# Patient Record
Sex: FEMALE | Race: WHITE | Employment: OTHER
[De-identification: names, ages, dates, MRNs, and addresses within clinical notes are randomized per-mention and may not be internally consistent; named-entity substitution may affect disease eponyms.]

---

## 2017-09-12 ENCOUNTER — HOSPITAL ENCOUNTER (OUTPATIENT)
Dept: ULTRASOUND IMAGING | Facility: CLINIC | Age: 57
Discharge: HOME | End: 2017-09-12
Payer: COMMERCIAL

## 2017-09-12 DIAGNOSIS — M25.512 LEFT SHOULDER PAIN, UNSPECIFIED CHRONICITY: ICD-10-CM

## 2017-09-12 PROCEDURE — 76881 US COMPL JOINT R-T W/IMG: CPT

## 2018-06-08 ENCOUNTER — HOSPITAL ENCOUNTER (EMERGENCY)
Age: 58
Discharge: HOME | End: 2018-06-08
Attending: EMERGENCY MEDICINE
Payer: COMMERCIAL

## 2018-06-08 ENCOUNTER — APPOINTMENT (OUTPATIENT)
Dept: CT IMAGING | Age: 58
End: 2018-06-08
Payer: COMMERCIAL

## 2018-06-08 ENCOUNTER — APPOINTMENT (OUTPATIENT)
Dept: GENERAL RADIOLOGY | Age: 58
End: 2018-06-08
Payer: COMMERCIAL

## 2018-06-08 VITALS
RESPIRATION RATE: 18 BRPM | TEMPERATURE: 98.4 F | SYSTOLIC BLOOD PRESSURE: 118 MMHG | OXYGEN SATURATION: 100 % | HEART RATE: 82 BPM | DIASTOLIC BLOOD PRESSURE: 102 MMHG

## 2018-06-08 DIAGNOSIS — G44.209 ACUTE NON INTRACTABLE TENSION-TYPE HEADACHE: ICD-10-CM

## 2018-06-08 DIAGNOSIS — R06.02 SHORTNESS OF BREATH: Primary | ICD-10-CM

## 2018-06-08 DIAGNOSIS — R07.9 CHEST PAIN, UNSPECIFIED TYPE: ICD-10-CM

## 2018-06-08 LAB
ANION GAP SERPL CALCULATED.3IONS-SCNC: 10 MMOL/L (ref 9–17)
BUN BLDV-MCNC: 16 MG/DL (ref 6–20)
BUN/CREAT BLD: ABNORMAL (ref 9–20)
CALCIUM SERPL-MCNC: 9 MG/DL (ref 8.6–10.4)
CHLORIDE BLD-SCNC: 99 MMOL/L (ref 98–107)
CO2: 26 MMOL/L (ref 20–31)
CREAT SERPL-MCNC: 0.64 MG/DL (ref 0.5–0.9)
D-DIMER QUANTITATIVE: 1.92 MG/L FEU
EKG ATRIAL RATE: 86 BPM
EKG P AXIS: 103 DEGREES
EKG P-R INTERVAL: 160 MS
EKG Q-T INTERVAL: 364 MS
EKG QRS DURATION: 82 MS
EKG QTC CALCULATION (BAZETT): 435 MS
EKG R AXIS: -14 DEGREES
EKG T AXIS: 15 DEGREES
EKG VENTRICULAR RATE: 86 BPM
GFR AFRICAN AMERICAN: >60 ML/MIN
GFR NON-AFRICAN AMERICAN: >60 ML/MIN
GFR SERPL CREATININE-BSD FRML MDRD: ABNORMAL ML/MIN/{1.73_M2}
GFR SERPL CREATININE-BSD FRML MDRD: ABNORMAL ML/MIN/{1.73_M2}
GLUCOSE BLD-MCNC: 124 MG/DL (ref 70–99)
HCT VFR BLD CALC: 38.2 % (ref 36.3–47.1)
HEMOGLOBIN: 12.3 G/DL (ref 11.9–15.1)
MCH RBC QN AUTO: 28.3 PG (ref 25.2–33.5)
MCHC RBC AUTO-ENTMCNC: 32.2 G/DL (ref 28.4–34.8)
MCV RBC AUTO: 88 FL (ref 82.6–102.9)
NRBC AUTOMATED: 0 PER 100 WBC
PDW BLD-RTO: 13.7 % (ref 11.8–14.4)
PLATELET # BLD: 236 K/UL (ref 138–453)
PMV BLD AUTO: 10.5 FL (ref 8.1–13.5)
POC TROPONIN I: 0 NG/ML (ref 0–0.1)
POC TROPONIN I: 0 NG/ML (ref 0–0.1)
POC TROPONIN INTERP: NORMAL
POC TROPONIN INTERP: NORMAL
POTASSIUM SERPL-SCNC: 4.4 MMOL/L (ref 3.7–5.3)
RBC # BLD: 4.34 M/UL (ref 3.95–5.11)
SODIUM BLD-SCNC: 135 MMOL/L (ref 135–144)
WBC # BLD: 6.7 K/UL (ref 3.5–11.3)

## 2018-06-08 PROCEDURE — 6360000004 HC RX CONTRAST MEDICATION: Performed by: STUDENT IN AN ORGANIZED HEALTH CARE EDUCATION/TRAINING PROGRAM

## 2018-06-08 PROCEDURE — 85379 FIBRIN DEGRADATION QUANT: CPT

## 2018-06-08 PROCEDURE — 71046 X-RAY EXAM CHEST 2 VIEWS: CPT

## 2018-06-08 PROCEDURE — 84484 ASSAY OF TROPONIN QUANT: CPT

## 2018-06-08 PROCEDURE — 85027 COMPLETE CBC AUTOMATED: CPT

## 2018-06-08 PROCEDURE — 80048 BASIC METABOLIC PNL TOTAL CA: CPT

## 2018-06-08 PROCEDURE — 93971 EXTREMITY STUDY: CPT

## 2018-06-08 PROCEDURE — 71260 CT THORAX DX C+: CPT

## 2018-06-08 PROCEDURE — G0384 LEV 5 HOSP TYPE B ED VISIT: HCPCS

## 2018-06-08 PROCEDURE — 93005 ELECTROCARDIOGRAM TRACING: CPT

## 2018-06-08 PROCEDURE — 6370000000 HC RX 637 (ALT 250 FOR IP): Performed by: STUDENT IN AN ORGANIZED HEALTH CARE EDUCATION/TRAINING PROGRAM

## 2018-06-08 RX ORDER — ACETAMINOPHEN 325 MG/1
650 TABLET ORAL ONCE
Status: COMPLETED | OUTPATIENT
Start: 2018-06-08 | End: 2018-06-08

## 2018-06-08 RX ADMIN — IOPAMIDOL 75 ML: 755 INJECTION, SOLUTION INTRAVENOUS at 16:24

## 2018-06-08 RX ADMIN — ACETAMINOPHEN 650 MG: 325 TABLET ORAL at 16:05

## 2018-06-08 ASSESSMENT — ENCOUNTER SYMPTOMS
CONSTIPATION: 0
NAUSEA: 1
COUGH: 1
ABDOMINAL PAIN: 0
SORE THROAT: 0
VOMITING: 0
WHEEZING: 0
DIARRHEA: 0

## 2018-06-08 ASSESSMENT — PAIN SCALES - GENERAL
PAINLEVEL_OUTOF10: 6
PAINLEVEL_OUTOF10: 6

## 2018-06-08 ASSESSMENT — HEART SCORE
ECG: 0
ECG: 1
ECG: 0

## 2018-06-08 ASSESSMENT — PAIN DESCRIPTION - LOCATION: LOCATION: CHEST;ABDOMEN

## 2018-06-08 ASSESSMENT — PAIN DESCRIPTION - PAIN TYPE: TYPE: ACUTE PAIN

## 2018-06-08 ASSESSMENT — PAIN DESCRIPTION - DESCRIPTORS: DESCRIPTORS: ACHING

## 2020-05-31 ENCOUNTER — HOSPITAL ENCOUNTER (INPATIENT)
Age: 60
LOS: 5 days | Discharge: HOME | DRG: 435 | End: 2020-06-06
Attending: EMERGENCY MEDICINE | Admitting: INTERNAL MEDICINE
Payer: COMMERCIAL

## 2020-05-31 ENCOUNTER — APPOINTMENT (OUTPATIENT)
Dept: GENERAL RADIOLOGY | Age: 60
DRG: 435 | End: 2020-05-31
Payer: COMMERCIAL

## 2020-05-31 LAB
ABSOLUTE EOS #: 0 K/UL (ref 0–0.4)
ABSOLUTE IMMATURE GRANULOCYTE: 0 K/UL (ref 0–0.3)
ABSOLUTE LYMPH #: 0.98 K/UL (ref 1–4.8)
ABSOLUTE MONO #: 1.18 K/UL (ref 0.1–0.8)
ALBUMIN SERPL-MCNC: 3.1 G/DL (ref 3.5–5.2)
ALBUMIN/GLOBULIN RATIO: 1 (ref 1–2.5)
ALLEN TEST: ABNORMAL
ALLEN TEST: ABNORMAL
ALP BLD-CCNC: 550 U/L (ref 35–104)
ALT SERPL-CCNC: 655 U/L (ref 5–33)
ANION GAP SERPL CALCULATED.3IONS-SCNC: ABNORMAL MMOL/L (ref 9–17)
ANION GAP: 17 MMOL/L (ref 7–16)
AST SERPL-CCNC: 169 U/L
BASOPHILS # BLD: 0 % (ref 0–2)
BASOPHILS ABSOLUTE: 0 K/UL (ref 0–0.2)
BETA-HYDROXYBUTYRATE: 11.05 MMOL/L (ref 0.02–0.27)
BILIRUB SERPL-MCNC: 0.54 MG/DL (ref 0.3–1.2)
BUN BLDV-MCNC: 41 MG/DL (ref 6–20)
BUN/CREAT BLD: ABNORMAL (ref 9–20)
CALCIUM SERPL-MCNC: 9.5 MG/DL (ref 8.6–10.4)
CARBOXYHEMOGLOBIN: 1.6 % (ref 0–5)
CHLORIDE BLD-SCNC: 83 MMOL/L (ref 98–107)
CHP ED QC CHECK: YES
CO2: <6 MMOL/L (ref 20–31)
CREAT SERPL-MCNC: 1.76 MG/DL (ref 0.5–0.9)
DIFFERENTIAL TYPE: ABNORMAL
EOSINOPHILS RELATIVE PERCENT: 0 % (ref 1–4)
FIO2: ABNORMAL
FIO2: ABNORMAL
GFR AFRICAN AMERICAN: 36 ML/MIN
GFR NON-AFRICAN AMERICAN: 29 ML/MIN
GFR NON-AFRICAN AMERICAN: 30 ML/MIN
GFR SERPL CREATININE-BSD FRML MDRD: 36 ML/MIN
GFR SERPL CREATININE-BSD FRML MDRD: ABNORMAL ML/MIN/{1.73_M2}
GLUCOSE BLD-MCNC: 1078 MG/DL (ref 70–99)
GLUCOSE BLD-MCNC: 700 MG/DL
GLUCOSE BLD-MCNC: >700 MG/DL (ref 74–100)
HCO3 VENOUS: 6.6 MMOL/L (ref 24–30)
HCO3 VENOUS: 7.8 MMOL/L (ref 22–29)
HCT VFR BLD CALC: 40.8 % (ref 36.3–47.1)
HEMOGLOBIN: 11.5 G/DL (ref 11.9–15.1)
IMMATURE GRANULOCYTES: 0 %
INR BLD: 1.1
LACTIC ACID, SEPSIS WHOLE BLOOD: 3.8 MMOL/L (ref 0.5–1.9)
LACTIC ACID, SEPSIS WHOLE BLOOD: 3.8 MMOL/L (ref 0.5–1.9)
LACTIC ACID, SEPSIS: ABNORMAL MMOL/L (ref 0.5–1.9)
LACTIC ACID, SEPSIS: ABNORMAL MMOL/L (ref 0.5–1.9)
LIPASE: 12 U/L (ref 13–60)
LYMPHOCYTES # BLD: 5 % (ref 24–44)
MCH RBC QN AUTO: 28.6 PG (ref 25.2–33.5)
MCHC RBC AUTO-ENTMCNC: 28.2 G/DL (ref 28.4–34.8)
MCV RBC AUTO: 101.5 FL (ref 82.6–102.9)
METHEMOGLOBIN: ABNORMAL % (ref 0–1.5)
MODE: ABNORMAL
MODE: ABNORMAL
MONOCYTES # BLD: 6 % (ref 1–7)
MORPHOLOGY: ABNORMAL
NEGATIVE BASE EXCESS, VEN: 22 (ref 0–2)
NEGATIVE BASE EXCESS, VEN: 23.6 MMOL/L (ref 0–2)
NOTIFICATION TIME: ABNORMAL
NOTIFICATION: ABNORMAL
NRBC AUTOMATED: 0 PER 100 WBC
O2 DEVICE/FLOW/%: ABNORMAL
O2 DEVICE/FLOW/%: ABNORMAL
O2 SAT, VEN: 65 % (ref 60–85)
O2 SAT, VEN: 96.3 % (ref 60–85)
OXYHEMOGLOBIN: ABNORMAL % (ref 95–98)
PARTIAL THROMBOPLASTIN TIME: 26.5 SEC (ref 20.5–30.5)
PATIENT TEMP: 37
PATIENT TEMP: ABNORMAL
PCO2, VEN, TEMP ADJ: ABNORMAL MMHG (ref 39–55)
PCO2, VEN: 28.1 (ref 39–55)
PCO2, VEN: 31 MM HG (ref 41–51)
PDW BLD-RTO: 14.6 % (ref 11.8–14.4)
PEEP/CPAP: ABNORMAL
PH VENOUS: 7 (ref 7.32–7.42)
PH VENOUS: 7.01 (ref 7.32–7.43)
PH, VEN, TEMP ADJ: ABNORMAL (ref 7.32–7.42)
PLATELET # BLD: 473 K/UL (ref 138–453)
PLATELET ESTIMATE: ABNORMAL
PMV BLD AUTO: 11.2 FL (ref 8.1–13.5)
PO2, VEN, TEMP ADJ: ABNORMAL MMHG (ref 30–50)
PO2, VEN: 109 (ref 30–50)
PO2, VEN: 49.2 MM HG (ref 30–50)
POC CHLORIDE: 98 MMOL/L (ref 98–107)
POC CREATININE: 1.78 MG/DL (ref 0.51–1.19)
POC HEMATOCRIT: 39 % (ref 36–46)
POC HEMOGLOBIN: 13.1 G/DL (ref 12–16)
POC IONIZED CALCIUM: 1.23 MMOL/L (ref 1.15–1.33)
POC LACTIC ACID: 3.61 MMOL/L (ref 0.56–1.39)
POC PCO2 TEMP: ABNORMAL MM HG
POC PH TEMP: ABNORMAL
POC PO2 TEMP: ABNORMAL MM HG
POC POTASSIUM: 7 MMOL/L (ref 3.5–4.5)
POC SODIUM: 123 MMOL/L (ref 138–146)
POSITIVE BASE EXCESS, VEN: ABNORMAL (ref 0–3)
POSITIVE BASE EXCESS, VEN: ABNORMAL MMOL/L (ref 0–2)
POTASSIUM SERPL-SCNC: 6.9 MMOL/L (ref 3.7–5.3)
PROTHROMBIN TIME: 11.8 SEC (ref 9–12)
PSV: ABNORMAL
PT. POSITION: ABNORMAL
RBC # BLD: 4.02 M/UL (ref 3.95–5.11)
RBC # BLD: ABNORMAL 10*6/UL
RESPIRATORY RATE: ABNORMAL
SAMPLE SITE: ABNORMAL
SAMPLE SITE: ABNORMAL
SEG NEUTROPHILS: 89 % (ref 36–66)
SEGMENTED NEUTROPHILS ABSOLUTE COUNT: 17.44 K/UL (ref 1.8–7.7)
SET RATE: ABNORMAL
SODIUM BLD-SCNC: 124 MMOL/L (ref 135–144)
TEXT FOR RESPIRATORY: ABNORMAL
TOTAL CO2, VENOUS: 9 MMOL/L (ref 23–30)
TOTAL HB: ABNORMAL G/DL (ref 12–16)
TOTAL PROTEIN: 6.2 G/DL (ref 6.4–8.3)
TOTAL RATE: ABNORMAL
TROPONIN INTERP: ABNORMAL
TROPONIN T: ABNORMAL NG/ML
TROPONIN, HIGH SENSITIVITY: 53 NG/L (ref 0–14)
VT: ABNORMAL
WBC # BLD: 19.6 K/UL (ref 3.5–11.3)
WBC # BLD: ABNORMAL 10*3/UL

## 2020-05-31 PROCEDURE — 82947 ASSAY GLUCOSE BLOOD QUANT: CPT

## 2020-05-31 PROCEDURE — 82565 ASSAY OF CREATININE: CPT

## 2020-05-31 PROCEDURE — 87086 URINE CULTURE/COLONY COUNT: CPT

## 2020-05-31 PROCEDURE — 80053 COMPREHEN METABOLIC PANEL: CPT

## 2020-05-31 PROCEDURE — 84484 ASSAY OF TROPONIN QUANT: CPT

## 2020-05-31 PROCEDURE — 93005 ELECTROCARDIOGRAM TRACING: CPT | Performed by: EMERGENCY MEDICINE

## 2020-05-31 PROCEDURE — 85610 PROTHROMBIN TIME: CPT

## 2020-05-31 PROCEDURE — 82805 BLOOD GASES W/O2 SATURATION: CPT

## 2020-05-31 PROCEDURE — 87040 BLOOD CULTURE FOR BACTERIA: CPT

## 2020-05-31 PROCEDURE — 82010 KETONE BODYS QUAN: CPT

## 2020-05-31 PROCEDURE — 82435 ASSAY OF BLOOD CHLORIDE: CPT

## 2020-05-31 PROCEDURE — 6370000000 HC RX 637 (ALT 250 FOR IP): Performed by: EMERGENCY MEDICINE

## 2020-05-31 PROCEDURE — 84132 ASSAY OF SERUM POTASSIUM: CPT

## 2020-05-31 PROCEDURE — 96365 THER/PROPH/DIAG IV INF INIT: CPT

## 2020-05-31 PROCEDURE — 2580000003 HC RX 258: Performed by: EMERGENCY MEDICINE

## 2020-05-31 PROCEDURE — 85025 COMPLETE CBC W/AUTO DIFF WBC: CPT

## 2020-05-31 PROCEDURE — 83690 ASSAY OF LIPASE: CPT

## 2020-05-31 PROCEDURE — 84295 ASSAY OF SERUM SODIUM: CPT

## 2020-05-31 PROCEDURE — 36415 COLL VENOUS BLD VENIPUNCTURE: CPT

## 2020-05-31 PROCEDURE — 82803 BLOOD GASES ANY COMBINATION: CPT

## 2020-05-31 PROCEDURE — 96366 THER/PROPH/DIAG IV INF ADDON: CPT

## 2020-05-31 PROCEDURE — 71045 X-RAY EXAM CHEST 1 VIEW: CPT

## 2020-05-31 PROCEDURE — 83605 ASSAY OF LACTIC ACID: CPT

## 2020-05-31 PROCEDURE — 99285 EMERGENCY DEPT VISIT HI MDM: CPT

## 2020-05-31 PROCEDURE — 85730 THROMBOPLASTIN TIME PARTIAL: CPT

## 2020-05-31 PROCEDURE — 82330 ASSAY OF CALCIUM: CPT

## 2020-05-31 PROCEDURE — 85014 HEMATOCRIT: CPT

## 2020-05-31 RX ORDER — CALCIUM GLUCONATE 20 MG/ML
2 INJECTION, SOLUTION INTRAVENOUS ONCE
Status: COMPLETED | OUTPATIENT
Start: 2020-06-01 | End: 2020-06-01

## 2020-05-31 RX ORDER — 0.9 % SODIUM CHLORIDE 0.9 %
1000 INTRAVENOUS SOLUTION INTRAVENOUS ONCE
Status: COMPLETED | OUTPATIENT
Start: 2020-05-31 | End: 2020-06-01

## 2020-05-31 RX ORDER — 0.9 % SODIUM CHLORIDE 0.9 %
15 INTRAVENOUS SOLUTION INTRAVENOUS ONCE
Status: COMPLETED | OUTPATIENT
Start: 2020-05-31 | End: 2020-06-01

## 2020-05-31 RX ORDER — POTASSIUM CHLORIDE 7.45 MG/ML
10 INJECTION INTRAVENOUS PRN
Status: DISCONTINUED | OUTPATIENT
Start: 2020-05-31 | End: 2020-06-06 | Stop reason: HOSPADM

## 2020-05-31 RX ORDER — SODIUM CHLORIDE 9 MG/ML
INJECTION, SOLUTION INTRAVENOUS CONTINUOUS
Status: DISCONTINUED | OUTPATIENT
Start: 2020-06-01 | End: 2020-06-01

## 2020-05-31 RX ORDER — DEXTROSE AND SODIUM CHLORIDE 5; .45 G/100ML; G/100ML
INJECTION, SOLUTION INTRAVENOUS CONTINUOUS PRN
Status: DISCONTINUED | OUTPATIENT
Start: 2020-05-31 | End: 2020-06-06 | Stop reason: HOSPADM

## 2020-05-31 RX ORDER — SODIUM CHLORIDE, SODIUM LACTATE, POTASSIUM CHLORIDE, CALCIUM CHLORIDE 600; 310; 30; 20 MG/100ML; MG/100ML; MG/100ML; MG/100ML
1000 INJECTION, SOLUTION INTRAVENOUS ONCE
Status: COMPLETED | OUTPATIENT
Start: 2020-05-31 | End: 2020-06-01

## 2020-05-31 RX ADMIN — SODIUM CHLORIDE 0.1 UNITS/KG/HR: 9 INJECTION, SOLUTION INTRAVENOUS at 23:28

## 2020-05-31 RX ADMIN — SODIUM CHLORIDE, POTASSIUM CHLORIDE, SODIUM LACTATE AND CALCIUM CHLORIDE 1000 ML: 600; 310; 30; 20 INJECTION, SOLUTION INTRAVENOUS at 22:53

## 2020-05-31 RX ADMIN — SODIUM CHLORIDE 1000 ML: 9 INJECTION, SOLUTION INTRAVENOUS at 23:28

## 2020-05-31 ASSESSMENT — ENCOUNTER SYMPTOMS
CONSTIPATION: 1
BACK PAIN: 1
SORE THROAT: 0
SHORTNESS OF BREATH: 1
ABDOMINAL PAIN: 1
DIARRHEA: 0
VOMITING: 1
COUGH: 0
NAUSEA: 1

## 2020-05-31 ASSESSMENT — PAIN DESCRIPTION - ORIENTATION: ORIENTATION: RIGHT;LEFT;UPPER

## 2020-05-31 ASSESSMENT — PAIN DESCRIPTION - FREQUENCY: FREQUENCY: INTERMITTENT

## 2020-05-31 ASSESSMENT — PAIN DESCRIPTION - PROGRESSION: CLINICAL_PROGRESSION: NOT CHANGED

## 2020-05-31 ASSESSMENT — PAIN DESCRIPTION - DESCRIPTORS: DESCRIPTORS: ACHING

## 2020-05-31 ASSESSMENT — PAIN DESCRIPTION - ONSET: ONSET: ON-GOING

## 2020-05-31 ASSESSMENT — PAIN DESCRIPTION - PAIN TYPE: TYPE: ACUTE PAIN

## 2020-05-31 ASSESSMENT — PAIN DESCRIPTION - LOCATION: LOCATION: ABDOMEN

## 2020-06-01 PROBLEM — E11.10 DKA, TYPE 2, NOT AT GOAL (HCC): Status: ACTIVE | Noted: 2020-06-01

## 2020-06-01 LAB
-: ABNORMAL
ALBUMIN SERPL-MCNC: 3 G/DL (ref 3.5–5.2)
ALBUMIN/GLOBULIN RATIO: 1 (ref 1–2.5)
ALLEN TEST: ABNORMAL
ALLEN TEST: ABNORMAL
ALP BLD-CCNC: 499 U/L (ref 35–104)
ALT SERPL-CCNC: 613 U/L (ref 5–33)
AMORPHOUS: ABNORMAL
ANION GAP SERPL CALCULATED.3IONS-SCNC: 14 MMOL/L (ref 9–17)
ANION GAP SERPL CALCULATED.3IONS-SCNC: 14 MMOL/L (ref 9–17)
ANION GAP SERPL CALCULATED.3IONS-SCNC: 19 MMOL/L (ref 9–17)
ANION GAP SERPL CALCULATED.3IONS-SCNC: 29 MMOL/L (ref 9–17)
ANION GAP SERPL CALCULATED.3IONS-SCNC: 30 MMOL/L (ref 9–17)
AST SERPL-CCNC: 133 U/L
BACTERIA: ABNORMAL
BETA-HYDROXYBUTYRATE: 8.76 MMOL/L (ref 0.02–0.27)
BILIRUB SERPL-MCNC: 0.43 MG/DL (ref 0.3–1.2)
BILIRUBIN URINE: NEGATIVE
BUN BLDV-MCNC: 36 MG/DL (ref 6–20)
BUN BLDV-MCNC: 38 MG/DL (ref 6–20)
BUN BLDV-MCNC: 39 MG/DL (ref 6–20)
BUN BLDV-MCNC: 41 MG/DL (ref 6–20)
BUN BLDV-MCNC: 43 MG/DL (ref 6–20)
BUN/CREAT BLD: ABNORMAL (ref 9–20)
CALCIUM SERPL-MCNC: 8.6 MG/DL (ref 8.6–10.4)
CALCIUM SERPL-MCNC: 8.7 MG/DL (ref 8.6–10.4)
CALCIUM SERPL-MCNC: 8.9 MG/DL (ref 8.6–10.4)
CALCIUM SERPL-MCNC: 9.2 MG/DL (ref 8.6–10.4)
CALCIUM SERPL-MCNC: 9.2 MG/DL (ref 8.6–10.4)
CARBOXYHEMOGLOBIN: 0.8 % (ref 0–5)
CARBOXYHEMOGLOBIN: 3.9 % (ref 0–5)
CASTS UA: ABNORMAL /LPF (ref 0–2)
CASTS UA: ABNORMAL /LPF (ref 0–2)
CHLORIDE BLD-SCNC: 102 MMOL/L (ref 98–107)
CHLORIDE BLD-SCNC: 103 MMOL/L (ref 98–107)
CHLORIDE BLD-SCNC: 104 MMOL/L (ref 98–107)
CHLORIDE BLD-SCNC: 92 MMOL/L (ref 98–107)
CHLORIDE BLD-SCNC: 98 MMOL/L (ref 98–107)
CO2: 16 MMOL/L (ref 20–31)
CO2: 17 MMOL/L (ref 20–31)
CO2: 19 MMOL/L (ref 20–31)
CO2: 7 MMOL/L (ref 20–31)
CO2: 9 MMOL/L (ref 20–31)
COLOR: YELLOW
COMMENT UA: ABNORMAL
CREAT SERPL-MCNC: 1.23 MG/DL (ref 0.5–0.9)
CREAT SERPL-MCNC: 1.26 MG/DL (ref 0.5–0.9)
CREAT SERPL-MCNC: 1.33 MG/DL (ref 0.5–0.9)
CREAT SERPL-MCNC: 1.7 MG/DL (ref 0.5–0.9)
CREAT SERPL-MCNC: 1.78 MG/DL (ref 0.5–0.9)
CRYSTALS, UA: ABNORMAL /HPF
DIRECT EXAM: ABNORMAL
EKG ATRIAL RATE: 103 BPM
EKG ATRIAL RATE: 99 BPM
EKG P AXIS: 25 DEGREES
EKG P AXIS: 48 DEGREES
EKG P-R INTERVAL: 146 MS
EKG P-R INTERVAL: 152 MS
EKG Q-T INTERVAL: 340 MS
EKG Q-T INTERVAL: 378 MS
EKG QRS DURATION: 100 MS
EKG QRS DURATION: 86 MS
EKG QTC CALCULATION (BAZETT): 436 MS
EKG QTC CALCULATION (BAZETT): 495 MS
EKG R AXIS: -27 DEGREES
EKG R AXIS: -9 DEGREES
EKG T AXIS: 28 DEGREES
EKG T AXIS: 59 DEGREES
EKG VENTRICULAR RATE: 103 BPM
EKG VENTRICULAR RATE: 99 BPM
EPITHELIAL CELLS UA: ABNORMAL /HPF (ref 0–5)
ESTIMATED AVERAGE GLUCOSE: 260 MG/DL
FIO2: ABNORMAL
FIO2: ABNORMAL
GFR AFRICAN AMERICAN: 36 ML/MIN
GFR AFRICAN AMERICAN: 38 ML/MIN
GFR AFRICAN AMERICAN: 50 ML/MIN
GFR AFRICAN AMERICAN: 53 ML/MIN
GFR AFRICAN AMERICAN: 55 ML/MIN
GFR NON-AFRICAN AMERICAN: 29 ML/MIN
GFR NON-AFRICAN AMERICAN: 31 ML/MIN
GFR NON-AFRICAN AMERICAN: 41 ML/MIN
GFR NON-AFRICAN AMERICAN: 44 ML/MIN
GFR NON-AFRICAN AMERICAN: 45 ML/MIN
GFR SERPL CREATININE-BSD FRML MDRD: ABNORMAL ML/MIN/{1.73_M2}
GLUCOSE BLD-MCNC: 162 MG/DL (ref 65–105)
GLUCOSE BLD-MCNC: 174 MG/DL (ref 65–105)
GLUCOSE BLD-MCNC: 175 MG/DL (ref 65–105)
GLUCOSE BLD-MCNC: 176 MG/DL (ref 65–105)
GLUCOSE BLD-MCNC: 177 MG/DL (ref 65–105)
GLUCOSE BLD-MCNC: 178 MG/DL (ref 65–105)
GLUCOSE BLD-MCNC: 187 MG/DL (ref 65–105)
GLUCOSE BLD-MCNC: 192 MG/DL (ref 70–99)
GLUCOSE BLD-MCNC: 195 MG/DL (ref 70–99)
GLUCOSE BLD-MCNC: 213 MG/DL (ref 65–105)
GLUCOSE BLD-MCNC: 230 MG/DL (ref 65–105)
GLUCOSE BLD-MCNC: 318 MG/DL (ref 65–105)
GLUCOSE BLD-MCNC: 351 MG/DL (ref 65–105)
GLUCOSE BLD-MCNC: 355 MG/DL (ref 65–105)
GLUCOSE BLD-MCNC: 393 MG/DL (ref 70–99)
GLUCOSE BLD-MCNC: 411 MG/DL (ref 65–105)
GLUCOSE BLD-MCNC: 450 MG/DL (ref 65–105)
GLUCOSE BLD-MCNC: 471 MG/DL (ref 65–105)
GLUCOSE BLD-MCNC: 525 MG/DL (ref 65–105)
GLUCOSE BLD-MCNC: 555 MG/DL (ref 65–105)
GLUCOSE BLD-MCNC: 641 MG/DL (ref 70–99)
GLUCOSE BLD-MCNC: 817 MG/DL (ref 70–99)
GLUCOSE BLD-MCNC: >600 MG/DL (ref 65–105)
GLUCOSE BLD-MCNC: >600 MG/DL (ref 65–105)
GLUCOSE URINE: ABNORMAL
HBA1C MFR BLD: 10.7 % (ref 4–6)
HCO3 ARTERIAL: 20.5 MMOL/L (ref 22–27)
HCO3 VENOUS: 18.2 MMOL/L (ref 24–30)
KETONES, URINE: ABNORMAL
LACTIC ACID, SEPSIS WHOLE BLOOD: 3.6 MMOL/L (ref 0.5–1.9)
LACTIC ACID, SEPSIS: ABNORMAL MMOL/L (ref 0.5–1.9)
LACTIC ACID, WHOLE BLOOD: 1.5 MMOL/L (ref 0.7–2.1)
LACTIC ACID: NORMAL MMOL/L
LEUKOCYTE ESTERASE, URINE: NEGATIVE
LIPASE: 14 U/L (ref 13–60)
Lab: ABNORMAL
MAGNESIUM: 1.3 MG/DL (ref 1.6–2.6)
MAGNESIUM: 1.4 MG/DL (ref 1.6–2.6)
MAGNESIUM: 1.8 MG/DL (ref 1.6–2.6)
MAGNESIUM: 1.9 MG/DL (ref 1.6–2.6)
METHEMOGLOBIN: ABNORMAL % (ref 0–1.5)
METHEMOGLOBIN: ABNORMAL % (ref 0–1.5)
MODE: ABNORMAL
MODE: ABNORMAL
MUCUS: ABNORMAL
MYOGLOBIN: 316 NG/ML (ref 25–58)
NEGATIVE BASE EXCESS, ART: 3.8 MMOL/L (ref 0–2)
NEGATIVE BASE EXCESS, VEN: 4.8 MMOL/L (ref 0–2)
NITRITE, URINE: NEGATIVE
NOTIFICATION TIME: ABNORMAL
NOTIFICATION TIME: ABNORMAL
NOTIFICATION: ABNORMAL
NOTIFICATION: ABNORMAL
O2 DEVICE/FLOW/%: ABNORMAL
O2 DEVICE/FLOW/%: ABNORMAL
O2 SAT, ARTERIAL: 97.2 % (ref 94–100)
O2 SAT, VEN: 99.2 % (ref 60–85)
OTHER OBSERVATIONS UA: ABNORMAL
OXYHEMOGLOBIN: ABNORMAL % (ref 95–98)
OXYHEMOGLOBIN: ABNORMAL % (ref 95–98)
PARTIAL THROMBOPLASTIN TIME: 23.9 SEC (ref 20.5–30.5)
PATIENT TEMP: 37
PATIENT TEMP: 37
PCO2 ARTERIAL: 36.9 MMHG (ref 32–45)
PCO2, ART, TEMP ADJ: ABNORMAL (ref 32–45)
PCO2, VEN, TEMP ADJ: ABNORMAL MMHG (ref 39–55)
PCO2, VEN: 28.2 (ref 39–55)
PEEP/CPAP: ABNORMAL
PEEP/CPAP: ABNORMAL
PH ARTERIAL: 7.37 (ref 7.35–7.45)
PH UA: 5 (ref 5–8)
PH VENOUS: 7.42 (ref 7.32–7.42)
PH, ART, TEMP ADJ: ABNORMAL (ref 7.35–7.45)
PH, VEN, TEMP ADJ: ABNORMAL (ref 7.32–7.42)
PHOSPHORUS: 2.1 MG/DL (ref 2.6–4.5)
PHOSPHORUS: 2.2 MG/DL (ref 2.6–4.5)
PHOSPHORUS: 2.7 MG/DL (ref 2.6–4.5)
PHOSPHORUS: 3.7 MG/DL (ref 2.6–4.5)
PO2 ARTERIAL: 90.6 MMHG (ref 75–95)
PO2, ART, TEMP ADJ: ABNORMAL MMHG (ref 75–95)
PO2, VEN, TEMP ADJ: ABNORMAL MMHG (ref 30–50)
PO2, VEN: 166 (ref 30–50)
POSITIVE BASE EXCESS, ART: ABNORMAL MMOL/L (ref 0–2)
POSITIVE BASE EXCESS, VEN: ABNORMAL MMOL/L (ref 0–2)
POTASSIUM SERPL-SCNC: 3.8 MMOL/L (ref 3.7–5.3)
POTASSIUM SERPL-SCNC: 3.9 MMOL/L (ref 3.7–5.3)
POTASSIUM SERPL-SCNC: 4.2 MMOL/L (ref 3.7–5.3)
POTASSIUM SERPL-SCNC: 4.9 MMOL/L (ref 3.7–5.3)
POTASSIUM SERPL-SCNC: 6.3 MMOL/L (ref 3.7–5.3)
PROTEIN UA: ABNORMAL
PSV: ABNORMAL
PSV: ABNORMAL
PT. POSITION: ABNORMAL
PT. POSITION: ABNORMAL
RBC UA: ABNORMAL /HPF (ref 0–2)
RENAL EPITHELIAL, UA: ABNORMAL /HPF
RESPIRATORY RATE: ABNORMAL
RESPIRATORY RATE: ABNORMAL
SAMPLE SITE: ABNORMAL
SAMPLE SITE: ABNORMAL
SET RATE: ABNORMAL
SET RATE: ABNORMAL
SODIUM BLD-SCNC: 129 MMOL/L (ref 135–144)
SODIUM BLD-SCNC: 133 MMOL/L (ref 135–144)
SODIUM BLD-SCNC: 136 MMOL/L (ref 135–144)
SODIUM BLD-SCNC: 137 MMOL/L (ref 135–144)
SODIUM BLD-SCNC: 138 MMOL/L (ref 135–144)
SPECIFIC GRAVITY UA: 1.02 (ref 1–1.03)
SPECIMEN DESCRIPTION: ABNORMAL
TEXT FOR RESPIRATORY: ABNORMAL
TEXT FOR RESPIRATORY: ABNORMAL
TOTAL CK: 106 U/L (ref 26–192)
TOTAL HB: ABNORMAL G/DL (ref 12–16)
TOTAL HB: ABNORMAL G/DL (ref 12–16)
TOTAL PROTEIN: 6 G/DL (ref 6.4–8.3)
TOTAL RATE: ABNORMAL
TOTAL RATE: ABNORMAL
TRICHOMONAS: ABNORMAL
TROPONIN INTERP: ABNORMAL
TROPONIN T: ABNORMAL NG/ML
TROPONIN, HIGH SENSITIVITY: 121 NG/L (ref 0–14)
TROPONIN, HIGH SENSITIVITY: 236 NG/L (ref 0–14)
TROPONIN, HIGH SENSITIVITY: 480 NG/L (ref 0–14)
TROPONIN, HIGH SENSITIVITY: 62 NG/L (ref 0–14)
TROPONIN, HIGH SENSITIVITY: 62 NG/L (ref 0–14)
TROPONIN, HIGH SENSITIVITY: 87 NG/L (ref 0–14)
TURBIDITY: ABNORMAL
URINE HGB: NEGATIVE
UROBILINOGEN, URINE: NORMAL
VT: ABNORMAL
VT: ABNORMAL
WBC UA: ABNORMAL /HPF (ref 0–5)
YEAST: ABNORMAL

## 2020-06-01 PROCEDURE — 6360000002 HC RX W HCPCS: Performed by: STUDENT IN AN ORGANIZED HEALTH CARE EDUCATION/TRAINING PROGRAM

## 2020-06-01 PROCEDURE — 76937 US GUIDE VASCULAR ACCESS: CPT

## 2020-06-01 PROCEDURE — 93010 ELECTROCARDIOGRAM REPORT: CPT | Performed by: INTERNAL MEDICINE

## 2020-06-01 PROCEDURE — 82805 BLOOD GASES W/O2 SATURATION: CPT

## 2020-06-01 PROCEDURE — 6370000000 HC RX 637 (ALT 250 FOR IP): Performed by: STUDENT IN AN ORGANIZED HEALTH CARE EDUCATION/TRAINING PROGRAM

## 2020-06-01 PROCEDURE — 83605 ASSAY OF LACTIC ACID: CPT

## 2020-06-01 PROCEDURE — 36620 INSERTION CATHETER ARTERY: CPT

## 2020-06-01 PROCEDURE — 83036 HEMOGLOBIN GLYCOSYLATED A1C: CPT

## 2020-06-01 PROCEDURE — 84484 ASSAY OF TROPONIN QUANT: CPT

## 2020-06-01 PROCEDURE — 85730 THROMBOPLASTIN TIME PARTIAL: CPT

## 2020-06-01 PROCEDURE — 83690 ASSAY OF LIPASE: CPT

## 2020-06-01 PROCEDURE — 36415 COLL VENOUS BLD VENIPUNCTURE: CPT

## 2020-06-01 PROCEDURE — 80053 COMPREHEN METABOLIC PANEL: CPT

## 2020-06-01 PROCEDURE — 37799 UNLISTED PX VASCULAR SURGERY: CPT

## 2020-06-01 PROCEDURE — 2580000003 HC RX 258: Performed by: STUDENT IN AN ORGANIZED HEALTH CARE EDUCATION/TRAINING PROGRAM

## 2020-06-01 PROCEDURE — 2580000003 HC RX 258: Performed by: EMERGENCY MEDICINE

## 2020-06-01 PROCEDURE — 2000000000 HC ICU R&B

## 2020-06-01 PROCEDURE — 83735 ASSAY OF MAGNESIUM: CPT

## 2020-06-01 PROCEDURE — 82947 ASSAY GLUCOSE BLOOD QUANT: CPT

## 2020-06-01 PROCEDURE — 82550 ASSAY OF CK (CPK): CPT

## 2020-06-01 PROCEDURE — 82010 KETONE BODYS QUAN: CPT

## 2020-06-01 PROCEDURE — 03HY32Z INSERTION OF MONITORING DEVICE INTO UPPER ARTERY, PERCUTANEOUS APPROACH: ICD-10-PCS | Performed by: INTERNAL MEDICINE

## 2020-06-01 PROCEDURE — 84100 ASSAY OF PHOSPHORUS: CPT

## 2020-06-01 PROCEDURE — 99291 CRITICAL CARE FIRST HOUR: CPT | Performed by: INTERNAL MEDICINE

## 2020-06-01 PROCEDURE — 80048 BASIC METABOLIC PNL TOTAL CA: CPT

## 2020-06-01 PROCEDURE — 83874 ASSAY OF MYOGLOBIN: CPT

## 2020-06-01 PROCEDURE — 6360000002 HC RX W HCPCS: Performed by: EMERGENCY MEDICINE

## 2020-06-01 PROCEDURE — 2500000003 HC RX 250 WO HCPCS: Performed by: STUDENT IN AN ORGANIZED HEALTH CARE EDUCATION/TRAINING PROGRAM

## 2020-06-01 PROCEDURE — 93005 ELECTROCARDIOGRAM TRACING: CPT | Performed by: STUDENT IN AN ORGANIZED HEALTH CARE EDUCATION/TRAINING PROGRAM

## 2020-06-01 PROCEDURE — 87641 MR-STAPH DNA AMP PROBE: CPT

## 2020-06-01 PROCEDURE — 81001 URINALYSIS AUTO W/SCOPE: CPT

## 2020-06-01 RX ORDER — MAGNESIUM SULFATE 1 G/100ML
1 INJECTION INTRAVENOUS PRN
Status: DISCONTINUED | OUTPATIENT
Start: 2020-06-01 | End: 2020-06-06 | Stop reason: HOSPADM

## 2020-06-01 RX ORDER — HEPARIN SODIUM 10000 [USP'U]/100ML
8.51 INJECTION, SOLUTION INTRAVENOUS CONTINUOUS
Status: DISCONTINUED | OUTPATIENT
Start: 2020-06-01 | End: 2020-06-01

## 2020-06-01 RX ORDER — HEPARIN SODIUM 10000 [USP'U]/100ML
1000 INJECTION, SOLUTION INTRAVENOUS CONTINUOUS
Status: DISCONTINUED | OUTPATIENT
Start: 2020-06-01 | End: 2020-06-03

## 2020-06-01 RX ORDER — SODIUM CHLORIDE 450 MG/100ML
INJECTION, SOLUTION INTRAVENOUS CONTINUOUS
Status: DISCONTINUED | OUTPATIENT
Start: 2020-06-01 | End: 2020-06-01

## 2020-06-01 RX ORDER — INSULIN ASPART 100 [IU]/ML
INJECTION, SOLUTION INTRAVENOUS; SUBCUTANEOUS
COMMUNITY
End: 2020-12-23 | Stop reason: HOSPADM

## 2020-06-01 RX ORDER — HEPARIN SODIUM 1000 [USP'U]/ML
2000 INJECTION, SOLUTION INTRAVENOUS; SUBCUTANEOUS PRN
Status: DISCONTINUED | OUTPATIENT
Start: 2020-06-01 | End: 2020-06-01

## 2020-06-01 RX ORDER — HEPARIN SODIUM 1000 [USP'U]/ML
2000 INJECTION, SOLUTION INTRAVENOUS; SUBCUTANEOUS PRN
Status: DISCONTINUED | OUTPATIENT
Start: 2020-06-01 | End: 2020-06-03

## 2020-06-01 RX ORDER — SERTRALINE HYDROCHLORIDE 100 MG/1
150 TABLET, FILM COATED ORAL DAILY
COMMUNITY

## 2020-06-01 RX ORDER — ONDANSETRON 2 MG/ML
4 INJECTION INTRAMUSCULAR; INTRAVENOUS EVERY 6 HOURS PRN
Status: DISCONTINUED | OUTPATIENT
Start: 2020-06-01 | End: 2020-06-06 | Stop reason: HOSPADM

## 2020-06-01 RX ORDER — ASPIRIN 81 MG/1
81 TABLET, CHEWABLE ORAL NIGHTLY
COMMUNITY

## 2020-06-01 RX ORDER — DEXTROSE MONOHYDRATE 25 G/50ML
12.5 INJECTION, SOLUTION INTRAVENOUS PRN
Status: DISCONTINUED | OUTPATIENT
Start: 2020-06-01 | End: 2020-06-06 | Stop reason: HOSPADM

## 2020-06-01 RX ORDER — HEPARIN SODIUM 1000 [USP'U]/ML
4000 INJECTION, SOLUTION INTRAVENOUS; SUBCUTANEOUS ONCE
Status: COMPLETED | OUTPATIENT
Start: 2020-06-01 | End: 2020-06-01

## 2020-06-01 RX ORDER — HEPARIN SODIUM 1000 [USP'U]/ML
4000 INJECTION, SOLUTION INTRAVENOUS; SUBCUTANEOUS ONCE
Status: DISCONTINUED | OUTPATIENT
Start: 2020-06-01 | End: 2020-06-01

## 2020-06-01 RX ORDER — ASPIRIN 81 MG/1
81 TABLET ORAL DAILY
Status: DISCONTINUED | OUTPATIENT
Start: 2020-06-01 | End: 2020-06-06 | Stop reason: HOSPADM

## 2020-06-01 RX ORDER — HEPARIN SODIUM 1000 [USP'U]/ML
4000 INJECTION, SOLUTION INTRAVENOUS; SUBCUTANEOUS PRN
Status: DISCONTINUED | OUTPATIENT
Start: 2020-06-01 | End: 2020-06-03

## 2020-06-01 RX ORDER — HEPARIN SODIUM 1000 [USP'U]/ML
4000 INJECTION, SOLUTION INTRAVENOUS; SUBCUTANEOUS PRN
Status: DISCONTINUED | OUTPATIENT
Start: 2020-06-01 | End: 2020-06-01

## 2020-06-01 RX ORDER — 0.9 % SODIUM CHLORIDE 0.9 %
15 INTRAVENOUS SOLUTION INTRAVENOUS ONCE
Status: COMPLETED | OUTPATIENT
Start: 2020-06-01 | End: 2020-06-01

## 2020-06-01 RX ORDER — HEPARIN SODIUM 5000 [USP'U]/ML
5000 INJECTION, SOLUTION INTRAVENOUS; SUBCUTANEOUS EVERY 8 HOURS SCHEDULED
Status: DISCONTINUED | OUTPATIENT
Start: 2020-06-01 | End: 2020-06-01

## 2020-06-01 RX ORDER — SODIUM CHLORIDE 0.9 % (FLUSH) 0.9 %
10 SYRINGE (ML) INJECTION PRN
Status: CANCELLED | OUTPATIENT
Start: 2020-06-01

## 2020-06-01 RX ORDER — SODIUM CHLORIDE 0.9 % (FLUSH) 0.9 %
10 SYRINGE (ML) INJECTION EVERY 12 HOURS SCHEDULED
Status: CANCELLED | OUTPATIENT
Start: 2020-06-01

## 2020-06-01 RX ORDER — DEXTROSE AND SODIUM CHLORIDE 5; .45 G/100ML; G/100ML
INJECTION, SOLUTION INTRAVENOUS CONTINUOUS PRN
Status: DISCONTINUED | OUTPATIENT
Start: 2020-06-01 | End: 2020-06-06 | Stop reason: HOSPADM

## 2020-06-01 RX ORDER — AMLODIPINE BESYLATE 5 MG/1
5 TABLET ORAL NIGHTLY
Status: ON HOLD | COMMUNITY
End: 2020-06-06 | Stop reason: HOSPADM

## 2020-06-01 RX ADMIN — SODIUM CHLORIDE: 9 INJECTION, SOLUTION INTRAVENOUS at 02:23

## 2020-06-01 RX ADMIN — MAGNESIUM SULFATE 1 G: 1 INJECTION INTRAVENOUS at 12:10

## 2020-06-01 RX ADMIN — SODIUM CHLORIDE: 9 INJECTION, SOLUTION INTRAVENOUS at 05:51

## 2020-06-01 RX ADMIN — CALCIUM GLUCONATE 2 G: 20 INJECTION, SOLUTION INTRAVENOUS at 00:35

## 2020-06-01 RX ADMIN — SODIUM CHLORIDE 14.4 UNITS/HR: 9 INJECTION, SOLUTION INTRAVENOUS at 09:12

## 2020-06-01 RX ADMIN — SODIUM CHLORIDE 1000 ML: 9 INJECTION, SOLUTION INTRAVENOUS at 01:22

## 2020-06-01 RX ADMIN — SODIUM PHOSPHATE, MONOBASIC, MONOHYDRATE 15 MMOL: 276; 142 INJECTION, SOLUTION INTRAVENOUS at 12:24

## 2020-06-01 RX ADMIN — MAGNESIUM SULFATE 1 G: 1 INJECTION INTRAVENOUS at 11:01

## 2020-06-01 RX ADMIN — METOPROLOL TARTRATE 12.5 MG: 25 TABLET ORAL at 21:00

## 2020-06-01 RX ADMIN — HEPARIN SODIUM AND DEXTROSE 8.5 UNITS/KG/HR: 10000; 5 INJECTION INTRAVENOUS at 18:23

## 2020-06-01 RX ADMIN — HEPARIN SODIUM 5000 UNITS: 5000 INJECTION INTRAVENOUS; SUBCUTANEOUS at 05:51

## 2020-06-01 RX ADMIN — DEXTROSE AND SODIUM CHLORIDE: 5; 450 INJECTION, SOLUTION INTRAVENOUS at 13:04

## 2020-06-01 RX ADMIN — SODIUM CHLORIDE 1905 ML: 9 INJECTION, SOLUTION INTRAVENOUS at 00:41

## 2020-06-01 RX ADMIN — METOPROLOL TARTRATE 12.5 MG: 25 TABLET ORAL at 12:07

## 2020-06-01 RX ADMIN — ONDANSETRON 4 MG: 2 INJECTION INTRAMUSCULAR; INTRAVENOUS at 15:01

## 2020-06-01 RX ADMIN — MAGNESIUM SULFATE 1 G: 1 INJECTION INTRAVENOUS at 14:03

## 2020-06-01 RX ADMIN — HEPARIN SODIUM 4000 UNITS: 1000 INJECTION INTRAVENOUS; SUBCUTANEOUS at 18:23

## 2020-06-01 RX ADMIN — SODIUM CHLORIDE 5.08 UNITS/HR: 9 INJECTION, SOLUTION INTRAVENOUS at 17:06

## 2020-06-01 RX ADMIN — Medication 81 MG: at 09:33

## 2020-06-01 ASSESSMENT — PAIN SCALES - GENERAL
PAINLEVEL_OUTOF10: 0
PAINLEVEL_OUTOF10: 4
PAINLEVEL_OUTOF10: 5

## 2020-06-01 ASSESSMENT — PAIN DESCRIPTION - FREQUENCY: FREQUENCY: INTERMITTENT

## 2020-06-01 ASSESSMENT — PAIN DESCRIPTION - LOCATION
LOCATION: HEAD
LOCATION: ABDOMEN

## 2020-06-01 ASSESSMENT — PAIN DESCRIPTION - PAIN TYPE
TYPE: ACUTE PAIN
TYPE: ACUTE PAIN

## 2020-06-01 ASSESSMENT — PAIN DESCRIPTION - ORIENTATION: ORIENTATION: RIGHT;LEFT;UPPER

## 2020-06-01 ASSESSMENT — PAIN DESCRIPTION - DESCRIPTORS
DESCRIPTORS: ACHING
DESCRIPTORS: ACHING

## 2020-06-01 ASSESSMENT — PAIN DESCRIPTION - PROGRESSION: CLINICAL_PROGRESSION: NOT CHANGED

## 2020-06-01 ASSESSMENT — PAIN DESCRIPTION - ONSET: ONSET: ON-GOING

## 2020-06-01 NOTE — H&P
Hyperlipidemia     Hypertension        Past Surgical History:        Procedure Laterality Date     SECTION      x2    GALLBLADDER SURGERY         Allergies: Allergies   Allergen Reactions    Lisinopril     Pcn [Penicillins] Rash         Home Meds:   Prior to Admission medications    Medication Sig Start Date End Date Taking? Authorizing Provider   atorvastatin (LIPITOR) 40 MG tablet  10/12/13  Yes Historical Provider, MD   LORazepam (ATIVAN) 0.5 MG tablet  10/12/13  Yes Historical Provider, MD   atenolol (TENORMIN) 50 MG tablet Take 50 mg by mouth daily. Yes Historical Provider, MD   Insulin Glargine (LANTUS SC) Inject  into the skin. Yes Historical Provider, MD   Insulin Zinc Human (NOVOLIN L SC) Inject  into the skin. Yes Historical Provider, MD   megestrol (MEGACE) 40 MG tablet Take 2 tablets by mouth BID for two days,  then 1 tablet by mouth BID for 2 days, then 1 tablet BID til gone 13   EMERSON Navas CNP   erythromycin base (E-MYCIN) 500 MG tablet  13   Historical Provider, MD   citalopram (CELEXA) 20 MG tablet Take 20 mg by mouth daily. Historical Provider, MD   nystatin (MYCOSTATIN) powder Apply 3 times daily. 10/16/13   EMERSON Navas CNP   fluconazole (DIFLUCAN) 150 MG tablet 1 tablet today the repeat 1 tablet in 3 days 10/16/13   EMERSON Navas CNP       Social History:   TOBACCO:   reports that she has quit smoking. She has never used smokeless tobacco.  ETOH:   reports current alcohol use. DRUGS:  reports no history of drug use.     Family History:       Problem Relation Age of Onset   Proctor Cancer Mother     Hypertension Mother     Heart Failure Father     Hypertension Father     Cancer Maternal Grandmother         colon     Cancer Maternal Grandfather         lung        REVIEW OF SYSTEMS (ROS):  Review of Systems   General ROS: negative for fevers or chills  Ophthalmic ROS: negative  ENT ROS: positive for dry mouth  Endocrine ROS: positive for CREATININE 1.78*  --  1.76*   GLUCOSE  --    < > 1,078*    < > = values in this interval not displayed. S. Calcium:  Recent Labs     05/31/20 2222   CALCIUM 9.5     S. Ionized Calcium:No results for input(s): IONCA in the last 72 hours. S. Magnesium:No results for input(s): MG in the last 72 hours. S. Phosphorus:No results for input(s): PHOS in the last 72 hours. S. Glucose:  Recent Labs     05/31/20 2218   POCGLU >700*     Glycosylated hemoglobin A1C: No results for input(s): LABA1C in the last 72 hours. INR:   Recent Labs     05/31/20 2222   INR 1.1     Hepatic functions:   Recent Labs     05/31/20 2222   ALKPHOS 550*   *   *   PROT 6.2*   BILITOT 0.54   LABALBU 3.1*     Pancreatic functions:No results for input(s): LACTA, AMYLASE in the last 72 hours. S. Lactic Acid: No results for input(s): LACTA in the last 72 hours. Cardiac enzymes:No results for input(s): CKTOTAL, CKMB, CKMBINDEX, TROPONINI in the last 72 hours. BNP:No results for input(s): BNP in the last 72 hours. Lipid profile: No results for input(s): CHOL, TRIG, HDL, LDLCALC in the last 72 hours.     Invalid input(s): LDL  Blood Gases: No results found for: PH, PCO2, PO2, HCO3, O2SAT  Thyroid functions:   Lab Results   Component Value Date    TSH 1.82 12/27/2013        Urinalysis: see chart    Microbiology: Cultures during this admission:     Blood cultures:                 [] None drawn      [x] Negative             []  Positive (Details:  )  Urine Culture:                   [x] None drawn      [x] Negative             []  Positive (Details:  )  Sputum Culture:               [x] None drawn       [] Negative             []  Positive (Details:  )   Endotracheal aspirate:     [x] None drawn       [] Negative             []  Positive (Details:  )     -----------------------------------------------------------------  Radiological reports:   XR CHEST PORTABLE   Final Result   No acute cardiopulmonary process            (See pain on exam  Admitted with DKA  Impression  Diabetic ketoacidosis due to uncontrolled blood glucose  Diabetes mellitus type 2  Hypertension essential  Acute kidney injury  Non-ST elevated MI  Nausea and vomiting resolved  Hyperkalemia resolved  Hypomagnesemia  Abnormal liver function test with history of prior cholecystectomy  Amylase lipase is normal  Morbid obesity  Plan  Continue DKA protocol  Continue IV fluid hydration  Cardiology evaluation in progress  Will need to monitor serial LFT  If alkaline phosphatase stays elevated will need ultrasound liver and common bile duct  Hold statin until LFTs better    Total critical care time caring for this patient with life threatening, unstable organ failure, including direct patient contact, management of life support systems, review of data including imaging and labs, discussions with other team members and physicians at least 28   Min so far today, excluding procedures. Treatment plan Discussed with nursing staff in detail , all questions answered . Electronically signed by Ken Bowen MD on   6/1/20 at 9:43 AM EDT    Please note that this chart was generated using voice recognition Dragon dictation software. Although every effort was made to ensure the accuracy of this automated transcription, some errors in transcription may have occurred.

## 2020-06-01 NOTE — ED NOTES
0.9% NaCl bolus initiated by Karrie ESPITIA per verbal order by physician. Pt hypotensive and blood glucose >700. Pt symptomatic with dizziness. Pt remains on full cardiac monitoring. Side rails up X2, bed locked in lowest position, call light in hand.        Sumaya Parson RN  05/31/20 4112

## 2020-06-01 NOTE — PLAN OF CARE
Problem: Falls - Risk of:  Goal: Will remain free from falls  Description: Will remain free from falls  6/1/2020 1242 by Annabella Arboleda RN  Outcome: Ongoing  6/1/2020 0512 by Sandra Garcia RN  Outcome: Ongoing  Goal: Absence of physical injury  Description: Absence of physical injury  6/1/2020 0512 by Sandra Garcia RN  Outcome: Ongoing     Problem: Anxiety/Stress:  Goal: Level of anxiety will decrease  Description: Level of anxiety will decrease  6/1/2020 1242 by Annabella Arboleda RN  Outcome: Ongoing  6/1/2020 0512 by Sandra Garcia RN  Outcome: Ongoing     Problem: Nutrition Deficit:  Goal: Ability to achieve adequate nutritional intake will improve  Description: Ability to achieve adequate nutritional intake will improve  6/1/2020 1242 by Annabella Arboleda RN  Outcome: Ongoing  6/1/2020 0512 by Sandra Garcia RN  Outcome: Ongoing     Problem: Serum Glucose Level - Abnormal:  Goal: Ability to maintain appropriate glucose levels will improve to within specified parameters  Description: Ability to maintain appropriate glucose levels will improve to within specified parameters  6/1/2020 1242 by Annabella Arboleda RN  Outcome: Ongoing  6/1/2020 0512 by Sandra Garcia RN  Outcome: Ongoing     Problem: Skin Integrity - Impaired:  Goal: Will show no infection signs and symptoms  Description: Will show no infection signs and symptoms  6/1/2020 1242 by Annabella Arboleda RN  Outcome: Ongoing  6/1/2020 0512 by Sandra Garcia RN  Outcome: Ongoing

## 2020-06-01 NOTE — ED NOTES
Per Dr. Downing [facility] care resident, leave insulin infusion at 12.7 ml/hr     Shirley Ireland, RN  06/01/20 0165 [facility]  06/01/20 7619

## 2020-06-01 NOTE — ED NOTES
Pt presented to ED with complaints of abd pain, AMS, and shortness of breath. Pt states shortness of breath x months. Pt family called EMS for pt \"not acting right. \". pt states her BS runs around 500 daily. Pt denies chest pain. Pt follows commands.       Kathy De La Cruz RN  06/01/20 0021

## 2020-06-01 NOTE — ED PROVIDER NOTES
insulin calcium and fluid boluses. Patient unable to make urine at this time with the severe dehydration hopefully fluids will improve this but with the patient burning discomfort with urination this could be the source of possible infection patient will be started on antibiotics for possible sepsis initiating patient's DKA. Pt Stewart body wt 57kg fluids for Sepsis based on this    Based on evaluations of pt hydration status pt required additional fluids beyond 30cc/kg ideal body wt. EMERGENCY DEPARTMENT COURSE:     -------------------------  BP: (!) 122/39, Temp: 97.5 °F (36.4 °C), Pulse: 103, Resp: 18  Physical Exam  Constitutional:       Appearance: She is well-developed. She is not diaphoretic. HENT:      Head: Normocephalic and atraumatic. Right Ear: External ear normal.      Left Ear: External ear normal.      Mouth/Throat:      Mouth: Mucous membranes are dry. Eyes:      General: No scleral icterus. Right eye: No discharge. Left eye: No discharge. Neck:      Musculoskeletal: Normal range of motion. Trachea: No tracheal deviation. Cardiovascular:      Rate and Rhythm: Tachycardia present. Pulmonary:      Effort: Pulmonary effort is normal. No respiratory distress. Breath sounds: No stridor. Musculoskeletal: Normal range of motion. Skin:     General: Skin is warm and dry. Neurological:      Mental Status: She is alert and oriented to person, place, and time. Coordination: Coordination normal.   Psychiatric:         Behavior: Behavior normal.           Comments  Patient has signs of DKA with a pH of 7.01 and low bicarb. Sugars over 700 at this time. With the chest pain EKG will be needed to ensure no signs of cardiac MI. Also will need evaluation for possible infection patient meeting 1 sirs criteria at this time may need full septic work-up depending on labs and if there is an identifiable infection.       Patient in severe DKA with severe

## 2020-06-01 NOTE — ED PROVIDER NOTES
BID til gone 12/2/13   EMERSON Hassan CNP   erythromycin base (E-MYCIN) 500 MG tablet  9/19/13   Historical Provider, MD   citalopram (CELEXA) 20 MG tablet Take 20 mg by mouth daily. Historical Provider, MD   nystatin (MYCOSTATIN) powder Apply 3 times daily. 10/16/13   EMERSON Hassan CNP   fluconazole (DIFLUCAN) 150 MG tablet 1 tablet today the repeat 1 tablet in 3 days 10/16/13   EMERSON Hassan CNP       REVIEW OF SYSTEMS    (2-9 systems for level 4, 10 or more for level 5)      Review of Systems   Constitutional: Positive for chills. Negative for fever. HENT: Positive for ear pain (R>L). Negative for hearing loss and sore throat. Eyes: Positive for visual disturbance (Bilateral blurry vision). Respiratory: Positive for shortness of breath. Negative for cough. Cardiovascular: Positive for chest pain. Gastrointestinal: Positive for abdominal pain, constipation, nausea and vomiting. Negative for diarrhea. Genitourinary: Positive for difficulty urinating. Negative for dysuria. Musculoskeletal: Positive for back pain (Lower). Negative for arthralgias and myalgias. Skin: Positive for pallor. Neurological: Positive for headaches. Negative for numbness. PHYSICAL EXAM   (up to 7 for level 4, 8 or more for level 5)      INITIAL VITALS:   BP (!) 134/59   Pulse 113   Temp 97.5 °F (36.4 °C) (Oral)   Resp 25   Ht 5' 5\" (1.651 m)   Wt 280 lb (127 kg)   SpO2 100%   BMI 46.59 kg/m²     Physical Exam  Vitals signs and nursing note reviewed. Constitutional:       General: She is not in acute distress. Appearance: Normal appearance. She is well-developed. She is obese. She is not ill-appearing or diaphoretic. HENT:      Head: Normocephalic and atraumatic.       Right Ear: Tympanic membrane, ear canal and external ear normal.      Left Ear: Tympanic membrane, ear canal and external ear normal.      Nose: Nose normal.      Mouth/Throat:      Mouth: Mucous membranes are Sepsis    Lactate, Sepsis    Protime-INR    APTT    Troponin    BLOOD GAS, VENOUS    COMPREHENSIVE METABOLIC PANEL    Diet NPO Effective Now    Vital signs per unit routine    Notify physician if blood glucose (BG) less than 80 mg/dL   [facility] physician if blood glucose (BG) less than 200 mg/dL AND two of the following three criteria are met on two consecutive BMPs    Notify physician if multiplier less than 0.01 results in insulin rate of 0 units/hr    Telemetry monitoring - 24 hour duration    Inpatient consult to Critical Care    POCT glucose    Venous Blood Gas, POC    Creatinine W/GFR Point of Care    Lactic Acid, POC    POCT Glucose    Anion Gap (Calc) POC    EKG 12 Lead    Insert peripheral IV    PATIENT STATUS (FROM ED OR OR/PROCEDURAL) Inpatient       MEDICATIONS ORDERED:  Orders Placed This Encounter   Medications    lactated ringers infusion 1,000 mL    0.9 % sodium chloride bolus    insulin regular (HUMULIN R;NOVOLIN R) 100 Units in sodium chloride 0.9 % 100 mL infusion    dextrose 5 % and 0.45 % sodium chloride infusion    FOLLOWED BY Linked Order Group     0.9 % sodium chloride bolus     0.9 % sodium chloride infusion    potassium chloride 10 mEq/100 mL IVPB (Peripheral Line)    calcium gluconate 2 g in sodium chloride 100 mL       DDX: Elis Meneses is a 64 y.o. female who presents to the emergency department with fatigue, abdominal pain, multiple episodes of vomiting, high blood sugar.  Differential diagnosis includes DKA, HHS, sepsis, ischemia, injury    DIAGNOSTIC RESULTS / EMERGENCY DEPARTMENT COURSE / MDM   LAB RESULTS:  Results for orders placed or performed during the hospital encounter of 05/31/20   CBC Auto Differential   Result Value Ref Range    WBC 19.6 (H) 3.5 - 11.3 k/uL    RBC 4.02 3.95 - 5.11 m/uL    Hemoglobin 11.5 (L) 11.9 - 15.1 g/dL    Hematocrit 40.8 36.3 - 47.1 %    .5 82.6 - 102.9 fL    MCH 28.6 25.2 - 33.5 pg    MCHC 28.2 (L) 28.4 - 34.8 Value Ref Range    pH, Chuck 7.003 (LL) 7.320 - 7.420    pCO2, Chuck 28.1 (L) 39 - 55    pO2, Chuck 109.0 (H) 30 - 50    HCO3, Venous 6.6 (L) 24 - 30 mmol/L    Positive Base Excess, Chuck NOT REPORTED 0.0 - 2.0 mmol/L    Negative Base Excess, Chuck 23.6 (H) 0.0 - 2.0 mmol/L    O2 Sat, Chuck 96.3 (H) 60.0 - 85.0 %    Total Hb NOT REPORTED 12.0 - 16.0 g/dl    Oxyhemoglobin NOT REPORTED 95.0 - 98.0 %    Carboxyhemoglobin 1.6 0 - 5 %    Methemoglobin NOT REPORTED 0.0 - 1.5 %    Pt Temp 37.0     pH, Chuck, Temp Adj NOT REPORTED 7.320 - 7.420    pCO2, Chuck, Temp Adj NOT REPORTED 39 - 55 mmHg    pO2, Chuck, Temp Adj NOT REPORTED 30 - 50 mmHg    O2 Device/Flow/% NOT REPORTED     Respiratory Rate NOT REPORTED     Howie Test NOT REPORTED     Sample Site NOT REPORTED     Pt.  Position NOT REPORTED     Mode NOT REPORTED     Set Rate NOT REPORTED     Total Rate NOT REPORTED     VT NOT REPORTED     FIO2 INFORMATION NOT PROVIDED     Peep/Cpap NOT REPORTED     PSV NOT REPORTED     Text for Respiratory NOT REPORTED     NOTIFICATION NOT REPORTED     NOTIFICATION TIME NOT REPORTED    Hemoglobin and hematocrit, blood   Result Value Ref Range    POC Hemoglobin 13.1 12.0 - 16.0 g/dL    POC Hematocrit 39 36 - 46 %   SODIUM (POC)   Result Value Ref Range    POC Sodium 123 (L) 138 - 146 mmol/L   POTASSIUM (POC)   Result Value Ref Range    POC Potassium 7.0 (HH) 3.5 - 4.5 mmol/L   CHLORIDE (POC)   Result Value Ref Range    POC Chloride 98 98 - 107 mmol/L   CALCIUM, IONIC (POC)   Result Value Ref Range    POC Ionized Calcium 1.23 1.15 - 1.33 mmol/L   Lactate, Sepsis   Result Value Ref Range    Lactic Acid, Sepsis NOT REPORTED 0.5 - 1.9 mmol/L    Lactic Acid, Sepsis, Whole Blood 3.8 (H) 0.5 - 1.9 mmol/L   Lactate, Sepsis   Result Value Ref Range    Lactic Acid, Sepsis NOT REPORTED 0.5 - 1.9 mmol/L    Lactic Acid, Sepsis, Whole Blood 3.6 (H) 0.5 - 1.9 mmol/L   Lactate, Sepsis   Result Value Ref Range    Lactic Acid, Sepsis NOT REPORTED 0.5 - 1.9 mmol/L    Lactic Acid, Sepsis, Whole Blood 3.8 (H) 0.5 - 1.9 mmol/L   Protime-INR   Result Value Ref Range    Protime 11.8 9.0 - 12.0 sec    INR 1.1    APTT   Result Value Ref Range    PTT 26.5 20.5 - 30.5 sec   Troponin   Result Value Ref Range    Troponin, High Sensitivity 62 (HH) 0 - 14 ng/L    Troponin T NOT REPORTED <0.03 ng/mL    Troponin Interp NOT REPORTED    POCT glucose   Result Value Ref Range    Glucose 700 mg/dL    QC OK? yes    Venous Blood Gas, POC   Result Value Ref Range    pH, Chuck 7.010 (LL) 7.320 - 7.430    pCO2, Chuck 31.0 (L) 41.0 - 51.0 mm Hg    pO2, Chuck 49.2 30.0 - 50.0 mm Hg    HCO3, Venous 7.8 (L) 22.0 - 29.0 mmol/L    Total CO2, Venous 9 (L) 23.0 - 30.0 mmol/L    Negative Base Excess, Chuck 22 (H) 0.0 - 2.0    Positive Base Excess, Chuck NOT REPORTED 0.0 - 3.0    O2 Sat, Chuck 65 60.0 - 85.0 %    O2 Device/Flow/% NOT REPORTED     Howie Test NOT REPORTED     Sample Site NOT REPORTED     Mode NOT REPORTED     FIO2 NOT REPORTED     Pt Temp NOT REPORTED     POC pH Temp NOT REPORTED     POC pCO2 Temp NOT REPORTED mm Hg    POC pO2 Temp NOT REPORTED mm Hg   Creatinine W/GFR Point of Care   Result Value Ref Range    POC Creatinine 1.78 (H) 0.51 - 1.19 mg/dL    GFR Comment 36 (L) >60 mL/min    GFR Non- 29 (L) >60 mL/min    GFR Comment         Lactic Acid, POC   Result Value Ref Range    POC Lactic Acid 3.61 (H) 0.56 - 1.39 mmol/L   POCT Glucose   Result Value Ref Range    POC Glucose >700 (HH) 74 - 100 mg/dL   Anion Gap (Calc) POC   Result Value Ref Range    Anion Gap 17 (H) 7 - 16 mmol/L       IMPRESSION: Wes Valenzuela is a 64 y.o. female who presents to the emergency department with multiple episodes of vomiting, fatigue, abdominal pain, high blood sugar. On examination she is tachycardic and her blood pressure is 104/50. Her examination is consistent with the story of decreased p.o. tolerance as well as high blood sugars. High suspicion for DKA at this time.   POC labs demonstrate potassium of 7.0,

## 2020-06-01 NOTE — CONSULTS
% 250 mL IVPB, 15 mmol, Intravenous, PRN **OR** sodium phosphate 20 mmol in dextrose 5 % 500 mL IVPB, 20 mmol, Intravenous, PRN  [COMPLETED] 0.9 % sodium chloride bolus, 15 mL/kg, Intravenous, Once **FOLLOWED BY** 0.45 % sodium chloride infusion, , Intravenous, Continuous  dextrose 5 % and 0.45 % sodium chloride infusion, , Intravenous, Continuous PRN  insulin regular (HUMULIN R;NOVOLIN R) 100 Units in sodium chloride 0.9 % 100 mL infusion, 0.1 Units/kg/hr, Intravenous, Continuous  dextrose 5 % and 0.45 % sodium chloride infusion, , Intravenous, Continuous PRN  [COMPLETED] 0.9 % sodium chloride bolus, 15 mL/kg, Intravenous, Once **FOLLOWED BY** 0.9 % sodium chloride infusion, , Intravenous, Continuous  potassium chloride 10 mEq/100 mL IVPB (Peripheral Line), 10 mEq, Intravenous, PRN      Allergies:  Lisinopril and Pcn [penicillins]      Social History:   reports that she has quit smoking. She has never used smokeless tobacco. She reports current alcohol use. She reports that she does not use drugs. Family History: family history includes Cancer in her maternal grandfather, maternal grandmother, and mother; Heart Failure in her father; Hypertension in her father and mother. No h/o sudden cardiac death. No for premature CAD      REVIEW OF SYSTEMS:    · Constitutional: there has been no unanticipated weight loss. · Eyes: No visual changes or diplopia. · ENT: No Headaches  · Cardiovascular: see above  · Respiratory: No previous pulmonary problems, No cough  · Gastrointestinal: No abdominal pain. No change in bowel or bladder habits. · Genitourinary: No dysuria, trouble voiding, or hematuria. · Musculoskeletal:  No gait disturbance, No weakness or joint complaints. · Integumentary: No rash or pruritis.   · Neurological: No headache, diplopia      PHYSICAL EXAM:      BP (!) 123/53   Pulse 109   Temp 97.7 °F (36.5 °C) (Oral)   Resp 23   Ht 5' 5\" (1.651 m)   Wt 259 lb 0.7 oz (117.5 kg)   SpO2 100%

## 2020-06-02 LAB
ALBUMIN SERPL-MCNC: 2.9 G/DL (ref 3.5–5.2)
ALBUMIN/GLOBULIN RATIO: 1 (ref 1–2.5)
ALLEN TEST: ABNORMAL
ALP BLD-CCNC: 335 U/L (ref 35–104)
ALT SERPL-CCNC: 313 U/L (ref 5–33)
ANION GAP SERPL CALCULATED.3IONS-SCNC: 11 MMOL/L (ref 9–17)
ANION GAP SERPL CALCULATED.3IONS-SCNC: 13 MMOL/L (ref 9–17)
ANION GAP SERPL CALCULATED.3IONS-SCNC: 13 MMOL/L (ref 9–17)
ANION GAP SERPL CALCULATED.3IONS-SCNC: 14 MMOL/L (ref 9–17)
ANION GAP SERPL CALCULATED.3IONS-SCNC: 15 MMOL/L (ref 9–17)
ANION GAP SERPL CALCULATED.3IONS-SCNC: 8 MMOL/L (ref 9–17)
AST SERPL-CCNC: 37 U/L
BILIRUB SERPL-MCNC: 0.37 MG/DL (ref 0.3–1.2)
BILIRUBIN DIRECT: 0.15 MG/DL
BILIRUBIN, INDIRECT: 0.22 MG/DL (ref 0–1)
BUN BLDV-MCNC: 19 MG/DL (ref 6–20)
BUN BLDV-MCNC: 23 MG/DL (ref 6–20)
BUN BLDV-MCNC: 30 MG/DL (ref 6–20)
BUN BLDV-MCNC: 32 MG/DL (ref 6–20)
BUN BLDV-MCNC: 32 MG/DL (ref 6–20)
BUN BLDV-MCNC: 37 MG/DL (ref 6–20)
BUN/CREAT BLD: ABNORMAL (ref 9–20)
CALCIUM SERPL-MCNC: 8.6 MG/DL (ref 8.6–10.4)
CALCIUM SERPL-MCNC: 8.6 MG/DL (ref 8.6–10.4)
CALCIUM SERPL-MCNC: 8.7 MG/DL (ref 8.6–10.4)
CALCIUM SERPL-MCNC: 8.8 MG/DL (ref 8.6–10.4)
CALCIUM SERPL-MCNC: 8.9 MG/DL (ref 8.6–10.4)
CALCIUM SERPL-MCNC: 8.9 MG/DL (ref 8.6–10.4)
CARBOXYHEMOGLOBIN: 0.5 % (ref 0–5)
CARBOXYHEMOGLOBIN: 0.7 % (ref 0–5)
CARBOXYHEMOGLOBIN: 0.8 % (ref 0–5)
CHLORIDE BLD-SCNC: 103 MMOL/L (ref 98–107)
CHLORIDE BLD-SCNC: 103 MMOL/L (ref 98–107)
CHLORIDE BLD-SCNC: 104 MMOL/L (ref 98–107)
CHLORIDE BLD-SCNC: 105 MMOL/L (ref 98–107)
CHLORIDE BLD-SCNC: 106 MMOL/L (ref 98–107)
CHLORIDE BLD-SCNC: 107 MMOL/L (ref 98–107)
CO2: 17 MMOL/L (ref 20–31)
CO2: 19 MMOL/L (ref 20–31)
CO2: 19 MMOL/L (ref 20–31)
CO2: 23 MMOL/L (ref 20–31)
CREAT SERPL-MCNC: 0.59 MG/DL (ref 0.5–0.9)
CREAT SERPL-MCNC: 0.71 MG/DL (ref 0.5–0.9)
CREAT SERPL-MCNC: 0.92 MG/DL (ref 0.5–0.9)
CREAT SERPL-MCNC: 1.03 MG/DL (ref 0.5–0.9)
CREAT SERPL-MCNC: 1.03 MG/DL (ref 0.5–0.9)
CREAT SERPL-MCNC: 1.35 MG/DL (ref 0.5–0.9)
CULTURE: NORMAL
FIO2: 21
FIO2: ABNORMAL
GFR AFRICAN AMERICAN: 49 ML/MIN
GFR AFRICAN AMERICAN: >60 ML/MIN
GFR NON-AFRICAN AMERICAN: 40 ML/MIN
GFR NON-AFRICAN AMERICAN: 55 ML/MIN
GFR NON-AFRICAN AMERICAN: 55 ML/MIN
GFR NON-AFRICAN AMERICAN: >60 ML/MIN
GFR SERPL CREATININE-BSD FRML MDRD: ABNORMAL ML/MIN/{1.73_M2}
GLOBULIN: ABNORMAL G/DL (ref 1.5–3.8)
GLUCOSE BLD-MCNC: 116 MG/DL (ref 65–105)
GLUCOSE BLD-MCNC: 118 MG/DL (ref 65–105)
GLUCOSE BLD-MCNC: 138 MG/DL (ref 65–105)
GLUCOSE BLD-MCNC: 146 MG/DL (ref 65–105)
GLUCOSE BLD-MCNC: 146 MG/DL (ref 65–105)
GLUCOSE BLD-MCNC: 151 MG/DL (ref 65–105)
GLUCOSE BLD-MCNC: 159 MG/DL (ref 65–105)
GLUCOSE BLD-MCNC: 161 MG/DL (ref 65–105)
GLUCOSE BLD-MCNC: 174 MG/DL (ref 65–105)
GLUCOSE BLD-MCNC: 175 MG/DL (ref 70–99)
GLUCOSE BLD-MCNC: 182 MG/DL (ref 65–105)
GLUCOSE BLD-MCNC: 182 MG/DL (ref 70–99)
GLUCOSE BLD-MCNC: 183 MG/DL (ref 65–105)
GLUCOSE BLD-MCNC: 197 MG/DL (ref 65–105)
GLUCOSE BLD-MCNC: 206 MG/DL (ref 65–105)
GLUCOSE BLD-MCNC: 207 MG/DL (ref 65–105)
GLUCOSE BLD-MCNC: 211 MG/DL (ref 70–99)
GLUCOSE BLD-MCNC: 226 MG/DL (ref 65–105)
GLUCOSE BLD-MCNC: 227 MG/DL (ref 70–99)
GLUCOSE BLD-MCNC: 231 MG/DL (ref 65–105)
GLUCOSE BLD-MCNC: 235 MG/DL (ref 70–99)
GLUCOSE BLD-MCNC: 246 MG/DL (ref 70–99)
HCO3 ARTERIAL: 18.9 MMOL/L (ref 22–27)
HCO3 ARTERIAL: 20.4 MMOL/L (ref 22–27)
HCO3 ARTERIAL: 21 MMOL/L (ref 22–27)
Lab: NORMAL
MAGNESIUM: 1.6 MG/DL (ref 1.6–2.6)
MAGNESIUM: 1.7 MG/DL (ref 1.6–2.6)
MAGNESIUM: 1.8 MG/DL (ref 1.6–2.6)
MAGNESIUM: 1.8 MG/DL (ref 1.6–2.6)
METHEMOGLOBIN: ABNORMAL % (ref 0–1.5)
MODE: ABNORMAL
MYOGLOBIN: 107 NG/ML (ref 25–58)
MYOGLOBIN: 223 NG/ML (ref 25–58)
MYOGLOBIN: 88 NG/ML (ref 25–58)
NEGATIVE BASE EXCESS, ART: 2.4 MMOL/L (ref 0–2)
NEGATIVE BASE EXCESS, ART: 3 (ref 0–2)
NEGATIVE BASE EXCESS, ART: 4.6 MMOL/L (ref 0–2)
NEGATIVE BASE EXCESS, ART: 5 (ref 0–2)
NEGATIVE BASE EXCESS, ART: 6 MMOL/L (ref 0–2)
NOTIFICATION TIME: ABNORMAL
NOTIFICATION: ABNORMAL
O2 DEVICE/FLOW/%: ABNORMAL
O2 SAT, ARTERIAL: 91.5 % (ref 94–100)
O2 SAT, ARTERIAL: 95.7 % (ref 94–100)
O2 SAT, ARTERIAL: 98.4 % (ref 94–100)
OXYHEMOGLOBIN: ABNORMAL % (ref 95–98)
PARTIAL THROMBOPLASTIN TIME: 38.9 SEC (ref 20.5–30.5)
PARTIAL THROMBOPLASTIN TIME: 39.2 SEC (ref 20.5–30.5)
PARTIAL THROMBOPLASTIN TIME: 49.1 SEC (ref 20.5–30.5)
PARTIAL THROMBOPLASTIN TIME: 67.7 SEC (ref 20.5–30.5)
PATIENT TEMP: 37
PATIENT TEMP: ABNORMAL
PATIENT TEMP: ABNORMAL
PCO2 ARTERIAL: 32.6 MMHG (ref 32–45)
PCO2 ARTERIAL: 37 MMHG (ref 32–45)
PCO2 ARTERIAL: 39.4 MMHG (ref 32–45)
PCO2, ART, TEMP ADJ: ABNORMAL (ref 32–45)
PEEP/CPAP: ABNORMAL
PH ARTERIAL: 7.33 (ref 7.35–7.45)
PH ARTERIAL: 7.33 (ref 7.35–7.45)
PH ARTERIAL: 7.42 (ref 7.35–7.45)
PH, ART, TEMP ADJ: ABNORMAL (ref 7.35–7.45)
PHOSPHORUS: 2 MG/DL (ref 2.6–4.5)
PHOSPHORUS: 2 MG/DL (ref 2.6–4.5)
PHOSPHORUS: 2.1 MG/DL (ref 2.6–4.5)
PHOSPHORUS: 2.1 MG/DL (ref 2.6–4.5)
PHOSPHORUS: 2.3 MG/DL (ref 2.6–4.5)
PHOSPHORUS: 2.5 MG/DL (ref 2.6–4.5)
PO2 ARTERIAL: 114 MMHG (ref 75–95)
PO2 ARTERIAL: 64.8 MMHG (ref 75–95)
PO2 ARTERIAL: 83.6 MMHG (ref 75–95)
PO2, ART, TEMP ADJ: ABNORMAL MMHG (ref 75–95)
POC HCO3: 21.2 MMOL/L (ref 21–28)
POC HCO3: 21.9 MMOL/L (ref 21–28)
POC O2 SATURATION: 91 % (ref 94–98)
POC O2 SATURATION: 96 % (ref 94–98)
POC PCO2 TEMP: ABNORMAL MM HG
POC PCO2 TEMP: ABNORMAL MM HG
POC PCO2: 37 MM HG (ref 35–48)
POC PCO2: 41.4 MM HG (ref 35–48)
POC PH TEMP: ABNORMAL
POC PH TEMP: ABNORMAL
POC PH: 7.32 (ref 7.35–7.45)
POC PH: 7.38 (ref 7.35–7.45)
POC PO2 TEMP: ABNORMAL MM HG
POC PO2 TEMP: ABNORMAL MM HG
POC PO2: 65.6 MM HG (ref 83–108)
POC PO2: 81 MM HG (ref 83–108)
POSITIVE BASE EXCESS, ART: ABNORMAL (ref 0–3)
POSITIVE BASE EXCESS, ART: ABNORMAL (ref 0–3)
POSITIVE BASE EXCESS, ART: ABNORMAL MMOL/L (ref 0–2)
POTASSIUM SERPL-SCNC: 3.6 MMOL/L (ref 3.7–5.3)
POTASSIUM SERPL-SCNC: 3.7 MMOL/L (ref 3.7–5.3)
POTASSIUM SERPL-SCNC: 3.7 MMOL/L (ref 3.7–5.3)
POTASSIUM SERPL-SCNC: 3.9 MMOL/L (ref 3.7–5.3)
POTASSIUM SERPL-SCNC: 4 MMOL/L (ref 3.7–5.3)
POTASSIUM SERPL-SCNC: 4.2 MMOL/L (ref 3.7–5.3)
PSV: ABNORMAL
PT. POSITION: ABNORMAL
RESPIRATORY RATE: ABNORMAL
SAMPLE SITE: ABNORMAL
SET RATE: ABNORMAL
SODIUM BLD-SCNC: 133 MMOL/L (ref 135–144)
SODIUM BLD-SCNC: 135 MMOL/L (ref 135–144)
SODIUM BLD-SCNC: 135 MMOL/L (ref 135–144)
SODIUM BLD-SCNC: 136 MMOL/L (ref 135–144)
SODIUM BLD-SCNC: 137 MMOL/L (ref 135–144)
SODIUM BLD-SCNC: 138 MMOL/L (ref 135–144)
SPECIMEN DESCRIPTION: NORMAL
TCO2 (CALC), ART: 23 MMOL/L (ref 22–29)
TCO2 (CALC), ART: 23 MMOL/L (ref 22–29)
TEXT FOR RESPIRATORY: ABNORMAL
TOTAL HB: ABNORMAL G/DL (ref 12–16)
TOTAL PROTEIN: 5.7 G/DL (ref 6.4–8.3)
TOTAL RATE: ABNORMAL
TROPONIN INTERP: ABNORMAL
TROPONIN T: ABNORMAL NG/ML
TROPONIN, HIGH SENSITIVITY: 211 NG/L (ref 0–14)
TROPONIN, HIGH SENSITIVITY: 237 NG/L (ref 0–14)
TROPONIN, HIGH SENSITIVITY: 406 NG/L (ref 0–14)
VT: ABNORMAL

## 2020-06-02 PROCEDURE — 6370000000 HC RX 637 (ALT 250 FOR IP): Performed by: STUDENT IN AN ORGANIZED HEALTH CARE EDUCATION/TRAINING PROGRAM

## 2020-06-02 PROCEDURE — 82805 BLOOD GASES W/O2 SATURATION: CPT

## 2020-06-02 PROCEDURE — 84100 ASSAY OF PHOSPHORUS: CPT

## 2020-06-02 PROCEDURE — 99291 CRITICAL CARE FIRST HOUR: CPT | Performed by: INTERNAL MEDICINE

## 2020-06-02 PROCEDURE — 94761 N-INVAS EAR/PLS OXIMETRY MLT: CPT

## 2020-06-02 PROCEDURE — 80076 HEPATIC FUNCTION PANEL: CPT

## 2020-06-02 PROCEDURE — 82803 BLOOD GASES ANY COMBINATION: CPT

## 2020-06-02 PROCEDURE — 2000000000 HC ICU R&B

## 2020-06-02 PROCEDURE — 6360000002 HC RX W HCPCS: Performed by: STUDENT IN AN ORGANIZED HEALTH CARE EDUCATION/TRAINING PROGRAM

## 2020-06-02 PROCEDURE — 2500000003 HC RX 250 WO HCPCS: Performed by: STUDENT IN AN ORGANIZED HEALTH CARE EDUCATION/TRAINING PROGRAM

## 2020-06-02 PROCEDURE — 85730 THROMBOPLASTIN TIME PARTIAL: CPT

## 2020-06-02 PROCEDURE — 83874 ASSAY OF MYOGLOBIN: CPT

## 2020-06-02 PROCEDURE — 2580000003 HC RX 258: Performed by: STUDENT IN AN ORGANIZED HEALTH CARE EDUCATION/TRAINING PROGRAM

## 2020-06-02 PROCEDURE — 84484 ASSAY OF TROPONIN QUANT: CPT

## 2020-06-02 PROCEDURE — 82947 ASSAY GLUCOSE BLOOD QUANT: CPT

## 2020-06-02 RX ORDER — AMLODIPINE BESYLATE 5 MG/1
5 TABLET ORAL DAILY
Status: DISCONTINUED | OUTPATIENT
Start: 2020-06-02 | End: 2020-06-03

## 2020-06-02 RX ADMIN — SODIUM PHOSPHATE, MONOBASIC, MONOHYDRATE 15 MMOL: 276; 142 INJECTION, SOLUTION INTRAVENOUS at 12:02

## 2020-06-02 RX ADMIN — DEXTROSE AND SODIUM CHLORIDE: 5; 450 INJECTION, SOLUTION INTRAVENOUS at 17:36

## 2020-06-02 RX ADMIN — HEPARIN SODIUM 2000 UNITS: 1000 INJECTION INTRAVENOUS; SUBCUTANEOUS at 09:03

## 2020-06-02 RX ADMIN — METOPROLOL TARTRATE 12.5 MG: 25 TABLET ORAL at 08:32

## 2020-06-02 RX ADMIN — HEPARIN SODIUM AND DEXTROSE 12.5 UNITS/KG/HR: 10000; 5 INJECTION INTRAVENOUS at 09:03

## 2020-06-02 RX ADMIN — POTASSIUM CHLORIDE 10 MEQ: 10 INJECTION, SOLUTION INTRAVENOUS at 04:00

## 2020-06-02 RX ADMIN — HEPARIN SODIUM 2000 UNITS: 1000 INJECTION INTRAVENOUS; SUBCUTANEOUS at 16:33

## 2020-06-02 RX ADMIN — METOPROLOL TARTRATE 12.5 MG: 25 TABLET ORAL at 20:08

## 2020-06-02 RX ADMIN — POTASSIUM CHLORIDE 10 MEQ: 10 INJECTION, SOLUTION INTRAVENOUS at 02:05

## 2020-06-02 RX ADMIN — HEPARIN SODIUM 2000 UNITS: 1000 INJECTION INTRAVENOUS; SUBCUTANEOUS at 01:52

## 2020-06-02 RX ADMIN — SODIUM PHOSPHATE, MONOBASIC, MONOHYDRATE 10 MMOL: 276; 142 INJECTION, SOLUTION INTRAVENOUS at 03:00

## 2020-06-02 RX ADMIN — POTASSIUM CHLORIDE 10 MEQ: 10 INJECTION, SOLUTION INTRAVENOUS at 03:00

## 2020-06-02 RX ADMIN — HEPARIN SODIUM AND DEXTROSE 14.2 UNITS/KG/HR: 10000; 5 INJECTION INTRAVENOUS at 16:32

## 2020-06-02 RX ADMIN — AMLODIPINE BESYLATE 5 MG: 5 TABLET ORAL at 08:33

## 2020-06-02 RX ADMIN — DEXTROSE AND SODIUM CHLORIDE: 5; 450 INJECTION, SOLUTION INTRAVENOUS at 09:10

## 2020-06-02 RX ADMIN — Medication 81 MG: at 08:33

## 2020-06-02 ASSESSMENT — PAIN SCALES - GENERAL
PAINLEVEL_OUTOF10: 0
PAINLEVEL_OUTOF10: 0

## 2020-06-02 NOTE — CARE COORDINATION
Patient on insulin gtt, plan is formal ECG and Cardiac Cath when off insulin gtt, plan is home with

## 2020-06-02 NOTE — PROGRESS NOTES
Critical Care Team - Daily Progress Note      Date and time: 6/2/2020 8:02 AM  Patient's name:  [address] Record Number: [id number]  Patient's account/billing number: [de-identified]  Patient's YOB: 1960  Age: 64 y.o. Date of Admission: 5/31/2020 10:14 PM  Length of stay during current admission: 1      Primary Care Physician: No primary care provider on file.   ICU Attending Physician: Dr. Tiffany Law    Code Status: Full Code    Reason for ICU admission: DKA, NSTEMI      SUBJECTIVE:     OVERNIGHT EVENTS:       No acute events overnight, patient is alert and awake without any complaints at this time    AWAKE & FOLLOWING COMMANDS:  [] No   [x] Yes    CURRENT VENTILATION STATUS:     [] Ventilator  [] BIPAP  [] Nasal Cannula [x] Room Air      IF INTUBATED, ET TUBE MARKING AT LOWER LIP:       cms    SECRETIONS Amount:  [] Small [] Moderate  [] Large  [x] None  Color:     [] White [] Colored  [] Bloody    SEDATION:  RAAS Score:  [] Propofol gtt  [] Versed gtt  [] Ativan gtt   [x] No Sedation    PARALYZED:  [x] No    [] Yes    DIARRHEA:                [x] No                [] Yes  (C. Difficile status: [] positive                                                                                                                       [] negative                                                                                                                     [] pending)    VASOPRESSORS:  [x] No    [] Yes    If yes -   [] Levophed       [] Dopamine     [] Vasopressin       [] Dobutamine  [] Phenylephrine         [] Epinephrine    CENTRAL LINES:     [x] No   [] Yes   (Date of Insertion:   )           If yes -     [] Right IJ     [] Left IJ [] Right Femoral [] Left Femoral                   [] Right Subclavian [] Left Subclavian       FU'S CATHETER:   [x] No   [] Yes  (Date of Insertion:   )     URINE OUTPUT:            [x] Good   [] Low              [] Anuric      OBJECTIVE:     VITAL SIGNS:  BP (!) 151/61   Pulse 72   Temp 98.3 °F (36.8 °C) (Oral)   Resp 21   Ht 5' 5\" (1.651 m)   Wt 259 lb 0.7 oz (117.5 kg)   SpO2 95%   BMI 43.11 kg/m²     Tmax over 24 hours:  Temp (24hrs), Av.7 °F (37.1 °C), Min:97.6 °F (36.4 °C), Max:100 °F (37.8 °C)      Patient Vitals for the past 6 hrs:   Temp Temp src Pulse Resp SpO2   20 0700 -- -- 72 21 95 %   20 0600 -- -- 73 -- 97 %   20 0500 -- -- 70 16 97 %   20 0400 98.3 °F (36.8 °C) Oral 73 18 90 %   20 0300 -- -- 69 -- 94 %         Intake/Output Summary (Last 24 hours) at 2020 0802  Last data filed at 2020 0700  Gross per 24 hour   Intake 6413 ml   Output 700 ml   Net 5713 ml     Wt Readings from Last 2 Encounters:   20 259 lb 0.7 oz (117.5 kg)   13 280 lb (127 kg)     Body mass index is 43.11 kg/m².         PHYSICAL EXAMINATION:  General appearance - alert, well appearing, and in no distress  Mental status - alert, oriented to person, place, and time  Eyes - pupils equal and reactive, extraocular eye movements intact  Chest - clear to auscultation, no wheezes, rales or rhonchi, symmetric air entry  Heart - normal rate, regular rhythm, normal S1, S2, no murmurs, rubs, clicks or gallops  Abdomen - soft, nontender, nondistended, no masses or organomegaly  Neurological - alert, oriented, normal speech, no focal findings or movement disorder noted  Extremities - peripheral pulses normal, no pedal edema, no clubbing or cyanosis  Skin - normal coloration and turgor, no rashes, no suspicious skin lesions noted       MEDICATIONS:    Scheduled Meds:   amLODIPine  5 mg Oral Daily    aspirin  81 mg Oral Daily    metoprolol tartrate  12.5 mg Oral BID     Continuous Infusions:   dextrose 5 % and 0.45 % NaCl 150 mL/hr at 20 1304    heparin (porcine) 10.5 Units/kg/hr (20 0151)    insulin 5.8 Units/hr (20 0500)    dextrose 5 % and 0.45 % NaCl       PRN Meds:   benzocaine-menthol, 1 lozenge, Q2H PRN  dextrose,

## 2020-06-02 NOTE — PROGRESS NOTES
[facility]   Progress Note                 Date:   6/2/2020  Patient name:  Eladia Cee  Date of admission:  5/31/2020 10:14 PM  MRN:   [id number]  YOB: 1960  PCP:    No primary care provider on file. Reason for Admission: DKA, type 2, not at goal [facility]) [E11.10]      Subjective:       Clinical Changes / Abnormalities:     Patient seen and examined. No acute events overnight. Remained hemodynamically stable and afebrile. No CP, SOB. I/O last 3 completed shifts: In: 7818 [P.O.:720; I.V.:7098]  Out: 200 [Urine:200]  I/O this shift:  In: 250 [P.O.:250]  Out: 700 [Urine:700]        Medications:   Scheduled Meds:    aspirin  81 mg Oral Daily    metoprolol tartrate  12.5 mg Oral BID     Continuous Infusions:    dextrose 5 % and 0.45 % NaCl 150 mL/hr at 06/01/20 1304    heparin (porcine) 10.5 Units/kg/hr (06/02/20 0151)    insulin 5.8 Units/hr (06/02/20 0500)    dextrose 5 % and 0.45 % NaCl       CBC:   Recent Labs     05/31/20 2222   WBC 19.6*   HGB 11.5*   *     BMP:    Recent Labs     06/01/20  1422 06/01/20  1828 06/02/20  0109    133* 135   K 3.8 3.9 3.6*    102 105   CO2 19* 17* 17*   BUN 36* 38* 37*   CREATININE 1.23* 1.26* 1.35*   GLUCOSE 195* 192* 175*     Hepatic:   Recent Labs     05/31/20 2222 06/01/20  0247   * 133*   * 613*   BILITOT 0.54 0.43   ALKPHOS 550* 499*     Troponin:   Recent Labs     06/01/20  1007 06/01/20 2028 06/02/20  0109   TROPHS 236* 480* 406*     BNP: No results for input(s): BNP in the last 72 hours. Lipids: No results for input(s): CHOL, HDL in the last 72 hours. Invalid input(s): LDLCALCU  INR:   Recent Labs     05/31/20  2222   INR 1.1       Objective:   Vitals: BP (!) 151/61   Pulse 70   Temp 98.3 °F (36.8 °C) (Oral)   Resp 16   Ht 5' 5\" (1.651 m)   Wt 259 lb 0.7 oz (117.5 kg)   SpO2 97%   BMI 43.11 kg/m²   General appearance: Alert. No acute distress.   HEENT: Head: Normal,

## 2020-06-03 ENCOUNTER — APPOINTMENT (OUTPATIENT)
Dept: ULTRASOUND IMAGING | Age: 60
DRG: 435 | End: 2020-06-03
Payer: COMMERCIAL

## 2020-06-03 PROBLEM — Z79.4 TYPE 2 DIABETES MELLITUS WITH KETOACIDOSIS WITHOUT COMA, WITH LONG-TERM CURRENT USE OF INSULIN (HCC): Status: ACTIVE | Noted: 2020-06-01

## 2020-06-03 LAB
AFP: 1.4 UG/L
ALLEN TEST: ABNORMAL
ALLEN TEST: ABNORMAL
ALPHA-1 ANTITRYPSIN: 170 MG/DL (ref 90–200)
ANION GAP SERPL CALCULATED.3IONS-SCNC: 10 MMOL/L (ref 9–17)
ANION GAP SERPL CALCULATED.3IONS-SCNC: 11 MMOL/L (ref 9–17)
ANION GAP SERPL CALCULATED.3IONS-SCNC: 8 MMOL/L (ref 9–17)
BUN BLDV-MCNC: 11 MG/DL (ref 6–20)
BUN BLDV-MCNC: 13 MG/DL (ref 6–20)
BUN BLDV-MCNC: 16 MG/DL (ref 6–20)
BUN/CREAT BLD: ABNORMAL (ref 9–20)
CA 19-9: 13 U/ML (ref 0–35)
CALCIUM SERPL-MCNC: 8.8 MG/DL (ref 8.6–10.4)
CALCIUM SERPL-MCNC: 8.9 MG/DL (ref 8.6–10.4)
CALCIUM SERPL-MCNC: 9 MG/DL (ref 8.6–10.4)
CARBOXYHEMOGLOBIN: 0.6 % (ref 0–5)
CARBOXYHEMOGLOBIN: 0.7 % (ref 0–5)
CARCINOEMBRYONIC ANTIGEN: 2.6 NG/ML
CERULOPLASMIN: 32 MG/DL (ref 16–45)
CHLORIDE BLD-SCNC: 107 MMOL/L (ref 98–107)
CHLORIDE BLD-SCNC: 108 MMOL/L (ref 98–107)
CHLORIDE BLD-SCNC: 110 MMOL/L (ref 98–107)
CHOLESTEROL/HDL RATIO: 2.8
CHOLESTEROL: 122 MG/DL
CO2: 21 MMOL/L (ref 20–31)
CO2: 21 MMOL/L (ref 20–31)
CO2: 23 MMOL/L (ref 20–31)
CREAT SERPL-MCNC: 0.51 MG/DL (ref 0.5–0.9)
CREAT SERPL-MCNC: 0.56 MG/DL (ref 0.5–0.9)
CREAT SERPL-MCNC: 0.6 MG/DL (ref 0.5–0.9)
FERRITIN: 148 UG/L (ref 13–150)
FIO2: ABNORMAL
FIO2: ABNORMAL
GFR AFRICAN AMERICAN: >60 ML/MIN
GFR NON-AFRICAN AMERICAN: >60 ML/MIN
GFR SERPL CREATININE-BSD FRML MDRD: ABNORMAL ML/MIN/{1.73_M2}
GGT: 524 U/L (ref 5–36)
GLUCOSE BLD-MCNC: 132 MG/DL (ref 65–105)
GLUCOSE BLD-MCNC: 152 MG/DL (ref 65–105)
GLUCOSE BLD-MCNC: 153 MG/DL (ref 65–105)
GLUCOSE BLD-MCNC: 160 MG/DL (ref 65–105)
GLUCOSE BLD-MCNC: 163 MG/DL (ref 65–105)
GLUCOSE BLD-MCNC: 175 MG/DL (ref 65–105)
GLUCOSE BLD-MCNC: 176 MG/DL (ref 70–99)
GLUCOSE BLD-MCNC: 184 MG/DL (ref 65–105)
GLUCOSE BLD-MCNC: 190 MG/DL (ref 65–105)
GLUCOSE BLD-MCNC: 196 MG/DL (ref 65–105)
GLUCOSE BLD-MCNC: 198 MG/DL (ref 65–105)
GLUCOSE BLD-MCNC: 210 MG/DL (ref 70–99)
GLUCOSE BLD-MCNC: 215 MG/DL (ref 65–105)
GLUCOSE BLD-MCNC: 220 MG/DL (ref 70–99)
GLUCOSE BLD-MCNC: 238 MG/DL (ref 65–105)
GLUCOSE BLD-MCNC: 276 MG/DL (ref 65–105)
HCO3 ARTERIAL: 22 MMOL/L (ref 22–27)
HCO3 ARTERIAL: 23.6 MMOL/L (ref 22–27)
HDLC SERPL-MCNC: 43 MG/DL
INR BLD: 1.1
IRON SATURATION: 22 % (ref 20–55)
IRON: 46 UG/DL (ref 37–145)
LDL CHOLESTEROL: 50 MG/DL (ref 0–130)
LV EF: 65 %
LVEF MODALITY: NORMAL
MAGNESIUM: 1.7 MG/DL (ref 1.6–2.6)
MAGNESIUM: 1.8 MG/DL (ref 1.6–2.6)
METHEMOGLOBIN: ABNORMAL % (ref 0–1.5)
METHEMOGLOBIN: ABNORMAL % (ref 0–1.5)
MODE: ABNORMAL
MODE: ABNORMAL
NEGATIVE BASE EXCESS, ART: 1.4 MMOL/L (ref 0–2)
NEGATIVE BASE EXCESS, ART: 2.1 MMOL/L (ref 0–2)
NOTIFICATION TIME: ABNORMAL
NOTIFICATION TIME: ABNORMAL
NOTIFICATION: ABNORMAL
NOTIFICATION: ABNORMAL
O2 DEVICE/FLOW/%: ABNORMAL
O2 DEVICE/FLOW/%: ABNORMAL
O2 SAT, ARTERIAL: 97.1 % (ref 94–100)
O2 SAT, ARTERIAL: 98.4 % (ref 94–100)
OXYHEMOGLOBIN: ABNORMAL % (ref 95–98)
OXYHEMOGLOBIN: ABNORMAL % (ref 95–98)
PARTIAL THROMBOPLASTIN TIME: 64 SEC (ref 20.5–30.5)
PATIENT TEMP: 37
PATIENT TEMP: 37
PCO2 ARTERIAL: 37.4 MMHG (ref 32–45)
PCO2 ARTERIAL: 43.5 MMHG (ref 32–45)
PCO2, ART, TEMP ADJ: ABNORMAL (ref 32–45)
PCO2, ART, TEMP ADJ: ABNORMAL (ref 32–45)
PEEP/CPAP: ABNORMAL
PEEP/CPAP: ABNORMAL
PH ARTERIAL: 7.35 (ref 7.35–7.45)
PH ARTERIAL: 7.39 (ref 7.35–7.45)
PH, ART, TEMP ADJ: ABNORMAL (ref 7.35–7.45)
PH, ART, TEMP ADJ: ABNORMAL (ref 7.35–7.45)
PHOSPHORUS: 1.9 MG/DL (ref 2.6–4.5)
PHOSPHORUS: 2 MG/DL (ref 2.6–4.5)
PHOSPHORUS: 2.6 MG/DL (ref 2.6–4.5)
PHOSPHORUS: 2.8 MG/DL (ref 2.6–4.5)
PO2 ARTERIAL: 112 MMHG (ref 75–95)
PO2 ARTERIAL: 96.6 MMHG (ref 75–95)
PO2, ART, TEMP ADJ: ABNORMAL MMHG (ref 75–95)
PO2, ART, TEMP ADJ: ABNORMAL MMHG (ref 75–95)
POSITIVE BASE EXCESS, ART: ABNORMAL MMOL/L (ref 0–2)
POSITIVE BASE EXCESS, ART: ABNORMAL MMOL/L (ref 0–2)
POTASSIUM SERPL-SCNC: 3.7 MMOL/L (ref 3.7–5.3)
POTASSIUM SERPL-SCNC: 3.9 MMOL/L (ref 3.7–5.3)
POTASSIUM SERPL-SCNC: 3.9 MMOL/L (ref 3.7–5.3)
PROTHROMBIN TIME: 11.2 SEC (ref 9–12)
PSV: ABNORMAL
PSV: ABNORMAL
PT. POSITION: ABNORMAL
PT. POSITION: ABNORMAL
RESPIRATORY RATE: ABNORMAL
RESPIRATORY RATE: ABNORMAL
SAMPLE SITE: ABNORMAL
SAMPLE SITE: ABNORMAL
SARS-COV-2, PCR: NORMAL
SARS-COV-2, RAPID: NOT DETECTED
SARS-COV-2: NORMAL
SET RATE: ABNORMAL
SET RATE: ABNORMAL
SODIUM BLD-SCNC: 139 MMOL/L (ref 135–144)
SODIUM BLD-SCNC: 139 MMOL/L (ref 135–144)
SODIUM BLD-SCNC: 141 MMOL/L (ref 135–144)
SOURCE: NORMAL
TEXT FOR RESPIRATORY: ABNORMAL
TEXT FOR RESPIRATORY: ABNORMAL
TOTAL HB: ABNORMAL G/DL (ref 12–16)
TOTAL HB: ABNORMAL G/DL (ref 12–16)
TOTAL IRON BINDING CAPACITY: 213 UG/DL (ref 250–450)
TOTAL RATE: ABNORMAL
TOTAL RATE: ABNORMAL
TRIGL SERPL-MCNC: 145 MG/DL
UNSATURATED IRON BINDING CAPACITY: 167 UG/DL (ref 112–347)
VLDLC SERPL CALC-MCNC: NORMAL MG/DL (ref 1–30)
VT: ABNORMAL
VT: ABNORMAL

## 2020-06-03 PROCEDURE — 82805 BLOOD GASES W/O2 SATURATION: CPT

## 2020-06-03 PROCEDURE — 83540 ASSAY OF IRON: CPT

## 2020-06-03 PROCEDURE — 86301 IMMUNOASSAY TUMOR CA 19-9: CPT

## 2020-06-03 PROCEDURE — 80061 LIPID PANEL: CPT

## 2020-06-03 PROCEDURE — 2709999900 HC NON-CHARGEABLE SUPPLY

## 2020-06-03 PROCEDURE — 82787 IGG 1 2 3 OR 4 EACH: CPT

## 2020-06-03 PROCEDURE — B2111ZZ FLUOROSCOPY OF MULTIPLE CORONARY ARTERIES USING LOW OSMOLAR CONTRAST: ICD-10-PCS | Performed by: INTERNAL MEDICINE

## 2020-06-03 PROCEDURE — 84075 ASSAY ALKALINE PHOSPHATASE: CPT

## 2020-06-03 PROCEDURE — 85730 THROMBOPLASTIN TIME PARTIAL: CPT

## 2020-06-03 PROCEDURE — 82390 ASSAY OF CERULOPLASMIN: CPT

## 2020-06-03 PROCEDURE — 84080 ASSAY ALKALINE PHOSPHATASES: CPT

## 2020-06-03 PROCEDURE — 82378 CARCINOEMBRYONIC ANTIGEN: CPT

## 2020-06-03 PROCEDURE — 6370000000 HC RX 637 (ALT 250 FOR IP): Performed by: STUDENT IN AN ORGANIZED HEALTH CARE EDUCATION/TRAINING PROGRAM

## 2020-06-03 PROCEDURE — 83516 IMMUNOASSAY NONANTIBODY: CPT

## 2020-06-03 PROCEDURE — 83735 ASSAY OF MAGNESIUM: CPT

## 2020-06-03 PROCEDURE — 2580000003 HC RX 258: Performed by: STUDENT IN AN ORGANIZED HEALTH CARE EDUCATION/TRAINING PROGRAM

## 2020-06-03 PROCEDURE — 84100 ASSAY OF PHOSPHORUS: CPT

## 2020-06-03 PROCEDURE — 86038 ANTINUCLEAR ANTIBODIES: CPT

## 2020-06-03 PROCEDURE — 86376 MICROSOMAL ANTIBODY EACH: CPT

## 2020-06-03 PROCEDURE — 2060000000 HC ICU INTERMEDIATE R&B

## 2020-06-03 PROCEDURE — 93454 CORONARY ARTERY ANGIO S&I: CPT | Performed by: INTERNAL MEDICINE

## 2020-06-03 PROCEDURE — 2500000003 HC RX 250 WO HCPCS: Performed by: STUDENT IN AN ORGANIZED HEALTH CARE EDUCATION/TRAINING PROGRAM

## 2020-06-03 PROCEDURE — 6360000002 HC RX W HCPCS: Performed by: STUDENT IN AN ORGANIZED HEALTH CARE EDUCATION/TRAINING PROGRAM

## 2020-06-03 PROCEDURE — 6360000004 HC RX CONTRAST MEDICATION

## 2020-06-03 PROCEDURE — 86255 FLUORESCENT ANTIBODY SCREEN: CPT

## 2020-06-03 PROCEDURE — 2500000003 HC RX 250 WO HCPCS

## 2020-06-03 PROCEDURE — 86256 FLUORESCENT ANTIBODY TITER: CPT

## 2020-06-03 PROCEDURE — 82105 ALPHA-FETOPROTEIN SERUM: CPT

## 2020-06-03 PROCEDURE — 99291 CRITICAL CARE FIRST HOUR: CPT | Performed by: INTERNAL MEDICINE

## 2020-06-03 PROCEDURE — 36415 COLL VENOUS BLD VENIPUNCTURE: CPT

## 2020-06-03 PROCEDURE — 76705 ECHO EXAM OF ABDOMEN: CPT

## 2020-06-03 PROCEDURE — 6360000002 HC RX W HCPCS

## 2020-06-03 PROCEDURE — 83550 IRON BINDING TEST: CPT

## 2020-06-03 PROCEDURE — 85610 PROTHROMBIN TIME: CPT

## 2020-06-03 PROCEDURE — 99253 IP/OBS CNSLTJ NEW/EST LOW 45: CPT | Performed by: NURSE PRACTITIONER

## 2020-06-03 PROCEDURE — U0002 COVID-19 LAB TEST NON-CDC: HCPCS

## 2020-06-03 PROCEDURE — C1769 GUIDE WIRE: HCPCS

## 2020-06-03 PROCEDURE — C1894 INTRO/SHEATH, NON-LASER: HCPCS

## 2020-06-03 PROCEDURE — 93306 TTE W/DOPPLER COMPLETE: CPT

## 2020-06-03 PROCEDURE — 80048 BASIC METABOLIC PNL TOTAL CA: CPT

## 2020-06-03 PROCEDURE — 82103 ALPHA-1-ANTITRYPSIN TOTAL: CPT

## 2020-06-03 PROCEDURE — 82977 ASSAY OF GGT: CPT

## 2020-06-03 PROCEDURE — 82728 ASSAY OF FERRITIN: CPT

## 2020-06-03 RX ORDER — INSULIN GLARGINE 100 [IU]/ML
30 INJECTION, SOLUTION SUBCUTANEOUS ONCE
Status: COMPLETED | OUTPATIENT
Start: 2020-06-03 | End: 2020-06-03

## 2020-06-03 RX ORDER — AMLODIPINE BESYLATE 5 MG/1
5 TABLET ORAL ONCE
Status: COMPLETED | OUTPATIENT
Start: 2020-06-03 | End: 2020-06-03

## 2020-06-03 RX ORDER — DEXTROSE MONOHYDRATE 25 G/50ML
12.5 INJECTION, SOLUTION INTRAVENOUS PRN
Status: DISCONTINUED | OUTPATIENT
Start: 2020-06-03 | End: 2020-06-06 | Stop reason: HOSPADM

## 2020-06-03 RX ORDER — AMLODIPINE BESYLATE 10 MG/1
10 TABLET ORAL DAILY
Status: DISCONTINUED | OUTPATIENT
Start: 2020-06-03 | End: 2020-06-03

## 2020-06-03 RX ORDER — AMLODIPINE BESYLATE 10 MG/1
10 TABLET ORAL DAILY
Status: DISCONTINUED | OUTPATIENT
Start: 2020-06-04 | End: 2020-06-06 | Stop reason: HOSPADM

## 2020-06-03 RX ORDER — NICOTINE POLACRILEX 4 MG
15 LOZENGE BUCCAL PRN
Status: DISCONTINUED | OUTPATIENT
Start: 2020-06-03 | End: 2020-06-06 | Stop reason: HOSPADM

## 2020-06-03 RX ORDER — CARVEDILOL 6.25 MG/1
6.25 TABLET ORAL ONCE
Status: COMPLETED | OUTPATIENT
Start: 2020-06-03 | End: 2020-06-03

## 2020-06-03 RX ORDER — SODIUM CHLORIDE 450 MG/100ML
INJECTION, SOLUTION INTRAVENOUS CONTINUOUS
Status: DISCONTINUED | OUTPATIENT
Start: 2020-06-03 | End: 2020-06-06 | Stop reason: HOSPADM

## 2020-06-03 RX ORDER — INSULIN GLARGINE 100 [IU]/ML
30 INJECTION, SOLUTION SUBCUTANEOUS NIGHTLY
Status: DISCONTINUED | OUTPATIENT
Start: 2020-06-04 | End: 2020-06-05

## 2020-06-03 RX ORDER — DEXTROSE MONOHYDRATE 50 MG/ML
100 INJECTION, SOLUTION INTRAVENOUS PRN
Status: DISCONTINUED | OUTPATIENT
Start: 2020-06-03 | End: 2020-06-06 | Stop reason: HOSPADM

## 2020-06-03 RX ORDER — AMLODIPINE BESYLATE 5 MG/1
5 TABLET ORAL DAILY
Status: DISCONTINUED | OUTPATIENT
Start: 2020-06-03 | End: 2020-06-03

## 2020-06-03 RX ORDER — CARVEDILOL 12.5 MG/1
12.5 TABLET ORAL 2 TIMES DAILY WITH MEALS
Status: DISCONTINUED | OUTPATIENT
Start: 2020-06-03 | End: 2020-06-06 | Stop reason: HOSPADM

## 2020-06-03 RX ORDER — CARVEDILOL 6.25 MG/1
6.25 TABLET ORAL 2 TIMES DAILY WITH MEALS
Status: DISCONTINUED | OUTPATIENT
Start: 2020-06-03 | End: 2020-06-03

## 2020-06-03 RX ADMIN — AMLODIPINE BESYLATE 5 MG: 5 TABLET ORAL at 11:59

## 2020-06-03 RX ADMIN — DIBASIC SODIUM PHOSPHATE, MONOBASIC POTASSIUM PHOSPHATE AND MONOBASIC SODIUM PHOSPHATE 2 TABLET: 852; 155; 130 TABLET ORAL at 12:02

## 2020-06-03 RX ADMIN — CARVEDILOL 12.5 MG: 12.5 TABLET, FILM COATED ORAL at 18:35

## 2020-06-03 RX ADMIN — POTASSIUM CHLORIDE 10 MEQ: 10 INJECTION, SOLUTION INTRAVENOUS at 03:30

## 2020-06-03 RX ADMIN — INSULIN LISPRO 5 UNITS: 100 INJECTION, SOLUTION INTRAVENOUS; SUBCUTANEOUS at 20:06

## 2020-06-03 RX ADMIN — SODIUM PHOSPHATE, MONOBASIC, MONOHYDRATE 15 MMOL: 276; 142 INJECTION, SOLUTION INTRAVENOUS at 00:00

## 2020-06-03 RX ADMIN — INSULIN GLARGINE 30 UNITS: 100 INJECTION, SOLUTION SUBCUTANEOUS at 09:49

## 2020-06-03 RX ADMIN — AMLODIPINE BESYLATE 5 MG: 5 TABLET ORAL at 09:24

## 2020-06-03 RX ADMIN — INSULIN LISPRO 6 UNITS: 100 INJECTION, SOLUTION INTRAVENOUS; SUBCUTANEOUS at 16:55

## 2020-06-03 RX ADMIN — CARVEDILOL 6.25 MG: 6.25 TABLET, FILM COATED ORAL at 09:24

## 2020-06-03 RX ADMIN — POTASSIUM CHLORIDE 10 MEQ: 10 INJECTION, SOLUTION INTRAVENOUS at 04:30

## 2020-06-03 RX ADMIN — Medication 81 MG: at 09:24

## 2020-06-03 RX ADMIN — POTASSIUM CHLORIDE 10 MEQ: 10 INJECTION, SOLUTION INTRAVENOUS at 02:30

## 2020-06-03 RX ADMIN — INSULIN LISPRO 3 UNITS: 100 INJECTION, SOLUTION INTRAVENOUS; SUBCUTANEOUS at 12:10

## 2020-06-03 RX ADMIN — CARVEDILOL 6.25 MG: 6.25 TABLET, FILM COATED ORAL at 11:59

## 2020-06-03 RX ADMIN — SODIUM CHLORIDE 100 ML/HR: 4.5 INJECTION, SOLUTION INTRAVENOUS at 16:48

## 2020-06-03 ASSESSMENT — PAIN SCALES - GENERAL
PAINLEVEL_OUTOF10: 0

## 2020-06-03 NOTE — PLAN OF CARE
Problem: Falls - Risk of:  Goal: Will remain free from falls  Description: Will remain free from falls  Outcome: Ongoing  Goal: Absence of physical injury  Description: Absence of physical injury  Outcome: Ongoing     Problem: Anxiety/Stress:  Goal: Level of anxiety will decrease  Description: Level of anxiety will decrease  Outcome: Ongoing     Problem: Nutrition Deficit:  Goal: Ability to achieve adequate nutritional intake will improve  Description: Ability to achieve adequate nutritional intake will improve  Outcome: Ongoing     Problem: Serum Glucose Level - Abnormal:  Goal: Ability to maintain appropriate glucose levels will improve to within specified parameters  Description: Ability to maintain appropriate glucose levels will improve to within specified parameters  Outcome: Ongoing     Problem: Skin Integrity - Impaired:  Goal: Will show no infection signs and symptoms  Description: Will show no infection signs and symptoms  Outcome: Ongoing

## 2020-06-03 NOTE — PROGRESS NOTES
hydration. No previous history of cardiac disease. Initially had altered mentation, slurred speech however resolved now. Cardiology consulted for unstable angina, troponinemia. Echo unremarkable. Had cardiac cath today 6/3, Unremarkable. Amlodipine changed to 10 mg, Coreg changed to 12.5 mg twice daily. Start home medication atenolol. Held statins due to elevated LFTs. Elevated LFTs, trending down  However still on high side. Liver ultrasound shown dilated pancreatic duct, intrahepatic duct, CBD. No stones visualized through liver ultrasound. Previously had work-up included to clinic. GI consulted for further work-up. Arterial line removed. Carb controlled diet started. Procedures during patient's ICU stay:     Cardiac Cath  Arterial line    Current Vitals:     BP (!) 145/60   Pulse 84   Temp 98.1 °F (36.7 °C) (Oral)   Resp 16   Ht 5' 5\" (1.651 m)   Wt 256 lb 13.4 oz (116.5 kg)   SpO2 91%   BMI 42.74 kg/m²       Cultures:     Blood cultures:                 [] None drawn      [] Negative             []  Positive (Details:  )  Urine Culture:                   [] None drawn      [] Negative             []  Positive (Details:  )  Sputum Culture:               [] None drawn       [] Negative             []  Positive (Details:  )   Endotracheal aspirate:     [] None drawn       [] Negative             []  Positive (Details:  )       Consults:     1. Cardiology    Assessment:     Patient Active Problem List    Diagnosis Date Noted    Type 2 diabetes mellitus with ketoacidosis without coma, with long-term current use of insulin ([facility] [address] 06/01/2020    Uncontrolled diabetes mellitus ([facility] [address] 12/27/2013    Hypertension 12/27/2013    Postmenopausal bleeding 12/27/2013    Thickened endometrium 12/27/2013         Recommended Follow-up:     1. DKA: Resolved  2. Type 2 diabetes mellitus-insulin-dependent: Started on Lantus 30units, high-dose sliding scale. A1c 10.7. Adjust the dose accordingly.   Follow

## 2020-06-03 NOTE — PROGRESS NOTES
[facility]   Progress Note                 Date:   6/3/2020  Patient name:  Eladia Cee  Date of admission:  5/31/2020 10:14 PM  MRN:   [id number]  YOB: 1960  PCP:    No primary care provider on file. Reason for Admission: DKA, type 2, not at goal [facility]) [E11.10]      Subjective:       Clinical Changes / Abnormalities:     Patient seen and examined. No acute events overnight. Remained hemodynamically stable and afebrile. No CP, SOB. I/O last 3 completed shifts: In: 4907.9 [P.O.:250; I.V.:3867.9; IV Piggyback:790]  Out: 3150 [Urine:3150]  I/O this shift: In: 750 [P.O.:300; IV Piggyback:450]  Out: 800 [Urine:800]        Medications:   Scheduled Meds:    amLODIPine  5 mg Oral Daily    aspirin  81 mg Oral Daily    metoprolol tartrate  12.5 mg Oral BID     Continuous Infusions:    dextrose 5 % and 0.45 % NaCl 75 mL/hr at 06/02/20 1736    heparin (porcine) 14.2 Units/kg/hr (06/02/20 1632)    insulin 5.1 Units/kg/hr (06/03/20 0600)    dextrose 5 % and 0.45 % NaCl       CBC:   Recent Labs     05/31/20 2222   WBC 19.6*   HGB 11.5*   *     BMP:    Recent Labs     06/02/20  1822 06/02/20  2226 06/03/20  0200    137 139   K 3.7 4.0 3.7    106 108*   CO2 23 17* 21   BUN 23* 19 16   CREATININE 0.71 0.59 0.60   GLUCOSE 211* 182* 176*     Hepatic:   Recent Labs     05/31/20 2222 06/01/20  0247 06/02/20  1822   * 133* 37*   * 613* 313*   BILITOT 0.54 0.43 0.37   ALKPHOS 550* 499* 335*     Troponin:   Recent Labs     06/02/20  0109 06/02/20  1017 06/02/20  1531   TROPHS 406* 237* 211*     BNP: INR:   Recent Labs     05/31/20  2222   INR 1.1       Objective:   Vitals: BP (!) 145/60   Pulse 88   Temp 98.7 °F (37.1 °C) (Oral)   Resp 17   Ht 5' 5\" (1.651 m)   Wt 256 lb 13.4 oz (116.5 kg)   SpO2 91%   BMI 42.74 kg/m²   General appearance: Alert. No acute distress. HEENT: Head: Normal, normocephalic, atraumatic.   Neck: no JVD, trachea [phone]

## 2020-06-03 NOTE — OP NOTE
[facility]        Date:   6/3/2020  Patient name:  Prashant Lopez  Date of admission:  5/31/2020 10:14 PM  MRN:   [id number]  YOB: 1960    CARDIAC CATHETERIZATION    Operators:  Primary:Reji Reeder MD    CV Fellow:  Norma Bush M.D. Procedure performed:       [] Left Heart Catheterization. [] Graft Angiography.  [] Left Ventriculography. [] Right Heart Catheterization. [x] Coronary Angiography. [] Aortic Valve Studies. [] PCI:      [x] Other:         Pre Procedure Conscious Sedation Data:    ASA Class:    [] I [x] II [] III [] IV    Mallampati Class:  [] I [x] II [] III [] IV      Indication:  [] STEMI      [] + Stress test  [x] ACS      [] + EKG Changes  [] Non Q MI       [x] Significant Risk Factors  [] Recurrent Angina             [x] Diabetes Mellitus    [] New LBBB      [] Uncontrolled HTN. [] CHF / Low EF changes     [] Abnormal CTA / Ca Score  [] Other:     Procedure:  Access:  [x] Femoral  [] Radial  artery       [x] Right  [] Left    Procedure: After informed consent was obtained with explanation of the risks and benefits, patient was brought to the cath lab. The right and left groin were prepped and draped in sterile fashion. 1% lidocaine was used for local block. The  right femoral artery was cannulated with 6  Fr sheath with brisk arterial blood return. The side port was frequently flushed and aspirated with normal saline. Findings:  LMCA: Mild irregularities 10-20%.     LAD: 40% proximal stenosis     LCx: Mild irregularities 10-20%.     RCA: Mild irregularities 10-20%.     Coronary Tree      Dominance: Right      Estimated blood loss: 10 ml    Conclusions:  1. Non obstructive CAD  2. Recommendations:  1. Medical Therapy. 2. Risk Factors Modification.   3. Post Cath Protocol    History and Risk Factors    [x] Hypertension     [] Family history of CAD  [x] Hyperlipidemia     [] Cerebrovascular Disease   [] Prior MI       [] Peripheral Vascular disease   [] Prior PCI              [x] Diabetes Mellitus    [] Left Main PCI. [] Currently on Dialysis. [] Prior CABG. [] Currently smoker. [] Cardiac Arrest outside of healthcare facility. [] Yes    [x] No        Witnessed     [] Yes   [] No     Arrest after arrival of EMS  [] Yes   [] No     [] Cardiac Arrest at other Facility. [] Yes   [x] No    Pre-Procedure Information. Heart Failure       [] Yes    [x] No        Class  [] I      [] II  [] III    [] IV. New Diagnosis    [] Yes  [] No    HF Type      [] Systolic   [] Diastolic          [] Unknown. Diagnostic Test:   EKG       [x] Normal   [] Abnormal    New antiarrhythmia medications:    [] Yes   [] No   New onset atrial fibrillation / Flutter     [] Yes   [] No   ECG Abnormalities:      [] V. Fib   [] Brenda V. Tach           [] NS V. T   [] New LBBB           [] T. Inv  []  ST dev > 0.5 mm         [] PVC's freq  [] PVC's infrequent    Stress Test Performed:      [] Yes    [x] No     Type:     [] Stress Echo   [] Exercise Stress Test (no imaging)      [] Stress Nuclear  [] Stress Imaging     Results   [] Negative   [] Positive        [] Indeterminate  [] Unavailable     If Positive/ Risk / Extent of Ischemia:       [] Low  [] Intermediate         [] High  [] Unavailable      Cardiac CTA Performed:     [] Yes    [x] No      Results   [] CAD   [] Non obstructive CAD      [] No CAD   [] Uncertain      [] Unknown   [] Structural Disease. Pre Procedure Medications:   [x] Yes    [] No         [x] ASA   [x] Beta Blockers      [] Nitrate   [] Ca Channel Blockers      [] Ranolazine   [] Statin       [] Plavix/Others antiplatelets      Electronically signed on 6/3/2020 at 2:44 PM by: Marry Trujillo MD  Fellow, [address] [place] [address] 289 M.  Dylon Tsai MD. Attending Physician  [facility]

## 2020-06-04 ENCOUNTER — APPOINTMENT (OUTPATIENT)
Dept: GENERAL RADIOLOGY | Age: 60
DRG: 435 | End: 2020-06-04
Payer: COMMERCIAL

## 2020-06-04 ENCOUNTER — ANESTHESIA EVENT (OUTPATIENT)
Dept: ENDOSCOPY | Age: 60
DRG: 435 | End: 2020-06-04
Payer: COMMERCIAL

## 2020-06-04 ENCOUNTER — APPOINTMENT (OUTPATIENT)
Dept: MRI IMAGING | Age: 60
DRG: 435 | End: 2020-06-04
Payer: COMMERCIAL

## 2020-06-04 ENCOUNTER — ANESTHESIA (OUTPATIENT)
Dept: ENDOSCOPY | Age: 60
DRG: 435 | End: 2020-06-04
Payer: COMMERCIAL

## 2020-06-04 ENCOUNTER — APPOINTMENT (OUTPATIENT)
Dept: ULTRASOUND IMAGING | Age: 60
DRG: 435 | End: 2020-06-04
Payer: COMMERCIAL

## 2020-06-04 VITALS — OXYGEN SATURATION: 99 % | DIASTOLIC BLOOD PRESSURE: 58 MMHG | TEMPERATURE: 98.1 F | SYSTOLIC BLOOD PRESSURE: 98 MMHG

## 2020-06-04 PROBLEM — E83.42 HYPOMAGNESEMIA: Status: ACTIVE | Noted: 2020-06-04

## 2020-06-04 PROBLEM — I25.10 CAD (CORONARY ARTERY DISEASE), NATIVE CORONARY ARTERY: Status: ACTIVE | Noted: 2020-06-04

## 2020-06-04 PROBLEM — E11.65 HYPERGLYCEMIA DUE TO TYPE 2 DIABETES MELLITUS (HCC): Status: ACTIVE | Noted: 2020-06-04

## 2020-06-04 PROBLEM — K86.89 PANCREATIC MASS: Status: ACTIVE | Noted: 2020-06-04

## 2020-06-04 PROBLEM — E83.39 HYPOPHOSPHATEMIA: Status: ACTIVE | Noted: 2020-06-04

## 2020-06-04 LAB
ABSOLUTE EOS #: 0.03 K/UL (ref 0–0.44)
ABSOLUTE IMMATURE GRANULOCYTE: 0.04 K/UL (ref 0–0.3)
ABSOLUTE LYMPH #: 0.71 K/UL (ref 1.1–3.7)
ABSOLUTE MONO #: 0.42 K/UL (ref 0.1–1.2)
AFP: 1.2 UG/L
ALBUMIN SERPL-MCNC: 2.6 G/DL (ref 3.5–5.2)
ALBUMIN SERPL-MCNC: 2.7 G/DL (ref 3.5–5.2)
ALBUMIN/GLOBULIN RATIO: 0.8 (ref 1–2.5)
ALBUMIN/GLOBULIN RATIO: 1 (ref 1–2.5)
ALP BLD-CCNC: 278 U/L (ref 35–104)
ALP BLD-CCNC: 308 U/L (ref 35–104)
ALT SERPL-CCNC: 162 U/L (ref 5–33)
ALT SERPL-CCNC: 184 U/L (ref 5–33)
ANION GAP SERPL CALCULATED.3IONS-SCNC: 16 MMOL/L (ref 9–17)
ANTI-NUCLEAR ANTIBODY (ANA): NEGATIVE
AST SERPL-CCNC: 16 U/L
AST SERPL-CCNC: 19 U/L
BASOPHILS # BLD: 0 % (ref 0–2)
BASOPHILS ABSOLUTE: <0.03 K/UL (ref 0–0.2)
BILIRUB SERPL-MCNC: 0.56 MG/DL (ref 0.3–1.2)
BILIRUB SERPL-MCNC: 0.59 MG/DL (ref 0.3–1.2)
BILIRUBIN DIRECT: 0.21 MG/DL
BILIRUBIN, INDIRECT: 0.35 MG/DL (ref 0–1)
BUN BLDV-MCNC: 12 MG/DL (ref 6–20)
BUN/CREAT BLD: ABNORMAL (ref 9–20)
CA 19-9: 12 U/ML (ref 0–35)
CALCIUM SERPL-MCNC: 9 MG/DL (ref 8.6–10.4)
CARCINOEMBRYONIC ANTIGEN: 2.7 NG/ML
CHLORIDE BLD-SCNC: 102 MMOL/L (ref 98–107)
CO2: 21 MMOL/L (ref 20–31)
CREAT SERPL-MCNC: 0.63 MG/DL (ref 0.5–0.9)
DIFFERENTIAL TYPE: ABNORMAL
EOSINOPHILS RELATIVE PERCENT: 1 % (ref 1–4)
GFR AFRICAN AMERICAN: >60 ML/MIN
GFR NON-AFRICAN AMERICAN: >60 ML/MIN
GFR SERPL CREATININE-BSD FRML MDRD: ABNORMAL ML/MIN/{1.73_M2}
GFR SERPL CREATININE-BSD FRML MDRD: ABNORMAL ML/MIN/{1.73_M2}
GLOBULIN: ABNORMAL G/DL (ref 1.5–3.8)
GLUCOSE BLD-MCNC: 235 MG/DL (ref 65–105)
GLUCOSE BLD-MCNC: 236 MG/DL (ref 65–105)
GLUCOSE BLD-MCNC: 266 MG/DL (ref 65–105)
GLUCOSE BLD-MCNC: 348 MG/DL (ref 65–105)
GLUCOSE BLD-MCNC: 364 MG/DL (ref 65–105)
GLUCOSE BLD-MCNC: 392 MG/DL (ref 65–105)
GLUCOSE BLD-MCNC: 409 MG/DL (ref 70–99)
HCT VFR BLD CALC: 32 % (ref 36.3–47.1)
HEMOGLOBIN: 10 G/DL (ref 11.9–15.1)
IMMATURE GRANULOCYTES: 1 %
LYMPHOCYTES # BLD: 12 % (ref 24–43)
MAGNESIUM: 1.4 MG/DL (ref 1.6–2.6)
MCH RBC QN AUTO: 28.7 PG (ref 25.2–33.5)
MCHC RBC AUTO-ENTMCNC: 31.3 G/DL (ref 28.4–34.8)
MCV RBC AUTO: 92 FL (ref 82.6–102.9)
MONOCYTES # BLD: 7 % (ref 3–12)
NRBC AUTOMATED: 0 PER 100 WBC
PDW BLD-RTO: 15.1 % (ref 11.8–14.4)
PHOSPHORUS: 2.2 MG/DL (ref 2.6–4.5)
PHOSPHORUS: 2.3 MG/DL (ref 2.6–4.5)
PLATELET # BLD: 201 K/UL (ref 138–453)
PLATELET ESTIMATE: ABNORMAL
PMV BLD AUTO: 10.3 FL (ref 8.1–13.5)
POTASSIUM SERPL-SCNC: 4.6 MMOL/L (ref 3.7–5.3)
RBC # BLD: 3.48 M/UL (ref 3.95–5.11)
RBC # BLD: ABNORMAL 10*6/UL
SEG NEUTROPHILS: 80 % (ref 36–65)
SEGMENTED NEUTROPHILS ABSOLUTE COUNT: 4.71 K/UL (ref 1.5–8.1)
SODIUM BLD-SCNC: 139 MMOL/L (ref 135–144)
TOTAL PROTEIN: 5.4 G/DL (ref 6.4–8.3)
TOTAL PROTEIN: 5.8 G/DL (ref 6.4–8.3)
WBC # BLD: 5.9 K/UL (ref 3.5–11.3)
WBC # BLD: ABNORMAL 10*3/UL

## 2020-06-04 PROCEDURE — 3609020800 HC EGD W/EUS FNA: Performed by: INTERNAL MEDICINE

## 2020-06-04 PROCEDURE — 6360000004 HC RX CONTRAST MEDICATION: Performed by: INTERNAL MEDICINE

## 2020-06-04 PROCEDURE — 6370000000 HC RX 637 (ALT 250 FOR IP): Performed by: INTERNAL MEDICINE

## 2020-06-04 PROCEDURE — 74328 X-RAY BILE DUCT ENDOSCOPY: CPT

## 2020-06-04 PROCEDURE — 0FBG8ZX EXCISION OF PANCREAS, VIA NATURAL OR ARTIFICIAL OPENING ENDOSCOPIC, DIAGNOSTIC: ICD-10-PCS | Performed by: INTERNAL MEDICINE

## 2020-06-04 PROCEDURE — 7100000001 HC PACU RECOVERY - ADDTL 15 MIN: Performed by: INTERNAL MEDICINE

## 2020-06-04 PROCEDURE — 3609018800 HC ERCP DX COLLECTION SPECIMEN BRUSHING/WASHING: Performed by: INTERNAL MEDICINE

## 2020-06-04 PROCEDURE — 83735 ASSAY OF MAGNESIUM: CPT

## 2020-06-04 PROCEDURE — 3700000001 HC ADD 15 MINUTES (ANESTHESIA): Performed by: INTERNAL MEDICINE

## 2020-06-04 PROCEDURE — 2709999900 HC NON-CHARGEABLE SUPPLY: Performed by: INTERNAL MEDICINE

## 2020-06-04 PROCEDURE — 3609014900 HC ERCP W/SPHINCTEROTOMY &/OR PAPILLOTOMY: Performed by: INTERNAL MEDICINE

## 2020-06-04 PROCEDURE — A9576 INJ PROHANCE MULTIPACK: HCPCS | Performed by: NURSE PRACTITIONER

## 2020-06-04 PROCEDURE — 2500000003 HC RX 250 WO HCPCS: Performed by: INTERNAL MEDICINE

## 2020-06-04 PROCEDURE — 2720000010 HC SURG SUPPLY STERILE: Performed by: INTERNAL MEDICINE

## 2020-06-04 PROCEDURE — 2580000003 HC RX 258: Performed by: NURSE PRACTITIONER

## 2020-06-04 PROCEDURE — 3700000000 HC ANESTHESIA ATTENDED CARE: Performed by: INTERNAL MEDICINE

## 2020-06-04 PROCEDURE — 88173 CYTOPATH EVAL FNA REPORT: CPT

## 2020-06-04 PROCEDURE — 82378 CARCINOEMBRYONIC ANTIGEN: CPT

## 2020-06-04 PROCEDURE — 1200000000 HC SEMI PRIVATE

## 2020-06-04 PROCEDURE — 6360000002 HC RX W HCPCS: Performed by: NURSE PRACTITIONER

## 2020-06-04 PROCEDURE — 6370000000 HC RX 637 (ALT 250 FOR IP): Performed by: STUDENT IN AN ORGANIZED HEALTH CARE EDUCATION/TRAINING PROGRAM

## 2020-06-04 PROCEDURE — 7100000000 HC PACU RECOVERY - FIRST 15 MIN: Performed by: INTERNAL MEDICINE

## 2020-06-04 PROCEDURE — 43274 ERCP DUCT STENT PLACEMENT: CPT | Performed by: INTERNAL MEDICINE

## 2020-06-04 PROCEDURE — 43242 EGD US FINE NEEDLE BX/ASPIR: CPT | Performed by: INTERNAL MEDICINE

## 2020-06-04 PROCEDURE — 6360000002 HC RX W HCPCS: Performed by: INTERNAL MEDICINE

## 2020-06-04 PROCEDURE — 2580000003 HC RX 258: Performed by: INTERNAL MEDICINE

## 2020-06-04 PROCEDURE — 2580000003 HC RX 258: Performed by: NURSE ANESTHETIST, CERTIFIED REGISTERED

## 2020-06-04 PROCEDURE — C2625 STENT, NON-COR, TEM W/DEL SY: HCPCS | Performed by: INTERNAL MEDICINE

## 2020-06-04 PROCEDURE — 3609015100 HC ERCP STENT PLACEMENT BILIARY/PANCREATIC DUCT: Performed by: INTERNAL MEDICINE

## 2020-06-04 PROCEDURE — 99233 SBSQ HOSP IP/OBS HIGH 50: CPT | Performed by: INTERNAL MEDICINE

## 2020-06-04 PROCEDURE — C1769 GUIDE WIRE: HCPCS | Performed by: INTERNAL MEDICINE

## 2020-06-04 PROCEDURE — 84100 ASSAY OF PHOSPHORUS: CPT

## 2020-06-04 PROCEDURE — 88172 CYTP DX EVAL FNA 1ST EA SITE: CPT

## 2020-06-04 PROCEDURE — 2500000003 HC RX 250 WO HCPCS: Performed by: NURSE ANESTHETIST, CERTIFIED REGISTERED

## 2020-06-04 PROCEDURE — 85025 COMPLETE CBC W/AUTO DIFF WBC: CPT

## 2020-06-04 PROCEDURE — 6360000002 HC RX W HCPCS: Performed by: NURSE ANESTHETIST, CERTIFIED REGISTERED

## 2020-06-04 PROCEDURE — 82105 ALPHA-FETOPROTEIN SERUM: CPT

## 2020-06-04 PROCEDURE — 86301 IMMUNOASSAY TUMOR CA 19-9: CPT

## 2020-06-04 PROCEDURE — 80053 COMPREHEN METABOLIC PANEL: CPT

## 2020-06-04 PROCEDURE — 36415 COLL VENOUS BLD VENIPUNCTURE: CPT

## 2020-06-04 PROCEDURE — 0F798DZ DILATION OF COMMON BILE DUCT WITH INTRALUMINAL DEVICE, VIA NATURAL OR ARTIFICIAL OPENING ENDOSCOPIC: ICD-10-PCS | Performed by: INTERNAL MEDICINE

## 2020-06-04 PROCEDURE — 74183 MRI ABD W/O CNTR FLWD CNTR: CPT

## 2020-06-04 PROCEDURE — 88305 TISSUE EXAM BY PATHOLOGIST: CPT

## 2020-06-04 PROCEDURE — 82947 ASSAY GLUCOSE BLOOD QUANT: CPT

## 2020-06-04 PROCEDURE — 2580000003 HC RX 258: Performed by: STUDENT IN AN ORGANIZED HEALTH CARE EDUCATION/TRAINING PROGRAM

## 2020-06-04 PROCEDURE — 80076 HEPATIC FUNCTION PANEL: CPT

## 2020-06-04 PROCEDURE — 6360000004 HC RX CONTRAST MEDICATION: Performed by: NURSE PRACTITIONER

## 2020-06-04 PROCEDURE — 76975 GI ENDOSCOPIC ULTRASOUND: CPT

## 2020-06-04 DEVICE — BILIARY STENT WITH NAVIFLEXTM RX DELIVERY SYSTEM
Type: IMPLANTABLE DEVICE | Status: FUNCTIONAL
Brand: ADVANIX™ BILIARY

## 2020-06-04 RX ORDER — NITROGLYCERIN 0.4 MG/1
0.4 TABLET SUBLINGUAL EVERY 5 MIN PRN
Status: DISCONTINUED | OUTPATIENT
Start: 2020-06-04 | End: 2020-06-06 | Stop reason: HOSPADM

## 2020-06-04 RX ORDER — SIMETHICONE 80 MG
80 TABLET,CHEWABLE ORAL EVERY 6 HOURS PRN
Status: DISCONTINUED | OUTPATIENT
Start: 2020-06-04 | End: 2020-06-06 | Stop reason: HOSPADM

## 2020-06-04 RX ORDER — FENTANYL CITRATE 50 UG/ML
25 INJECTION, SOLUTION INTRAMUSCULAR; INTRAVENOUS EVERY 5 MIN PRN
Status: DISCONTINUED | OUTPATIENT
Start: 2020-06-04 | End: 2020-06-04

## 2020-06-04 RX ORDER — SODIUM CHLORIDE 0.9 % (FLUSH) 0.9 %
10 SYRINGE (ML) INJECTION EVERY 12 HOURS SCHEDULED
Status: DISCONTINUED | OUTPATIENT
Start: 2020-06-04 | End: 2020-06-06 | Stop reason: HOSPADM

## 2020-06-04 RX ORDER — MORPHINE SULFATE 2 MG/ML
2 INJECTION, SOLUTION INTRAMUSCULAR; INTRAVENOUS ONCE
Status: COMPLETED | OUTPATIENT
Start: 2020-06-04 | End: 2020-06-04

## 2020-06-04 RX ORDER — ROCURONIUM BROMIDE 10 MG/ML
INJECTION, SOLUTION INTRAVENOUS PRN
Status: DISCONTINUED | OUTPATIENT
Start: 2020-06-04 | End: 2020-06-04 | Stop reason: SDUPTHER

## 2020-06-04 RX ORDER — ATORVASTATIN CALCIUM 40 MG/1
40 TABLET, FILM COATED ORAL NIGHTLY
Status: DISCONTINUED | OUTPATIENT
Start: 2020-06-04 | End: 2020-06-06 | Stop reason: HOSPADM

## 2020-06-04 RX ORDER — DIPHENHYDRAMINE HYDROCHLORIDE 50 MG/ML
INJECTION INTRAMUSCULAR; INTRAVENOUS PRN
Status: DISCONTINUED | OUTPATIENT
Start: 2020-06-04 | End: 2020-06-04 | Stop reason: SDUPTHER

## 2020-06-04 RX ORDER — SODIUM CHLORIDE, SODIUM LACTATE, POTASSIUM CHLORIDE, CALCIUM CHLORIDE 600; 310; 30; 20 MG/100ML; MG/100ML; MG/100ML; MG/100ML
INJECTION, SOLUTION INTRAVENOUS CONTINUOUS
Status: DISCONTINUED | OUTPATIENT
Start: 2020-06-04 | End: 2020-06-04

## 2020-06-04 RX ORDER — SODIUM CHLORIDE 0.9 % (FLUSH) 0.9 %
10 SYRINGE (ML) INJECTION PRN
Status: DISCONTINUED | OUTPATIENT
Start: 2020-06-04 | End: 2020-06-06 | Stop reason: HOSPADM

## 2020-06-04 RX ORDER — FENTANYL CITRATE 50 UG/ML
INJECTION, SOLUTION INTRAMUSCULAR; INTRAVENOUS PRN
Status: DISCONTINUED | OUTPATIENT
Start: 2020-06-04 | End: 2020-06-04 | Stop reason: SDUPTHER

## 2020-06-04 RX ORDER — SODIUM CHLORIDE 9 MG/ML
INJECTION INTRAVENOUS PRN
Status: DISCONTINUED | OUTPATIENT
Start: 2020-06-04 | End: 2020-06-04 | Stop reason: SDUPTHER

## 2020-06-04 RX ORDER — OXYCODONE HYDROCHLORIDE AND ACETAMINOPHEN 5; 325 MG/1; MG/1
1 TABLET ORAL PRN
Status: DISCONTINUED | OUTPATIENT
Start: 2020-06-04 | End: 2020-06-04

## 2020-06-04 RX ORDER — ACETAMINOPHEN 325 MG/1
650 TABLET ORAL EVERY 4 HOURS PRN
Status: DISCONTINUED | OUTPATIENT
Start: 2020-06-04 | End: 2020-06-06 | Stop reason: HOSPADM

## 2020-06-04 RX ORDER — PROPOFOL 10 MG/ML
INJECTION, EMULSION INTRAVENOUS PRN
Status: DISCONTINUED | OUTPATIENT
Start: 2020-06-04 | End: 2020-06-04 | Stop reason: SDUPTHER

## 2020-06-04 RX ORDER — PHENYLEPHRINE HYDROCHLORIDE 10 MG/ML
INJECTION INTRAVENOUS PRN
Status: DISCONTINUED | OUTPATIENT
Start: 2020-06-04 | End: 2020-06-04 | Stop reason: SDUPTHER

## 2020-06-04 RX ORDER — GLYCOPYRROLATE 1 MG/5 ML
SYRINGE (ML) INTRAVENOUS PRN
Status: DISCONTINUED | OUTPATIENT
Start: 2020-06-04 | End: 2020-06-04 | Stop reason: SDUPTHER

## 2020-06-04 RX ORDER — 0.9 % SODIUM CHLORIDE 0.9 %
30 INTRAVENOUS SOLUTION INTRAVENOUS ONCE
Status: COMPLETED | OUTPATIENT
Start: 2020-06-04 | End: 2020-06-04

## 2020-06-04 RX ORDER — OXYCODONE HYDROCHLORIDE AND ACETAMINOPHEN 5; 325 MG/1; MG/1
2 TABLET ORAL PRN
Status: DISCONTINUED | OUTPATIENT
Start: 2020-06-04 | End: 2020-06-04

## 2020-06-04 RX ORDER — DIPHENHYDRAMINE HYDROCHLORIDE 50 MG/ML
12.5 INJECTION INTRAMUSCULAR; INTRAVENOUS
Status: DISCONTINUED | OUTPATIENT
Start: 2020-06-04 | End: 2020-06-04

## 2020-06-04 RX ORDER — LABETALOL 20 MG/4 ML (5 MG/ML) INTRAVENOUS SYRINGE
5 EVERY 10 MIN PRN
Status: DISCONTINUED | OUTPATIENT
Start: 2020-06-04 | End: 2020-06-04

## 2020-06-04 RX ORDER — EPHEDRINE SULFATE/0.9% NACL/PF 50 MG/5 ML
SYRINGE (ML) INTRAVENOUS PRN
Status: DISCONTINUED | OUTPATIENT
Start: 2020-06-04 | End: 2020-06-04 | Stop reason: SDUPTHER

## 2020-06-04 RX ORDER — MORPHINE SULFATE 2 MG/ML
2 INJECTION, SOLUTION INTRAMUSCULAR; INTRAVENOUS EVERY 5 MIN PRN
Status: DISCONTINUED | OUTPATIENT
Start: 2020-06-04 | End: 2020-06-04

## 2020-06-04 RX ORDER — ONDANSETRON 2 MG/ML
4 INJECTION INTRAMUSCULAR; INTRAVENOUS
Status: DISCONTINUED | OUTPATIENT
Start: 2020-06-04 | End: 2020-06-04

## 2020-06-04 RX ORDER — MIDAZOLAM HYDROCHLORIDE 1 MG/ML
INJECTION INTRAMUSCULAR; INTRAVENOUS PRN
Status: DISCONTINUED | OUTPATIENT
Start: 2020-06-04 | End: 2020-06-04 | Stop reason: SDUPTHER

## 2020-06-04 RX ORDER — INSULIN GLARGINE 100 [IU]/ML
20 INJECTION, SOLUTION SUBCUTANEOUS DAILY
Status: DISCONTINUED | OUTPATIENT
Start: 2020-06-04 | End: 2020-06-05

## 2020-06-04 RX ORDER — LIDOCAINE HYDROCHLORIDE 10 MG/ML
INJECTION, SOLUTION EPIDURAL; INFILTRATION; INTRACAUDAL; PERINEURAL PRN
Status: DISCONTINUED | OUTPATIENT
Start: 2020-06-04 | End: 2020-06-04 | Stop reason: SDUPTHER

## 2020-06-04 RX ORDER — HYDRALAZINE HYDROCHLORIDE 25 MG/1
25 TABLET, FILM COATED ORAL EVERY 8 HOURS SCHEDULED
Status: DISCONTINUED | OUTPATIENT
Start: 2020-06-04 | End: 2020-06-06 | Stop reason: HOSPADM

## 2020-06-04 RX ADMIN — LIDOCAINE HYDROCHLORIDE 50 MG: 10 INJECTION, SOLUTION EPIDURAL; INFILTRATION; INTRACAUDAL; PERINEURAL at 14:18

## 2020-06-04 RX ADMIN — SODIUM CHLORIDE: 4.5 INJECTION, SOLUTION INTRAVENOUS at 04:55

## 2020-06-04 RX ADMIN — INSULIN LISPRO 6 UNITS: 100 INJECTION, SOLUTION INTRAVENOUS; SUBCUTANEOUS at 17:25

## 2020-06-04 RX ADMIN — PHENYLEPHRINE HYDROCHLORIDE 200 MCG: 10 INJECTION INTRAVENOUS at 14:58

## 2020-06-04 RX ADMIN — MORPHINE SULFATE 2 MG: 2 INJECTION, SOLUTION INTRAMUSCULAR; INTRAVENOUS at 19:31

## 2020-06-04 RX ADMIN — ROCURONIUM BROMIDE 30 MG: 10 INJECTION INTRAVENOUS at 14:18

## 2020-06-04 RX ADMIN — PHENYLEPHRINE HYDROCHLORIDE 100 MCG: 10 INJECTION INTRAVENOUS at 14:52

## 2020-06-04 RX ADMIN — Medication 10 MG: at 15:14

## 2020-06-04 RX ADMIN — CARVEDILOL 12.5 MG: 12.5 TABLET, FILM COATED ORAL at 07:27

## 2020-06-04 RX ADMIN — INSULIN LISPRO 15 UNITS: 100 INJECTION, SOLUTION INTRAVENOUS; SUBCUTANEOUS at 11:58

## 2020-06-04 RX ADMIN — ROCURONIUM BROMIDE 20 MG: 10 INJECTION INTRAVENOUS at 15:10

## 2020-06-04 RX ADMIN — SODIUM CHLORIDE, POTASSIUM CHLORIDE, SODIUM LACTATE AND CALCIUM CHLORIDE: 600; 310; 30; 20 INJECTION, SOLUTION INTRAVENOUS at 13:47

## 2020-06-04 RX ADMIN — PHENYLEPHRINE HYDROCHLORIDE 300 MCG: 10 INJECTION INTRAVENOUS at 15:14

## 2020-06-04 RX ADMIN — PHENYLEPHRINE HYDROCHLORIDE 100 MCG: 10 INJECTION INTRAVENOUS at 14:42

## 2020-06-04 RX ADMIN — DESMOPRESSIN ACETATE 40 MG: 0.2 TABLET ORAL at 21:02

## 2020-06-04 RX ADMIN — HYDRALAZINE HYDROCHLORIDE 25 MG: 25 TABLET ORAL at 22:15

## 2020-06-04 RX ADMIN — FENTANYL CITRATE 100 MCG: 50 INJECTION INTRAMUSCULAR; INTRAVENOUS at 14:24

## 2020-06-04 RX ADMIN — GADOTERIDOL 20 ML: 279.3 INJECTION, SOLUTION INTRAVENOUS at 08:38

## 2020-06-04 RX ADMIN — POTASSIUM PHOSPHATE, MONOBASIC AND POTASSIUM PHOSPHATE, DIBASIC 10 MMOL: 224; 236 INJECTION, SOLUTION, CONCENTRATE INTRAVENOUS at 11:56

## 2020-06-04 RX ADMIN — Medication 12.5 MG: at 14:40

## 2020-06-04 RX ADMIN — Medication 81 MG: at 07:28

## 2020-06-04 RX ADMIN — CARVEDILOL 12.5 MG: 12.5 TABLET, FILM COATED ORAL at 17:25

## 2020-06-04 RX ADMIN — INDOMETHACIN 100 MG: 50 SUPPOSITORY RECTAL at 16:26

## 2020-06-04 RX ADMIN — MAGNESIUM SULFATE 1 G: 1 INJECTION INTRAVENOUS at 18:35

## 2020-06-04 RX ADMIN — MAGNESIUM SULFATE 1 G: 1 INJECTION INTRAVENOUS at 17:26

## 2020-06-04 RX ADMIN — MIDAZOLAM HYDROCHLORIDE 2 MG: 1 INJECTION, SOLUTION INTRAMUSCULAR; INTRAVENOUS at 14:16

## 2020-06-04 RX ADMIN — Medication 0.2 MG: at 14:58

## 2020-06-04 RX ADMIN — AMLODIPINE BESYLATE 10 MG: 10 TABLET ORAL at 07:27

## 2020-06-04 RX ADMIN — INSULIN LISPRO 5 UNITS: 100 INJECTION, SOLUTION INTRAVENOUS; SUBCUTANEOUS at 21:03

## 2020-06-04 RX ADMIN — PROPOFOL 200 MG: 10 INJECTION, EMULSION INTRAVENOUS at 14:18

## 2020-06-04 RX ADMIN — INSULIN GLARGINE 20 UNITS: 100 INJECTION, SOLUTION SUBCUTANEOUS at 11:58

## 2020-06-04 RX ADMIN — SODIUM CHLORIDE 30 ML: 0.9 INJECTION, SOLUTION INTRAVENOUS at 09:00

## 2020-06-04 RX ADMIN — SODIUM CHLORIDE 9 ML: 9 INJECTION INTRAMUSCULAR; INTRAVENOUS; SUBCUTANEOUS at 14:42

## 2020-06-04 RX ADMIN — INSULIN GLARGINE 30 UNITS: 100 INJECTION, SOLUTION SUBCUTANEOUS at 21:02

## 2020-06-04 RX ADMIN — INSULIN LISPRO 15 UNITS: 100 INJECTION, SOLUTION INTRAVENOUS; SUBCUTANEOUS at 07:28

## 2020-06-04 ASSESSMENT — PULMONARY FUNCTION TESTS
PIF_VALUE: 26
PIF_VALUE: 14
PIF_VALUE: 13
PIF_VALUE: 16
PIF_VALUE: 22
PIF_VALUE: 22
PIF_VALUE: 2
PIF_VALUE: 22
PIF_VALUE: 1
PIF_VALUE: 22
PIF_VALUE: 0
PIF_VALUE: 24
PIF_VALUE: 28
PIF_VALUE: 28
PIF_VALUE: 21
PIF_VALUE: 15
PIF_VALUE: 26
PIF_VALUE: 29
PIF_VALUE: 23
PIF_VALUE: 22
PIF_VALUE: 0
PIF_VALUE: 28
PIF_VALUE: 22
PIF_VALUE: 26
PIF_VALUE: 17
PIF_VALUE: 19
PIF_VALUE: 26
PIF_VALUE: 25
PIF_VALUE: 28
PIF_VALUE: 22
PIF_VALUE: 22
PIF_VALUE: 28
PIF_VALUE: 24
PIF_VALUE: 24
PIF_VALUE: 22
PIF_VALUE: 22
PIF_VALUE: 23
PIF_VALUE: 0
PIF_VALUE: 21
PIF_VALUE: 22
PIF_VALUE: 7
PIF_VALUE: 25
PIF_VALUE: 1
PIF_VALUE: 22
PIF_VALUE: 24
PIF_VALUE: 32
PIF_VALUE: 22
PIF_VALUE: 22
PIF_VALUE: 30
PIF_VALUE: 22
PIF_VALUE: 1
PIF_VALUE: 25
PIF_VALUE: 2
PIF_VALUE: 1
PIF_VALUE: 25
PIF_VALUE: 28
PIF_VALUE: 21
PIF_VALUE: 15
PIF_VALUE: 23
PIF_VALUE: 22
PIF_VALUE: 35
PIF_VALUE: 22
PIF_VALUE: 28
PIF_VALUE: 35
PIF_VALUE: 22
PIF_VALUE: 24
PIF_VALUE: 24
PIF_VALUE: 21
PIF_VALUE: 28
PIF_VALUE: 22
PIF_VALUE: 22
PIF_VALUE: 19
PIF_VALUE: 22
PIF_VALUE: 23
PIF_VALUE: 26
PIF_VALUE: 25
PIF_VALUE: 22
PIF_VALUE: 25
PIF_VALUE: 22
PIF_VALUE: 28
PIF_VALUE: 22
PIF_VALUE: 22
PIF_VALUE: 35
PIF_VALUE: 0
PIF_VALUE: 22
PIF_VALUE: 22
PIF_VALUE: 28
PIF_VALUE: 35
PIF_VALUE: 27
PIF_VALUE: 22

## 2020-06-04 ASSESSMENT — PAIN SCALES - GENERAL
PAINLEVEL_OUTOF10: 5
PAINLEVEL_OUTOF10: 0

## 2020-06-04 ASSESSMENT — LIFESTYLE VARIABLES: SMOKING_STATUS: 0

## 2020-06-04 ASSESSMENT — PAIN - FUNCTIONAL ASSESSMENT: PAIN_FUNCTIONAL_ASSESSMENT: 0-10

## 2020-06-04 NOTE — OP NOTE
Access was gained into the common bile duct using a [employer] sphincterotome preloaded with a 0.035\" guidewire. A cholangiogram showed high-grade distal CBD stricture. Biliary sphincterotomy was performed. 10 F x 7 cm plastic biliary stent was placed in transpapillary position traversing the stricture. Adequate drainage was noted. Pancreatic duct was not cannulated or contrast injected during this procedure. Final fluoroscopic views of the hemidiaphragm revealed no air. The patient was extubated and returned to the recovery area in good condition having tolerated the procedure well. RECOMMENDATIONS:   1. Transfer back to the floor for further management as per primary care team.   2. NPO x 4 hours then clear liquid diet today. 3.  Stent exchange will be decided based on pathology report and possible plan for surgery.         Poppy Corona

## 2020-06-04 NOTE — ANESTHESIA PRE PROCEDURE
Intramuscular PRN Raquel Haas MD        dextrose 5 % solution  100 mL/hr Intravenous PRN Raquel Haas MD        amLODIPine (NORVASC) tablet 10 mg  10 mg Oral Daily Raquel Haas MD   10 mg at 06/04/20 0727    carvedilol (COREG) tablet 12.5 mg  12.5 mg Oral BID WC Raquel Haas MD   12.5 mg at 06/04/20 0727    0.45 % sodium chloride infusion   Intravenous Continuous Urmila Schultz  mL/hr at 06/04/20 0455      benzocaine-menthol (CEPACOL SORE THROAT) lozenge 1 lozenge  1 lozenge Oral Q2H PRN Ulice Poisson, DO        dextrose 50 % IV solution  12.5 g Intravenous PRN Darus Diss, DO        magnesium sulfate 1 g in dextrose 5% 100 mL IVPB  1 g Intravenous PRN Darus Diss, DO   Stopped at 06/01/20 1503    sodium phosphate 10 mmol in dextrose 5 % 250 mL IVPB  10 mmol Intravenous PRN Darus Diss, DO   Stopped at 06/02/20 0700    Or    sodium phosphate 15 mmol in dextrose 5 % 250 mL IVPB  15 mmol Intravenous PRN Darus Diss, DO   Stopped at 06/03/20 0426    Or    sodium phosphate 20 mmol in dextrose 5 % 500 mL IVPB  20 mmol Intravenous PRN Darus Diss, DO        dextrose 5 % and 0.45 % sodium chloride infusion   Intravenous Continuous PRN Darus Diss, DO 75 mL/hr at 06/02/20 1736      aspirin EC tablet 81 mg  81 mg Oral Daily Quang Do MD   81 mg at 06/04/20 0728    ondansetron (ZOFRAN) injection 4 mg  4 mg Intravenous Q6H PRN Mark Ty, DO   4 mg at 06/01/20 1501    dextrose 5 % and 0.45 % sodium chloride infusion   Intravenous Continuous PRN Darus Diss, DO        potassium chloride 10 mEq/100 mL IVPB (Peripheral Line)  10 mEq Intravenous PRN Darus Diss,  mL/hr at 06/03/20 0430 10 mEq at 06/03/20 0430       Allergies   Allergen Reactions    Lisinopril     Pcn [Penicillins] Rash     Patient Active Problem List   Diagnosis    Uncontrolled diabetes mellitus ([facility] [address]    Hypertension    Postmenopausal bleeding    Thickened endometrium    Type 2 diabetes mellitus with ketoacidosis without coma, with long-term current use of insulin (HCC)     Past Medical History:   Diagnosis Date    Anxiety     Diabetes mellitus ([address]     Hyperlipidemia     Hypertension      Past Surgical History:   Procedure Laterality Date     SECTION      x2    GALLBLADDER SURGERY       Social History     Tobacco Use    Smoking status: Former Smoker    Smokeless tobacco: Never Used   Substance Use Topics    Alcohol use: Yes    Drug use: No         Vital Signs (Current)   Vitals:    20 1305   BP: (!) 166/65   Pulse: 88   Resp: 16   Temp: 97.9 °F (36.6 °C)   SpO2: 100%     Vital Signs Statistics (for past 48 hrs)     Temp  Av.9 °F (36.6 °C)  Min: 96.8 °F (36 °C)   Min taken time: 20  Max: 98.7 °F (37.1 °C)   Max taken time: 20 0400  Pulse  Av.4  Min: 76   Min taken time: 20 2200  Max: 93   Max taken time: 20 0723  Resp  Av  Min: 13   Min taken time: 20 1510  Max: 20   Max taken time: 20 0800  BP  Min: 123/66   Min taken time: 20 2000  Max: 187/79   Max taken time: 20 0723  ABP (Arterial line BP)  Min: 142/62   Min taken time: 20 0200  Max: 192/77   Max taken time: 20 1100  MAP (mmHg)  Av.3  Min: [de-identified]   Min taken time: 20 1107  Max: 109   Max taken time: 20 1505  SpO2  Av.6 %  Min: 91 %   Min taken time: 20 0500  Max: 100 %   Max taken time: 20 1305  BP Readings from Last 3 Encounters:   20 (!) 166/65   18 (!) 118/102   13 144/90       BMI  Body mass index is 42.43 kg/m².     CBC   Lab Results   Component Value Date    WBC 5.9 2020    RBC 3.48 2020    RBC 4.33 2012    HGB 10.0 2020    HCT 32.0 2020    MCV 92.0 2020    RDW 15.1 2020     2020     2012       CMP    Lab Results   Component Value Date     2020    K 4.6 2020 Hypertension I10    Postmenopausal bleeding N95.0    Thickened endometrium R93.89    Type 2 diabetes mellitus with ketoacidosis without coma, with long-term current use of insulin (HCC) E11.10, Z79.4       Past Medical History:        Diagnosis Date    Anxiety     Diabetes mellitus ([address]     Hyperlipidemia     Hypertension        Past Surgical History:        Procedure Laterality Date     SECTION      x2    GALLBLADDER SURGERY         Social History:    Social History     Tobacco Use    Smoking status: Former Smoker    Smokeless tobacco: Never Used   Substance Use Topics    Alcohol use: Yes                                Counseling given: Not Answered      Vital Signs (Current):   Vitals:    20 0723 20 1107 20 1254 20 1305   BP: (!) 187/79 139/60  (!) 166/65   Pulse: 93 83  88   Resp: 18 18  16   Temp: 97.7 °F (36.5 °C) 97.8 °F (36.6 °C)  97.9 °F (36.6 °C)   TempSrc: Oral Oral  Temporal   SpO2: 98% 98%  100%   Weight:   255 lb (115.7 kg)    Height:   5' 5\" (1.651 m)                                               BP Readings from Last 3 Encounters:   20 (!) 166/65   18 (!) 118/102   13 144/90       NPO Status: Time of last liquid consumption: 930                        Time of last solid consumption: 930                        Date of last liquid consumption: 20                        Date of last solid food consumption: 20    BMI:   Wt Readings from Last 3 Encounters:   20 255 lb (115.7 kg)   13 280 lb (127 kg)   10/16/13 281 lb (127.5 kg)     Body mass index is 42.43 kg/m².     CBC:   Lab Results   Component Value Date    WBC 5.9 2020    RBC 3.48 2020    RBC 4.33 2012    HGB 10.0 2020    HCT 32.0 2020    MCV 92.0 2020    RDW 15.1 2020     2020     2012       CMP:   Lab Results   Component Value Date     2020    K 4.6 2020     2020

## 2020-06-04 NOTE — PLAN OF CARE
Problem: Serum Glucose Level - Abnormal:  Goal: Ability to maintain appropriate glucose levels will improve to within specified parameters  Description: Ability to maintain appropriate glucose levels will improve to within specified parameters  Outcome: Ongoing  Note: Pt's blood sugar still elevated. Pt on carb control diet. Sugar free snacks offered. Will continue to monitor.

## 2020-06-04 NOTE — PROGRESS NOTES
[facility]  Progress Note                   Date:   6/4/2020  Patient name: Prashant Lopez  Date of admission:  5/31/2020 10:14 PM  MRN:   [id number]  YOB: 1960  PCP: No primary care provider on file. Reason for Admission: DKA, type 2, not at goal [facility]) [E11.10]    Subjective:       Clinical Changes /Abnormalities:  Patient seen and examined in bed in room after discussion with RN. Patient returning from MRI. SR  On tele. Discussed in detail with patient post cath POC including but not limited to medications, diet, exercise, right femoral artery site care, and follow-up. Questions and concerns addressed. F/U in office in 1-3 weeks as scheduled. Review of Systems    Medications:   Scheduled Meds:   sodium chloride  30 mL Intravenous Once    insulin glargine  30 Units Subcutaneous Nightly    insulin lispro  0-18 Units Subcutaneous TID WC    insulin lispro  0-9 Units Subcutaneous Nightly    amLODIPine  10 mg Oral Daily    carvedilol  12.5 mg Oral BID WC    aspirin  81 mg Oral Daily     Continuous Infusions:   dextrose      sodium chloride 100 mL/hr at 06/04/20 0455    dextrose 5 % and 0.45 % NaCl 75 mL/hr at 06/02/20 1736    dextrose 5 % and 0.45 % NaCl       CBC: No results for input(s): WBC, HGB, PLT in the last 72 hours. BMP:    Recent Labs     06/03/20  0200 06/03/20  0600 06/03/20  0919    141 139   K 3.7 3.9 3.9   * 110* 107   CO2 21 23 21   BUN 16 13 11   CREATININE 0.60 0.56 0.51   GLUCOSE 176* 210* 220*     Hepatic:  Recent Labs     06/02/20  1822   AST 37*   *   BILITOT 0.37   ALKPHOS 335*     Troponin:   Recent Labs     06/02/20  0109 06/02/20  1017 06/02/20  1531   TROPHS 406* 237* 211*     BNP: No results for input(s): BNP in the last 72 hours.   Lipids:   Recent Labs     06/03/20 2017   CHOL 122   HDL 43     INR:   Recent Labs     06/03/20  2017   INR 1.1     DIAGNOSTIC DATA  CATH 6/3/2020\  Findings:  LMCA: Mild irregularities 10-20%.     LAD: 40% proximal stenosis     LCx: Mild irregularities 10-20%.     RCA: Mild irregularities 10-20%.     Coronary Tree      Dominance: Right        Estimated blood loss: 10 ml     Conclusions:  1. Non obstructive CAD  2.    Recommendations:  1. Medical Therapy. 2. Risk Factors Modification. 3. Post Cath Protocol    ECHO 6/3/2020  Summary  Left ventricle is normal in size. Global left ventricular systolic function  is hyperdynamic. Estimated ejection fraction is > 65 % . Aortic valve is mildly sclerotic but opens well. Aortic velocities slightly increased probably due to patient being  hyperdynamic. Trivial mitral regurgitation. No obvious valvular abnormality. Trivial tricuspid regurgitation. Estimated right ventricular systolic pressure is 08MSMD. Objective:   Vitals: BP (!) 187/79   Pulse 93   Temp 97.7 °F (36.5 °C) (Oral)   Resp 18   Ht 5' 5\" (1.651 m)   Wt 255 lb 11.7 oz (116 kg)   SpO2 98%   BMI 42.56 kg/m²   General appearance: alert and cooperative with exam  HEENT: Head: Normocephalic, no lesions, without obvious abnormality. Neck:no JVD, trachea midline, no adenopathy  Lungs: Clear to auscultation  Heart: Regular rate and rhythm, s1/s2 auscultated, no murmurs  Abdomen: soft, non-tender, bowel sounds active  Extremities: no edema  Neurologic: not done  Right Femoral Artery site: CDI without ecchymosis or hematoma. Soft +Pulse    Coronary Discharge Core Measure: Please indicate the medication given by X, and if not the reasons not given:    Not Given Reason  Given      Beta Blockers X      ACE-I ALLERGY   Elevated LFTs undergoing work up   Statins         ASA X     Non obstructive CAD no PCI OAP (Plavix/Effient/Brilinta)     SL Nitro X           Assessment / Acute Cardiac Problems:   1. NSTEMI - likely type I vs II, Troponin elevation 62>87>121, in setting of DKA/TORREY as well  2.  Chest pain - symptoms consistent with unstable angina (2-3 months hx)  3. DKA  4. DM  5. HTN  6. HLD  7. Hypomagnesemia  8. Hyperkalemia - resolving  9. Elevated liver enzymes   10. TORREY  11. Lactic acidosis  12. Hx of microscopic hematuria       Patient Active Problem List:     Uncontrolled diabetes mellitus ([facility] [address]     Hypertension     Postmenopausal bleeding     Thickened endometrium     Type 2 diabetes mellitus with ketoacidosis without coma, with long-term current use of insulin ([facility] [address]      Plan of Treatment:   1   NSTEMI type II. S/p cardiac cath d/t chest pain and elevated troponins. Findings non obstructive CAD. Continue ASA, BB, CCB. Elevated LFTs start statin when improved. Allergy to ACE  2   HTN elevated today with repeat BP improved some after medications given. Continue BB and CCB. Will add hydralazine. 3.  HLD. Statin held d/t elevated LFTs. 3   Elevated liver enzymes. Followed by GI. Undergoing work up. 4   Keep K.4.0 and Magnesium >2.0  5.  Cardiology will sign off and see patient in out patient f/u   Please call with questions.       Electronically signed by EMERSON Avila NP on 6/4/2020 at 9:06 AM  [id number]  [phone]

## 2020-06-04 NOTE — H&P
Glucose Fingerstick    Collection Time: 06/04/20 11:07 AM   Result Value Ref Range    POC Glucose 364 (H) 65 - 105 mg/dL   Comprehensive Metabolic Panel    Collection Time: 06/04/20 12:06 PM   Result Value Ref Range    Glucose 409 (HH) 70 - 99 mg/dL    BUN 12 6 - 20 mg/dL    CREATININE 0.63 0.50 - 0.90 mg/dL    Bun/Cre Ratio NOT REPORTED 9 - 20    Calcium 9.0 8.6 - 10.4 mg/dL    Sodium 139 135 - 144 mmol/L    Potassium 4.6 3.7 - 5.3 mmol/L    Chloride 102 98 - 107 mmol/L    CO2 21 20 - 31 mmol/L    Anion Gap 16 9 - 17 mmol/L    Alkaline Phosphatase 278 (H) 35 - 104 U/L     (H) 5 - 33 U/L    AST 16 <32 U/L    Total Bilirubin 0.59 0.3 - 1.2 mg/dL    Total Protein 5.4 (L) 6.4 - 8.3 g/dL    Alb 2.7 (L) 3.5 - 5.2 g/dL    Albumin/Globulin Ratio 1.0 1.0 - 2.5    GFR Non-African American >60 >60 mL/min    GFR African American >60 >60 mL/min    GFR Comment          GFR Staging NOT REPORTED    CBC Auto Differential    Collection Time: 06/04/20 12:06 PM   Result Value Ref Range    WBC 5.9 3.5 - 11.3 k/uL    RBC 3.48 (L) 3.95 - 5.11 m/uL    Hemoglobin 10.0 (L) 11.9 - 15.1 g/dL    Hematocrit 32.0 (L) 36.3 - 47.1 %    MCV 92.0 82.6 - 102.9 fL    MCH 28.7 25.2 - 33.5 pg    MCHC 31.3 28.4 - 34.8 g/dL    RDW 15.1 (H) 11.8 - 14.4 %    Platelets 095 483 - 557 k/uL    MPV 10.3 8.1 - 13.5 fL    NRBC Automated 0.0 0.0 per 100 WBC    Differential Type NOT REPORTED     WBC Morphology NOT REPORTED     RBC Morphology ANISOCYTOSIS PRESENT     Platelet Estimate NOT REPORTED     Seg Neutrophils 80 (H) 36 - 65 %    Lymphocytes 12 (L) 24 - 43 %    Monocytes 7 3 - 12 %    Eosinophils % 1 1 - 4 %    Basophils 0 0 - 2 %    Immature Granulocytes 1 (H) 0 %    Segs Absolute 4.71 1.50 - 8.10 k/uL    Absolute Lymph # 0.71 (L) 1.10 - 3.70 k/uL    Absolute Mono # 0.42 0.10 - 1.20 k/uL    Absolute Eos # 0.03 0.00 - 0.44 k/uL    Basophils Absolute <0.03 0.00 - 0.20 k/uL    Absolute Immature Granulocyte 0.04 0.00 - 0.30 k/uL   CEA    Collection Time: 06/04/20 12:06 PM   Result Value Ref Range    CEA 2.7 <3.9 ng/mL   CANCER ANTIGEN 19-9    Collection Time: 06/04/20 12:06 PM   Result Value Ref Range    CA 19-9 12 0 - 35 U/mL   AFP TUMOR MARKER    Collection Time: 06/04/20 12:06 PM   Result Value Ref Range    AFP (Alpha Fetoprotein) 1.2 <8.4 ug/L   Magnesium    Collection Time: 06/04/20 12:06 PM   Result Value Ref Range    Magnesium 1.4 (L) 1.6 - 2.6 mg/dL   Phosphorus    Collection Time: 06/04/20 12:06 PM   Result Value Ref Range    Phosphorus 2.3 (L) 2.6 - 4.5 mg/dL   POC Glucose Fingerstick    Collection Time: 06/04/20  1:14 PM   Result Value Ref Range    POC Glucose 348 (H) 65 - 105 mg/dL       Imaging/Diagnostics:  Us Liver    Result Date: 6/3/2020  Dilated pancreatic duct. Mild intrahepatic ductal dilatation. Common bile duct appears slightly more dilated than expected for cholecystectomy. Consider follow-up with MRCP with contrast.     Xr Chest Portable    Result Date: 5/31/2020  No acute cardiopulmonary process     Mri Abdomen W Wo Contrast Mrcp    Result Date: 6/4/2020  1.  3.2 cm soft tissue mass in the pancreatic head resulting in pancreatic duct and biliary dilatation, compatible with primary neoplasm. There is abutment of the portal vein and soft tissue extension/lymph nodes in the liver hilum noted. Given the limitations of motion artifact on this exam, a pancreas CT should be considered for detailed vascular evaluation. 2.  No liver metastatic disease. Assessment :      Hospital Problems           Last Modified POA    * (Principal) Pancreatic Head Mass 6/4/2020 Yes    Type 2 diabetes mellitus with ketoacidosis without coma, with long-term current use of insulin ([address] 6/3/2020 Yes    Hypophosphatemia 6/4/2020 Yes    Hypomagnesemia 6/4/2020 Yes    Hyperglycemia due to type 2 diabetes mellitus ([address] 6/4/2020 Yes    Non-Obstructive CAD 6/4/2020 Yes          Plan:     Patient status inpatient in the Progressive Unit/Step down    1.  Patient had

## 2020-06-05 PROBLEM — C25.0: Status: ACTIVE | Noted: 2020-06-05

## 2020-06-05 LAB
ALBUMIN SERPL-MCNC: 2.6 G/DL (ref 3.5–5.2)
ALBUMIN SERPL-MCNC: 2.6 G/DL (ref 3.5–5.2)
ALBUMIN/GLOBULIN RATIO: 1 (ref 1–2.5)
ALP BLD-CCNC: 242 U/L (ref 35–104)
ALT SERPL-CCNC: 118 U/L (ref 5–33)
ANION GAP SERPL CALCULATED.3IONS-SCNC: 11 MMOL/L (ref 9–17)
AST SERPL-CCNC: 12 U/L
BILIRUB SERPL-MCNC: 0.4 MG/DL (ref 0.3–1.2)
BILIRUBIN DIRECT: 0.16 MG/DL
BILIRUBIN, INDIRECT: 0.24 MG/DL (ref 0–1)
BUN BLDV-MCNC: 11 MG/DL (ref 6–20)
BUN/CREAT BLD: ABNORMAL (ref 9–20)
CALCIUM SERPL-MCNC: 9 MG/DL (ref 8.6–10.4)
CASE NUMBER:: NORMAL
CHLORIDE BLD-SCNC: 102 MMOL/L (ref 98–107)
CO2: 25 MMOL/L (ref 20–31)
CREAT SERPL-MCNC: 0.75 MG/DL (ref 0.5–0.9)
GFR AFRICAN AMERICAN: >60 ML/MIN
GFR NON-AFRICAN AMERICAN: >60 ML/MIN
GFR SERPL CREATININE-BSD FRML MDRD: ABNORMAL ML/MIN/{1.73_M2}
GFR SERPL CREATININE-BSD FRML MDRD: ABNORMAL ML/MIN/{1.73_M2}
GLOBULIN: ABNORMAL G/DL (ref 1.5–3.8)
GLUCOSE BLD-MCNC: 226 MG/DL (ref 65–105)
GLUCOSE BLD-MCNC: 238 MG/DL (ref 65–105)
GLUCOSE BLD-MCNC: 263 MG/DL (ref 65–105)
GLUCOSE BLD-MCNC: 310 MG/DL (ref 65–105)
GLUCOSE BLD-MCNC: 318 MG/DL (ref 70–99)
HCT VFR BLD CALC: 30.5 % (ref 36.3–47.1)
HEMOGLOBIN: 9.5 G/DL (ref 11.9–15.1)
MAGNESIUM: 1.7 MG/DL (ref 1.6–2.6)
MCH RBC QN AUTO: 28.5 PG (ref 25.2–33.5)
MCHC RBC AUTO-ENTMCNC: 31.1 G/DL (ref 28.4–34.8)
MCV RBC AUTO: 91.6 FL (ref 82.6–102.9)
NRBC AUTOMATED: 0 PER 100 WBC
PDW BLD-RTO: 15 % (ref 11.8–14.4)
PHOSPHORUS: 3.2 MG/DL (ref 2.6–4.5)
PLATELET # BLD: 190 K/UL (ref 138–453)
PMV BLD AUTO: 10.4 FL (ref 8.1–13.5)
POTASSIUM SERPL-SCNC: 4.3 MMOL/L (ref 3.7–5.3)
RBC # BLD: 3.33 M/UL (ref 3.95–5.11)
SODIUM BLD-SCNC: 138 MMOL/L (ref 135–144)
SPECIMEN DESCRIPTION: NORMAL
SURGICAL PATHOLOGY REPORT: NORMAL
TOTAL PROTEIN: 5.1 G/DL (ref 6.4–8.3)
WBC # BLD: 6 K/UL (ref 3.5–11.3)

## 2020-06-05 PROCEDURE — 99232 SBSQ HOSP IP/OBS MODERATE 35: CPT | Performed by: INTERNAL MEDICINE

## 2020-06-05 PROCEDURE — 36415 COLL VENOUS BLD VENIPUNCTURE: CPT

## 2020-06-05 PROCEDURE — 6370000000 HC RX 637 (ALT 250 FOR IP): Performed by: INTERNAL MEDICINE

## 2020-06-05 PROCEDURE — 82947 ASSAY GLUCOSE BLOOD QUANT: CPT

## 2020-06-05 PROCEDURE — 80069 RENAL FUNCTION PANEL: CPT

## 2020-06-05 PROCEDURE — 85027 COMPLETE CBC AUTOMATED: CPT

## 2020-06-05 PROCEDURE — 2580000003 HC RX 258: Performed by: INTERNAL MEDICINE

## 2020-06-05 PROCEDURE — 83735 ASSAY OF MAGNESIUM: CPT

## 2020-06-05 PROCEDURE — 84100 ASSAY OF PHOSPHORUS: CPT

## 2020-06-05 PROCEDURE — 80076 HEPATIC FUNCTION PANEL: CPT

## 2020-06-05 PROCEDURE — 1200000000 HC SEMI PRIVATE

## 2020-06-05 PROCEDURE — 82248 BILIRUBIN DIRECT: CPT

## 2020-06-05 PROCEDURE — 6370000000 HC RX 637 (ALT 250 FOR IP): Performed by: NURSE PRACTITIONER

## 2020-06-05 PROCEDURE — 80053 COMPREHEN METABOLIC PANEL: CPT

## 2020-06-05 RX ORDER — INSULIN GLARGINE 100 [IU]/ML
50 INJECTION, SOLUTION SUBCUTANEOUS NIGHTLY
Status: DISCONTINUED | OUTPATIENT
Start: 2020-06-05 | End: 2020-06-06 | Stop reason: HOSPADM

## 2020-06-05 RX ORDER — INSULIN GLARGINE 100 [IU]/ML
10 INJECTION, SOLUTION SUBCUTANEOUS ONCE
Status: COMPLETED | OUTPATIENT
Start: 2020-06-05 | End: 2020-06-05

## 2020-06-05 RX ORDER — INSULIN GLARGINE 100 [IU]/ML
60 INJECTION, SOLUTION SUBCUTANEOUS DAILY
Status: DISCONTINUED | OUTPATIENT
Start: 2020-06-06 | End: 2020-06-06 | Stop reason: HOSPADM

## 2020-06-05 RX ORDER — INSULIN GLARGINE 100 [IU]/ML
30 INJECTION, SOLUTION SUBCUTANEOUS ONCE
Status: COMPLETED | OUTPATIENT
Start: 2020-06-05 | End: 2020-06-05

## 2020-06-05 RX ORDER — INSULIN GLARGINE 100 [IU]/ML
30 INJECTION, SOLUTION SUBCUTANEOUS DAILY
Status: DISCONTINUED | OUTPATIENT
Start: 2020-06-06 | End: 2020-06-05

## 2020-06-05 RX ADMIN — INSULIN LISPRO 3 UNITS: 100 INJECTION, SOLUTION INTRAVENOUS; SUBCUTANEOUS at 20:28

## 2020-06-05 RX ADMIN — Medication 81 MG: at 07:17

## 2020-06-05 RX ADMIN — CARVEDILOL 12.5 MG: 12.5 TABLET, FILM COATED ORAL at 07:17

## 2020-06-05 RX ADMIN — INSULIN GLARGINE 20 UNITS: 100 INJECTION, SOLUTION SUBCUTANEOUS at 07:18

## 2020-06-05 RX ADMIN — SODIUM CHLORIDE, PRESERVATIVE FREE 10 ML: 5 INJECTION INTRAVENOUS at 07:24

## 2020-06-05 RX ADMIN — INSULIN LISPRO 12 UNITS: 100 INJECTION, SOLUTION INTRAVENOUS; SUBCUTANEOUS at 11:07

## 2020-06-05 RX ADMIN — SODIUM CHLORIDE, PRESERVATIVE FREE 10 ML: 5 INJECTION INTRAVENOUS at 22:52

## 2020-06-05 RX ADMIN — HYDRALAZINE HYDROCHLORIDE 25 MG: 25 TABLET ORAL at 06:04

## 2020-06-05 RX ADMIN — INSULIN GLARGINE 30 UNITS: 100 INJECTION, SOLUTION SUBCUTANEOUS at 16:26

## 2020-06-05 RX ADMIN — INSULIN LISPRO 6 UNITS: 100 INJECTION, SOLUTION INTRAVENOUS; SUBCUTANEOUS at 16:26

## 2020-06-05 RX ADMIN — AMLODIPINE BESYLATE 10 MG: 10 TABLET ORAL at 07:18

## 2020-06-05 RX ADMIN — HYDRALAZINE HYDROCHLORIDE 25 MG: 25 TABLET ORAL at 20:24

## 2020-06-05 RX ADMIN — INSULIN GLARGINE 10 UNITS: 100 INJECTION, SOLUTION SUBCUTANEOUS at 11:08

## 2020-06-05 RX ADMIN — DESMOPRESSIN ACETATE 40 MG: 0.2 TABLET ORAL at 20:24

## 2020-06-05 RX ADMIN — HYDRALAZINE HYDROCHLORIDE 25 MG: 25 TABLET ORAL at 14:10

## 2020-06-05 RX ADMIN — INSULIN LISPRO 9 UNITS: 100 INJECTION, SOLUTION INTRAVENOUS; SUBCUTANEOUS at 07:18

## 2020-06-05 RX ADMIN — SIMETHICONE 80 MG: 80 TABLET, CHEWABLE ORAL at 18:42

## 2020-06-05 RX ADMIN — INSULIN GLARGINE 50 UNITS: 100 INJECTION, SOLUTION SUBCUTANEOUS at 20:28

## 2020-06-05 RX ADMIN — CARVEDILOL 12.5 MG: 12.5 TABLET, FILM COATED ORAL at 16:23

## 2020-06-05 NOTE — PROGRESS NOTES
[facility]    Progress Note    6/5/2020    1:23 PM    Name:   Martir Santos  MRN:     [id number]     Acct:      [de-identified]   Room:   [address] [place] [address] Day:  4  Admit Date:  5/31/2020 10:14 PM    PCP:   No primary care provider on file. Code Status:  Full Code    Subjective:     C/C:   Chief Complaint   Patient presents with    Abdominal Pain    Emesis    Blood Sugar Problem      Interval History Status: improved. Patient seen and examined this morning with a friend at the bedside. Overnight uneventful. Patient had EUS with FNA yesterday and also ERCP by GI consult. Abdominal pain had improved. Patient indicated she normally takes 110 units of Lantus every night, however her blood sugar is always high in the mornings. Brief History:     Martir Santos is a 64 y.o. Non-/non  female who presents with Abdominal Pain; Emesis; and Blood Sugar Problem   and is admitted to the hospital for the management of Pancreatic mass. Patient is a pleasant 70-year-old lady with longstanding diabetes type 2, currently insulin required who was admitted on 5/31/2020 to the ICU directly from the ED after she had presented with DKA. Patient was maintained in the ICU and managed with insulin drip and of the DKA protocol, and after improvement in her symptomatology and Lab investigations, [facility] service and further management. Patient was also found to have elevated troponin and had left heart catheterization done by cardiology consult yesterday which showed nonocclusive CAD in multiple vessels including, but not limited to the LMCA, LAD, LCx and RCA. Medical management has been recommended. Patient was found to have transaminitis and as part of the work-up had an ultrasound to rule out CBD stone.   Ultrasound had shown a dilated pancreatic duct with a mild acetaminophen, magnesium hydroxide, sodium chloride flush, nitroGLYCERIN, simethicone, glucose, dextrose, glucagon (rDNA), dextrose, benzocaine-menthol, dextrose, magnesium sulfate, sodium phosphate IVPB **OR** sodium phosphate IVPB **OR** sodium phosphate IVPB, dextrose 5 % and 0.45 % NaCl, ondansetron, dextrose 5 % and 0.45 % NaCl, potassium chloride    Data:     Past Medical History:   has a past medical history of Anxiety, Diabetes mellitus ([address] Hyperlipidemia, and Hypertension. Social History:   reports that she has quit smoking. She has never used smokeless tobacco. She reports current alcohol use. She reports that she does not use drugs. Family History:   Family History   Problem Relation Age of Onset   Proctor Cancer Mother     Hypertension Mother     Heart Failure Father     Hypertension Father     Cancer Maternal Grandmother         colon     Cancer Maternal Grandfather         lung        Vitals:  /79   Pulse 73   Temp 98.1 °F (36.7 °C) (Oral)   Resp 12   Ht 5' 5\" (1.651 m) Comment: from floor  Wt 255 lb (115.7 kg) Comment: from floor  SpO2 97%   BMI 42.43 kg/m²   Temp (24hrs), Av.2 °F (36.8 °C), Min:96.8 °F (36 °C), Max:98.4 °F (36.9 °C)    Recent Labs     20  1725 20  2040 20  0715 20  1106   POCGLU 235* 266* 263* 310*       I/O (24Hr):     Intake/Output Summary (Last 24 hours) at 2020 1323  Last data filed at 2020 0520  Gross per 24 hour   Intake 2910 ml   Output 0 ml   Net 2910 ml       Labs:  Hematology:  Recent Labs     20  2017 20  1206 20  0626   WBC  --  5.9 6.0   RBC  --  3.48* 3.33*   HGB  --  10.0* 9.5*   HCT  --  32.0* 30.5*   MCV  --  92.0 91.6   MCH  --  28.7 28.5   MCHC  --  31.3 31.1   RDW  --  15.1* 15.0*   PLT  --  201 190   MPV  --  10.3 10.4   INR 1.1  --   --      Chemistry:  Recent Labs     20  1531  20  0600 20  0919  20  0905 20  1206 20  0626   NA  --    < > 141 139  -- --   --   --   --   --   --   --   --   --   --    POCGLU  --    < >  --    < >  --    < >  --  348* 236* 235* 266*  --  263* 310*    < > = values in this interval not displayed. ABG:  Lab Results   Component Value Date    POCPH 7.379 06/02/2020    PHART 7.354 06/03/2020    POCPCO2 37.0 06/02/2020    ENC8CQS 43.5 06/03/2020    POCPO2 81.0 06/02/2020    PO2ART 96.6 06/03/2020    POCHCO3 21.9 06/02/2020    CDZ8FPE 23.6 06/03/2020    NBEA 1.4 06/03/2020    PBEA NOT REPORTED 06/03/2020    LXI8XIR 23 06/02/2020    IMLV6UYD 96 06/02/2020    K0JCFVDM 97.1 06/03/2020    FIO2 INFORMATION NOT PROVIDED 06/03/2020     Lab Results   Component Value Date/Time    SPECIAL NOT REPORTED 06/01/2020 01:46 AM     Lab Results   Component Value Date/Time    CULTURE NO SIGNIFICANT GROWTH 06/01/2020 01:46 AM       Radiology:  Fl Ercp Biliary S&i    Result Date: 6/4/2020  Unremarkable ERCP images. Please refer to the procedure report for further details. Us Liver    Result Date: 6/3/2020  Dilated pancreatic duct. Mild intrahepatic ductal dilatation. Common bile duct appears slightly more dilated than expected for cholecystectomy. Consider follow-up with MRCP with contrast.     Xr Chest Portable    Result Date: 5/31/2020  No acute cardiopulmonary process     Mri Abdomen W Wo Contrast Mrcp    Result Date: 6/4/2020  1.  3.2 cm soft tissue mass in the pancreatic head resulting in pancreatic duct and biliary dilatation, compatible with primary neoplasm. There is abutment of the portal vein and soft tissue extension/lymph nodes in the liver hilum noted. Given the limitations of motion artifact on this exam, a pancreas CT should be considered for detailed vascular evaluation. 2.  No liver metastatic disease.        Physical Examination:        General Appearance: alert, well appearing, and in no acute distress  Mental status: oriented to person, place, and time  Head: normocephalic, atraumatic  Eye: no icterus, redness, pupils equal and oncology  6. A.m. labs if she remains overnight    Navid Strauss MD  6/5/2020  1:23 PM

## 2020-06-05 NOTE — PROGRESS NOTES
06/02/20  1822 06/04/20  0905 06/04/20  1206 06/05/20  0626   ALKPHOS 335* 308* 278*  --    * 184* 162*  --    AST 37* 19 16  --    BILITOT 0.37 0.56 0.59  --    BILIDIR 0.15 0.21  --   --    LABALBU 2.9* 2.6* 2.7* 2.6*       Amylase/Lipase and Ammonia:  No results for input(s): AMYLASE, LIPASE, AMMONIA in the last 72 hours. Acute Hepatitis Panel:  No results found for: HEPBSAG, HEPCAB, HEPBIGM, HEPAIGM    HCV Genotype:  No results found for: HEPATITISCGENOTYPE    HCV Quantitative:  No results found for: HCVQNT    LIVER WORK UP:    AFP  Lab Results   Component Value Date    AFP 1.2 06/04/2020       Alpha 1 antitrypsin   Lab Results   Component Value Date    A1A 170 06/03/2020       Anti - Liver/Kidney Ab  No results found for: LIVER-KIDNEYMICROSOMALAB    JOSSIE  Lab Results   Component Value Date    JOSSIE NEGATIVE 06/03/2020       AMA  No results found for: Viviane Shallow    ASMA  No results found for: SMOOTHMUSCAB    Ceruloplasmin  Lab Results   Component Value Date    CERULOPLSM 32 06/03/2020       Celiac panel  No results found for: Tania Lamp, IGA    PT/INR  Recent Labs     06/03/20  2017   PROTIME 11.2   INR 1.1       Cancer Markers:  CEA:    Recent Labs     06/04/20  1206   CEA 2.7     Ca 125:  No results for input(s):  in the last 72 hours. Ca 19-9:   Invalid input(s):   AFP:   Recent Labs     06/04/20  1206   AFP 1.2     Lactic acid:Invalid input(s): LACTIC ACID    Radiology Review:    Us Liver    Result Date: 6/3/2020  EXAMINATION: ULTRASOUND OF THE LIVER 6/3/2020 12:57 pm COMPARISON: None. HISTORY: ORDERING SYSTEM PROVIDED HISTORY: CBD stones TECHNOLOGIST PROVIDED HISTORY: CBD stones FINDINGS: Limited visualization of the pancreas. No discrete lesion. Abnormal pancreatic ductal dilatation. No focal liver lesion. Intrahepatic ductal dilatation. Main portal vein is patent. Cholecystectomy. Common bile duct measures up to 16 mm. No right-sided hydronephrosis.   11 mm right renal cyst.

## 2020-06-05 NOTE — PLAN OF CARE
Problem: Falls - Risk of:  Goal: Will remain free from falls  Description: Will remain free from falls  Outcome: Ongoing  Goal: Absence of physical injury  Description: Absence of physical injury  Outcome: Ongoing     Problem: Nutrition Deficit:  Goal: Ability to achieve adequate nutritional intake will improve  Description: Ability to achieve adequate nutritional intake will improve  Outcome: Ongoing     Problem: Serum Glucose Level - Abnormal:  Goal: Ability to maintain appropriate glucose levels will improve to within specified parameters  Description: Ability to maintain appropriate glucose levels will improve to within specified parameters  6/5/2020 0424 by Xcohitl Lock RN  Outcome: Ongoing  6/4/2020 1550 by Fernandez Arana RN  Outcome: Ongoing  Note: Pt's blood sugar still elevated. Pt on carb control diet. Sugar free snacks offered. Will continue to monitor.      Problem: Skin Integrity - Impaired:  Goal: Will show no infection signs and symptoms  Description: Will show no infection signs and symptoms  Outcome: Ongoing

## 2020-06-05 NOTE — PLAN OF CARE
Problem: Skin Integrity - Impaired:  Goal: Will show no infection signs and symptoms  Description: Will show no infection signs and symptoms  6/5/2020 1649 by Mike Altamirano RN  Outcome: Ongoing  Note: Pts skin maintains structural intactness and physiologic function. Pt able to reposition independently in bed and chair. Turning every 2 hours and heels elevated off bed. Linens remain clean and dry. Will continue to monitor.

## 2020-06-05 NOTE — PLAN OF CARE
Problem: Nutrition  Goal: Optimal nutrition therapy  Outcome: Ongoing  Note: Nutrition Problem: Inadequate oral intake  Intervention: Food and/or Nutrient Delivery: Continue NPO(Restart diet as able)  Nutritional Goals: meet % of estimated nutrition needs

## 2020-06-06 VITALS
DIASTOLIC BLOOD PRESSURE: 69 MMHG | RESPIRATION RATE: 24 BRPM | OXYGEN SATURATION: 97 % | HEIGHT: 65 IN | HEART RATE: 79 BPM | SYSTOLIC BLOOD PRESSURE: 151 MMHG | BODY MASS INDEX: 42.49 KG/M2 | WEIGHT: 255 LBS | TEMPERATURE: 99.4 F

## 2020-06-06 LAB
ALBUMIN SERPL-MCNC: 2.5 G/DL (ref 3.5–5.2)
ANION GAP SERPL CALCULATED.3IONS-SCNC: 13 MMOL/L (ref 9–17)
BUN BLDV-MCNC: 10 MG/DL (ref 6–20)
BUN/CREAT BLD: ABNORMAL (ref 9–20)
CALCIUM SERPL-MCNC: 9.2 MG/DL (ref 8.6–10.4)
CHLORIDE BLD-SCNC: 107 MMOL/L (ref 98–107)
CO2: 23 MMOL/L (ref 20–31)
CREAT SERPL-MCNC: 0.62 MG/DL (ref 0.5–0.9)
CULTURE: NORMAL
CULTURE: NORMAL
GFR AFRICAN AMERICAN: >60 ML/MIN
GFR NON-AFRICAN AMERICAN: >60 ML/MIN
GFR SERPL CREATININE-BSD FRML MDRD: ABNORMAL ML/MIN/{1.73_M2}
GFR SERPL CREATININE-BSD FRML MDRD: ABNORMAL ML/MIN/{1.73_M2}
GLUCOSE BLD-MCNC: 117 MG/DL (ref 65–105)
GLUCOSE BLD-MCNC: 130 MG/DL (ref 65–105)
GLUCOSE BLD-MCNC: 157 MG/DL (ref 65–105)
GLUCOSE BLD-MCNC: 178 MG/DL (ref 70–99)
GLUCOSE BLD-MCNC: 184 MG/DL (ref 65–105)
GLUCOSE BLD-MCNC: 231 MG/DL (ref 65–105)
GLUCOSE BLD-MCNC: 49 MG/DL (ref 65–105)
GLUCOSE BLD-MCNC: 50 MG/DL (ref 65–105)
IGG 1: 388 MG/DL (ref 240–1118)
IGG 2: 143 MG/DL (ref 124–549)
IGG 3: 11 MG/DL (ref 21–134)
IGG 4: 9 MG/DL (ref 1–123)
LIVER-KIDNEY MICROSOMAL AB: NORMAL
Lab: NORMAL
Lab: NORMAL
MAGNESIUM: 1.5 MG/DL (ref 1.6–2.6)
MITOCHONDRIAL ANTIBODY: 45 UNITS (ref 0–20)
PHOSPHORUS: 3.6 MG/DL (ref 2.6–4.5)
POTASSIUM SERPL-SCNC: 3.9 MMOL/L (ref 3.7–5.3)
SODIUM BLD-SCNC: 143 MMOL/L (ref 135–144)
SPECIMEN DESCRIPTION: NORMAL
SPECIMEN DESCRIPTION: NORMAL

## 2020-06-06 PROCEDURE — 6360000002 HC RX W HCPCS: Performed by: INTERNAL MEDICINE

## 2020-06-06 PROCEDURE — 99239 HOSP IP/OBS DSCHRG MGMT >30: CPT | Performed by: INTERNAL MEDICINE

## 2020-06-06 PROCEDURE — 36415 COLL VENOUS BLD VENIPUNCTURE: CPT

## 2020-06-06 PROCEDURE — 2580000003 HC RX 258: Performed by: INTERNAL MEDICINE

## 2020-06-06 PROCEDURE — 6370000000 HC RX 637 (ALT 250 FOR IP): Performed by: INTERNAL MEDICINE

## 2020-06-06 PROCEDURE — 83735 ASSAY OF MAGNESIUM: CPT

## 2020-06-06 PROCEDURE — 99255 IP/OBS CONSLTJ NEW/EST HI 80: CPT | Performed by: INTERNAL MEDICINE

## 2020-06-06 PROCEDURE — 80069 RENAL FUNCTION PANEL: CPT

## 2020-06-06 PROCEDURE — 99232 SBSQ HOSP IP/OBS MODERATE 35: CPT | Performed by: INTERNAL MEDICINE

## 2020-06-06 PROCEDURE — 82947 ASSAY GLUCOSE BLOOD QUANT: CPT

## 2020-06-06 RX ORDER — NITROGLYCERIN 0.4 MG/1
TABLET SUBLINGUAL
Qty: 25 TABLET | Refills: 3 | Status: SHIPPED | OUTPATIENT
Start: 2020-06-06

## 2020-06-06 RX ORDER — HYDRALAZINE HYDROCHLORIDE 25 MG/1
25 TABLET, FILM COATED ORAL EVERY 8 HOURS SCHEDULED
Qty: 90 TABLET | Refills: 3 | Status: SHIPPED | OUTPATIENT
Start: 2020-06-06

## 2020-06-06 RX ORDER — MAGNESIUM SULFATE 1 G/100ML
1 INJECTION INTRAVENOUS
Status: DISPENSED | OUTPATIENT
Start: 2020-06-06 | End: 2020-06-06

## 2020-06-06 RX ORDER — PANTOPRAZOLE SODIUM 40 MG/1
40 TABLET, DELAYED RELEASE ORAL
Qty: 30 TABLET | Refills: 2 | Status: SHIPPED | OUTPATIENT
Start: 2020-06-06 | End: 2020-10-13

## 2020-06-06 RX ORDER — INSULIN GLARGINE 100 [IU]/ML
60 INJECTION, SOLUTION SUBCUTANEOUS DAILY
Qty: 1 VIAL | Refills: 3
Start: 2020-06-07 | End: 2020-12-23 | Stop reason: HOSPADM

## 2020-06-06 RX ORDER — CARVEDILOL 12.5 MG/1
12.5 TABLET ORAL 2 TIMES DAILY WITH MEALS
Qty: 60 TABLET | Refills: 3 | Status: SHIPPED | OUTPATIENT
Start: 2020-06-06

## 2020-06-06 RX ORDER — OXYCODONE HYDROCHLORIDE AND ACETAMINOPHEN 5; 325 MG/1; MG/1
1 TABLET ORAL EVERY 4 HOURS PRN
Qty: 30 TABLET | Refills: 0 | Status: SHIPPED | OUTPATIENT
Start: 2020-06-06 | End: 2020-06-13

## 2020-06-06 RX ORDER — AMLODIPINE BESYLATE 10 MG/1
10 TABLET ORAL DAILY
Qty: 30 TABLET | Refills: 3 | Status: SHIPPED | OUTPATIENT
Start: 2020-06-07

## 2020-06-06 RX ORDER — INSULIN GLARGINE 100 [IU]/ML
50 INJECTION, SOLUTION SUBCUTANEOUS NIGHTLY
Qty: 1 VIAL | Refills: 3
Start: 2020-06-06 | End: 2020-12-23 | Stop reason: HOSPADM

## 2020-06-06 RX ADMIN — AMLODIPINE BESYLATE 10 MG: 10 TABLET ORAL at 09:21

## 2020-06-06 RX ADMIN — MAGNESIUM SULFATE 1 G: 1 INJECTION INTRAVENOUS at 15:50

## 2020-06-06 RX ADMIN — INSULIN LISPRO 3 UNITS: 100 INJECTION, SOLUTION INTRAVENOUS; SUBCUTANEOUS at 17:03

## 2020-06-06 RX ADMIN — MAGNESIUM SULFATE 1 G: 1 INJECTION INTRAVENOUS at 13:03

## 2020-06-06 RX ADMIN — CARVEDILOL 12.5 MG: 12.5 TABLET, FILM COATED ORAL at 17:03

## 2020-06-06 RX ADMIN — DEXTROSE MONOHYDRATE 12.5 G: 25 INJECTION, SOLUTION INTRAVENOUS at 04:28

## 2020-06-06 RX ADMIN — SODIUM CHLORIDE, PRESERVATIVE FREE 10 ML: 5 INJECTION INTRAVENOUS at 09:20

## 2020-06-06 RX ADMIN — INSULIN LISPRO 3 UNITS: 100 INJECTION, SOLUTION INTRAVENOUS; SUBCUTANEOUS at 09:24

## 2020-06-06 RX ADMIN — Medication 81 MG: at 09:20

## 2020-06-06 RX ADMIN — INSULIN LISPRO 6 UNITS: 100 INJECTION, SOLUTION INTRAVENOUS; SUBCUTANEOUS at 12:08

## 2020-06-06 RX ADMIN — MAGNESIUM SULFATE 1 G: 1 INJECTION INTRAVENOUS at 14:12

## 2020-06-06 RX ADMIN — CARVEDILOL 12.5 MG: 12.5 TABLET, FILM COATED ORAL at 09:20

## 2020-06-06 RX ADMIN — HYDRALAZINE HYDROCHLORIDE 25 MG: 25 TABLET ORAL at 06:10

## 2020-06-06 RX ADMIN — INSULIN GLARGINE 60 UNITS: 100 INJECTION, SOLUTION SUBCUTANEOUS at 09:20

## 2020-06-06 RX ADMIN — MAGNESIUM SULFATE 1 G: 1 INJECTION INTRAVENOUS at 11:47

## 2020-06-06 RX ADMIN — HYDRALAZINE HYDROCHLORIDE 25 MG: 25 TABLET ORAL at 14:19

## 2020-06-06 ASSESSMENT — PAIN SCALES - GENERAL
PAINLEVEL_OUTOF10: 0

## 2020-06-06 NOTE — CONSULTS
injection vial Inject 60 Units into the skin daily 6/7/20  Yes Lynn Guzman MD   hydrALAZINE (APRESOLINE) 25 MG tablet Take 1 tablet by mouth every 8 hours 6/6/20  Yes Lynn Guzman MD   carvedilol (COREG) 12.5 MG tablet Take 1 tablet by mouth 2 times daily (with meals) 6/6/20  Yes Lynn Guzman MD   amLODIPine (NORVASC) 10 MG tablet Take 1 tablet by mouth daily 6/7/20  Yes Lynn Guzman MD   benzocaine-menthol (CEPACOL SORE THROAT) 15-3.6 MG lozenge Take 1 lozenge by mouth every 2 hours as needed for Sore Throat 6/6/20  Yes Lynn Guzman MD   pantoprazole (PROTONIX) 40 MG tablet Take 1 tablet by mouth every morning (before breakfast) 6/6/20  Yes Lynn Guzman MD   oxyCODONE-acetaminophen (PERCOCET) 5-325 MG per tablet Take 1 tablet by mouth every 4 hours as needed for Pain for up to 7 days. Intended supply: 3 days. Take lowest dose possible to manage pain 6/6/20 6/13/20 Yes Lynn Guzman MD   aspirin 81 MG chewable tablet Take 81 mg by mouth nightly   Yes Historical Provider, MD   sertraline (ZOLOFT) 100 MG tablet take 1 1/2 tablet by oral route  every day   Yes Historical Provider, MD   atorvastatin (LIPITOR) 40 MG tablet nightly  10/12/13  Yes Historical Provider, MD   LORazepam (ATIVAN) 0.5 MG tablet 1 mg nightly. 10/12/13  Yes Historical Provider, MD   citalopram (CELEXA) 20 MG tablet Take 20 mg by mouth daily. Yes Historical Provider, MD   insulin aspart (NOVOLOG FLEXPEN) 100 UNIT/ML injection pen Inject into the skin    Historical Provider, MD   Lisdexamfetamine Dimesylate 60 MG CAPS Take 60 mg by mouth every morning.     Historical Provider, MD     Current Facility-Administered Medications   Medication Dose Route Frequency Provider Last Rate Last Dose    insulin glargine (LANTUS) injection vial 50 Units  50 Units Subcutaneous Nightly Lynn Guzman MD   50 Units at 06/05/20 2028    insulin glargine (LANTUS) injection vial 60 Units  60 Units Subcutaneous Daily Lynn Guzman MD   60 Units dyspnea, wheezing, hemoptysis, chest pain   Cardiovascular: negative for chest pain, dyspnea, palpitations, orthopnea, PND   Gastrointestinal: negative for nausea, vomiting, diarrhea, constipation, abdominal pain, Dysphagia, hematemesis and hematochezia   Genitourinary: negative for frequency, dysuria, nocturia, urinary incontinence, and hematuria   Integument: negative for rash, skin lesions, bruises.    Hematologic/Lymphatic: negative for easy bruising, bleeding, lymphadenopathy, or petechiae   Endocrine: negative for heat or cold intolerance,weight changes, change in bowel habits and hair loss   Musculoskeletal: negative for myalgias, arthralgias, pain, joint swelling,and bone pain   Neurological: negative for headaches, dizziness, seizures, weakness, numbness    PHYSICAL EXAM:      BP (!) 151/69   Pulse 79   Temp 99.4 °F (37.4 °C) (Oral)   Resp 24   Ht 5' 5\" (1.651 m) Comment: from floor  Wt 255 lb (115.7 kg) Comment: from floor  SpO2 97%   BMI 42.43 kg/m²    Temp (24hrs), Av.8 °F (36.6 °C), Min:97.1 °F (36.2 °C), Max:99.4 °F (37.4 °C)    General appearance - well appearing, no in pain or distress   Mental status - alert and cooperative   Eyes - pupils equal and reactive, extraocular eye movements intact   Ears - bilateral TM's and external ear canals normal   Mouth - mucous membranes moist, pharynx normal without lesions   Neck - supple, no significant adenopathy   Lymphatics - no palpable lymphadenopathy, no hepatosplenomegaly   Chest - clear to auscultation, no wheezes, rales or rhonchi, symmetric air entry   Heart - normal rate, regular rhythm, normal S1, S2, no murmurs  Abdomen - soft, nontender, nondistended, no masses or organomegaly   Neurological - alert, oriented, normal speech, no focal findings or movement disorder noted   Musculoskeletal - no joint tenderness, deformity or swelling   Extremities - peripheral pulses normal, no pedal edema, no clubbing or cyanosis   Skin - normal coloration absent.  Small amount of fluid signal within the   gallbladder fossa, likely postoperative.       Bile Ducts: Intrahepatic and extrahepatic biliary dilatation with abrupt   cutoff at the mid common duct level.  Upstream dilatation measures 16 mm at   the common duct level, 8 mm at the left hepatic duct level and 7 mm in the   right hepatic duct.       Pancreatic Duct: Dilatation with abrupt cutoff at the site of pancreatic head   mass.  Upstream dilatation of 8 mm is noted.           Impression   1.  3.2 cm soft tissue mass in the pancreatic head resulting in pancreatic   duct and biliary dilatation, compatible with primary neoplasm.  There is   abutment of the portal vein and soft tissue extension/lymph nodes in the   liver hilum noted.  Given the limitations of motion artifact on this exam, a   pancreas CT should be considered for detailed vascular evaluation.       2.  No liver metastatic disease. Primary Problem  Primary adenocarcinoma of head of pancreas [facility])    Active Hospital Problems    Diagnosis Date Noted    Primary adenocarcinoma of head of pancreas ([facility] [C25.0] 06/05/2020    Hypophosphatemia [E83.39] 06/04/2020    Hypomagnesemia [E83.42] 06/04/2020    Hyperglycemia due to type 2 diabetes mellitus ([address] [E11.65] 06/04/2020    Non-Obstructive CAD [I25.10] 06/04/2020    Type 2 diabetes mellitus with ketoacidosis without coma, with long-term current use of insulin ([facility] [E11.10, Z79.4] 06/01/2020     IMPRESSION:   1. Pancreatic adenocarcinoma: Head of pancreas, 3 cm,  EUS showed that the mass encases the portal vein . An intact interface was seen between the mass and the celiac trunk and superior mesenteric artery suggesting a lack of invasion. Appears locally advanced  2. DKA  3. CAD    RECOMMENDATIONS:  1. I reviewed the imaging studies, lab data, biopsy results, diagnosis, prognosis and treatment options with patient.  She states comfort is her primary goal but she is open to discuss options  2. MR abd showed no liver lesions possible T2N1MO stage IIB at this times appears borderline/unresectable because of PV involvement. However she will need opinion from hepatobiliary surgeon as o/p  3. Also will get CT chest as o/p  4. Okay for discharge  5. Follow with oncology in one week      Discussed with patient and Nurse. Thank you for asking us to see this patient.     Ellen Benitez MD  Hematologist/Medical Oncologist  Cell: [phone]

## 2020-06-06 NOTE — DISCHARGE INSTR - COC
Continuity of Care Form    Patient Name: Tavo Estrada   :  1960  MRN:  [id number]    Admit date:  2020  Discharge date:  ***    Code Status Order: Full Code   Advance Directives:   [facility] Documentation     Date/Time Healthcare Directive Type of Healthcare Directive Copy in [facility] [address] Agent's Name Healthcare Agent's Phone Number    [phone]  No, patient does not have an advance directive for healthcare treatment -- -- -- -- --          Admitting Physician:  Carolin Jeong MD  PCP: No primary care provider on file. Discharging Nurse: [facility] Unit/Room#: 1920/5432-19  Discharging Unit Phone Number: ***    Emergency Contact:   Extended Emergency Contact Information  Primary Emergency Contact: [facility]  Address: [address] Phone: [phone]  Work Phone: [phone]  Mobile Phone: [phone]  Relation: Domestic Partner  Hearing or visual needs: None  Other needs: None  Preferred language: [id number] needed? No    Past Surgical History:  Past Surgical History:   Procedure Laterality Date     SECTION      x2    ERCP  2020    SPHINCTER/PAPILLOTOMY, STENT INSERTION    ERCP  2020    ERCP SPHINCTER/PAPILLOTOMY performed by Roula Hutton MD at [facility]    ERCP  2020    ERCP STENT INSERTION performed by Roula Hutton MD at [address] [facility]  2020    W/EUS FNA     UPPER GASTROINTESTINAL ENDOSCOPY  2020    EGD W/EUS FNA in OR with GI staff performed by Roula Hutton MD at [facility]       Immunization History: There is no immunization history on file for this patient.     Active Problems:  Patient Active Problem List   Diagnosis Code    Uncontrolled diabetes mellitus ([facility] E11.65    Hypertension I10    Postmenopausal bleeding N95.0    Thickened requires {Admit to Appropriate Level of Care:58972} for {GREATER/LESS:253215868} 30 days.      Update Admission H&P: {CHP DME Changes in ZYLXP:197966699}    PHYSICIAN SIGNATURE:  {Esignature:[id number]}

## 2020-06-06 NOTE — DISCHARGE SUMMARY
Results   Component Value Date    APTT 64.0 06/03/2020     FLP:    Lab Results   Component Value Date    CHOL 122 06/03/2020    TRIG 145 06/03/2020    HDL 43 06/03/2020     U/A:    Lab Results   Component Value Date    COLORU YELLOW 06/01/2020    TURBIDITY CLOUDY 06/01/2020    SPECGRAV 1.022 06/01/2020    HGBUR NEGATIVE 06/01/2020    PHUR 5.0 06/01/2020    PROTEINU TRACE 06/01/2020    GLUCOSEU 3+ 06/01/2020    KETUA SMALL 06/01/2020    BILIRUBINUR NEGATIVE 06/01/2020    UROBILINOGEN Normal 06/01/2020    NITRU NEGATIVE 06/01/2020    LEUKOCYTESUR NEGATIVE 06/01/2020     TSH:    Lab Results   Component Value Date    TSH 1.82 12/27/2013         Radiology:  Fl Ercp Biliary S&i    Result Date: 6/4/2020  Unremarkable ERCP images. Please refer to the procedure report for further details. Us Liver    Result Date: 6/3/2020  Dilated pancreatic duct. Mild intrahepatic ductal dilatation. Common bile duct appears slightly more dilated than expected for cholecystectomy. Consider follow-up with MRCP with contrast.     Xr Chest Portable    Result Date: 5/31/2020  No acute cardiopulmonary process     Mri Abdomen W Wo Contrast Mrcp    Result Date: 6/4/2020  1.  3.2 cm soft tissue mass in the pancreatic head resulting in pancreatic duct and biliary dilatation, compatible with primary neoplasm. There is abutment of the portal vein and soft tissue extension/lymph nodes in the liver hilum noted. Given the limitations of motion artifact on this exam, a pancreas CT should be considered for detailed vascular evaluation. 2.  No liver metastatic disease. Consultations:    Consults:     Final Specialist Recommendations/Findings:   IP CONSULT TO CRITICAL CARE  IP CONSULT TO CARDIOLOGY  IP CONSULT TO IV TEAM  IP CONSULT TO GI  IP CONSULT TO ONCOLOGY      The patient was seen and examined on day of discharge and this discharge summary is in conjunction with any daily progress note from day of discharge.     Discharge plan: Disposition: Home    Physician Follow Up: Boby Waterman MD  [address]  [address] [facility] [address]  [address] - CNP  [address]     On 6/16/2020  at 2:30 f/u cath without PCI [facility]    mEperatriz Pugh MD  [address] [facility], [facility] [address] [place] [address]  [phone]    Schedule an appointment as soon as possible for a visit  Please call this surgeon to make an appointment referred by Dr. Bonnie Lyons MD  [address] [place] [address]  [phone]    Schedule an appointment as soon as possible for a visit  Please call to make a follow up appt with Dr. 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    Diet: cardiac diet, diabetic diet and low fat, low cholesterol diet    Activity: As tolerated     Instructions to Patient: As indicated above. Patient to be compliant with her follow-up visits. Discharge Medications:      Medication List      START taking these medications    benzocaine-menthol 15-3.6 MG lozenge  Commonly known as:  CEPACOL SORE THROAT  Take 1 lozenge by mouth every 2 hours as needed for Sore Throat     carvedilol 12.5 MG tablet  Commonly known as:  COREG  Take 1 tablet by mouth 2 times daily (with meals)     hydrALAZINE 25 MG tablet  Commonly known as:  APRESOLINE  Take 1 tablet by mouth every 8 hours     nitroGLYCERIN 0.4 MG SL tablet  Commonly known as:  NITROSTAT  up to max of 3 total doses. If no relief after 1 dose, call 911.      oxyCODONE-acetaminophen 5-325 MG per

## 2020-06-06 NOTE — PROGRESS NOTES
of pancreas ([address]  Active Problems:    Type 2 diabetes mellitus with ketoacidosis without coma, with long-term current use of insulin (HCC)    Hypophosphatemia    Hypomagnesemia    Hyperglycemia due to type 2 diabetes mellitus ([address]    Non-Obstructive CAD  Resolved Problems:    * No resolved hospital problems. *       GI Assessment and plan:  1. Pancreatic head mass on MRI with double ductal dilatation-could be malignancy, obstruction or stone. Tumor markers WNL but we will need tissue sampling for diagnosis  -Pathology has resulted with the findings positive for malignancy, adenocarcinoma, moderately differentiated. -Pt will need to follow up I the office in 2 months for stent exchange if stent is not removed during surgery  -Dr. Leeann Grimm personally referred pt to Dr. Rufino Walton is to call Dr. Marita Hua office Monday am to schedule appt for Tuesday. Contact info added to discharge papers and discussed with patient     2. Abnormal LFT's most likely secondary to above   -Trend LFT's    GI will sign off. Thank you for allowing me to participate in the care of your patient. Please feel free to contact me with any questions or concerns.      Tasha Benitez, [address]  Gastroenterology  [phone]

## 2020-06-07 LAB — SMOOTH MUSCLE ANTIBODY: 22 UNITS (ref 0–19)

## 2020-06-08 LAB
ALK PHOS BONE SPECIFIC: 72 U/L (ref 0–55)
ALK PHOS OTHER CALC: 0 U/L
ALK PHOSPHATASE: 326 U/L (ref 40–120)
ALKALINE PHOSPHATASE LIVER FRACTION: 254 U/L (ref 0–94)
SMOOTH MUSCLE AB IGG TITER: NORMAL

## 2020-06-09 NOTE — ADT AUTH CERT
[facility]        PROCEDURE DATE: 06/04/20       REFERRING PHYSICIAN: No ref. provider found        PRIMARY CARE PROVIDER: No primary care provider on file.       ATTENDING PHYSICIAN: Timi Bailon [place] [facility]       HISTORY: Ms. Sarika Osman is a 64 y. o. female who presents to the [place] [address] Endoscopy unit for ERCP. The patient's clinical history is remarkable for pancreatic mass with CBD obstruction. She is currently medically stable and appropriate for the planned procedure.            PROCEDURES PERFORMED:    1. Transoral Endoscopic retrograde cholangiopancreatography (ERCP). 2.  Cholangiogram   3. Biliary sphincterotomy   4.  Placement of 10 F x 7 cm plastic biliary stent   5. Fluoroscpoy      RECOMMENDATIONS:    1. Transfer back to the floor for further management as per primary care team.    2. NPO x 4 hours then clear liquid diet today. 3.  Stent exchange will be decided based on pathology report and possible plan for surgery.

## 2020-06-10 ENCOUNTER — TELEPHONE (OUTPATIENT)
Dept: ONCOLOGY | Age: 60
End: 2020-06-10

## 2020-06-10 NOTE — TELEPHONE ENCOUNTER
Mihir Hernandez, Dr. Gracia Manual nurse, called in stating pt seen for consultation yesterday. Renae stated pt scheduled for diagnostic lap/port placement 2020. Name: Hans Mccracken  : 1960  MRN: [id number]    Oncology Navigation- Initial Note:    Intake-  Contact Type: Surgeon Office  Notes: Introductory phone call made to patient. Instructed patient writer will be navigating oncology care & may call writer with any questions/concerns/issues @ [phone] prn. Discussed potential barriers to care, pt c/o \"feeling bloated\" & increased swelling to lower extremities. Pt stated discussed with Dr. Alyse Hood @ yesterday's consultation. Discussed community resources & offered referral to [employer], pt declined. Pt requested friend, Don James, ([phone]) added to emergency contacts. Pt stated in process of completing POA documents & stated Madelyn Escalera will be healthcare POA. [address] written materials, [employer] brochure, & writer's business card mailed to patient. Will continue to follow.     Electronically signed by Xin Weaver, RN on 6/10/2020 at 1:16 PM

## 2020-06-11 ENCOUNTER — HOSPITAL ENCOUNTER (OUTPATIENT)
Dept: CT IMAGING | Age: 60
Discharge: HOME | End: 2020-06-13
Payer: COMMERCIAL

## 2020-06-11 PROCEDURE — 2580000003 HC RX 258: Performed by: INTERNAL MEDICINE

## 2020-06-11 PROCEDURE — 71260 CT THORAX DX C+: CPT

## 2020-06-11 PROCEDURE — 6360000004 HC RX CONTRAST MEDICATION: Performed by: INTERNAL MEDICINE

## 2020-06-11 RX ORDER — SODIUM CHLORIDE 0.9 % (FLUSH) 0.9 %
10 SYRINGE (ML) INJECTION PRN
Status: DISCONTINUED | OUTPATIENT
Start: 2020-06-11 | End: 2020-06-14 | Stop reason: HOSPADM

## 2020-06-11 RX ORDER — 0.9 % SODIUM CHLORIDE 0.9 %
80 INTRAVENOUS SOLUTION INTRAVENOUS ONCE
Status: COMPLETED | OUTPATIENT
Start: 2020-06-11 | End: 2020-06-11

## 2020-06-11 RX ADMIN — SODIUM CHLORIDE 80 ML: 9 INJECTION, SOLUTION INTRAVENOUS at 07:57

## 2020-06-11 RX ADMIN — Medication 10 ML: at 07:56

## 2020-06-11 RX ADMIN — IOVERSOL 75 ML: 741 INJECTION INTRA-ARTERIAL; INTRAVENOUS at 07:56

## 2020-06-12 ENCOUNTER — TELEPHONE (OUTPATIENT)
Dept: ONCOLOGY | Age: 60
End: 2020-06-12

## 2020-06-16 ENCOUNTER — OFFICE VISIT (OUTPATIENT)
Dept: ONCOLOGY | Age: 60
End: 2020-06-16
Payer: COMMERCIAL

## 2020-06-16 ENCOUNTER — TELEPHONE (OUTPATIENT)
Dept: ONCOLOGY | Age: 60
End: 2020-06-16

## 2020-06-16 VITALS
DIASTOLIC BLOOD PRESSURE: 63 MMHG | TEMPERATURE: 98.4 F | HEART RATE: 86 BPM | RESPIRATION RATE: 16 BRPM | WEIGHT: 273.2 LBS | BODY MASS INDEX: 45.46 KG/M2 | SYSTOLIC BLOOD PRESSURE: 123 MMHG

## 2020-06-16 PROCEDURE — 99215 OFFICE O/P EST HI 40 MIN: CPT | Performed by: INTERNAL MEDICINE

## 2020-06-16 PROCEDURE — 99211 OFF/OP EST MAY X REQ PHY/QHP: CPT | Performed by: INTERNAL MEDICINE

## 2020-06-16 RX ORDER — FUROSEMIDE 20 MG/1
20 TABLET ORAL DAILY
Qty: 30 TABLET | Refills: 1 | Status: SHIPPED | OUTPATIENT
Start: 2020-06-16 | End: 2020-12-29

## 2020-06-16 NOTE — PROGRESS NOTES
of 3 total doses. If no relief after 1 dose, call 911. 6/6/20   Shawnee Collins MD   insulin glargine (LANTUS) 100 UNIT/ML injection vial Inject 50 Units into the skin nightly 6/6/20   Shawnee Collins MD   insulin glargine (LANTUS) 100 UNIT/ML injection vial Inject 60 Units into the skin daily 6/7/20   Shawnee Collins MD   hydrALAZINE (APRESOLINE) 25 MG tablet Take 1 tablet by mouth every 8 hours 6/6/20   Shawnee Collins MD   carvedilol (COREG) 12.5 MG tablet Take 1 tablet by mouth 2 times daily (with meals) 6/6/20   Shawnee Collins MD   amLODIPine (NORVASC) 10 MG tablet Take 1 tablet by mouth daily 6/7/20   Shawnee Collins MD   benzocaine-menthol (CEPACOL SORE THROAT) 15-3.6 MG lozenge Take 1 lozenge by mouth every 2 hours as needed for Sore Throat 6/6/20   Shawnee Collins MD   pantoprazole (PROTONIX) 40 MG tablet Take 1 tablet by mouth every morning (before breakfast) 6/6/20   Shawnee Collins MD   aspirin 81 MG chewable tablet Take 81 mg by mouth nightly    Historical Provider, MD   insulin aspart (NOVOLOG FLEXPEN) 100 UNIT/ML injection pen Inject into the skin    Historical Provider, MD   Lisdexamfetamine Dimesylate 60 MG CAPS Take 60 mg by mouth every morning. Historical Provider, MD   sertraline (ZOLOFT) 100 MG tablet take 1 1/2 tablet by oral route  every day    Historical Provider, MD   atorvastatin (LIPITOR) 40 MG tablet nightly  10/12/13   Historical Provider, MD   LORazepam (ATIVAN) 0.5 MG tablet 1 mg nightly. 10/12/13   Historical Provider, MD   citalopram (CELEXA) 20 MG tablet Take 20 mg by mouth daily. Historical Provider, MD     Current Outpatient Medications   Medication Sig Dispense Refill    nitroGLYCERIN (NITROSTAT) 0.4 MG SL tablet up to max of 3 total doses.  If no relief after 1 dose, call 911. 25 tablet 3    insulin glargine (LANTUS) 100 UNIT/ML injection vial Inject 50 Units into the skin nightly 1 vial 3    insulin glargine (LANTUS) 100 UNIT/ML injection vial Inject 60 Units into the Pancreatic Duct: Dilatation with abrupt cutoff at the site of pancreatic head   mass.  Upstream dilatation of 8 mm is noted.           Impression   1.  3.2 cm soft tissue mass in the pancreatic head resulting in pancreatic   duct and biliary dilatation, compatible with primary neoplasm.  There is   abutment of the portal vein and soft tissue extension/lymph nodes in the   liver hilum noted.  Given the limitations of motion artifact on this exam, a   pancreas CT should be considered for detailed vascular evaluation.       2.  No liver metastatic disease. Primary Problem  Primary adenocarcinoma of head of pancreas ([facility] [address]    There are no active hospital problems to display for this patient. IMPRESSION:   1. Pancreatic adenocarcinoma: Head of pancreas, 3 cm,  EUS showed that the mass encases the portal vein . An intact interface was seen between the mass and the celiac trunk and superior mesenteric artery suggesting a lack of invasion. Appears locally advanced  2. DKA  3. CAD    RECOMMENDATIONS:  1. I reviewed the imaging studies, lab data, biopsy results, diagnosis, prognosis and treatment options with patient. She states comfort is her primary goal but she is open to discuss options  2. MR abd showed no liver lesions possible T2N1MO stage IIB at this times appears borderline/unresectable because of PV involvement. She is seen by hepatobiliary surgeon as o/p  3. CT chest showed No mets. 4. She is planning for PORT and staging laparoscopy with Dr Mary Wills  5. Plan for chemo with FOLFIRINOX in 2 weeks  6. Arrange for chemo teach  I reviewed the side effects of chemotherapy which include, but are not limited to, fatigue, nausea, vomiting, alopecia, myelosuppression, mucositis, allergic reaction, nephrotoxicity, ototoxicity, neurotoxicity, diarrhea, and constipation. Patient is agreeable  7.  RTC with C1      Ilya Benitez MD  Hematologist/Medical Oncologist    I spent more than 40 minutes examining, evaluating,

## 2020-06-22 ENCOUNTER — TELEPHONE (OUTPATIENT)
Dept: ONCOLOGY | Age: 60
End: 2020-06-22

## 2020-06-23 ENCOUNTER — TELEPHONE (OUTPATIENT)
Dept: ONCOLOGY | Age: 60
End: 2020-06-23

## 2020-06-23 NOTE — TELEPHONE ENCOUNTER
Name: Fadi Chris  : 1960  MRN: [id number]    Oncology Navigation Follow-Up Note    Contact Type:  Telephone  Notes: Pt called in to inquire on chemotherapy scheduling. Instructed pt once PA approved will receive call from Qasim Estrada, [facility] infusion , to schedule chemotherapy education, chemotherapy & Dr. Marc Silver f/u. Pt stated friend/POA \"wants to be with me the first week of chemo\" & will be coming from [place]. Pt stated \"she needs to reserve airplane ticket\". Instructed pt may contact Qasim Estrada to update, contact # given. Pt stated recent admission medications for adult ADD & anxiety/depression stopped. Pt stated seeing therapist @ Dr. Ida Ash office & office in recent legal trouble. Pt stated \"I don't have anyone to see and I don't know if it's okay to take the medication\". Pt stated \"I am just off and was a raging bitch yesterday\". Instructed pt may contact [facility] triage nurse, contact # given. Pt verbalized understanding & thanked writer. Support given & instructed pt may contact writer prn. Will continue to follow.     Electronically signed by Purvi Cornelius RN on 2020 at 9:31 AM

## 2020-06-29 ENCOUNTER — TELEPHONE (OUTPATIENT)
Dept: ONCOLOGY | Age: 60
End: 2020-06-29

## 2020-06-29 NOTE — TELEPHONE ENCOUNTER
Name: Elis Meneses  : 1960  MRN: [id number]    Oncology Navigation Follow-Up Note    Contact Type:  Telephone  Notes: Pt called in to inquire on chemotherapy PA. Instructed pt writer [facility] , & request contact pt. Spoke with Enrique Kern, updated on conversation with pt. Enrique Kern stated will contact [facility] & Counts include 234 beds at the [facility] pre cert & update pt. Will continue to follow.     Electronically signed by Bell Benitez RN on 2020 at 11:20 AM

## 2020-06-30 ENCOUNTER — TELEPHONE (OUTPATIENT)
Dept: INFUSION THERAPY | Age: 60
End: 2020-06-30

## 2020-06-30 ENCOUNTER — TELEPHONE (OUTPATIENT)
Dept: ONCOLOGY | Age: 60
End: 2020-06-30

## 2020-06-30 NOTE — TELEPHONE ENCOUNTER
Chemo orders received, ht 65\", wt 273lbs, and bsa 2.38 verified. Dose of chemotherapy verified;   Oxaliplatin 75mg/m2= 178mg  Irinotecan 80mg/m2= 190mg  Leucovorin 400mg/m2= 952mg  5FU 400mg/m2= 952mg  5FU 2400mg/m2= 5712mg

## 2020-07-06 ENCOUNTER — TELEPHONE (OUTPATIENT)
Dept: ONCOLOGY | Age: 60
End: 2020-07-06

## 2020-07-06 ENCOUNTER — OFFICE VISIT (OUTPATIENT)
Dept: ONCOLOGY | Age: 60
End: 2020-07-06
Payer: COMMERCIAL

## 2020-07-06 PROCEDURE — 99999 PR OFFICE/OUTPT VISIT,PROCEDURE ONLY: CPT | Performed by: INTERNAL MEDICINE

## 2020-07-06 RX ORDER — ONDANSETRON 8 MG/1
8 TABLET, ORALLY DISINTEGRATING ORAL EVERY 8 HOURS PRN
Qty: 90 TABLET | Refills: 2 | Status: SHIPPED | OUTPATIENT
Start: 2020-07-06

## 2020-07-06 RX ORDER — LIDOCAINE AND PRILOCAINE 25; 25 MG/G; MG/G
CREAM TOPICAL
Qty: 1 TUBE | Refills: 2 | Status: SHIPPED | OUTPATIENT
Start: 2020-07-06 | End: 2020-12-29 | Stop reason: ALTCHOICE

## 2020-07-06 NOTE — PROGRESS NOTES
Nilsa Olvera and her son were here for chemotherapy teaching. Spent 60 minutes with them. Teaching sheets from [facility] and verbal information given on chemotherapy agents, action, administration and side effects. Chemotherapy medications discussed: oxaliplatin, irrinotecan, 5FU    Chemotherapy consent form signed by patient. Provided new patient binder,    Discussed implanted port and demonstrated port access with sample port and ellington needle. Patient's port site assessed, no redness or swelling noted. Pt denies pain. Pt has prescription for EMLA cream and was given instructions on use.     Reviewed anti-emetic medication, pt has prescription for zofran  and was given instructions on use.     Questions answered and support given.     [facility] rehab referral assessment form, distress tool form and PG-SGA form completed by patient. Patient declined      Needs that were identified during teaching visit: no needs identified at this time      Discussed community resources such as [address], [employer], [employer], [employer], etc.     Pt will return on 7/14 for C1D1 and f/u with Dr. Celia Hernandez  on 7/14.       Ash Weiss RN

## 2020-07-06 NOTE — TELEPHONE ENCOUNTER
Name: Delfino Sloan  : 1960  MRN: [id number]    Oncology Navigation Follow-Up Note    Contact Type:  Telephone  Notes: Pt left VM requesting writer call back r/t chemotherapy schedule. Called pt, pt stated confused r/t chemotherapy schedule r/t \"I though I had to get two boluses\". Instructed pt chemotherapy schedule along with chemotherapy to be discussed @ length during today's chemotherapy education. Notified pt chemotherapy certified nurse to complete teaching. Encouraged pt to write all questions down & bring to teaching. Pt verbalized understanding & thanked writer. Support given & instructed pt may contact writer prn. Will continue to follow.     Electronically signed by Mabel Esparza RN on 2020 at 8:44 AM

## 2020-07-09 ENCOUNTER — TELEPHONE (OUTPATIENT)
Dept: GASTROENTEROLOGY | Age: 60
End: 2020-07-09

## 2020-07-14 ENCOUNTER — OFFICE VISIT (OUTPATIENT)
Dept: ONCOLOGY | Age: 60
End: 2020-07-14
Payer: COMMERCIAL

## 2020-07-14 ENCOUNTER — HOSPITAL ENCOUNTER (OUTPATIENT)
Dept: INFUSION THERAPY | Age: 60
Discharge: HOME | End: 2020-07-14
Payer: COMMERCIAL

## 2020-07-14 ENCOUNTER — TELEPHONE (OUTPATIENT)
Dept: ONCOLOGY | Age: 60
End: 2020-07-14

## 2020-07-14 VITALS — HEIGHT: 65 IN | BODY MASS INDEX: 43.49 KG/M2 | WEIGHT: 261 LBS

## 2020-07-14 VITALS
DIASTOLIC BLOOD PRESSURE: 72 MMHG | SYSTOLIC BLOOD PRESSURE: 136 MMHG | BODY MASS INDEX: 43.43 KG/M2 | WEIGHT: 261 LBS | TEMPERATURE: 97.8 F | HEART RATE: 73 BPM

## 2020-07-14 DIAGNOSIS — C25.0 PRIMARY ADENOCARCINOMA OF HEAD OF PANCREAS (HCC): Primary | ICD-10-CM

## 2020-07-14 LAB
ABSOLUTE EOS #: 0.2 K/UL (ref 0–0.4)
ABSOLUTE IMMATURE GRANULOCYTE: ABNORMAL K/UL (ref 0–0.3)
ABSOLUTE LYMPH #: 1.2 K/UL (ref 1–4.8)
ABSOLUTE MONO #: 0.4 K/UL (ref 0.1–1.2)
ALBUMIN SERPL-MCNC: 3.8 G/DL (ref 3.5–5.2)
ALBUMIN/GLOBULIN RATIO: 1.4 (ref 1–2.5)
ALP BLD-CCNC: 104 U/L (ref 35–104)
ALT SERPL-CCNC: 18 U/L (ref 5–33)
ANION GAP SERPL CALCULATED.3IONS-SCNC: 12 MMOL/L (ref 9–17)
AST SERPL-CCNC: 16 U/L
BASOPHILS # BLD: 1 % (ref 0–2)
BASOPHILS ABSOLUTE: 0 K/UL (ref 0–0.2)
BILIRUB SERPL-MCNC: 0.39 MG/DL (ref 0.3–1.2)
BUN BLDV-MCNC: 18 MG/DL (ref 6–20)
BUN/CREAT BLD: ABNORMAL (ref 9–20)
CA 19-9: 11 U/ML (ref 0–35)
CALCIUM SERPL-MCNC: 9.7 MG/DL (ref 8.6–10.4)
CHLORIDE BLD-SCNC: 98 MMOL/L (ref 98–107)
CO2: 26 MMOL/L (ref 20–31)
CREAT SERPL-MCNC: 0.81 MG/DL (ref 0.5–0.9)
DIFFERENTIAL TYPE: ABNORMAL
EOSINOPHILS RELATIVE PERCENT: 4 % (ref 1–4)
GFR AFRICAN AMERICAN: >60 ML/MIN
GFR NON-AFRICAN AMERICAN: >60 ML/MIN
GFR SERPL CREATININE-BSD FRML MDRD: ABNORMAL ML/MIN/{1.73_M2}
GFR SERPL CREATININE-BSD FRML MDRD: ABNORMAL ML/MIN/{1.73_M2}
GLUCOSE BLD-MCNC: 410 MG/DL (ref 70–99)
HCT VFR BLD CALC: 33 % (ref 36–46)
HEMOGLOBIN: 10.8 G/DL (ref 12–16)
IMMATURE GRANULOCYTES: ABNORMAL %
LYMPHOCYTES # BLD: 19 % (ref 24–44)
MCH RBC QN AUTO: 27.9 PG (ref 26–34)
MCHC RBC AUTO-ENTMCNC: 32.8 G/DL (ref 31–37)
MCV RBC AUTO: 85.2 FL (ref 80–100)
MONOCYTES # BLD: 7 % (ref 2–11)
NRBC AUTOMATED: ABNORMAL PER 100 WBC
PDW BLD-RTO: 14.8 % (ref 12.5–15.4)
PLATELET # BLD: 267 K/UL (ref 140–450)
PLATELET ESTIMATE: ABNORMAL
PMV BLD AUTO: 8.7 FL (ref 6–12)
POTASSIUM SERPL-SCNC: 4.8 MMOL/L (ref 3.7–5.3)
RBC # BLD: 3.87 M/UL (ref 4–5.2)
RBC # BLD: ABNORMAL 10*6/UL
SEG NEUTROPHILS: 69 % (ref 36–66)
SEGMENTED NEUTROPHILS ABSOLUTE COUNT: 4.3 K/UL (ref 1.8–7.7)
SODIUM BLD-SCNC: 136 MMOL/L (ref 135–144)
TOTAL PROTEIN: 6.6 G/DL (ref 6.4–8.3)
WBC # BLD: 6.1 K/UL (ref 3.5–11)
WBC # BLD: ABNORMAL 10*3/UL

## 2020-07-14 PROCEDURE — 99215 OFFICE O/P EST HI 40 MIN: CPT | Performed by: INTERNAL MEDICINE

## 2020-07-14 PROCEDURE — 85025 COMPLETE CBC W/AUTO DIFF WBC: CPT

## 2020-07-14 PROCEDURE — 96411 CHEMO IV PUSH ADDL DRUG: CPT

## 2020-07-14 PROCEDURE — 96365 THER/PROPH/DIAG IV INF INIT: CPT

## 2020-07-14 PROCEDURE — 96415 CHEMO IV INFUSION ADDL HR: CPT

## 2020-07-14 PROCEDURE — 96413 CHEMO IV INFUSION 1 HR: CPT

## 2020-07-14 PROCEDURE — 6360000002 HC RX W HCPCS: Performed by: INTERNAL MEDICINE

## 2020-07-14 PROCEDURE — 2580000003 HC RX 258: Performed by: INTERNAL MEDICINE

## 2020-07-14 PROCEDURE — 96375 TX/PRO/DX INJ NEW DRUG ADDON: CPT

## 2020-07-14 PROCEDURE — 86301 IMMUNOASSAY TUMOR CA 19-9: CPT

## 2020-07-14 PROCEDURE — 96367 TX/PROPH/DG ADDL SEQ IV INF: CPT

## 2020-07-14 PROCEDURE — 96416 CHEMO PROLONG INFUSE W/PUMP: CPT

## 2020-07-14 PROCEDURE — 36591 DRAW BLOOD OFF VENOUS DEVICE: CPT

## 2020-07-14 PROCEDURE — 80053 COMPREHEN METABOLIC PANEL: CPT

## 2020-07-14 PROCEDURE — 96368 THER/DIAG CONCURRENT INF: CPT

## 2020-07-14 PROCEDURE — 96417 CHEMO IV INFUS EACH ADDL SEQ: CPT

## 2020-07-14 RX ORDER — DEXTROSE MONOHYDRATE 50 MG/ML
INJECTION, SOLUTION INTRAVENOUS ONCE
Status: COMPLETED | OUTPATIENT
Start: 2020-07-14 | End: 2020-07-14

## 2020-07-14 RX ORDER — ATROPINE SULFATE 0.4 MG/ML
0.4 AMPUL (ML) INJECTION
Status: COMPLETED | OUTPATIENT
Start: 2020-07-14 | End: 2020-07-14

## 2020-07-14 RX ORDER — DIPHENHYDRAMINE HYDROCHLORIDE 50 MG/ML
50 INJECTION INTRAMUSCULAR; INTRAVENOUS ONCE
Status: CANCELLED | OUTPATIENT
Start: 2020-07-14

## 2020-07-14 RX ORDER — ATROPINE SULFATE 0.4 MG/ML
0.4 AMPUL (ML) INJECTION
Status: CANCELLED | OUTPATIENT
Start: 2020-07-14

## 2020-07-14 RX ORDER — DEXAMETHASONE SODIUM PHOSPHATE 10 MG/ML
10 INJECTION INTRAMUSCULAR; INTRAVENOUS ONCE
Status: COMPLETED | OUTPATIENT
Start: 2020-07-14 | End: 2020-07-14

## 2020-07-14 RX ORDER — HEPARIN SODIUM (PORCINE) LOCK FLUSH IV SOLN 100 UNIT/ML 100 UNIT/ML
500 SOLUTION INTRAVENOUS PRN
Status: CANCELLED | OUTPATIENT
Start: 2020-07-14

## 2020-07-14 RX ORDER — SODIUM CHLORIDE 0.9 % (FLUSH) 0.9 %
5 SYRINGE (ML) INJECTION PRN
Status: CANCELLED | OUTPATIENT
Start: 2020-07-14

## 2020-07-14 RX ORDER — HEPARIN SODIUM (PORCINE) LOCK FLUSH IV SOLN 100 UNIT/ML 100 UNIT/ML
500 SOLUTION INTRAVENOUS PRN
Status: DISCONTINUED | OUTPATIENT
Start: 2020-07-14 | End: 2020-07-15 | Stop reason: HOSPADM

## 2020-07-14 RX ORDER — PALONOSETRON 0.05 MG/ML
0.25 INJECTION, SOLUTION INTRAVENOUS ONCE
Status: CANCELLED | OUTPATIENT
Start: 2020-07-14

## 2020-07-14 RX ORDER — SODIUM CHLORIDE 9 MG/ML
INJECTION, SOLUTION INTRAVENOUS ONCE
Status: CANCELLED | OUTPATIENT
Start: 2020-07-14

## 2020-07-14 RX ORDER — FLUOROURACIL 50 MG/ML
400 INJECTION, SOLUTION INTRAVENOUS ONCE
Status: CANCELLED | OUTPATIENT
Start: 2020-07-14

## 2020-07-14 RX ORDER — DEXTROSE MONOHYDRATE 50 MG/ML
INJECTION, SOLUTION INTRAVENOUS ONCE
Status: CANCELLED | OUTPATIENT
Start: 2020-07-14

## 2020-07-14 RX ORDER — SODIUM CHLORIDE 9 MG/ML
INJECTION, SOLUTION INTRAVENOUS CONTINUOUS
Status: CANCELLED | OUTPATIENT
Start: 2020-07-14

## 2020-07-14 RX ORDER — SODIUM CHLORIDE 0.9 % (FLUSH) 0.9 %
10 SYRINGE (ML) INJECTION PRN
Status: CANCELLED | OUTPATIENT
Start: 2020-07-14

## 2020-07-14 RX ORDER — FLUOROURACIL 50 MG/ML
950 INJECTION, SOLUTION INTRAVENOUS ONCE
Status: COMPLETED | OUTPATIENT
Start: 2020-07-14 | End: 2020-07-14

## 2020-07-14 RX ORDER — METHYLPREDNISOLONE SODIUM SUCCINATE 125 MG/2ML
125 INJECTION, POWDER, LYOPHILIZED, FOR SOLUTION INTRAMUSCULAR; INTRAVENOUS ONCE
Status: CANCELLED | OUTPATIENT
Start: 2020-07-14

## 2020-07-14 RX ORDER — EPINEPHRINE 1 MG/ML
0.3 INJECTION, SOLUTION, CONCENTRATE INTRAVENOUS PRN
Status: CANCELLED | OUTPATIENT
Start: 2020-07-14

## 2020-07-14 RX ORDER — PALONOSETRON 0.05 MG/ML
0.25 INJECTION, SOLUTION INTRAVENOUS ONCE
Status: COMPLETED | OUTPATIENT
Start: 2020-07-14 | End: 2020-07-14

## 2020-07-14 RX ORDER — SODIUM CHLORIDE 0.9 % (FLUSH) 0.9 %
10 SYRINGE (ML) INJECTION PRN
Status: DISCONTINUED | OUTPATIENT
Start: 2020-07-14 | End: 2020-07-15 | Stop reason: HOSPADM

## 2020-07-14 RX ADMIN — PALONOSETRON 0.25 MG: 0.05 INJECTION, SOLUTION INTRAVENOUS at 10:15

## 2020-07-14 RX ADMIN — FLUOROURACIL 950 MG: 50 INJECTION, SOLUTION INTRAVENOUS at 15:17

## 2020-07-14 RX ADMIN — DEXAMETHASONE SODIUM PHOSPHATE 10 MG: 10 INJECTION INTRAMUSCULAR; INTRAVENOUS at 10:15

## 2020-07-14 RX ADMIN — DEXTROSE MONOHYDRATE: 50 INJECTION, SOLUTION INTRAVENOUS at 10:02

## 2020-07-14 RX ADMIN — LEUCOVORIN CALCIUM 950 MG: 350 INJECTION, POWDER, LYOPHILIZED, FOR SOLUTION INTRAMUSCULAR; INTRAVENOUS at 13:21

## 2020-07-14 RX ADMIN — DEXTROSE MONOHYDRATE 180 MG: 50 INJECTION, SOLUTION INTRAVENOUS at 13:21

## 2020-07-14 RX ADMIN — ATROPINE SULFATE 0.4 MG: 0.4 INJECTION, SOLUTION INTRAMUSCULAR; INTRAVENOUS; SUBCUTANEOUS at 13:20

## 2020-07-14 RX ADMIN — Medication 10 ML: at 15:16

## 2020-07-14 RX ADMIN — FOSAPREPITANT 150 MG: 150 INJECTION, POWDER, LYOPHILIZED, FOR SOLUTION INTRAVENOUS at 10:31

## 2020-07-14 RX ADMIN — FLUOROURACIL 5600 MG: 50 INJECTION, SOLUTION INTRAVENOUS at 15:19

## 2020-07-14 RX ADMIN — OXALIPLATIN 175 MG: 5 INJECTION, SOLUTION INTRAVENOUS at 11:13

## 2020-07-14 NOTE — PROGRESS NOTES
Pt here for C.1D.1 FOFIRINOX. Labs drawn from port at MD visit and results reviewed. Glucose=410. Pt states uses sliding scale at home and gave herself 15 units insulin while in waiting room d/t elevated blood sugar. Pharmacy notified. Pt was seen by Dr. Eriberto Christianson, order rec'd to proceed with tx today at reduced dose. Pharmacy notified and doses adjusted and double checked per pharm and writer. Pt was treated without incident and d/c'd in stable condition. Pt will return on 7/16/20 for 5FU pump d/c.

## 2020-07-14 NOTE — PROGRESS NOTES
Today's Date: 2020  Patient Name: Nash Ellis  Patient's age: 64 y. o., 1960    DIAGNOSIS: pancreatic cancer, clinical  T2N1MO stage IIB     CHIEF COMPLAINT:    Chief Complaint   Patient presents with    Follow-up     review status of disease     HISTORY OF PRESENT ILLNESS:    This is a 65-year-old female who was admitted with diabetic ketoacidosis, lower back pain. She was noted to have elevated troponin, elevated liver enzymes. Patient was seen by cardiology and had cardiac cath which showed nonobstructive CAD and medical management recommended. She had ultrasound of the liver which showed dilated pancreatic duct and intrahepatic ductal dilatation. This was followed by MRCP which showed pancreatic mass and she underwent ERCP and EUS on 20 with cholangiogram, Biliary sphincterotomy and  Placement of 10 F x 7 cm plastic biliary stent  The pancreatic head biopsy showed adenocarcinoma. Oncology consulted for further recommendations. INTERVAL HISTORY:  Patient is returning for follow up visit and to discuss further recommendations. She has PORT placement. She is agreeable for chemo. She is scheduled to receive chemo today. She denied any NV. No fever chills. During this visit patient's allergy, social, medical, surgical history and medications were reviewed and updated. Past Medical History:   has a past medical history of Anxiety, Diabetes mellitus ([address] Hyperlipidemia, Hypertension, and Pancreatic cancer ([address] Past Surgical History:   has a past surgical history that includes  section; Gallbladder surgery; ERCP (2020); Upper gastrointestinal endoscopy (2020); Upper gastrointestinal endoscopy (2020); ERCP (2020); and ERCP (2020). Medications:    Prior to Admission medications    Medication Sig Start Date End Date Taking? Authorizing Provider   lidocaine-prilocaine (EMLA) 2.5-2.5 % cream Apply topically as needed.  20  Yes Ilya Benitez MD   ondansetron (ZOFRAN-ODT) 8 MG TBDP disintegrating tablet Place 1 tablet under the tongue every 8 hours as needed for Nausea or Vomiting 7/6/20  Yes Mignon Arnold MD   furosemide (LASIX) 20 MG tablet Take 1 tablet by mouth daily 6/16/20 7/16/20 Yes Ilya Benitez MD   nitroGLYCERIN (NITROSTAT) 0.4 MG SL tablet up to max of 3 total doses. If no relief after 1 dose, call 911. 6/6/20  Yes Bhargavi Ozuna MD   insulin glargine (LANTUS) 100 UNIT/ML injection vial Inject 50 Units into the skin nightly 6/6/20  Yes Bhargavi Ozuna MD   insulin glargine (LANTUS) 100 UNIT/ML injection vial Inject 60 Units into the skin daily 6/7/20  Yes Bhargavi Ozuna MD   hydrALAZINE (APRESOLINE) 25 MG tablet Take 1 tablet by mouth every 8 hours 6/6/20  Yes Bhargavi Ozuna MD   carvedilol (COREG) 12.5 MG tablet Take 1 tablet by mouth 2 times daily (with meals) 6/6/20  Yes Bhargavi Ozuna MD   amLODIPine (NORVASC) 10 MG tablet Take 1 tablet by mouth daily 6/7/20  Yes Bhargavi Ozuna MD   benzocaine-menthol (CEPACOL SORE THROAT) 15-3.6 MG lozenge Take 1 lozenge by mouth every 2 hours as needed for Sore Throat 6/6/20  Yes Bhargavi Ozuna MD   pantoprazole (PROTONIX) 40 MG tablet Take 1 tablet by mouth every morning (before breakfast) 6/6/20  Yes Bhargavi Ozuna MD   aspirin 81 MG chewable tablet Take 81 mg by mouth nightly   Yes Historical Provider, MD   insulin aspart (NOVOLOG FLEXPEN) 100 UNIT/ML injection pen Inject into the skin   Yes Historical Provider, MD   Lisdexamfetamine Dimesylate 60 MG CAPS Take 60 mg by mouth every morning. Yes Historical Provider, MD   sertraline (ZOLOFT) 100 MG tablet take 1 1/2 tablet by oral route  every day   Yes Historical Provider, MD   atorvastatin (LIPITOR) 40 MG tablet nightly  10/12/13  Yes Historical Provider, MD   LORazepam (ATIVAN) 0.5 MG tablet 1 mg nightly.   10/12/13  Yes Historical Provider, MD     Current Outpatient Medications   Medication Sig Dispense Refill    lidocaine-prilocaine (EMLA) 2.5-2.5 % cream Apply topically as needed. 1 Tube 2    ondansetron (ZOFRAN-ODT) 8 MG TBDP disintegrating tablet Place 1 tablet under the tongue every 8 hours as needed for Nausea or Vomiting 90 tablet 2    furosemide (LASIX) 20 MG tablet Take 1 tablet by mouth daily 30 tablet 1    nitroGLYCERIN (NITROSTAT) 0.4 MG SL tablet up to max of 3 total doses. If no relief after 1 dose, call 911. 25 tablet 3    insulin glargine (LANTUS) 100 UNIT/ML injection vial Inject 50 Units into the skin nightly 1 vial 3    insulin glargine (LANTUS) 100 UNIT/ML injection vial Inject 60 Units into the skin daily 1 vial 3    hydrALAZINE (APRESOLINE) 25 MG tablet Take 1 tablet by mouth every 8 hours 90 tablet 3    carvedilol (COREG) 12.5 MG tablet Take 1 tablet by mouth 2 times daily (with meals) 60 tablet 3    amLODIPine (NORVASC) 10 MG tablet Take 1 tablet by mouth daily 30 tablet 3    benzocaine-menthol (CEPACOL SORE THROAT) 15-3.6 MG lozenge Take 1 lozenge by mouth every 2 hours as needed for Sore Throat 50 lozenge 0    pantoprazole (PROTONIX) 40 MG tablet Take 1 tablet by mouth every morning (before breakfast) 30 tablet 2    aspirin 81 MG chewable tablet Take 81 mg by mouth nightly      insulin aspart (NOVOLOG FLEXPEN) 100 UNIT/ML injection pen Inject into the skin      Lisdexamfetamine Dimesylate 60 MG CAPS Take 60 mg by mouth every morning.  sertraline (ZOLOFT) 100 MG tablet take 1 1/2 tablet by oral route  every day      atorvastatin (LIPITOR) 40 MG tablet nightly       LORazepam (ATIVAN) 0.5 MG tablet 1 mg nightly. No current facility-administered medications for this visit. Allergies:  Lisinopril and Pcn [penicillins]    Social History:   reports that she quit smoking about 15 years ago. She has never used smokeless tobacco. She reports current alcohol use. She reports that she does not use drugs.      Family History: family history includes Cancer in her maternal grandfather, maternal grandmother, and mother; Heart Failure in her father; Hypertension in her father and mother. REVIEW OF SYSTEMS:    Constitutional: No fever or chills. No night sweats, no weight loss   Eyes: No eye discharge, double vision, or eye pain   HEENT: negative for sore mouth, sore throat, hoarseness and voice change   Respiratory: negative for cough , sputum, dyspnea, wheezing, hemoptysis, chest pain   Cardiovascular: negative for chest pain, dyspnea, palpitations, orthopnea, PND   Gastrointestinal: negative for nausea, vomiting, diarrhea, constipation, abdominal pain, Dysphagia, hematemesis and hematochezia   Genitourinary: negative for frequency, dysuria, nocturia, urinary incontinence, and hematuria   Integument: negative for rash, skin lesions, bruises.    Hematologic/Lymphatic: negative for easy bruising, bleeding, lymphadenopathy, or petechiae   Endocrine: negative for heat or cold intolerance,weight changes, change in bowel habits and hair loss   Musculoskeletal: negative for myalgias, arthralgias, pain, joint swelling,and bone pain   Neurological: negative for headaches, dizziness, seizures, weakness, numbness    PHYSICAL EXAM:      /72   Pulse 73   Temp 97.8 °F (36.6 °C) (Oral)   Wt 261 lb (118.4 kg)   BMI 43.43 kg/m²    Temp (24hrs), Av.8 °F (36.6 °C), Min:97.1 °F (36.2 °C), Max:99.4 °F (37.4 °C)    General appearance - well appearing, no in pain or distress   Mental status - alert and cooperative   Eyes - pupils equal and reactive, extraocular eye movements intact   Ears - bilateral TM's and external ear canals normal   Mouth - mucous membranes moist, pharynx normal without lesions   Neck - supple, no significant adenopathy   Lymphatics - no palpable lymphadenopathy, no hepatosplenomegaly   Chest - clear to auscultation, no wheezes, rales or rhonchi, symmetric air entry   Heart - normal rate, regular rhythm, normal S1, S2, no murmurs  Abdomen - soft, nontender, nondistended, no masses or organomegaly   Neurological - alert, oriented, normal speech, no focal findings or movement disorder noted   Musculoskeletal - no joint tenderness, deformity or swelling   Extremities - peripheral pulses normal, no pedal edema, no clubbing or cyanosis   Skin - normal coloration and turgor, no rashes, no suspicious skin lesions noted ,    DATA:    Labs:   CBC:   Recent Labs     07/14/20  0850   WBC 6.1   HGB 10.8*   HCT 33.0*        BMP:   No results for input(s): NA, K, CO2, BUN, CREATININE, LABGLOM, GLUCOSE in the last 72 hours. PT/INR:   No results for input(s): PROTIME, INR in the last 72 hours. Surgical Pathology Report   Surgical Pathology   Collected: 06/04/20 9160   Lab status: Final   Resulting lab: [facility]   Value: -- Diagnosis --   HEAD OF PANCREAS MASS EUS FINE NEEDLE ASPIRATION:           POSITIVE FOR MALIGNANCY:   ADENOCARCINOMA, MODERATELY DIFFERENTIATED. IMAGING DATA:  MRI abd 6/4/20  FINDINGS:   Exam degraded by motion artifact.       ABDOMEN:       The visualized lung bases reveal no signal abnormality.       The liver is normal in signal intensity and contour.  No focal liver lesion   identified.       Irregular mass in the head of the pancreas is present resulting in biliary   and pancreatic duct obstruction.  This measures 3.2 x 2.3 cm and is best   visualized on T2 imaging.  This mass extends towards the liver hilum. [place]   is close approximation with the main portal vein, which remains patent.    There is close abutment with the hepatic artery.  No inflammatory change   identified involving the pancreas.  Relative atrophy of the body and tail is   noted.       The spleen, adrenals and kidneys reveal no significant findings.  Right renal   cyst.       The visualized bowel is normal in caliber.       No ascites.  Other than periportal lymph nodes measuring up to 12 mm, no   retroperitoneal or mesenteric lymphadenopathy is otherwise appreciated.       Incidental small intramural fibroid is noted in the right body of the uterus.       No signal abnormality identified in the visualized osseous structures.       MRCP:       Gallbladder: Surgically absent.  Small amount of fluid signal within the   gallbladder fossa, likely postoperative.       Bile Ducts: Intrahepatic and extrahepatic biliary dilatation with abrupt   cutoff at the mid common duct level.  Upstream dilatation measures 16 mm at   the common duct level, 8 mm at the left hepatic duct level and 7 mm in the   right hepatic duct.       Pancreatic Duct: Dilatation with abrupt cutoff at the site of pancreatic head   mass.  Upstream dilatation of 8 mm is noted.           Impression   1.  3.2 cm soft tissue mass in the pancreatic head resulting in pancreatic   duct and biliary dilatation, compatible with primary neoplasm.  There is   abutment of the portal vein and soft tissue extension/lymph nodes in the   liver hilum noted.  Given the limitations of motion artifact on this exam, a   pancreas CT should be considered for detailed vascular evaluation.       2.  No liver metastatic disease. Primary Problem  Primary adenocarcinoma of head of pancreas ([address]    There are no active hospital problems to display for this patient. IMPRESSION:   1. Pancreatic adenocarcinoma: Head of pancreas, 3 cm,  EUS showed that the mass encases the portal vein . An intact interface was seen between the mass and the celiac trunk and superior mesenteric artery suggesting a lack of invasion. Appears locally advanced  2. DKA  3. CAD    RECOMMENDATIONS:  1. I reviewed the imaging studies, lab data, biopsy results, diagnosis, prognosis and treatment options with patient. She states comfort is her primary goal but she is open to discuss options  2. MR abd showed no liver lesions possible T2N1MO stage IIB at this times appears borderline/unresectable because of PV involvement. She is seen by hepatobiliary surgeon as o/p  3. CT chest showed No mets. 4. Plan for chemo with FOLFIRINOX starting today with reduced dose  I reviewed the side effects of chemotherapy which include, but are not limited to, fatigue, nausea, vomiting, alopecia, myelosuppression, mucositis, allergic reaction, nephrotoxicity, ototoxicity, neurotoxicity, diarrhea, and constipation. Patient is agreeable  5. RTC with C2      Ilya Benitez MD  Hematologist/Medical Oncologist    I spent more than 40 minutes examining, evaluating, reviewing data and counseling the patient. Greater than 50% of that time was spent face-to-face with the patient in counseling and coordinating her care.

## 2020-07-15 ENCOUNTER — TELEPHONE (OUTPATIENT)
Dept: ONCOLOGY | Age: 60
End: 2020-07-15

## 2020-07-16 ENCOUNTER — HOSPITAL ENCOUNTER (OUTPATIENT)
Dept: INFUSION THERAPY | Age: 60
Discharge: HOME | End: 2020-07-16
Payer: COMMERCIAL

## 2020-07-16 DIAGNOSIS — C25.0 PRIMARY ADENOCARCINOMA OF HEAD OF PANCREAS (HCC): Primary | ICD-10-CM

## 2020-07-16 PROCEDURE — 96523 IRRIG DRUG DELIVERY DEVICE: CPT

## 2020-07-16 PROCEDURE — 2580000003 HC RX 258: Performed by: INTERNAL MEDICINE

## 2020-07-16 PROCEDURE — 6360000002 HC RX W HCPCS: Performed by: INTERNAL MEDICINE

## 2020-07-16 RX ORDER — SODIUM CHLORIDE 0.9 % (FLUSH) 0.9 %
20 SYRINGE (ML) INJECTION PRN
Status: CANCELLED | OUTPATIENT
Start: 2020-07-16

## 2020-07-16 RX ORDER — SODIUM CHLORIDE 0.9 % (FLUSH) 0.9 %
10 SYRINGE (ML) INJECTION PRN
Status: DISCONTINUED | OUTPATIENT
Start: 2020-07-16 | End: 2020-07-17 | Stop reason: HOSPADM

## 2020-07-16 RX ORDER — HEPARIN SODIUM (PORCINE) LOCK FLUSH IV SOLN 100 UNIT/ML 100 UNIT/ML
500 SOLUTION INTRAVENOUS PRN
Status: DISCONTINUED | OUTPATIENT
Start: 2020-07-16 | End: 2020-07-17 | Stop reason: HOSPADM

## 2020-07-16 RX ORDER — HEPARIN SODIUM (PORCINE) LOCK FLUSH IV SOLN 100 UNIT/ML 100 UNIT/ML
500 SOLUTION INTRAVENOUS PRN
Status: CANCELLED | OUTPATIENT
Start: 2020-07-16

## 2020-07-16 RX ORDER — SODIUM CHLORIDE 0.9 % (FLUSH) 0.9 %
10 SYRINGE (ML) INJECTION PRN
Status: CANCELLED | OUTPATIENT
Start: 2020-07-16

## 2020-07-16 RX ADMIN — HEPARIN 500 UNITS: 100 SYRINGE at 14:08

## 2020-07-16 RX ADMIN — Medication 10 ML: at 14:07

## 2020-07-20 ENCOUNTER — HOSPITAL ENCOUNTER (INPATIENT)
Age: 60
LOS: 3 days | Discharge: HOME | DRG: 871 | End: 2020-07-24
Attending: EMERGENCY MEDICINE | Admitting: INTERNAL MEDICINE
Payer: COMMERCIAL

## 2020-07-20 LAB
ABSOLUTE EOS #: 0.06 K/UL (ref 0–0.4)
ABSOLUTE IMMATURE GRANULOCYTE: ABNORMAL K/UL (ref 0–0.3)
ABSOLUTE LYMPH #: 0.29 K/UL (ref 1–4.8)
ABSOLUTE MONO #: 0.03 K/UL (ref 0.1–0.8)
ALBUMIN SERPL-MCNC: 3.7 G/DL (ref 3.5–5.2)
ALBUMIN/GLOBULIN RATIO: 1.4 (ref 1–2.5)
ALP BLD-CCNC: 482 U/L (ref 35–104)
ALT SERPL-CCNC: 1068 U/L (ref 5–33)
ANION GAP SERPL CALCULATED.3IONS-SCNC: 16 MMOL/L (ref 9–17)
AST SERPL-CCNC: 1449 U/L
BASOPHILS # BLD: 0 % (ref 0–2)
BASOPHILS ABSOLUTE: 0 K/UL (ref 0–0.2)
BILIRUB SERPL-MCNC: 2.03 MG/DL (ref 0.3–1.2)
BUN BLDV-MCNC: 14 MG/DL (ref 6–20)
BUN/CREAT BLD: ABNORMAL (ref 9–20)
CALCIUM SERPL-MCNC: 10.1 MG/DL (ref 8.6–10.4)
CHLORIDE BLD-SCNC: 96 MMOL/L (ref 98–107)
CO2: 25 MMOL/L (ref 20–31)
CREAT SERPL-MCNC: 0.9 MG/DL (ref 0.5–0.9)
DIFFERENTIAL TYPE: ABNORMAL
EOSINOPHILS RELATIVE PERCENT: 2 % (ref 1–4)
GFR AFRICAN AMERICAN: >60 ML/MIN
GFR NON-AFRICAN AMERICAN: >60 ML/MIN
GFR SERPL CREATININE-BSD FRML MDRD: ABNORMAL ML/MIN/{1.73_M2}
GFR SERPL CREATININE-BSD FRML MDRD: ABNORMAL ML/MIN/{1.73_M2}
GLUCOSE BLD-MCNC: 229 MG/DL (ref 70–99)
HCT VFR BLD CALC: 31.9 % (ref 36–46)
HEMOGLOBIN: 10.5 G/DL (ref 12–16)
IMMATURE GRANULOCYTES: ABNORMAL %
LACTIC ACID: 3.7 MMOL/L (ref 0.5–2.2)
LIPASE: 6 U/L (ref 13–60)
LYMPHOCYTES # BLD: 10 % (ref 24–44)
MAGNESIUM: 1.4 MG/DL (ref 1.6–2.6)
MCH RBC QN AUTO: 27.6 PG (ref 26–34)
MCHC RBC AUTO-ENTMCNC: 32.9 G/DL (ref 31–37)
MCV RBC AUTO: 83.8 FL (ref 80–100)
MONOCYTES # BLD: 1 % (ref 1–7)
MORPHOLOGY: NORMAL
NRBC AUTOMATED: ABNORMAL PER 100 WBC
PDW BLD-RTO: 14.6 % (ref 12.5–15.4)
PLATELET # BLD: 198 K/UL (ref 140–450)
PLATELET ESTIMATE: ABNORMAL
PMV BLD AUTO: 8.4 FL (ref 6–12)
POTASSIUM SERPL-SCNC: 3.8 MMOL/L (ref 3.7–5.3)
RBC # BLD: 3.8 M/UL (ref 4–5.2)
RBC # BLD: ABNORMAL 10*6/UL
SEG NEUTROPHILS: 87 % (ref 36–66)
SEGMENTED NEUTROPHILS ABSOLUTE COUNT: 2.52 K/UL (ref 1.8–7.7)
SODIUM BLD-SCNC: 137 MMOL/L (ref 135–144)
TOTAL PROTEIN: 6.4 G/DL (ref 6.4–8.3)
TROPONIN INTERP: ABNORMAL
TROPONIN T: ABNORMAL NG/ML
TROPONIN, HIGH SENSITIVITY: 18 NG/L (ref 0–14)
WBC # BLD: 2.9 K/UL (ref 3.5–11)
WBC # BLD: ABNORMAL 10*3/UL

## 2020-07-20 PROCEDURE — 80053 COMPREHEN METABOLIC PANEL: CPT

## 2020-07-20 PROCEDURE — 96375 TX/PRO/DX INJ NEW DRUG ADDON: CPT

## 2020-07-20 PROCEDURE — 84484 ASSAY OF TROPONIN QUANT: CPT

## 2020-07-20 PROCEDURE — 36415 COLL VENOUS BLD VENIPUNCTURE: CPT

## 2020-07-20 PROCEDURE — 83735 ASSAY OF MAGNESIUM: CPT

## 2020-07-20 PROCEDURE — 99285 EMERGENCY DEPT VISIT HI MDM: CPT

## 2020-07-20 PROCEDURE — 6370000000 HC RX 637 (ALT 250 FOR IP): Performed by: EMERGENCY MEDICINE

## 2020-07-20 PROCEDURE — 6360000002 HC RX W HCPCS: Performed by: EMERGENCY MEDICINE

## 2020-07-20 PROCEDURE — 83690 ASSAY OF LIPASE: CPT

## 2020-07-20 PROCEDURE — 85025 COMPLETE CBC W/AUTO DIFF WBC: CPT

## 2020-07-20 PROCEDURE — 87205 SMEAR GRAM STAIN: CPT

## 2020-07-20 PROCEDURE — 87150 DNA/RNA AMPLIFIED PROBE: CPT

## 2020-07-20 PROCEDURE — 83605 ASSAY OF LACTIC ACID: CPT

## 2020-07-20 PROCEDURE — 87186 SC STD MICRODIL/AGAR DIL: CPT

## 2020-07-20 PROCEDURE — 2580000003 HC RX 258: Performed by: EMERGENCY MEDICINE

## 2020-07-20 PROCEDURE — 87040 BLOOD CULTURE FOR BACTERIA: CPT

## 2020-07-20 PROCEDURE — 93005 ELECTROCARDIOGRAM TRACING: CPT | Performed by: EMERGENCY MEDICINE

## 2020-07-20 PROCEDURE — 86403 PARTICLE AGGLUT ANTBDY SCRN: CPT

## 2020-07-20 RX ORDER — PROMETHAZINE HYDROCHLORIDE 25 MG/ML
12.5 INJECTION, SOLUTION INTRAMUSCULAR; INTRAVENOUS ONCE
Status: COMPLETED | OUTPATIENT
Start: 2020-07-20 | End: 2020-07-20

## 2020-07-20 RX ORDER — ONDANSETRON 2 MG/ML
4 INJECTION INTRAMUSCULAR; INTRAVENOUS ONCE
Status: COMPLETED | OUTPATIENT
Start: 2020-07-20 | End: 2020-07-20

## 2020-07-20 RX ORDER — ACETAMINOPHEN 325 MG/1
650 TABLET ORAL ONCE
Status: COMPLETED | OUTPATIENT
Start: 2020-07-20 | End: 2020-07-20

## 2020-07-20 RX ORDER — 0.9 % SODIUM CHLORIDE 0.9 %
1000 INTRAVENOUS SOLUTION INTRAVENOUS ONCE
Status: COMPLETED | OUTPATIENT
Start: 2020-07-20 | End: 2020-07-21

## 2020-07-20 RX ADMIN — ONDANSETRON 4 MG: 2 INJECTION INTRAMUSCULAR; INTRAVENOUS at 21:53

## 2020-07-20 RX ADMIN — SODIUM CHLORIDE 1000 ML: 9 INJECTION, SOLUTION INTRAVENOUS at 21:53

## 2020-07-20 RX ADMIN — PROMETHAZINE HYDROCHLORIDE 12.5 MG: 25 INJECTION INTRAMUSCULAR; INTRAVENOUS at 21:53

## 2020-07-20 RX ADMIN — ACETAMINOPHEN 650 MG: 325 TABLET ORAL at 21:53

## 2020-07-20 ASSESSMENT — PAIN SCALES - GENERAL
PAINLEVEL_OUTOF10: 3
PAINLEVEL_OUTOF10: 5

## 2020-07-20 ASSESSMENT — PAIN DESCRIPTION - LOCATION: LOCATION: ABDOMEN

## 2020-07-21 ENCOUNTER — APPOINTMENT (OUTPATIENT)
Dept: ULTRASOUND IMAGING | Age: 60
DRG: 871 | End: 2020-07-21
Payer: COMMERCIAL

## 2020-07-21 PROBLEM — N39.0 URINARY TRACT INFECTIOUS DISEASE: Status: ACTIVE | Noted: 2020-07-21

## 2020-07-21 PROBLEM — Z98.890 POSTPROCEDURAL STATE: Status: ACTIVE | Noted: 2020-07-21

## 2020-07-21 PROBLEM — I87.2 PERIPHERAL VENOUS INSUFFICIENCY: Status: ACTIVE | Noted: 2020-07-21

## 2020-07-21 PROBLEM — I16.0 HYPERTENSIVE URGENCY: Status: ACTIVE | Noted: 2020-07-21

## 2020-07-21 PROBLEM — A49.02 METHICILLIN RESISTANT STAPHYLOCOCCUS AUREUS INFECTION: Status: ACTIVE | Noted: 2020-07-21

## 2020-07-21 PROBLEM — R11.2 INTRACTABLE NAUSEA AND VOMITING: Status: ACTIVE | Noted: 2020-07-21

## 2020-07-21 PROBLEM — E66.01 MORBID OBESITY (HCC): Status: ACTIVE | Noted: 2020-07-21

## 2020-07-21 PROBLEM — A41.9 SEPSIS (HCC): Status: ACTIVE | Noted: 2020-07-21

## 2020-07-21 PROBLEM — R41.3 AMNESIA: Status: ACTIVE | Noted: 2020-07-21

## 2020-07-21 PROBLEM — R74.01 TRANSAMINITIS: Status: ACTIVE | Noted: 2020-07-21

## 2020-07-21 PROBLEM — E11.3399 MODERATE NONPROLIFERATIVE DIABETIC RETINOPATHY ASSOCIATED WITH TYPE 2 DIABETES MELLITUS (HCC): Status: ACTIVE | Noted: 2018-03-15

## 2020-07-21 PROBLEM — C25.9 PANCREATIC ADENOCARCINOMA (HCC): Status: ACTIVE | Noted: 2020-06-09

## 2020-07-21 PROBLEM — Z79.4 LONG TERM CURRENT USE OF INSULIN (HCC): Status: ACTIVE | Noted: 2020-07-21

## 2020-07-21 PROBLEM — R16.0 HEPATOMEGALY: Status: ACTIVE | Noted: 2020-07-21

## 2020-07-21 PROBLEM — C25.0: Status: ACTIVE | Noted: 2020-07-21

## 2020-07-21 PROBLEM — E11.8 DIABETIC COMPLICATION (HCC): Status: ACTIVE | Noted: 2017-06-22

## 2020-07-21 PROBLEM — M79.609 PAIN IN LIMB: Status: ACTIVE | Noted: 2020-07-21

## 2020-07-21 PROBLEM — F32.A DEPRESSION: Status: ACTIVE | Noted: 2020-07-21

## 2020-07-21 PROBLEM — F41.9 ANXIETY: Status: ACTIVE | Noted: 2017-06-23

## 2020-07-21 PROBLEM — E11.42 DIABETIC PERIPHERAL NEUROPATHY ASSOCIATED WITH TYPE 2 DIABETES MELLITUS (HCC): Status: ACTIVE | Noted: 2020-07-21

## 2020-07-21 PROBLEM — R78.81 BACTEREMIA: Status: ACTIVE | Noted: 2020-07-21

## 2020-07-21 PROBLEM — E78.5 HYPERLIPIDEMIA: Status: ACTIVE | Noted: 2020-07-21

## 2020-07-21 PROBLEM — M19.079 PRIMARY LOCALIZED OSTEOARTHROSIS OF ANKLE AND FOOT: Status: ACTIVE | Noted: 2020-07-21

## 2020-07-21 PROBLEM — E66.01 CLASS 3 SEVERE OBESITY WITH SERIOUS COMORBIDITY IN ADULT (HCC): Status: ACTIVE | Noted: 2020-07-21

## 2020-07-21 LAB
-: ABNORMAL
ALBUMIN SERPL-MCNC: 3 G/DL (ref 3.5–5.2)
ALBUMIN SERPL-MCNC: 3.2 G/DL (ref 3.5–5.2)
ALBUMIN/GLOBULIN RATIO: 1.2 (ref 1–2.5)
ALBUMIN/GLOBULIN RATIO: 1.4 (ref 1–2.5)
ALP BLD-CCNC: 391 U/L (ref 35–104)
ALP BLD-CCNC: 418 U/L (ref 35–104)
ALT SERPL-CCNC: 671 U/L (ref 5–33)
ALT SERPL-CCNC: 888 U/L (ref 5–33)
AMORPHOUS: ABNORMAL
AMYLASE: 9 U/L (ref 28–100)
ANION GAP SERPL CALCULATED.3IONS-SCNC: 13 MMOL/L (ref 9–17)
ANION GAP SERPL CALCULATED.3IONS-SCNC: 15 MMOL/L (ref 9–17)
AST SERPL-CCNC: 395 U/L
AST SERPL-CCNC: 817 U/L
BACTERIA: ABNORMAL
BILIRUB SERPL-MCNC: 2.77 MG/DL (ref 0.3–1.2)
BILIRUB SERPL-MCNC: 3.45 MG/DL (ref 0.3–1.2)
BILIRUBIN DIRECT: 3.58 MG/DL
BILIRUBIN URINE: ABNORMAL
BILIRUBIN, INDIRECT: ABNORMAL MG/DL (ref 0–1)
BUN BLDV-MCNC: 14 MG/DL (ref 6–20)
BUN BLDV-MCNC: 14 MG/DL (ref 6–20)
BUN/CREAT BLD: ABNORMAL (ref 9–20)
CALCIUM SERPL-MCNC: 9 MG/DL (ref 8.6–10.4)
CALCIUM SERPL-MCNC: 9.2 MG/DL (ref 8.6–10.4)
CASTS UA: ABNORMAL /LPF
CHLORIDE BLD-SCNC: 95 MMOL/L (ref 98–107)
CHLORIDE BLD-SCNC: 99 MMOL/L (ref 98–107)
CO2: 21 MMOL/L (ref 20–31)
CO2: 24 MMOL/L (ref 20–31)
COLOR: ABNORMAL
COMMENT UA: ABNORMAL
CREAT SERPL-MCNC: 0.89 MG/DL (ref 0.5–0.9)
CREAT SERPL-MCNC: 0.96 MG/DL (ref 0.5–0.9)
CRYSTALS, UA: ABNORMAL /HPF
EKG ATRIAL RATE: 92 BPM
EKG P AXIS: 15 DEGREES
EKG P-R INTERVAL: 150 MS
EKG Q-T INTERVAL: 360 MS
EKG QRS DURATION: 82 MS
EKG QTC CALCULATION (BAZETT): 445 MS
EKG R AXIS: -9 DEGREES
EKG T AXIS: 27 DEGREES
EKG VENTRICULAR RATE: 92 BPM
EPITHELIAL CELLS UA: ABNORMAL /HPF (ref 0–5)
GFR AFRICAN AMERICAN: >60 ML/MIN
GFR AFRICAN AMERICAN: >60 ML/MIN
GFR NON-AFRICAN AMERICAN: 60 ML/MIN
GFR NON-AFRICAN AMERICAN: >60 ML/MIN
GFR SERPL CREATININE-BSD FRML MDRD: ABNORMAL ML/MIN/{1.73_M2}
GLUCOSE BLD-MCNC: 178 MG/DL (ref 70–99)
GLUCOSE BLD-MCNC: 248 MG/DL (ref 65–105)
GLUCOSE BLD-MCNC: 288 MG/DL (ref 65–105)
GLUCOSE BLD-MCNC: 299 MG/DL (ref 65–105)
GLUCOSE BLD-MCNC: 326 MG/DL (ref 70–99)
GLUCOSE URINE: ABNORMAL
HCT VFR BLD CALC: 28.9 % (ref 36–46)
HEMOGLOBIN: 9.5 G/DL (ref 12–16)
KETONES, URINE: NEGATIVE
LACTIC ACID: 2.5 MMOL/L (ref 0.5–2.2)
LEUKOCYTE ESTERASE, URINE: NEGATIVE
LIPASE: 5 U/L (ref 13–60)
MAGNESIUM: 1.5 MG/DL (ref 1.6–2.6)
MAGNESIUM: 2.1 MG/DL (ref 1.6–2.6)
MCH RBC QN AUTO: 27.8 PG (ref 26–34)
MCHC RBC AUTO-ENTMCNC: 33 G/DL (ref 31–37)
MCV RBC AUTO: 84.1 FL (ref 80–100)
MUCUS: ABNORMAL
NITRITE, URINE: NEGATIVE
NRBC AUTOMATED: ABNORMAL PER 100 WBC
OTHER OBSERVATIONS UA: ABNORMAL
PDW BLD-RTO: 14.1 % (ref 12.5–15.4)
PH UA: 6.5 (ref 5–8)
PHOSPHORUS: 2.6 MG/DL (ref 2.6–4.5)
PLATELET # BLD: 197 K/UL (ref 140–450)
PMV BLD AUTO: 8.4 FL (ref 6–12)
POTASSIUM SERPL-SCNC: 3.8 MMOL/L (ref 3.7–5.3)
POTASSIUM SERPL-SCNC: 3.8 MMOL/L (ref 3.7–5.3)
PROCALCITONIN: 10.17 NG/ML
PROTEIN UA: ABNORMAL
RBC # BLD: 3.43 M/UL (ref 4–5.2)
RBC UA: ABNORMAL /HPF (ref 0–2)
RENAL EPITHELIAL, UA: ABNORMAL /HPF
SARS-COV-2, PCR: NORMAL
SARS-COV-2, RAPID: NOT DETECTED
SARS-COV-2: NORMAL
SODIUM BLD-SCNC: 131 MMOL/L (ref 135–144)
SODIUM BLD-SCNC: 136 MMOL/L (ref 135–144)
SOURCE: NORMAL
SPECIFIC GRAVITY UA: 1.02 (ref 1–1.03)
TOTAL PROTEIN: 5.5 G/DL (ref 6.4–8.3)
TOTAL PROTEIN: 5.6 G/DL (ref 6.4–8.3)
TRICHOMONAS: ABNORMAL
TROPONIN INTERP: ABNORMAL
TROPONIN T: ABNORMAL NG/ML
TROPONIN, HIGH SENSITIVITY: 23 NG/L (ref 0–14)
TURBIDITY: CLEAR
URINE HGB: ABNORMAL
UROBILINOGEN, URINE: NORMAL
WBC # BLD: 6 K/UL (ref 3.5–11)
WBC UA: ABNORMAL /HPF (ref 0–5)
YEAST: ABNORMAL

## 2020-07-21 PROCEDURE — 85027 COMPLETE CBC AUTOMATED: CPT

## 2020-07-21 PROCEDURE — 84145 PROCALCITONIN (PCT): CPT

## 2020-07-21 PROCEDURE — 2580000003 HC RX 258: Performed by: NURSE PRACTITIONER

## 2020-07-21 PROCEDURE — 6370000000 HC RX 637 (ALT 250 FOR IP): Performed by: NURSE PRACTITIONER

## 2020-07-21 PROCEDURE — 2500000003 HC RX 250 WO HCPCS: Performed by: NURSE PRACTITIONER

## 2020-07-21 PROCEDURE — 82150 ASSAY OF AMYLASE: CPT

## 2020-07-21 PROCEDURE — 82248 BILIRUBIN DIRECT: CPT

## 2020-07-21 PROCEDURE — 96365 THER/PROPH/DIAG IV INF INIT: CPT

## 2020-07-21 PROCEDURE — 2580000003 HC RX 258: Performed by: EMERGENCY MEDICINE

## 2020-07-21 PROCEDURE — 99223 1ST HOSP IP/OBS HIGH 75: CPT | Performed by: INTERNAL MEDICINE

## 2020-07-21 PROCEDURE — 83690 ASSAY OF LIPASE: CPT

## 2020-07-21 PROCEDURE — 99254 IP/OBS CNSLTJ NEW/EST MOD 60: CPT | Performed by: INTERNAL MEDICINE

## 2020-07-21 PROCEDURE — U0002 COVID-19 LAB TEST NON-CDC: HCPCS

## 2020-07-21 PROCEDURE — 83605 ASSAY OF LACTIC ACID: CPT

## 2020-07-21 PROCEDURE — 2500000003 HC RX 250 WO HCPCS: Performed by: INTERNAL MEDICINE

## 2020-07-21 PROCEDURE — 6360000002 HC RX W HCPCS: Performed by: EMERGENCY MEDICINE

## 2020-07-21 PROCEDURE — 84100 ASSAY OF PHOSPHORUS: CPT

## 2020-07-21 PROCEDURE — 76705 ECHO EXAM OF ABDOMEN: CPT

## 2020-07-21 PROCEDURE — 36415 COLL VENOUS BLD VENIPUNCTURE: CPT

## 2020-07-21 PROCEDURE — APPSS45 APP SPLIT SHARED TIME 31-45 MINUTES: Performed by: NURSE PRACTITIONER

## 2020-07-21 PROCEDURE — 2060000000 HC ICU INTERMEDIATE R&B

## 2020-07-21 PROCEDURE — 80053 COMPREHEN METABOLIC PANEL: CPT

## 2020-07-21 PROCEDURE — 6360000002 HC RX W HCPCS: Performed by: NURSE PRACTITIONER

## 2020-07-21 PROCEDURE — 83735 ASSAY OF MAGNESIUM: CPT

## 2020-07-21 PROCEDURE — 81001 URINALYSIS AUTO W/SCOPE: CPT

## 2020-07-21 PROCEDURE — 82947 ASSAY GLUCOSE BLOOD QUANT: CPT

## 2020-07-21 RX ORDER — POLYETHYLENE GLYCOL 3350 17 G/17G
17 POWDER, FOR SOLUTION ORAL DAILY PRN
Status: DISCONTINUED | OUTPATIENT
Start: 2020-07-21 | End: 2020-07-24 | Stop reason: HOSPADM

## 2020-07-21 RX ORDER — AMLODIPINE BESYLATE 5 MG/1
10 TABLET ORAL DAILY
Status: DISCONTINUED | OUTPATIENT
Start: 2020-07-21 | End: 2020-07-24 | Stop reason: HOSPADM

## 2020-07-21 RX ORDER — POTASSIUM CHLORIDE 20 MEQ/1
40 TABLET, EXTENDED RELEASE ORAL PRN
Status: DISCONTINUED | OUTPATIENT
Start: 2020-07-21 | End: 2020-07-24 | Stop reason: HOSPADM

## 2020-07-21 RX ORDER — PANTOPRAZOLE SODIUM 40 MG/1
40 TABLET, DELAYED RELEASE ORAL
Status: DISCONTINUED | OUTPATIENT
Start: 2020-07-21 | End: 2020-07-24 | Stop reason: HOSPADM

## 2020-07-21 RX ORDER — NICOTINE 21 MG/24HR
1 PATCH, TRANSDERMAL 24 HOURS TRANSDERMAL DAILY PRN
Status: DISCONTINUED | OUTPATIENT
Start: 2020-07-21 | End: 2020-07-24 | Stop reason: HOSPADM

## 2020-07-21 RX ORDER — ONDANSETRON 2 MG/ML
4 INJECTION INTRAMUSCULAR; INTRAVENOUS EVERY 6 HOURS PRN
Status: DISCONTINUED | OUTPATIENT
Start: 2020-07-21 | End: 2020-07-24 | Stop reason: HOSPADM

## 2020-07-21 RX ORDER — 0.9 % SODIUM CHLORIDE 0.9 %
1000 INTRAVENOUS SOLUTION INTRAVENOUS ONCE
Status: COMPLETED | OUTPATIENT
Start: 2020-07-21 | End: 2020-07-21

## 2020-07-21 RX ORDER — MAGNESIUM SULFATE 1 G/100ML
1 INJECTION INTRAVENOUS ONCE
Status: COMPLETED | OUTPATIENT
Start: 2020-07-21 | End: 2020-07-21

## 2020-07-21 RX ORDER — ASPIRIN 81 MG/1
81 TABLET, CHEWABLE ORAL NIGHTLY
Status: DISCONTINUED | OUTPATIENT
Start: 2020-07-21 | End: 2020-07-24 | Stop reason: HOSPADM

## 2020-07-21 RX ORDER — PROMETHAZINE HYDROCHLORIDE 25 MG/1
12.5 TABLET ORAL EVERY 6 HOURS PRN
Status: DISCONTINUED | OUTPATIENT
Start: 2020-07-21 | End: 2020-07-24 | Stop reason: HOSPADM

## 2020-07-21 RX ORDER — CARVEDILOL 12.5 MG/1
12.5 TABLET ORAL 2 TIMES DAILY WITH MEALS
Status: DISCONTINUED | OUTPATIENT
Start: 2020-07-21 | End: 2020-07-24 | Stop reason: HOSPADM

## 2020-07-21 RX ORDER — NITROGLYCERIN 0.4 MG/1
0.4 TABLET SUBLINGUAL EVERY 5 MIN PRN
Status: DISCONTINUED | OUTPATIENT
Start: 2020-07-21 | End: 2020-07-24 | Stop reason: HOSPADM

## 2020-07-21 RX ORDER — HYDRALAZINE HYDROCHLORIDE 25 MG/1
25 TABLET, FILM COATED ORAL EVERY 8 HOURS SCHEDULED
Status: DISCONTINUED | OUTPATIENT
Start: 2020-07-21 | End: 2020-07-24 | Stop reason: HOSPADM

## 2020-07-21 RX ORDER — OXYCODONE HYDROCHLORIDE 5 MG/1
10 TABLET ORAL EVERY 4 HOURS PRN
Status: DISCONTINUED | OUTPATIENT
Start: 2020-07-21 | End: 2020-07-24 | Stop reason: HOSPADM

## 2020-07-21 RX ORDER — NICOTINE POLACRILEX 4 MG
15 LOZENGE BUCCAL PRN
Status: DISCONTINUED | OUTPATIENT
Start: 2020-07-21 | End: 2020-07-24 | Stop reason: HOSPADM

## 2020-07-21 RX ORDER — OXYCODONE HYDROCHLORIDE 5 MG/1
5 TABLET ORAL EVERY 4 HOURS PRN
Status: DISCONTINUED | OUTPATIENT
Start: 2020-07-21 | End: 2020-07-24 | Stop reason: HOSPADM

## 2020-07-21 RX ORDER — DEXTROSE MONOHYDRATE 50 MG/ML
100 INJECTION, SOLUTION INTRAVENOUS PRN
Status: DISCONTINUED | OUTPATIENT
Start: 2020-07-21 | End: 2020-07-24 | Stop reason: HOSPADM

## 2020-07-21 RX ORDER — OXYCODONE HYDROCHLORIDE AND ACETAMINOPHEN 5; 325 MG/1; MG/1
1 TABLET ORAL EVERY 4 HOURS PRN
Status: DISCONTINUED | OUTPATIENT
Start: 2020-07-21 | End: 2020-07-21

## 2020-07-21 RX ORDER — POTASSIUM CHLORIDE 7.45 MG/ML
10 INJECTION INTRAVENOUS PRN
Status: DISCONTINUED | OUTPATIENT
Start: 2020-07-21 | End: 2020-07-24 | Stop reason: HOSPADM

## 2020-07-21 RX ORDER — SODIUM CHLORIDE 9 MG/ML
INJECTION, SOLUTION INTRAVENOUS CONTINUOUS
Status: DISCONTINUED | OUTPATIENT
Start: 2020-07-21 | End: 2020-07-24 | Stop reason: HOSPADM

## 2020-07-21 RX ORDER — MAGNESIUM SULFATE 1 G/100ML
1 INJECTION INTRAVENOUS PRN
Status: DISCONTINUED | OUTPATIENT
Start: 2020-07-21 | End: 2020-07-24 | Stop reason: HOSPADM

## 2020-07-21 RX ORDER — SODIUM CHLORIDE 0.9 % (FLUSH) 0.9 %
10 SYRINGE (ML) INJECTION EVERY 12 HOURS SCHEDULED
Status: DISCONTINUED | OUTPATIENT
Start: 2020-07-21 | End: 2020-07-24 | Stop reason: HOSPADM

## 2020-07-21 RX ORDER — OXYCODONE HYDROCHLORIDE AND ACETAMINOPHEN 5; 325 MG/1; MG/1
2 TABLET ORAL EVERY 4 HOURS PRN
Status: DISCONTINUED | OUTPATIENT
Start: 2020-07-21 | End: 2020-07-21

## 2020-07-21 RX ORDER — DEXTROSE MONOHYDRATE 25 G/50ML
12.5 INJECTION, SOLUTION INTRAVENOUS PRN
Status: DISCONTINUED | OUTPATIENT
Start: 2020-07-21 | End: 2020-07-24 | Stop reason: HOSPADM

## 2020-07-21 RX ORDER — SODIUM CHLORIDE 0.9 % (FLUSH) 0.9 %
10 SYRINGE (ML) INJECTION PRN
Status: DISCONTINUED | OUTPATIENT
Start: 2020-07-21 | End: 2020-07-24 | Stop reason: HOSPADM

## 2020-07-21 RX ORDER — ATORVASTATIN CALCIUM 40 MG/1
40 TABLET, FILM COATED ORAL NIGHTLY
Status: DISCONTINUED | OUTPATIENT
Start: 2020-07-21 | End: 2020-07-24 | Stop reason: HOSPADM

## 2020-07-21 RX ADMIN — INSULIN LISPRO 9 UNITS: 100 INJECTION, SOLUTION INTRAVENOUS; SUBCUTANEOUS at 16:50

## 2020-07-21 RX ADMIN — SODIUM CHLORIDE 1000 ML: 9 INJECTION, SOLUTION INTRAVENOUS at 00:43

## 2020-07-21 RX ADMIN — INSULIN LISPRO 3 UNITS: 100 INJECTION, SOLUTION INTRAVENOUS; SUBCUTANEOUS at 21:53

## 2020-07-21 RX ADMIN — FAMOTIDINE 20 MG: 10 INJECTION INTRAVENOUS at 02:55

## 2020-07-21 RX ADMIN — FAMOTIDINE 20 MG: 10 INJECTION INTRAVENOUS at 21:53

## 2020-07-21 RX ADMIN — ASPIRIN 81 MG 81 MG: 81 TABLET ORAL at 21:53

## 2020-07-21 RX ADMIN — INSULIN LISPRO 9 UNITS: 100 INJECTION, SOLUTION INTRAVENOUS; SUBCUTANEOUS at 11:13

## 2020-07-21 RX ADMIN — PANTOPRAZOLE SODIUM 40 MG: 40 TABLET, DELAYED RELEASE ORAL at 07:20

## 2020-07-21 RX ADMIN — PROMETHAZINE HYDROCHLORIDE 12.5 MG: 25 TABLET ORAL at 11:11

## 2020-07-21 RX ADMIN — AZTREONAM 1 G: 1 INJECTION, POWDER, LYOPHILIZED, FOR SOLUTION INTRAMUSCULAR; INTRAVENOUS at 22:04

## 2020-07-21 RX ADMIN — SODIUM CHLORIDE: 9 INJECTION, SOLUTION INTRAVENOUS at 02:53

## 2020-07-21 RX ADMIN — Medication 10 ML: at 08:16

## 2020-07-21 RX ADMIN — AZTREONAM 1 G: 1 INJECTION, POWDER, LYOPHILIZED, FOR SOLUTION INTRAMUSCULAR; INTRAVENOUS at 16:28

## 2020-07-21 RX ADMIN — MAGNESIUM SULFATE HEPTAHYDRATE 1 G: 1 INJECTION, SOLUTION INTRAVENOUS at 00:43

## 2020-07-21 RX ADMIN — DESMOPRESSIN ACETATE 40 MG: 0.2 TABLET ORAL at 21:53

## 2020-07-21 RX ADMIN — SODIUM CHLORIDE: 9 INJECTION, SOLUTION INTRAVENOUS at 11:09

## 2020-07-21 RX ADMIN — HYDRALAZINE HYDROCHLORIDE 25 MG: 25 TABLET, FILM COATED ORAL at 07:20

## 2020-07-21 RX ADMIN — HYDRALAZINE HYDROCHLORIDE 25 MG: 25 TABLET, FILM COATED ORAL at 16:28

## 2020-07-21 RX ADMIN — MAGNESIUM SULFATE HEPTAHYDRATE 1 G: 1 INJECTION, SOLUTION INTRAVENOUS at 10:01

## 2020-07-21 RX ADMIN — FAMOTIDINE 20 MG: 10 INJECTION INTRAVENOUS at 08:15

## 2020-07-21 RX ADMIN — MAGNESIUM SULFATE HEPTAHYDRATE 1 G: 1 INJECTION, SOLUTION INTRAVENOUS at 11:12

## 2020-07-21 RX ADMIN — METRONIDAZOLE 500 MG: 500 INJECTION, SOLUTION INTRAVENOUS at 18:27

## 2020-07-21 RX ADMIN — ENOXAPARIN SODIUM 40 MG: 40 INJECTION SUBCUTANEOUS at 08:17

## 2020-07-21 RX ADMIN — CARVEDILOL 12.5 MG: 12.5 TABLET, FILM COATED ORAL at 08:14

## 2020-07-21 RX ADMIN — CARVEDILOL 12.5 MG: 12.5 TABLET, FILM COATED ORAL at 16:28

## 2020-07-21 RX ADMIN — SODIUM CHLORIDE: 9 INJECTION, SOLUTION INTRAVENOUS at 21:37

## 2020-07-21 RX ADMIN — INSULIN LISPRO 3 UNITS: 100 INJECTION, SOLUTION INTRAVENOUS; SUBCUTANEOUS at 08:15

## 2020-07-21 RX ADMIN — ONDANSETRON 4 MG: 2 INJECTION INTRAMUSCULAR; INTRAVENOUS at 08:15

## 2020-07-21 RX ADMIN — OXYCODONE HYDROCHLORIDE AND ACETAMINOPHEN 2 TABLET: 5; 325 TABLET ORAL at 16:50

## 2020-07-21 RX ADMIN — OXYCODONE HYDROCHLORIDE 5 MG: 5 TABLET ORAL at 23:07

## 2020-07-21 RX ADMIN — AMLODIPINE BESYLATE 10 MG: 5 TABLET ORAL at 08:14

## 2020-07-21 ASSESSMENT — PAIN SCALES - GENERAL
PAINLEVEL_OUTOF10: 0
PAINLEVEL_OUTOF10: 4
PAINLEVEL_OUTOF10: 7
PAINLEVEL_OUTOF10: 4
PAINLEVEL_OUTOF10: 5

## 2020-07-21 ASSESSMENT — ENCOUNTER SYMPTOMS
DIARRHEA: 0
EYE PAIN: 0
ABDOMINAL PAIN: 0
SHORTNESS OF BREATH: 0
EYES NEGATIVE: 1
SORE THROAT: 0
BACK PAIN: 0
CONSTIPATION: 1
VOMITING: 1
ABDOMINAL DISTENTION: 0
RHINORRHEA: 0
ABDOMINAL PAIN: 1
RESPIRATORY NEGATIVE: 1
NAUSEA: 1
COUGH: 0

## 2020-07-21 ASSESSMENT — PAIN DESCRIPTION - ONSET: ONSET: ON-GOING

## 2020-07-21 ASSESSMENT — PAIN DESCRIPTION - FREQUENCY: FREQUENCY: INTERMITTENT

## 2020-07-21 ASSESSMENT — PAIN DESCRIPTION - LOCATION: LOCATION: ABDOMEN

## 2020-07-21 ASSESSMENT — PAIN DESCRIPTION - ORIENTATION: ORIENTATION: MID

## 2020-07-21 ASSESSMENT — PAIN - FUNCTIONAL ASSESSMENT: PAIN_FUNCTIONAL_ASSESSMENT: ACTIVITIES ARE NOT PREVENTED

## 2020-07-21 ASSESSMENT — PAIN DESCRIPTION - PROGRESSION: CLINICAL_PROGRESSION: NOT CHANGED

## 2020-07-21 ASSESSMENT — PAIN DESCRIPTION - PAIN TYPE: TYPE: ACUTE PAIN

## 2020-07-21 ASSESSMENT — PAIN DESCRIPTION - DESCRIPTORS: DESCRIPTORS: DISCOMFORT

## 2020-07-21 NOTE — H&P
[facility]    HISTORY AND PHYSICAL EXAMINATION            Date:   7/21/2020  Patient name:  Huber Arredondo  Date of admission:  7/20/2020  9:24 PM  MRN:   [id number]  Account:  [de-identified]  YOB: 1960  PCP:    Alicia Tanner  Room:   [address] [place] [address]  Code Status:    Full Code    Chief Complaint:     Chief Complaint   Patient presents with    Nausea    Emesis     started today, pt started chemo last tuesday       History Obtained From:     patient, electronic medical record    History of Present Illness:     Huber Arredondo is a 64 y.o. Non-/non  female who presents with Nausea and Emesis (started today, pt started chemo last tuesday)   and is admitted to the hospital for the management of Intractable nausea and vomiting. Patient has known pancreatic cancer and had her first chemo treatment last Tuesday. Yesterday she began vomiting uncontrollably at home. She tried to take a Zofran tablet that she had at home, but she could not keep it down. She also complained of headache, constipation, feeling cold, and running a fever of 100.8 °F.  She has additional significant medical history of cholecystectomy 30 years ago and diabetes, and ERCP in June of this year. She has a biliary stent. While in ED, her blood pressure was 138/52, temperature 99.8 °F, heart rate 87, respiratory rate 14. Her LFTs were noted to be elevated and she was admitted for further management of her nausea and vomiting. Today IV hydration continues. Right upper quadrant ultrasound was completed and revealed dilatation of the intra-and extrahepatic ducts, as well as the pancreatic duct. There is a mass seen on the pancreatic head. Oncology rounded and recommend GI consult due to transaminitis. GI was consulted and requested patient be transferred to [facility] for specialized GI care.   Her BP meds were placed on hold due to concerns for sepsis, however, her BP remains stable at this time. Her nausea has improved, although her abdominal discomfort remains, especially in the right upper quadrant in the right upper quadrant. Cultures were positive 2 out of 2 for gram-negative rods. She was started on IV aztreonam, and infectious disease was consulted. Past Medical History:     Past Medical History:   Diagnosis Date    Anxiety     Diabetes mellitus ([address]     Hyperlipidemia     Hypertension     Pancreatic [facility])         Past Surgical History:     Past Surgical History:   Procedure Laterality Date     SECTION      x2    ERCP  2020    SPHINCTER/PAPILLOTOMY, STENT INSERTION    ERCP  2020    ERCP SPHINCTER/PAPILLOTOMY performed by Quin Osler, MD at [facility]    ERCP  2020    ERCP STENT INSERTION performed by Quin Osler, MD at [address] [place] [address] ENDOSCOPY  2020    W/EUS FNA     UPPER GASTROINTESTINAL ENDOSCOPY  2020    EGD W/EUS FNA in OR with GI staff performed by Quin Osler, MD at [facility]        Medications Prior to Admission:     Prior to Admission medications    Medication Sig Start Date End Date Taking?  Authorizing Provider   ondansetron (ZOFRAN-ODT) 8 MG TBDP disintegrating tablet Place 1 tablet under the tongue every 8 hours as needed for Nausea or Vomiting 20  Yes Patti Guadarrama MD   furosemide (LASIX) 20 MG tablet Take 1 tablet by mouth daily  Patient taking differently: Take 20 mg by mouth daily As needed 20 Yes Patti Guadarrama MD   insulin glargine (LANTUS) 100 UNIT/ML injection vial Inject 50 Units into the skin nightly 20  Yes Sage Young MD   hydrALAZINE (APRESOLINE) 25 MG tablet Take 1 tablet by mouth every 8 hours 20  Yes Sage Young MD   carvedilol (COREG) 12.5 MG tablet Take 1 tablet by mouth 2 times daily (with meals) 20  Yes Sage Young MD   amLODIPine (NORVASC) 10 MG tablet Take 1 tablet by mouth daily 6/7/20  Yes Bora Jensen MD   pantoprazole (PROTONIX) 40 MG tablet Take 1 tablet by mouth every morning (before breakfast) 6/6/20  Yes Bora Jensen MD   aspirin 81 MG chewable tablet Take 81 mg by mouth nightly   Yes Historical Provider, MD   insulin aspart (NOVOLOG FLEXPEN) 100 UNIT/ML injection pen Inject into the skin   Yes Historical Provider, MD   sertraline (ZOLOFT) 100 MG tablet take 1 1/2 tablet by oral route  every day   Yes Historical Provider, MD   atorvastatin (LIPITOR) 40 MG tablet nightly  10/12/13  Yes Historical Provider, MD   LORazepam (ATIVAN) 0.5 MG tablet 1 mg nightly. 10/12/13  Yes Historical Provider, MD   lidocaine-prilocaine (EMLA) 2.5-2.5 % cream Apply topically as needed. 7/6/20   Bon Douglas MD   nitroGLYCERIN (NITROSTAT) 0.4 MG SL tablet up to max of 3 total doses. If no relief after 1 dose, call 911. 6/6/20   Bora Jensen MD   insulin glargine (LANTUS) 100 UNIT/ML injection vial Inject 60 Units into the skin daily 6/7/20   Bora Jensen MD   benzocaine-menthol (CEPACOL SORE THROAT) 15-3.6 MG lozenge Take 1 lozenge by mouth every 2 hours as needed for Sore Throat 6/6/20   Bora Jensen MD   Lisdexamfetamine Dimesylate 60 MG CAPS Take 60 mg by mouth every morning. Historical Provider, MD        Allergies:     Lisinopril; Sulfamethoxazole; Trimethoprim; Cefuroxime axetil; Sulfamethoxazole-trimethoprim; Clindamycin phosphate; and Penicillins    Social History:     Tobacco:    reports that she quit smoking about 15 years ago. She has never used smokeless tobacco.  Alcohol:      reports previous alcohol use. Drug Use:  reports no history of drug use.     Family History:     Family History   Problem Relation Age of Onset   [facility] Cancer Mother     Hypertension Mother     Heart Failure Father     Hypertension Father     Cancer Maternal Grandmother         colon     Cancer Maternal Grandfather         lung        Review of Systems: Positive and Negative as described in HPI. Review of Systems   Constitutional: Positive for chills, fatigue and fever. HENT: Negative. Eyes: Negative. Respiratory: Negative. Cardiovascular: Negative. Gastrointestinal: Positive for abdominal pain, constipation, nausea and vomiting. Negative for abdominal distention and diarrhea. Reports abdominal discomfort \"radiates around her belly\"   Endocrine: Positive for cold intolerance. Genitourinary: Negative. Musculoskeletal: Negative. Neurological: Positive for headaches. Negative for dizziness. Psychiatric/Behavioral: Negative. Physical Exam:   BP (!) 126/59   Pulse 79   Temp 99.2 °F (37.3 °C) (Oral)   Resp 18   Ht 5' 5\" (1.651 m)   Wt 258 lb (117 kg)   SpO2 95%   BMI 42.93 kg/m²   Temp (24hrs), Av.5 °F (37.5 °C), Min:98.3 °F (36.8 °C), Max:100.5 °F (38.1 °C)    Recent Labs     20  1112 20  1648   POCGLU 288* 299*         Physical Exam  Vitals signs and nursing note reviewed. Constitutional:       General: She is not in acute distress. Appearance: She is obese. She is ill-appearing. She is not toxic-appearing or diaphoretic. HENT:      Head: Normocephalic and atraumatic. Right Ear: External ear normal.      Left Ear: External ear normal.      Nose: Nose normal. No rhinorrhea. Mouth/Throat:      Mouth: Mucous membranes are moist.   Eyes:      General: No scleral icterus. Right eye: No discharge. Left eye: No discharge. Extraocular Movements: Extraocular movements intact. Conjunctiva/sclera: Conjunctivae normal.      Pupils: Pupils are equal, round, and reactive to light. Neck:      Musculoskeletal: Normal range of motion and neck supple. Comments: No JVD  Cardiovascular:      Rate and Rhythm: Normal rate and regular rhythm. Pulses: Normal pulses. Heart sounds: Normal heart sounds.    Pulmonary:      Effort: Pulmonary effort is normal. No respiratory distress. Breath sounds: Normal breath sounds. No wheezing, rhonchi or rales. Abdominal:      General: Bowel sounds are normal. There is distension. Palpations: Abdomen is soft. Tenderness: There is abdominal tenderness. There is guarding. Musculoskeletal: Normal range of motion. Right lower leg: No edema. Left lower leg: No edema. Skin:     General: Skin is warm and dry. Coloration: Skin is pale. Skin is not jaundiced. Findings: No bruising, lesion or rash. Neurological:      General: No focal deficit present. Mental Status: She is alert. Psychiatric:         Mood and Affect: Mood normal.         Behavior: Behavior normal.         Thought Content:  Thought content normal.         Judgment: Judgment normal.         Investigations:      Laboratory Testing:  Recent Results (from the past 24 hour(s))   Troponin    Collection Time: 07/20/20  9:42 PM   Result Value Ref Range    Troponin, High Sensitivity 18 (H) 0 - 14 ng/L    Troponin T NOT REPORTED <0.03 ng/mL    Troponin Interp NOT REPORTED    Comprehensive Metabolic Panel    Collection Time: 07/20/20  9:42 PM   Result Value Ref Range    Glucose 229 (H) 70 - 99 mg/dL    BUN 14 6 - 20 mg/dL    CREATININE 0.90 0.50 - 0.90 mg/dL    Bun/Cre Ratio NOT REPORTED 9 - 20    Calcium 10.1 8.6 - 10.4 mg/dL    Sodium 137 135 - 144 mmol/L    Potassium 3.8 3.7 - 5.3 mmol/L    Chloride 96 (L) 98 - 107 mmol/L    CO2 25 20 - 31 mmol/L    Anion Gap 16 9 - 17 mmol/L    Alkaline Phosphatase 482 (H) 35 - 104 U/L    ALT 1,068 (H) 5 - 33 U/L    AST 1,449 (H) <32 U/L    Total Bilirubin 2.03 (H) 0.3 - 1.2 mg/dL    Total Protein 6.4 6.4 - 8.3 g/dL    Alb 3.7 3.5 - 5.2 g/dL    Albumin/Globulin Ratio 1.4 1.0 - 2.5    GFR Non-African American >60 >60 mL/min    GFR African American >60 >60 mL/min    GFR Comment          GFR Staging NOT REPORTED    CBC Auto Differential    Collection Time: 07/20/20  9:42 PM   Result Value Ref Range    WBC 2.9 (L) 3.5 - 11.0 k/uL    RBC 3.80 (L) 4.0 - 5.2 m/uL    Hemoglobin 10.5 (L) 12.0 - 16.0 g/dL    Hematocrit 31.9 (L) 36 - 46 %    MCV 83.8 80 - 100 fL    MCH 27.6 26 - 34 pg    MCHC 32.9 31 - 37 g/dL    RDW 14.6 12.5 - 15.4 %    Platelets 271 215 - 283 k/uL    MPV 8.4 6.0 - 12.0 fL    NRBC Automated NOT REPORTED per 100 WBC    Differential Type NOT REPORTED     Immature Granulocytes NOT REPORTED 0 %    Absolute Immature Granulocyte NOT REPORTED 0.00 - 0.30 k/uL    WBC Morphology NOT REPORTED     RBC Morphology NOT REPORTED     Platelet Estimate NOT REPORTED     Seg Neutrophils 87 (H) 36 - 66 %    Lymphocytes 10 (L) 24 - 44 %    Monocytes 1 1 - 7 %    Eosinophils % 2 1 - 4 %    Basophils 0 0 - 2 %    Segs Absolute 2.52 1.8 - 7.7 k/uL    Absolute Lymph # 0.29 (L) 1.0 - 4.8 k/uL    Absolute Mono # 0.03 (L) 0.1 - 0.8 k/uL    Absolute Eos # 0.06 0.0 - 0.4 k/uL    Basophils Absolute 0.00 0.0 - 0.2 k/uL    Morphology Normal    Magnesium    Collection Time: 07/20/20  9:42 PM   Result Value Ref Range    Magnesium 1.4 (L) 1.6 - 2.6 mg/dL   Lactic Acid    Collection Time: 07/20/20  9:42 PM   Result Value Ref Range    Lactic Acid 3.7 (H) 0.5 - 2.2 mmol/L   Lipase    Collection Time: 07/20/20  9:42 PM   Result Value Ref Range    Lipase 6 (L) 13 - 60 U/L   Culture, Blood 1    Collection Time: 07/20/20  9:42 PM    Specimen: Blood   Result Value Ref Range    Specimen Description . BLOOD     Special Requests 20 ML RIGHT ARM     Culture (A)      POSITIVE Blood Culture Results called to and read back by: JEF WEINER AT 1415 7/21/20    Culture DIRECT GRAM STAIN FROM BOTTLE: GRAM NEGATIVE RODS     Culture Escherichia coli detected by PCR (A)    EKG 12 Lead    Collection Time: 07/20/20  9:48 PM   Result Value Ref Range    Ventricular Rate 92 BPM    Atrial Rate 92 BPM    P-R Interval 150 ms    QRS Duration 82 ms    Q-T Interval 360 ms    QTc Calculation (Bazett) 445 ms    P Axis 15 degrees    R Axis -9 degrees    T Axis 27 degrees   Culture, Blood 1    Collection Time: 07/20/20 10:07 PM    Specimen: Blood   Result Value Ref Range    Specimen Description . BLOOD     Special Requests 20 ML LEFT ARM     Culture (A)      POSITIVE Blood Culture Results called to and read back by: JEF WEINER AT [id number] 7/21/20    Culture DIRECT GRAM STAIN FROM BOTTLE: GRAM NEGATIVE RODS    Troponin    Collection Time: 07/21/20 12:10 AM   Result Value Ref Range    Troponin, High Sensitivity 23 (H) 0 - 14 ng/L    Troponin T NOT REPORTED <0.03 ng/mL    Troponin Interp NOT REPORTED    Lactic Acid    Collection Time: 07/21/20 12:48 AM   Result Value Ref Range    Lactic Acid 2.5 (H) 0.5 - 2.2 mmol/L   Urinalysis Reflex to Culture    Collection Time: 07/21/20 12:50 AM    Specimen: Urine, clean catch   Result Value Ref Range    Color, UA CAROLA (A) YELLOW    Turbidity UA CLEAR CLEAR    Glucose, Ur 2+ (A) NEGATIVE    Bilirubin Urine MODERATE (A) NEGATIVE    Ketones, Urine NEGATIVE NEGATIVE    Specific Gravity, UA 1.020 1.005 - 1.030    Urine Hgb TRACE (A) NEGATIVE    pH, UA 6.5 5.0 - 8.0    Protein, UA 2+ (A) NEGATIVE    Urobilinogen, Urine Normal Normal    Nitrite, Urine NEGATIVE NEGATIVE    Leukocyte Esterase, Urine NEGATIVE NEGATIVE    Urinalysis Comments NOT REPORTED    COVID-19    Collection Time: 07/21/20 12:50 AM    Specimen: Other   Result Value Ref Range    SARS-CoV-2          SARS-CoV-2, Rapid Not Detected Not Detected    Source . NASOPHARYNGEAL SWAB     SARS-CoV-2, PCR         Microscopic Urinalysis    Collection Time: 07/21/20 12:50 AM   Result Value Ref Range    -          WBC, UA 2 TO 5 0 - 5 /HPF    RBC, UA 0 TO 2 0 - 2 /HPF    Casts UA NOT REPORTED /LPF    Crystals, UA NOT REPORTED None /HPF    Epithelial Cells UA 2 TO 5 0 - 5 /HPF    Renal Epithelial, UA NOT REPORTED 0 /HPF    Bacteria, UA None None    Mucus, UA NOT REPORTED None    Trichomonas, UA NOT REPORTED None    Amorphous, UA NOT REPORTED None    Other Observations UA (A) NOT REQ.      Utilizing a urinalysis as the only screening method to exclude a potential uropathogen can be unreliable in many patient populations. Rapid screening tests are less sensitive than culture and if UTI is a clinical possibility, culture should be considered despite a negative urinalysis.     Yeast, UA NOT REPORTED None   Comprehensive Metabolic Panel w/ Reflex to MG    Collection Time: 07/21/20  6:25 AM   Result Value Ref Range    Glucose 178 (H) 70 - 99 mg/dL    BUN 14 6 - 20 mg/dL    CREATININE 0.89 0.50 - 0.90 mg/dL    Bun/Cre Ratio NOT REPORTED 9 - 20    Calcium 9.2 8.6 - 10.4 mg/dL    Sodium 136 135 - 144 mmol/L    Potassium 3.8 3.7 - 5.3 mmol/L    Chloride 99 98 - 107 mmol/L    CO2 24 20 - 31 mmol/L    Anion Gap 13 9 - 17 mmol/L    Alkaline Phosphatase 418 (H) 35 - 104 U/L     (H) 5 - 33 U/L     (H) <32 U/L    Total Bilirubin 2.77 (H) 0.3 - 1.2 mg/dL    Total Protein 5.5 (L) 6.4 - 8.3 g/dL    Alb 3.2 (L) 3.5 - 5.2 g/dL    Albumin/Globulin Ratio 1.4 1.0 - 2.5    GFR Non-African American >60 >60 mL/min    GFR African American >60 >60 mL/min    GFR Comment          GFR Staging NOT REPORTED    Phosphorus    Collection Time: 07/21/20  6:25 AM   Result Value Ref Range    Phosphorus 2.6 2.6 - 4.5 mg/dL   Amylase    Collection Time: 07/21/20  6:25 AM   Result Value Ref Range    Amylase 9 (L) 28 - 100 U/L   Lipase    Collection Time: 07/21/20  6:25 AM   Result Value Ref Range    Lipase 5 (L) 13 - 60 U/L   CBC    Collection Time: 07/21/20  6:25 AM   Result Value Ref Range    WBC 6.0 3.5 - 11.0 k/uL    RBC 3.43 (L) 4.0 - 5.2 m/uL    Hemoglobin 9.5 (L) 12.0 - 16.0 g/dL    Hematocrit 28.9 (L) 36 - 46 %    MCV 84.1 80 - 100 fL    MCH 27.8 26 - 34 pg    MCHC 33.0 31 - 37 g/dL    RDW 14.1 12.5 - 15.4 %    Platelets 772 189 - 294 k/uL    MPV 8.4 6.0 - 12.0 fL    NRBC Automated NOT REPORTED per 100 WBC   Magnesium    Collection Time: 07/21/20  6:25 AM   Result Value Ref Range    Magnesium 1.5 (L) 1.6 - 2.6 mg/dL   POC Glucose Fingerstick    Collection Time: 07/21/20 11:12 AM   Result Value Ref Range    POC Glucose 288 (H) 65 - 105 mg/dL   MAGNESIUM    Collection Time: 07/21/20  1:38 PM   Result Value Ref Range    Magnesium 2.1 1.6 - 2.6 mg/dL   Procalcitonin    Collection Time: 07/21/20  1:38 PM   Result Value Ref Range    Procalcitonin 10.17 (H) <0.09 ng/mL   POC Glucose Fingerstick    Collection Time: 07/21/20  4:48 PM   Result Value Ref Range    POC Glucose 299 (H) 65 - 105 mg/dL       Imaging/Diagnostics:    No results found. Assessment :      Hospital Problems           Last Modified POA    * (Principal) Intractable nausea and vomiting 7/21/2020 Yes    Hypertension 7/21/2020 Yes    Pancreatic Head Mass 7/21/2020 Yes    Primary adenocarcinoma of head of pancreas [address] [address] 7/21/2020 Yes    Sepsis ([facility] [address] 7/21/2020 Yes    Class 3 severe obesity with serious comorbidity in adult [facility]) 7/21/2020 Yes    Bacteremia 7/21/2020 Yes    Transaminitis 7/21/2020 Yes                Plan:     Patient status inpatient in the Med/Surge    1. IV hydration as ordered  2. Control nausea. Clear liquid diet as tolerated. N.p.o. after midnight. 3. Transfer to [facility] for GI specialty services-concern for cholangitis noted. GI consulted. 4. Hold antihypertensives for now. 5. Oncology following. Appreciate input. 6. Infectious disease consult. Appreciate input. 7. Weight loss education as appropriate and as outpatient. 8. Daily CBC/CMP. Replace electrolytes as needed. 9. Replace electrolytes as needed  10. GI prophylaxis  11. DVT prophylaxis with EPC cuffs. 12. Change Percocet to oxycodone for pain control due to elevated liver enzymes  13. Check INR in a.m.  14. Avoid liver toxic agents.     Consultations:   IP CONSULT TO ONCOLOGY  IP CONSULT TO GI  IP CONSULT TO INFECTIOUS DISEASES      EMERSON Rojas NP  7/21/2020  6:28 PM    Copy sent to Dr. Ale Lynch

## 2020-07-21 NOTE — ED NOTES
Report called to floor. Due to number of admissions pt is updated that there will be a slight delay in transport to the floor.  Pt has no requests or complaints at this time, call light within reach     Marlena Curry RN  07/21/20 0131

## 2020-07-21 NOTE — ED NOTES
Pt states that nausea is \"much better\".  She has no requests or complaints at this time, call light within reach     [facility], RN  07/20/20 [id number]

## 2020-07-21 NOTE — ED NOTES
Pt presents to ED with complaint of nausea and vomiting that started this afternoon. Pt just started chemo on Tuesday for pancreatic cancer. She was given zofran but states that was not helping today. Pt had 3-4 episodes of vomiting.  Pt states that she also has some back/abdominal pain but this is not new for her     Vivianesarita Escobar RN  07/20/20 [id number]

## 2020-07-21 NOTE — PLAN OF CARE
Problem: Nutrition  Goal: Optimal nutrition therapy  Outcome: Ongoing   Patient blood sugar checked per protocol.  Discussed importance of checks and coverage at home

## 2020-07-21 NOTE — PLAN OF CARE
Brief Plan of Care:    Patient has abnormal LFTS with obstructive pattern, E. Coli bacteremia, afebrile and hemodynamically stable per report. Concern biliary obstruction despite improved imaging. Recommend broad spectrum antibiotics and transfer to [facility] to evaluate for repeat ERCP.

## 2020-07-21 NOTE — PLAN OF CARE
Problem: Falls - Risk of:  Goal: Will remain free from falls  Description: Will remain free from falls  Outcome: Ongoing  Goal: Absence of physical injury  Description: Absence of physical injury  Outcome: Ongoing     Problem: Skin Integrity:  Goal: Will show no infection signs and symptoms  Description: Will show no infection signs and symptoms  Outcome: Ongoing  Goal: Absence of new skin breakdown  Description: Absence of new skin breakdown  Outcome: Ongoing  Goal: Complications related to intravenous access or infusion will be avoided or minimized  Description: Complications related to intravenous access or infusion will be avoided or minimized  Outcome: Ongoing     Problem: Nausea/Vomiting:  Goal: Absence of nausea/vomiting  Description: Absence of nausea/vomiting  Outcome: Ongoing  Goal: Able to drink  Description: Able to drink  Outcome: Ongoing  Goal: Able to eat  Description: Able to eat  Outcome: Ongoing  Goal: Ability to achieve adequate nutritional intake will improve  Description: Ability to achieve adequate nutritional intake will improve  Outcome: Ongoing     Problem: Nutritional:  Goal: Nutritional status will improve  Description: Nutritional status will improve  Outcome: Ongoing     Problem: Physical Regulation:  Goal: Will remain free from infection  Description: Will remain free from infection  Outcome: Ongoing  Goal: Ability to maintain vital signs within normal range will improve  Description: Ability to maintain vital signs within normal range will improve  Outcome: Ongoing

## 2020-07-21 NOTE — ED PROVIDER NOTES
[id number] [facility]  [address] [facility] [address] [facility] [place] [address]  Phone: [phone]  Fax: [phone] [facility]          Pt Name: Jd Willis  MRN: [id number]  [place] 1960  Date of evaluation: 2020      CHIEF COMPLAINT       Chief Complaint   Patient presents with    Nausea    Emesis     started today, pt started chemo last 406 [facility] Fabiola is a 64 y.o. female who presents with nausea and vomiting. No diarrhea. States she began chemotherapy for pancreatic cancer on Tuesday for the first time since being diagnosed. She is supposed to get the chemotherapy once every other week. Symptoms began this morning. She sees oncology here at this facility. Denies any abdominal pain. No trauma. No fevers. Has not been able to tolerate much oral food or fluids. Denies other symptoms or concerns. REVIEW OF SYSTEMS       Review of Systems   Constitutional: Negative for fatigue and fever. HENT: Negative for rhinorrhea and sore throat. Eyes: Negative for pain. Respiratory: Negative for cough and shortness of breath. Cardiovascular: Negative for chest pain. Gastrointestinal: Positive for nausea and vomiting. Negative for abdominal pain and diarrhea. Genitourinary: Negative for difficulty urinating. Musculoskeletal: Negative for back pain and neck pain. Skin: Negative for rash. Neurological: Negative for weakness and headaches. PAST MEDICAL HISTORY    has a past medical history of Anxiety, Diabetes mellitus ([address] Hyperlipidemia, Hypertension, and Pancreatic cancer ([facility] [address] SURGICAL HISTORY      has a past surgical history that includes  section; Gallbladder surgery; ERCP (2020); Upper gastrointestinal endoscopy (2020); Upper gastrointestinal endoscopy (2020); ERCP (2020); and ERCP (2020).     CURRENT MEDICATIONS       Previous Medications    AMLODIPINE her father and mother. SOCIAL HISTORY      reports that she quit smoking about 15 years ago. She has never used smokeless tobacco. She reports previous alcohol use. She reports that she does not use drugs. PHYSICAL EXAM     INITIAL VITALS:  oral temperature is 99.8 °F (37.7 °C). Her blood pressure is 138/52 (abnormal) and her pulse is 87. Her respiration is 14 and oxygen saturation is 98%. Physical Exam   Constitutional: She appears well-developed and well-nourished. Non-toxic appearance. She does not appear ill. No distress. HENT:   Head: Normocephalic and atraumatic. Right Ear: External ear normal.   Left Ear: External ear normal.   Eyes: EOM and lids are normal.   Neck: No tracheal deviation present. Cardiovascular: Normal rate and regular rhythm. Pulmonary/Chest: Effort normal. No accessory muscle usage. No tachypnea. No respiratory distress. Abdominal: Soft. There is no abdominal tenderness. Neurological: She is alert. GCS eye subscore is 4. GCS verbal subscore is 5. GCS motor subscore is 6. Skin: Skin is warm and dry. Psychiatric: She has a normal mood and affect. Her speech is normal.   Vitals reviewed. DIFFERENTIAL DIAGNOSIS/ MDM:     Plan at this time will be to check baseline labs and hydrate with fluids and treat symptomatically. Clinically she does not appear toxic or septic. DIAGNOSTIC RESULTS     EKG: All EKG's are interpreted by the Emergency Department Physician who either signs or Co-signs this chart in the absence of a cardiologist.    Interpreted by Kiran Colon MD     Rhythm: normal sinus   Rate: normal  Axis: normal  Ectopy: none  Conduction: normal  ST Segments: no acute change  T Waves: no acute change    Clinical Impression: normal sinus rhythm with no acute changes/normal EKG. No acute infarction/ischemia noted.         RADIOLOGY:   I directly visualized the following  images and reviewed the radiologist interpretations:  No orders to display       No results found.     LABS:  Results for orders placed or performed during the hospital encounter of 07/20/20   Troponin   Result Value Ref Range    Troponin, High Sensitivity 18 (H) 0 - 14 ng/L    Troponin T NOT REPORTED <0.03 ng/mL    Troponin Interp NOT REPORTED    Comprehensive Metabolic Panel   Result Value Ref Range    Glucose 229 (H) 70 - 99 mg/dL    BUN 14 6 - 20 mg/dL    CREATININE 0.90 0.50 - 0.90 mg/dL    Bun/Cre Ratio NOT REPORTED 9 - 20    Calcium 10.1 8.6 - 10.4 mg/dL    Sodium 137 135 - 144 mmol/L    Potassium 3.8 3.7 - 5.3 mmol/L    Chloride 96 (L) 98 - 107 mmol/L    CO2 25 20 - 31 mmol/L    Anion Gap 16 9 - 17 mmol/L    Alkaline Phosphatase 482 (H) 35 - 104 U/L    ALT 1,068 (H) 5 - 33 U/L    AST 1,449 (H) <32 U/L    Total Bilirubin 2.03 (H) 0.3 - 1.2 mg/dL    Total Protein 6.4 6.4 - 8.3 g/dL    Alb 3.7 3.5 - 5.2 g/dL    Albumin/Globulin Ratio 1.4 1.0 - 2.5    GFR Non-African American >60 >60 mL/min    GFR African American >60 >60 mL/min    GFR Comment          GFR Staging NOT REPORTED    CBC Auto Differential   Result Value Ref Range    WBC 2.9 (L) 3.5 - 11.0 k/uL    RBC 3.80 (L) 4.0 - 5.2 m/uL    Hemoglobin 10.5 (L) 12.0 - 16.0 g/dL    Hematocrit 31.9 (L) 36 - 46 %    MCV 83.8 80 - 100 fL    MCH 27.6 26 - 34 pg    MCHC 32.9 31 - 37 g/dL    RDW 14.6 12.5 - 15.4 %    Platelets 594 293 - 863 k/uL    MPV 8.4 6.0 - 12.0 fL    NRBC Automated NOT REPORTED per 100 WBC    Differential Type NOT REPORTED     Immature Granulocytes NOT REPORTED 0 %    Absolute Immature Granulocyte NOT REPORTED 0.00 - 0.30 k/uL    WBC Morphology NOT REPORTED     RBC Morphology NOT REPORTED     Platelet Estimate NOT REPORTED     Seg Neutrophils 87 (H) 36 - 66 %    Lymphocytes 10 (L) 24 - 44 %    Monocytes 1 1 - 7 %    Eosinophils % 2 1 - 4 %    Basophils 0 0 - 2 %    Segs Absolute 2.52 1.8 - 7.7 k/uL    Absolute Lymph # 0.29 (L) 1.0 - 4.8 k/uL    Absolute Mono # 0.03 (L) 0.1 - 0.8 k/uL    Absolute Eos # 0.06 0.0 - 0.4 k/uL Basophils Absolute 0.00 0.0 - 0.2 k/uL    Morphology Normal    Magnesium   Result Value Ref Range    Magnesium 1.4 (L) 1.6 - 2.6 mg/dL   Lactic Acid   Result Value Ref Range    Lactic Acid 3.7 (H) 0.5 - 2.2 mmol/L   Lipase   Result Value Ref Range    Lipase 6 (L) 13 - 60 U/L   EKG 12 Lead   Result Value Ref Range    Ventricular Rate 92 BPM    Atrial Rate 92 BPM    P-R Interval 150 ms    QRS Duration 82 ms    Q-T Interval 360 ms    QTc Calculation (Bazett) 445 ms    P Axis 15 degrees    R Axis -9 degrees    T Axis 27 degrees       EMERGENCY DEPARTMENT COURSE:     The patient was given the following medications:  Orders Placed This Encounter   Medications    0.9 % sodium chloride bolus    ondansetron (ZOFRAN) injection 4 mg    promethazine (PHENERGAN) injection 12.5 mg    acetaminophen (TYLENOL) tablet 650 mg        Vitals:    Vitals:    07/20/20 2124 07/20/20 2223 07/20/20 2257 07/21/20 0005   BP: (!) 144/61   (!) 138/52   Pulse: 93   87   Resp: 16   14   Temp: 100.4 °F (38 °C) 100 °F (37.8 °C) 99.8 °F (37.7 °C)    TempSrc: Oral Oral Oral    SpO2: 97%   98%     -------------------------  BP: (!) 138/52, Temp: 99.8 °F (37.7 °C), Pulse: 87, Resp: 14      Re-evaluation Notes    Patient does have significantly elevated LFTs that I suspect is secondary to her chemotherapy. Plan to be to admit for oncology consult and supportive care. Clinically I do not feel she requires any imaging at this time. I discussed this with the patient and she is agreeable. Spoke with hospitalist who accepted admission of the patient. CONSULTS:    None    CRITICAL CARE:     None    PROCEDURES:    None    FINAL IMPRESSION      1. Intractable vomiting with nausea, unspecified vomiting type    2. Dehydration    3. Medication side effect          DISPOSITION/PLAN   DISPOSITION Decision To Admit 07/20/2020 10:28:52 PM      Condition on Disposition    Improved    PATIENT REFERRED TO:  No follow-up provider specified.     DISCHARGE MEDICATIONS:  New Prescriptions    No medications on file       (Please note that portions of this note were completed with a voice recognition program.  Efforts were made to edit the dictations but occasionally words are mis-transcribed.)    Karyn Valrea,   Attending Emergency Physician       Karyn Valera,   07/21/20 [address], DO  07/21/20 1394

## 2020-07-21 NOTE — CONSULTS
Today's Date: 2020  Patient Name: Neeta Borrero  Date of admission: 2020  9:24 PM  Patient's age: 64 y. o., 1960  Admission Dx: Intractable nausea and vomiting [R11.2]    Reason for Consult: Pancreatic cancer known patient  Requesting Physician: Marcia Mckenna MD    CHIEF COMPLAINT:    Chief Complaint   Patient presents with    Nausea    Emesis     started today, pt started chemo last tuesday       History Obtained From:  Pt and chart    HISTORY OF PRESENT ILLNESS:      Sherice Malik is a 57-year-old female with recently diagnosed pancreatic adenocarcinoma and started on chemotherapy with FOLFIRINOX was admitted with nausea vomiting, dehydration. Patient also complained of low-grade fever 100.7. On admission she was noted to have elevated liver enzymes. She has been recently diagnosed with pancreatic adenocarcinoma and initial work-up revealed borderline/unresectable because of PV involvement. She is seen by hepatobiliary surgeon as o/p. Started on neoadjuvant chemotherapy. She had biliary sphincterotomy and stent placement on 2020. Past Medical History:   has a past medical history of Anxiety, Diabetes mellitus ([facility] [address] Hyperlipidemia, Hypertension, and Pancreatic cancer ([facility] [address] Past Surgical History:   has a past surgical history that includes  section; Gallbladder surgery; ERCP (2020); Upper gastrointestinal endoscopy (2020); Upper gastrointestinal endoscopy (2020); ERCP (2020); and ERCP (2020). Medications:    Prior to Admission medications    Medication Sig Start Date End Date Taking?  Authorizing Provider   ondansetron (ZOFRAN-ODT) 8 MG TBDP disintegrating tablet Place 1 tablet under the tongue every 8 hours as needed for Nausea or Vomiting 20  Yes Caren Tim MD   furosemide (LASIX) 20 MG tablet Take 1 tablet by mouth daily  Patient taking differently: Take 20 mg by mouth daily As needed 20 Yes Caren Tim MD   insulin glargine (LANTUS) 100 UNIT/ML injection vial Inject 50 Units into the skin nightly 6/6/20  Yes Ashley Pereira MD   hydrALAZINE (APRESOLINE) 25 MG tablet Take 1 tablet by mouth every 8 hours 6/6/20  Yes Ashley Pereira MD   carvedilol (COREG) 12.5 MG tablet Take 1 tablet by mouth 2 times daily (with meals) 6/6/20  Yes Ashley Pereira MD   amLODIPine (NORVASC) 10 MG tablet Take 1 tablet by mouth daily 6/7/20  Yes Ashley Pereira MD   pantoprazole (PROTONIX) 40 MG tablet Take 1 tablet by mouth every morning (before breakfast) 6/6/20  Yes Ashley Pereira MD   aspirin 81 MG chewable tablet Take 81 mg by mouth nightly   Yes Historical Provider, MD   insulin aspart (NOVOLOG FLEXPEN) 100 UNIT/ML injection pen Inject into the skin   Yes Historical Provider, MD   sertraline (ZOLOFT) 100 MG tablet take 1 1/2 tablet by oral route  every day   Yes Historical Provider, MD   atorvastatin (LIPITOR) 40 MG tablet nightly  10/12/13  Yes Historical Provider, MD   LORazepam (ATIVAN) 0.5 MG tablet 1 mg nightly. 10/12/13  Yes Historical Provider, MD   lidocaine-prilocaine (EMLA) 2.5-2.5 % cream Apply topically as needed. 7/6/20   Holly Santos MD   nitroGLYCERIN (NITROSTAT) 0.4 MG SL tablet up to max of 3 total doses. If no relief after 1 dose, call 911. 6/6/20   Ashley Pereira MD   insulin glargine (LANTUS) 100 UNIT/ML injection vial Inject 60 Units into the skin daily 6/7/20   Ashley Pereira MD   benzocaine-menthol (CEPACOL SORE THROAT) 15-3.6 MG lozenge Take 1 lozenge by mouth every 2 hours as needed for Sore Throat 6/6/20   Ashley Pereira MD   Lisdexamfetamine Dimesylate 60 MG CAPS Take 60 mg by mouth every morning.     Historical Provider, MD     Current Facility-Administered Medications   Medication Dose Route Frequency Provider Last Rate Last Dose    amLODIPine (NORVASC) tablet 10 mg  10 mg Oral Daily EMERSON Arias CNP   10 mg at 07/21/20 9087    aspirin chewable tablet 81 mg  81 mg Oral Nightly James Wallace Calfee, APRN - CNP        atorvastatin (LIPITOR) tablet 40 mg  40 mg Oral Nightly [facility], APRN - CNP        carvedilol (COREG) tablet 12.5 mg  12.5 mg Oral BID WC [facility], APRN - CNP   12.5 mg at 07/21/20 9571    hydrALAZINE (APRESOLINE) tablet 25 mg  25 mg Oral 3 times per day [facility], APRN - CNP   25 mg at 07/21/20 0720    nitroGLYCERIN (NITROSTAT) SL tablet 0.4 mg  0.4 mg Sublingual Q5 Min PRN [facility] Sal, APRN - CNP        pantoprazole (PROTONIX) tablet 40 mg  40 mg Oral QAM AC [facility], APRN - CNP   40 mg at 07/21/20 0720    sodium chloride flush 0.9 % injection 10 mL  10 mL Intravenous 2 times per day [facility], APRN - CNP   10 mL at 07/21/20 0816    sodium chloride flush 0.9 % injection 10 mL  10 mL Intravenous PRN [facility] Sal, APRN - CNP        potassium chloride (KLOR-CON M) extended release tablet 40 mEq  40 mEq Oral PRN [facility] Sal, APRN - CNP        Or    potassium bicarb-citric acid (EFFER-K) effervescent tablet 40 mEq  40 mEq Oral PRN [facility] Salm, APRN - CNP        Or    potassium chloride 10 mEq/100 mL IVPB (Peripheral Line)  10 mEq Intravenous PRN [facility], APRN - CNP        magnesium sulfate 1 g in dextrose 5% 100 mL IVPB  1 g Intravenous PRN [facility] Salm, APRN -  mL/hr at 07/21/20 1112 1 g at 07/21/20 1112    polyethylene glycol (GLYCOLAX) packet 17 g  17 g Oral Daily PRN [facility] Sal, APRN - CNP        promethazine (PHENERGAN) tablet 12.5 mg  12.5 mg Oral Q6H PRN [facility], APRN - CNP   12.5 mg at 07/21/20 1111    Or    ondansetron (ZOFRAN) injection 4 mg  4 mg Intravenous Q6H PRN [facility], APRN - CNP   4 mg at 07/21/20 0815    famotidine (PEPCID) injection 20 mg  20 mg Intravenous BID [facility], APRN - CNP   20 mg at 07/21/20 0815    nicotine (NICODERM CQ) 21 MG/24HR 1 patch  1 patch Transdermal Daily PRN EMERSON Ross - CNP        enoxaparin (LOVENOX) injection 40 mg  40 mg Subcutaneous Daily Jarrell Patel APRN - CNP   40 mg at 07/21/20 0817    glucose (GLUTOSE) 40 % oral gel 15 g  15 g Oral PRN Jarrellsarah Patel, APRN - CNP        dextrose 50 % IV solution  12.5 g Intravenous PRN Jarrellsarah Patel, APRN - CNP        glucagon (rDNA) injection 1 mg  1 mg Intramuscular PRN Jarrellsarah Patel, APRN - CNP        dextrose 5 % solution  100 mL/hr Intravenous PRN Jarrell Amanda, APRN - CNP        0.9 % sodium chloride infusion   Intravenous Continuous Jarrell Amanda, APRN -  mL/hr at 07/21/20 1109      insulin lispro (HUMALOG) injection vial 0-18 Units  0-18 Units Subcutaneous TID WC Jarrellsarah Patel, APRN - CNP   9 Units at 07/21/20 1113    insulin lispro (HUMALOG) injection vial 0-9 Units  0-9 Units Subcutaneous Nightly Jarrell Patel APRN - CNP        aztreonam (AZACTAM) 1 g IVPB minibag  1 g Intravenous Q8H EMERSON Rojas NP           Allergies:  Lisinopril; Sulfamethoxazole; Trimethoprim; Cefuroxime axetil; Sulfamethoxazole-trimethoprim; Clindamycin phosphate; and Penicillins    Social History:   reports that she quit smoking about 15 years ago. She has never used smokeless tobacco. She reports previous alcohol use. She reports that she does not use drugs. Family History: family history includes Cancer in her maternal grandfather, maternal grandmother, and mother; Heart Failure in her father; Hypertension in her father and mother. REVIEW OF SYSTEMS:    Constitutional: ++ fever or chills.  No night sweats, no weight loss   Eyes: No eye discharge, double vision, or eye pain   HEENT: negative for sore mouth, sore throat, hoarseness and voice change   Respiratory: negative for cough , sputum, dyspnea, wheezing, hemoptysis, chest pain   Cardiovascular: negative for chest pain, dyspnea, palpitations, orthopnea, PND   Gastrointestinal: +++r nausea, vomiting, diarrhea, constipation, abdominal pain, Dysphagia, hematemesis and hematochezia   Genitourinary: negative for frequency, dysuria, nocturia, urinary incontinence, and hematuria   Integument: negative for rash, skin lesions, bruises.    Hematologic/Lymphatic: negative for easy bruising, bleeding, lymphadenopathy, or petechiae   Endocrine: negative for heat or cold intolerance,weight changes, change in bowel habits and hair loss   Musculoskeletal: negative for myalgias, arthralgias, pain, joint swelling,and bone pain   Neurological: negative for headaches, dizziness, seizures, weakness, numbness    PHYSICAL EXAM:      /66   Pulse 69   Temp 98.7 °F (37.1 °C) (Oral)   Resp 20   Ht 5' 5\" (1.651 m)   Wt 258 lb (117 kg)   SpO2 98%   BMI 42.93 kg/m²    Temp (24hrs), Av.6 °F (37.6 °C), Min:98.3 °F (36.8 °C), Max:100.5 °F (38.1 °C)    General appearance - well appearing, no in pain or distress   Mental status - alert and cooperative   Eyes - pupils equal and reactive, extraocular eye movements intact   Ears - bilateral TM's and external ear canals normal   Mouth - mucous membranes moist, pharynx normal without lesions   Neck - supple, no significant adenopathy   Lymphatics - no palpable lymphadenopathy, no hepatosplenomegaly   Chest - clear to auscultation, no wheezes, rales or rhonchi, symmetric air entry   Heart - normal rate, regular rhythm, normal S1, S2, no murmurs  Abdomen - soft, nontender, nondistended, no masses or organomegaly   Neurological - alert, oriented, normal speech, no focal findings or movement disorder noted   Musculoskeletal - no joint tenderness, deformity or swelling   Extremities - peripheral pulses normal, no pedal edema, no clubbing or cyanosis   Skin - normal coloration and turgor, no rashes, no suspicious skin lesions noted ,    DATA:    Labs:   CBC:   Recent Labs     20  2142 20  0625   WBC 2.9* 6.0   HGB 10.5* 9.5*   HCT 31.9* 28.9*    197     BMP:   Recent Labs 07/20/20  2142 07/21/20  0625    136   K 3.8 3.8   CO2 25 24   BUN 14 14   CREATININE 0.90 0.89   LABGLOM >60 >60   GLUCOSE 229* 178*     PT/INR: No results for input(s): PROTIME, INR in the last 72 hours. IMAGING DATA:  @IMG@   US ABDOMEN LIMITED   Final Result   Intra and extrahepatic biliary dilatation with the common bile duct measuring   up to 13.2 mm, decreased since the prior exams. Biliary stent noted. Questionable pancreatic head mass, better shown on previous MRI. Dilated   pancreatic duct measuring up to 8.4 mm. Primary Problem  <principal problem not specified>    Active Hospital Problems    Diagnosis Date Noted    Intractable nausea and vomiting [R11.2] 07/21/2020         IMPRESSION:   1. Pancreatic adenocarcinoma: Pancreatic adenocarcinoma: Head of pancreas, 3 cm,  T2N1MO stage IIB at EUS showed that the mass encases the portal vein . An intact interface was seen between the mass and the celiac trunk and superior mesenteric artery suggesting a lack of invasion. Appears locally advanced. borderline/unresectable because of PV involvement. She is seen by hepatobiliary surgeon as o/p Started on FOLFIRINOX chemotherapy recently  2. Anemia  3. Leukopenia secondary to chemotherapy  4. Nausea vomiting chemotherapy-induced  5. Dehydration  6. Elevated liver enzymes and fever: Suspect cholangitis. Blood culture showing E. coli    RECOMMENDATIONS:  1. I reviewed the laboratory data, imaging studies, diagnosis, prognosis and treatment options with patient  2. At this point continue symptomatic care with IV hydration, antinausea medications  3. Reviewed the ultrasound which showed intra-and extrahepatic biliary dilatation. She is status post biliary stent placement  4. With elevation of liver enzymes and fever and blood culture positive for E. coli I recommend getting GI consult for suspected cholangitis  5. I discussed the care with primary team  6.  I reviewed the goals of care  7. We will adjust her chemotherapy for cycle 2      Discussed with patient and Nurse. Thank you for asking us to see this patient.     Jennifer Benitez MD  Hematologist/Medical Oncologist  Cell: [phone]

## 2020-07-21 NOTE — CARE COORDINATION
Case Management Initial Discharge Plan  Linn Lee,             Met with:family member patient and son to discuss discharge plans. Information verified: address, contacts, phone number, , insurance Yes    Emergency Contact/Next of Kin name & number: Tina Kaur, [phone]    PCP: Bill Sawyer  Date of last visit: 1 month ago    Insurance Provider: MAYTE    Discharge Planning    Living Arrangements:  Spouse/Significant Other, Family Members   Support Systems:  Spouse/Significant Other, Children, Family Members    Home has 2 stories  3 stairs to climb to get into front door,  13 stairs to climb to reach second floor  Location of bedroom/bathroom in home main    Patient able to perform ADL's:Independent    Current Services (outpatient & in home) none  DME equipment: none  DME provider: n/a    Receiving oral anticoagulation therapy? No    If indicated:   Physician managing anticoagulation treatment: na  Where does patient obtain lab work for ATC treatment? n/a      Potential Assistance Needed:  N/A    Patient agreeable to home care: No  [place] of choice provided:  n/a    Prior SNF/Rehab Placement and Facility: no  Agreeable to SNF/Rehab: No  [place] of choice provided: n/a     Evaluation: yes    Expected Discharge date:  20    Patient expects to be discharged to:  home  Follow Up Appointment: Best Day/ Time: Thursday AM    Transportation provider: son vs self  Transportation arrangements needed for discharge: No, son Öcsény to drive home    Readmission Risk              Risk of Unplanned Readmission:        0             Does patient have a readmission risk score greater than 14?: No  If yes, follow-up appointment must be made within 7 days of discharge. Goals of Care: Control of nausea. Patient is feeling better, she would like advance diet. She declines additional needs. Discharge Plan: Plan to DC home.            Electronically signed by Sandra Anaya RN, BSN on 7/21/20 at 10:26 AM EDT

## 2020-07-21 NOTE — PLAN OF CARE
Problem: Falls - Risk of:  Goal: Will remain free from falls  Description: Will remain free from falls  Outcome: Ongoing   Fall assessment preformed. Bed in low locked position with call light and tray table within reach. Education given. Will continue to monitor. Problem: Skin Integrity:  Goal: Will show no infection signs and symptoms  Description: Will show no infection signs and symptoms  Outcome: Ongoing   Skin assessment preformed. Pt self turned every 2 hours with heals elevated off bed. Will continue to monitor. Problem: Nausea/Vomiting:  Goal: Absence of nausea/vomiting  Description: Absence of nausea/vomiting  Outcome: Ongoing  Goal: Able to drink  Description: Able to drink  Outcome: Ongoing  Goal: Able to eat  Description: Able to eat  Outcome: Ongoing  Goal: Ability to achieve adequate nutritional intake will improve  Description: Ability to achieve adequate nutritional intake will improve  Outcome: Ongoing   Patient receiving anti nausea medications per MAR. Diet orders progressed to clear liquids.  Will continue to monitor

## 2020-07-21 NOTE — PROGRESS NOTES
· Admission Body Weight:      · Usual Body Weight: 270 lb (122.5 kg)     · Ideal Body Weight: 125 lbs; % Ideal Body Weight 206.4 %   · BMI: 42.9   · BMI Categories: Obese Class 3 (BMI 40.0 or greater)       Nutrition Diagnosis:   · Increased nutrient needs related to catabolic illness as evidenced by NPO or clear liquid status due to medical condition, weight loss      Nutrition Interventions:   Food and/or Nutrient Delivery:  Start Oral Diet, Start Oral Nutrition Supplement  Nutrition Education/Counseling:  Education completed   Coordination of Nutrition Care:  Continued Inpatient Monitoring    Goals:  PO intakes to meet >75% estimated needs. Nutrition Monitoring and Evaluation:   Behavioral-Environmental Outcomes:      Food/Nutrient Intake Outcomes:  Diet Advancement/Tolerance, Food and Nutrient Intake, Supplement Intake  Physical Signs/Symptoms Outcomes:  Biochemical Data, Constipation     Discharge Planning:     Too soon to determine     Jyason Pruitt RD, LD  Registered Dietitian  [facility]  [phone]

## 2020-07-21 NOTE — CONSULTS
[facility]  Initial Consult Note  Date: 7/21/2020    Hospital day :0     Impression:   1. Pancreatic adenocarcinoma started on chemotherapy 7/14/2020  2. E. coli sepsis and source at this time is not entirely clear but one would be concerned for a gastrointestinal/biliary source-cholangitis  3. Abnormal LFTs-overall improving  4. Intractable nausea and vomiting with associated dehydration related to the above infection  5. Leukopenia-resolving  6. Penicillin/cephalosporin allergy-she reports swelling and rash with penicillin as well as a rash with cephalosporins  7. Diabetes mellitus type 2    Recommendations   · Continue intravenous antimicrobial therapy with aztreonam  · I would be in favor for CT imaging of the abdomen pelvis to work-up for an intra-abdominal process. · The patient will be transferred to [place] [address] to be evaluated by the gastroenterology service and may need biliary intervention though the ultrasound does not show any major changes. · The patient does not seem to have any evidence of a urinary tract infection or peritonitis. · I will follow her progress and adjust antimicrobial therapy accordingly    Chief complaint/reason for consultation:   E. coli sepsis    History of Present Illness:   Danielle Grier is a 64y.o.-year-old female who was initially admitted on 7/20/2020. Surinder Bustamante has a history of pancreatic adenocarcinoma of the head of the pancreas T2 N1 M0 stage IIb that was recently diagnosed and she was seen by hepatobiliary surgery but was not felt to be an operative candidate as the mass encased the portal vein. She has undergone biliary sphincterotomy and stent placement 6/4/2020 was started on chemotherapy with FOLFIRINOX and received her first cycle on July 14, 2020. The patient was otherwise doing well until yesterday afternoon when she developed fevers, chills, generalized malaise with intractable nausea, vomiting.   The patient reports she was not even able to keep anything down at all and was in her bed trying to get as many calories as possible. Her son ended up taking her temperature and was 100.9 degrees and she had instructions to come into the emergency room for temperatures over 100.3. The patient was seen in the emergency department and admitted for dehydration. Blood cultures done in the emergency department have come back with gram-negative rods 2 out of 2 sets identified as E. coli by PCR. I been asked to evaluate and help with antibiotic choice. The patient does report a mild headache, no sore throat, cough, shortness of breath, chest pains or palpitations. She does report some abdominal discomfort more so today than yesterday. She continues to have pain that radiates to the back. She also reports that her urine was very dark/concentrated but she has not had any dysuria or frequency. I have personally reviewed the past medical history, past surgical history, medications, social history, and family history, and I have updated the database accordingly.   Past Medical History:     Past Medical History:   Diagnosis Date    Anxiety     Diabetes mellitus ([facility] [address]     Hyperlipidemia     Hypertension     Pancreatic cancer [facility])      Past Surgical  History:     Past Surgical History:   Procedure Laterality Date     SECTION      x2    ERCP  2020    SPHINCTER/PAPILLOTOMY, STENT INSERTION    ERCP  2020    ERCP SPHINCTER/PAPILLOTOMY performed by Ronnie Howell MD at [facility]    ERCP  2020    ERCP STENT INSERTION performed by Ronnie Howell MD at [address] [place] [address] ENDOSCOPY  2020    W/EUS FNA     UPPER GASTROINTESTINAL ENDOSCOPY  2020    EGD W/EUS FNA in OR with GI staff performed by Ronnie Howell MD at [facility]     Medications:      amLODIPine  10 mg Oral Daily    aspirin  81 mg Oral Nightly    atorvastatin  40 mg Oral Nightly    carvedilol  12.5 mg Oral BID WC    hydrALAZINE  25 mg Oral 3 times per day    pantoprazole  40 mg Oral QAM AC    sodium chloride flush  10 mL Intravenous 2 times per day    famotidine (PEPCID) injection  20 mg Intravenous BID    enoxaparin  40 mg Subcutaneous Daily    insulin lispro  0-18 Units Subcutaneous TID WC    insulin lispro  0-9 Units Subcutaneous Nightly    aztreonam  1 g Intravenous Q8H     Social History:     Social History     Socioeconomic History    Marital status:      Spouse name: Not on file    Number of children: Not on file    Years of education: Not on file    Highest education level: Not on file   Occupational History    Not on file   Social Needs    Financial resource strain: Not on file    Food insecurity     Worry: Not on file     Inability: Not on file    Transportation needs     Medical: Not on file     Non-medical: Not on file   Tobacco Use    Smoking status: Former Smoker     Last attempt to quit: 7/14/2005     Years since quitting: 15.0    Smokeless tobacco: Never Used   Substance and Sexual Activity    Alcohol use: Not Currently    Drug use: No    Sexual activity: Not on file   Lifestyle    Physical activity     Days per week: Not on file     Minutes per session: Not on file    Stress: Not on file   Relationships    Social connections     Talks on phone: Not on file     Gets together: Not on file     Attends [employer] service: Not on file     Active member of club or organization: Not on file     Attends meetings of clubs or organizations: Not on file     Relationship status: Not on file    Intimate partner violence     Fear of current or ex partner: Not on file     Emotionally abused: Not on file     Physically abused: Not on file     Forced sexual activity: Not on file   Other Topics Concern    Not on file   Social History Narrative    Not on file     Family History:     Family History   Problem Relation Age of Onset    Cancer Mother  Hypertension Mother     Heart Failure Father     Hypertension Father     Cancer Maternal Grandmother         colon     Cancer Maternal Grandfather         lung       Allergies:   Lisinopril; Sulfamethoxazole; Trimethoprim; Cefuroxime axetil; Sulfamethoxazole-trimethoprim; Clindamycin phosphate; and Penicillins     Review of Systems:   General: She has had fevers, chills, decreased oral intake  Eyes: No double vision or blurry vision. ENT: No sore throat or runny nose. Cardiovascular: No chest pain or palpitations. Lung: No shortness of breath or cough. Abdomen: She has some abdominal pain/discomfort with some nausea and vomiting was unable to keep anything down yesterday  Genitourinary: She noticed that her urine was dark and No increased urinary frequency, or dysuria. Musculoskeletal: She does report some back pain and No muscle aches or pains. Hematologic: No bleeding or bruising. Neurologic: She has some mild headache but no weakness or numbness. Physical Examination :   BP (!) 126/59   Pulse 79   Temp 99.2 °F (37.3 °C) (Oral)   Resp 18   Ht 5' 5\" (1.651 m)   Wt 258 lb (117 kg)   SpO2 95%   BMI 42.93 kg/m²     Temperature Range: Temp: 99.2 °F (37.3 °C) Temp  Av.5 °F (37.5 °C)  Min: 98.3 °F (36.8 °C)  Max: 100.5 °F (38.1 °C)  General Appearance: Awake, alert, and in no apparent distress  Head: Normocephalic, without obvious abnormality, atraumatic  Eyes: Pupils equal, round, reactive, to light and accommodation; extraocular movements intact; sclera anicteric; conjunctivae pink  ENT: Oropharynx clear, without erythema, exudate, or thrush. Neck: Supple, without lymphadenopathy. Pulmonary/Chest: Clear to auscultation, without wheezes, rales, or rhonchi  Cardiovascular: The anterior chest does have a port in place without signs of acute infection and Regular rate and rhythm with a soft systolic murmur, no rubs or gallops.    Abdomen: non-distended, bowel sounds normal, no organomegaly and mildly tender to deep palpation in the right upper quadrant  Extremities: No cyanosis, clubbing, edema, or effusions. Neurologic: No gross sensory or motor deficits. Skin: Warm and dry with no rash. Medical Decision Making:   I have independently reviewed/ordered the following labs:  CBC with Differential:   Recent Labs     07/20/20 2142 07/21/20  0625   WBC 2.9* 6.0   HGB 10.5* 9.5*   HCT 31.9* 28.9*    197   LYMPHOPCT 10*  --    MONOPCT 1  --      BMP:   Recent Labs     07/20/20 2142 07/21/20  0625 07/21/20  1338    136  --    K 3.8 3.8  --    CL 96* 99  --    CO2 25 24  --    BUN 14 14  --    CREATININE 0.90 0.89  --    MG 1.4* 1.5* 2.1     Hepatic Function Panel:   Recent Labs     07/20/20 2142 07/21/20  0625   PROT 6.4 5.5*   LABALBU 3.7 3.2*   BILITOT 2.03* 2.77*   ALKPHOS 482* 418*   ALT 1,068* 888*   AST 1,449* 817*     No results found for: CRP  No results found for: SEDRATE    No results for input(s): PROCAL in the last 72 hours.      Color, UA  AMBERAbnormal    YELLOW  Final  07/21/2020 12:50 AM  [facility]    Turbidity UA  CLEAR  CLEAR  Final  07/21/2020 12:50 AM  [facility]    Glucose, Ur  2+Abnormal    NEGATIVE  Final  07/21/2020 12:50 AM  [facility]    Bilirubin Urine  MODERATEAbnormal    NEGATIVE  Final  07/21/2020 12:50 AM  [facility]    Ketones, Urine  NEGATIVE  NEGATIVE  Final  07/21/2020 12:50 AM  [facility]    Specific Gravity, UA  1.020  1.005 - 1.030  Final  07/21/2020 12:50 AM  [facility]    Urine Hgb  TRACEAbnormal    NEGATIVE  Final  07/21/2020 12:50 AM  [facility]    pH, UA  6.5  5.0 - 8.0  Final  07/21/2020 12:50 AM  [facility]    Protein, UA  2+Abnormal    NEGATIVE  Final  07/21/2020 12:50 AM  [facility]    Urobilinogen, Urine  Normal  Normal  Final  07/21/2020 12:50 AM  [facility]    Nitrite, Urine  NEGATIVE  NEGATIVE  Final  07/21/2020 12:50 AM  [facility]    Leukocyte Esterase, Urine  NEGATIVE  NEGATIVE  Final 07/21/2020 12:50 AM  [facility]        WBC, UA  2 TO 5  0 - 5 /HPF  Final  07/21/2020 12:50 AM  [facility]    RBC, UA  0 TO 2  0 - 2 /HPF  Final  07/21/2020 12:50 AM         Imaging Studies:   RIGHT UPPER QUADRANT ULTRASOUND 7/21/2020 1:28 pm FINDINGS:  Intra and extrahepatic biliary dilatation with the common bile duct measuring up to 13.2 mm, decreased since the prior exams. Biliary stent noted. Questionable pancreatic head mass, better shown on previous MRI. Dilated pancreatic duct measuring up to 8.4 mm. Cultures:     Culture, Blood 1 [[id number]]  (Abnormal)  Collected: 07/20/20 2207    Order Status: Completed  Specimen: Blood  Updated: 07/21/20 1417     Specimen Description  . BLOOD     Special Requests  20 ML LEFT ARM     Culture  POSITIVE Blood Culture Results called to and read back by: JEF WEINER AT 5091 7/21/20Abnormal        DIRECT GRAM STAIN FROM BOTTLE: GRAM NEGATIVE RODS    Culture, Blood 1 [[id number]]  (Abnormal)  Collected: 07/20/20 2142    Order Status: Completed  Specimen: Blood  Updated: 07/21/20 1416     Specimen Description  . BLOOD     Special Requests  20 ML RIGHT ARM     Culture  POSITIVE Blood Culture Results called to and read back by: JEF WEINER AT 1412 7/21/20Abnormal        DIRECT GRAM STAIN FROM BOTTLE: GRAM NEGATIVE RODS      Escherichia coli detected by PCRAbnormal             Thank you for allowing us to participate in the care of this patient. Please call with questions. Electronically signed by Kasie Marshall MD on 7/21/2020 at 4:22 PM      [facility]  Kasie Marshall MD  [employer] messaging  OFFICE: [phone]      This note is created with the assistance of a speech recognition program.  While intending to generate a document that actually reflects the content of the visit, the document can still have some errors including those of syntax and sound a like substitutions which may escape proof reading.   In such instances, actual meaning can be extrapolated by contextual diversion.

## 2020-07-21 NOTE — ED NOTES
Lab at bedside to draw second troponin. Pt is updated on plan of care.  She has no requests or complaints at this time, call light within reach     Natalya Villafana RN  07/21/20 3328

## 2020-07-21 NOTE — ED NOTES
Dr. Beverley Dwyer in to discuss results and plan of care with patient     Silvia Wallace, RN  07/21/20 0030

## 2020-07-21 NOTE — PROGRESS NOTES
Dr. Harinder Sanford at [facility] for assessment. Requested that GI be consulted for concerns shown on abd ultrasound and bacteremia. Spoke with Dr. Zayda Rubin with GI regarding consult. Request that patient be transferred to [place].  for further testing. Kayce Richard NP updated and will initiate transfer. Patient updated. All questions answered to satisfaction.

## 2020-07-21 NOTE — PLAN OF CARE
Problem: Falls - Risk of:  Goal: Will remain free from falls  Description: Will remain free from falls  7/21/2020 1949 by Nathan Daly  Outcome: Ongoing  Goal: Absence of physical injury  Description: Absence of physical injury  7/21/2020 1949 by Nathan Daly  Outcome: Ongoing    Problem: Skin Integrity:  Goal: Will show no infection signs and symptoms  Description: Will show no infection signs and symptoms  7/21/2020 1949 by Nathan Daly  Outcome: Ongoing    Goal: Absence of new skin breakdown  Description: Absence of new skin breakdown  7/21/2020 1949 by Nathan Daly  Outcome: Ongoing    Goal: Complications related to intravenous access or infusion will be avoided or minimized  Description: Complications related to intravenous access or infusion will be avoided or minimized  7/21/2020 1949 by Nathan Daly  Outcome: Ongoing     Problem: Nausea/Vomiting:  Goal: Absence of nausea/vomiting  Description: Absence of nausea/vomiting  7/21/2020 1949 by Nathan Daly  Outcome: Ongoing    Goal: Able to drink  Description: Able to drink  7/21/2020 1949 by Nathan Daly  Outcome: Ongoing    Goal: Able to eat  Description: Able to eat  7/21/2020 1949 by Nathan Daly  Outcome: Ongoing    Goal: Ability to achieve adequate nutritional intake will improve  Description: Ability to achieve adequate nutritional intake will improve  7/21/2020 1949 by Nathan Daly  Outcome: Ongoing       Problem: Nutritional:  Goal: Nutritional status will improve  Description: Nutritional status will improve  7/21/2020 1949 by Nathan Daly  Outcome: Ongoing     Problem: Physical Regulation:  Goal: Will remain free from infection  Description: Will remain free from infection  7/21/2020 1949 by Nathan Daly  Outcome: Ongoing    Goal: Ability to maintain vital signs within normal range will improve  Description: Ability to maintain vital signs within normal range will improve  7/21/2020 1949 by Nathan Daly  Outcome: Ongoing    Problem: Pain:  Description: Pain management should include both nonpharmacologic and pharmacologic interventions. Goal: Pain level will decrease  Description: Pain level will decrease  7/21/2020 1949 by Ernst Sacks  Outcome: Ongoing    Goal: Control of acute pain  Description: Control of acute pain  7/21/2020 1949 by Ernst Sacks  Outcome: Ongoing    Goal: Control of chronic pain  Description: Control of chronic pain  7/21/2020 1949 by Ernst Sacks  Outcome: Ongoing    Problem: Nutrition  Description: Nutrition Problem #1: Increased nutrient needs  Intervention: Food and/or Nutrient Delivery: Start Oral Diet, Start Oral Nutrition Supplement  Nutritional Goals: PO intakes to meet >75% estimated needs. Goal: Optimal nutrition therapy  7/21/2020 1949 by Ernst Sacks  Outcome: Ongoing  : Patient is in the chair with no needs at this time. Call light is within reach. Will continue to monitor and assess hourly/PRN.

## 2020-07-22 ENCOUNTER — APPOINTMENT (OUTPATIENT)
Dept: GENERAL RADIOLOGY | Age: 60
DRG: 871 | End: 2020-07-22
Payer: COMMERCIAL

## 2020-07-22 ENCOUNTER — ANESTHESIA EVENT (OUTPATIENT)
Dept: OPERATING ROOM | Age: 60
DRG: 871 | End: 2020-07-22
Payer: COMMERCIAL

## 2020-07-22 ENCOUNTER — ANESTHESIA (OUTPATIENT)
Dept: OPERATING ROOM | Age: 60
DRG: 871 | End: 2020-07-22
Payer: COMMERCIAL

## 2020-07-22 VITALS — DIASTOLIC BLOOD PRESSURE: 68 MMHG | OXYGEN SATURATION: 92 % | TEMPERATURE: 99.4 F | SYSTOLIC BLOOD PRESSURE: 115 MMHG

## 2020-07-22 PROBLEM — N17.9 AKI (ACUTE KIDNEY INJURY) (HCC): Status: ACTIVE | Noted: 2020-07-22

## 2020-07-22 PROBLEM — K83.09 ACUTE OBSTRUCTIVE CHOLANGITIS: Status: ACTIVE | Noted: 2020-07-22

## 2020-07-22 PROBLEM — E11.65 TYPE 2 DIABETES MELLITUS WITH HYPERGLYCEMIA, WITH LONG-TERM CURRENT USE OF INSULIN (HCC): Status: ACTIVE | Noted: 2020-06-01

## 2020-07-22 PROBLEM — R78.81 BACTEREMIA: Status: RESOLVED | Noted: 2020-07-21 | Resolved: 2020-07-22

## 2020-07-22 PROBLEM — A41.9 SEPSIS (HCC): Status: RESOLVED | Noted: 2020-07-21 | Resolved: 2020-07-22

## 2020-07-22 PROBLEM — E87.1 DEHYDRATION WITH HYPONATREMIA: Status: ACTIVE | Noted: 2020-07-22

## 2020-07-22 PROBLEM — E86.0 DEHYDRATION WITH HYPONATREMIA: Status: ACTIVE | Noted: 2020-07-22

## 2020-07-22 PROBLEM — A41.51 SEPTICEMIA DUE TO E. COLI (HCC): Status: ACTIVE | Noted: 2020-07-22

## 2020-07-22 LAB
ABSOLUTE EOS #: 0.15 K/UL (ref 0–0.44)
ABSOLUTE IMMATURE GRANULOCYTE: 0.07 K/UL (ref 0–0.3)
ABSOLUTE LYMPH #: 0.33 K/UL (ref 1.1–3.7)
ABSOLUTE MONO #: 0.22 K/UL (ref 0.1–1.2)
ALBUMIN SERPL-MCNC: 2.5 G/DL (ref 3.5–5.2)
ALBUMIN/GLOBULIN RATIO: 0.9 (ref 1–2.5)
ALP BLD-CCNC: 348 U/L (ref 35–104)
ALT SERPL-CCNC: 489 U/L (ref 5–33)
ANION GAP SERPL CALCULATED.3IONS-SCNC: 10 MMOL/L (ref 9–17)
AST SERPL-CCNC: 173 U/L
BASOPHILS # BLD: 0 % (ref 0–2)
BASOPHILS ABSOLUTE: 0 K/UL (ref 0–0.2)
BILIRUB SERPL-MCNC: 3.28 MG/DL (ref 0.3–1.2)
BILIRUBIN DIRECT: 3.23 MG/DL
BILIRUBIN, INDIRECT: 0.05 MG/DL (ref 0–1)
BUN BLDV-MCNC: 16 MG/DL (ref 6–20)
BUN/CREAT BLD: ABNORMAL (ref 9–20)
CALCIUM SERPL-MCNC: 8.4 MG/DL (ref 8.6–10.4)
CHLORIDE BLD-SCNC: 99 MMOL/L (ref 98–107)
CO2: 20 MMOL/L (ref 20–31)
CREAT SERPL-MCNC: 1.08 MG/DL (ref 0.5–0.9)
CULTURE: ABNORMAL
DIFFERENTIAL TYPE: ABNORMAL
EOSINOPHILS RELATIVE PERCENT: 4 % (ref 1–4)
GFR AFRICAN AMERICAN: >60 ML/MIN
GFR NON-AFRICAN AMERICAN: 52 ML/MIN
GFR SERPL CREATININE-BSD FRML MDRD: ABNORMAL ML/MIN/{1.73_M2}
GFR SERPL CREATININE-BSD FRML MDRD: ABNORMAL ML/MIN/{1.73_M2}
GLOBULIN: ABNORMAL G/DL (ref 1.5–3.8)
GLUCOSE BLD-MCNC: 213 MG/DL (ref 65–105)
GLUCOSE BLD-MCNC: 225 MG/DL (ref 65–105)
GLUCOSE BLD-MCNC: 238 MG/DL (ref 65–105)
GLUCOSE BLD-MCNC: 239 MG/DL (ref 65–105)
GLUCOSE BLD-MCNC: 256 MG/DL (ref 65–105)
GLUCOSE BLD-MCNC: 318 MG/DL (ref 65–105)
GLUCOSE BLD-MCNC: 324 MG/DL (ref 65–105)
GLUCOSE BLD-MCNC: 330 MG/DL (ref 70–99)
HCT VFR BLD CALC: 27.4 % (ref 36.3–47.1)
HEMOGLOBIN: 8.4 G/DL (ref 11.9–15.1)
IMMATURE GRANULOCYTES: 2 %
INR BLD: 1.3
LYMPHOCYTES # BLD: 9 % (ref 24–43)
Lab: ABNORMAL
Lab: ABNORMAL
MCH RBC QN AUTO: 28.1 PG (ref 25.2–33.5)
MCHC RBC AUTO-ENTMCNC: 30.7 G/DL (ref 28.4–34.8)
MCV RBC AUTO: 91.6 FL (ref 82.6–102.9)
MONOCYTES # BLD: 6 % (ref 3–12)
MORPHOLOGY: NORMAL
NRBC AUTOMATED: 0 PER 100 WBC
PDW BLD-RTO: 14.3 % (ref 11.8–14.4)
PLATELET # BLD: 147 K/UL (ref 138–453)
PLATELET ESTIMATE: ABNORMAL
PMV BLD AUTO: 10.7 FL (ref 8.1–13.5)
POTASSIUM SERPL-SCNC: 3.9 MMOL/L (ref 3.7–5.3)
PROTHROMBIN TIME: 13.3 SEC (ref 9–12)
RBC # BLD: 2.99 M/UL (ref 3.95–5.11)
RBC # BLD: ABNORMAL 10*6/UL
SEG NEUTROPHILS: 79 % (ref 36–65)
SEGMENTED NEUTROPHILS ABSOLUTE COUNT: 2.93 K/UL (ref 1.5–8.1)
SODIUM BLD-SCNC: 129 MMOL/L (ref 135–144)
SPECIMEN DESCRIPTION: ABNORMAL
SPECIMEN DESCRIPTION: ABNORMAL
TOTAL PROTEIN: 5.2 G/DL (ref 6.4–8.3)
WBC # BLD: 3.7 K/UL (ref 3.5–11.3)
WBC # BLD: ABNORMAL 10*3/UL

## 2020-07-22 PROCEDURE — 80048 BASIC METABOLIC PNL TOTAL CA: CPT

## 2020-07-22 PROCEDURE — 6370000000 HC RX 637 (ALT 250 FOR IP): Performed by: INTERNAL MEDICINE

## 2020-07-22 PROCEDURE — 6370000000 HC RX 637 (ALT 250 FOR IP): Performed by: CLINICAL NURSE SPECIALIST

## 2020-07-22 PROCEDURE — 2580000003 HC RX 258: Performed by: INTERNAL MEDICINE

## 2020-07-22 PROCEDURE — 0FPB8DZ REMOVAL OF INTRALUMINAL DEVICE FROM HEPATOBILIARY DUCT, VIA NATURAL OR ARTIFICIAL OPENING ENDOSCOPIC: ICD-10-PCS | Performed by: INTERNAL MEDICINE

## 2020-07-22 PROCEDURE — 6370000000 HC RX 637 (ALT 250 FOR IP): Performed by: NURSE PRACTITIONER

## 2020-07-22 PROCEDURE — 2709999900 HC NON-CHARGEABLE SUPPLY: Performed by: INTERNAL MEDICINE

## 2020-07-22 PROCEDURE — 3700000001 HC ADD 15 MINUTES (ANESTHESIA): Performed by: INTERNAL MEDICINE

## 2020-07-22 PROCEDURE — 99254 IP/OBS CNSLTJ NEW/EST MOD 60: CPT | Performed by: INTERNAL MEDICINE

## 2020-07-22 PROCEDURE — 3609015100 HC ERCP STENT PLACEMENT BILIARY/PANCREATIC DUCT: Performed by: INTERNAL MEDICINE

## 2020-07-22 PROCEDURE — 2580000003 HC RX 258: Performed by: CLINICAL NURSE SPECIALIST

## 2020-07-22 PROCEDURE — 0F798DZ DILATION OF COMMON BILE DUCT WITH INTRALUMINAL DEVICE, VIA NATURAL OR ARTIFICIAL OPENING ENDOSCOPIC: ICD-10-PCS | Performed by: INTERNAL MEDICINE

## 2020-07-22 PROCEDURE — C1874 STENT, COATED/COV W/DEL SYS: HCPCS | Performed by: INTERNAL MEDICINE

## 2020-07-22 PROCEDURE — 2500000003 HC RX 250 WO HCPCS: Performed by: INTERNAL MEDICINE

## 2020-07-22 PROCEDURE — 3700000000 HC ANESTHESIA ATTENDED CARE: Performed by: INTERNAL MEDICINE

## 2020-07-22 PROCEDURE — 3609015000 HC ERCP REMOVE FOREIGN BODY/STENT BILIARY/PANC DUCT: Performed by: INTERNAL MEDICINE

## 2020-07-22 PROCEDURE — 80076 HEPATIC FUNCTION PANEL: CPT

## 2020-07-22 PROCEDURE — 85610 PROTHROMBIN TIME: CPT

## 2020-07-22 PROCEDURE — C1769 GUIDE WIRE: HCPCS | Performed by: INTERNAL MEDICINE

## 2020-07-22 PROCEDURE — BF101ZZ FLUOROSCOPY OF BILE DUCTS USING LOW OSMOLAR CONTRAST: ICD-10-PCS | Performed by: INTERNAL MEDICINE

## 2020-07-22 PROCEDURE — 99233 SBSQ HOSP IP/OBS HIGH 50: CPT | Performed by: INTERNAL MEDICINE

## 2020-07-22 PROCEDURE — 7100000001 HC PACU RECOVERY - ADDTL 15 MIN: Performed by: INTERNAL MEDICINE

## 2020-07-22 PROCEDURE — 99231 SBSQ HOSP IP/OBS SF/LOW 25: CPT | Performed by: INTERNAL MEDICINE

## 2020-07-22 PROCEDURE — 3609014300 HC ERCP BALLOON DILATE BILIARY/PANC DUCT/AMPULLA EA: Performed by: INTERNAL MEDICINE

## 2020-07-22 PROCEDURE — 2500000003 HC RX 250 WO HCPCS: Performed by: NURSE ANESTHETIST, CERTIFIED REGISTERED

## 2020-07-22 PROCEDURE — 2500000003 HC RX 250 WO HCPCS: Performed by: CLINICAL NURSE SPECIALIST

## 2020-07-22 PROCEDURE — 99232 SBSQ HOSP IP/OBS MODERATE 35: CPT | Performed by: INTERNAL MEDICINE

## 2020-07-22 PROCEDURE — 6360000002 HC RX W HCPCS: Performed by: NURSE ANESTHETIST, CERTIFIED REGISTERED

## 2020-07-22 PROCEDURE — 74328 X-RAY BILE DUCT ENDOSCOPY: CPT

## 2020-07-22 PROCEDURE — 2580000003 HC RX 258: Performed by: NURSE ANESTHETIST, CERTIFIED REGISTERED

## 2020-07-22 PROCEDURE — 82947 ASSAY GLUCOSE BLOOD QUANT: CPT

## 2020-07-22 PROCEDURE — 7100000000 HC PACU RECOVERY - FIRST 15 MIN: Performed by: INTERNAL MEDICINE

## 2020-07-22 PROCEDURE — 2720000010 HC SURG SUPPLY STERILE: Performed by: INTERNAL MEDICINE

## 2020-07-22 PROCEDURE — 6360000004 HC RX CONTRAST MEDICATION: Performed by: INTERNAL MEDICINE

## 2020-07-22 PROCEDURE — 2060000000 HC ICU INTERMEDIATE R&B

## 2020-07-22 PROCEDURE — 36415 COLL VENOUS BLD VENIPUNCTURE: CPT

## 2020-07-22 PROCEDURE — 85025 COMPLETE CBC W/AUTO DIFF WBC: CPT

## 2020-07-22 DEVICE — STENT SYSTEM RMV
Type: IMPLANTABLE DEVICE | Site: BILE DUCT | Status: FUNCTIONAL
Brand: WALLFLEX BILIARY

## 2020-07-22 RX ORDER — INSULIN GLARGINE 100 [IU]/ML
50 INJECTION, SOLUTION SUBCUTANEOUS NIGHTLY
Status: DISCONTINUED | OUTPATIENT
Start: 2020-07-22 | End: 2020-07-24 | Stop reason: HOSPADM

## 2020-07-22 RX ORDER — ONDANSETRON 2 MG/ML
INJECTION INTRAMUSCULAR; INTRAVENOUS PRN
Status: DISCONTINUED | OUTPATIENT
Start: 2020-07-22 | End: 2020-07-22 | Stop reason: SDUPTHER

## 2020-07-22 RX ORDER — PROPOFOL 10 MG/ML
INJECTION, EMULSION INTRAVENOUS PRN
Status: DISCONTINUED | OUTPATIENT
Start: 2020-07-22 | End: 2020-07-22 | Stop reason: SDUPTHER

## 2020-07-22 RX ORDER — INSULIN GLARGINE 100 [IU]/ML
60 INJECTION, SOLUTION SUBCUTANEOUS DAILY
Status: DISCONTINUED | OUTPATIENT
Start: 2020-07-23 | End: 2020-07-24 | Stop reason: HOSPADM

## 2020-07-22 RX ORDER — SODIUM CHLORIDE 9 MG/ML
INJECTION, SOLUTION INTRAVENOUS CONTINUOUS PRN
Status: DISCONTINUED | OUTPATIENT
Start: 2020-07-22 | End: 2020-07-22 | Stop reason: SDUPTHER

## 2020-07-22 RX ORDER — LIDOCAINE HYDROCHLORIDE 10 MG/ML
INJECTION, SOLUTION EPIDURAL; INFILTRATION; INTRACAUDAL; PERINEURAL PRN
Status: DISCONTINUED | OUTPATIENT
Start: 2020-07-22 | End: 2020-07-22 | Stop reason: SDUPTHER

## 2020-07-22 RX ORDER — FENTANYL CITRATE 50 UG/ML
25 INJECTION, SOLUTION INTRAMUSCULAR; INTRAVENOUS EVERY 5 MIN PRN
Status: DISCONTINUED | OUTPATIENT
Start: 2020-07-22 | End: 2020-07-22 | Stop reason: HOSPADM

## 2020-07-22 RX ORDER — GLYCOPYRROLATE 1 MG/5 ML
SYRINGE (ML) INTRAVENOUS PRN
Status: DISCONTINUED | OUTPATIENT
Start: 2020-07-22 | End: 2020-07-22 | Stop reason: SDUPTHER

## 2020-07-22 RX ORDER — FENTANYL CITRATE 50 UG/ML
INJECTION, SOLUTION INTRAMUSCULAR; INTRAVENOUS PRN
Status: DISCONTINUED | OUTPATIENT
Start: 2020-07-22 | End: 2020-07-22 | Stop reason: SDUPTHER

## 2020-07-22 RX ORDER — NEOSTIGMINE METHYLSULFATE 5 MG/5 ML
SYRINGE (ML) INTRAVENOUS PRN
Status: DISCONTINUED | OUTPATIENT
Start: 2020-07-22 | End: 2020-07-22 | Stop reason: SDUPTHER

## 2020-07-22 RX ORDER — FENTANYL CITRATE 50 UG/ML
50 INJECTION, SOLUTION INTRAMUSCULAR; INTRAVENOUS EVERY 5 MIN PRN
Status: DISCONTINUED | OUTPATIENT
Start: 2020-07-22 | End: 2020-07-22 | Stop reason: HOSPADM

## 2020-07-22 RX ORDER — SODIUM CHLORIDE, SODIUM LACTATE, POTASSIUM CHLORIDE, CALCIUM CHLORIDE 600; 310; 30; 20 MG/100ML; MG/100ML; MG/100ML; MG/100ML
INJECTION, SOLUTION INTRAVENOUS CONTINUOUS PRN
Status: DISCONTINUED | OUTPATIENT
Start: 2020-07-22 | End: 2020-07-22

## 2020-07-22 RX ORDER — ROCURONIUM BROMIDE 10 MG/ML
INJECTION, SOLUTION INTRAVENOUS PRN
Status: DISCONTINUED | OUTPATIENT
Start: 2020-07-22 | End: 2020-07-22 | Stop reason: SDUPTHER

## 2020-07-22 RX ADMIN — ONDANSETRON 4 MG: 2 INJECTION, SOLUTION INTRAMUSCULAR; INTRAVENOUS at 16:14

## 2020-07-22 RX ADMIN — OXYCODONE HYDROCHLORIDE 5 MG: 5 TABLET ORAL at 08:39

## 2020-07-22 RX ADMIN — METRONIDAZOLE 500 MG: 500 INJECTION, SOLUTION INTRAVENOUS at 01:11

## 2020-07-22 RX ADMIN — INSULIN GLARGINE 50 UNITS: 100 INJECTION, SOLUTION SUBCUTANEOUS at 22:09

## 2020-07-22 RX ADMIN — OXYCODONE HYDROCHLORIDE 5 MG: 5 TABLET ORAL at 22:10

## 2020-07-22 RX ADMIN — SODIUM CHLORIDE: 9 INJECTION, SOLUTION INTRAVENOUS at 08:42

## 2020-07-22 RX ADMIN — Medication 3 MG: at 16:13

## 2020-07-22 RX ADMIN — Medication 0.4 MG: at 16:13

## 2020-07-22 RX ADMIN — FAMOTIDINE 20 MG: 10 INJECTION INTRAVENOUS at 08:27

## 2020-07-22 RX ADMIN — CARVEDILOL 12.5 MG: 12.5 TABLET, FILM COATED ORAL at 08:26

## 2020-07-22 RX ADMIN — ASPIRIN 81 MG 81 MG: 81 TABLET ORAL at 20:27

## 2020-07-22 RX ADMIN — AZTREONAM 1 G: 1 INJECTION, POWDER, LYOPHILIZED, FOR SOLUTION INTRAMUSCULAR; INTRAVENOUS at 06:58

## 2020-07-22 RX ADMIN — INSULIN LISPRO 12 UNITS: 100 INJECTION, SOLUTION INTRAVENOUS; SUBCUTANEOUS at 08:26

## 2020-07-22 RX ADMIN — INSULIN LISPRO 6 UNITS: 100 INJECTION, SOLUTION INTRAVENOUS; SUBCUTANEOUS at 11:59

## 2020-07-22 RX ADMIN — OXYCODONE HYDROCHLORIDE 5 MG: 5 TABLET ORAL at 12:44

## 2020-07-22 RX ADMIN — METRONIDAZOLE 500 MG: 500 INJECTION, SOLUTION INTRAVENOUS at 08:25

## 2020-07-22 RX ADMIN — FENTANYL CITRATE 50 MCG: 50 INJECTION INTRAMUSCULAR; INTRAVENOUS at 15:06

## 2020-07-22 RX ADMIN — AZTREONAM 1 G: 1 INJECTION, POWDER, LYOPHILIZED, FOR SOLUTION INTRAMUSCULAR; INTRAVENOUS at 20:25

## 2020-07-22 RX ADMIN — FAMOTIDINE 20 MG: 10 INJECTION INTRAVENOUS at 20:27

## 2020-07-22 RX ADMIN — PROPOFOL 150 MG: 10 INJECTION, EMULSION INTRAVENOUS at 15:06

## 2020-07-22 RX ADMIN — SODIUM CHLORIDE: 9 INJECTION, SOLUTION INTRAVENOUS at 14:58

## 2020-07-22 RX ADMIN — Medication 10 ML: at 08:25

## 2020-07-22 RX ADMIN — OXYCODONE HYDROCHLORIDE 5 MG: 5 TABLET ORAL at 17:34

## 2020-07-22 RX ADMIN — PANTOPRAZOLE SODIUM 40 MG: 40 TABLET, DELAYED RELEASE ORAL at 06:35

## 2020-07-22 RX ADMIN — INSULIN LISPRO 6 UNITS: 100 INJECTION, SOLUTION INTRAVENOUS; SUBCUTANEOUS at 20:37

## 2020-07-22 RX ADMIN — LIDOCAINE HYDROCHLORIDE 50 MG: 10 INJECTION, SOLUTION EPIDURAL; INFILTRATION; INTRACAUDAL; PERINEURAL at 15:06

## 2020-07-22 RX ADMIN — SODIUM CHLORIDE: 9 INJECTION, SOLUTION INTRAVENOUS at 16:24

## 2020-07-22 RX ADMIN — DESMOPRESSIN ACETATE 40 MG: 0.2 TABLET ORAL at 20:27

## 2020-07-22 RX ADMIN — ROCURONIUM BROMIDE 50 MG: 10 INJECTION INTRAVENOUS at 15:06

## 2020-07-22 RX ADMIN — GLUCAGON HYDROCHLORIDE 0.5 MG: KIT at 16:00

## 2020-07-22 ASSESSMENT — PAIN SCALES - GENERAL
PAINLEVEL_OUTOF10: 4
PAINLEVEL_OUTOF10: 5
PAINLEVEL_OUTOF10: 5
PAINLEVEL_OUTOF10: 4
PAINLEVEL_OUTOF10: 3
PAINLEVEL_OUTOF10: 0
PAINLEVEL_OUTOF10: 5
PAINLEVEL_OUTOF10: 0
PAINLEVEL_OUTOF10: 0
PAINLEVEL_OUTOF10: 4
PAINLEVEL_OUTOF10: 3

## 2020-07-22 ASSESSMENT — PULMONARY FUNCTION TESTS
PIF_VALUE: 26
PIF_VALUE: 27
PIF_VALUE: 26
PIF_VALUE: 27
PIF_VALUE: 25
PIF_VALUE: 20
PIF_VALUE: 29
PIF_VALUE: 26
PIF_VALUE: 26
PIF_VALUE: 0
PIF_VALUE: 27
PIF_VALUE: 25
PIF_VALUE: 28
PIF_VALUE: 29
PIF_VALUE: 27
PIF_VALUE: 17
PIF_VALUE: 27
PIF_VALUE: 27
PIF_VALUE: 28
PIF_VALUE: 29
PIF_VALUE: 25
PIF_VALUE: 28
PIF_VALUE: 3
PIF_VALUE: 25
PIF_VALUE: 26
PIF_VALUE: 27
PIF_VALUE: 28
PIF_VALUE: 26
PIF_VALUE: 28
PIF_VALUE: 29
PIF_VALUE: 27
PIF_VALUE: 28
PIF_VALUE: 28
PIF_VALUE: 8
PIF_VALUE: 25
PIF_VALUE: 26
PIF_VALUE: 25
PIF_VALUE: 24
PIF_VALUE: 0
PIF_VALUE: 27
PIF_VALUE: 28
PIF_VALUE: 28
PIF_VALUE: 16
PIF_VALUE: 31
PIF_VALUE: 28
PIF_VALUE: 19
PIF_VALUE: 27
PIF_VALUE: 26
PIF_VALUE: 3
PIF_VALUE: 28
PIF_VALUE: 28
PIF_VALUE: 7
PIF_VALUE: 27
PIF_VALUE: 28
PIF_VALUE: 27
PIF_VALUE: 0
PIF_VALUE: 0
PIF_VALUE: 6
PIF_VALUE: 28
PIF_VALUE: 23
PIF_VALUE: 18
PIF_VALUE: 28
PIF_VALUE: 26
PIF_VALUE: 28
PIF_VALUE: 34
PIF_VALUE: 17
PIF_VALUE: 29
PIF_VALUE: 28
PIF_VALUE: 27
PIF_VALUE: 26
PIF_VALUE: 3
PIF_VALUE: 29
PIF_VALUE: 29
PIF_VALUE: 0
PIF_VALUE: 8
PIF_VALUE: 0
PIF_VALUE: 28
PIF_VALUE: 26
PIF_VALUE: 28
PIF_VALUE: 1
PIF_VALUE: 5
PIF_VALUE: 25
PIF_VALUE: 28
PIF_VALUE: 27

## 2020-07-22 ASSESSMENT — ENCOUNTER SYMPTOMS
PHOTOPHOBIA: 0
ABDOMINAL DISTENTION: 0
BLOOD IN STOOL: 0
STRIDOR: 0
EYE ITCHING: 0
CHEST TIGHTNESS: 0
CONSTIPATION: 0
VOMITING: 1
COUGH: 0
RHINORRHEA: 0
SINUS PRESSURE: 0
EYE REDNESS: 0
APNEA: 0
ANAL BLEEDING: 0
RECTAL PAIN: 0
NAUSEA: 1
CHOKING: 0
RESPIRATORY NEGATIVE: 1
GASTROINTESTINAL NEGATIVE: 1
VOICE CHANGE: 0
TROUBLE SWALLOWING: 0
SORE THROAT: 0
SHORTNESS OF BREATH: 0
WHEEZING: 0

## 2020-07-22 ASSESSMENT — PAIN DESCRIPTION - DIRECTION
RADIATING_TOWARDS: TOWARDS BACK
RADIATING_TOWARDS: AROUND TO BACK
RADIATING_TOWARDS: AROUND TO BACK

## 2020-07-22 ASSESSMENT — PAIN DESCRIPTION - DESCRIPTORS
DESCRIPTORS: ACHING
DESCRIPTORS: ACHING
DESCRIPTORS: SORE
DESCRIPTORS: PRESSURE

## 2020-07-22 ASSESSMENT — PAIN - FUNCTIONAL ASSESSMENT
PAIN_FUNCTIONAL_ASSESSMENT: ACTIVITIES ARE NOT PREVENTED
PAIN_FUNCTIONAL_ASSESSMENT: 0-10

## 2020-07-22 ASSESSMENT — PAIN DESCRIPTION - PROGRESSION
CLINICAL_PROGRESSION: NOT CHANGED

## 2020-07-22 ASSESSMENT — PAIN DESCRIPTION - ONSET
ONSET: ON-GOING

## 2020-07-22 ASSESSMENT — PAIN DESCRIPTION - FREQUENCY
FREQUENCY: INTERMITTENT
FREQUENCY: CONTINUOUS
FREQUENCY: CONTINUOUS
FREQUENCY: INTERMITTENT

## 2020-07-22 ASSESSMENT — PAIN DESCRIPTION - LOCATION
LOCATION: ABDOMEN

## 2020-07-22 ASSESSMENT — PAIN DESCRIPTION - PAIN TYPE
TYPE: ACUTE PAIN

## 2020-07-22 ASSESSMENT — PAIN DESCRIPTION - ORIENTATION
ORIENTATION: RIGHT
ORIENTATION: MID

## 2020-07-22 NOTE — PLAN OF CARE
Problem: Falls - Risk of:  Goal: Will remain free from falls  Description: Will remain free from falls  7/22/2020 0229 by Oma Reeves RN  Outcome: Met This Shift  Patient's bed remained locked and in lowest position throughout shift. Patient belonings and call light remain within reach. 2/4 side rails are up, and non-skid footwear is on. Problem: Nutritional:  Goal: Nutritional status will improve  Description: Nutritional status will improve  7/22/2020 0229 by Oma Reeves RN  Outcome: Ongoing    Problem: Nausea/Vomiting:  Goal: Absence of nausea/vomiting  Description: Absence of nausea/vomiting  7/22/2020 0229 by Oma Reeves RN  Outcome: Ongoing       Problem: Pain:  Description: Pain management should include both nonpharmacologic and pharmacologic interventions. Goal: Pain level will decrease  Description: Pain level will decrease  7/22/2020 0229 by Oma Reeves RN  Outcome: Ongoing  Patient rated pain a  5  on a scale from 0-10. Writer administered PRN pain medications to patient.         Problem: Health Behavior:  Goal: Ability to identify and utilize available resources and services will improve  Description: Ability to identify and utilize available resources and services will improve  7/22/2020 0229 by Oma Reeves RN  Outcome: Ongoing  Electronically signed by Oma Reeves RN on 7/22/2020 at 2:30 AM

## 2020-07-22 NOTE — PROGRESS NOTES
needed for Nausea or Vomiting 7/6/20  Yes Linda Aldana MD   furosemide (LASIX) 20 MG tablet Take 1 tablet by mouth daily  Patient taking differently: Take 20 mg by mouth daily As needed 6/16/20 7/20/20 Yes Linda Aldana MD   insulin glargine (LANTUS) 100 UNIT/ML injection vial Inject 50 Units into the skin nightly 6/6/20  Yes Jazmín Fajardo MD   hydrALAZINE (APRESOLINE) 25 MG tablet Take 1 tablet by mouth every 8 hours 6/6/20  Yes Jazmín Fajardo MD   carvedilol (COREG) 12.5 MG tablet Take 1 tablet by mouth 2 times daily (with meals) 6/6/20  Yes Jazmín Fajardo MD   amLODIPine (NORVASC) 10 MG tablet Take 1 tablet by mouth daily 6/7/20  Yes Jazmín Fajardo MD   pantoprazole (PROTONIX) 40 MG tablet Take 1 tablet by mouth every morning (before breakfast) 6/6/20  Yes Jazmín Fajardo MD   aspirin 81 MG chewable tablet Take 81 mg by mouth nightly   Yes Historical Provider, MD   insulin aspart (NOVOLOG FLEXPEN) 100 UNIT/ML injection pen Inject into the skin   Yes Historical Provider, MD   sertraline (ZOLOFT) 100 MG tablet take 1 1/2 tablet by oral route  every day   Yes Historical Provider, MD   atorvastatin (LIPITOR) 40 MG tablet nightly  10/12/13  Yes Historical Provider, MD   LORazepam (ATIVAN) 0.5 MG tablet 1 mg nightly. 10/12/13  Yes Historical Provider, MD   lidocaine-prilocaine (EMLA) 2.5-2.5 % cream Apply topically as needed. 7/6/20   Linda Aldana MD   nitroGLYCERIN (NITROSTAT) 0.4 MG SL tablet up to max of 3 total doses. If no relief after 1 dose, call 911. 6/6/20   Jazmín Fajardo MD   insulin glargine (LANTUS) 100 UNIT/ML injection vial Inject 60 Units into the skin daily 6/7/20   Jazmín Fajardo MD   benzocaine-menthol (CEPACOL SORE THROAT) 15-3.6 MG lozenge Take 1 lozenge by mouth every 2 hours as needed for Sore Throat 6/6/20   Jazmín Fajardo MD   Lisdexamfetamine Dimesylate 60 MG CAPS Take 60 mg by mouth every morning.     Historical Provider, MD     Current Facility-Administered Medications Medication Dose Route Frequency Provider Last Rate Last Dose    [Held by provider] amLODIPine (NORVASC) tablet 10 mg  10 mg Oral Daily Laverne Fontan, APRN - CNS   10 mg at 07/21/20 0814    [MAR Hold] aspirin chewable tablet 81 mg  81 mg Oral Nightly Laverne Fontan, APRN - CNS   81 mg at 07/21/20 2153    [MAR Hold] atorvastatin (LIPITOR) tablet 40 mg  40 mg Oral Nightly Laverne Fontan, APRN - CNS   40 mg at 07/21/20 2153    [MAR Hold] carvedilol (COREG) tablet 12.5 mg  12.5 mg Oral BID WC Laverne Fontan, APRN - CNS   12.5 mg at 07/22/20 0826    [Held by provider] hydrALAZINE (APRESOLINE) tablet 25 mg  25 mg Oral 3 times per day Laverne Fontan, APRN - CNS   25 mg at 07/21/20 1628    [MAR Hold] nitroGLYCERIN (NITROSTAT) SL tablet 0.4 mg  0.4 mg Sublingual Q5 Min PRN Laverne Fontan, APRN - CNS        [MAR Hold] pantoprazole (PROTONIX) tablet 40 mg  40 mg Oral QAM AC Laverne Fontan, APRN - CNS   40 mg at 07/22/20 0635    [MAR Hold] sodium chloride flush 0.9 % injection 10 mL  10 mL Intravenous 2 times per day Laverne Fontan, APRN - CNS   10 mL at 07/22/20 0825    [MAR Hold] sodium chloride flush 0.9 % injection 10 mL  10 mL Intravenous PRN Laverne Fontan, APRN - CNS       [facility] Hold] potassium chloride (KLOR-CON M) extended release tablet 40 mEq  40 mEq Oral PRN Laverne Fontan, APRN - CNS        Or   [facility] Hold] potassium bicarb-citric acid (EFFER-K) effervescent tablet 40 mEq  40 mEq Oral PRN Laverne Fontan, APRN - CNS        Or   [facility] Hold] potassium chloride 10 mEq/100 mL IVPB (Peripheral Line)  10 mEq Intravenous PRN Laverne Fontan, APRN - CNS       [facility] Hold] magnesium sulfate 1 g in dextrose 5% 100 mL IVPB  1 g Intravenous PRN Laverne Fontan, APRN - CNS   Stopped at 07/21/20 1212    [MAR Hold] polyethylene glycol (GLYCOLAX) packet 17 g  17 g Oral Daily PRN Laverne Fontan, APRN - CNS        [MAR Hold] promethazine (PHENERGAN) tablet 12.5 mg  12.5 mg Oral Q6H PRN Laverne Fontan, APRN - CNS   12.5 mg at 07/21/20 1111    Or    [MAR Hold] ondansetron (ZOFRAN) injection 4 mg  4 mg Intravenous Q6H PRN EMERSON Butcher   4 mg at 07/21/20 0815    [MAR Hold] famotidine (PEPCID) injection 20 mg  20 mg Intravenous BID EMERSON Butcher   20 mg at 07/22/20 0827    [MAR Hold] nicotine (NICODERM CQ) 21 MG/24HR 1 patch  1 patch Transdermal Daily PRN EMERSON Butcher        [MAR Hold] glucose (GLUTOSE) 40 % oral gel 15 g  15 g Oral PRN EMERSON Butcher - SASCHA        [facility] Hold] dextrose 50 % IV solution  12.5 g Intravenous PRN EMERSON Butcher        [MAR Hold] glucagon (rDNA) injection 1 mg  1 mg Intramuscular PRN EMERSON Butcher [facility] Hold] dextrose 5 % solution  100 mL/hr Intravenous PRN EMERSON Butcher - SASCHA       [facility] Hold] 0.9 % sodium chloride infusion   Intravenous Continuous EMERSON Butcher 125 mL/hr at 07/22/20 0842      [MAR Hold] insulin lispro (HUMALOG) injection vial 0-18 Units  0-18 Units Subcutaneous TID WC EMERSON Butcher   6 Units at 07/22/20 1159    [MAR Hold] insulin lispro (HUMALOG) injection vial 0-9 Units  0-9 Units Subcutaneous Nightly EMERSON Butcher   3 Units at 07/21/20 2153    [MAR Hold] aztreonam (AZACTAM) 1 g IVPB minibag  1 g Intravenous Q8H EMERSON Butcher   Stopped at 07/22/20 0728    [Held by provider] enoxaparin (LOVENOX) injection 40 mg  40 mg Subcutaneous Daily EMERSON Conrad CNP        [MAR Hold] metronidazole (FLAGYL) 500 mg in NaCl 100 mL IVPB premix  500 mg Intravenous Q8H Koko Clemente MD   Stopped at 07/22/20 0925    [MAR Hold] oxyCODONE (ROXICODONE) immediate release tablet 5 mg  5 mg Oral Q4H PRN Norma Bogdangel, APRN - NP   5 mg at 07/22/20 1244    Or    [MAR Hold] oxyCODONE (ROXICODONE) immediate release tablet 10 mg  10 mg Oral Q4H PRN Norma Kugel, APRN - NP           Allergies:  Lisinopril; Sulfamethoxazole; Trimethoprim; Cefuroxime axetil; Sulfamethoxazole-trimethoprim; Clindamycin phosphate; and Penicillins    Social History:   reports that she quit smoking about 15 years ago. She has never used smokeless tobacco. She reports previous alcohol use. She reports that she does not use drugs. Family History: family history includes Cancer in her maternal grandfather, maternal grandmother, and mother; Heart Failure in her father; Hypertension in her father and mother. REVIEW OF SYSTEMS:    Constitutional: ++ fever or chills. No night sweats, no weight loss   Eyes: No eye discharge, double vision, or eye pain   HEENT: negative for sore mouth, sore throat, hoarseness and voice change   Respiratory: negative for cough , sputum, dyspnea, wheezing, hemoptysis, chest pain   Cardiovascular: negative for chest pain, dyspnea, palpitations, orthopnea, PND   Gastrointestinal: +++r nausea, vomiting, diarrhea, constipation, abdominal pain, Dysphagia, hematemesis and hematochezia   Genitourinary: negative for frequency, dysuria, nocturia, urinary incontinence, and hematuria   Integument: negative for rash, skin lesions, bruises.    Hematologic/Lymphatic: negative for easy bruising, bleeding, lymphadenopathy, or petechiae   Endocrine: negative for heat or cold intolerance,weight changes, change in bowel habits and hair loss   Musculoskeletal: negative for myalgias, arthralgias, pain, joint swelling,and bone pain   Neurological: negative for headaches, dizziness, seizures, weakness, numbness    PHYSICAL EXAM:      BP (!) 150/57   Pulse 81   Temp 100.6 °F (38.1 °C) (Temporal)   Resp 20   Ht 5' 5\" (1.651 m)   Wt 272 lb (123.4 kg)   SpO2 96%   BMI 45.26 kg/m²    Temp (24hrs), Av °F (37.2 °C), Min:97.5 °F (36.4 °C), Max:100.6 °F (38.1 °C)    General appearance - well appearing, no in pain or distress   Mental status - alert and cooperative   Eyes - pupils equal and reactive, extraocular eye movements intact   Ears - bilateral TM's and external ear canals normal   Mouth - mucous membranes moist, pharynx normal without lesions   Neck - supple, no significant adenopathy   Lymphatics - no palpable lymphadenopathy, no hepatosplenomegaly   Chest - clear to auscultation, no wheezes, rales or rhonchi, symmetric air entry   Heart - normal rate, regular rhythm, normal S1, S2, no murmurs  Abdomen - soft, nontender, nondistended, no masses or organomegaly   Neurological - alert, oriented, normal speech, no focal findings or movement disorder noted   Musculoskeletal - no joint tenderness, deformity or swelling   Extremities - peripheral pulses normal, no pedal edema, no clubbing or cyanosis   Skin - normal coloration and turgor, no rashes, no suspicious skin lesions noted ,    DATA:    Labs:   CBC:   Recent Labs     07/21/20  0625 07/22/20  0552   WBC 6.0 3.7   HGB 9.5* 8.4*   HCT 28.9* 27.4*    147     BMP:   Recent Labs     07/21/20  1720 07/22/20  0552   * 129*   K 3.8 3.9   CO2 21 20   BUN 14 16   CREATININE 0.96* 1.08*   LABGLOM 60* 52*   GLUCOSE 326* 330*     PT/INR:   Recent Labs     07/22/20  0552   PROTIME 13.3*   INR 1.3       IMAGING DATA:  @IMG@    ABDOMEN LIMITED   Final Result   Intra and extrahepatic biliary dilatation with the common bile duct measuring   up to 13.2 mm, decreased since the prior exams. Biliary stent noted. Questionable pancreatic head mass, better shown on previous MRI. Dilated   pancreatic duct measuring up to 8.4 mm.              Primary Problem  Intractable nausea and vomiting    Active Hospital Problems    Diagnosis Date Noted    Intractable nausea and vomiting [R11.2] 07/21/2020    Sepsis ([address] [A41.9] 07/21/2020    Class 3 severe obesity with serious comorbidity in adult ([address] [E66.01] 07/21/2020    Bacteremia [R78.81] 07/21/2020    Transaminitis [R74.0] 07/21/2020    Intractable vomiting [R11.10]     Dehydration [E86.0]     Primary adenocarcinoma of head of pancreas ([address] [C25.0] 06/05/2020    Pancreatic Head Mass [K86.89] 06/04/2020    Hypertension [I10] 12/27/2013         IMPRESSION:   1. Pancreatic adenocarcinoma: Pancreatic adenocarcinoma: Head of pancreas, 3 cm,  T2N1MO stage IIB at EUS showed that the mass encases the portal vein . An intact interface was seen between the mass and the celiac trunk and superior mesenteric artery suggesting a lack of invasion. Appears locally advanced. borderline/unresectable because of PV involvement. She is seen by hepatobiliary surgeon as o/p Started on FOLFIRINOX chemotherapy recently  2. Anemia  3. Leukopenia secondary to chemotherapy  4. Nausea vomiting chemotherapy-induced  5. Dehydration  6. Elevated liver enzymes and fever: Suspect cholangitis. Blood culture showing E. coli    RECOMMENDATIONS:  1. I reviewed the laboratory data, imaging studies, diagnosis, prognosis and treatment options with patient  2. At this point continue symptomatic care with IV hydration, antinausea medications  3. Reviewed the ultrasound which showed intra-and extrahepatic biliary dilatation. She is status post biliary stent placement  4. ERCP today for stent exchange  5. I discussed the care with primary team  6. I reviewed the goals of care  7. We will adjust her chemotherapy for cycle 2      Discussed with patient and Nurse. Thank you for asking us to see this patient. Scarlet Hussein MD  PGY-3, Internal medicine resident  [facility] [place], [place], [facility], [facility]  7/22/2020 3:07 PM   Attending Physician Statement  The patient was seen and examined during rounds, I have discussed the care of Nimesh Donnelly, including pertinent history and exam findings with the resident. I have reviewed the key elements of all parts of the encounter with the resident. I agree with the assessment, and status of the problem list as documented.    Additional assessment/ plan        Arnaud Brambila MD  Hematology Oncology  [phone]  Electronically signed by Gini Diego MD on 7/22/2020 at 3:51 PM

## 2020-07-22 NOTE — ANESTHESIA POSTPROCEDURE EVALUATION
Department of Anesthesiology  Postprocedure Note    Patient: Ryan Frances  MRN: [id number]  YOB: 1960  Date of evaluation: 7/22/2020  Time:  5:51 PM     Procedure Summary     Date:  07/22/20 Room / Location:  [facility] 04 / [facility]    Anesthesia Start:  1458 Anesthesia Stop:  1632    Procedures:       ERCP STENT REMOVAL      ERCP STENT INSERTION      ERCP DILATION BALLOON Diagnosis:  (BILIARY OBSTRUCTION, ASCENDING CHOLANGITIS)    Surgeon:  lAe Irene MD Responsible Provider:  Shikha Garcia MD    Anesthesia Type:  general ASA Status:  3          Anesthesia Type: general    Lavelle Phase I: Lavelle Score: 9    Lavelle Phase II:      Last vitals: Reviewed and per EMR flowsheets.        Anesthesia Post Evaluation    Patient location during evaluation: PACU  Patient participation: complete - patient participated  Level of consciousness: awake and alert  Pain score: 5  Airway patency: patent  Nausea & Vomiting: no nausea and no vomiting  Complications: no  Cardiovascular status: hemodynamically stable  Respiratory status: room air  Hydration status: euvolemic

## 2020-07-22 NOTE — PROGRESS NOTES
[facility] acquired patient at [phone]. Azactam was started and patient ok to transport via stretcher.

## 2020-07-22 NOTE — PROGRESS NOTES
[address] [place] [facility]ist Progress Note-Internal Medicine. [facility] 4B Stepdown       Patient: Haven Moreno    Unit/Bed: 4235/1626-51    YOB: 1960    MRN: [id number]    Acct: [de-identified]     Admitting Diagnosis:Intractable nausea and vomiting [R11.2]  Sepsis [facility]) [A41.9]    Admit Date:  7/20/2020    Hospital Day: 1    Subjective:    Patient feeling better today after ERCP. Presented with a one-week history of nausea vomiting and fever. Positive blood cultures with E. coli septicemia-from suspected acute cholangitis. Patient Seen, Chart, Labs, Radiology studies, and Consults reviewed. Objective:   BP (!) 120/52   Pulse 80   Temp 98.6 °F (37 °C) (Oral)   Resp 12   Ht 5' 5\" (1.651 m)   Wt 272 lb 4.3 oz (123.5 kg)   SpO2 93%   BMI 45.31 kg/m²       Intake/Output Summary (Last 24 hours) at 7/22/2020 1310  Last data filed at 7/22/2020 0636  Gross per 24 hour   Intake 1477 ml   Output 250 ml   Net 1227 ml       Review of Systems   Constitutional: Positive for activity change and fever. Negative for appetite change, chills, diaphoresis, fatigue and unexpected weight change. HENT: Negative for congestion, drooling, ear pain, nosebleeds, rhinorrhea, sinus pressure, sore throat, trouble swallowing and voice change. Eyes: Negative for photophobia, redness, itching and visual disturbance. Respiratory: Negative for apnea, cough, choking, chest tightness, shortness of breath, wheezing and stridor. Cardiovascular: Negative for chest pain, palpitations and leg swelling. Gastrointestinal: Positive for nausea and vomiting. Negative for abdominal distention, anal bleeding, blood in stool, constipation and rectal pain. Endocrine: Negative for heat intolerance, polydipsia, polyphagia and polyuria.    Genitourinary: Negative for decreased urine volume, dyspareunia, enuresis, frequency, genital sores, hematuria, menstrual problem, urgency and vaginal discharge. Musculoskeletal: Negative for arthralgias, gait problem, joint swelling, neck pain and neck stiffness. Skin: Negative for pallor and wound. Allergic/Immunologic: Negative for food allergies. Neurological: Negative for dizziness, tremors, seizures, syncope, weakness, light-headedness and headaches. Hematological: Negative for adenopathy. Does not bruise/bleed easily. Psychiatric/Behavioral: Negative for agitation, confusion, hallucinations, self-injury, sleep disturbance and suicidal ideas. The patient is not hyperactive. Physical Exam  Vitals signs and nursing note reviewed. Constitutional:       General: She is not in acute distress. Appearance: Normal appearance. She is obese. She is not ill-appearing, toxic-appearing or diaphoretic. HENT:      Head: Normocephalic and atraumatic. Nose: Nose normal. No congestion or rhinorrhea. Mouth/Throat:      Mouth: Mucous membranes are moist.      Pharynx: Oropharynx is clear. No oropharyngeal exudate or posterior oropharyngeal erythema. Eyes:      General: No scleral icterus. Right eye: No discharge. Left eye: No discharge. Extraocular Movements: Extraocular movements intact. Conjunctiva/sclera: Conjunctivae normal.      Pupils: Pupils are equal, round, and reactive to light. Neck:      Musculoskeletal: Normal range of motion and neck supple. No neck rigidity or muscular tenderness. Vascular: No carotid bruit. Cardiovascular:      Rate and Rhythm: Normal rate and regular rhythm. Pulses: Normal pulses. Heart sounds: Normal heart sounds. No murmur. No friction rub. No gallop. Pulmonary:      Effort: Pulmonary effort is normal. No respiratory distress. Breath sounds: No stridor. No wheezing, rhonchi or rales. Chest:      Chest wall: No tenderness. Abdominal:      General: Bowel sounds are normal. There is no distension. Palpations:  There is no mass. Tenderness: There is no abdominal tenderness. There is no left CVA tenderness, guarding or rebound. Hernia: No hernia is present. Musculoskeletal: Normal range of motion. General: No swelling, tenderness, deformity or signs of injury. Right lower leg: No edema. Left lower leg: No edema. Lymphadenopathy:      Cervical: No cervical adenopathy. Skin:     General: Skin is warm and dry. Coloration: Skin is not jaundiced or pale. Findings: No bruising, erythema, lesion or rash. Neurological:      General: No focal deficit present. Mental Status: She is alert. Mental status is at baseline. Cranial Nerves: No cranial nerve deficit. Sensory: No sensory deficit. Motor: No weakness. Coordination: Coordination normal.      Gait: Gait normal.      Deep Tendon Reflexes: Reflexes normal.   Psychiatric:         Mood and Affect: Mood normal.         Behavior: Behavior normal.         Thought Content:  Thought content normal.         Judgment: Judgment normal.     Medications:    [Held by provider] amLODIPine  10 mg Oral Daily    aspirin  81 mg Oral Nightly    atorvastatin  40 mg Oral Nightly    carvedilol  12.5 mg Oral BID     [Held by provider] hydrALAZINE  25 mg Oral 3 times per day    pantoprazole  40 mg Oral QAM AC    sodium chloride flush  10 mL Intravenous 2 times per day    famotidine (PEPCID) injection  20 mg Intravenous BID    insulin lispro  0-18 Units Subcutaneous TID     insulin lispro  0-9 Units Subcutaneous Nightly    aztreonam  1 g Intravenous Q8H    [Held by provider] enoxaparin  40 mg Subcutaneous Daily    metroNIDAZOLE  500 mg Intravenous Q8H       Continuous Infusions:   dextrose      sodium chloride 125 mL/hr at 07/22/20 0842       PRN Meds:nitroGLYCERIN, sodium chloride flush, potassium chloride **OR** potassium alternative oral replacement **OR** potassium chloride, magnesium sulfate, polyethylene glycol, promethazine **OR** ondansetron, nicotine, glucose, dextrose, glucagon (rDNA), dextrose, oxyCODONE **OR** oxyCODONE    Data:    CBC:   Recent Labs     07/20/20  2142 07/21/20  0625 07/22/20  0552   WBC 2.9* 6.0 3.7   RBC 3.80* 3.43* 2.99*   HGB 10.5* 9.5* 8.4*   HCT 31.9* 28.9* 27.4*   MCV 83.8 84.1 91.6   RDW 14.6 14.1 14.3    197 147       BMP:   Recent Labs     07/21/20  0625 07/21/20  1720 07/22/20  0552    131* 129*   K 3.8 3.8 3.9   CL 99 95* 99   CO2 24 21 20   PHOS 2.6  --   --    BUN 14 14 16   CREATININE 0.89 0.96* 1.08*       PT/INR:  Recent Labs     07/22/20  0552   PROTIME 13.3*   INR 1.3       APTT: No results for input(s): APTT in the last 72 hours. FASTING LIPID PANEL:  Lab Results   Component Value Date    CHOL 122 06/03/2020    HDL 43 06/03/2020    TRIG 145 06/03/2020       ABG. None. URINALYSIS. None. SEROLOGY  Results for Rhea Thorne (MRN [id number]) as of 7/22/2020 13:11   Ref. Range 6/3/2020 20:17   CA 19-9 Latest Ref Range: 0 - 35 U/mL 13   CEA Latest Ref Range: <3.9 ng/mL 2.6   JOSSIE Latest Ref Range: NEGATIVE  NEGATIVE   Anti-Mitochondrial Antibody Latest Ref Range: 0.0 - 20.0 Units 45.0 (H)   Liver-Kidney Microsomal Ab Latest Ref Range: <1:20  <1:20   Smooth Muscle Ab Latest Ref Range: 0 - 19 Units 22 (H)   IgG 1 Latest Ref Range: 240 - 1118 mg/dL 388   IgG 2 Latest Ref Range: 124 - 549 mg/dL 143   IgG 3 Latest Ref Range: 21 - 134 mg/dL 11 (L)   IgG 4 Latest Ref Range: 1 - 123 mg/dL 9     Results for Rhea Thorne (MRN [id number]) as of 7/22/2020 13:11   Ref. Range 6/3/2020 20:17   Smooth Muscle Ab Latest Ref Range: <1:20  1:80     TUMOR MARKER. None. MICROBIOLOGY   Blood Culture-7/20/2020. Escherichia coli detected by PCR     LACTOSE FERMENTING GRAM NEGATIVE RODS     GRAM NEGATIVE RODS     HISTOPATHOLOGY. None. TOXICOLOGY. None. ENDOSCOPE STUDIES. ERCP with stent removal and exchange + Biliary decompression -07/22/2020. PROCEDURES PERFORMED:   1. Transoral Endoscopic retrograde cholangiopancreatography (ERCP). 2. Stent removal    3. Cholangiogram     4. Sludge extraction    5. Biliary Stent placement SEMS (fully covered, 10mm x 60mm)     6. Biliary decompression     POSTPROCEDURE DIAGNOSIS:   Purulent Cholangitis secondary to extraluminal malignant compression of distal CBD     Successful stent removal of pre-existing stent and replacement     PROCEDURES. Diagnostic left heart catheterization-06/03/2020. Diagnostic procedure: Coronary angiography     Complications:    - No complication      Conclusions      Procedure Summary      Nonobstructive CAD. Recommendations      Medical therapy and risk factor modifications. RADIOLOGY. Liver US- 7/21/2020. FINDINGS:  LIVER:  Image portions of the liver show no focal abnormality. The liver has  a slightly heterogeneous echotexture and is somewhat difficult to penetrate. Liver length is 19.5 cm. Mild-to-moderate intrahepatic biliary ductal  dilatation.     BILIARY SYSTEM:  Previous cholecystectomy. Dilated common bile duct  measuring up to 13.2 mm at the carmel hepatis. , decreased slightly since the  prior studies. Partially visualized stent within the common bile duct.     RIGHT KIDNEY: The right kidney is grossly unremarkable without evidence of  hydronephrosis.     PANCREAS: The pancreas is partially obscured by overlying bowel gas. In  particular, the head and tail are not optimally visualized. Subtle  heterogeneous hypoechoic area in the pancreatic head may represent the  patient's known pancreatic mass. The pancreatic duct is dilated measuring up  to 8.4 mm in the body.     OTHER: No evidence of right upper quadrant ascites.     IMPRESSION:  Intra and extrahepatic biliary dilatation with the common bile duct measuring  up to 13.2 mm, decreased since the prior exams. Biliary stent noted.     Questionable pancreatic head mass, better shown on previous MRI.   Dilated  pancreatic duct measuring up to 8.4 mm.     MRI abdomen and Pelvis-6/4/2020. FINDINGS:    Exam degraded by motion artifact.     ABDOMEN:     The visualized lung bases reveal no signal abnormality.     The liver is normal in signal intensity and contour. No focal liver lesion identified.     Irregular mass in the head of the pancreas is present resulting in biliary  and pancreatic duct obstruction. This measures 3.2 x 2.3 cm and is best visualized on T2 imaging. This mass extends towards the liver hilum. There is close approximation with the main portal vein, which remains patent. There is close abutment with the hepatic artery. No inflammatory change identified involving the pancreas. Relative atrophy of the body and tail is noted.     The spleen, adrenals and kidneys reveal no significant findings. Right renal cyst.     The visualized bowel is normal in caliber.     No ascites. Other than periportal lymph nodes measuring up to 12 mm, no  retroperitoneal or mesenteric lymphadenopathy is otherwise appreciated.     Incidental small intramural fibroid is noted in the right body of the uterus.     No signal abnormality identified in the visualized osseous structures.     MRCP:     Gallbladder:   Surgically absent. Small amount of fluid signal within the gallbladder fossa, likely postoperative.     Bile Ducts:   Intrahepatic and extrahepatic biliary dilatation with abrupt cutoff at the mid common duct level. Upstream dilatation measures 16 mm at the common duct level, 8 mm at the left hepatic   duct level and 7 mm in the right hepatic duct.     Pancreatic Duct:   Dilatation with abrupt cutoff at the site of pancreatic head mass. Upstream dilatation of 8 mm is noted.     IMPRESSION:  1.  3.2 cm soft tissue mass in the pancreatic head resulting in pancreatic  duct and biliary dilatation, compatible with primary neoplasm.       There is abutment of the portal vein and soft tissue extension/lymph nodes in the  liver hilum noted. Given the limitations of motion artifact on this exam, a  pancreas CT should be considered for detailed vascular evaluation.     2. No liver metastatic disease. ASSESSMENT AND  PLAN. 1.CARDIOVASCULAR. Hypertension-controlled. Nonobstructive CAD on cardiac cath. 2.GI. Pancreatic adenocarcinoma of the head     Acute cholangitis -s/p ERCP. Elevated liver enzymes. GI consulted and following. 3.RENAL AND ELECTROLYTES. TORREY due to ATN-IV fluids. Hypovolemic hyponatremia-serum sodium 129. 4.ID.     E. coli septicemia due to acute cholangitis-continue aztreonam.       Discontinue IV Flagyl. ID consult tomorrow. 5.ENDO. Hyperglycemia due to uncontrolled T2 DM-sliding scale insulin and Lantus. 6.PSYCH. Anxiety disorder. 7.HEM-ONC. Pancreatic adenocarcinoma. 8.NUTRITION. Obesity with a BMI of 45.26-needs regular exercise diet control. 9.DISPO. Planned d/c to home vs rehab soon.         Electronically signed Ifrah Brown MD on 7/22/2020 at 1:10 PM    Rounding Hospitalist

## 2020-07-22 NOTE — PROGRESS NOTES
[facility]  Progress Note    Lashell Santos  MRN: [id number]  Date: 7/22/2020    Reason for F/U :   E. coli sepsis    Impression :   1. Pancreatic adenocarcinoma started on chemotherapy 7/14/2020  2. E. coli sepsis and source at this time is not entirely clear but one would be concerned for a gastrointestinal/biliary source-cholangitis  · Status post ERCP with stent exchange 7/22/2020  3. Coagulase-negative staph in 1 out of 2 sets of blood cultures a contaminant  4. Leukopenia-resolving  5. Penicillin/cephalosporin allergy-she reports swelling and rash with penicillin as well as a rash with cephalosporins  6. Diabetes mellitus type 2    Recommendations:   · Continue intravenous antimicrobial therapy with aztreonam.  · I do note metronidazole was added but the do note that she has E. coli in the blood. · We are awaiting the sensitivity data for the E. coli. · Clinically the patient is improved  · We will continue to follow her progress and adjust therapy accordingly    Infection Control Recommendations:   Universal precautions    Discharge Planning:   Estimated Length of IV antimicrobials: To be determined  Patient will need Midline Catheter Insertion/ PICC line Insertion: No  Patient will need: Home IV , [place],  SNF,  LTAC: Undetermined  Patient willneed outpatient wound care: No    MedicalDecision making / Summary of Stay:   Lashell Santos is a 64y.o.-year-old female who was initially admitted on 7/20/2020. Ray Lockhart has a history of pancreatic adenocarcinoma of the head of the pancreas T2 N1 M0 stage IIb that was recently diagnosed and she was seen by hepatobiliary surgery but was not felt to be an operative candidate as the mass encased the portal vein.   She has undergone biliary sphincterotomy and stent placement 6/4/2020 was started on chemotherapy with FOLFIRINOX and received her first cycle on July 14, 2020.     The patient was otherwise doing well until yesterday afternoon Exam  Constitutional:       Appearance: She is well-developed. HENT:      Head: Normocephalic and atraumatic. Mouth/Throat:      Mouth: Mucous membranes are moist.   Eyes:      Pupils: Pupils are equal, round, and reactive to light. Neck:      Musculoskeletal: Normal range of motion and neck supple. Cardiovascular:      Rate and Rhythm: Regular rhythm. Heart sounds: Normal heart sounds. Pulmonary:      Effort: Pulmonary effort is normal.      Breath sounds: Normal breath sounds. Abdominal:      General: Bowel sounds are normal.      Palpations: Abdomen is soft. Skin:     General: Skin is warm and dry. Neurological:      General: No focal deficit present. Mental Status: She is alert and oriented to person, place, and time. Laboratory data:   I have independently reviewed the followinglabs:  CBC with Differential:   Recent Labs     07/20/20  2142 07/21/20  0625 07/22/20  0552   WBC 2.9* 6.0 3.7   HGB 10.5* 9.5* 8.4*   HCT 31.9* 28.9* 27.4*    197 147   LYMPHOPCT 10*  --  9*   MONOPCT 1  --  6     BMP:   Recent Labs     07/21/20  0625 07/21/20  1338 07/21/20  1720 07/22/20  0552     --  131* 129*   K 3.8  --  3.8 3.9   CL 99  --  95* 99   CO2 24  --  21 20   BUN 14  --  14 16   CREATININE 0.89  --  0.96* 1.08*   MG 1.5* 2.1  --   --      Hepatic Function Panel:   Recent Labs     07/21/20  1720 07/22/20  0552   PROT 5.6* 5.2*   LABALBU 3.0* 2.5*   BILIDIR 3.58* 3.23*   IBILI CANNOT BE CALCULATED 0.05   BILITOT 3.45* 3.28*   ALKPHOS 391* 348*   * 489*   * 173*     No results for input(s): VANCOTROUGH in the last 72 hours. No results found for: CRP  No results found for: Efren Rachid Munoz [address] 1237     07/21/20  1338   PROCAL 10.17*       Imaging Studies:   No new imaging    Cultures:     Culture, Blood 1 [[id number]]  (Abnormal)  Collected: 07/20/20 9248    Order Status: Completed  Specimen: Blood  Updated: 07/22/20 8083     Specimen Description  . BLOOD Special Requests  20 ML RIGHT ARM     Culture  POSITIVE Blood Culture Results called to and read back by: JEF WEINER AT [id number] 7/21/20Abnormal        DIRECT GRAM STAIN FROM BOTTLE: GRAM NEGATIVE RODS      Escherichia coli detected by PCRAbnormal        STAPHYLOCOCCUS SPECIES, COAGULASE NEGATIVE A single positive blood culture of coagulase negative Staphylocci, diptheroids, micrococci, Cutibacterium, viridans Streptocci, Bacillus, or Lactobacillus species should be interpreted with caution and viewed as a likely skin contaminant. Abnormal      Culture, Blood 1 [[id number]]  (Abnormal)  Collected: 07/20/20 2207    Order Status: Completed  Specimen: Blood  Updated: 07/22/20 0706     Specimen Description  . BLOOD     Special Requests  20 ML LEFT ARM     Culture  POSITIVE Blood Culture Results called to and read back by: JEF WEINER AT [id number] 7/21/20Abnormal        DIRECT GRAM STAIN FROM BOTTLE: GRAM NEGATIVE RODS      LACTOSE FERMENTING GRAM NEGATIVE RODSAbnormal           Medications:      [Held by provider] amLODIPine  10 mg Oral Daily    aspirin  81 mg Oral Nightly    atorvastatin  40 mg Oral Nightly    carvedilol  12.5 mg Oral BID WC    [Held by provider] hydrALAZINE  25 mg Oral 3 times per day    pantoprazole  40 mg Oral QAM AC    sodium chloride flush  10 mL Intravenous 2 times per day    famotidine (PEPCID) injection  20 mg Intravenous BID    insulin lispro  0-18 Units Subcutaneous TID WC    insulin lispro  0-9 Units Subcutaneous Nightly    aztreonam  1 g Intravenous Q8H    [Held by provider] enoxaparin  40 mg Subcutaneous Daily    metroNIDAZOLE  500 mg Intravenous Q8H           [facility]  Beth Dorantes MD  [employer] messaging  OFFICE: [phone]      Electronically signed by Beth Dorantes MD on 7/22/2020 at 6:15 PM  Thank you for allowing us to participate in the care of this patient. Please call with questions.     This note iscreated with the assistance of a speech recognition program.  While intending to generate a document that actually reflects the content of the visit, the document can still have some errors including those of syntax andsound a like substitutions which may escape proof reading. In such instances, actual meaning can be extrapolated by contextual diversion.

## 2020-07-22 NOTE — ANESTHESIA PRE PROCEDURE
Department of Anesthesiology  Preprocedure Note       Name:  Donovan Portillo   Age:  64 y.o.  :  1960                                          MRN:  [id number]         Date:  2020      Surgeon: Rivka Brower):  Sailaja Butler MD    Procedure: Procedure(s):  ERCP ENDOSCOPIC RETROGRADE CHOLANGIOPANCREATOGRAPHY - GI SCHEDULED    Medications prior to admission:   Prior to Admission medications    Medication Sig Start Date End Date Taking? Authorizing Provider   ondansetron (ZOFRAN-ODT) 8 MG TBDP disintegrating tablet Place 1 tablet under the tongue every 8 hours as needed for Nausea or Vomiting 20  Yes Elizabeth Reynaag MD   furosemide (LASIX) 20 MG tablet Take 1 tablet by mouth daily  Patient taking differently: Take 20 mg by mouth daily As needed 20 Yes Elizabeth Reynaga MD   insulin glargine (LANTUS) 100 UNIT/ML injection vial Inject 50 Units into the skin nightly 20  Yes Valentino Braver, MD   hydrALAZINE (APRESOLINE) 25 MG tablet Take 1 tablet by mouth every 8 hours 20  Yes Valentino Braver, MD   carvedilol (COREG) 12.5 MG tablet Take 1 tablet by mouth 2 times daily (with meals) 20  Yes Valentino Braver, MD   amLODIPine (NORVASC) 10 MG tablet Take 1 tablet by mouth daily 20  Yes Valentino Braver, MD   pantoprazole (PROTONIX) 40 MG tablet Take 1 tablet by mouth every morning (before breakfast) 20  Yes Valentino Braver, MD   aspirin 81 MG chewable tablet Take 81 mg by mouth nightly   Yes Historical Provider, MD   insulin aspart (NOVOLOG FLEXPEN) 100 UNIT/ML injection pen Inject into the skin   Yes Historical Provider, MD   sertraline (ZOLOFT) 100 MG tablet take 1 1/2 tablet by oral route  every day   Yes Historical Provider, MD   atorvastatin (LIPITOR) 40 MG tablet nightly  10/12/13  Yes Historical Provider, MD   LORazepam (ATIVAN) 0.5 MG tablet 1 mg nightly. 10/12/13  Yes Historical Provider, MD   lidocaine-prilocaine (EMLA) 2.5-2.5 % cream Apply topically as needed.  20   Ilya BERG MD Eli   nitroGLYCERIN (NITROSTAT) 0.4 MG SL tablet up to max of 3 total doses. If no relief after 1 dose, call 911. 6/6/20   Liban Poole MD   insulin glargine (LANTUS) 100 UNIT/ML injection vial Inject 60 Units into the skin daily 6/7/20   Liban Poole MD   benzocaine-menthol (CEPACOL SORE THROAT) 15-3.6 MG lozenge Take 1 lozenge by mouth every 2 hours as needed for Sore Throat 6/6/20   Liban Poole MD   Lisdexamfetamine Dimesylate 60 MG CAPS Take 60 mg by mouth every morning.     Historical Provider, MD       Current medications:    Current Facility-Administered Medications   Medication Dose Route Frequency Provider Last Rate Last Dose    [Held by provider] amLODIPine (NORVASC) tablet 10 mg  10 mg Oral Daily Doraine Abt, APRN - CNS   10 mg at 07/21/20 0814    [MAR Hold] aspirin chewable tablet 81 mg  81 mg Oral Nightly Doraine Abt, APRN - CNS   81 mg at 07/21/20 2153    [MAR Hold] atorvastatin (LIPITOR) tablet 40 mg  40 mg Oral Nightly Doraine Abt, APRN - CNS   40 mg at 07/21/20 2153    [MAR Hold] carvedilol (COREG) tablet 12.5 mg  12.5 mg Oral BID WC Doraine Abt, APRN - CNS   12.5 mg at 07/22/20 6432    [Held by provider] hydrALAZINE (APRESOLINE) tablet 25 mg  25 mg Oral 3 times per day Doraine Abt, APRN - CNS   25 mg at 07/21/20 1628    [MAR Hold] nitroGLYCERIN (NITROSTAT) SL tablet 0.4 mg  0.4 mg Sublingual Q5 Min PRN Doraine Abt, APRN - CNS        [MAR Hold] pantoprazole (PROTONIX) tablet 40 mg  40 mg Oral QAM AC Doraine Abt, APRN - CNS   40 mg at 07/22/20 0635    [MAR Hold] sodium chloride flush 0.9 % injection 10 mL  10 mL Intravenous 2 times per day Doraine Abt, APRN - CNS   10 mL at 07/22/20 0825    [MAR Hold] sodium chloride flush 0.9 % injection 10 mL  10 mL Intravenous PRN Doraine Abt, APRN - CNS       [facility] Hold] potassium chloride (KLOR-CON M) extended release tablet 40 mEq  40 mEq Oral PRN EMERSON Pepe        Or   [facility] Hold] potassium bicarb-citric acid (EFFER-K) effervescent tablet 40 mEq  40 mEq Oral PRN EMERSON Metcalf        Or   [facility] Hold] potassium chloride 10 mEq/100 mL IVPB (Peripheral Line)  10 mEq Intravenous PRN EMERSON Metcalf       [facility] Hold] magnesium sulfate 1 g in dextrose 5% 100 mL IVPB  1 g Intravenous PRN EMERSON Metcalf   Stopped at 07/21/20 1212    [MAR Hold] polyethylene glycol (GLYCOLAX) packet 17 g  17 g Oral Daily PRN EMERSON Metcalf        [MAR Hold] promethazine (PHENERGAN) tablet 12.5 mg  12.5 mg Oral Q6H PRN EMERSON Metcalf   12.5 mg at 07/21/20 1111    Or    [MAR Hold] ondansetron (ZOFRAN) injection 4 mg  4 mg Intravenous Q6H PRN MEERSON Metcalf   4 mg at 07/21/20 0815    [MAR Hold] famotidine (PEPCID) injection 20 mg  20 mg Intravenous BID EMERSON Metcalf   20 mg at 07/22/20 0827    [MAR Hold] nicotine (NICODERM CQ) 21 MG/24HR 1 patch  1 patch Transdermal Daily PRN EMERSON Metcalf        [MAR Hold] glucose (GLUTOSE) 40 % oral gel 15 g  15 g Oral PRN EMERSON Metcalf       [facility] Hold] dextrose 50 % IV solution  12.5 g Intravenous PRN EMERSON Metcalf        [MAR Hold] glucagon (rDNA) injection 1 mg  1 mg Intramuscular PRN EMERSON Metcalf        [facility] Hold] dextrose 5 % solution  100 mL/hr Intravenous PRN EMERSON Metcalf       [facility] Hold] 0.9 % sodium chloride infusion   Intravenous Continuous EMERSON Metcalf 125 mL/hr at 07/22/20 0842      [MAR Hold] insulin lispro (HUMALOG) injection vial 0-18 Units  0-18 Units Subcutaneous TID WC EMERSON Metcalf   6 Units at 07/22/20 1159    [MAR Hold] insulin lispro (HUMALOG) injection vial 0-9 Units  0-9 Units Subcutaneous Nightly EMERSON Metcalf   3 Units at 07/21/20 2153    [MAR Hold] aztreonam (AZACTAM) 1 g IVPB minibag  1 g Intravenous Q8H EMERSON Metcalf - CNS Stopped at 07/22/20 0728    [Held by provider] enoxaparin (LOVENOX) injection 40 mg  40 mg Subcutaneous Daily EMERSON Conrad CNP        [MAR Hold] metronidazole (FLAGYL) 500 mg in NaCl 100 mL IVPB premix  500 mg Intravenous Q8H Candido Hernandez MD   Stopped at 07/22/20 0925    [MAR Hold] oxyCODONE (ROXICODONE) immediate release tablet 5 mg  5 mg Oral Q4H PRN Zetta Ser, APRN - NP   5 mg at 07/22/20 1244    Or    [MAR Hold] oxyCODONE (ROXICODONE) immediate release tablet 10 mg  10 mg Oral Q4H PRN Zetta Ser, APRN - NP           Allergies:     Allergies   Allergen Reactions    Lisinopril Swelling     Other reaction(s): swollen lips  In lips      Sulfamethoxazole      Other reaction(s): lip swelling  Other reaction(s): lip swelling      Trimethoprim      Other reaction(s): lip swelling    Cefuroxime Axetil Rash     Other reaction(s): swollen lips  Swollen lips    Sulfamethoxazole-Trimethoprim      Other reaction(s): swollen lips  Other reaction(s): swollen lips      Clindamycin Phosphate Rash     Other reaction(s): rash    Penicillins Rash     Other reaction(s): rash       Problem List:    Patient Active Problem List   Diagnosis Code    Uncontrolled diabetes mellitus ([facility] E11.65    Hypertension I10    Postmenopausal bleeding N95.0    Thickened endometrium R93.89    Type 2 diabetes mellitus with ketoacidosis without coma, with long-term current use of insulin ([facility]) E11.10, Z79.4    Hypophosphatemia E83.39    Hypomagnesemia E83.42    Hyperglycemia due to type 2 diabetes mellitus (HCC) E11.65    Pancreatic Head Mass K86.89    Non-Obstructive CAD I25.10    Primary adenocarcinoma of head of pancreas ([facility]) C25.0    Intractable nausea and vomiting R11.2    Sepsis ([facility]) A41.9    Class 3 severe obesity with serious comorbidity in adult (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) I57.9    Diabetic peripheral neuropathy associated with type 2 diabetes mellitus (HCC) E11.42    Hepatomegaly R16.0    Methicillin resistant Staphylococcus aureus infection A49.02    Long term current use of insulin (HCC) Z79.4    Hyperlipidemia E78.5    Moderate nonproliferative diabetic retinopathy associated with type 2 diabetes mellitus ([facility] H20.9366    Morbid obesity (HCC) E66.01    Pain in limb M79.609    Pancreatic adenocarcinoma (HCC) C25.9    Pancreatic malignancy syndrome ([facility] C25.0    Peripheral venous insufficiency I87.2    Postprocedural state Z98.890    Primary localized osteoarthrosis of ankle and foot M19.079    Urinary tract infectious disease N39.0    Bacteremia R78.81    Transaminitis R74.0    Intractable vomiting R11.10    Dehydration E86.0       Past Medical History:        Diagnosis Date    Anxiety     Diabetes mellitus ([facility]     Hyperlipidemia     Hypertension     Pancreatic cancer [facility])        Past Surgical History:        Procedure Laterality Date     SECTION      x2    ERCP  2020    SPHINCTER/PAPILLOTOMY, STENT INSERTION    ERCP  2020    ERCP SPHINCTER/PAPILLOTOMY performed by Deonna Camarena MD at [facility] [place]    ERCP  2020    ERCP STENT INSERTION performed by Deonna Camarena MD at [address] [place] [address] ENDOSCOPY  2020    W/EUS FNA     UPPER GASTROINTESTINAL ENDOSCOPY  2020    EGD W/EUS FNA in OR with GI staff performed by Deonna Camarena MD at [facility] Endoscopy       Social History:    Social History     Tobacco Use    Smoking status: Former Smoker     Last attempt to quit: 2005     Years since quitting: 15.0    Smokeless tobacco: Never Used   Substance Use Topics    Alcohol use: Not Currently                                Counseling given: Not Answered      Vital Signs (Current):   Vitals:    20 0344 20 0635 20 0825 20 1234   BP: (!) 133/58  (!) 129/53 (!) 120/52 Pulse: 84  81 80   Resp: 14 20 12   Temp: 99.6 °F (37.6 °C)  98.9 °F (37.2 °C) 98.6 °F (37 °C)   TempSrc: Temporal  Temporal Oral   SpO2: 97%  93% 93%   Weight:  272 lb 4.3 oz (123.5 kg)     Height:                                                  BP Readings from Last 3 Encounters:   07/22/20 (!) 120/52   07/14/20 136/72   06/16/20 123/63       NPO Status:                                                                                 BMI:   Wt Readings from Last 3 Encounters:   07/22/20 272 lb 4.3 oz (123.5 kg)   07/14/20 261 lb (118.4 kg)   07/14/20 261 lb (118.4 kg)     Body mass index is 45.31 kg/m².     CBC:   Lab Results   Component Value Date    WBC 3.7 07/22/2020    RBC 2.99 07/22/2020    RBC 4.33 03/05/2012    HGB 8.4 07/22/2020    HCT 27.4 07/22/2020    MCV 91.6 07/22/2020    RDW 14.3 07/22/2020     07/22/2020     03/05/2012       CMP:   Lab Results   Component Value Date     07/22/2020    K 3.9 07/22/2020    CL 99 07/22/2020    CO2 20 07/22/2020    BUN 16 07/22/2020    CREATININE 1.08 07/22/2020    GFRAA >60 07/22/2020    LABGLOM 52 07/22/2020    GLUCOSE 330 07/22/2020    GLUCOSE 278 03/05/2012    PROT 5.2 07/22/2020    CALCIUM 8.4 07/22/2020    BILITOT 3.28 07/22/2020    ALKPHOS 348 07/22/2020     07/22/2020     07/22/2020       POC Tests:   Recent Labs     07/22/20  1138   POCGLU 225*       Coags:   Lab Results   Component Value Date    PROTIME 13.3 07/22/2020    INR 1.3 07/22/2020    APTT 64.0 06/03/2020       HCG (If Applicable):   Lab Results   Component Value Date    PREGTESTUR negative 12/27/2013        ABGs:   Lab Results   Component Value Date    PHART 7.354 06/03/2020    PO2ART 96.6 06/03/2020    JFT9NAE 43.5 06/03/2020    QLP7EMS 23.6 06/03/2020    Q5AWPOUB 97.1 06/03/2020        Type & Screen (If Applicable):  No results found for: LABABO, LABRH    Drug/Infectious Status (If Applicable):  No results found for: HIV, HEPCAB    COVID-19 Screening (If Applicable):   Lab Results   Component Value Date    COVID19 Not Detected 07/21/2020         Anesthesia Evaluation  Patient summary reviewed and Nursing notes reviewed no history of anesthetic complications:   Airway: Mallampati: III  TM distance: >3 FB   Neck ROM: full  Mouth opening: > = 3 FB Dental:          Pulmonary:normal exam  breath sounds clear to auscultation                             Cardiovascular:    (+) hypertension:, CAD: non-obstructive,         Rhythm: regular  Rate: normal                    Neuro/Psych:   (+) neuromuscular disease:, depression/anxiety             GI/Hepatic/Renal:   (+) liver disease:, morbid obesity         ROS comment: Pancreatic mass, nausea/emesis. Endo/Other:    (+) DiabetesType II DM, poorly controlled, using insulin, malignancy/cancer (pancreatic; currently undergoing chemotherapy). ROS comment: Fever with E. Coli bacteremia Abdominal:           Vascular:                               TTE 5/31/2020  Left ventricle is normal in size. Global left ventricular systolic function is hyperdynamic. Estimated ejection fraction is > 65 % . Aortic valve is mildly sclerotic but opens well. Aortic velocities slightly increased probably due to patient being hyperdynamic. Trivial mitral regurgitation. No obvious valvular abnormality. Trivial tricuspid regurgitation. Estimated right ventricular systolic pressure is 62GBHB.        Anesthesia Plan      general     ASA 3       Induction: intravenous and rapid sequence. MIPS: Postoperative opioids intended and Prophylactic antiemetics administered. Anesthetic plan and risks discussed with patient.       Plan discussed with CRNA and surgical team.                  Jettie Gilford, MD   7/22/2020

## 2020-07-22 NOTE — PROGRESS NOTES
Patient arrived to unit from [facility] via ambulance. VS were obtained and patient was assessed. ATB was restarted on our pump. Patient was oriented to room. Bed remains locked and in lowest position. Will continue to monitor.

## 2020-07-22 NOTE — CONSULTS
[facility]    GASTROENTEROLOGY CONSULT    Patient:   Chantel Askew   :    1960   Facility:   [facility]   Date:    2020  Admission Dx: Intractable nausea and vomiting [R11.2]  Sepsis ([facility] [A41.9]  Requesting physician: José Luis Porter MD  Reason for consult:  Elevated LFTs, positive blood cultures   CC : N/V    SUBJECTIVE     HISTORY OF PRESENT ILLNESS  This is a 64 y.o.   female who was admitted 2020 with Intractable nausea and vomiting [R11.2]  Sepsis ([facility] [A41.9]. We have been asked to see the patient in consultation by José Luis Porter MD for elevated LFTs, positive blood cultures. 61-year-old female with history of diabetes mellitus, hypertension, and recently diagnosed pancreatic adenocarcinoma started on chemotherapy with FLORFIRINOX 2020  who presented to [facility] with reports of1 day hx of  nausea and vomiting refractory to Zofran. She also reported fever of 100. 8 F yesterday. She had associated right upper quadrant abdominal pain and has noticed her urine to be dark in color. Labs revealed abnormal LFTs suggesting obstructive pattern. Serology showing E. coli bacteremia. Concern for possible cholangitis. Patient seen by ID and started on Flagyl and Aztreonam.  Patient transferred to [facility] for GI evaluation and further management. Patient was seen by GI 2024 elevated LFTs and abnormal liver ultrasound showing ductal dilation. MRI abdomen 2020 showed pancreatic head mass. On 2020 patient underwent EUS with FNB of pancreatic mass. ERCP with sphincterotomy and 10 F by 7 cm plastic biliary stent placed.     Patient stated she was seen by Dr. Zeynep Carpenter and is not deemed to be a candidate for Whipple as mass encloses blood vessels      OBJECTIVE:     PAST MEDICAL/SURGICAL HISTORY  Past Medical History:   Diagnosis Date    Anxiety     Diabetes mellitus ([facility]     Hyperlipidemia     Hypertension     Pancreatic cancer [facility])      Past Surgical History:   Procedure Laterality Date     SECTION      x2    ERCP  2020    SPHINCTER/PAPILLOTOMY, STENT INSERTION    ERCP  2020    ERCP SPHINCTER/PAPILLOTOMY performed by Ed Vela MD at [facility]"" Endoscopy    ERCP  2020    ERCP STENT INSERTION performed by Ed Vela MD at [address] [place] [address] ENDOSCOPY  2020    W/EUS FNA     UPPER GASTROINTESTINAL ENDOSCOPY  2020    EGD W/EUS FNA in OR with GI staff performed by Ed [facility]ela MD at [facility] Endoscopy       ALLERGIES:  Allergies   Allergen Reactions    Lisinopril Swelling     Other reaction(s): swollen lips  In lips      Sulfamethoxazole      Other reaction(s): lip swelling  Other reaction(s): lip swelling      Trimethoprim      Other reaction(s): lip swelling    Cefuroxime Axetil Rash     Other reaction(s): swollen lips  Swollen lips    Sulfamethoxazole-Trimethoprim      Other reaction(s): swollen lips  Other reaction(s): swollen lips      Clindamycin Phosphate Rash     Other reaction(s): rash    Penicillins Rash     Other reaction(s): rash       HOME MEDICATIONS:  Prior to Admission medications    Medication Sig Start Date End Date Taking?  Authorizing Provider   ondansetron (ZOFRAN-ODT) 8 MG TBDP disintegrating tablet Place 1 tablet under the tongue every 8 hours as needed for Nausea or Vomiting 20  Yes Lona Granda MD   furosemide (LASIX) 20 MG tablet Take 1 tablet by mouth daily  Patient taking differently: Take 20 mg by mouth daily As needed 20 Yes Lona Granda MD   insulin glargine (LANTUS) 100 UNIT/ML injection vial Inject 50 Units into the skin nightly 20  Yes Samreen Dalton MD   hydrALAZINE (APRESOLINE) 25 MG tablet Take 1 tablet by mouth every 8 hours 20  Yes Samreen Dalton MD   carvedilol (COREG) 12.5 MG tablet Take 1 tablet by mouth 2 times daily (with meals) 6/6/20  Yes Jazmín Fajardo MD   amLODIPine (NORVASC) 10 MG tablet Take 1 tablet by mouth daily 6/7/20  Yes Jazmín Fajardo MD   pantoprazole (PROTONIX) 40 MG tablet Take 1 tablet by mouth every morning (before breakfast) 6/6/20  Yes Jazmín Fajardo MD   aspirin 81 MG chewable tablet Take 81 mg by mouth nightly   Yes Historical Provider, MD   insulin aspart (NOVOLOG FLEXPEN) 100 UNIT/ML injection pen Inject into the skin   Yes Historical Provider, MD   sertraline (ZOLOFT) 100 MG tablet take 1 1/2 tablet by oral route  every day   Yes Historical Provider, MD   atorvastatin (LIPITOR) 40 MG tablet nightly  10/12/13  Yes Historical Provider, MD   LORazepam (ATIVAN) 0.5 MG tablet 1 mg nightly. 10/12/13  Yes Historical Provider, MD   lidocaine-prilocaine (EMLA) 2.5-2.5 % cream Apply topically as needed. 7/6/20   Linda Aldana MD   nitroGLYCERIN (NITROSTAT) 0.4 MG SL tablet up to max of 3 total doses. If no relief after 1 dose, call 911. 6/6/20   Jazmín Fajardo MD   insulin glargine (LANTUS) 100 UNIT/ML injection vial Inject 60 Units into the skin daily 6/7/20   Jazmín Fajardo MD   benzocaine-menthol (CEPACOL SORE THROAT) 15-3.6 MG lozenge Take 1 lozenge by mouth every 2 hours as needed for Sore Throat 6/6/20   Jazmín Fajardo MD   Lisdexamfetamine Dimesylate 60 MG CAPS Take 60 mg by mouth every morning.     Historical Provider, MD       CURRENT MEDICATIONS:  Scheduled Meds:   [Held by provider] amLODIPine  10 mg Oral Daily    aspirin  81 mg Oral Nightly    atorvastatin  40 mg Oral Nightly    carvedilol  12.5 mg Oral BID WC    [Held by provider] hydrALAZINE  25 mg Oral 3 times per day    pantoprazole  40 mg Oral QAM AC    sodium chloride flush  10 mL Intravenous 2 times per day    famotidine (PEPCID) injection  20 mg Intravenous BID    insulin lispro  0-18 Units Subcutaneous TID WC    insulin lispro  0-9 Units Subcutaneous Nightly    aztreonam  1 g Intravenous Q8H    [Held by provider] focal abnormality. The liver has a slightly heterogeneous echotexture and is somewhat difficult to penetrate. Liver length is 19.5 cm. Mild-to-moderate intrahepatic biliary ductal dilatation. BILIARY SYSTEM:  Previous cholecystectomy. Dilated common bile duct measuring up to 13.2 mm at the carmel hepatis. , decreased slightly since the prior studies. Partially visualized stent within the common bile duct. RIGHT KIDNEY: The right kidney is grossly unremarkable without evidence of hydronephrosis. PANCREAS: The pancreas is partially obscured by overlying bowel gas. In particular, the head and tail are not optimally visualized. Subtle heterogeneous hypoechoic area in the pancreatic head may represent the patient's known pancreatic mass. The pancreatic duct is dilated measuring up to 8.4 mm in the body. OTHER: No evidence of right upper quadrant ascites. Intra and extrahepatic biliary dilatation with the common bile duct measuring up to 13.2 mm, decreased since the prior exams. Biliary stent noted. Questionable pancreatic head mass, better shown on previous MRI. Dilated pancreatic duct measuring up to 8.4 mm. IMPRESSION:     53-year-old female with history of diabetes mellitus, hypertension, and recently diagnosed pancreatic adenocarcinoma started on chemotherapy with FLORFIRINOX 07/14/2020  who presented to [facility] with reports of nausea and vomiting refractory to Zofran and fever of 100. 8 F. Labs revealed abnormal LFTs suggesting obstructive pattern. Serology showing E. coli bacteremia. Concern for possible cholangitis. Patient started on Flagyl and Aztreonam by ID. Patient transferred to [facility] for GI evaluation and further management. 1.  Pancreatic adenocarcinoma s/p stent placement on chemotherapy  2. Evaded LFTs suggesting obstructive pattern  3.  E. coli bacteremia -suspect cholangitis    - Suspect blocked biliary stent. Old records, labs and imaging reviewed.      PLAN

## 2020-07-22 NOTE — FLOWSHEET NOTE
Situation:  Writer visited with Ms. Wiley Vallejo prior to her transfer to another hospital.    Assessment:  Ms. Wiley Vallejo welcomed writer's visit. She shared about her illness and her need to go to another hospital. She talked about how she has been coping with her diagnosis, including seeking forgiveness, acknowledging some of the mistakes she has made. Ms. Wiley Vallejo expressed hopes of \"going to heaven. \" She affirmed her belief in God's love and mercy but also acknowledged that she is wondering if she will be forgiven. She is aware that she needs to forgive herself. She spoke of her efforts to address end of life plans and concerns. She reflected on her attitude about her life and death. She draws support from her leonidas, her family, her friends, and the prayers of others. Ms. Wiley Vallejo was receptive to prayer. Intervention:  Writer provided supportive presence and explored Pt's coping and needs; offered empathy and listening to Pt; affirmed Pt's strengths; encouraged Pt to be kind to herself; prayed for Pt; gave Pt her business card and told her she is available for support. Outcome:  Ms. Wiley Vallejo thanked writer for the visit and support. Plan:  Writer will provide follow up support to Patient.        07/21/20 2001   Encounter Summary   Services provided to: Patient   Referral/Consult From: Nurse   Support System Family members;Friends/neighbors   Continue Visiting   (7/21/20)   Complexity of Encounter Moderate   Length of Encounter 15 minutes   Spiritual Assessment Completed Yes   Spiritual/Druze   Type Spiritual support   Assessment Calm; Approachable;Guilt;Coping;Grieving   Intervention Discussed relationship with God;Discussed meaning/purpose;Discussed illness/injury and it's impact; Discussed belief system/Yarsani practices/leonidas;Sustaining presence/ Ministry of presence; Facilitated forgiveness;Prayer;Explored coping resources; Explored feelings, thoughts, concerns; Active listening   Outcome

## 2020-07-22 NOTE — OP NOTE
[facility]     ERCP with stent removal and exchange + Biliary decompression     PROCEDURE DATE: 07/22/20    REFERRING PHYSICIAN: No ref. provider found     PRIMARY CARE PROVIDER: Kat Elliott    ATTENDING PHYSICIAN: Geovany Tomas MD     HISTORY: Ms. Lashell Santos is a 64 y.o. female who presents to the [place] [address] Endoscopy unit for ERCP. The patient's clinical history is remarkable for pancreatic head cancer with vascular encasement, non-surgical candidate per record, recent ERCP with plastic biliary stent placement, transferred for suspected . She is currently medically stable and appropriate for the planned procedure. PROCEDURES PERFORMED:   1. Transoral Endoscopic retrograde cholangiopancreatography (ERCP). 2. Stent removal  3. Cholangiogram   4. Sludge extraction  5. Biliary Stent placement SEMS (fully covered, 10mm x 60mm)   6. Biliary decompression    POSTPROCEDURE DIAGNOSIS:    Purulent Cholangitis secondary to extraluminal malignant compression of distal CBD    Successful stent removal of pre-existing stent and replacement     INSTRUMENTS:   1. Olympus TJF-180VF flexible duodenoscope. 2. 44 Rx [employer] Sphincterotome   3. Endoscope 15 mm snare for stent extraction  4. 9-12 mm biliary balloon  5. ELLEN fully covered biliary stent 10mm x 60mm       MEDICATIONS:   General endotracheal anesthesia  Indomethacin     EBL: 10<cc    FLUOROSCOPY TIME: 1 minute and 46 seconds     PREPARATION: After the risks, benefits and alternatives of the procedure were discussed, informed consent was obtained. Complications were said to include, but were not limited to: medication allergy, medication reaction, cardiovascular and respiratory problems, bleeding, perforation, infection, pancreatitis, aspiration, and/or missed diagnosis.  Following arrival in the endoscopy room, the patient was placed in the supine position and a final time-out was performed in the presence of the nursing staff. The patient was then sedated and intubated, and the examination was started. FINDINGS: The duodenoscope was inserted into the oropharynx under direct visualization and advanced smoothly into the stomach where a small gastric pool was suctioned. A cursory view of the gastric mucosa was normal. The scope was then further advanced through a patent pylorus to the second portion of the duodenum. The major ampulla was visualized and appeared grossly intact with evidence of prior sphincterotomy and residual plastic biliary stent. There was evidence of purulence exiting the distal phalange of the stent. The stent was pulled using an endoscopic snare and under fluoroscopic guidance. Access was gained into the common bile duct using a [employer] sphincterotome 44 Rx preloaded with a 0.035\" guidewire. A cholangiogram revealed CBD narrowing in the distal most segment with mid and proximal CBD dilation to approximately 1.3 cm. Area of narrowing is approximately 3.5 cm in length. Exchange of tome was performed for the 9-12 above balloon. Several sweep were performed after dilation of the balloon to 12 mm to extract significant amount of purulence, sludge, and small soft stones. Followed several sweep, we exchanged the balloon for a fully covered self expanding metal stent with the deployment device over the wire. Measurements were taken prior to ensure the stent length surpasses the CBD narrowed segment. Deployment was successfully performed under fluoroscopic guidance with the proximal tip at the CHD and the distal tip with 15mm outside the ampulla. Upon deployment there was significant amount of purulent biliary drainage signifying successful biliary decompression. Final fluoroscopic views of the hemidiaphragm revealed no air. The patient was extubated and returned to the recovery area in good condition having tolerated the procedure well.      Biliary Cannulation:        Significant

## 2020-07-23 PROBLEM — I47.29 NSVT (NONSUSTAINED VENTRICULAR TACHYCARDIA): Status: ACTIVE | Noted: 2020-07-23

## 2020-07-23 LAB
ALBUMIN SERPL-MCNC: 2.6 G/DL (ref 3.5–5.2)
ALBUMIN/GLOBULIN RATIO: 0.9 (ref 1–2.5)
ALP BLD-CCNC: 400 U/L (ref 35–104)
ALT SERPL-CCNC: 345 U/L (ref 5–33)
ANION GAP SERPL CALCULATED.3IONS-SCNC: 10 MMOL/L (ref 9–17)
AST SERPL-CCNC: 84 U/L
BILIRUB SERPL-MCNC: 3.16 MG/DL (ref 0.3–1.2)
BILIRUBIN DIRECT: 2.96 MG/DL
BILIRUBIN, INDIRECT: 0.2 MG/DL (ref 0–1)
BUN BLDV-MCNC: 16 MG/DL (ref 6–20)
CALCIUM SERPL-MCNC: 8.9 MG/DL (ref 8.6–10.4)
CHLORIDE BLD-SCNC: 102 MMOL/L (ref 98–107)
CO2: 21 MMOL/L (ref 20–31)
CREAT SERPL-MCNC: 1.05 MG/DL (ref 0.5–0.9)
GFR AFRICAN AMERICAN: >60 ML/MIN
GFR NON-AFRICAN AMERICAN: 54 ML/MIN
GFR SERPL CREATININE-BSD FRML MDRD: ABNORMAL ML/MIN/{1.73_M2}
GFR SERPL CREATININE-BSD FRML MDRD: ABNORMAL ML/MIN/{1.73_M2}
GLUCOSE BLD-MCNC: 124 MG/DL (ref 65–105)
GLUCOSE BLD-MCNC: 145 MG/DL (ref 65–105)
GLUCOSE BLD-MCNC: 163 MG/DL (ref 65–105)
GLUCOSE BLD-MCNC: 202 MG/DL (ref 70–99)
GLUCOSE BLD-MCNC: 204 MG/DL (ref 65–105)
HCT VFR BLD CALC: 29.2 % (ref 36.3–47.1)
HEMOGLOBIN: 8.8 G/DL (ref 11.9–15.1)
MAGNESIUM: 1.7 MG/DL (ref 1.6–2.6)
MCH RBC QN AUTO: 27.7 PG (ref 25.2–33.5)
MCHC RBC AUTO-ENTMCNC: 30.1 G/DL (ref 28.4–34.8)
MCV RBC AUTO: 91.8 FL (ref 82.6–102.9)
NRBC AUTOMATED: 0 PER 100 WBC
PDW BLD-RTO: 14.4 % (ref 11.8–14.4)
PLATELET # BLD: 181 K/UL (ref 138–453)
PMV BLD AUTO: 10.4 FL (ref 8.1–13.5)
POTASSIUM SERPL-SCNC: 3.8 MMOL/L (ref 3.7–5.3)
POTASSIUM SERPL-SCNC: 3.8 MMOL/L (ref 3.7–5.3)
RBC # BLD: 3.18 M/UL (ref 3.95–5.11)
SODIUM BLD-SCNC: 133 MMOL/L (ref 135–144)
TOTAL PROTEIN: 5.4 G/DL (ref 6.4–8.3)
WBC # BLD: 3 K/UL (ref 3.5–11.3)

## 2020-07-23 PROCEDURE — 6370000000 HC RX 637 (ALT 250 FOR IP): Performed by: INTERNAL MEDICINE

## 2020-07-23 PROCEDURE — 6370000000 HC RX 637 (ALT 250 FOR IP): Performed by: NURSE PRACTITIONER

## 2020-07-23 PROCEDURE — 51798 US URINE CAPACITY MEASURE: CPT

## 2020-07-23 PROCEDURE — 2500000003 HC RX 250 WO HCPCS: Performed by: INTERNAL MEDICINE

## 2020-07-23 PROCEDURE — 36415 COLL VENOUS BLD VENIPUNCTURE: CPT

## 2020-07-23 PROCEDURE — 2580000003 HC RX 258: Performed by: INTERNAL MEDICINE

## 2020-07-23 PROCEDURE — APPNB45 APP NON BILLABLE 31-45 MINUTES: Performed by: INTERNAL MEDICINE

## 2020-07-23 PROCEDURE — 80053 COMPREHEN METABOLIC PANEL: CPT

## 2020-07-23 PROCEDURE — 99232 SBSQ HOSP IP/OBS MODERATE 35: CPT | Performed by: INTERNAL MEDICINE

## 2020-07-23 PROCEDURE — 85027 COMPLETE CBC AUTOMATED: CPT

## 2020-07-23 PROCEDURE — 6360000002 HC RX W HCPCS: Performed by: INTERNAL MEDICINE

## 2020-07-23 PROCEDURE — 82248 BILIRUBIN DIRECT: CPT

## 2020-07-23 PROCEDURE — 2060000000 HC ICU INTERMEDIATE R&B

## 2020-07-23 PROCEDURE — 51701 INSERT BLADDER CATHETER: CPT

## 2020-07-23 PROCEDURE — 93005 ELECTROCARDIOGRAM TRACING: CPT | Performed by: INTERNAL MEDICINE

## 2020-07-23 PROCEDURE — 82947 ASSAY GLUCOSE BLOOD QUANT: CPT

## 2020-07-23 PROCEDURE — 83735 ASSAY OF MAGNESIUM: CPT

## 2020-07-23 PROCEDURE — 84132 ASSAY OF SERUM POTASSIUM: CPT

## 2020-07-23 RX ORDER — CIPROFLOXACIN 500 MG/1
500 TABLET, FILM COATED ORAL 2 TIMES DAILY
Qty: 20 TABLET | Refills: 0 | Status: SHIPPED | OUTPATIENT
Start: 2020-07-23 | End: 2020-08-02

## 2020-07-23 RX ORDER — MAGNESIUM SULFATE 1 G/100ML
1 INJECTION INTRAVENOUS ONCE
Status: COMPLETED | OUTPATIENT
Start: 2020-07-23 | End: 2020-07-24

## 2020-07-23 RX ORDER — POTASSIUM CHLORIDE 20 MEQ/1
40 TABLET, EXTENDED RELEASE ORAL ONCE
Status: COMPLETED | OUTPATIENT
Start: 2020-07-23 | End: 2020-07-23

## 2020-07-23 RX ADMIN — AZTREONAM 1 G: 1 INJECTION, POWDER, LYOPHILIZED, FOR SOLUTION INTRAMUSCULAR; INTRAVENOUS at 12:11

## 2020-07-23 RX ADMIN — DESMOPRESSIN ACETATE 40 MG: 0.2 TABLET ORAL at 22:10

## 2020-07-23 RX ADMIN — OXYCODONE HYDROCHLORIDE 5 MG: 5 TABLET ORAL at 22:10

## 2020-07-23 RX ADMIN — AZTREONAM 1 G: 1 INJECTION, POWDER, LYOPHILIZED, FOR SOLUTION INTRAMUSCULAR; INTRAVENOUS at 04:07

## 2020-07-23 RX ADMIN — MAGNESIUM SULFATE HEPTAHYDRATE 1 G: 1 INJECTION, SOLUTION INTRAVENOUS at 23:02

## 2020-07-23 RX ADMIN — OXYCODONE HYDROCHLORIDE 5 MG: 5 TABLET ORAL at 12:59

## 2020-07-23 RX ADMIN — INSULIN GLARGINE 50 UNITS: 100 INJECTION, SOLUTION SUBCUTANEOUS at 22:15

## 2020-07-23 RX ADMIN — FAMOTIDINE 20 MG: 10 INJECTION INTRAVENOUS at 22:10

## 2020-07-23 RX ADMIN — CARVEDILOL 12.5 MG: 12.5 TABLET, FILM COATED ORAL at 16:55

## 2020-07-23 RX ADMIN — ASPIRIN 81 MG 81 MG: 81 TABLET ORAL at 22:10

## 2020-07-23 RX ADMIN — INSULIN LISPRO 6 UNITS: 100 INJECTION, SOLUTION INTRAVENOUS; SUBCUTANEOUS at 08:10

## 2020-07-23 RX ADMIN — OXYCODONE HYDROCHLORIDE 5 MG: 5 TABLET ORAL at 04:12

## 2020-07-23 RX ADMIN — SERTRALINE 150 MG: 50 TABLET, FILM COATED ORAL at 08:09

## 2020-07-23 RX ADMIN — Medication 10 ML: at 08:51

## 2020-07-23 RX ADMIN — FAMOTIDINE 20 MG: 10 INJECTION INTRAVENOUS at 08:09

## 2020-07-23 RX ADMIN — CARVEDILOL 12.5 MG: 12.5 TABLET, FILM COATED ORAL at 08:08

## 2020-07-23 RX ADMIN — PANTOPRAZOLE SODIUM 40 MG: 40 TABLET, DELAYED RELEASE ORAL at 05:14

## 2020-07-23 RX ADMIN — POTASSIUM CHLORIDE 40 MEQ: 1500 TABLET, EXTENDED RELEASE ORAL at 22:10

## 2020-07-23 RX ADMIN — INSULIN LISPRO 3 UNITS: 100 INJECTION, SOLUTION INTRAVENOUS; SUBCUTANEOUS at 12:10

## 2020-07-23 RX ADMIN — INSULIN LISPRO 2 UNITS: 100 INJECTION, SOLUTION INTRAVENOUS; SUBCUTANEOUS at 22:15

## 2020-07-23 RX ADMIN — AZTREONAM 1 G: 1 INJECTION, POWDER, LYOPHILIZED, FOR SOLUTION INTRAMUSCULAR; INTRAVENOUS at 22:09

## 2020-07-23 RX ADMIN — Medication 10 ML: at 22:15

## 2020-07-23 RX ADMIN — INSULIN GLARGINE 60 UNITS: 100 INJECTION, SOLUTION SUBCUTANEOUS at 08:08

## 2020-07-23 ASSESSMENT — ENCOUNTER SYMPTOMS
COUGH: 0
ABDOMINAL PAIN: 1
WHEEZING: 0
CHEST TIGHTNESS: 0
PHOTOPHOBIA: 0
EYE ITCHING: 0
SHORTNESS OF BREATH: 0
STRIDOR: 0
RHINORRHEA: 0
NAUSEA: 0
ABDOMINAL DISTENTION: 0
RESPIRATORY NEGATIVE: 1
SORE THROAT: 0
RECTAL PAIN: 0
EYE REDNESS: 0
VOMITING: 0
SINUS PRESSURE: 0
TROUBLE SWALLOWING: 0
CHOKING: 0
APNEA: 0
VOICE CHANGE: 0
ANAL BLEEDING: 0
BLOOD IN STOOL: 0
CONSTIPATION: 0

## 2020-07-23 ASSESSMENT — PAIN DESCRIPTION - PROGRESSION

## 2020-07-23 ASSESSMENT — PAIN DESCRIPTION - FREQUENCY
FREQUENCY: CONTINUOUS

## 2020-07-23 ASSESSMENT — PAIN DESCRIPTION - ONSET
ONSET: ON-GOING

## 2020-07-23 ASSESSMENT — PAIN SCALES - GENERAL
PAINLEVEL_OUTOF10: 2
PAINLEVEL_OUTOF10: 3
PAINLEVEL_OUTOF10: 5
PAINLEVEL_OUTOF10: 5
PAINLEVEL_OUTOF10: 3
PAINLEVEL_OUTOF10: 2
PAINLEVEL_OUTOF10: 0
PAINLEVEL_OUTOF10: 4
PAINLEVEL_OUTOF10: 3
PAINLEVEL_OUTOF10: 4
PAINLEVEL_OUTOF10: 3

## 2020-07-23 ASSESSMENT — PAIN - FUNCTIONAL ASSESSMENT
PAIN_FUNCTIONAL_ASSESSMENT: ACTIVITIES ARE NOT PREVENTED
PAIN_FUNCTIONAL_ASSESSMENT: ACTIVITIES ARE NOT PREVENTED

## 2020-07-23 ASSESSMENT — PAIN DESCRIPTION - PAIN TYPE
TYPE: ACUTE PAIN
TYPE: SURGICAL PAIN;ACUTE PAIN
TYPE: SURGICAL PAIN
TYPE: SURGICAL PAIN
TYPE: ACUTE PAIN

## 2020-07-23 ASSESSMENT — PAIN DESCRIPTION - ORIENTATION
ORIENTATION: RIGHT

## 2020-07-23 ASSESSMENT — PAIN DESCRIPTION - LOCATION
LOCATION: ABDOMEN

## 2020-07-23 ASSESSMENT — PAIN DESCRIPTION - DESCRIPTORS
DESCRIPTORS: SORE

## 2020-07-23 NOTE — PROGRESS NOTES
[facility]  Progress Note    Irvin Jaramillo  MRN: [id number]  Date: 7/23/2020    Reason for F/U :   E. coli sepsis    Impression :   1. Pancreatic adenocarcinoma started on chemotherapy 7/14/2020  2. E. coli sepsis and source at this time is not entirely clear but one would be concerned for a gastrointestinal/biliary source-cholangitis  · Status post ERCP with stent exchange 7/22/2020  3. Coagulase-negative staph in 1 out of 2 sets of blood cultures a contaminant  4. Leukopenia-resolving  5. Penicillin/cephalosporin allergy-she reports swelling and rash with penicillin as well as a rash with cephalosporins  6. Diabetes mellitus type 2    Recommendations:   · Continue intravenous antimicrobial therapy with aztreonam.  · The E. coli is pan susceptible and the patient will be okay for discharge on oral antibiotic therapy with ciprofloxacin to complete a 14-day course of therapy  · Clinically she is doing okay was able to eat today. · If she continues to do well clinically she is okay for discharge tomorrow    Infection Control Recommendations:   Universal precautions    Discharge Planning:   Estimated Length of IV antimicrobials: To be determined  Patient will need Midline Catheter Insertion/ PICC line Insertion: No  Patient will need: Home IV , [place],  SNF,  LTAC: Undetermined  Patient willneed outpatient wound care: No    MedicalDecision making / Summary of Stay:   Irvin Jaramillo is a 64y.o.-year-old female who was initially admitted on 7/20/2020. Sherly Foss has a history of pancreatic adenocarcinoma of the head of the pancreas T2 N1 M0 stage IIb that was recently diagnosed and she was seen by hepatobiliary surgery but was not felt to be an operative candidate as the mass encased the portal vein.   She has undergone biliary sphincterotomy and stent placement 6/4/2020 was started on chemotherapy with FOLFIRINOX and received her first cycle on July 14, 2020.     The patient was otherwise doing well until yesterday afternoon when she developed fevers, chills, generalized malaise with intractable nausea, vomiting. The patient reports she was not even able to keep anything down at all and was in her bed trying to get as many calories as possible. Her son ended up taking her temperature and was 100.9 degrees and she had instructions to come into the emergency room for temperatures over 100.3. The patient was seen in the emergency department and admitted for dehydration. Blood cultures done in the emergency department have come back with gram-negative rods 2 out of 2 sets identified as E. coli by PCR. I been asked to evaluate and help with antibiotic choice.     The patient does report a mild headache, no sore throat, cough, shortness of breath, chest pains or palpitations. She does report some abdominal discomfort more so today than yesterday. She continues to have pain that radiates to the back. She also reports that her urine was very dark/concentrated but she has not had any dysuria or frequency. She underwent ERCP  with stent removal, Cholangiogram, sludge extraction, biliary decompression and Biliary stent placement    Current evaluation:2020    /65   Pulse 71   Temp 97.9 °F (36.6 °C) (Axillary)   Resp 14   Ht 5' 5\" (1.651 m)   Wt 262 lb 12.6 oz (119.2 kg)   SpO2 99%   BMI 43.73 kg/m²     Temperature Range: Temp: 97.9 °F (36.6 °C) Temp  Av.5 °F (36.4 °C)  Min: 97 °F (36.1 °C)  Max: 98.2 °F (36.8 °C)  The patient is seen and evaluated at bedside she did undergo and she is sitting in the chair. She tells me that she was able to tolerate a regular diet today. She has not had a bowel movement yet but does have some abdominal pain. No nausea vomiting or diarrhea. Review of Systems   Constitutional: Negative. HENT: Negative. Respiratory: Negative. Cardiovascular: Negative. Gastrointestinal: Positive for abdominal pain. Genitourinary: Negative. Musculoskeletal: Negative. Skin: Negative. Neurological: Negative. Psychiatric/Behavioral: Negative. Physical Examination :     Physical Exam  Constitutional:       Appearance: She is well-developed. HENT:      Head: Normocephalic and atraumatic. Mouth/Throat:      Mouth: Mucous membranes are moist.   Eyes:      Pupils: Pupils are equal, round, and reactive to light. Neck:      Musculoskeletal: Normal range of motion and neck supple. Cardiovascular:      Rate and Rhythm: Regular rhythm. Heart sounds: Murmur present. Pulmonary:      Effort: Pulmonary effort is normal.      Breath sounds: Normal breath sounds. Abdominal:      General: Bowel sounds are normal.      Palpations: Abdomen is soft. Skin:     General: Skin is warm and dry. Neurological:      General: No focal deficit present. Mental Status: She is alert and oriented to person, place, and time. Laboratory data:   I have independently reviewed the followinglabs:  CBC with Differential:   Recent Labs     07/20/20  2142  07/22/20  0552 07/23/20  0839   WBC 2.9*   < > 3.7 3.0*   HGB 10.5*   < > 8.4* 8.8*   HCT 31.9*   < > 27.4* 29.2*      < > 147 181   LYMPHOPCT 10*  --  9*  --    MONOPCT 1  --  6  --     < > = values in this interval not displayed. BMP:   Recent Labs     07/21/20  0625 07/21/20  1338  07/22/20  0552 07/23/20  0839     --    < > 129* 133*   K 3.8  --    < > 3.9 3.8   CL 99  --    < > 99 102   CO2 24  --    < > 20 21   BUN 14  --    < > 16 16   CREATININE 0.89  --    < > 1.08* 1.05*   MG 1.5* 2.1  --   --   --     < > = values in this interval not displayed. Hepatic Function Panel:   Recent Labs     07/22/20  0552 07/23/20  0839   PROT 5.2* 5.4*   LABALBU 2.5* 2.6*   BILIDIR 3.23* 2.96*   IBILI 0.05 0.20   BILITOT 3.28* 3.16*   ALKPHOS 348* 400*   * 345*   * 84*     No results for input(s): RAPHAEL in the last 72 hours.    No results found for: CRP  No results found for: Efren Tavarez 1237     07/21/20  1338   PROCAL 10.17*       Imaging Studies:   No new imaging    Cultures:     Culture, Blood 1 [[id number]]  (Abnormal)   Collected: 07/20/20 2142    Order Status: Completed  Specimen: Blood  Updated: 07/23/20 1527     Specimen Description  . BLOOD     Special Requests  20 ML RIGHT ARM     Culture  POSITIVE Blood Culture Results called to and read back by: JEF WEINER AT [id number] 7/21/20Abnormal        DIRECT GRAM STAIN FROM BOTTLE: GRAM NEGATIVE RODS      Escherichia coli detected by PCRAbnormal        ESCHERICHIA COLIAbnormal        STAPHYLOCOCCUS SPECIES, COAGULASE NEGATIVE A single positive blood culture of coagulase negative Staphylocci, diptheroids, micrococci, Cutibacterium, viridans Streptocci, Bacillus, or Lactobacillus species should be interpreted with caution and viewed as a likely skin contaminant. Abnormal      Culture, Blood 1 [[id number]]  (Abnormal)  Collected: 07/20/20 2207    Order Status: Completed  Specimen: Blood  Updated: 07/22/20 2303     Specimen Description  . BLOOD     Special Requests  20 ML LEFT ARM     Culture  POSITIVE Blood Culture Results called to and read back by: JEF WEINER AT [id number] 7/21/20Abnormal        DIRECT GRAM STAIN FROM BOTTLE: GRAM NEGATIVE RODS      ESCHERICHIA COLI For susceptibility, refer to previous culture. Abnormal     Escherichia coli (4)     Antibiotic  Interpretation  DENA  Status     amikacin    Final      NOT REPORTED    ampicillin  Sensitive   Final      <=2   SUSCEPTIBLE    ampicillin-sulbactam    Final      NOT REPORTED    aztreonam  Sensitive   Final      <=1   SUSCEPTIBLE    ceFAZolin  Sensitive   Final      <=4   SUSCEPTIBLE    cefepime    Final      NOT REPORTED    cefTRIAXone  Sensitive   Final      <=1   SUSCEPTIBLE    ciprofloxacin  Sensitive   Final      <=0.25   SUSCEPTIBLE    ertapenem    Final      NOT REPORTED    Confirmatory Extended Spectrum Beta-Lactamase  Negative  NEGATIVE  Final     gentamicin Sensitive   Final      <=1   SUSCEPTIBLE    meropenem    Final      NOT REPORTED    nitrofurantoin    Final      NOT REPORTED    tigecycline    Final      NOT REPORTED    tobramycin  Sensitive   Final      <=1   SUSCEPTIBLE    trimethoprim-sulfamethoxazole  Sensitive   Final      <=20   SUSCEPTIBLE    piperacillin-tazobactam  Sensitive   Final      <=4   SUSCEPTIBLE      Medications:      sertraline  150 mg Oral Daily    insulin glargine  50 Units Subcutaneous Nightly    insulin glargine  60 Units Subcutaneous Daily    [Held by provider] amLODIPine  10 mg Oral Daily    aspirin  81 mg Oral Nightly    atorvastatin  40 mg Oral Nightly    carvedilol  12.5 mg Oral BID WC    [Held by provider] hydrALAZINE  25 mg Oral 3 times per day    pantoprazole  40 mg Oral QAM AC    sodium chloride flush  10 mL Intravenous 2 times per day    famotidine (PEPCID) injection  20 mg Intravenous BID    insulin lispro  0-18 Units Subcutaneous TID WC    insulin lispro  0-9 Units Subcutaneous Nightly    aztreonam  1 g Intravenous Q8H    [Held by provider] enoxaparin  40 mg Subcutaneous Daily           [facility]  Davidson Shah MD  [employer]  OFFICE: [phone]      Electronically signed by Davidson Shah MD on 7/23/2020 at 4:57 PM  Thank you for allowing us to participate in the care of this patient. Please call with questions. This note iscreated with the assistance of a speech recognition program.  While intending to generate a document that actually reflects the content of the visit, the document can still have some errors including those of syntax andsound a like substitutions which may escape proof reading. In such instances, actual meaning can be extrapolated by contextual diversion.

## 2020-07-23 NOTE — PROGRESS NOTES
Today's Date: 2020  Patient Name: Donna July  Date of admission: 2020  9:24 PM  Patient's age: 64 y. o., 1960  Admission Dx: Intractable nausea and vomiting [R11.2]  Sepsis ([facility] [address] [A41.9]    Reason for Consult: Pancreatic cancer known patient  Requesting Physician: Jan Ortiz MD    CHIEF COMPLAINT:    Chief Complaint   Patient presents with    Nausea    Emesis     started today, pt started chemo last tuesday       History Obtained From:  Pt and chart    Interval history 2020  No acute events overnight. No more nausea  No bowel movements yet  S/p ERCP and stent exchange. One low-grade fever yesterday, none today    HISTORY OF PRESENT ILLNESS:      Bryan Gomez is a 53-year-old female with recently diagnosed pancreatic adenocarcinoma and started on chemotherapy with FOLFIRINOX was admitted with nausea vomiting, dehydration. Patient also complained of low-grade fever 100.7. On admission she was noted to have elevated liver enzymes. She has been recently diagnosed with pancreatic adenocarcinoma and initial work-up revealed borderline/unresectable because of PV involvement. She is seen by hepatobiliary surgeon as o/p. Started on neoadjuvant chemotherapy. She had biliary sphincterotomy and stent placement on 2020. Past Medical History:   has a past medical history of Anxiety, Diabetes mellitus ([facility] [address] Hyperlipidemia, Hypertension, and Pancreatic cancer ([facility] [address] Past Surgical History:   has a past surgical history that includes  section; Gallbladder surgery; ERCP (2020); Upper gastrointestinal endoscopy (2020); Upper gastrointestinal endoscopy (2020); ERCP (2020); ERCP (2020); ERCP (2020); ERCP (2020); ERCP (2020); and ERCP (2020). Medications:    Prior to Admission medications    Medication Sig Start Date End Date Taking?  Authorizing Provider   ondansetron (ZOFRAN-ODT) 8 MG TBDP disintegrating tablet Place 1 tablet under the tongue every 8 hours as needed for Nausea or Vomiting 7/6/20  Yes Pamelia Meckel, MD   furosemide (LASIX) 20 MG tablet Take 1 tablet by mouth daily  Patient taking differently: Take 20 mg by mouth daily As needed 6/16/20 7/20/20 Yes Pamelia Meckel, MD   insulin glargine (LANTUS) 100 UNIT/ML injection vial Inject 50 Units into the skin nightly 6/6/20  Yes Yolis Sun MD   hydrALAZINE (APRESOLINE) 25 MG tablet Take 1 tablet by mouth every 8 hours 6/6/20  Yes Yolis Sun MD   carvedilol (COREG) 12.5 MG tablet Take 1 tablet by mouth 2 times daily (with meals) 6/6/20  Yes Yolis Sun MD   amLODIPine (NORVASC) 10 MG tablet Take 1 tablet by mouth daily 6/7/20  Yes Yolis Sun MD   pantoprazole (PROTONIX) 40 MG tablet Take 1 tablet by mouth every morning (before breakfast) 6/6/20  Yes Yolis Sun MD   aspirin 81 MG chewable tablet Take 81 mg by mouth nightly   Yes Historical Provider, MD   insulin aspart (NOVOLOG FLEXPEN) 100 UNIT/ML injection pen Inject into the skin   Yes Historical Provider, MD   sertraline (ZOLOFT) 100 MG tablet take 1 1/2 tablet by oral route  every day   Yes Historical Provider, MD   atorvastatin (LIPITOR) 40 MG tablet nightly  10/12/13  Yes Historical Provider, MD   LORazepam (ATIVAN) 0.5 MG tablet 1 mg nightly. 10/12/13  Yes Historical Provider, MD   lidocaine-prilocaine (EMLA) 2.5-2.5 % cream Apply topically as needed. 7/6/20   Pamelia Meckel, MD   nitroGLYCERIN (NITROSTAT) 0.4 MG SL tablet up to max of 3 total doses. If no relief after 1 dose, call 911. 6/6/20   Yolis Sun MD   insulin glargine (LANTUS) 100 UNIT/ML injection vial Inject 60 Units into the skin daily 6/7/20   Yolis Sun MD   benzocaine-menthol (CEPACOL SORE THROAT) 15-3.6 MG lozenge Take 1 lozenge by mouth every 2 hours as needed for Sore Throat 6/6/20   Yolis Sun MD   Lisdexamfetamine Dimesylate 60 MG CAPS Take 60 mg by mouth every morning.     Historical Provider, MD     Current Facility-Administered Medications   Medication Dose Route Frequency Provider Last Rate Last Dose    sertraline (ZOLOFT) tablet 150 mg  150 mg Oral Daily Naomi Roldan, APRN - CNP   150 mg at 07/23/20 0809    insulin glargine (LANTUS) injection vial 50 Units  50 Units Subcutaneous Nightly EMERSON Gaxiola CNP   50 Units at 07/22/20 2209    insulin glargine (LANTUS) injection vial 60 Units  60 Units Subcutaneous Daily EMERSON Gaxiola CNP   60 Units at 07/23/20 3177    [Held by provider] amLODIPine (NORVASC) tablet 10 mg  10 mg Oral Daily EMERSON Pepe   10 mg at 07/21/20 3023    aspirin chewable tablet 81 mg  81 mg Oral Nightly Idalmis Santana MD   81 mg at 07/22/20 2027    atorvastatin (LIPITOR) tablet 40 mg  40 mg Oral Nightly Idalmis Santana MD   40 mg at 07/22/20 2027    carvedilol (COREG) tablet 12.5 mg  12.5 mg Oral BID AIDE Ernst MD   12.5 mg at 07/23/20 0808    [Held by provider] hydrALAZINE (APRESOLINE) tablet 25 mg  25 mg Oral 3 times per day EMERSON Pepe   25 mg at 07/21/20 1628    nitroGLYCERIN (NITROSTAT) SL tablet 0.4 mg  0.4 mg Sublingual Q5 Min PRN Idalmis Santana MD        pantoprazole (PROTONIX) tablet 40 mg  40 mg Oral QAM AC Idalmis Santana MD   40 mg at 07/23/20 0514    sodium chloride flush 0.9 % injection 10 mL  10 mL Intravenous 2 times per day Idalmis Santana MD   10 mL at 07/23/20 0851    sodium chloride flush 0.9 % injection 10 mL  10 mL Intravenous PRN Idalmis Santana MD        potassium chloride (KLOR-CON M) extended release tablet 40 mEq  40 mEq Oral PRN Idalmis Santana MD        Or    potassium bicarb-citric acid (EFFER-K) effervescent tablet 40 mEq  40 mEq Oral PRN Idalmis Santana MD        Or   Lisa Augustine potassium chloride 10 mEq/100 mL IVPB (Peripheral Line)  10 mEq Intravenous PRN Idalmis Santana MD        magnesium sulfate 1 g in dextrose 5% 100 mL IVPB  1 g Intravenous PRGAYLE Santana MD   Stopped at 07/21/20 1337    polyethylene glycol (GLYCOLAX) packet 17 g  17 g Oral Daily PRN Mark Stephens MD        promethazine (PHENERGAN) tablet 12.5 mg  12.5 mg Oral Q6H PRN Mark Stephens MD   12.5 mg at 07/21/20 1111    Or    ondansetron (ZOFRAN) injection 4 mg  4 mg Intravenous Q6H PRN Mark Stephens MD   4 mg at 07/21/20 0815    famotidine (PEPCID) injection 20 mg  20 mg Intravenous BID Mark Stephens MD   20 mg at 07/23/20 0809    nicotine (NICODERM CQ) 21 MG/24HR 1 patch  1 patch Transdermal Daily PRN Mark Stephens MD        glucose (GLUTOSE) 40 % oral gel 15 g  15 g Oral PRN Mark Stephens MD        dextrose 50 % IV solution  12.5 g Intravenous PRN Mark Stephens MD        glucagon (rDNA) injection 1 mg  1 mg Intramuscular PRN Mark Stephens MD        dextrose 5 % solution  100 mL/hr Intravenous PRN Mark Stephens MD        0.9 % sodium chloride infusion   Intravenous Continuous Castelanelle Perez MD 75 mL/hr at 07/22/20 2050      insulin lispro (HUMALOG) injection vial 0-18 Units  0-18 Units Subcutaneous TID WC Mark Stephens MD   6 Units at 07/23/20 0810    insulin lispro (HUMALOG) injection vial 0-9 Units  0-9 Units Subcutaneous Nightly Mark Stephens MD   6 Units at 07/22/20 2037    aztreonam (AZACTAM) 1 g IVPB minibag  1 g Intravenous Q8H Mark Stephens MD   Stopped at 07/23/20 0437    [Held by provider] enoxaparin (LOVENOX) injection 40 mg  40 mg Subcutaneous Daily Jose Hale, APRN - CNP        oxyCODONE (ROXICODONE) immediate release tablet 5 mg  5 mg Oral Q4H PRN Mark Stephens MD   5 mg at 07/23/20 8998    Or    oxyCODONE (ROXICODONE) immediate release tablet 10 mg  10 mg Oral Q4H PRN Mark Stephens MD           Allergies:  Lisinopril; Sulfamethoxazole; Trimethoprim; Cefuroxime axetil; Sulfamethoxazole-trimethoprim; Clindamycin phosphate; and Penicillins    Social History:   reports that she quit smoking about 15 years ago. She has never used smokeless tobacco. She reports previous alcohol use.  She reports that she does not use drugs. Family History: family history includes Cancer in her maternal grandfather, maternal grandmother, and mother; Heart Failure in her father; Hypertension in her father and mother. REVIEW OF SYSTEMS:    Constitutional: ++ fever or chills. No night sweats, no weight loss   Eyes: No eye discharge, double vision, or eye pain   HEENT: negative for sore mouth, sore throat, hoarseness and voice change   Respiratory: negative for cough , sputum, dyspnea, wheezing, hemoptysis, chest pain   Cardiovascular: negative for chest pain, dyspnea, palpitations, orthopnea, PND   Gastrointestinal: +++r nausea, vomiting, diarrhea, constipation, abdominal pain, Dysphagia, hematemesis and hematochezia   Genitourinary: negative for frequency, dysuria, nocturia, urinary incontinence, and hematuria   Integument: negative for rash, skin lesions, bruises.    Hematologic/Lymphatic: negative for easy bruising, bleeding, lymphadenopathy, or petechiae   Endocrine: negative for heat or cold intolerance,weight changes, change in bowel habits and hair loss   Musculoskeletal: negative for myalgias, arthralgias, pain, joint swelling,and bone pain   Neurological: negative for headaches, dizziness, seizures, weakness, numbness    PHYSICAL EXAM:      /73   Pulse 74   Temp 97.8 °F (36.6 °C) (Axillary)   Resp 21   Ht 5' 5\" (1.651 m)   Wt 262 lb 12.6 oz (119.2 kg)   SpO2 98%   BMI 43.73 kg/m²    Temp (24hrs), Av.3 °F (37.4 °C), Min:93.5 °F (34.2 °C), Max:100.6 °F (38.1 °C)    General appearance - well appearing, no in pain or distress   Mental status - alert and cooperative   Eyes - pupils equal and reactive, extraocular eye movements intact   Ears - bilateral TM's and external ear canals normal   Mouth - mucous membranes moist, pharynx normal without lesions   Neck - supple, no significant adenopathy   Lymphatics - no palpable lymphadenopathy, no hepatosplenomegaly   Chest - clear to auscultation, no wheezes, rales or rhonchi, symmetric air entry   Heart - normal rate, regular rhythm, normal S1, S2, no murmurs  Abdomen - soft, nontender, nondistended, no masses or organomegaly   Neurological - alert, oriented, normal speech, no focal findings or movement disorder noted   Musculoskeletal - no joint tenderness, deformity or swelling   Extremities - peripheral pulses normal, no pedal edema, no clubbing or cyanosis   Skin - normal coloration and turgor, no rashes, no suspicious skin lesions noted ,    DATA:    Labs:   CBC:   Recent Labs     07/22/20  0552 07/23/20  0839   WBC 3.7 3.0*   HGB 8.4* 8.8*   HCT 27.4* 29.2*    181     BMP:   Recent Labs     07/22/20  0552 07/23/20  0839   * 133*   K 3.9 3.8   CO2 20 21   BUN 16 16   CREATININE 1.08* 1.05*   LABGLOM 52* 54*   GLUCOSE 330* 202*     PT/INR:   Recent Labs     07/22/20 0552   PROTIME 13.3*   INR 1.3       IMAGING DATA:  @[facility] [place], [place]@   [place] ERCP BILIARY S&I   Final Result   Intraprocedural fluoroscopic spot images as above. See separate procedure   report for more information. US ABDOMEN LIMITED   Final Result   Intra and extrahepatic biliary dilatation with the common bile duct measuring   up to 13.2 mm, decreased since the prior exams. Biliary stent noted. Questionable pancreatic head mass, better shown on previous MRI. Dilated   pancreatic duct measuring up to 8.4 mm.              Primary Problem  Acute obstructive cholangitis    Active Hospital Problems    Diagnosis Date Noted    Septicemia due to E. coli ([facility] [address] [A41.51] 07/22/2020    Acute obstructive cholangitis [K83.09] 07/22/2020    TORREY (acute kidney injury) ([facility] [address] [N17.9] 07/22/2020    Dehydration with hyponatremia [E87.1] 07/22/2020    Intractable nausea and vomiting [R11.2] 07/21/2020    Class 3 severe obesity with serious comorbidity in adult ([facility] [address] [E66.01] 07/21/2020    Transaminitis [R74.0] 07/21/2020    Intractable vomiting [R11.10]     Dehydration [E86.0]     Primary adenocarcinoma of head of pancreas ([facility] [C25.0] 06/05/2020    Pancreatic Head Mass [K86.89] 06/04/2020    Non-Obstructive CAD [I25.10] 06/04/2020    Type 2 diabetes mellitus with hyperglycemia, with long-term current use of insulin ([facility] [E11.65, Z79.4] 06/01/2020    Hypertension [I10] 12/27/2013         IMPRESSION:   1. Pancreatic adenocarcinoma: Pancreatic adenocarcinoma: Head of pancreas, 3 cm,  T2N1MO stage IIB at EUS showed that the mass encases the portal vein . An intact interface was seen between the mass and the celiac trunk and superior mesenteric artery suggesting a lack of invasion. Appears locally advanced. borderline/unresectable because of PV involvement. She is seen by hepatobiliary surgeon as o/p Started on FOLFIRINOX chemotherapy recently  2. Anemia  3. Leukopenia secondary to chemotherapy  4. Nausea vomiting chemotherapy-induced  5. Dehydration  6. Elevated liver enzymes and fever: Suspect cholangitis. Blood culture showing E. coli    RECOMMENDATIONS:  1. I reviewed the laboratory data, imaging studies, diagnosis, prognosis and treatment options with patient  2. At this point continue symptomatic care with IV hydration, antinausea medications  3. Reviewed the ultrasound which showed intra-and extrahepatic biliary dilatation. She is status post biliary stent placement  4. ERCP yesterday for stent exchange  5. I discussed the care with primary team  6. I reviewed the goals of care  7. Will adjust her chemotherapy for cycle 2      Discussed with patient and Nurse. Thank you for asking us to see this patient. Mouna Jung MD  PGY-3, Internal medicine resident  [place], [place]  7/23/2020 11:46 AM     Attending Physician Statement   I have discussed the care of Donna July, including pertinent history and exam findings with the resident. I have reviewed the key elements of all parts of the encounter with the resident.  I have seen and examined the patient with the resident. I agree with the assessment and plan and status of the problem list as documented.                                [address] [facility]                          Cell: [phone]

## 2020-07-23 NOTE — PROGRESS NOTES
[facility]B [facility]       Patient: Anu Faria    Unit/Bed: 0944/4260-59    YOB: 1960    MRN: [id number]    Acct: [de-identified]     Admitting Diagnosis:Intractable nausea and vomiting [R11.2]  Sepsis [facility]) [A41.9]    Admit Date:  7/20/2020    Hospital Day: 2    Subjective:    Patient feels much better. Had transient palpitation with 6 beats of V. tach. Positive blood cultures with E. coli septicemia due to acute cholangitis. A 12-lead EKG which reviewed this afternoon shows normal sinus rhythm at 72 bpm.  No ST segment elevation or depression. QTC of 451 ms. Patient Seen, Chart, Labs, Radiology studies, and Consults reviewed. Objective:   /72   Pulse 76   Temp 97.5 °F (36.4 °C) (Oral)   Resp 15   Ht 5' 5\" (1.651 m)   Wt 262 lb 12.6 oz (119.2 kg)   SpO2 98%   BMI 43.73 kg/m²       Intake/Output Summary (Last 24 hours) at 7/23/2020 1011  Last data filed at 7/23/2020 0444  Gross per 24 hour   Intake 3627 ml   Output 490 ml   Net 3137 ml       Review of Systems   Constitutional: Negative for activity change, appetite change, chills, diaphoresis, fatigue, fever and unexpected weight change. HENT: Negative for congestion, drooling, ear pain, nosebleeds, rhinorrhea, sinus pressure, sore throat, trouble swallowing and voice change. Eyes: Negative for photophobia, redness, itching and visual disturbance. Respiratory: Negative for apnea, cough, choking, chest tightness, shortness of breath, wheezing and stridor. Cardiovascular: Positive for palpitations. Negative for chest pain and leg swelling. Gastrointestinal: Negative for abdominal distention, anal bleeding, blood in stool, constipation, nausea, rectal pain and vomiting. Endocrine: Negative for heat intolerance, polydipsia, polyphagia and polyuria.    Genitourinary: Negative for decreased urine volume, dyspareunia, enuresis, frequency, genital sores, hematuria, menstrual problem, urgency and vaginal discharge. Musculoskeletal: Negative for arthralgias, gait problem, joint swelling, neck pain and neck stiffness. Skin: Negative for pallor and wound. Allergic/Immunologic: Negative for food allergies. Neurological: Negative for dizziness, tremors, seizures, syncope, weakness, light-headedness and headaches. Hematological: Negative for adenopathy. Does not bruise/bleed easily. Psychiatric/Behavioral: Negative for agitation, confusion, hallucinations, self-injury, sleep disturbance and suicidal ideas. The patient is not hyperactive. Physical Exam  Vitals signs and nursing note reviewed. Constitutional:       General: She is not in acute distress. Appearance: Normal appearance. She is obese. She is not ill-appearing, toxic-appearing or diaphoretic. HENT:      Head: Normocephalic and atraumatic. Nose: Nose normal. No congestion or rhinorrhea. Mouth/Throat:      Mouth: Mucous membranes are moist.      Pharynx: Oropharynx is clear. No oropharyngeal exudate or posterior oropharyngeal erythema. Eyes:      General: No scleral icterus. Right eye: No discharge. Left eye: No discharge. Extraocular Movements: Extraocular movements intact. Conjunctiva/sclera: Conjunctivae normal.      Pupils: Pupils are equal, round, and reactive to light. Neck:      Musculoskeletal: Normal range of motion and neck supple. No neck rigidity or muscular tenderness. Vascular: No carotid bruit. Cardiovascular:      Rate and Rhythm: Normal rate and regular rhythm. Pulses: Normal pulses. Heart sounds: Normal heart sounds. No murmur. No friction rub. No gallop. Pulmonary:      Effort: Pulmonary effort is normal. No respiratory distress. Breath sounds: No stridor. No wheezing, rhonchi or rales. Chest:      Chest wall: No tenderness. coli detected by PCR     LACTOSE FERMENTING GRAM NEGATIVE RODS     GRAM NEGATIVE RODS     HISTOPATHOLOGY. None. TOXICOLOGY. None. ENDOSCOPE STUDIES. ERCP with stent removal and exchange + Biliary decompression -07/22/2020. PROCEDURES PERFORMED:   1. Transoral Endoscopic retrograde cholangiopancreatography (ERCP). 2. Stent removal    3. Cholangiogram     4. Sludge extraction    5. Biliary Stent placement SEMS (fully covered, 10mm x 60mm)     6. Biliary decompression     POSTPROCEDURE DIAGNOSIS:   Purulent Cholangitis secondary to extraluminal malignant compression of distal CBD     Successful stent removal of pre-existing stent and replacement     PROCEDURES. Diagnostic left heart catheterization-06/03/2020. Diagnostic procedure: Coronary angiography     Complications:    - No complication     CONCLUSION:  Procedure Summary     Nonobstructive CAD. RECOMMENDATIONS:     Medical therapy and risk factor modifications. RADIOLOGY. Liver US- 7/21/2020. FINDINGS:    LIVER:  Image portions of the liver show no focal abnormality. The liver has  a slightly heterogeneous echotexture and is somewhat difficult to penetrate. Liver length is 19.5 cm. Mild-to-moderate intrahepatic biliary ductal  dilatation.     BILIARY SYSTEM:  Previous cholecystectomy. Dilated common bile duct  measuring up to 13.2 mm at the carmel hepatis. , decreased slightly since the  prior studies. Partially visualized stent within the common bile duct.     RIGHT KIDNEY: The right kidney is grossly unremarkable without evidence of  hydronephrosis.     PANCREAS: The pancreas is partially obscured by overlying bowel gas. In  particular, the head and tail are not optimally visualized. Subtle  heterogeneous hypoechoic area in the pancreatic head may represent the  patient's known pancreatic mass.   The pancreatic duct is dilated measuring up  to 8.4 mm in the body.     OTHER: No evidence of right upper quadrant ascites.     IMPRESSION:  Intra and extrahepatic biliary dilatation with the common bile duct measuring  up to 13.2 mm, decreased since the prior exams. Biliary stent noted.     Questionable pancreatic head mass, better shown on previous MRI. Dilated  pancreatic duct measuring up to 8.4 mm.     MRI abdomen and Pelvis-6/4/2020. FINDINGS:    Exam degraded by motion artifact.     ABDOMEN:     The visualized lung bases reveal no signal abnormality.     The liver is normal in signal intensity and contour. No focal liver lesion identified.     Irregular mass in the head of the pancreas is present resulting in biliary  and pancreatic duct obstruction. This measures 3.2 x 2.3 cm and is best visualized on T2 imaging. This mass extends towards the liver hilum. There is close approximation with the main portal vein, which remains patent. There is close abutment with the hepatic artery. No inflammatory change identified involving the pancreas. Relative atrophy of the body and tail is noted.     The spleen, adrenals and kidneys reveal no significant findings. Right renal cyst.     The visualized bowel is normal in caliber.     No ascites. Other than periportal lymph nodes measuring up to 12 mm, no  retroperitoneal or mesenteric lymphadenopathy is otherwise appreciated.     Incidental small intramural fibroid is noted in the right body of the uterus.     No signal abnormality identified in the visualized osseous structures.     MRCP:     Gallbladder:   Surgically absent. Small amount of fluid signal within the gallbladder fossa, likely postoperative.     Bile Ducts:   Intrahepatic and extrahepatic biliary dilatation with abrupt cutoff at the mid common duct level. Upstream dilatation measures 16 mm at the common duct level, 8 mm at the left hepatic   duct level and 7 mm in the right hepatic duct.     Pancreatic Duct:   Dilatation with abrupt cutoff at the site of pancreatic head mass. Upstream dilatation of 8 mm is noted.     IMPRESSION:  1.  3.2 cm soft tissue mass in the pancreatic head resulting in pancreatic  duct and biliary dilatation, compatible with primary neoplasm. There is abutment of the portal vein and soft tissue extension/lymph nodes in the  liver hilum noted. Given the limitations of motion artifact on this exam, a  pancreas CT should be considered for detailed vascular evaluation.     2. No liver metastatic disease. ASSESSMENT AND  PLAN. 1.CARDIOVASCULAR. Transient nonsustained V. tach     Hypertension-controlled. Nonobstructive CAD on cardiac cath. 2.GI. Pancreatic adenocarcinoma of the head     Acute cholangitis -s/p ERCP. Elevated liver enzymes. GI consulted and following. 3.RENAL AND ELECTROLYTES. TORREY due to ATN - IVF w/ NS. Hypovolemic hyponatremia-serum sodium 133 from 129 yesterday. Keep K > 4 and magnesium >2    4. ID.     E. coli septicemia due to acute cholangitis- on IV aztreonam.         ID following. 5.ENDO. Hyperglycemia due to uncontrolled T2 DM-sliding scale insulin and Lantus. 6.PSYCH. Anxiety and depression-stable on Zoloft. 7.HEM-ONC. Pancreatic adenocarcinoma. 8.NUTRITION. Obesity with a BMI of 45.26-needs regular exercise diet control. 9.DISPO. Planned d/c to home vs rehab soon.         Electronically signed Dejuan Bhagat MD on 7/23/2020 at 10:11 AM    Rounding Hospitalist

## 2020-07-23 NOTE — PROGRESS NOTES
The patient told me that she hasn't really urinated at all since her ERCP, just \"dribbles\". The writer bladder scanned the patient and she has 358 mL in her bladder. Joaquina Agrawal NP was notified via perfect serve. Will continue to monitor.  Electronically signed by Stephen Lock RN on 7/23/2020 at 4:23 AM

## 2020-07-23 NOTE — PROGRESS NOTES
THE [facility] AT [place] [facility] Progress Note    Gini Freeman is a 64 y.o. female patient. Hospitalization Day:2      Chief consult reason: Pancreatic adenocarcinoma, elevated LFTs      Subjective: Patient seen and examined. Patient reports not sleeping well last evening. Reports some mild right upper quadrant discomfort. No nausea or vomiting, tolerating clear liquid diet. Passing flatus no stool. VITALS:  /73   Pulse 74   Temp 97.8 °F (36.6 °C) (Axillary)   Resp 21   Ht 5' 5\" (1.651 m)   Wt 262 lb 12.6 oz (119.2 kg)   SpO2 99%   BMI 43.73 kg/m²   TEMPERATURE:  Current - Temp: 97.8 °F (36.6 °C); Max - Temp  Av.3 °F (37.4 °C)  Min: 93.5 °F (34.2 °C)  Max: 100.6 °F (38.1 °C)    Physical Assessment:  General appearance:  alert, cooperative and no distress  Mental Status:  oriented to person, place and time and normal affect  Lungs:  clear to auscultation bilaterally, normal effort  Heart:  regular rate and rhythm, no murmur  Abdomen:  soft, obese, mild RUQ tenderness, nondistended, normal bowel sounds, no masses  Extremities:  no edema, no redness, No clubbing  Skin:  warm, dry, no gross lesions or rashes    Data Review:  LABS and IMAGING:     CBC  Recent Labs     20  2142 20  0625 20  0552 20  0839   WBC 2.9* 6.0 3.7 3.0*   HGB 10.5* 9.5* 8.4* 8.8*   HCT 31.9* 28.9* 27.4* 29.2*   MCV 83.8 84.1 91.6 91.8   MCHC 32.9 33.0 30.7 30.1   RDW 14.6 14.1 14.3 14.4    197 147 181       Immature PLTs   No results found for: PLTFLUORE    ANEMIA STUDIES  No results for input(s): LABIRON, TIBC, IRON, FERRITIN, [id number], FOLATE, OCCULTBLD in the last 72 hours.     BMP  Recent Labs     20  1720 20  0552 20  0839   * 129* 133*   K 3.8 3.9 3.8   CL 95* 99 102   CO2    BUN 14 16 16   CREATININE 0.96* 1.08* 1.05*   GLUCOSE 326* 330* 202*   CALCIUM 9.0 8.4* 8.9       LFTS  Recent Labs     20  2142 20  0625 20  1720 20  4316 07/23/20  0839   ALKPHOS 482* 418* 391* 348* 400*   ALT 1,068* 888* 671* 489* 345*   AST 1,449* 817* 395* 173* 84*   BILITOT 2.03* 2.77* 3.45* 3.28* 3.16*   BILIDIR  --   --  3.58* 3.23* 2.96*   LABALBU 3.7 3.2* 3.0* 2.5* 2.6*       AMYLASE/LIPASE/AMMONIA  Recent Labs     07/20/20  2142 07/21/20  0625   AMYLASE  --  9*   LIPASE 6* 5*       Acute Hepatitis Panel   No results found for: HEPBSAG, HEPCAB, HEPBIGM, HEPAIGM    HCV Genotype   No results found for: HEPATITISCGENOTYPE    HCV Quantitative   No results found for: HCVQNT    LIVER WORK UP:    AFP  Lab Results   Component Value Date    AFP 1.2 06/04/2020       Alpha 1 antitrypsin   Lab Results   Component Value Date    A1A 170 06/03/2020       Anti - Liver/Kidney Ab  Lab Results   Component Value Date    LIVER-KIDNEYMICROSOMALAB <1:20 06/03/2020       JOSSIE  Lab Results   Component Value Date    JOSSIE NEGATIVE 06/03/2020       AMA  Lab Results   Component Value Date    MITOAB 45.0 06/03/2020       ASMA  Lab Results   Component Value Date    SMOOTHMUSCAB 22 06/03/2020    SMOOTHMUSCAB 1:80 06/03/2020       Ceruloplasmin  Lab Results   Component Value Date    CERULOPLSM 32 06/03/2020       Celiac panel  No results found for: TISSTRNTIIGG, TTGIGA, IGA    IgG  No results found for: IGG    IgM  No results found for: IGM    GGT   Lab Results   Component Value Date    LABGGT 524 06/03/2020       PT/INR  Recent Labs     07/22/20  0552   PROTIME 13.3*   INR 1.3       Cancer Markers:  CEA:  No results for input(s): CEA in the last 72 hours. Ca 125:  No results for input(s):  in the last 72 hours. Ca 19-9:   No results for input(s):  in the last 72 hours. AFP: No results for input(s): AFP in the last 72 hours. Lactic acid:No results for input(s): LACTACIDWB in the last 72 hours. Radiology Review:     Fl Ercp Biliary S&i    Result Date: 7/22/2020  EXAMINATION: SPOT FLUOROSCOPIC IMAGES 7/22/2020 4:18 pm TECHNIQUE: Fluoroscopy was provided by the radiology department for procedure. Radiologist was not present during examination. FLUOROSCOPY DOSE AND TYPE OR TIME AND EXPOSURES: 106.3 seconds. Air kerma 52.19 mGy COMPARISON: None HISTORY: Intraprocedural imaging. FINDINGS: 6 spot images of the right upper quadrant were obtained. Images were submitted from an ERCP with stent placement. Intraprocedural fluoroscopic spot images as above. See separate procedure report for more information. Us Abdomen Limited    Result Date: 7/21/2020  EXAMINATION: RIGHT UPPER QUADRANT ULTRASOUND 7/21/2020 1:28 pm COMPARISON: 06/03/2020, MRI 06/04/2020, ERCP 06/04/2020 HISTORY: ORDERING SYSTEM PROVIDED HISTORY: rt upper abdomen pain, hx gallbladder removed, pancreatic stent/ pancreatic ca TECHNOLOGIST PROVIDED HISTORY: rt upper abdomen pain, hx gallbladder removed, pancreatic stent/ pancreatic ca Reason for Exam: RUQ pain; N/V; pancreatic CA; pancreatic stent Acuity: Acute Type of Exam: Subsequent/Follow-up FINDINGS: LIVER:  Image portions of the liver show no focal abnormality. The liver has a slightly heterogeneous echotexture and is somewhat difficult to penetrate. Liver length is 19.5 cm. Mild-to-moderate intrahepatic biliary ductal dilatation. BILIARY SYSTEM:  Previous cholecystectomy. Dilated common bile duct measuring up to 13.2 mm at the carmel hepatis. , decreased slightly since the prior studies. Partially visualized stent within the common bile duct. RIGHT KIDNEY: The right kidney is grossly unremarkable without evidence of hydronephrosis. PANCREAS: The pancreas is partially obscured by overlying bowel gas. In particular, the head and tail are not optimally visualized. Subtle heterogeneous hypoechoic area in the pancreatic head may represent the patient's known pancreatic mass. The pancreatic duct is dilated measuring up to 8.4 mm in the body. OTHER: No evidence of right upper quadrant ascites.      Intra and extrahepatic biliary dilatation with the common bile duct measuring up to 13.2 mm, decreased since the prior exams. Biliary stent noted. Questionable pancreatic head mass, better shown on previous MRI. Dilated pancreatic duct measuring up to 8.4 mm. ENDOSCOPY    ERCP with stent removal and exchange + Biliary decompression      PROCEDURE DATE: 07/22/20  ATTENDING PHYSICIAN: Brittanie Thakkar MD      PROCEDURES PERFORMED:   1. Transoral Endoscopic retrograde cholangiopancreatography (ERCP). 2. Stent removal  3. Cholangiogram   4. Sludge extraction  5. Biliary Stent placement SEMS (fully covered, 10mm x 60mm)   6. Biliary decompression    Principal Problem:    Acute obstructive cholangitis  Active Problems:    Hypertension    Type 2 diabetes mellitus with hyperglycemia, with long-term current use of insulin (HCC)    Pancreatic Head Mass    Non-Obstructive CAD    Primary adenocarcinoma of head of pancreas (HCC)    Intractable nausea and vomiting    Class 3 severe obesity with serious comorbidity in [facility])    Transaminitis    Intractable vomiting    Dehydration    Septicemia due to E. coli (HCC)    TORREY (acute kidney injury) ([facility] [address]    Dehydration with hyponatremia  Resolved Problems:    Sepsis ([address]    Bacteremia       GI Assessment:    1. Pancreatic adenocarcinoma on chemotherapy  2. Ascending cholangitis - 07/22/2020 S/P ERCP with biliary stent exchange (ELLEN fully covered, 10 mm x 60 mm) and decompression  3. Elevated LFTs -secondary to biliary obstruction-blocked stent    - LFTS improving. Clinically improved       Plan of care:  1. Advance diet to low-fat, diabetic diet  2. Trend LFTs  3. Continue antibiotics per ID recommendations    Please feel free to contact me with any questions or concerns. Thank you for allowing me to participate in the care of your patient. Iona Patterson, EMERSON - CNP on 7/23/2020 at 12:53 PM   THE [facility]    Please note that this note was generated using a voice recognition dictation software.   Although every effort was made to ensure the accuracy of this automated transcription, some errors in transcription may have occurred.

## 2020-07-23 NOTE — PLAN OF CARE
Problem: Falls - Risk of:  Goal: Will remain free from falls  Description: Will remain free from falls  7/23/2020 0353 by Magdalena Logan RN  Outcome: Met This Shift  Patient's bed remained locked and in lowest position throughout shift. Patient belonings and call light remain within reach. 2/4 side rails are up, and non-skid footwear is on. Problem: Nausea/Vomiting:  Goal: Absence of nausea/vomiting  Description: Absence of nausea/vomiting  7/23/2020 0353 by Magdalena Logan RN  Outcome: Met This Shift    Problem: Nutritional:  Goal: Nutritional status will improve  Description: Nutritional status will improve  Outcome: Ongoing     Problem: Nutrition  Description: Nutrition Problem #1: Increased nutrient needs  Intervention: Food and/or Nutrient Delivery: Start Oral Diet, Start Oral Nutrition Supplement  Nutritional Goals: PO intakes to meet >75% estimated needs. Goal: Optimal nutrition therapy  Outcome: Ongoing     Problem: Pain:  Description: Pain management should include both nonpharmacologic and pharmacologic interventions.   Goal: Pain level will decrease  Description: Pain level will decrease    Problem: Health Behavior:  Goal: Ability to identify and utilize available resources and services will improve  Description: Ability to identify and utilize available resources and services will improve  Outcome: Ongoing      Electronically signed by Magdalena Logan RN on 7/23/2020 at 3:55 AM

## 2020-07-23 NOTE — PROGRESS NOTES
Pt had 7 beat run vtach. C/o chest discomfort during. Primary notified via [employer]. Awaiting further orders. Will continue to monitor.

## 2020-07-23 NOTE — PLAN OF CARE
Problem: Falls - Risk of:  Goal: Will remain free from falls  Description: Will remain free from falls  Outcome: Met This Shift  Interventions: nonskid footwear applied, clear path to restroom, call light within reach, pt. Educated on fall precautions. Problem: Skin Integrity:  Goal: Absence of new skin breakdown  Description: Absence of new skin breakdown  Outcome: Met This Shift  Interventions: pt. Skin kept dry and clean, pt. Refused bath, and stated the desire to bathe later in the night during night shift. Problem: Nausea/Vomiting:  Goal: Absence of nausea/vomiting  Description: Absence of nausea/vomiting  Outcome: Met This Shift  Interventions: pharmacotherapy initiated for N/V. Pt. Has had no emesis or nausea since previous shift.

## 2020-07-23 NOTE — PROGRESS NOTES
Comprehensive Nutrition Assessment    Type and Reason for Visit:  Reassess    Nutrition Recommendations/Plan: Pt was previously on Creon with meals and snacks to help with digestion. Recommend 5 carbs per meal with Glucerna supplements. Nutrition Assessment:  Pt states she is now advanced back to solid food for lunch. She is s/p ERCP/stent 7/22    Malnutrition Assessment:  Malnutrition Status:  Mild malnutrition    Context:  Chronic Illness     Findings of the 6 clinical characteristics of malnutrition:  Energy Intake:  Mild decrease in energy intake (Comment)(x 2 days)  Weight Loss:  7 - 5% over 1 month     Body Fat Loss:  1 - Mild body fat loss Orbital, Triceps   Muscle Mass Loss:  1 - Mild muscle mass loss Temples (temporalis), Clavicles (pectoralis & deltoids)   Strength:  Not Performed    Estimated Daily Nutrient Needs:  Energy (kcal):  2462 kcal/d; Weight Used for Energy Requirements:  Current     Protein (g):  117-140g/d; Weight Used for Protein Requirements:  Current(1.0-1.2g/kg)          Wounds:  None       Current Nutrition Therapies:    DIET LOW FAT; Carb Control: 4 carb choices (60 gms)/meal    Anthropometric Measures:  · Height: 5' 5\" (165.1 cm)  · Current Body Weight: 262 lb (118.8 kg)   · Usual Body Weight: 270 lb (122.5 kg)     · Ideal Body Weight: 125 lbs; % Ideal Body Weight 206.4 %   · BMI: 43.6  · BMI Categories: Obese Class 3 (BMI 40.0 or greater)       Nutrition Diagnosis:   · Increased nutrient needs related to catabolic illness as evidenced by poor intake prior to admission, weight loss    Nutrition Interventions:   Food and/or Nutrient Delivery:  Modify Current Diet, Start Oral Nutrition Supplement  Nutrition Education/Counseling:  Education completed   Coordination of Nutrition Care:  Continued Inpatient Monitoring    Goals:  PO intakes to meet >75% estimated needs.        Nutrition Monitoring and Evaluation:   Food/Nutrient Intake Outcomes:  Food and Nutrient Intake, Supplement

## 2020-07-23 NOTE — PLAN OF CARE
Nutrition Problem #1: Increased nutrient needs  Intervention: Food and/or Nutrient Delivery: Modify Current Diet, Start Oral Nutrition Supplement  Nutritional Goals: PO intakes to meet >75% estimated needs.

## 2020-07-24 VITALS
OXYGEN SATURATION: 94 % | WEIGHT: 268.52 LBS | HEIGHT: 65 IN | BODY MASS INDEX: 44.74 KG/M2 | SYSTOLIC BLOOD PRESSURE: 144 MMHG | DIASTOLIC BLOOD PRESSURE: 60 MMHG | TEMPERATURE: 98 F | RESPIRATION RATE: 17 BRPM | HEART RATE: 75 BPM

## 2020-07-24 LAB
ALBUMIN SERPL-MCNC: 2.6 G/DL (ref 3.5–5.2)
ALBUMIN/GLOBULIN RATIO: 0.9 (ref 1–2.5)
ALP BLD-CCNC: 437 U/L (ref 35–104)
ALT SERPL-CCNC: 264 U/L (ref 5–33)
ANION GAP SERPL CALCULATED.3IONS-SCNC: 11 MMOL/L (ref 9–17)
AST SERPL-CCNC: 70 U/L
BILIRUB SERPL-MCNC: 2.65 MG/DL (ref 0.3–1.2)
BILIRUBIN DIRECT: 2.41 MG/DL
BILIRUBIN, INDIRECT: 0.24 MG/DL (ref 0–1)
BUN BLDV-MCNC: 11 MG/DL (ref 6–20)
CALCIUM SERPL-MCNC: 9.1 MG/DL (ref 8.6–10.4)
CHLORIDE BLD-SCNC: 105 MMOL/L (ref 98–107)
CO2: 20 MMOL/L (ref 20–31)
CREAT SERPL-MCNC: 0.93 MG/DL (ref 0.5–0.9)
EKG ATRIAL RATE: 72 BPM
EKG P AXIS: 35 DEGREES
EKG P-R INTERVAL: 170 MS
EKG Q-T INTERVAL: 412 MS
EKG QRS DURATION: 92 MS
EKG QTC CALCULATION (BAZETT): 451 MS
EKG R AXIS: -4 DEGREES
EKG T AXIS: 18 DEGREES
EKG VENTRICULAR RATE: 72 BPM
GFR AFRICAN AMERICAN: >60 ML/MIN
GFR NON-AFRICAN AMERICAN: >60 ML/MIN
GFR SERPL CREATININE-BSD FRML MDRD: ABNORMAL ML/MIN/{1.73_M2}
GFR SERPL CREATININE-BSD FRML MDRD: ABNORMAL ML/MIN/{1.73_M2}
GLUCOSE BLD-MCNC: 115 MG/DL (ref 65–105)
GLUCOSE BLD-MCNC: 128 MG/DL (ref 65–105)
GLUCOSE BLD-MCNC: 130 MG/DL (ref 70–99)
GLUCOSE BLD-MCNC: 134 MG/DL (ref 65–105)
HCT VFR BLD CALC: 31 % (ref 36.3–47.1)
HEMOGLOBIN: 9.3 G/DL (ref 11.9–15.1)
MCH RBC QN AUTO: 27.6 PG (ref 25.2–33.5)
MCHC RBC AUTO-ENTMCNC: 30 G/DL (ref 28.4–34.8)
MCV RBC AUTO: 92 FL (ref 82.6–102.9)
NRBC AUTOMATED: 0 PER 100 WBC
PDW BLD-RTO: 14.7 % (ref 11.8–14.4)
PLATELET # BLD: 227 K/UL (ref 138–453)
PMV BLD AUTO: 10.4 FL (ref 8.1–13.5)
POTASSIUM SERPL-SCNC: 4.1 MMOL/L (ref 3.7–5.3)
RBC # BLD: 3.37 M/UL (ref 3.95–5.11)
SODIUM BLD-SCNC: 136 MMOL/L (ref 135–144)
TOTAL PROTEIN: 5.5 G/DL (ref 6.4–8.3)
WBC # BLD: 3.5 K/UL (ref 3.5–11.3)

## 2020-07-24 PROCEDURE — 36415 COLL VENOUS BLD VENIPUNCTURE: CPT

## 2020-07-24 PROCEDURE — 82248 BILIRUBIN DIRECT: CPT

## 2020-07-24 PROCEDURE — 99239 HOSP IP/OBS DSCHRG MGMT >30: CPT | Performed by: INTERNAL MEDICINE

## 2020-07-24 PROCEDURE — 99231 SBSQ HOSP IP/OBS SF/LOW 25: CPT | Performed by: INTERNAL MEDICINE

## 2020-07-24 PROCEDURE — 6370000000 HC RX 637 (ALT 250 FOR IP): Performed by: INTERNAL MEDICINE

## 2020-07-24 PROCEDURE — APPNB45 APP NON BILLABLE 31-45 MINUTES: Performed by: INTERNAL MEDICINE

## 2020-07-24 PROCEDURE — 85027 COMPLETE CBC AUTOMATED: CPT

## 2020-07-24 PROCEDURE — 2580000003 HC RX 258: Performed by: INTERNAL MEDICINE

## 2020-07-24 PROCEDURE — 6370000000 HC RX 637 (ALT 250 FOR IP): Performed by: NURSE PRACTITIONER

## 2020-07-24 PROCEDURE — 2500000003 HC RX 250 WO HCPCS: Performed by: INTERNAL MEDICINE

## 2020-07-24 PROCEDURE — 80053 COMPREHEN METABOLIC PANEL: CPT

## 2020-07-24 PROCEDURE — 82947 ASSAY GLUCOSE BLOOD QUANT: CPT

## 2020-07-24 RX ORDER — SIMETHICONE 80 MG
80 TABLET,CHEWABLE ORAL EVERY 6 HOURS PRN
Status: DISCONTINUED | OUTPATIENT
Start: 2020-07-24 | End: 2020-07-24 | Stop reason: HOSPADM

## 2020-07-24 RX ORDER — OXYCODONE HYDROCHLORIDE 5 MG/1
5 TABLET ORAL EVERY 6 HOURS PRN
Qty: 20 TABLET | Refills: 0 | Status: SHIPPED | OUTPATIENT
Start: 2020-07-24 | End: 2020-07-28 | Stop reason: SDUPTHER

## 2020-07-24 RX ADMIN — SODIUM CHLORIDE: 9 INJECTION, SOLUTION INTRAVENOUS at 06:36

## 2020-07-24 RX ADMIN — SERTRALINE 150 MG: 50 TABLET, FILM COATED ORAL at 08:38

## 2020-07-24 RX ADMIN — PANTOPRAZOLE SODIUM 40 MG: 40 TABLET, DELAYED RELEASE ORAL at 06:36

## 2020-07-24 RX ADMIN — FAMOTIDINE 20 MG: 10 INJECTION INTRAVENOUS at 08:38

## 2020-07-24 RX ADMIN — CARVEDILOL 12.5 MG: 12.5 TABLET, FILM COATED ORAL at 08:38

## 2020-07-24 RX ADMIN — POLYETHYLENE GLYCOL 3350 17 G: 17 POWDER, FOR SOLUTION ORAL at 13:03

## 2020-07-24 RX ADMIN — AZTREONAM 1 G: 1 INJECTION, POWDER, LYOPHILIZED, FOR SOLUTION INTRAMUSCULAR; INTRAVENOUS at 04:22

## 2020-07-24 RX ADMIN — INSULIN GLARGINE 60 UNITS: 100 INJECTION, SOLUTION SUBCUTANEOUS at 08:38

## 2020-07-24 RX ADMIN — OXYCODONE HYDROCHLORIDE 5 MG: 5 TABLET ORAL at 08:47

## 2020-07-24 RX ADMIN — AZTREONAM 1 G: 1 INJECTION, POWDER, LYOPHILIZED, FOR SOLUTION INTRAMUSCULAR; INTRAVENOUS at 12:29

## 2020-07-24 RX ADMIN — OXYCODONE HYDROCHLORIDE 5 MG: 5 TABLET ORAL at 13:03

## 2020-07-24 ASSESSMENT — PAIN SCALES - GENERAL
PAINLEVEL_OUTOF10: 5
PAINLEVEL_OUTOF10: 5
PAINLEVEL_OUTOF10: 4
PAINLEVEL_OUTOF10: 0

## 2020-07-24 ASSESSMENT — PAIN DESCRIPTION - PROGRESSION
CLINICAL_PROGRESSION: NOT CHANGED

## 2020-07-24 NOTE — PROGRESS NOTES
Today's Date: 2020  Patient Name: Moreno Moy  Date of admission: 2020  9:24 PM  Patient's age: 64 y. o., 1960  Admission Dx: Intractable nausea and vomiting [R11.2]  Sepsis ([facility] [address] [A41.9]    Reason for Consult: Pancreatic cancer known patient  Requesting Physician: Mikey Marina MD    CHIEF COMPLAINT:    Chief Complaint   Patient presents with    Nausea    Emesis     started today, pt started chemo last tuesday       History Obtained From:  Pt and chart    Interval history 2020  No acute events overnight. Resting comfortably in the chair today. Afebrile. Stable vitals. HISTORY OF PRESENT ILLNESS:      Mindy Adame is a 14-year-old female with recently diagnosed pancreatic adenocarcinoma and started on chemotherapy with FOLFIRINOX was admitted with nausea vomiting, dehydration. Patient also complained of low-grade fever 100.7. On admission she was noted to have elevated liver enzymes. She has been recently diagnosed with pancreatic adenocarcinoma and initial work-up revealed borderline/unresectable because of PV involvement. She is seen by hepatobiliary surgeon as o/p. Started on neoadjuvant chemotherapy. She had biliary sphincterotomy and stent placement on 2020. Past Medical History:   has a past medical history of Anxiety, Diabetes mellitus ([address] Hyperlipidemia, Hypertension, and Pancreatic cancer ([address] Past Surgical History:   has a past surgical history that includes  section; Gallbladder surgery; ERCP (2020); Upper gastrointestinal endoscopy (2020); Upper gastrointestinal endoscopy (2020); ERCP (2020); ERCP (2020); ERCP (2020); ERCP (2020); ERCP (2020); and ERCP (2020). Medications:    Prior to Admission medications    Medication Sig Start Date End Date Taking?  Authorizing Provider   oxyCODONE (ROXICODONE) 5 MG immediate release tablet Take 1 tablet by mouth every 6 hours as needed for Pain for up to 5 days. 7/24/20 7/29/20 Yes Flip Perez MD   ciprofloxacin (CIPRO) 500 MG tablet Take 1 tablet by mouth 2 times daily for 10 days 7/23/20 8/2/20 Yes Cale Hicks MD   ondansetron (ZOFRAN-ODT) 8 MG TBDP disintegrating tablet Place 1 tablet under the tongue every 8 hours as needed for Nausea or Vomiting 7/6/20  Yes Marlin Sky MD   furosemide (LASIX) 20 MG tablet Take 1 tablet by mouth daily  Patient taking differently: Take 20 mg by mouth daily As needed 6/16/20 7/20/20 Yes Marlin Sky MD   insulin glargine (LANTUS) 100 UNIT/ML injection vial Inject 50 Units into the skin nightly 6/6/20  Yes Canelo Hough MD   hydrALAZINE (APRESOLINE) 25 MG tablet Take 1 tablet by mouth every 8 hours 6/6/20  Yes Canelo Hoguh MD   carvedilol (COREG) 12.5 MG tablet Take 1 tablet by mouth 2 times daily (with meals) 6/6/20  Yes Canelo Hough MD   amLODIPine (NORVASC) 10 MG tablet Take 1 tablet by mouth daily 6/7/20  Yes Canelo Hough MD   pantoprazole (PROTONIX) 40 MG tablet Take 1 tablet by mouth every morning (before breakfast) 6/6/20  Yes Canelo Hough MD   aspirin 81 MG chewable tablet Take 81 mg by mouth nightly   Yes Historical Provider, MD   insulin aspart (NOVOLOG FLEXPEN) 100 UNIT/ML injection pen Inject into the skin   Yes Historical Provider, MD   sertraline (ZOLOFT) 100 MG tablet take 1 1/2 tablet by oral route  every day   Yes Historical Provider, MD   atorvastatin (LIPITOR) 40 MG tablet nightly  10/12/13  Yes Historical Provider, MD   LORazepam (ATIVAN) 0.5 MG tablet 1 mg nightly. 10/12/13  Yes Historical Provider, MD   lidocaine-prilocaine (EMLA) 2.5-2.5 % cream Apply topically as needed. 7/6/20   Marlin Sky MD   nitroGLYCERIN (NITROSTAT) 0.4 MG SL tablet up to max of 3 total doses.  If no relief after 1 dose, call 911. 6/6/20   Canelo Hough MD   insulin glargine (LANTUS) 100 UNIT/ML injection vial Inject 60 Units into the skin daily 6/7/20   Canelo Hough MD   benzocaine-menthol Trimethoprim; Cefuroxime axetil; Sulfamethoxazole-trimethoprim; Clindamycin phosphate; and Penicillins    Social History:   reports that she quit smoking about 15 years ago. She has never used smokeless tobacco. She reports previous alcohol use. She reports that she does not use drugs. Family History: family history includes Cancer in her maternal grandfather, maternal grandmother, and mother; Heart Failure in her father; Hypertension in her father and mother. REVIEW OF SYSTEMS:    Constitutional: ++ fever or chills. No night sweats, no weight loss   Eyes: No eye discharge, double vision, or eye pain   HEENT: negative for sore mouth, sore throat, hoarseness and voice change   Respiratory: negative for cough , sputum, dyspnea, wheezing, hemoptysis, chest pain   Cardiovascular: negative for chest pain, dyspnea, palpitations, orthopnea, PND   Gastrointestinal: +++r nausea, vomiting, diarrhea, constipation, abdominal pain, Dysphagia, hematemesis and hematochezia   Genitourinary: negative for frequency, dysuria, nocturia, urinary incontinence, and hematuria   Integument: negative for rash, skin lesions, bruises.    Hematologic/Lymphatic: negative for easy bruising, bleeding, lymphadenopathy, or petechiae   Endocrine: negative for heat or cold intolerance,weight changes, change in bowel habits and hair loss   Musculoskeletal: negative for myalgias, arthralgias, pain, joint swelling,and bone pain   Neurological: negative for headaches, dizziness, seizures, weakness, numbness    PHYSICAL EXAM:      BP (!) 144/60   Pulse 75   Temp 98 °F (36.7 °C) (Oral)   Resp 17   Ht 5' 5\" (1.651 m)   Wt 268 lb 8.3 oz (121.8 kg)   SpO2 94%   BMI 44.68 kg/m²    Temp (24hrs), Av °F (36.7 °C), Min:97.7 °F (36.5 °C), Max:98.1 °F (36.7 °C)    General appearance - well appearing, no in pain or distress   Mental status - alert and cooperative   Eyes - pupils equal and reactive, extraocular eye movements intact   Ears - bilateral TM's and external ear canals normal   Mouth - mucous membranes moist, pharynx normal without lesions   Neck - supple, no significant adenopathy   Lymphatics - no palpable lymphadenopathy, no hepatosplenomegaly   Chest - clear to auscultation, no wheezes, rales or rhonchi, symmetric air entry   Heart - normal rate, regular rhythm, normal S1, S2, no murmurs  Abdomen - soft, nontender, nondistended, no masses or organomegaly   Neurological - alert, oriented, normal speech, no focal findings or movement disorder noted   Musculoskeletal - no joint tenderness, deformity or swelling   Extremities - peripheral pulses normal, no pedal edema, no clubbing or cyanosis   Skin - normal coloration and turgor, no rashes, no suspicious skin lesions noted ,    DATA:    Labs:   CBC:   Recent Labs     07/23/20  0839 07/24/20  0757   WBC 3.0* 3.5   HGB 8.8* 9.3*   HCT 29.2* 31.0*    227     BMP:   Recent Labs     07/23/20  0839 07/23/20  1626 07/24/20  0757   *  --  136   K 3.8 3.8 4.1   CO2 21  --  20   BUN 16  --  11   CREATININE 1.05*  --  0.93*   LABGLOM 54*  --  >60   GLUCOSE 202*  --  130*     PT/INR:   Recent Labs     07/22/20  0552   PROTIME 13.3*   INR 1.3       IMAGING DATA:  @[facility] [place]   [place] ERCP BILIARY S&I   Final Result   Intraprocedural fluoroscopic spot images as above. See separate procedure   report for more information. US ABDOMEN LIMITED   Final Result   Intra and extrahepatic biliary dilatation with the common bile duct measuring   up to 13.2 mm, decreased since the prior exams. Biliary stent noted. Questionable pancreatic head mass, better shown on previous MRI. Dilated   pancreatic duct measuring up to 8.4 mm.              Primary Problem  Acute obstructive cholangitis    Active Hospital Problems    Diagnosis Date Noted    NSVT (nonsustained ventricular tachycardia) ([facility] [address] [I47.2] 07/23/2020    Septicemia due to E. coli (HCC) [A41.51] 07/22/2020    Acute obstructive cholangitis [K83.09] patient and Nurse. Thank you for asking us to see this patient. Kathryn Yen MD  PGY-3, Internal medicine resident  [place], [place]  7/24/2020 3:44 PM   Attending Physician Statement  The patient was seen and examined during rounds, I have discussed the care of Rei Guillermo, including pertinent history and exam findings with the resident. I have reviewed the key elements of all parts of the encounter with the resident. I agree with the assessment, and status of the problem list as documented.    Additional assessment/ plan        Janes Thakkar MD  Hematology Oncology  [phone]  Electronically signed by Monroe Watts MD on 7/24/2020 at 7:47 PM

## 2020-07-24 NOTE — DISCHARGE SUMMARY
Pt educated over discharge summary and new medications. All questions answered. IV removed. Pt wheeled down via wheelchair to home with all personal belongings.     Electronically signed by Suzan Patrick RN on 7/24/2020 at 4:06 PM

## 2020-07-24 NOTE — DISCHARGE SUMMARY
discharge today. Follow-up with PCP in 1 week. HPI and Physical Exam:    Danielle Grier is a 64 y.o. Non-/non  female who presents with Nausea and Emesis (started today, pt started chemo last tuesday)   and is admitted to the hospital for the management of Intractable nausea and vomiting.     Patient has known pancreatic cancer and had her first chemo treatment last Tuesday. Yesterday she began vomiting uncontrollably at home. She tried to take a Zofran tablet that she had at home, but she could not keep it down. She also complained of headache, constipation, feeling cold, and running a fever of 100.8 °F.      She has additional significant medical history of cholecystectomy 30 years ago and diabetes,   and ERCP in June of this year. She has a biliary stent.     While in ED, her blood pressure was 138/52, temperature 99.8 °F, heart rate 87, respiratory rate 14. Her LFTs were noted to be elevated and she was admitted for further management of her nausea and vomiting.      Today IV hydration continues. Right upper quadrant ultrasound was completed and revealed dilatation of the intra-and extrahepatic ducts, as well as the pancreatic duct. There is a mass seen on the pancreatic head. Oncology rounded and recommend GI consult due to transaminitis. GI was consulted and requested patient be transferred to [facility] for specialized GI care. Her BP meds were placed on hold due to concerns for sepsis, however, her BP remains stable at this time. Her nausea has improved, although her abdominal discomfort remains, especially in the right upper quadrant in the right upper quadrant. Cultures were positive 2 out of 2 for gram-negative rods. She was started on IV aztreonam, and infectious disease was consulted.     Vitals:  Vitals:    07/24/20 0418 07/24/20 0423 07/24/20 0830 07/24/20 1145   BP: 139/74  136/62 (!) 144/60   Pulse: 74  82 75   Resp: 16  15 17   Temp: 98.1 °F (36.7 °C)  98.1 °F (36.7 °C) 98 °F (36.7 °C)   TempSrc: Oral  Oral Oral   SpO2: 95%  90% 94%   Weight:  268 lb 8.3 oz (121.8 kg)     Height:         Weight: Weight: 268 lb 8.3 oz (121.8 kg)     24 hour intake/output:    Intake/Output Summary (Last 24 hours) at 7/24/2020 1948  Last data filed at 7/24/2020 0459  Gross per 24 hour   Intake 775 ml   Output 750 ml   Net 25 ml       Physical Exam  Vitals signs and nursing note reviewed. Constitutional:       General: She is not in acute distress. Appearance: Normal appearance. She is obese. She is not ill-appearing, toxic-appearing or diaphoretic. HENT:      Head: Normocephalic and atraumatic. Nose: Nose normal. No congestion or rhinorrhea. Mouth/Throat:      Mouth: Mucous membranes are moist.      Pharynx: Oropharynx is clear. No oropharyngeal exudate or posterior oropharyngeal erythema. Eyes:      General: No scleral icterus. Right eye: No discharge. Left eye: No discharge. Extraocular Movements: Extraocular movements intact. Conjunctiva/sclera: Conjunctivae normal.      Pupils: Pupils are equal, round, and reactive to light. Neck:      Musculoskeletal: Normal range of motion and neck supple. No neck rigidity or muscular tenderness. Vascular: No carotid bruit. Cardiovascular:      Rate and Rhythm: Normal rate and regular rhythm. Pulses: Normal pulses. Heart sounds: Normal heart sounds. No murmur. No friction rub. No gallop. Pulmonary:      Effort: Pulmonary effort is normal. No respiratory distress. Breath sounds: Normal breath sounds. No stridor. No wheezing, rhonchi or rales. Chest:      Chest wall: No tenderness. Abdominal:      General: Bowel sounds are normal. There is no distension. Palpations: Abdomen is soft. There is no mass. Tenderness: There is no abdominal tenderness. There is no right CVA tenderness, left CVA tenderness, guarding or rebound. Hernia: No hernia is present. Musculoskeletal: Normal range of motion. General: No swelling, tenderness, deformity or signs of injury. Right lower leg: No edema. Left lower leg: No edema. Lymphadenopathy:      Cervical: No cervical adenopathy. Skin:     General: Skin is warm and dry. Coloration: Skin is not jaundiced or pale. Findings: No bruising, erythema, lesion or rash. Neurological:      General: No focal deficit present. Mental Status: She is alert and oriented to person, place, and time. Mental status is at baseline. Cranial Nerves: No cranial nerve deficit. Sensory: No sensory deficit. Motor: No weakness. Coordination: Coordination normal.      Gait: Gait normal.      Deep Tendon Reflexes: Reflexes normal.   Psychiatric:         Mood and Affect: Mood normal.         Behavior: Behavior normal.         Thought Content: Thought content normal.         Judgment: Judgment normal.     CBC:    Lab Results   Component Value Date    WBC 3.5 07/24/2020    HGB 9.3 07/24/2020    HCT 31.0 07/24/2020     07/24/2020     03/05/2012       Renal:    Lab Results   Component Value Date     07/24/2020    K 4.1 07/24/2020     07/24/2020    CO2 20 07/24/2020    BUN 11 07/24/2020    CREATININE 0.93 07/24/2020    CALCIUM 9.1 07/24/2020    PHOS 2.6 07/21/2020     ABG. None.     URINALYSIS. None.     SEROLOGY  Results for Wil Healy (MRN [id number]) as of 7/22/2020 13:11    Ref.  Range 6/3/2020 20:17   CA 19-9 Latest Ref Range: 0 - 35 U/mL 13   CEA Latest Ref Range: <3.9 ng/mL 2.6   JOSSIE Latest Ref Range: NEGATIVE  NEGATIVE   Anti-Mitochondrial Antibody Latest Ref Range: 0.0 - 20.0 Units 45.0 (H)   Liver-Kidney Microsomal Ab Latest Ref Range: <1:20  <1:20   Smooth Muscle Ab Latest Ref Range: 0 - 19 Units 22 (H)   IgG 1 Latest Ref Range: 240 - 1118 mg/dL 388   IgG 2 Latest Ref Range: 124 - 549 mg/dL 143   IgG 3 Latest Ref Range: 21 - 134 mg/dL 11 (L)   IgG 4 Latest Ref Range: 1 - 123 mg/dL 9      Results for Dago Jara (MRN [id number]) as of 7/22/2020 13:11    Ref. Range 6/3/2020 20:17   Smooth Muscle Ab Latest Ref Range: <1:20  1:80      TUMOR MARKER. None.     MICROBIOLOGY   Blood Culture-7/20/2020.   ESCHERICHIA COLI.       HISTOPATHOLOGY. -- Diagnosis --   HEAD OF PANCREAS MASS EUS FINE NEEDLE ASPIRATION:     POSITIVE FOR MALIGNANCY:     ADENOCARCINOMA, MODERATELY DIFFERENTIATED.           SALIMA Levine. None.     ENDOSCOPE STUDIES. ERCP with stent removal and exchange + Biliary decompression -07/22/2020.     PROCEDURES PERFORMED:   1. Transoral Endoscopic retrograde cholangiopancreatography (ERCP).      2. Stent removal     3. Cholangiogram      4. Sludge extraction     5. Biliary Stent placement SEMS (fully covered, 10mm x 60mm)      6. Biliary decompression     POSTPROCEDURE DIAGNOSIS:   Purulent Cholangitis secondary to extraluminal malignant compression of distal CBD     Successful stent removal of pre-existing stent and replacement      PROCEDURES. Diagnostic left heart catheterization-06/03/2020. Diagnostic procedure: Coronary angiography     Complications:    - No complication     CONCLUSION:  Procedure Summary     Nonobstructive CAD.     RECOMMENDATIONS:      Medical therapy and risk factor modifications.     RADIOLOGY.     Liver US- 7/21/2020. FINDINGS:     LIVER:    Image portions of the liver show no focal abnormality.      The liver has a slightly heterogeneous echotexture and is somewhat difficult  to penetrate. Liver length is 19.5 cm.      Mild-to-moderate intrahepatic biliary ductal dilatation.     BILIARY SYSTEM:    Previous cholecystectomy.      Dilated common bile duct measuring up to 13.2 mm at the carmle hepatis. , decreased   slightly since the prior studies.      Partially visualized stent within the common bile duct.     RIGHT KIDNEY:   The right kidney is grossly unremarkable without evidence of hydronephrosis.     PANCREAS:   The pancreas is partially obscured by overlying bowel gas. In particular, the head and tail are not optimally visualized.      Subtle heterogeneous hypoechoic area in the pancreatic head may represent the  patient's known pancreatic mass. The pancreatic duct is dilated measuring up to 8.4 mm in the body.     OTHER: No evidence of right upper quadrant ascites. IMPRESSION:  Intra and extrahepatic biliary dilatation with the common bile duct measuring  up to 13.2 mm, decreased since the prior exams.      Biliary stent noted.     Questionable pancreatic head mass, better shown on previous MRI.      Dilated pancreatic duct measuring up to 8.4 mm.     MRI abdomen and Pelvis-6/4/2020. FINDINGS:     Exam degraded by motion artifact.     ABDOMEN:   The visualized lung bases reveal no signal abnormality.     The liver is normal in signal intensity and contour.       No focal liver lesion identified.     Irregular mass in the head of the pancreas is present resulting in biliary  and pancreatic duct obstruction.      This measures 3.2 x 2.3 cm and is best visualized on T2 imaging.      This mass extends towards the liver hilum.      There is close approximation with the main portal vein, which remains patent.     There is close abutment with the hepatic artery.      No inflammatory change identified involving the pancreas.      Relative atrophy of the body and tail is noted.     The spleen, adrenals and kidneys reveal no significant findings.      Right renal cyst.     The visualized bowel is normal in caliber.     No ascites.      Other than periportal lymph nodes measuring up to 12 mm, no retroperitoneal   or mesenteric lymphadenopathy is otherwise appreciated.     Incidental small intramural fibroid is noted in the right body of the uterus.     No signal abnormality identified in the visualized osseous structures.     MRCP:   Gallbladder:   Surgically absent.      Small amount of fluid signal within the gallbladder fossa, likely postoperative.     Bile Ducts:   Intrahepatic and extrahepatic biliary dilatation with abrupt cutoff at the mid common duct level.    Upstream dilatation measures 16 mm at the common duct level, 8 mm at the left hepatic   duct level and 7 mm in the right hepatic duct.     Pancreatic Duct:   Dilatation with abrupt cutoff at the site of pancreatic head mass.      Upstream dilatation of 8 mm is noted.     IMPRESSION:  1.  3.2 cm soft tissue mass in the pancreatic head resulting in pancreatic       duct and biliary dilatation, compatible with primary neoplasm.            There is abutment of the portal vein and soft tissue extension/lymph nodes in the       liver hilum noted.            Given the limitations of motion artifact on this exam, a       pancreas CT should be considered for detailed vascular evaluation.     2.  No liver metastatic disease. Consults:     IP CONSULT TO ONCOLOGY  IP CONSULT TO GI  IP CONSULT TO INFECTIOUS DISEASES  IP CONSULT TO GI    Disposition:    [x] Home       [] TCU       [] Rehab       [] Psych       [] SNF       [] [place]       [] Other-    Condition at Discharge: Stable    Code Status:  Prior     Patient Instructions:    Discharge lab work:    Activity: activity as tolerated  Diet: No diet orders on file      Follow-up visits:   Virginie Weber [address] [facility]           Discharge Medications:      Michelle Nichols   Home Medication Instructions [id number]    Printed on:07/24/20 1948   Medication Information                      amLODIPine (NORVASC) 10 MG tablet  Take 1 tablet by mouth daily             aspirin 81 MG chewable tablet  Take 81 mg by mouth nightly             atorvastatin (LIPITOR) 40 MG tablet  nightly              benzocaine-menthol (CEPACOL SORE THROAT) 15-3.6 MG lozenge  Take 1 lozenge by mouth every 2 hours as needed for Sore Throat carvedilol (COREG) 12.5 MG tablet  Take 1 tablet by mouth 2 times daily (with meals)             ciprofloxacin (CIPRO) 500 MG tablet  Take 1 tablet by mouth 2 times daily for 10 days             furosemide (LASIX) 20 MG tablet  Take 1 tablet by mouth daily             hydrALAZINE (APRESOLINE) 25 MG tablet  Take 1 tablet by mouth every 8 hours             insulin aspart (NOVOLOG FLEXPEN) 100 UNIT/ML injection pen  Inject into the skin             insulin glargine (LANTUS) 100 UNIT/ML injection vial  Inject 50 Units into the skin nightly             insulin glargine (LANTUS) 100 UNIT/ML injection vial  Inject 60 Units into the skin daily             lidocaine-prilocaine (EMLA) 2.5-2.5 % cream  Apply topically as needed. Lisdexamfetamine Dimesylate 60 MG CAPS  Take 60 mg by mouth every morning. LORazepam (ATIVAN) 0.5 MG tablet  1 mg nightly. nitroGLYCERIN (NITROSTAT) 0.4 MG SL tablet  up to max of 3 total doses. If no relief after 1 dose, call 911. ondansetron (ZOFRAN-ODT) 8 MG TBDP disintegrating tablet  Place 1 tablet under the tongue every 8 hours as needed for Nausea or Vomiting             oxyCODONE (ROXICODONE) 5 MG immediate release tablet  Take 1 tablet by mouth every 6 hours as needed for Pain for up to 5 days. pantoprazole (PROTONIX) 40 MG tablet  Take 1 tablet by mouth every morning (before breakfast)             sertraline (ZOLOFT) 100 MG tablet  take 1 1/2 tablet by oral route  every day                 Time Spent on discharge is more than 30 minutes in the examination, evaluation, counseling and review of medications and discharge plan. Signed: Thank you Jj Pineda for the opportunity to be involved in this patient's care.     Electronically signed by Flip Perez MD on 7/24/2020 at 7:48 PM

## 2020-07-24 NOTE — PROGRESS NOTES
THE [facility] AT [facility] Progress Note    Peter Johnston is a 64 y.o. female patient. Hospitalization Day:3      Chief consult reason: Pancreatic adenocarcinoma, elevated LFTs      Subjective: Patient seen and examined. Overall not feeling well this AM, but better this afternoon after taking shower. Reports RUQ discomfort continues - improved after passing flatus. No BM     VITALS:  BP (!) 144/60   Pulse 75   Temp 98 °F (36.7 °C) (Oral)   Resp 17   Ht 5' 5\" (1.651 m)   Wt 268 lb 8.3 oz (121.8 kg)   SpO2 94%   BMI 44.68 kg/m²   TEMPERATURE:  Current - Temp: 98 °F (36.7 °C); Max - Temp  Av °F (36.7 °C)  Min: 97.7 °F (36.5 °C)  Max: 98.1 °F (36.7 °C)    Physical Assessment:  General appearance:  alert, cooperative and no distress  Mental Status:  oriented to person, place and time and normal affect  Lungs:  clear to auscultation bilaterally, normal effort  Heart:  regular rate and rhythm, no murmur  Abdomen:  soft, obese, mild RUQ tenderness, nondistended, normal bowel sounds, no masses  Extremities:  no edema, no redness, No clubbing  Skin:  warm, dry, no gross lesions or rashes    Data Review:  LABS and IMAGING:     CBC  Recent Labs     20  0552 20  0839 20  0757   WBC 3.7 3.0* 3.5   HGB 8.4* 8.8* 9.3*   HCT 27.4* 29.2* 31.0*   MCV 91.6 91.8 92.0   MCHC 30.7 30.1 30.0   RDW 14.3 14.4 14.7*    181 227       Immature PLTs   No results found for: PLTFLUORE    ANEMIA STUDIES  No results for input(s): LABIRON, TIBC, IRON, FERRITIN, [id number], FOLATE, OCCULTBLD in the last 72 hours.     BMP  Recent Labs     20  0552 20  0839 20  1626 20  0757   * 133*  --  136   K 3.9 3.8 3.8 4.1   CL 99 102  --  105   CO2 20 21  --  20   BUN 16 16  --  11   CREATININE 1.08* 1.05*  --  0.93*   GLUCOSE 330* 202*  --  130*   CALCIUM 8.4* 8.9  --  9.1       LFTS  Recent Labs     20  1720 20  0552 20  0839 20  0757   ALKPHOS 391* 348* 400* 437* * 489* 345* 264*   * 173* 84* 70*   BILITOT 3.45* 3.28* 3.16* 2.65*   BILIDIR 3.58* 3.23* 2.96* 2.41*   LABALBU 3.0* 2.5* 2.6* 2.6*       AMYLASE/LIPASE/AMMONIA  No results for input(s): AMYLASE, LIPASE, AMMONIA in the last 72 hours. Acute Hepatitis Panel   No results found for: HEPBSAG, HEPCAB, HEPBIGM, HEPAIGM    HCV Genotype   No results found for: HEPATITISCGENOTYPE    HCV Quantitative   No results found for: HCVQNT    LIVER WORK UP:    AFP  Lab Results   Component Value Date    AFP 1.2 06/04/2020       Alpha 1 antitrypsin   Lab Results   Component Value Date    A1A 170 06/03/2020       Anti - Liver/Kidney Ab  Lab Results   Component Value Date    LIVER-KIDNEYMICROSOMALAB <1:20 06/03/2020       JOSSIE  Lab Results   Component Value Date    JOSSIE NEGATIVE 06/03/2020       AMA  Lab Results   Component Value Date    MITOAB 45.0 06/03/2020       ASMA  Lab Results   Component Value Date    SMOOTHMUSCAB 22 06/03/2020    SMOOTHMUSCAB 1:80 06/03/2020       Ceruloplasmin  Lab Results   Component Value Date    CERULOPLSM 32 06/03/2020       Celiac panel  No results found for: TISSTRNTIIGG, TTGIGA, IGA    IgG  No results found for: IGG    IgM  No results found for: IGM    GGT   Lab Results   Component Value Date    LABGGT 524 06/03/2020       PT/INR  Recent Labs     07/22/20  0552   PROTIME 13.3*   INR 1.3       Cancer Markers:  CEA:  No results for input(s): CEA in the last 72 hours. Ca 125:  No results for input(s):  in the last 72 hours. Ca 19-9:   No results for input(s):  in the last 72 hours. AFP: No results for input(s): AFP in the last 72 hours. Lactic acid:No results for input(s): LACTACIDWB in the last 72 hours. Radiology Review: Fl Ercp Biliary S&i    Result Date: 7/22/2020  EXAMINATION: SPOT FLUOROSCOPIC IMAGES 7/22/2020 4:18 pm TECHNIQUE: Fluoroscopy was provided by the radiology department for procedure. Radiologist was not present during examination.  FLUOROSCOPY DOSE AND TYPE OR TIME AND EXPOSURES: 106.3 seconds. Air kerma 52.19 mGy COMPARISON: None HISTORY: Intraprocedural imaging. FINDINGS: 6 spot images of the right upper quadrant were obtained. Images were submitted from an ERCP with stent placement. Intraprocedural fluoroscopic spot images as above. See separate procedure report for more information. Us Abdomen Limited    Result Date: 7/21/2020  EXAMINATION: RIGHT UPPER QUADRANT ULTRASOUND 7/21/2020 1:28 pm COMPARISON: 06/03/2020, MRI 06/04/2020, ERCP 06/04/2020 HISTORY: ORDERING SYSTEM PROVIDED HISTORY: rt upper abdomen pain, hx gallbladder removed, pancreatic stent/ pancreatic ca TECHNOLOGIST PROVIDED HISTORY: rt upper abdomen pain, hx gallbladder removed, pancreatic stent/ pancreatic ca Reason for Exam: RUQ pain; N/V; pancreatic CA; pancreatic stent Acuity: Acute Type of Exam: Subsequent/Follow-up FINDINGS: LIVER:  Image portions of the liver show no focal abnormality. The liver has a slightly heterogeneous echotexture and is somewhat difficult to penetrate. Liver length is 19.5 cm. Mild-to-moderate intrahepatic biliary ductal dilatation. BILIARY SYSTEM:  Previous cholecystectomy. Dilated common bile duct measuring up to 13.2 mm at the carmel hepatis. , decreased slightly since the prior studies. Partially visualized stent within the common bile duct. RIGHT KIDNEY: The right kidney is grossly unremarkable without evidence of hydronephrosis. PANCREAS: The pancreas is partially obscured by overlying bowel gas. In particular, the head and tail are not optimally visualized. Subtle heterogeneous hypoechoic area in the pancreatic head may represent the patient's known pancreatic mass. The pancreatic duct is dilated measuring up to 8.4 mm in the body. OTHER: No evidence of right upper quadrant ascites. Intra and extrahepatic biliary dilatation with the common bile duct measuring up to 13.2 mm, decreased since the prior exams. Biliary stent noted.  Questionable pancreatic head mass, better shown on previous MRI. Dilated pancreatic duct measuring up to 8.4 mm. ENDOSCOPY    ERCP with stent removal and exchange + Biliary decompression      PROCEDURE DATE: 07/22/20  ATTENDING PHYSICIAN: Bna Rodas MD      PROCEDURES PERFORMED:   1. Transoral Endoscopic retrograde cholangiopancreatography (ERCP). 2. Stent removal  3. Cholangiogram   4. Sludge extraction  5. Biliary Stent placement SEMS (fully covered, 10mm x 60mm)   6. Biliary decompression    Principal Problem:    Acute obstructive cholangitis  Active Problems:    Hypertension    Type 2 diabetes mellitus with hyperglycemia, with long-term current use of insulin (HCC)    Pancreatic Head Mass    Non-Obstructive CAD    Primary adenocarcinoma of head of pancreas (HCC)    Intractable nausea and vomiting    Class 3 severe obesity with serious comorbidity in [facility])    Transaminitis    Intractable vomiting    Septicemia due to E. coli (HCC)    TORREY (acute kidney injury) ([facility] [address]    Dehydration with hyponatremia    NSVT (nonsustained ventricular tachycardia) ([address]  Resolved Problems:    Sepsis ([address]    Bacteremia    Dehydration       GI Assessment:    1. Pancreatic adenocarcinoma on chemotherapy  2. Ascending cholangitis - 07/22/2020 S/P ERCP with biliary stent exchange (ELLEN fully covered, 10 mm x 60 mm) and decompression  3. Elevated LFTs -secondary to biliary obstruction-blocked stent    - LFTs improving. Clinically improved       Plan of care:  1. Start Simethicone 4 times daily as needed for bloating  2. Continue antibiotics . Will need  2 week course  3. Patient to follow-up with Dr. Chikis Spann in O/P setting -patient instructed to keep her appointment for next month  4. Okay to be discharged from GI perspective if tolerating diet  5. Recommend obtaining LFTs in 3 days (from [facility]) and send results to Dr. Chikis Spann      Please feel free to contact me with any questions or concerns.    Thank you for allowing me to participate in the care of your patient. EMERSON Garvin CNP on 7/24/2020 at 1:00 PM   THE [facility] AT [facility]    Please note that this note was generated using a voice recognition dictation software. Although every effort was made to ensure the accuracy of this automated transcription, some errors in transcription may have occurred.

## 2020-07-24 NOTE — PLAN OF CARE
Supplement  Nutritional Goals: PO intakes to meet >75% estimated needs.      Outcome: Met This Shift     Problem: Pain:  Goal: Pain level will decrease  Description: Pain level will decrease  Outcome: Met This Shift  Goal: Control of acute pain  Description: Control of acute pain  Outcome: Met This Shift  Goal: Control of chronic pain  Description: Control of chronic pain  Outcome: Met This Shift     Problem: Health Behavior:  Goal: Ability to identify and utilize available resources and services will improve  Description: Ability to identify and utilize available resources and services will improve  Outcome: Met This Shift  Goal: Ability to manage health-related needs will improve  Description: Ability to manage health-related needs will improve  Outcome: Met This Shift

## 2020-07-28 ENCOUNTER — HOSPITAL ENCOUNTER (OUTPATIENT)
Dept: INFUSION THERAPY | Age: 60
Discharge: HOME | End: 2020-07-28
Payer: COMMERCIAL

## 2020-07-28 ENCOUNTER — HOSPITAL ENCOUNTER (OUTPATIENT)
Dept: ULTRASOUND IMAGING | Age: 60
Discharge: HOME | End: 2020-07-30
Payer: COMMERCIAL

## 2020-07-28 ENCOUNTER — OFFICE VISIT (OUTPATIENT)
Dept: ONCOLOGY | Age: 60
End: 2020-07-28
Payer: COMMERCIAL

## 2020-07-28 ENCOUNTER — TELEPHONE (OUTPATIENT)
Dept: ONCOLOGY | Age: 60
End: 2020-07-28

## 2020-07-28 VITALS
OXYGEN SATURATION: 95 % | TEMPERATURE: 98.7 F | BODY MASS INDEX: 46.13 KG/M2 | HEART RATE: 85 BPM | SYSTOLIC BLOOD PRESSURE: 155 MMHG | DIASTOLIC BLOOD PRESSURE: 78 MMHG | WEIGHT: 277.2 LBS

## 2020-07-28 DIAGNOSIS — C25.0 PRIMARY ADENOCARCINOMA OF HEAD OF PANCREAS (HCC): Primary | ICD-10-CM

## 2020-07-28 LAB
ABSOLUTE EOS #: 0 K/UL (ref 0–0.4)
ABSOLUTE IMMATURE GRANULOCYTE: ABNORMAL K/UL (ref 0–0.3)
ABSOLUTE LYMPH #: 0.5 K/UL (ref 1–4.8)
ABSOLUTE MONO #: 0.8 K/UL (ref 0.1–1.2)
ALBUMIN SERPL-MCNC: 2.8 G/DL (ref 3.5–5.2)
ALBUMIN/GLOBULIN RATIO: 0.9 (ref 1–2.5)
ALP BLD-CCNC: 614 U/L (ref 35–104)
ALT SERPL-CCNC: 99 U/L (ref 5–33)
ANION GAP SERPL CALCULATED.3IONS-SCNC: 12 MMOL/L (ref 9–17)
AST SERPL-CCNC: 33 U/L
BASOPHILS # BLD: 0 % (ref 0–2)
BASOPHILS ABSOLUTE: 0 K/UL (ref 0–0.2)
BILIRUB SERPL-MCNC: 1.81 MG/DL (ref 0.3–1.2)
BUN BLDV-MCNC: 14 MG/DL (ref 6–20)
BUN/CREAT BLD: ABNORMAL (ref 9–20)
CA 19-9: 12 U/ML (ref 0–35)
CALCIUM SERPL-MCNC: 10.4 MG/DL (ref 8.6–10.4)
CHLORIDE BLD-SCNC: 101 MMOL/L (ref 98–107)
CO2: 21 MMOL/L (ref 20–31)
CREAT SERPL-MCNC: 0.69 MG/DL (ref 0.5–0.9)
DIFFERENTIAL TYPE: ABNORMAL
EOSINOPHILS RELATIVE PERCENT: 1 % (ref 1–4)
GFR AFRICAN AMERICAN: >60 ML/MIN
GFR NON-AFRICAN AMERICAN: >60 ML/MIN
GFR SERPL CREATININE-BSD FRML MDRD: ABNORMAL ML/MIN/{1.73_M2}
GFR SERPL CREATININE-BSD FRML MDRD: ABNORMAL ML/MIN/{1.73_M2}
GLUCOSE BLD-MCNC: 205 MG/DL (ref 70–99)
HCT VFR BLD CALC: 28.2 % (ref 36–46)
HEMOGLOBIN: 9.2 G/DL (ref 12–16)
IMMATURE GRANULOCYTES: ABNORMAL %
LYMPHOCYTES # BLD: 14 % (ref 24–44)
MCH RBC QN AUTO: 28 PG (ref 26–34)
MCHC RBC AUTO-ENTMCNC: 32.8 G/DL (ref 31–37)
MCV RBC AUTO: 85.4 FL (ref 80–100)
MONOCYTES # BLD: 20 % (ref 2–11)
NRBC AUTOMATED: ABNORMAL PER 100 WBC
PDW BLD-RTO: 16 % (ref 12.5–15.4)
PLATELET # BLD: 299 K/UL (ref 140–450)
PLATELET ESTIMATE: ABNORMAL
PMV BLD AUTO: 7.4 FL (ref 6–12)
POTASSIUM SERPL-SCNC: 3.6 MMOL/L (ref 3.7–5.3)
RBC # BLD: 3.3 M/UL (ref 4–5.2)
RBC # BLD: ABNORMAL 10*6/UL
SEG NEUTROPHILS: 65 % (ref 36–66)
SEGMENTED NEUTROPHILS ABSOLUTE COUNT: 2.6 K/UL (ref 1.8–7.7)
SODIUM BLD-SCNC: 134 MMOL/L (ref 135–144)
TOTAL PROTEIN: 6 G/DL (ref 6.4–8.3)
WBC # BLD: 3.9 K/UL (ref 3.5–11)
WBC # BLD: ABNORMAL 10*3/UL

## 2020-07-28 PROCEDURE — 99215 OFFICE O/P EST HI 40 MIN: CPT | Performed by: INTERNAL MEDICINE

## 2020-07-28 PROCEDURE — 85025 COMPLETE CBC W/AUTO DIFF WBC: CPT

## 2020-07-28 PROCEDURE — 6360000002 HC RX W HCPCS: Performed by: INTERNAL MEDICINE

## 2020-07-28 PROCEDURE — 36591 DRAW BLOOD OFF VENOUS DEVICE: CPT

## 2020-07-28 PROCEDURE — 76705 ECHO EXAM OF ABDOMEN: CPT

## 2020-07-28 PROCEDURE — 86301 IMMUNOASSAY TUMOR CA 19-9: CPT

## 2020-07-28 PROCEDURE — 2580000003 HC RX 258: Performed by: INTERNAL MEDICINE

## 2020-07-28 PROCEDURE — 80053 COMPREHEN METABOLIC PANEL: CPT

## 2020-07-28 RX ORDER — HEPARIN SODIUM (PORCINE) LOCK FLUSH IV SOLN 100 UNIT/ML 100 UNIT/ML
500 SOLUTION INTRAVENOUS PRN
Status: DISCONTINUED | OUTPATIENT
Start: 2020-07-28 | End: 2020-07-29 | Stop reason: HOSPADM

## 2020-07-28 RX ORDER — OXYCODONE HYDROCHLORIDE 5 MG/1
5 TABLET ORAL EVERY 6 HOURS PRN
Qty: 120 TABLET | Refills: 0 | Status: SHIPPED | OUTPATIENT
Start: 2020-07-28 | End: 2020-08-27

## 2020-07-28 RX ORDER — SODIUM CHLORIDE 0.9 % (FLUSH) 0.9 %
10 SYRINGE (ML) INJECTION PRN
Status: CANCELLED | OUTPATIENT
Start: 2020-07-28

## 2020-07-28 RX ORDER — SODIUM CHLORIDE 0.9 % (FLUSH) 0.9 %
20 SYRINGE (ML) INJECTION PRN
Status: CANCELLED | OUTPATIENT
Start: 2020-07-28

## 2020-07-28 RX ORDER — HEPARIN SODIUM (PORCINE) LOCK FLUSH IV SOLN 100 UNIT/ML 100 UNIT/ML
500 SOLUTION INTRAVENOUS PRN
Status: CANCELLED | OUTPATIENT
Start: 2020-07-28

## 2020-07-28 RX ORDER — SODIUM CHLORIDE 0.9 % (FLUSH) 0.9 %
10 SYRINGE (ML) INJECTION PRN
Status: DISCONTINUED | OUTPATIENT
Start: 2020-07-28 | End: 2020-07-29 | Stop reason: HOSPADM

## 2020-07-28 RX ADMIN — Medication 10 ML: at 09:00

## 2020-07-28 RX ADMIN — HEPARIN 500 UNITS: 100 SYRINGE at 09:40

## 2020-07-28 NOTE — PROGRESS NOTES
Today's Date: 7/28/2020  Patient Name: Haven Moreno  Patient's age: 64 y. o., 1960    DIAGNOSIS: pancreatic cancer, clinical  T2N1MO stage IIB, encasement of portal vein, unresectable  Started on dose reduced FOLFIRINOX on 7/14/2020    CHIEF COMPLAINT:    Chief Complaint   Patient presents with    Follow-up     review status of disease    Follow-Up from Hospital     Patient was in [facility] with an infection    Shortness of Breath    Abdominal Pain     HISTORY OF PRESENT ILLNESS:    This is a 65-year-old female who was admitted with diabetic ketoacidosis, lower back pain. She was noted to have elevated troponin, elevated liver enzymes. Patient was seen by cardiology and had cardiac cath which showed nonobstructive CAD and medical management recommended. She had ultrasound of the liver which showed dilated pancreatic duct and intrahepatic ductal dilatation. This was followed by MRCP which showed pancreatic mass and she underwent ERCP and EUS on 6/4/20 with cholangiogram, Biliary sphincterotomy and  Placement of 10 F x 7 cm plastic biliary stent  The pancreatic head biopsy showed adenocarcinoma. Oncology consulted for further recommendations. INTERVAL HISTORY:  Patient is returning for follow up visit and to discuss further recommendations. She was admitted to hospital after her first cycle of chemotherapy with neutropenia, nausea vomiting. Her blood cultures was positive for E. coli and her liver enzymes were up. She had ERCP and stent exchange. She still complains of some nausea. She has some fatigue and tiredness. She also complains of right upper quadrant abdominal pain. No fever chills. During this visit patient's allergy, social, medical, surgical history and medications were reviewed and updated. Past Medical History:   has a past medical history of Anxiety, Diabetes mellitus ([address] Hyperlipidemia, Hypertension, and Pancreatic cancer ([facility] [address]     Past Surgical (before breakfast) 6/6/20  Yes Varsha Rangel MD   aspirin 81 MG chewable tablet Take 81 mg by mouth nightly   Yes Historical Provider, MD   insulin aspart (NOVOLOG FLEXPEN) 100 UNIT/ML injection pen Inject into the skin   Yes Historical Provider, MD   Lisdexamfetamine Dimesylate 60 MG CAPS Take 60 mg by mouth every morning. Yes Historical Provider, MD   sertraline (ZOLOFT) 100 MG tablet take 1 1/2 tablet by oral route  every day   Yes Historical Provider, MD   atorvastatin (LIPITOR) 40 MG tablet nightly  10/12/13  Yes Historical Provider, MD   LORazepam (ATIVAN) 0.5 MG tablet 1 mg nightly. 10/12/13  Yes Historical Provider, MD   furosemide (LASIX) 20 MG tablet Take 1 tablet by mouth daily  Patient taking differently: Take 20 mg by mouth daily As needed 6/16/20 7/20/20  Ilsa Rothman MD     Current Outpatient Medications   Medication Sig Dispense Refill    oxyCODONE (ROXICODONE) 5 MG immediate release tablet Take 1 tablet by mouth every 6 hours as needed for Pain for up to 30 days. 120 tablet 0    ciprofloxacin (CIPRO) 500 MG tablet Take 1 tablet by mouth 2 times daily for 10 days 20 tablet 0    lidocaine-prilocaine (EMLA) 2.5-2.5 % cream Apply topically as needed. 1 Tube 2    ondansetron (ZOFRAN-ODT) 8 MG TBDP disintegrating tablet Place 1 tablet under the tongue every 8 hours as needed for Nausea or Vomiting 90 tablet 2    nitroGLYCERIN (NITROSTAT) 0.4 MG SL tablet up to max of 3 total doses.  If no relief after 1 dose, call 911. 25 tablet 3    insulin glargine (LANTUS) 100 UNIT/ML injection vial Inject 50 Units into the skin nightly 1 vial 3    insulin glargine (LANTUS) 100 UNIT/ML injection vial Inject 60 Units into the skin daily 1 vial 3    hydrALAZINE (APRESOLINE) 25 MG tablet Take 1 tablet by mouth every 8 hours 90 tablet 3    carvedilol (COREG) 12.5 MG tablet Take 1 tablet by mouth 2 times daily (with meals) 60 tablet 3    amLODIPine (NORVASC) 10 MG tablet Take 1 tablet by mouth daily 30 for frequency, dysuria, nocturia, urinary incontinence, and hematuria   Integument: negative for rash, skin lesions, bruises.    Hematologic/Lymphatic: negative for easy bruising, bleeding, lymphadenopathy, or petechiae   Endocrine: negative for heat or cold intolerance,weight changes, change in bowel habits and hair loss   Musculoskeletal: negative for myalgias, arthralgias, pain, joint swelling,and bone pain   Neurological: negative for headaches, dizziness, seizures, weakness, numbness    PHYSICAL EXAM:      BP (!) 155/78   Pulse 85   Temp 98.7 °F (37.1 °C) (Oral)   Wt 277 lb 3.2 oz (125.7 kg)   SpO2 95%   BMI 46.13 kg/m²    Temp (24hrs), Av.8 °F (36.6 °C), Min:97.1 °F (36.2 °C), Max:99.4 °F (37.4 °C)    General appearance - well appearing, no in pain or distress   Mental status - alert and cooperative   Eyes - pupils equal and reactive, extraocular eye movements intact   Ears - bilateral TM's and external ear canals normal   Mouth - mucous membranes moist, pharynx normal without lesions   Neck - supple, no significant adenopathy   Lymphatics - no palpable lymphadenopathy, no hepatosplenomegaly   Chest - clear to auscultation, no wheezes, rales or rhonchi, symmetric air entry   Heart - normal rate, regular rhythm, normal S1, S2, no murmurs  Abdomen - soft, nontender, nondistended, no masses or organomegaly   Neurological - alert, oriented, normal speech, no focal findings or movement disorder noted   Musculoskeletal - no joint tenderness, deformity or swelling   Extremities - peripheral pulses normal, no pedal edema, no clubbing or cyanosis   Skin - normal coloration and turgor, no rashes, no suspicious skin lesions noted ,    DATA:    Labs:   CBC:   Recent Labs     20  0841   WBC 3.9   HGB 9.2*   HCT 28.2*        BMP:   Recent Labs     20  0841   *   K 3.6*   CO2 21   BUN 14   CREATININE 0.69   LABGLOM >60   GLUCOSE 205*     PT/INR:   No results for input(s): PROTIME, INR in the last 72 hours. Surgical Pathology Report   Surgical Pathology   Collected: 06/04/20 1542   Lab status: Final   Resulting lab: [facility]   Value: -- Diagnosis --   HEAD OF PANCREAS MASS EUS FINE NEEDLE ASPIRATION:           POSITIVE FOR MALIGNANCY:   ADENOCARCINOMA, MODERATELY DIFFERENTIATED. IMAGING DATA:  MRI abd 6/4/20  FINDINGS:   Exam degraded by motion artifact.       ABDOMEN:       The visualized lung bases reveal no signal abnormality.       The liver is normal in signal intensity and contour.  No focal liver lesion   identified.       Irregular mass in the head of the pancreas is present resulting in biliary   and pancreatic duct obstruction.  This measures 3.2 x 2.3 cm and is best   visualized on T2 imaging.  This mass extends towards the liver hilum. Samantha Dart   is close approximation with the main portal vein, which remains patent.    There is close abutment with the hepatic artery.  No inflammatory change   identified involving the pancreas.  Relative atrophy of the body and tail is   noted.       The spleen, adrenals and kidneys reveal no significant findings.  Right renal   cyst.       The visualized bowel is normal in caliber.       No ascites.  Other than periportal lymph nodes measuring up to 12 mm, no   retroperitoneal or mesenteric lymphadenopathy is otherwise appreciated.       Incidental small intramural fibroid is noted in the right body of the uterus.       No signal abnormality identified in the visualized osseous structures.       MRCP:       Gallbladder: Surgically absent.  Small amount of fluid signal within the   gallbladder fossa, likely postoperative.       Bile Ducts: Intrahepatic and extrahepatic biliary dilatation with abrupt   cutoff at the mid common duct level.  Upstream dilatation measures 16 mm at   the common duct level, 8 mm at the left hepatic duct level and 7 mm in the   right hepatic duct.       Pancreatic Duct: Dilatation with abrupt cutoff at the site of pancreatic head   mass.  Upstream dilatation of 8 mm is noted.           Impression   1.  3.2 cm soft tissue mass in the pancreatic head resulting in pancreatic   duct and biliary dilatation, compatible with primary neoplasm.  There is   abutment of the portal vein and soft tissue extension/lymph nodes in the   liver hilum noted.  Given the limitations of motion artifact on this exam, a   pancreas CT should be considered for detailed vascular evaluation.       2.  No liver metastatic disease. IMPRESSION:   1. Pancreatic adenocarcinoma: Head of pancreas, 3 cm,  MR abd showed no liver lesions possible T2N1MO stage IIB at this times appears borderline/unresectable because of PV involvement. She is seen by hepatobiliary surgeon as o/p  2. CT chest showed No mets. EUS showed that the mass encases the portal vein . An intact interface was seen between the mass and the celiac trunk and superior mesenteric artery suggesting a lack of invasion. Appears locally advanced  3. DKA  4. CAD  5. Abdominal pain    RECOMMENDATIONS:  1. I reviewed the imaging studies, lab data, diagnosis, prognosis and treatment options with patient. She states comfort is her primary goal but she is open to discuss options  2. I will get stat ultrasound abdomen to evaluate any acute pathology or development of ascites  3. Plan to continue FOLFIRINOX however I will omit I irinotecan with her cycle 2  I reviewed the side effects of chemotherapy which include, but are not limited to, fatigue, nausea, vomiting, alopecia, myelosuppression, mucositis, allergic reaction, nephrotoxicity, ototoxicity, neurotoxicity, diarrhea, and constipation. Patient is agreeable  4. RTC with C3      Iyla Benitez MD  Hematologist/Medical Oncologist    I spent more than 40 minutes examining, evaluating, reviewing data and counseling the patient. Greater than 50% of that time was spent face-to-face with the patient in counseling and coordinating her care.

## 2020-07-28 NOTE — TELEPHONE ENCOUNTER
Call received from Ha at [address] [facility] stating that the patient's US results are completed. Dr Citlalli Madrid reviewed results and stated that the US is negative. Dr Citlalli Madrid stated to inform patient of negative US results and stated for the patient to come into office for tx next week. Writer informed patient of above and she communicated understanding.  Natan Prince

## 2020-07-28 NOTE — TELEPHONE ENCOUNTER
Pt having US abd today at [facility]-no chemo today per Dr Abdulkadir Lockhart, tx to resume on 8/4/2020-pt to return on 8/18/2020 to see Dr Abdulkadir Lockhart and for tx

## 2020-07-30 ENCOUNTER — TELEPHONE (OUTPATIENT)
Dept: ONCOLOGY | Age: 60
End: 2020-07-30

## 2020-07-30 ENCOUNTER — TELEPHONE (OUTPATIENT)
Dept: GASTROENTEROLOGY | Age: 60
End: 2020-07-30

## 2020-07-30 NOTE — TELEPHONE ENCOUNTER
patient called [facility] 7/30/2020 @ 10:58 needs appt info. Returned patient call gave her appointment , date, time, and location . She also states her [address] will be calling for results of tests she states it is ok to discuss everything with her as they have questions .  Thank You

## 2020-08-04 ENCOUNTER — HOSPITAL ENCOUNTER (OUTPATIENT)
Dept: INFUSION THERAPY | Age: 60
Discharge: HOME | End: 2020-08-04
Payer: COMMERCIAL

## 2020-08-04 VITALS
HEART RATE: 78 BPM | RESPIRATION RATE: 18 BRPM | SYSTOLIC BLOOD PRESSURE: 147 MMHG | DIASTOLIC BLOOD PRESSURE: 64 MMHG | WEIGHT: 277 LBS | TEMPERATURE: 97.8 F | BODY MASS INDEX: 46.1 KG/M2

## 2020-08-04 DIAGNOSIS — C25.0 PRIMARY ADENOCARCINOMA OF HEAD OF PANCREAS (HCC): Primary | ICD-10-CM

## 2020-08-04 LAB
ABSOLUTE EOS #: 0.19 K/UL (ref 0–0.4)
ABSOLUTE IMMATURE GRANULOCYTE: ABNORMAL K/UL (ref 0–0.3)
ABSOLUTE LYMPH #: 1.22 K/UL (ref 1–4.8)
ABSOLUTE MONO #: 0.56 K/UL (ref 0.1–0.8)
ALBUMIN SERPL-MCNC: 2.6 G/DL (ref 3.5–5.2)
ALBUMIN/GLOBULIN RATIO: 0.8 (ref 1–2.5)
ALP BLD-CCNC: 426 U/L (ref 35–104)
ALT SERPL-CCNC: 40 U/L (ref 5–33)
ANION GAP SERPL CALCULATED.3IONS-SCNC: 14 MMOL/L (ref 9–17)
AST SERPL-CCNC: 27 U/L
BASOPHILS # BLD: 0 % (ref 0–2)
BASOPHILS ABSOLUTE: 0 K/UL (ref 0–0.2)
BILIRUB SERPL-MCNC: 0.81 MG/DL (ref 0.3–1.2)
BUN BLDV-MCNC: 12 MG/DL (ref 6–20)
BUN/CREAT BLD: ABNORMAL (ref 9–20)
CA 19-9: 17 U/ML (ref 0–35)
CALCIUM SERPL-MCNC: 9.5 MG/DL (ref 8.6–10.4)
CHLORIDE BLD-SCNC: 99 MMOL/L (ref 98–107)
CO2: 22 MMOL/L (ref 20–31)
CREAT SERPL-MCNC: 0.79 MG/DL (ref 0.5–0.9)
DIFFERENTIAL TYPE: ABNORMAL
EOSINOPHILS RELATIVE PERCENT: 2 % (ref 1–4)
GFR AFRICAN AMERICAN: >60 ML/MIN
GFR NON-AFRICAN AMERICAN: >60 ML/MIN
GFR SERPL CREATININE-BSD FRML MDRD: ABNORMAL ML/MIN/{1.73_M2}
GFR SERPL CREATININE-BSD FRML MDRD: ABNORMAL ML/MIN/{1.73_M2}
GLUCOSE BLD-MCNC: 430 MG/DL (ref 70–99)
HCT VFR BLD CALC: 28.1 % (ref 36–46)
HEMOGLOBIN: 9.3 G/DL (ref 12–16)
IMMATURE GRANULOCYTES: ABNORMAL %
LYMPHOCYTES # BLD: 13 % (ref 24–44)
MCH RBC QN AUTO: 27.5 PG (ref 26–34)
MCHC RBC AUTO-ENTMCNC: 32.9 G/DL (ref 31–37)
MCV RBC AUTO: 83.6 FL (ref 80–100)
METAMYELOCYTES ABSOLUTE COUNT: 0.19 K/UL
METAMYELOCYTES: 2 %
MONOCYTES # BLD: 6 % (ref 1–7)
MORPHOLOGY: NORMAL
NRBC AUTOMATED: ABNORMAL PER 100 WBC
PDW BLD-RTO: 16.1 % (ref 12.5–15.4)
PLATELET # BLD: 436 K/UL (ref 140–450)
PLATELET ESTIMATE: ABNORMAL
PMV BLD AUTO: 7.5 FL (ref 6–12)
POTASSIUM SERPL-SCNC: 3.9 MMOL/L (ref 3.7–5.3)
RBC # BLD: 3.36 M/UL (ref 4–5.2)
RBC # BLD: ABNORMAL 10*6/UL
SEG NEUTROPHILS: 77 % (ref 36–66)
SEGMENTED NEUTROPHILS ABSOLUTE COUNT: 7.24 K/UL (ref 1.8–7.7)
SODIUM BLD-SCNC: 135 MMOL/L (ref 135–144)
TOTAL PROTEIN: 6 G/DL (ref 6.4–8.3)
WBC # BLD: 9.4 K/UL (ref 3.5–11)
WBC # BLD: ABNORMAL 10*3/UL

## 2020-08-04 PROCEDURE — 96413 CHEMO IV INFUSION 1 HR: CPT

## 2020-08-04 PROCEDURE — 96416 CHEMO PROLONG INFUSE W/PUMP: CPT

## 2020-08-04 PROCEDURE — C8957 PROLONGED IV INF, REQ PUMP: HCPCS

## 2020-08-04 PROCEDURE — 96415 CHEMO IV INFUSION ADDL HR: CPT

## 2020-08-04 PROCEDURE — 2580000003 HC RX 258: Performed by: INTERNAL MEDICINE

## 2020-08-04 PROCEDURE — 86301 IMMUNOASSAY TUMOR CA 19-9: CPT

## 2020-08-04 PROCEDURE — 96368 THER/DIAG CONCURRENT INF: CPT

## 2020-08-04 PROCEDURE — 36591 DRAW BLOOD OFF VENOUS DEVICE: CPT

## 2020-08-04 PROCEDURE — 6360000002 HC RX W HCPCS: Performed by: INTERNAL MEDICINE

## 2020-08-04 PROCEDURE — 96375 TX/PRO/DX INJ NEW DRUG ADDON: CPT

## 2020-08-04 PROCEDURE — 80053 COMPREHEN METABOLIC PANEL: CPT

## 2020-08-04 PROCEDURE — 85025 COMPLETE CBC W/AUTO DIFF WBC: CPT

## 2020-08-04 PROCEDURE — 96411 CHEMO IV PUSH ADDL DRUG: CPT

## 2020-08-04 RX ORDER — SODIUM CHLORIDE 0.9 % (FLUSH) 0.9 %
10 SYRINGE (ML) INJECTION PRN
Status: DISCONTINUED | OUTPATIENT
Start: 2020-08-04 | End: 2020-08-05 | Stop reason: HOSPADM

## 2020-08-04 RX ORDER — SODIUM CHLORIDE 9 MG/ML
INJECTION, SOLUTION INTRAVENOUS CONTINUOUS
Status: CANCELLED | OUTPATIENT
Start: 2020-08-04

## 2020-08-04 RX ORDER — SODIUM CHLORIDE 9 MG/ML
INJECTION, SOLUTION INTRAVENOUS ONCE
Status: CANCELLED | OUTPATIENT
Start: 2020-08-04

## 2020-08-04 RX ORDER — DIPHENHYDRAMINE HYDROCHLORIDE 50 MG/ML
50 INJECTION INTRAMUSCULAR; INTRAVENOUS ONCE
Status: CANCELLED | OUTPATIENT
Start: 2020-08-04

## 2020-08-04 RX ORDER — ATROPINE SULFATE 0.4 MG/ML
0.4 AMPUL (ML) INJECTION
Status: DISCONTINUED | OUTPATIENT
Start: 2020-08-04 | End: 2020-08-04

## 2020-08-04 RX ORDER — FLUOROURACIL 50 MG/ML
950 INJECTION, SOLUTION INTRAVENOUS ONCE
Status: COMPLETED | OUTPATIENT
Start: 2020-08-04 | End: 2020-08-04

## 2020-08-04 RX ORDER — DEXTROSE MONOHYDRATE 50 MG/ML
INJECTION, SOLUTION INTRAVENOUS ONCE
Status: COMPLETED | OUTPATIENT
Start: 2020-08-04 | End: 2020-08-04

## 2020-08-04 RX ORDER — EPINEPHRINE 1 MG/ML
0.3 INJECTION, SOLUTION, CONCENTRATE INTRAVENOUS PRN
Status: CANCELLED | OUTPATIENT
Start: 2020-08-04

## 2020-08-04 RX ORDER — DEXAMETHASONE SODIUM PHOSPHATE 10 MG/ML
10 INJECTION INTRAMUSCULAR; INTRAVENOUS ONCE
Status: COMPLETED | OUTPATIENT
Start: 2020-08-04 | End: 2020-08-04

## 2020-08-04 RX ORDER — HEPARIN SODIUM (PORCINE) LOCK FLUSH IV SOLN 100 UNIT/ML 100 UNIT/ML
500 SOLUTION INTRAVENOUS PRN
Status: DISCONTINUED | OUTPATIENT
Start: 2020-08-04 | End: 2020-08-05 | Stop reason: HOSPADM

## 2020-08-04 RX ORDER — PALONOSETRON 0.05 MG/ML
0.25 INJECTION, SOLUTION INTRAVENOUS ONCE
Status: COMPLETED | OUTPATIENT
Start: 2020-08-04 | End: 2020-08-04

## 2020-08-04 RX ORDER — METHYLPREDNISOLONE SODIUM SUCCINATE 125 MG/2ML
125 INJECTION, POWDER, LYOPHILIZED, FOR SOLUTION INTRAMUSCULAR; INTRAVENOUS ONCE
Status: CANCELLED | OUTPATIENT
Start: 2020-08-04

## 2020-08-04 RX ORDER — SODIUM CHLORIDE 0.9 % (FLUSH) 0.9 %
5 SYRINGE (ML) INJECTION PRN
Status: CANCELLED | OUTPATIENT
Start: 2020-08-04

## 2020-08-04 RX ADMIN — Medication 10 ML: at 13:05

## 2020-08-04 RX ADMIN — LEUCOVORIN CALCIUM 950 MG: 350 INJECTION, POWDER, LYOPHILIZED, FOR SOLUTION INTRAMUSCULAR; INTRAVENOUS at 10:57

## 2020-08-04 RX ADMIN — FLUOROURACIL 5600 MG: 50 INJECTION, SOLUTION INTRAVENOUS at 13:03

## 2020-08-04 RX ADMIN — Medication 10 MG: at 10:39

## 2020-08-04 RX ADMIN — DEXTROSE MONOHYDRATE: 50 INJECTION, SOLUTION INTRAVENOUS at 10:34

## 2020-08-04 RX ADMIN — Medication 10 ML: at 09:20

## 2020-08-04 RX ADMIN — FLUOROURACIL 950 MG: 50 INJECTION, SOLUTION INTRAVENOUS at 13:03

## 2020-08-04 RX ADMIN — OXALIPLATIN 175 MG: 5 INJECTION, SOLUTION, CONCENTRATE INTRAVENOUS at 10:57

## 2020-08-04 RX ADMIN — PALONOSETRON HYDROCHLORIDE 0.25 MG: 0.25 INJECTION, SOLUTION INTRAVENOUS at 10:39

## 2020-08-04 NOTE — PROGRESS NOTES
Patient here for C2D1 FOLFOX. Labs drawn from port and reviewed. Glucose 430 Dr. Elizabeth Martin updated. Patient has her insulin pen with her and Dr. Elizabeth Martin ok'd her to use her own insulin per her sliding scale. Verbalized understanding. Blood return noted before during and after 5FU push. She tolerated treatment well and was discharged home with spouse in stable condition. She is due to return 8/5 for pump dc.

## 2020-08-04 NOTE — PROGRESS NOTES
Spoke to Dr. Janeth Rockwell regarding Liver Enzymes and Bili - patient had a stent replacement.  Liver enzymes are elevated and bili is WNL; no dose adjustment at this time - Dr. Janeth Rockwell would like to proceed with treatment    Alex Contreras, PharmD

## 2020-08-06 ENCOUNTER — HOSPITAL ENCOUNTER (OUTPATIENT)
Dept: INFUSION THERAPY | Age: 60
Discharge: HOME | End: 2020-08-06
Payer: COMMERCIAL

## 2020-08-06 DIAGNOSIS — C25.0 PRIMARY ADENOCARCINOMA OF HEAD OF PANCREAS (HCC): Primary | ICD-10-CM

## 2020-08-06 PROCEDURE — 96523 IRRIG DRUG DELIVERY DEVICE: CPT

## 2020-08-06 RX ORDER — HEPARIN SODIUM (PORCINE) LOCK FLUSH IV SOLN 100 UNIT/ML 100 UNIT/ML
500 SOLUTION INTRAVENOUS PRN
Status: DISCONTINUED | OUTPATIENT
Start: 2020-08-06 | End: 2020-08-07 | Stop reason: HOSPADM

## 2020-08-06 RX ORDER — SODIUM CHLORIDE 0.9 % (FLUSH) 0.9 %
10 SYRINGE (ML) INJECTION PRN
Status: DISCONTINUED | OUTPATIENT
Start: 2020-08-06 | End: 2020-08-07 | Stop reason: HOSPADM

## 2020-08-06 RX ORDER — SODIUM CHLORIDE 0.9 % (FLUSH) 0.9 %
10 SYRINGE (ML) INJECTION PRN
Status: CANCELLED | OUTPATIENT
Start: 2020-08-06

## 2020-08-06 RX ORDER — HEPARIN SODIUM (PORCINE) LOCK FLUSH IV SOLN 100 UNIT/ML 100 UNIT/ML
500 SOLUTION INTRAVENOUS PRN
Status: CANCELLED | OUTPATIENT
Start: 2020-08-06

## 2020-08-06 RX ORDER — SODIUM CHLORIDE 0.9 % (FLUSH) 0.9 %
20 SYRINGE (ML) INJECTION PRN
Status: CANCELLED | OUTPATIENT
Start: 2020-08-06

## 2020-08-06 NOTE — TELEPHONE ENCOUNTER
Spoke with Alina Fierro her HPOA and she states she is still very weak and pale. She will talk with hr in the morning to see if she will be coming to see Dr Mich King tomorrow 08/06/2020.

## 2020-08-07 ENCOUNTER — OFFICE VISIT (OUTPATIENT)
Dept: GASTROENTEROLOGY | Age: 60
End: 2020-08-07
Payer: COMMERCIAL

## 2020-08-07 VITALS — BODY MASS INDEX: 45.43 KG/M2 | WEIGHT: 273 LBS | TEMPERATURE: 97.4 F

## 2020-08-07 PROCEDURE — 99213 OFFICE O/P EST LOW 20 MIN: CPT | Performed by: INTERNAL MEDICINE

## 2020-08-07 ASSESSMENT — ENCOUNTER SYMPTOMS
COUGH: 0
VOICE CHANGE: 0
CHOKING: 0
BACK PAIN: 0
WHEEZING: 0
SORE THROAT: 1
TROUBLE SWALLOWING: 1
NAUSEA: 1
RECTAL PAIN: 0
VOMITING: 0
ABDOMINAL DISTENTION: 1
SINUS PRESSURE: 0
ABDOMINAL PAIN: 1
DIARRHEA: 0
BLOOD IN STOOL: 0
ANAL BLEEDING: 0
CONSTIPATION: 0

## 2020-08-07 NOTE — PROGRESS NOTES
GI CLINIC FOLLOW UP    INTERVAL HISTORY:   No referring provider defined for this encounter. Chief Complaint   Patient presents with    Abdominal Pain     Current Stent put in for Pacreatic Cancer and has to have it replaced with a metal stent. She feels like her belly has a lot of air. Last week Chemo was unable to be done as she thought she had fluid but no fluid was there. HISTORY OF PRESENT ILLNESS: Mariano Goldberg is a 64 y.o. female pancreatic cancer. Recently she was admitted to the hospital with cholangitis due to clogged CBD stent. She underwent ERCP with placement of metal stent. She has been doing well. She was seen by pancreatic surgeon Dr Peter Roberson. She was deemed not a surgical candidate. Past Medical,Family, and Social History reviewed and does not contribute to the patient presentingcondition. Patient's PMH/PSH,SH,PSYCH Hx, MEDs, ALLERGIES, and ROS were all reviewed and updated in the appropriate sections.     PAST MEDICAL HISTORY:  Past Medical History:   Diagnosis Date    Anxiety     Diabetes mellitus ([facility] [address]     Hyperlipidemia     Hypertension     Pancreatic cancer [facility])        Past Surgical History:   Procedure Laterality Date     SECTION      x2    ERCP  2020    SPHINCTER/PAPILLOTOMY, STENT INSERTION    ERCP  2020    ERCP SPHINCTER/PAPILLOTOMY performed by Ambar Price MD at [facility]    ERCP  2020    ERCP STENT INSERTION performed by Ambar Price MD at [facility]    ERCP  2020     ERCP ENDOSCOPIC RETROGRADE CHOLANGIOPANCREATOGRAPHY -   ERCP STENT REMOVAL     ERCP  2020    ERCP STENT REMOVAL performed by Gustavo Cassidy MD at [address] ERCP  2020    ERCP STENT INSERTION performed by Gustavo Cassidy MD at [facility] ERCP  2020    ERCP DILATION BALLOON performed by Gustavo Cassidy MD at [facility] ENDOSCOPY  2020    W/EUS FNA     UPPER GASTROINTESTINAL ENDOSCOPY  6/4/2020    EGD W/EUS FNA in OR with GI staff performed by Real Morgan MD at [facility]       CURRENT MEDICATIONS:    Current Outpatient Medications:     oxyCODONE (ROXICODONE) 5 MG immediate release tablet, Take 1 tablet by mouth every 6 hours as needed for Pain for up to 30 days. , Disp: 120 tablet, Rfl: 0    lidocaine-prilocaine (EMLA) 2.5-2.5 % cream, Apply topically as needed. , Disp: 1 Tube, Rfl: 2    ondansetron (ZOFRAN-ODT) 8 MG TBDP disintegrating tablet, Place 1 tablet under the tongue every 8 hours as needed for Nausea or Vomiting, Disp: 90 tablet, Rfl: 2    furosemide (LASIX) 20 MG tablet, Take 1 tablet by mouth daily (Patient taking differently: Take 20 mg by mouth daily As needed), Disp: 30 tablet, Rfl: 1    nitroGLYCERIN (NITROSTAT) 0.4 MG SL tablet, up to max of 3 total doses.  If no relief after 1 dose, call 911., Disp: 25 tablet, Rfl: 3    insulin glargine (LANTUS) 100 UNIT/ML injection vial, Inject 50 Units into the skin nightly, Disp: 1 vial, Rfl: 3    insulin glargine (LANTUS) 100 UNIT/ML injection vial, Inject 60 Units into the skin daily, Disp: 1 vial, Rfl: 3    hydrALAZINE (APRESOLINE) 25 MG tablet, Take 1 tablet by mouth every 8 hours, Disp: 90 tablet, Rfl: 3    carvedilol (COREG) 12.5 MG tablet, Take 1 tablet by mouth 2 times daily (with meals), Disp: 60 tablet, Rfl: 3    amLODIPine (NORVASC) 10 MG tablet, Take 1 tablet by mouth daily, Disp: 30 tablet, Rfl: 3    benzocaine-menthol (CEPACOL SORE THROAT) 15-3.6 MG lozenge, Take 1 lozenge by mouth every 2 hours as needed for Sore Throat, Disp: 50 lozenge, Rfl: 0    pantoprazole (PROTONIX) 40 MG tablet, Take 1 tablet by mouth every morning (before breakfast), Disp: 30 tablet, Rfl: 2    aspirin 81 MG chewable tablet, Take 81 mg by mouth nightly, Disp: , Rfl:     insulin aspart (NOVOLOG FLEXPEN) 100 UNIT/ML injection pen, Inject into the skin, Disp: , Rfl:     Lisdexamfetamine Dimesylate 60 MG CAPS, Take 60 mg by mouth every morning., Disp: , Rfl:     sertraline (ZOLOFT) 100 MG tablet, take 1 1/2 tablet by oral route  every day, Disp: , Rfl:     atorvastatin (LIPITOR) 40 MG tablet, nightly , Disp: , Rfl:     LORazepam (ATIVAN) 0.5 MG tablet, 1 mg nightly. , Disp: , Rfl:     ALLERGIES:   Allergies   Allergen Reactions    Lisinopril Swelling     Other reaction(s): swollen lips  In lips      Sulfamethoxazole      Other reaction(s): lip swelling  Other reaction(s): lip swelling      Trimethoprim      Other reaction(s): lip swelling    Cefuroxime Axetil Rash     Other reaction(s): swollen lips  Swollen lips    Sulfamethoxazole-Trimethoprim      Other reaction(s): swollen lips  Other reaction(s): swollen lips      Clindamycin Phosphate Rash     Other reaction(s): rash    Penicillins Rash     Other reaction(s): rash       FAMILY HISTORY:       Problem Relation Age of Onset    Cancer Mother     Hypertension Mother     Heart Failure Father     Hypertension Father     Cancer Maternal Grandmother         colon     Cancer Maternal Grandfather         lung          SOCIAL HISTORY:   Social History     Socioeconomic History    Marital status:      Spouse name: Not on file    Number of children: Not on file    Years of education: Not on file    Highest education level: Not on file   Occupational History    Not on file   Social Needs    Financial resource strain: Not on file    Food insecurity     Worry: Not on file     Inability: Not on file    Transportation needs     Medical: Not on file     Non-medical: Not on file   Tobacco Use    Smoking status: Former Smoker     Last attempt to quit: 7/14/2005     Years since quitting: 15.0    Smokeless tobacco: Never Used   Substance and Sexual Activity    Alcohol use: Not Currently    Drug use: No    Sexual activity: Not on file   Lifestyle    Physical activity     Days per week: Not on file     Minutes per session: Not on file    Stress: Not on file   Relationships    Social connections     Talks on phone: Not on file     Gets together: Not on file     Attends Druze service: Not on file     Active member of club or organization: Not on file     Attends meetings of clubs or organizations: Not on file     Relationship status: Not on file    Intimate partner violence     Fear of current or ex partner: Not on file     Emotionally abused: Not on file     Physically abused: Not on file     Forced sexual activity: Not on file   Other Topics Concern    Not on file   Social History Narrative    Not on file       REVIEW OF SYSTEMS: A 12-point review of systemswas obtained and pertinent positives and negatives were enumerated above in the history of present illness. All other reviewed systems / symptoms were negative. Review of Systems   Constitutional: Positive for appetite change, fatigue and unexpected weight change. HENT: Positive for sore throat and trouble swallowing. Negative for dental problem, postnasal drip, sinus pressure and voice change. Eyes: Positive for visual disturbance. Respiratory: Negative for cough, choking and wheezing. Cardiovascular: Negative for chest pain, palpitations and leg swelling. Gastrointestinal: Positive for abdominal distention, abdominal pain and nausea. Negative for anal bleeding, blood in stool, constipation, diarrhea, rectal pain and vomiting. Endocrine: Negative. Genitourinary: Negative. Negative for difficulty urinating. Musculoskeletal: Negative. Negative for arthralgias, back pain, gait problem and myalgias. Allergic/Immunologic: Negative for environmental allergies and food allergies. Neurological: Positive for dizziness and light-headedness. Negative for weakness, numbness and headaches. Hematological: Negative. Does not bruise/bleed easily. Psychiatric/Behavioral: Negative. Negative for sleep disturbance. The patient is not nervous/anxious.             LABORATORY DATA: Reviewed  Lab Results   Component Value Date    WBC 9.4 08/04/2020    HGB 9.3 (L) 08/04/2020    HCT 28.1 (L) 08/04/2020    MCV 83.6 08/04/2020     08/04/2020     08/04/2020    K 3.9 08/04/2020    CL 99 08/04/2020    CO2 22 08/04/2020    BUN 12 08/04/2020    CREATININE 0.79 08/04/2020    LABPROT 5.6 (L) 01/16/2013    LABALBU 2.6 (L) 08/04/2020    BILITOT 0.81 08/04/2020    ALKPHOS 426 (H) 08/04/2020    AST 27 08/04/2020    ALT 40 (H) 08/04/2020    INR 1.3 07/22/2020         Lab Results   Component Value Date    RBC 3.36 (L) 08/04/2020    HGB 9.3 (L) 08/04/2020    MCV 83.6 08/04/2020    MCH 27.5 08/04/2020    MCHC 32.9 08/04/2020    RDW 16.1 (H) 08/04/2020    MPV 7.5 08/04/2020    BASOPCT 0 08/04/2020    LYMPHSABS 1.22 08/04/2020    MONOSABS 0.56 08/04/2020    NEUTROABS 7.24 08/04/2020    EOSABS 0.19 08/04/2020    BASOSABS 0.00 08/04/2020         DIAGNOSTIC TESTING:     Fl Ercp Biliary S&i    Result Date: 7/22/2020  EXAMINATION: SPOT FLUOROSCOPIC IMAGES 7/22/2020 4:18 pm TECHNIQUE: Fluoroscopy was provided by the radiology department for procedure. Radiologist was not present during examination. FLUOROSCOPY DOSE AND TYPE OR TIME AND EXPOSURES: 106.3 seconds. Air kerma 52.19 mGy COMPARISON: None HISTORY: Intraprocedural imaging. FINDINGS: 6 spot images of the right upper quadrant were obtained. Images were submitted from an ERCP with stent placement. Intraprocedural fluoroscopic spot images as above. See separate procedure report for more information. Us Abdomen Limited Specify Organ?  Liver, Spleen    Result Date: 7/29/2020  EXAMINATION: RIGHT UPPER QUADRANT ULTRASOUND 7/28/2020 10:04 am COMPARISON: 07/21/2020 HISTORY: ORDERING SYSTEM PROVIDED HISTORY: Pancreatic adenocarcinoma [facility]) TECHNOLOGIST PROVIDED HISTORY: Specify organ?->LIVER Specify organ?->SPLEEN Reason for Exam: pancreatic adenocarcinoma; distention; RUQ pain Acuity: Chronic Type of Exam: Subsequent/Follow-up Relevant Medical/Surgical History: pt's stent was septic and has been replaced since last ultrasound FINDINGS: LIVER:  Poor penetration of the liver. Linear areas of increased echogenicity throughout the liver with echogenic shadowing compatible with pneumobilia. Liver measures 20 cm in length. BILIARY SYSTEM:  Absent gallbladder. The common duct is dilated measuring 1.4 cm. RIGHT KIDNEY: The right kidney is grossly unremarkable without evidence of hydronephrosis. PANCREAS:  Abnormal pancreas. Patient has known pancreatic adenocarcinoma. The pancreatic head measures 4.6 x 2.8 cm and is enlarged. Pancreatic body measures 2.6 cm transversely and the tail measures 3.4 cm transversely. The pancreatic duct is dilated measuring 1.0 cm. OTHER: Bilateral pleural effusions. The spleen measures 12.2 cm. Echogenic shadowing within the pancreas most likely pneumobilia related to stent placement. Dilated ducts compatible with known pancreatic neoplasm though perhaps minimally increased from prior exam.  These changes may be technical in nature. Us Abdomen Limited    Result Date: 7/21/2020  EXAMINATION: RIGHT UPPER QUADRANT ULTRASOUND 7/21/2020 1:28 pm COMPARISON: 06/03/2020, MRI 06/04/2020, ERCP 06/04/2020 HISTORY: ORDERING SYSTEM PROVIDED HISTORY: rt upper abdomen pain, hx gallbladder removed, pancreatic stent/ pancreatic ca TECHNOLOGIST PROVIDED HISTORY: rt upper abdomen pain, hx gallbladder removed, pancreatic stent/ pancreatic ca Reason for Exam: RUQ pain; N/V; pancreatic CA; pancreatic stent Acuity: Acute Type of Exam: Subsequent/Follow-up FINDINGS: LIVER:  Image portions of the liver show no focal abnormality. The liver has a slightly heterogeneous echotexture and is somewhat difficult to penetrate. Liver length is 19.5 cm. Mild-to-moderate intrahepatic biliary ductal dilatation. BILIARY SYSTEM:  Previous cholecystectomy. Dilated common bile duct measuring up to 13.2 mm at the carmel hepatis. , decreased slightly since the prior studies. Partially visualized stent within the common bile duct. RIGHT KIDNEY: The right kidney is grossly unremarkable without evidence of hydronephrosis. PANCREAS: The pancreas is partially obscured by overlying bowel gas. In particular, the head and tail are not optimally visualized. Subtle heterogeneous hypoechoic area in the pancreatic head may represent the patient's known pancreatic mass. The pancreatic duct is dilated measuring up to 8.4 mm in the body. OTHER: No evidence of right upper quadrant ascites. Intra and extrahepatic biliary dilatation with the common bile duct measuring up to 13.2 mm, decreased since the prior exams. Biliary stent noted. Questionable pancreatic head mass, better shown on previous MRI. Dilated pancreatic duct measuring up to 8.4 mm. PHYSICAL EXAMINATION: Vital signs reviewed per the nursing documentation. There were no vitals taken for this visit. There is no height or weight on file to calculate BMI. Physical Exam      I personally reviewed the nurse's notes and documentation and I agree with her notes. General: alert, appears stated age and cooperative Psych: Normal. and Alert and oriented, appropriate affect. . Normal affect. Mentation normal  HEENT: PERRLA. Clear conjunctivae and sclerae. Moist oral mucosae, no lesions or ulcers. The neck is supple, without lymphadenopathy or jugular venous distension. No masses. Normal thyroid. Cardiovascular: S1 S2 RRR no rubs or murmurs. Pulmonary: clear BL. No accessory muscle usage. Abdominal Exam: Soft, NT ND, no hepato or spleno megaly, +BS, no ascites. IMPRESSION: Ms. Rosana Willis is a 64 y.o. female with pancreatic cancer. S/p CBD stenting. She has metal stent. Her weight is stable. She is eating okay. She reports fullness in upper abdomen. This could be from cancer versus gastroparesis. She is constipated. Recommended to her to take Colace daily. Follow-up in 1 month.       Thank you for allowing me to participate in the care of Ms. Ramona Alvarado. For any further questions please do not hesitate to contact me. I have reviewed and agree with the ROS entered by the MA/LPN. Note is dictated utilizing voice recognition software. Unfortunately this leads to occasional typographical errors. Please contact our office if you have any questions.     Artur Tavarez MD  [facility]  O: #[phone]

## 2020-08-12 ENCOUNTER — TELEPHONE (OUTPATIENT)
Dept: ONCOLOGY | Age: 60
End: 2020-08-12

## 2020-08-12 NOTE — TELEPHONE ENCOUNTER
Left detailed message for Efrain Johnson to arrange possible genetic eval and testing on 9/1/20 during her scheduled treatment time. Requested that she call back to confirm receipt of message and that she would like to schedule appt.

## 2020-08-12 NOTE — TELEPHONE ENCOUNTER
Name: Luz March  : 1960  MRN: [id number]    Oncology Navigation Follow-Up Note    Contact Type:  Telephone  Notes: Spoke with pt for f/u call. Pt c/o constant abdominal discomfort & bloating. Inquired if pt taking creon, pt stated no. In[facility]ed [facility] & request contact pt. Inquired on genetic evaluation & testing. Pt stated \"I canceled the appointment because I had too much on my plate\". Discussed importance of genetic evaluation & testing. Pt agreeable to rescheduling & requested coordinate with upcoming appt. Instructed pt writer will contact Kenyetta Caban, [facility] genetic counselor, & request contact pt. Instructed pt may contact writer prn. Will continue to follow.     Electronically signed by Neris Gastelum RN on 2020 at 1:15 PM

## 2020-08-13 ENCOUNTER — TELEPHONE (OUTPATIENT)
Dept: ONCOLOGY | Age: 60
End: 2020-08-13

## 2020-08-13 NOTE — TELEPHONE ENCOUNTER
NUTRITION NOTE    Called pt on listed home phone number d/t request from Isacc Sumner, [facility] to contact patient. . Pt reports post-prandial bloating, cramping, and change in stool color/consistency continue. Pt states she recalls me speaking to her about Creon. RD educated pt on Creon's purpose and necessity with pancreatic cancer when fat malabsorption is present. RD contacted Dr Asa Barrow, pt's oncologist, to recommend consideration of initiating creon with meals. Recommend consider initiating Creon with meals. Recommend 2 - 36,000unit capsules with meals and 1- 36,000unit capsule with snacks.   Marylou Monsivais RD, LD  Registered Dietitian  [facility]  [phone]

## 2020-08-14 ENCOUNTER — TELEPHONE (OUTPATIENT)
Dept: ONCOLOGY | Age: 60
End: 2020-08-14

## 2020-08-14 NOTE — TELEPHONE ENCOUNTER
Name: Willian Cowan  : 1960  MRN: [id number]    Oncology Navigation Follow-Up Note    Contact Type:  Telephone  Notes: [facility] dietician, notified Manuel Urbina spoke with pt, creon recommendations placed, & routed to Dr. Abdulkadir Lockhart. Spoke with Dr. Abdulkadir Lockhart, requested creon prescription e-scribed to pt's pharmacy. Will continue to follow.     Electronically signed by Aston Seth RN on 2020 at 10:39 AM

## 2020-08-17 ENCOUNTER — TELEPHONE (OUTPATIENT)
Dept: ONCOLOGY | Age: 60
End: 2020-08-17

## 2020-08-18 ENCOUNTER — OFFICE VISIT (OUTPATIENT)
Dept: ONCOLOGY | Age: 60
End: 2020-08-18
Payer: COMMERCIAL

## 2020-08-18 ENCOUNTER — TELEPHONE (OUTPATIENT)
Dept: ONCOLOGY | Age: 60
End: 2020-08-18

## 2020-08-18 ENCOUNTER — HOSPITAL ENCOUNTER (OUTPATIENT)
Dept: INFUSION THERAPY | Age: 60
Discharge: HOME | End: 2020-08-18
Payer: COMMERCIAL

## 2020-08-18 VITALS
HEART RATE: 76 BPM | WEIGHT: 260 LBS | BODY MASS INDEX: 43.32 KG/M2 | HEIGHT: 65 IN | SYSTOLIC BLOOD PRESSURE: 139 MMHG | RESPIRATION RATE: 18 BRPM | TEMPERATURE: 98.8 F | DIASTOLIC BLOOD PRESSURE: 68 MMHG

## 2020-08-18 VITALS
TEMPERATURE: 98.8 F | BODY MASS INDEX: 43.27 KG/M2 | WEIGHT: 260 LBS | DIASTOLIC BLOOD PRESSURE: 68 MMHG | HEART RATE: 76 BPM | SYSTOLIC BLOOD PRESSURE: 139 MMHG

## 2020-08-18 DIAGNOSIS — C25.0 PRIMARY ADENOCARCINOMA OF HEAD OF PANCREAS (HCC): Primary | ICD-10-CM

## 2020-08-18 LAB
ABSOLUTE EOS #: 0.2 K/UL (ref 0–0.4)
ABSOLUTE IMMATURE GRANULOCYTE: ABNORMAL K/UL (ref 0–0.3)
ABSOLUTE LYMPH #: 0.9 K/UL (ref 1–4.8)
ABSOLUTE MONO #: 0.4 K/UL (ref 0.1–1.2)
ALBUMIN SERPL-MCNC: 3 G/DL (ref 3.5–5.2)
ALBUMIN/GLOBULIN RATIO: 1 (ref 1–2.5)
ALP BLD-CCNC: 179 U/L (ref 35–104)
ALT SERPL-CCNC: 21 U/L (ref 5–33)
ANION GAP SERPL CALCULATED.3IONS-SCNC: 13 MMOL/L (ref 9–17)
AST SERPL-CCNC: 28 U/L
BASOPHILS # BLD: 1 % (ref 0–2)
BASOPHILS ABSOLUTE: 0 K/UL (ref 0–0.2)
BILIRUB SERPL-MCNC: 0.51 MG/DL (ref 0.3–1.2)
BUN BLDV-MCNC: 8 MG/DL (ref 6–20)
BUN/CREAT BLD: ABNORMAL (ref 9–20)
CA 19-9: 14 U/ML (ref 0–35)
CALCIUM SERPL-MCNC: 8.9 MG/DL (ref 8.6–10.4)
CHLORIDE BLD-SCNC: 105 MMOL/L (ref 98–107)
CO2: 27 MMOL/L (ref 20–31)
CREAT SERPL-MCNC: 0.65 MG/DL (ref 0.5–0.9)
DIFFERENTIAL TYPE: ABNORMAL
EOSINOPHILS RELATIVE PERCENT: 4 % (ref 1–4)
GFR AFRICAN AMERICAN: >60 ML/MIN
GFR NON-AFRICAN AMERICAN: >60 ML/MIN
GFR SERPL CREATININE-BSD FRML MDRD: ABNORMAL ML/MIN/{1.73_M2}
GFR SERPL CREATININE-BSD FRML MDRD: ABNORMAL ML/MIN/{1.73_M2}
GLUCOSE BLD-MCNC: 125 MG/DL (ref 70–99)
HCT VFR BLD CALC: 28.5 % (ref 36–46)
HEMOGLOBIN: 9.3 G/DL (ref 12–16)
IMMATURE GRANULOCYTES: ABNORMAL %
LYMPHOCYTES # BLD: 25 % (ref 24–44)
MCH RBC QN AUTO: 27.1 PG (ref 26–34)
MCHC RBC AUTO-ENTMCNC: 32.7 G/DL (ref 31–37)
MCV RBC AUTO: 82.8 FL (ref 80–100)
MONOCYTES # BLD: 12 % (ref 2–11)
NRBC AUTOMATED: ABNORMAL PER 100 WBC
PDW BLD-RTO: 17 % (ref 12.5–15.4)
PLATELET # BLD: 241 K/UL (ref 140–450)
PLATELET ESTIMATE: ABNORMAL
PMV BLD AUTO: 7.2 FL (ref 6–12)
POTASSIUM SERPL-SCNC: 3.6 MMOL/L (ref 3.7–5.3)
RBC # BLD: 3.44 M/UL (ref 4–5.2)
RBC # BLD: ABNORMAL 10*6/UL
SEG NEUTROPHILS: 58 % (ref 36–66)
SEGMENTED NEUTROPHILS ABSOLUTE COUNT: 2.1 K/UL (ref 1.8–7.7)
SODIUM BLD-SCNC: 145 MMOL/L (ref 135–144)
TOTAL PROTEIN: 6 G/DL (ref 6.4–8.3)
WBC # BLD: 3.7 K/UL (ref 3.5–11)
WBC # BLD: ABNORMAL 10*3/UL

## 2020-08-18 PROCEDURE — 96416 CHEMO PROLONG INFUSE W/PUMP: CPT

## 2020-08-18 PROCEDURE — 85025 COMPLETE CBC W/AUTO DIFF WBC: CPT

## 2020-08-18 PROCEDURE — 86301 IMMUNOASSAY TUMOR CA 19-9: CPT

## 2020-08-18 PROCEDURE — 96375 TX/PRO/DX INJ NEW DRUG ADDON: CPT

## 2020-08-18 PROCEDURE — 6360000002 HC RX W HCPCS: Performed by: INTERNAL MEDICINE

## 2020-08-18 PROCEDURE — 2580000003 HC RX 258: Performed by: INTERNAL MEDICINE

## 2020-08-18 PROCEDURE — 80053 COMPREHEN METABOLIC PANEL: CPT

## 2020-08-18 PROCEDURE — 99215 OFFICE O/P EST HI 40 MIN: CPT | Performed by: INTERNAL MEDICINE

## 2020-08-18 PROCEDURE — 36591 DRAW BLOOD OFF VENOUS DEVICE: CPT

## 2020-08-18 PROCEDURE — 96368 THER/DIAG CONCURRENT INF: CPT

## 2020-08-18 PROCEDURE — 96413 CHEMO IV INFUSION 1 HR: CPT

## 2020-08-18 PROCEDURE — 96411 CHEMO IV PUSH ADDL DRUG: CPT

## 2020-08-18 PROCEDURE — 96367 TX/PROPH/DG ADDL SEQ IV INF: CPT

## 2020-08-18 PROCEDURE — 96415 CHEMO IV INFUSION ADDL HR: CPT

## 2020-08-18 RX ORDER — DEXTROSE MONOHYDRATE 50 MG/ML
INJECTION, SOLUTION INTRAVENOUS ONCE
Status: DISCONTINUED | OUTPATIENT
Start: 2020-08-18 | End: 2020-08-19 | Stop reason: HOSPADM

## 2020-08-18 RX ORDER — DIPHENHYDRAMINE HYDROCHLORIDE 50 MG/ML
50 INJECTION INTRAMUSCULAR; INTRAVENOUS ONCE
Status: CANCELLED | OUTPATIENT
Start: 2020-08-18

## 2020-08-18 RX ORDER — SODIUM CHLORIDE 9 MG/ML
INJECTION, SOLUTION INTRAVENOUS ONCE
Status: DISCONTINUED | OUTPATIENT
Start: 2020-08-18 | End: 2020-08-19 | Stop reason: HOSPADM

## 2020-08-18 RX ORDER — HEPARIN SODIUM (PORCINE) LOCK FLUSH IV SOLN 100 UNIT/ML 100 UNIT/ML
500 SOLUTION INTRAVENOUS PRN
Status: DISCONTINUED | OUTPATIENT
Start: 2020-08-18 | End: 2020-08-19 | Stop reason: HOSPADM

## 2020-08-18 RX ORDER — SODIUM CHLORIDE 0.9 % (FLUSH) 0.9 %
10 SYRINGE (ML) INJECTION PRN
Status: DISCONTINUED | OUTPATIENT
Start: 2020-08-18 | End: 2020-08-19 | Stop reason: HOSPADM

## 2020-08-18 RX ORDER — DEXAMETHASONE SODIUM PHOSPHATE 10 MG/ML
10 INJECTION INTRAMUSCULAR; INTRAVENOUS ONCE
Status: COMPLETED | OUTPATIENT
Start: 2020-08-18 | End: 2020-08-18

## 2020-08-18 RX ORDER — PALONOSETRON 0.05 MG/ML
0.25 INJECTION, SOLUTION INTRAVENOUS ONCE
Status: COMPLETED | OUTPATIENT
Start: 2020-08-18 | End: 2020-08-18

## 2020-08-18 RX ORDER — ATROPINE SULFATE 0.4 MG/ML
0.4 AMPUL (ML) INJECTION
Status: DISPENSED | OUTPATIENT
Start: 2020-08-18 | End: 2020-08-18

## 2020-08-18 RX ORDER — METHYLPREDNISOLONE SODIUM SUCCINATE 125 MG/2ML
125 INJECTION, POWDER, LYOPHILIZED, FOR SOLUTION INTRAMUSCULAR; INTRAVENOUS ONCE
Status: CANCELLED | OUTPATIENT
Start: 2020-08-18

## 2020-08-18 RX ORDER — SERTRALINE HYDROCHLORIDE 100 MG/1
TABLET, FILM COATED ORAL
Qty: 30 TABLET | Status: CANCELLED | OUTPATIENT
Start: 2020-08-18

## 2020-08-18 RX ORDER — SODIUM CHLORIDE 0.9 % (FLUSH) 0.9 %
5 SYRINGE (ML) INJECTION PRN
Status: CANCELLED | OUTPATIENT
Start: 2020-08-18

## 2020-08-18 RX ORDER — SODIUM CHLORIDE 9 MG/ML
INJECTION, SOLUTION INTRAVENOUS CONTINUOUS
Status: CANCELLED | OUTPATIENT
Start: 2020-08-18

## 2020-08-18 RX ORDER — EPINEPHRINE 1 MG/ML
0.3 INJECTION, SOLUTION, CONCENTRATE INTRAVENOUS PRN
Status: CANCELLED | OUTPATIENT
Start: 2020-08-18

## 2020-08-18 RX ORDER — FLUOROURACIL 50 MG/ML
400 INJECTION, SOLUTION INTRAVENOUS ONCE
Status: CANCELLED | OUTPATIENT
Start: 2020-08-18

## 2020-08-18 RX ORDER — FLUOROURACIL 50 MG/ML
950 INJECTION, SOLUTION INTRAVENOUS ONCE
Status: COMPLETED | OUTPATIENT
Start: 2020-08-18 | End: 2020-08-18

## 2020-08-18 RX ADMIN — OXALIPLATIN 175 MG: 5 INJECTION, SOLUTION, CONCENTRATE INTRAVENOUS at 12:34

## 2020-08-18 RX ADMIN — DEXTROSE MONOHYDRATE: 50 INJECTION, SOLUTION INTRAVENOUS at 11:21

## 2020-08-18 RX ADMIN — PALONOSETRON 0.25 MG: 0.05 INJECTION, SOLUTION INTRAVENOUS at 11:21

## 2020-08-18 RX ADMIN — FOSAPREPITANT 150 MG: 150 INJECTION, POWDER, LYOPHILIZED, FOR SOLUTION INTRAVENOUS at 11:40

## 2020-08-18 RX ADMIN — Medication 10 ML: at 14:39

## 2020-08-18 RX ADMIN — FLUOROURACIL 950 MG: 50 INJECTION, SOLUTION INTRAVENOUS at 14:39

## 2020-08-18 RX ADMIN — FLUOROURACIL 5600 MG: 50 INJECTION, SOLUTION INTRAVENOUS at 14:40

## 2020-08-18 RX ADMIN — Medication 10 MG: at 11:23

## 2020-08-18 RX ADMIN — LEUCOVORIN CALCIUM 950 MG: 200 INJECTION, POWDER, LYOPHILIZED, FOR SUSPENSION INTRAMUSCULAR; INTRAVENOUS at 12:34

## 2020-08-18 NOTE — PROGRESS NOTES
Pt here for C.3D.1 Folfirinox. C/o blurred vision x 2 days. Labs drawn from port and results reviewed. Pt was seen by Dr. Meli Trimble, order rec'd to hold Irinotecan today and proceed with remainder of tx. Pt was treated without incident and d/c'd in stable condition. Pt will return on 8/20/20 for 5FU pump d/c.

## 2020-08-18 NOTE — PROGRESS NOTES
Today's Date: 2020  Patient Name: Juaquin Zaragoza  Patient's age: 64 y. o., 1960    DIAGNOSIS: pancreatic cancer, clinical  T2N1MO stage IIB, encasement of portal vein, unresectable  Started on dose reduced FOLFIRINOX on 2020    CHIEF COMPLAINT:    Chief Complaint   Patient presents with    Follow-up     review status of disease    Blurred Vision     last couple days     HISTORY OF PRESENT ILLNESS:    This is a 61-year-old female who was admitted with diabetic ketoacidosis, lower back pain. She was noted to have elevated troponin, elevated liver enzymes. Patient was seen by cardiology and had cardiac cath which showed nonobstructive CAD and medical management recommended. She had ultrasound of the liver which showed dilated pancreatic duct and intrahepatic ductal dilatation. This was followed by MRCP which showed pancreatic mass and she underwent ERCP and EUS on 20 with cholangiogram, Biliary sphincterotomy and  Placement of 10 F x 7 cm plastic biliary stent  The pancreatic head biopsy showed adenocarcinoma. Oncology consulted for further recommendations. INTERVAL HISTORY:  Patient is returning for follow up visit and to discuss further recommendations. She tolerated second cycle better. No fever chills. During this visit patient's allergy, social, medical, surgical history and medications were reviewed and updated. Past Medical History:   has a past medical history of Anxiety, Diabetes mellitus ([address] Hyperlipidemia, Hypertension, and Pancreatic cancer ([address] Past Surgical History:   has a past surgical history that includes  section; Gallbladder surgery; ERCP (2020); Upper gastrointestinal endoscopy (2020); Upper gastrointestinal endoscopy (2020); ERCP (2020); ERCP (2020); ERCP (2020); ERCP (2020); ERCP (2020); and ERCP (2020).      Medications:    Prior to Admission medications    Medication Sig Start Date End Date Taking? Authorizing Provider   oxyCODONE (ROXICODONE) 5 MG immediate release tablet Take 1 tablet by mouth every 6 hours as needed for Pain for up to 30 days. 7/28/20 8/27/20 Yes Holly Santos MD   lidocaine-prilocaine (EMLA) 2.5-2.5 % cream Apply topically as needed. 7/6/20  Yes Holly Santos MD   ondansetron (ZOFRAN-ODT) 8 MG TBDP disintegrating tablet Place 1 tablet under the tongue every 8 hours as needed for Nausea or Vomiting 7/6/20  Yes Holly Santos MD   nitroGLYCERIN (NITROSTAT) 0.4 MG SL tablet up to max of 3 total doses. If no relief after 1 dose, call 911. 6/6/20  Yes Ashley Pereira MD   insulin glargine (LANTUS) 100 UNIT/ML injection vial Inject 50 Units into the skin nightly 6/6/20  Yes Ashley Pereira MD   insulin glargine (LANTUS) 100 UNIT/ML injection vial Inject 60 Units into the skin daily 6/7/20  Yes Ashley Pereira MD   hydrALAZINE (APRESOLINE) 25 MG tablet Take 1 tablet by mouth every 8 hours 6/6/20  Yes Ashley Pereira MD   carvedilol (COREG) 12.5 MG tablet Take 1 tablet by mouth 2 times daily (with meals) 6/6/20  Yes Ashley Pereira MD   amLODIPine (NORVASC) 10 MG tablet Take 1 tablet by mouth daily 6/7/20  Yes Ashley Pereira MD   benzocaine-menthol (CEPACOL SORE THROAT) 15-3.6 MG lozenge Take 1 lozenge by mouth every 2 hours as needed for Sore Throat 6/6/20  Yes Ashley Pereira MD   pantoprazole (PROTONIX) 40 MG tablet Take 1 tablet by mouth every morning (before breakfast) 6/6/20  Yes Ashley Pereira MD   aspirin 81 MG chewable tablet Take 81 mg by mouth nightly   Yes Historical Provider, MD   insulin aspart (NOVOLOG FLEXPEN) 100 UNIT/ML injection pen Inject into the skin   Yes Historical Provider, MD   Lisdexamfetamine Dimesylate 60 MG CAPS Take 60 mg by mouth every morning.    Yes Historical Provider, MD   sertraline (ZOLOFT) 100 MG tablet take 1 1/2 tablet by oral route  every day   Yes Historical Provider, MD   atorvastatin (LIPITOR) 40 MG tablet nightly  10/12/13  Yes Historical Provider, MD   LORazepam (ATIVAN) 0.5 MG tablet 1 mg nightly. 10/12/13  Yes Historical Provider, MD   furosemide (LASIX) 20 MG tablet Take 1 tablet by mouth daily  Patient taking differently: Take 20 mg by mouth daily As needed 6/16/20 7/20/20  Elizabeth Reynaga MD     Current Outpatient Medications   Medication Sig Dispense Refill    oxyCODONE (ROXICODONE) 5 MG immediate release tablet Take 1 tablet by mouth every 6 hours as needed for Pain for up to 30 days. 120 tablet 0    lidocaine-prilocaine (EMLA) 2.5-2.5 % cream Apply topically as needed. 1 Tube 2    ondansetron (ZOFRAN-ODT) 8 MG TBDP disintegrating tablet Place 1 tablet under the tongue every 8 hours as needed for Nausea or Vomiting 90 tablet 2    nitroGLYCERIN (NITROSTAT) 0.4 MG SL tablet up to max of 3 total doses. If no relief after 1 dose, call 911. 25 tablet 3    insulin glargine (LANTUS) 100 UNIT/ML injection vial Inject 50 Units into the skin nightly 1 vial 3    insulin glargine (LANTUS) 100 UNIT/ML injection vial Inject 60 Units into the skin daily 1 vial 3    hydrALAZINE (APRESOLINE) 25 MG tablet Take 1 tablet by mouth every 8 hours 90 tablet 3    carvedilol (COREG) 12.5 MG tablet Take 1 tablet by mouth 2 times daily (with meals) 60 tablet 3    amLODIPine (NORVASC) 10 MG tablet Take 1 tablet by mouth daily 30 tablet 3    benzocaine-menthol (CEPACOL SORE THROAT) 15-3.6 MG lozenge Take 1 lozenge by mouth every 2 hours as needed for Sore Throat 50 lozenge 0    pantoprazole (PROTONIX) 40 MG tablet Take 1 tablet by mouth every morning (before breakfast) 30 tablet 2    aspirin 81 MG chewable tablet Take 81 mg by mouth nightly      insulin aspart (NOVOLOG FLEXPEN) 100 UNIT/ML injection pen Inject into the skin      Lisdexamfetamine Dimesylate 60 MG CAPS Take 60 mg by mouth every morning.       sertraline (ZOLOFT) 100 MG tablet take 1 1/2 tablet by oral route  every day      atorvastatin (LIPITOR) 40 MG tablet nightly       LORazepam (ATIVAN) 0.5 MG tablet 1 mg nightly.  furosemide (LASIX) 20 MG tablet Take 1 tablet by mouth daily (Patient taking differently: Take 20 mg by mouth daily As needed) 30 tablet 1     No current facility-administered medications for this visit. Facility-Administered Medications Ordered in Other Visits   Medication Dose Route Frequency Provider Last Rate Last Dose    sodium chloride flush 0.9 % injection 10 mL  10 mL Intravenous PRN Patti Guadarrama MD        heparin flush 100 UNIT/ML injection 500 Units  500 Units Intracatheter PRN Patti Guadarrama MD        dextrose 5 % solution   Intravenous Once Patti Guadarrama MD 20 mL/hr at 08/18/20 1121      fosaprepitant (EMEND) 150 mg in sodium chloride 0.9 % 150 mL IVPB  150 mg Intravenous Once Patti Guadarrama  mL/hr at 08/18/20 1140 150 mg at 08/18/20 1140    atropine injection 0.4 mg  0.4 mg Intravenous Once PRN Patti Guadarrama MD        0.9 % sodium chloride infusion   Intravenous Once Patti Guadarrama MD        oxaliplatin (ELOXATIN) 175 mg in dextrose 5 % 250 mL chemo IVPB  75 mg/m2 (Treatment Plan Recorded) Intravenous Once Patti Guadarrama MD        leucovorin calcium (WELLCOVORIN) 950 mg in dextrose 5 % 250 mL IVPB  400 mg/m2 (Treatment Plan Recorded) Intravenous Once Patti Guadarrama MD        fluorouracil (ADRUCIL) chemo syringe 950 mg  950 mg Intravenous Once Patti Guadarrama MD        fluorouracil (ADRUCIL) 5,600 mg in 112 mL chemo infusion  2,400 mg/m2 (Treatment Plan Recorded) Intravenous Over 46 hours Patti Guadarrama MD           Allergies:  Lisinopril; Sulfamethoxazole; Trimethoprim; Cefuroxime axetil; Sulfamethoxazole-trimethoprim; Clindamycin phosphate; and Penicillins    Social History:   reports that she quit smoking about 15 years ago. She has never used smokeless tobacco. She reports previous alcohol use. She reports that she does not use drugs.      Family History: family history includes Cancer in her maternal grandfather, maternal grandmother, and mother; Heart Failure in her father; Hypertension in her father and mother. REVIEW OF SYSTEMS:    Constitutional: No fever or chills. No night sweats, no weight loss   Eyes: No eye discharge, double vision, or eye pain   HEENT: negative for sore mouth, sore throat, hoarseness and voice change   Respiratory: negative for cough , sputum, dyspnea, wheezing, hemoptysis, chest pain   Cardiovascular: negative for chest pain, dyspnea, palpitations, orthopnea, PND   Gastrointestinal: negative for nausea, vomiting, diarrhea, constipation, abdominal pain, Dysphagia, hematemesis and hematochezia   Genitourinary: negative for frequency, dysuria, nocturia, urinary incontinence, and hematuria   Integument: negative for rash, skin lesions, bruises.    Hematologic/Lymphatic: negative for easy bruising, bleeding, lymphadenopathy, or petechiae   Endocrine: negative for heat or cold intolerance,weight changes, change in bowel habits and hair loss   Musculoskeletal: negative for myalgias, arthralgias, pain, joint swelling,and bone pain   Neurological: negative for headaches, dizziness, seizures, weakness, numbness    PHYSICAL EXAM:      /68   Pulse 76   Temp 98.8 °F (37.1 °C) (Oral)   Wt 260 lb (117.9 kg)   BMI 43.27 kg/m²    Temp (24hrs), Av.8 °F (36.6 °C), Min:97.1 °F (36.2 °C), Max:99.4 °F (37.4 °C)    General appearance - well appearing, no in pain or distress   Mental status - alert and cooperative   Eyes - pupils equal and reactive, extraocular eye movements intact   Ears - bilateral TM's and external ear canals normal   Mouth - mucous membranes moist, pharynx normal without lesions   Neck - supple, no significant adenopathy   Lymphatics - no palpable lymphadenopathy, no hepatosplenomegaly   Chest - clear to auscultation, no wheezes, rales or rhonchi, symmetric air entry   Heart - normal rate, regular rhythm, normal S1, S2, no murmurs  Abdomen - soft, nontender, nondistended, no masses or organomegaly   Neurological - alert, oriented, normal speech, no focal findings or movement disorder noted   Musculoskeletal - no joint tenderness, deformity or swelling   Extremities - peripheral pulses normal, no pedal edema, no clubbing or cyanosis   Skin - normal coloration and turgor, no rashes, no suspicious skin lesions noted ,    DATA:    Labs:   CBC:   Recent Labs     08/18/20  1023   WBC 3.7   HGB 9.3*   HCT 28.5*        BMP:   Recent Labs     08/18/20  1023   *   K 3.6*   CO2 27   BUN 8   CREATININE 0.65   LABGLOM >60   GLUCOSE 125*     PT/INR:   No results for input(s): PROTIME, INR in the last 72 hours. Surgical Pathology Report   Surgical Pathology   Collected: 06/04/20 5656   Lab status: Final   Resulting lab: [facility]   Value: -- Diagnosis --   HEAD OF PANCREAS MASS EUS FINE NEEDLE ASPIRATION:           POSITIVE FOR MALIGNANCY:   ADENOCARCINOMA, MODERATELY DIFFERENTIATED. IMAGING DATA:  MRI abd 6/4/20  FINDINGS:   Exam degraded by motion artifact.       ABDOMEN:       The visualized lung bases reveal no signal abnormality.       The liver is normal in signal intensity and contour.  No focal liver lesion   identified.       Irregular mass in the head of the pancreas is present resulting in biliary   and pancreatic duct obstruction.  This measures 3.2 x 2.3 cm and is best   visualized on T2 imaging.  This mass extends towards the liver hilum. [place]   is close approximation with the main portal vein, which remains patent.    There is close abutment with the hepatic artery.  No inflammatory change   identified involving the pancreas.  Relative atrophy of the body and tail is   noted.       The spleen, adrenals and kidneys reveal no significant findings.  Right renal   cyst.       The visualized bowel is normal in caliber.       No ascites.  Other than periportal lymph nodes measuring up to 12 mm, no   retroperitoneal or mesenteric lymphadenopathy is otherwise appreciated.     Incidental small intramural fibroid is noted in the right body of the uterus.       No signal abnormality identified in the visualized osseous structures.       MRCP:       Gallbladder: Surgically absent.  Small amount of fluid signal within the   gallbladder fossa, likely postoperative.       Bile Ducts: Intrahepatic and extrahepatic biliary dilatation with abrupt   cutoff at the mid common duct level.  Upstream dilatation measures 16 mm at   the common duct level, 8 mm at the left hepatic duct level and 7 mm in the   right hepatic duct.       Pancreatic Duct: Dilatation with abrupt cutoff at the site of pancreatic head   mass.  Upstream dilatation of 8 mm is noted.           Impression   1.  3.2 cm soft tissue mass in the pancreatic head resulting in pancreatic   duct and biliary dilatation, compatible with primary neoplasm.  There is   abutment of the portal vein and soft tissue extension/lymph nodes in the   liver hilum noted.  Given the limitations of motion artifact on this exam, a   pancreas CT should be considered for detailed vascular evaluation.       2.  No liver metastatic disease. IMPRESSION:   1. Pancreatic adenocarcinoma: Head of pancreas, 3 cm,  MR abd showed no liver lesions possible T2N1MO stage IIB at this times appears borderline/unresectable because of PV involvement. She is seen by hepatobiliary surgeon as o/p  2. CT chest showed No mets. EUS showed that the mass encases the portal vein . An intact interface was seen between the mass and the celiac trunk and superior mesenteric artery suggesting a lack of invasion. Appears locally advanced  3. DKA  4. CAD  5. Abdominal pain    RECOMMENDATIONS:  1. I reviewed the imaging studies, lab data, diagnosis, prognosis and treatment options with patient. She states comfort is her primary goal but she is open to discuss options  2. I will get stat ultrasound abdomen to evaluate any acute pathology or development of ascites  3.  Plan to continue FOLFIRINOX however I will omit I irinotecan with her cycle 3 as well and will add subsequently  I reviewed the side effects of chemotherapy which include, but are not limited to, fatigue, nausea, vomiting, alopecia, myelosuppression, mucositis, allergic reaction, nephrotoxicity, ototoxicity, neurotoxicity, diarrhea, and constipation. Patient is agreeable  4. RTC in 4 weeks      Ilya Benitez MD  Hematologist/Medical Oncologist    I spent more than 40 minutes examining, evaluating, reviewing data and counseling the patient. Greater than 50% of that time was spent face-to-face with the patient in counseling and coordinating her care.

## 2020-08-19 ENCOUNTER — TELEPHONE (OUTPATIENT)
Dept: ONCOLOGY | Age: 60
End: 2020-08-19

## 2020-08-19 NOTE — TELEPHONE ENCOUNTER
[facility] [employer] forms completed, signed by Dr Anaya Burks and faxed to [phone]. Confirmation fax and forms placed in pile to be scanned. Patient informed of above and appreciated phone call.  Jonathan Seymour

## 2020-08-20 ENCOUNTER — HOSPITAL ENCOUNTER (OUTPATIENT)
Dept: INFUSION THERAPY | Age: 60
Discharge: HOME | End: 2020-08-20
Payer: COMMERCIAL

## 2020-08-20 DIAGNOSIS — C25.0 PRIMARY ADENOCARCINOMA OF HEAD OF PANCREAS (HCC): Primary | ICD-10-CM

## 2020-08-20 PROCEDURE — 6360000002 HC RX W HCPCS: Performed by: INTERNAL MEDICINE

## 2020-08-20 PROCEDURE — 2580000003 HC RX 258: Performed by: INTERNAL MEDICINE

## 2020-08-20 PROCEDURE — 96523 IRRIG DRUG DELIVERY DEVICE: CPT

## 2020-08-20 RX ORDER — SODIUM CHLORIDE 0.9 % (FLUSH) 0.9 %
20 SYRINGE (ML) INJECTION PRN
Status: CANCELLED | OUTPATIENT
Start: 2020-08-20

## 2020-08-20 RX ORDER — HEPARIN SODIUM (PORCINE) LOCK FLUSH IV SOLN 100 UNIT/ML 100 UNIT/ML
500 SOLUTION INTRAVENOUS PRN
Status: CANCELLED | OUTPATIENT
Start: 2020-08-20

## 2020-08-20 RX ORDER — SODIUM CHLORIDE 0.9 % (FLUSH) 0.9 %
10 SYRINGE (ML) INJECTION PRN
Status: DISCONTINUED | OUTPATIENT
Start: 2020-08-20 | End: 2020-08-21 | Stop reason: HOSPADM

## 2020-08-20 RX ORDER — SODIUM CHLORIDE 0.9 % (FLUSH) 0.9 %
10 SYRINGE (ML) INJECTION PRN
Status: CANCELLED | OUTPATIENT
Start: 2020-08-20

## 2020-08-20 RX ORDER — HEPARIN SODIUM (PORCINE) LOCK FLUSH IV SOLN 100 UNIT/ML 100 UNIT/ML
500 SOLUTION INTRAVENOUS PRN
Status: DISCONTINUED | OUTPATIENT
Start: 2020-08-20 | End: 2020-08-21 | Stop reason: HOSPADM

## 2020-08-20 RX ADMIN — HEPARIN 500 UNITS: 100 SYRINGE at 13:40

## 2020-08-20 RX ADMIN — Medication 10 ML: at 13:40

## 2020-08-26 ENCOUNTER — TELEPHONE (OUTPATIENT)
Dept: ONCOLOGY | Age: 60
End: 2020-08-26

## 2020-09-01 ENCOUNTER — TELEPHONE (OUTPATIENT)
Dept: ONCOLOGY | Age: 60
End: 2020-09-01

## 2020-09-01 NOTE — TELEPHONE ENCOUNTER
Name: Neeta Borrero  : 1960  MRN: [id number]    Oncology Navigation Follow-Up Note    Contact Type:  Telephone  Notes: Upon review of Epic noted pt contacted [facility] triage 2020 to cancel txs & requested appt w/Dr. Celia Hernandez to discuss options. Noted pt scheduled for Dr. Celia Hernandez f/u 2020. Attempted to contact pt for f/u call, no answer, VM left instructed pt may contact writer prn. Will continue to follow.     Electronically signed by Aggie Marsh RN on 2020 at 12:29 PM

## 2020-09-04 ENCOUNTER — OFFICE VISIT (OUTPATIENT)
Dept: ONCOLOGY | Age: 60
End: 2020-09-04
Payer: COMMERCIAL

## 2020-09-04 ENCOUNTER — TELEPHONE (OUTPATIENT)
Dept: ONCOLOGY | Age: 60
End: 2020-09-04

## 2020-09-04 VITALS
RESPIRATION RATE: 18 BRPM | DIASTOLIC BLOOD PRESSURE: 80 MMHG | WEIGHT: 257.6 LBS | SYSTOLIC BLOOD PRESSURE: 130 MMHG | BODY MASS INDEX: 42.87 KG/M2 | TEMPERATURE: 98.3 F | HEART RATE: 77 BPM

## 2020-09-04 PROCEDURE — 99211 OFF/OP EST MAY X REQ PHY/QHP: CPT | Performed by: INTERNAL MEDICINE

## 2020-09-04 PROCEDURE — 99215 OFFICE O/P EST HI 40 MIN: CPT | Performed by: INTERNAL MEDICINE

## 2020-09-04 RX ORDER — HEPARIN SODIUM (PORCINE) LOCK FLUSH IV SOLN 100 UNIT/ML 100 UNIT/ML
500 SOLUTION INTRAVENOUS PRN
Status: CANCELLED | OUTPATIENT
Start: 2020-09-08

## 2020-09-04 RX ORDER — EPINEPHRINE 1 MG/ML
0.3 INJECTION, SOLUTION, CONCENTRATE INTRAVENOUS PRN
Status: CANCELLED | OUTPATIENT
Start: 2020-09-08

## 2020-09-04 RX ORDER — SODIUM CHLORIDE 0.9 % (FLUSH) 0.9 %
5 SYRINGE (ML) INJECTION PRN
Status: CANCELLED | OUTPATIENT
Start: 2020-09-08

## 2020-09-04 RX ORDER — ATROPINE SULFATE 0.4 MG/ML
0.4 AMPUL (ML) INJECTION
Status: CANCELLED | OUTPATIENT
Start: 2020-09-08

## 2020-09-04 RX ORDER — SODIUM CHLORIDE 9 MG/ML
INJECTION, SOLUTION INTRAVENOUS CONTINUOUS
Status: CANCELLED | OUTPATIENT
Start: 2020-09-08

## 2020-09-04 RX ORDER — METHYLPREDNISOLONE SODIUM SUCCINATE 125 MG/2ML
125 INJECTION, POWDER, LYOPHILIZED, FOR SOLUTION INTRAMUSCULAR; INTRAVENOUS ONCE
Status: CANCELLED | OUTPATIENT
Start: 2020-09-08

## 2020-09-04 RX ORDER — DIPHENHYDRAMINE HYDROCHLORIDE 50 MG/ML
50 INJECTION INTRAMUSCULAR; INTRAVENOUS ONCE
Status: CANCELLED | OUTPATIENT
Start: 2020-09-08

## 2020-09-04 RX ORDER — PALONOSETRON 0.05 MG/ML
0.25 INJECTION, SOLUTION INTRAVENOUS ONCE
Status: CANCELLED | OUTPATIENT
Start: 2020-09-08

## 2020-09-04 RX ORDER — SODIUM CHLORIDE 9 MG/ML
INJECTION, SOLUTION INTRAVENOUS ONCE
Status: CANCELLED | OUTPATIENT
Start: 2020-09-08

## 2020-09-04 RX ORDER — DEXTROSE MONOHYDRATE 50 MG/ML
INJECTION, SOLUTION INTRAVENOUS ONCE
Status: CANCELLED | OUTPATIENT
Start: 2020-09-08

## 2020-09-04 RX ORDER — SODIUM CHLORIDE 0.9 % (FLUSH) 0.9 %
10 SYRINGE (ML) INJECTION PRN
Status: CANCELLED | OUTPATIENT
Start: 2020-09-08

## 2020-09-04 NOTE — PROGRESS NOTES
Today's Date: 9/4/2020  Patient Name: Anu Faria  Patient's age: 64 y. o., 1960    DIAGNOSIS: pancreatic cancer, clinical  T2N1MO stage IIB, encasement of portal vein, unresectable  Started on dose reduced FOLFIRINOX on 7/14/2020    CHIEF COMPLAINT:    Chief Complaint   Patient presents with    Follow-up     review status of disease    Other     pt cancelled all treatment appointments    Nausea     off and on    Numbness     feet    Pain     feet    Anorexia     comes and goes     HISTORY OF PRESENT ILLNESS:    This is a 44-year-old female who was admitted with diabetic ketoacidosis, lower back pain. She was noted to have elevated troponin, elevated liver enzymes. Patient was seen by cardiology and had cardiac cath which showed nonobstructive CAD and medical management recommended. She had ultrasound of the liver which showed dilated pancreatic duct and intrahepatic ductal dilatation. This was followed by MRCP which showed pancreatic mass and she underwent ERCP and EUS on 6/4/20 with cholangiogram, Biliary sphincterotomy and  Placement of 10 F x 7 cm plastic biliary stent  The pancreatic head biopsy showed adenocarcinoma. Oncology consulted for further recommendations. INTERVAL HISTORY:  Patient is returning for follow up visit and to discuss further recommendations. She has received chemotherapy with no irinotecan and low-dose oxaliplatin despite that she felt that she had a lot of side effects and had nausea for about 7 days and diarrhea lasting about 4 to 5 days after chemotherapy. She wants to stop treatment but willing to discuss more options. I discussed several options with patient. No fever chills. During this visit patient's allergy, social, medical, surgical history and medications were reviewed and updated. Past Medical History:   has a past medical history of Anxiety, Diabetes mellitus ([address] Hyperlipidemia, Hypertension, and Pancreatic cancer ([facility] [address]     Past Surgical History:   has a past surgical history that includes  section; Gallbladder surgery; ERCP (2020); Upper gastrointestinal endoscopy (2020); Upper gastrointestinal endoscopy (2020); ERCP (2020); ERCP (2020); ERCP (2020); ERCP (2020); ERCP (2020); and ERCP (2020). Medications:    Prior to Admission medications    Medication Sig Start Date End Date Taking? Authorizing Provider   lipase-protease-amylase (CREON) 29690 units CPEP delayed release capsule Take two 36,000unit capsules with meals and one- 36,000unit capsule with snacks. 20  Yes Diogenes Meyer MD   lidocaine-prilocaine (EMLA) 2.5-2.5 % cream Apply topically as needed. 20  Yes Diogenes Meyer MD   ondansetron (ZOFRAN-ODT) 8 MG TBDP disintegrating tablet Place 1 tablet under the tongue every 8 hours as needed for Nausea or Vomiting 20  Yes Diogenes Meyer MD   furosemide (LASIX) 20 MG tablet Take 1 tablet by mouth daily  Patient taking differently: Take 20 mg by mouth daily As needed 20 Yes Ilya Benitez MD   nitroGLYCERIN (NITROSTAT) 0.4 MG SL tablet up to max of 3 total doses.  If no relief after 1 dose, call 911. 20  Yes Romana Johnson MD   insulin glargine (LANTUS) 100 UNIT/ML injection vial Inject 50 Units into the skin nightly 20  Yes Romana Johnson MD   insulin glargine (LANTUS) 100 UNIT/ML injection vial Inject 60 Units into the skin daily 20  Yes Romana Johnson MD   hydrALAZINE (APRESOLINE) 25 MG tablet Take 1 tablet by mouth every 8 hours 20  Yes Romana Johnson MD   carvedilol (COREG) 12.5 MG tablet Take 1 tablet by mouth 2 times daily (with meals) 20  Yes Romana Johnson MD   amLODIPine (NORVASC) 10 MG tablet Take 1 tablet by mouth daily 20  Yes Romana Johnson MD   benzocaine-menthol (CEPACOL SORE THROAT) 15-3.6 MG lozenge Take 1 lozenge by mouth every 2 hours as needed for Sore Throat 20  Yes Romana Johnson MD   pantoprazole ([address] [place] [address]) 40 MG tablet Take 1 tablet by mouth every morning (before breakfast) 6/6/20  Yes Rocco Delgado MD   aspirin 81 MG chewable tablet Take 81 mg by mouth nightly   Yes Historical Provider, MD   insulin aspart (NOVOLOG FLEXPEN) 100 UNIT/ML injection pen Inject into the skin   Yes Historical Provider, MD   Lisdexamfetamine Dimesylate 60 MG CAPS Take 60 mg by mouth every morning. Yes Historical Provider, MD   sertraline (ZOLOFT) 100 MG tablet take 1 1/2 tablet by oral route  every day   Yes Historical Provider, MD   atorvastatin (LIPITOR) 40 MG tablet nightly  10/12/13  Yes Historical Provider, MD   LORazepam (ATIVAN) 0.5 MG tablet 1 mg nightly. 10/12/13  Yes Historical Provider, MD     Current Outpatient Medications   Medication Sig Dispense Refill    lipase-protease-amylase (CREON) 71603 units CPEP delayed release capsule Take two 36,000unit capsules with meals and one- 36,000unit capsule with snacks. 180 capsule 2    lidocaine-prilocaine (EMLA) 2.5-2.5 % cream Apply topically as needed. 1 Tube 2    ondansetron (ZOFRAN-ODT) 8 MG TBDP disintegrating tablet Place 1 tablet under the tongue every 8 hours as needed for Nausea or Vomiting 90 tablet 2    furosemide (LASIX) 20 MG tablet Take 1 tablet by mouth daily (Patient taking differently: Take 20 mg by mouth daily As needed) 30 tablet 1    nitroGLYCERIN (NITROSTAT) 0.4 MG SL tablet up to max of 3 total doses.  If no relief after 1 dose, call 911. 25 tablet 3    insulin glargine (LANTUS) 100 UNIT/ML injection vial Inject 50 Units into the skin nightly 1 vial 3    insulin glargine (LANTUS) 100 UNIT/ML injection vial Inject 60 Units into the skin daily 1 vial 3    hydrALAZINE (APRESOLINE) 25 MG tablet Take 1 tablet by mouth every 8 hours 90 tablet 3    carvedilol (COREG) 12.5 MG tablet Take 1 tablet by mouth 2 times daily (with meals) 60 tablet 3    amLODIPine (NORVASC) 10 MG tablet Take 1 tablet by mouth daily 30 tablet 3    benzocaine-menthol (CEPACOL SORE THROAT) 15-3.6 MG lozenge Take 1 lozenge by mouth every 2 hours as needed for Sore Throat 50 lozenge 0    pantoprazole (PROTONIX) 40 MG tablet Take 1 tablet by mouth every morning (before breakfast) 30 tablet 2    aspirin 81 MG chewable tablet Take 81 mg by mouth nightly      insulin aspart (NOVOLOG FLEXPEN) 100 UNIT/ML injection pen Inject into the skin      Lisdexamfetamine Dimesylate 60 MG CAPS Take 60 mg by mouth every morning.  sertraline (ZOLOFT) 100 MG tablet take 1 1/2 tablet by oral route  every day      atorvastatin (LIPITOR) 40 MG tablet nightly       LORazepam (ATIVAN) 0.5 MG tablet 1 mg nightly. No current facility-administered medications for this visit. Allergies:  Lisinopril; Sulfamethoxazole; Trimethoprim; Cefuroxime axetil; Sulfamethoxazole-trimethoprim; Clindamycin phosphate; and Penicillins    Social History:   reports that she quit smoking about 15 years ago. She has never used smokeless tobacco. She reports previous alcohol use. She reports that she does not use drugs. Family History: family history includes Cancer in her maternal grandfather, maternal grandmother, and mother; Heart Failure in her father; Hypertension in her father and mother. REVIEW OF SYSTEMS:    Constitutional: No fever or chills. No night sweats, no weight loss   Eyes: No eye discharge, double vision, or eye pain   HEENT: negative for sore mouth, sore throat, hoarseness and voice change   Respiratory: negative for cough , sputum, dyspnea, wheezing, hemoptysis, chest pain   Cardiovascular: negative for chest pain, dyspnea, palpitations, orthopnea, PND   Gastrointestinal: negative for nausea, vomiting, diarrhea, constipation, abdominal pain, Dysphagia, hematemesis and hematochezia   Genitourinary: negative for frequency, dysuria, nocturia, urinary incontinence, and hematuria   Integument: negative for rash, skin lesions, bruises.    Hematologic/Lymphatic: negative for easy bruising, bleeding, lymphadenopathy, or petechiae   Endocrine: negative for heat or cold intolerance,weight changes, change in bowel habits and hair loss   Musculoskeletal: negative for myalgias, arthralgias, pain, joint swelling,and bone pain   Neurological: negative for headaches, dizziness, seizures, weakness, numbness    PHYSICAL EXAM:      /80   Pulse 77   Temp 98.3 °F (36.8 °C) (Oral)   Resp 18   Wt 257 lb 9.6 oz (116.8 kg)   BMI 42.87 kg/m²    Temp (24hrs), Av.8 °F (36.6 °C), Min:97.1 °F (36.2 °C), Max:99.4 °F (37.4 °C)    General appearance - well appearing, no in pain or distress   Mental status - alert and cooperative   Eyes - pupils equal and reactive, extraocular eye movements intact   Ears - bilateral TM's and external ear canals normal   Mouth - mucous membranes moist, pharynx normal without lesions   Neck - supple, no significant adenopathy   Lymphatics - no palpable lymphadenopathy, no hepatosplenomegaly   Chest - clear to auscultation, no wheezes, rales or rhonchi, symmetric air entry   Heart - normal rate, regular rhythm, normal S1, S2, no murmurs  Abdomen - soft, nontender, nondistended, no masses or organomegaly   Neurological - alert, oriented, normal speech, no focal findings or movement disorder noted   Musculoskeletal - no joint tenderness, deformity or swelling   Extremities - peripheral pulses normal, no pedal edema, no clubbing or cyanosis   Skin - normal coloration and turgor, no rashes, no suspicious skin lesions noted ,    DATA:    Labs:   CBC:   No results for input(s): WBC, HGB, HCT, PLT in the last 72 hours. BMP:   No results for input(s): NA, K, CO2, BUN, CREATININE, LABGLOM, GLUCOSE in the last 72 hours. PT/INR:   No results for input(s): PROTIME, INR in the last 72 hours. Surgical Pathology Report   Surgical Pathology   Collected: 20 2312   Lab status: Final   Resulting lab: [facility]   Value: -- Diagnosis --   HEAD OF PANCREAS MASS EUS FINE NEEDLE ASPIRATION:           POSITIVE FOR MALIGNANCY:   ADENOCARCINOMA, MODERATELY DIFFERENTIATED. IMAGING DATA:  MRI abd 6/4/20  FINDINGS:   Exam degraded by motion artifact.       ABDOMEN:       The visualized lung bases reveal no signal abnormality.       The liver is normal in signal intensity and contour.  No focal liver lesion   identified.       Irregular mass in the head of the pancreas is present resulting in biliary   and pancreatic duct obstruction.  This measures 3.2 x 2.3 cm and is best   visualized on T2 imaging.  This mass extends towards the liver hilum. Graeme Jolly   is close approximation with the main portal vein, which remains patent.    There is close abutment with the hepatic artery.  No inflammatory change   identified involving the pancreas.  Relative atrophy of the body and tail is   noted.       The spleen, adrenals and kidneys reveal no significant findings.  Right renal   cyst.       The visualized bowel is normal in caliber.       No ascites.  Other than periportal lymph nodes measuring up to 12 mm, no   retroperitoneal or mesenteric lymphadenopathy is otherwise appreciated.       Incidental small intramural fibroid is noted in the right body of the uterus.       No signal abnormality identified in the visualized osseous structures.       MRCP:       Gallbladder: Surgically absent.  Small amount of fluid signal within the   gallbladder fossa, likely postoperative.       Bile Ducts: Intrahepatic and extrahepatic biliary dilatation with abrupt   cutoff at the mid common duct level.  Upstream dilatation measures 16 mm at   the common duct level, 8 mm at the left hepatic duct level and 7 mm in the   right hepatic duct.       Pancreatic Duct: Dilatation with abrupt cutoff at the site of pancreatic head   mass.  Upstream dilatation of 8 mm is noted.           Impression   1.  3.2 cm soft tissue mass in the pancreatic head resulting in pancreatic   duct and biliary dilatation, compatible with primary neoplasm. Graeme Jolly is abutment of the portal vein and soft tissue extension/lymph nodes in the   liver hilum noted.  Given the limitations of motion artifact on this exam, a   pancreas CT should be considered for detailed vascular evaluation.       2.  No liver metastatic disease. IMPRESSION:   1. Pancreatic adenocarcinoma: Head of pancreas, 3 cm,  MR abd showed no liver lesions possible T2N1MO stage IIB at this times appears borderline/unresectable because of PV involvement. She is seen by hepatobiliary surgeon as o/p  2. CT chest showed No mets. EUS showed that the mass encases the portal vein . An intact interface was seen between the mass and the celiac trunk and superior mesenteric artery suggesting a lack of invasion. Appears locally advanced  3. DKA  4. CAD  5. Abdominal pain    RECOMMENDATIONS:  1. I reviewed the imaging studies, lab data, diagnosis, prognosis and treatment options with patient. She states comfort is her primary goal but she is open to discuss options  2. She has received 3 cycles of FOLFIRINOX and during her last cycle I omitted irinotecan and also dose reduced oxaliplatin. Despite that she had nausea vomiting lasting 7 days and diarrhea. She wants to hold off treatment and discuss other options. I explained that palliative care or hospice is still reasonable option however given that she has locally advanced disease, it is reasonable to try other treatment modalities. I will hold off 5-FU bolus from chemotherapy and continue FOLFOX reduced dose only. If she tolerated this chemo will give a few more cycles  3. I also discussed option of radiation therapy with weekly gemcitabine and I will send her to radiation oncology to discuss this option. 4. And planning to get restaging scans after 5-6 cycles. 5. Additionally I discussed the option of hospice/palliative care. She is willing to try low-dose chemotherapy for couple of cycles if tolerable.   Otherwise will explore the option of concurrent chemoradiation  Patient is agreeable  6. RTC in 2 weeks      Ilya Benitez MD  Hematologist/Medical Oncologist    I spent more than 40 minutes examining, evaluating, reviewing data and counseling the patient. Greater than 50% of that time was spent face-to-face with the patient in counseling and coordinating her care.

## 2020-09-04 NOTE — TELEPHONE ENCOUNTER
AVS from 9/4/20     radiatiion referral  Chemo without bolus and irinotecan next week  RTC 3 weeks    RO appt 9-8-20 @ 11 am   Chemo 9-8-20 @ 1 p  RV scheduled with chemo on 9/22/20 @ 10 am with chemo to follow    Pt was given AVS and appt schedule

## 2020-09-08 ENCOUNTER — HOSPITAL ENCOUNTER (OUTPATIENT)
Dept: RADIATION ONCOLOGY | Age: 60
Discharge: HOME | End: 2020-09-08
Payer: COMMERCIAL

## 2020-09-08 ENCOUNTER — HOSPITAL ENCOUNTER (OUTPATIENT)
Dept: INFUSION THERAPY | Age: 60
Discharge: HOME | End: 2020-09-08
Payer: COMMERCIAL

## 2020-09-08 VITALS
HEART RATE: 85 BPM | WEIGHT: 254 LBS | BODY MASS INDEX: 42.27 KG/M2 | SYSTOLIC BLOOD PRESSURE: 156 MMHG | DIASTOLIC BLOOD PRESSURE: 83 MMHG | OXYGEN SATURATION: 96 % | TEMPERATURE: 97 F | RESPIRATION RATE: 16 BRPM

## 2020-09-08 VITALS
RESPIRATION RATE: 16 BRPM | DIASTOLIC BLOOD PRESSURE: 83 MMHG | WEIGHT: 253.3 LBS | HEART RATE: 80 BPM | TEMPERATURE: 98.1 F | BODY MASS INDEX: 42.15 KG/M2 | SYSTOLIC BLOOD PRESSURE: 168 MMHG

## 2020-09-08 DIAGNOSIS — C25.0 PRIMARY ADENOCARCINOMA OF HEAD OF PANCREAS (HCC): Primary | ICD-10-CM

## 2020-09-08 LAB
ABSOLUTE EOS #: 0.2 K/UL (ref 0–0.4)
ABSOLUTE IMMATURE GRANULOCYTE: ABNORMAL K/UL (ref 0–0.3)
ABSOLUTE LYMPH #: 1.4 K/UL (ref 1–4.8)
ABSOLUTE MONO #: 0.7 K/UL (ref 0.1–1.2)
ALBUMIN SERPL-MCNC: 3.4 G/DL (ref 3.5–5.2)
ALBUMIN/GLOBULIN RATIO: 1.2 (ref 1–2.5)
ALP BLD-CCNC: 205 U/L (ref 35–104)
ALT SERPL-CCNC: 42 U/L (ref 5–33)
ANION GAP SERPL CALCULATED.3IONS-SCNC: 13 MMOL/L (ref 9–17)
AST SERPL-CCNC: 56 U/L
BASOPHILS # BLD: 1 % (ref 0–2)
BASOPHILS ABSOLUTE: 0 K/UL (ref 0–0.2)
BILIRUB SERPL-MCNC: 0.42 MG/DL (ref 0.3–1.2)
BUN BLDV-MCNC: 7 MG/DL (ref 6–20)
BUN/CREAT BLD: ABNORMAL (ref 9–20)
CALCIUM SERPL-MCNC: 10 MG/DL (ref 8.6–10.4)
CHLORIDE BLD-SCNC: 101 MMOL/L (ref 98–107)
CO2: 27 MMOL/L (ref 20–31)
CREAT SERPL-MCNC: 0.66 MG/DL (ref 0.5–0.9)
DIFFERENTIAL TYPE: ABNORMAL
EOSINOPHILS RELATIVE PERCENT: 3 % (ref 1–4)
GFR AFRICAN AMERICAN: >60 ML/MIN
GFR NON-AFRICAN AMERICAN: >60 ML/MIN
GFR SERPL CREATININE-BSD FRML MDRD: ABNORMAL ML/MIN/{1.73_M2}
GFR SERPL CREATININE-BSD FRML MDRD: ABNORMAL ML/MIN/{1.73_M2}
GLUCOSE BLD-MCNC: 222 MG/DL (ref 70–99)
HCT VFR BLD CALC: 34.4 % (ref 36–46)
HEMOGLOBIN: 11.4 G/DL (ref 12–16)
IMMATURE GRANULOCYTES: ABNORMAL %
LYMPHOCYTES # BLD: 23 % (ref 24–44)
MCH RBC QN AUTO: 27.3 PG (ref 26–34)
MCHC RBC AUTO-ENTMCNC: 33 G/DL (ref 31–37)
MCV RBC AUTO: 82.6 FL (ref 80–100)
MONOCYTES # BLD: 12 % (ref 2–11)
NRBC AUTOMATED: ABNORMAL PER 100 WBC
PDW BLD-RTO: 19.4 % (ref 12.5–15.4)
PLATELET # BLD: 316 K/UL (ref 140–450)
PLATELET ESTIMATE: ABNORMAL
PMV BLD AUTO: 7.8 FL (ref 6–12)
POTASSIUM SERPL-SCNC: 3.9 MMOL/L (ref 3.7–5.3)
RBC # BLD: 4.17 M/UL (ref 4–5.2)
RBC # BLD: ABNORMAL 10*6/UL
SEG NEUTROPHILS: 61 % (ref 36–66)
SEGMENTED NEUTROPHILS ABSOLUTE COUNT: 3.8 K/UL (ref 1.8–7.7)
SODIUM BLD-SCNC: 141 MMOL/L (ref 135–144)
TOTAL PROTEIN: 6.3 G/DL (ref 6.4–8.3)
WBC # BLD: 6.2 K/UL (ref 3.5–11)
WBC # BLD: ABNORMAL 10*3/UL

## 2020-09-08 PROCEDURE — 96413 CHEMO IV INFUSION 1 HR: CPT

## 2020-09-08 PROCEDURE — 6360000002 HC RX W HCPCS: Performed by: INTERNAL MEDICINE

## 2020-09-08 PROCEDURE — 96375 TX/PRO/DX INJ NEW DRUG ADDON: CPT

## 2020-09-08 PROCEDURE — 36415 COLL VENOUS BLD VENIPUNCTURE: CPT

## 2020-09-08 PROCEDURE — A4216 STERILE WATER/SALINE, 10 ML: HCPCS | Performed by: INTERNAL MEDICINE

## 2020-09-08 PROCEDURE — 85025 COMPLETE CBC W/AUTO DIFF WBC: CPT

## 2020-09-08 PROCEDURE — 2580000003 HC RX 258: Performed by: INTERNAL MEDICINE

## 2020-09-08 PROCEDURE — 80053 COMPREHEN METABOLIC PANEL: CPT

## 2020-09-08 PROCEDURE — 96415 CHEMO IV INFUSION ADDL HR: CPT

## 2020-09-08 PROCEDURE — 36591 DRAW BLOOD OFF VENOUS DEVICE: CPT

## 2020-09-08 PROCEDURE — 99213 OFFICE O/P EST LOW 20 MIN: CPT | Performed by: RADIOLOGY

## 2020-09-08 PROCEDURE — 96549 UNLISTED CHEMOTHERAPY PX: CPT

## 2020-09-08 PROCEDURE — 99204 OFFICE O/P NEW MOD 45 MIN: CPT | Performed by: RADIOLOGY

## 2020-09-08 PROCEDURE — 96366 THER/PROPH/DIAG IV INF ADDON: CPT

## 2020-09-08 PROCEDURE — 96368 THER/DIAG CONCURRENT INF: CPT

## 2020-09-08 PROCEDURE — 96416 CHEMO PROLONG INFUSE W/PUMP: CPT

## 2020-09-08 RX ORDER — ATROPINE SULFATE 0.4 MG/ML
0.4 AMPUL (ML) INJECTION
Status: DISPENSED | OUTPATIENT
Start: 2020-09-08 | End: 2020-09-08

## 2020-09-08 RX ORDER — SODIUM CHLORIDE 0.9 % (FLUSH) 0.9 %
10 SYRINGE (ML) INJECTION PRN
Status: DISCONTINUED | OUTPATIENT
Start: 2020-09-08 | End: 2020-09-09 | Stop reason: HOSPADM

## 2020-09-08 RX ORDER — HEPARIN SODIUM (PORCINE) LOCK FLUSH IV SOLN 100 UNIT/ML 100 UNIT/ML
500 SOLUTION INTRAVENOUS PRN
Status: DISCONTINUED | OUTPATIENT
Start: 2020-09-08 | End: 2020-09-09 | Stop reason: HOSPADM

## 2020-09-08 RX ORDER — DEXAMETHASONE SODIUM PHOSPHATE 10 MG/ML
10 INJECTION, SOLUTION INTRAMUSCULAR; INTRAVENOUS ONCE
Status: COMPLETED | OUTPATIENT
Start: 2020-09-08 | End: 2020-09-08

## 2020-09-08 RX ORDER — PALONOSETRON 0.05 MG/ML
0.25 INJECTION, SOLUTION INTRAVENOUS ONCE
Status: COMPLETED | OUTPATIENT
Start: 2020-09-08 | End: 2020-09-08

## 2020-09-08 RX ORDER — DEXTROSE MONOHYDRATE 50 MG/ML
INJECTION, SOLUTION INTRAVENOUS ONCE
Status: COMPLETED | OUTPATIENT
Start: 2020-09-08 | End: 2020-09-08

## 2020-09-08 RX ADMIN — FOSAPREPITANT 150 MG: 150 INJECTION, POWDER, LYOPHILIZED, FOR SOLUTION INTRAVENOUS at 13:45

## 2020-09-08 RX ADMIN — Medication 10 ML: at 16:26

## 2020-09-08 RX ADMIN — FLUOROURACIL 5600 MG: 50 INJECTION, SOLUTION INTRAVENOUS at 16:26

## 2020-09-08 RX ADMIN — LEUCOVORIN CALCIUM 950 MG: 350 INJECTION, POWDER, LYOPHILIZED, FOR SOLUTION INTRAMUSCULAR; INTRAVENOUS at 14:20

## 2020-09-08 RX ADMIN — Medication 10 ML: at 13:05

## 2020-09-08 RX ADMIN — OXALIPLATIN 175 MG: 5 INJECTION, SOLUTION, CONCENTRATE INTRAVENOUS at 14:21

## 2020-09-08 RX ADMIN — PALONOSETRON 0.25 MG: 0.05 INJECTION, SOLUTION INTRAVENOUS at 13:30

## 2020-09-08 RX ADMIN — DEXAMETHASONE SODIUM PHOSPHATE 10 MG: 10 INJECTION, SOLUTION INTRAMUSCULAR; INTRAVENOUS at 13:30

## 2020-09-08 RX ADMIN — DEXTROSE MONOHYDRATE: 50 INJECTION, SOLUTION INTRAVENOUS at 13:30

## 2020-09-08 NOTE — PROGRESS NOTES
Patient here for C4D1 FOLFOX. Labs reviewed. Blood return noted prior to starting 5FU pump. She tolerated treatment well and was discharged home in stable condition. She is due to return 9/10 for pump dc.

## 2020-09-08 NOTE — PROGRESS NOTES
Referring Physician: Dr Sherin Brady:    09/08/20 1115   BP: (!) 156/83   Pulse: 85   Resp: 16   Temp: 97 °F (36.1 °C)   SpO2: 96%    :  Patient Currently in Pain: Denies             Wt Readings from Last 1 Encounters:   09/08/20 254 lb (115.2 kg)        Body mass index is 42.27 kg/m². Immunizations:    Influenza status:    [x]   Current   []   Patient declined    Pneumococcal status:  [x]   Current  []   Patient declined    Smoking Status:    [] Smoker - PPD:   [] Nonsmoker - Quit Date:               [x] Never a smoker      No chief complaint on file. Cancer Staging  No matching staging information was found for the patient. Prior Radiation Therapy? No   If yes, site treated:   Facility:                             Date:    Concurrent Chemo/radiation? No   If yes, start date:    Prior Chemotherapy? Yes   If yes    Facility: [facility]                            Date:7/2020- present     Prior Hormonal Therapy? No   If yes   Facility:                             Date:    Head and Neck Cancer? No   If yes, please remind physician to place swallow study order and speech therapy order. Pacemaker/Defibulator/ICD:  No                Current Outpatient Medications   Medication Sig Dispense Refill    lipase-protease-amylase (CREON) 66322 units CPEP delayed release capsule Take two 36,000unit capsules with meals and one- 36,000unit capsule with snacks. 180 capsule 2    lidocaine-prilocaine (EMLA) 2.5-2.5 % cream Apply topically as needed. 1 Tube 2    ondansetron (ZOFRAN-ODT) 8 MG TBDP disintegrating tablet Place 1 tablet under the tongue every 8 hours as needed for Nausea or Vomiting 90 tablet 2    furosemide (LASIX) 20 MG tablet Take 1 tablet by mouth daily (Patient taking differently: Take 20 mg by mouth daily As needed) 30 tablet 1    nitroGLYCERIN (NITROSTAT) 0.4 MG SL tablet up to max of 3 total doses.  If no relief after 1 dose, call 911. 25 tablet 3    insulin glargine [facility] OR    ERCP  7/22/2020    ERCP DILATION BALLOON performed by Lian Avitia MD at [address] [facility] END[facility]  06/04/2020    W/EUS FNA     UPPER GASTROINTESTINAL ENDOSCOPY  6/4/2020    EGD W/EUS FNA in OR with GI staff performed by Dianne Stover MD at [facility]       Family History   Problem Relation Age of Onset    Cancer Mother     Hypertension Mother     Heart Failure Father     Hypertension Father     Cancer Maternal Grandmother         colon     Cancer Maternal Grandfather         lung        Social History     Socioeconomic History    Marital status:      Spouse name: Not on file    Number of children: Not on file    Years of education: Not on file    Highest education level: Not on file   Occupational History    Not on file   Social Needs    Financial resource strain: Not on file    Food insecurity     Worry: Not on file     Inability: Not on file    Transportation needs     Medical: Not on file     Non-medical: Not on file   Tobacco Use    Smoking status: Former Smoker     Last attempt to quit: 7/14/2005     Years since quitting: 15.1    Smokeless tobacco: Never Used   Substance and Sexual Activity    Alcohol use: Not Currently    Drug use: No    Sexual activity: Not on file   Lifestyle    Physical activity     Days per week: Not on file     Minutes per session: Not on file    Stress: Not on file   Relationships    Social connections     Talks on phone: Not on file     Gets together: Not on file     Attends Evangelical service: Not on file     Active member of club or organization: Not on file     Attends meetings of clubs or organizations: Not on file     Relationship status: Not on file    Intimate partner violence     Fear of current or ex partner: Not on file     Emotionally abused: Not on file     Physically abused: Not on file     Forced sexual activity: Not on file   Other Topics Concern    Not on file   Social room  2. Patient attended at all times by family member or staff  3. Provide assistance as indicated for ambulation activities  4. Reorient confused/cognitively impaired patient  5. Call-light/bell within patient's reach  6. Chair/bed in low position, stretcher/bed with siderails up except when performing patient care activities  7. Educate patient/family/caregiver on falls prevention             Assessment/Plan: Patient was seen today for consultation. Dr Zach Bobo notified and examined pt. Pt to have a MRI then return for teach/SIM. Pt signed consent and copy was provided.           Paulino Sy 9/8/2020 11:24 AM

## 2020-09-09 NOTE — CONSULTS
[facility] 40            Radiation Oncology          [address]          [facility] [address]        Lara Alexander: [phone]        F: [phone]       [facility]           2020    Patient: Willian Cowan   YOB: 1960       Dear Dr Abdulkadir Lockhart: Thank you for referring Willian Cowan to me for evaluation. Below are the relevant portions of my assessment and plan of care. If you have questions, please do not hesitate to call me. I look forward to following this patient along with you. Sincerely,    Electronically signed by Shikha Phillips MD on 20 at 8:34 AM EDT    CC: Patient Care Team:  Wil Pollard as PCP - General (Nurse Practitioner)  Wil Pollard as PCP - [facility] Provider  Lisa Hameed DO (Obstetrics & Gynecology)  Aston Seth RN as Nurse Navigator (Oncology)  ------------------------------------------------------------------------------------------------------------------------------------------------------------------------------------------      RADIATION ONCOLOGY NEW PATIENT VISIT:    Date of Service: 2020    Location:  [facility] Radiation Oncology,   [address] Marti Gleason   [phone]     Patient ID:   Willian Cowan  : 1960   MRN: [id number]    CHIEF COMPLAINT: \"Pancreas Cancer\"    DIAGNOSIS:  Cancer Staging  Primary adenocarcinoma of head of pancreas [facility])  Staging form: Exocrine Pancreas, AJCC 8th Edition  - Clinical stage from 2020: Stage IIB (cT2, cN1, cM0) - Signed by Shikha Phillips MD on 2020    -on chemo with FOLFOX x4 (stopped Irinotecan from FOLFIRINOX)    HISTORY OF PRESENT ILLNESS:   Ms Hanane Polanco is a 64year old female with a recent diagnosis of pancreatic adenocarcinoma. Patient as you know presented with back pain to the ER and was found to be in DKA.   Patient underwent imaging which revealed a dilated pancreatic duct and ultimately had an MRI with MRCP on June 4, 2020 showing a 3.2 cm pancreatic mass at the head as well as portal vein abutment and soft tissue extension into lymph node in the liver hilum. Patient had a ERCP with biliary sphincterotomy and stent placement. She also had a CT of the chest done that was negative that day. Patient ultimately was evaluated by Dr. Anand Parikh and hepatobiliary surgery who did a diagnostic laparoscopic exam when noted that the tumor wrapped around her portal vein and therefore was borderline to unresectable. Patient was therefore referred for treatment with chemotherapy. Patient started on FOLFIRINOX on July 14, 2020. Unfortunately patient had significant abdominal pain nausea and sepsis requiring hospital admission and stent replacement on July 22, 2020. Patient was taken off of irinotecan and had a reduced dose of oxaliplatin and is now on cycle 4 of FOLFOX today. She however is concerned about continuing chemotherapy or any treatment and therefore would like to seek out alternative options. Patient has been referred to us today to discuss the role of chemoradiation therapy. Patient has been discussed the option of doing single agent chemotherapy with radiation. She is nervous however as she had significant issues with chemotherapy in the past and does not want to have any significant side effects that affect her quality of life. She has nausea still from her treatments, but otherwise denies any headaches, vision changes, chest pains, shortness of breath, abdominal pain, back pain, swelling, diarrhea, or any bleeding. She does note some changes in her stool fluctuating from light to dark.     PRIOR RADIATION HISTORY:  No prior history of radiation therapy     PACEMAKER: None     PAST MEDICAL HISTORY:  Past Medical History:   Diagnosis Date    Anxiety     Diabetes mellitus ([facility] [address]     Hyperlipidemia     Hypertension     Pancreatic cancer ([facility] [address]        PAST SURGICAL HISTORY:  Past Surgical History:   Procedure Laterality Date     SECTION      x2    ERCP  2020    SPHINCTER/PAPILLOTOMY, STENT INSERTION    ERCP  2020    ERCP SPHINCTER/PAPILLOTOMY performed by Chari Gilbert MD at [facility]    ERCP  2020    ERCP STENT INSERTION performed by Chari Gilbert MD at [facility]    ERCP  2020     ERCP ENDOSCOPIC RETROGRADE CHOLANGIOPANCREATOGRAPHY -   ERCP STENT REMOVAL     ERCP  2020    ERCP STENT REMOVAL performed by Chuckie Toussaint MD at [address] ERCP  2020    ERCP STENT INSERTION performed by Chuckie Toussaint MD at [address] ERCP  2020    ERCP DILATION BALLOON performed by Chuckie Toussaint MD at [facility] ENDOSCOPY  2020    W/EUS FNA     UPPER GASTROINTESTINAL ENDOSCOPY  2020    EGD W/EUS FNA in OR with GI staff performed by Chari Gilbert MD at [facility]       MEDICATIONS:    Current Outpatient Medications:     lipase-protease-amylase (CREON) 85372 units CPEP delayed release capsule, Take two 36,000unit capsules with meals and one- 36,000unit capsule with snacks. , Disp: 180 capsule, Rfl: 2    lidocaine-prilocaine (EMLA) 2.5-2.5 % cream, Apply topically as needed. , Disp: 1 Tube, Rfl: 2    ondansetron (ZOFRAN-ODT) 8 MG TBDP disintegrating tablet, Place 1 tablet under the tongue every 8 hours as needed for Nausea or Vomiting, Disp: 90 tablet, Rfl: 2    furosemide (LASIX) 20 MG tablet, Take 1 tablet by mouth daily (Patient taking differently: Take 20 mg by mouth daily As needed), Disp: 30 tablet, Rfl: 1    nitroGLYCERIN (NITROSTAT) 0.4 MG SL tablet, up to max of 3 total doses.  If no relief after 1 dose, call 911., Disp: 25 tablet, Rfl: 3    insulin glargine (LANTUS) 100 UNIT/ML injection vial, Inject 50 Units into the skin nightly, Disp: 1 vial, Rfl: 3    insulin glargine (LANTUS) 100 UNIT/ML injection vial, Inject 60 Units into the skin daily, Disp: 1 vial, Rfl: 3    hydrALAZINE (APRESOLINE) 25 MG tablet, Take 1 tablet by mouth every 8 hours, Disp: 90 tablet, Rfl: 3    carvedilol (COREG) 12.5 MG tablet, Take 1 tablet by mouth 2 times daily (with meals), Disp: 60 tablet, Rfl: 3    amLODIPine (NORVASC) 10 MG tablet, Take 1 tablet by mouth daily, Disp: 30 tablet, Rfl: 3    benzocaine-menthol (CEPACOL SORE THROAT) 15-3.6 MG lozenge, Take 1 lozenge by mouth every 2 hours as needed for Sore Throat, Disp: 50 lozenge, Rfl: 0    pantoprazole (PROTONIX) 40 MG tablet, Take 1 tablet by mouth every morning (before breakfast), Disp: 30 tablet, Rfl: 2    aspirin 81 MG chewable tablet, Take 81 mg by mouth nightly, Disp: , Rfl:     insulin aspart (NOVOLOG FLEXPEN) 100 UNIT/ML injection pen, Inject into the skin, Disp: , Rfl:     Lisdexamfetamine Dimesylate 60 MG CAPS, Take 60 mg by mouth every morning., Disp: , Rfl:     sertraline (ZOLOFT) 100 MG tablet, take 1 1/2 tablet by oral route  every day, Disp: , Rfl:     atorvastatin (LIPITOR) 40 MG tablet, nightly , Disp: , Rfl:     LORazepam (ATIVAN) 0.5 MG tablet, 1 mg nightly. , Disp: , Rfl:     ALLERGIES:   Allergies   Allergen Reactions    Lisinopril Swelling     Other reaction(s): swollen lips  In lips      Sulfamethoxazole      Other reaction(s): lip swelling  Other reaction(s): lip swelling      Trimethoprim      Other reaction(s): lip swelling    Cefuroxime Axetil Rash     Other reaction(s): swollen lips  Swollen lips    Sulfamethoxazole-Trimethoprim      Other reaction(s): swollen lips  Other reaction(s): swollen lips      Clindamycin Phosphate Rash     Other reaction(s): rash    Penicillins Rash     Other reaction(s): rash       SOCIAL HISTORY:  Social History     Socioeconomic History    Marital status:      Spouse name: Not on file    Number of children: Not on file    Years of education: Not on file    Highest education level: Not on file   Occupational History    Not on file   Social Needs    Financial resource strain: Not on file   [address] [place] [address] Hemoglobin   Date Value Ref Range Status   2020 11.4 (L) 12.0 - 16.0 g/dL Final     Platelet Count   Date Value Ref Range Status   2012 298 130 - 400 k/uL Final     Platelets   Date Value Ref Range Status   2020 316 140 - 450 k/uL Final     CEA   Date Value Ref Range Status   2020 2.7 <3.9 ng/mL Final     Comment:     The Roche \"ECLIA\" assay is used. Results obtained with different assay methods cannot be   used interchangeably. 2020 2.6 <3.9 ng/mL Final     Comment:     The Roche \"ECLIA\" assay is used. Results obtained with different assay methods cannot be   used interchangeably. IMAGIN/4/20 MRI Abd  Impression    1.  3.2 cm soft tissue mass in the pancreatic head resulting in pancreatic    duct and biliary dilatation, compatible with primary neoplasm.  There is    abutment of the portal vein and soft tissue extension/lymph nodes in the    liver hilum noted.  Given the limitations of motion artifact on this exam, a    pancreas CT should be considered for detailed vascular evaluation.         2.  No liver metastatic disease. CT Chest 20  Impression    1.  No acute airspace disease or evidence for metastatic disease in the chest.         2.  Interval placement of biliary stent, partially visualized, with    associated pneumobilia.         3.  Calcified atheromatous plaque and coronary calcifications. PATHOLOGY:  20 Biopsy  -- Diagnosis --   HEAD OF PANCREAS MASS EUS FINE NEEDLE ASPIRATION:           POSITIVE FOR MALIGNANCY:   ADENOCARCINOMA, MODERATELY DIFFERENTIATED.                  ASSESSMENT AND PLAN:   Moreno Moy is a 64 y.o. female with a Cancer Staging  Primary adenocarcinoma of head of pancreas ([facility] [address]  Staging form: Exocrine Pancreas, AJCC 8th Edition  - Clinical stage from 2020: Stage IIB (cT2, cN1, cM0) - Signed by Benoit Beckman MD on 2020      We reviewed with the patient the testing that has been done so far, including imaging and pathology results. We also reviewed their staging based on the testing that as been done and the recommendations per the NCCN guidelines. We discussed the options of treatment, including surgery, chemotherapy, and radiation, and the rationale of why radiation therapy would be indicated for this diagnosis. We also discussed that they have the option to not pursue our recommended treatment as well. Patient was seen by Dr Anand Parikh and had a diagnostic laproscopy that revealed her tumor to be unresectable at the time and therefore she was recommended chemotherapy with FOLFIRINOX. However she had a difficult time tolerating it and had to exclude Irinotecan and lower the dose of oxaliplatin. She would be recommended concurrent chemoradiation if she can no longer tolerate multi-agent chemotherapy. We did discuss that there are options of the drug that we would use with radiation with medical oncology. We reviewed the logistics of treatment planning, including a CT Simulation session, as well as daily treatments for approximately 6 weeks. With regards to radiation to the abdomen, I discussed the possible short-term side effects which included skin irritation (causing redness, dryness or peeling), hair loss in treated area, tiredness, low blood counts (causing infection or bleeding), abdominal pain, nausea/vomiting and diarrhea. Possible long-term side effects discussed included hyperpigmentation of the skin, damage to the bowels or intestines (resulting in bleeding or obstruction that may require surgery), damage to the kidneys (resulting in decreased function), damage to the liver (resulting in decreased function) and damage to the nerves (causing numbness, weakness or paralysis).     After ample time to review the patient's questions, an informed consent was obtained to proceed with scheduling a repeat MRI of the pancreas to assess the tumor, and then a follow up with a treatment planning session after for radiation therapy. Patient was in agreement with my recommendations. All questions were answered to their satisfaction. Patient was advised to contact us anytime should they have any questions or concerns. I spent greater than 60 minutes counseling and evaluating the different treatment options available to the patient and formulating a plan of action today.       Temo Marcial MD  Electronically signed by Temo Marcial MD on 9/9/20 at 8:34 AM EDT      Drugs Prescribed:  New Prescriptions    No medications on file       Orders Placed:     Orders Placed This Encounter   Procedures    MRI ABDOMEN W WO CONTRAST         CC:  Patient Care Team:  Temo Marshall as PCP - General (Nurse Practitioner)  Temo Marshall as PCP - [facility] Empaneled Provider  Lisa Torres DO (Obstetrics & Gynecology)  Jared Martines RN as Nurse Navigator (Oncology)

## 2020-09-10 ENCOUNTER — HOSPITAL ENCOUNTER (OUTPATIENT)
Dept: INFUSION THERAPY | Age: 60
Discharge: HOME | End: 2020-09-10
Payer: COMMERCIAL

## 2020-09-10 DIAGNOSIS — C25.0 PRIMARY ADENOCARCINOMA OF HEAD OF PANCREAS (HCC): Primary | ICD-10-CM

## 2020-09-10 PROCEDURE — 6360000002 HC RX W HCPCS: Performed by: INTERNAL MEDICINE

## 2020-09-10 PROCEDURE — 2580000003 HC RX 258: Performed by: INTERNAL MEDICINE

## 2020-09-10 PROCEDURE — 96523 IRRIG DRUG DELIVERY DEVICE: CPT

## 2020-09-10 RX ORDER — HEPARIN SODIUM (PORCINE) LOCK FLUSH IV SOLN 100 UNIT/ML 100 UNIT/ML
500 SOLUTION INTRAVENOUS PRN
Status: DISCONTINUED | OUTPATIENT
Start: 2020-09-10 | End: 2020-09-11 | Stop reason: HOSPADM

## 2020-09-10 RX ORDER — SODIUM CHLORIDE 0.9 % (FLUSH) 0.9 %
20 SYRINGE (ML) INJECTION PRN
Status: CANCELLED | OUTPATIENT
Start: 2020-09-10

## 2020-09-10 RX ORDER — SODIUM CHLORIDE 0.9 % (FLUSH) 0.9 %
10 SYRINGE (ML) INJECTION PRN
Status: DISCONTINUED | OUTPATIENT
Start: 2020-09-10 | End: 2020-09-11 | Stop reason: HOSPADM

## 2020-09-10 RX ORDER — HEPARIN SODIUM (PORCINE) LOCK FLUSH IV SOLN 100 UNIT/ML 100 UNIT/ML
500 SOLUTION INTRAVENOUS PRN
Status: CANCELLED | OUTPATIENT
Start: 2020-09-10

## 2020-09-10 RX ORDER — SODIUM CHLORIDE 0.9 % (FLUSH) 0.9 %
10 SYRINGE (ML) INJECTION PRN
Status: CANCELLED | OUTPATIENT
Start: 2020-09-10

## 2020-09-10 RX ADMIN — HEPARIN 500 UNITS: 100 SYRINGE at 14:56

## 2020-09-10 RX ADMIN — Medication 10 ML: at 14:56

## 2020-09-10 NOTE — PROGRESS NOTES
Patient here for pump dc. Infusion complete. Port flushes and draws well and she was discharged home in stable condition. She is due to return 9/14 for MRI.

## 2020-09-14 ENCOUNTER — TELEPHONE (OUTPATIENT)
Dept: RADIATION ONCOLOGY | Age: 60
End: 2020-09-14

## 2020-09-14 NOTE — TELEPHONE ENCOUNTER
Pt called states she is not feeling well and would like to cancel MRI for today. Pt also scheduled for CT/SIM 9/15 that also will need to be rescheduled due to MRI. Pt states she wants to know MRI results before proceeding with any Radiation appointment she is unsure if she wants tx. MRI rescheduled 9/17/20 @1pm in [place] pt will be added for port access prior. Pt added to pending list for MRI results.

## 2020-09-15 ENCOUNTER — APPOINTMENT (OUTPATIENT)
Dept: RADIATION ONCOLOGY | Age: 60
End: 2020-09-15
Attending: RADIOLOGY
Payer: COMMERCIAL

## 2020-09-17 ENCOUNTER — HOSPITAL ENCOUNTER (OUTPATIENT)
Dept: INFUSION THERAPY | Age: 60
Discharge: HOME | End: 2020-09-17
Payer: COMMERCIAL

## 2020-09-17 ENCOUNTER — HOSPITAL ENCOUNTER (OUTPATIENT)
Dept: MRI IMAGING | Age: 60
Discharge: HOME | End: 2020-09-19
Payer: COMMERCIAL

## 2020-09-17 DIAGNOSIS — C25.0 PRIMARY ADENOCARCINOMA OF HEAD OF PANCREAS (HCC): Primary | ICD-10-CM

## 2020-09-17 PROCEDURE — 2580000003 HC RX 258: Performed by: INTERNAL MEDICINE

## 2020-09-17 PROCEDURE — 74183 MRI ABD W/O CNTR FLWD CNTR: CPT

## 2020-09-17 PROCEDURE — 2580000003 HC RX 258: Performed by: RADIOLOGY

## 2020-09-17 PROCEDURE — 6360000002 HC RX W HCPCS: Performed by: INTERNAL MEDICINE

## 2020-09-17 PROCEDURE — A9579 GAD-BASE MR CONTRAST NOS,1ML: HCPCS | Performed by: RADIOLOGY

## 2020-09-17 PROCEDURE — 6360000004 HC RX CONTRAST MEDICATION: Performed by: RADIOLOGY

## 2020-09-17 RX ORDER — SODIUM CHLORIDE 0.9 % (FLUSH) 0.9 %
20 SYRINGE (ML) INJECTION PRN
Status: CANCELLED | OUTPATIENT
Start: 2020-09-17

## 2020-09-17 RX ORDER — SODIUM CHLORIDE 0.9 % (FLUSH) 0.9 %
10 SYRINGE (ML) INJECTION PRN
Status: DISCONTINUED | OUTPATIENT
Start: 2020-09-17 | End: 2020-09-18 | Stop reason: HOSPADM

## 2020-09-17 RX ORDER — SODIUM CHLORIDE 0.9 % (FLUSH) 0.9 %
10 SYRINGE (ML) INJECTION ONCE
Status: COMPLETED | OUTPATIENT
Start: 2020-09-17 | End: 2020-09-17

## 2020-09-17 RX ORDER — HEPARIN SODIUM (PORCINE) LOCK FLUSH IV SOLN 100 UNIT/ML 100 UNIT/ML
500 SOLUTION INTRAVENOUS PRN
Status: DISCONTINUED | OUTPATIENT
Start: 2020-09-17 | End: 2020-09-18 | Stop reason: HOSPADM

## 2020-09-17 RX ORDER — SODIUM CHLORIDE 0.9 % (FLUSH) 0.9 %
10 SYRINGE (ML) INJECTION PRN
Status: CANCELLED | OUTPATIENT
Start: 2020-09-17

## 2020-09-17 RX ORDER — HEPARIN SODIUM (PORCINE) LOCK FLUSH IV SOLN 100 UNIT/ML 100 UNIT/ML
500 SOLUTION INTRAVENOUS PRN
Status: CANCELLED | OUTPATIENT
Start: 2020-09-17

## 2020-09-17 RX ORDER — 0.9 % SODIUM CHLORIDE 0.9 %
100 INTRAVENOUS SOLUTION INTRAVENOUS ONCE
Status: COMPLETED | OUTPATIENT
Start: 2020-09-17 | End: 2020-09-17

## 2020-09-17 RX ADMIN — HEPARIN 500 UNITS: 100 SYRINGE at 14:06

## 2020-09-17 RX ADMIN — SODIUM CHLORIDE 80 ML: 9 INJECTION, SOLUTION INTRAVENOUS at 12:46

## 2020-09-17 RX ADMIN — Medication 10 ML: at 12:47

## 2020-09-17 RX ADMIN — Medication 10 ML: at 11:48

## 2020-09-17 RX ADMIN — Medication 10 ML: at 11:47

## 2020-09-17 RX ADMIN — GADOTERIDOL 20 ML: 279.3 INJECTION, SOLUTION INTRAVENOUS at 12:46

## 2020-09-17 RX ADMIN — Medication 10 ML: at 14:06

## 2020-09-17 NOTE — PROGRESS NOTES
Pt here for MRI port access. Pt was treated without incident and d/c'd in stable condition. Pt returns 9/22/20 for MD follow up and C5D1.

## 2020-09-18 ENCOUNTER — TELEPHONE (OUTPATIENT)
Dept: ONCOLOGY | Age: 60
End: 2020-09-18

## 2020-09-22 ENCOUNTER — HOSPITAL ENCOUNTER (OUTPATIENT)
Dept: INFUSION THERAPY | Age: 60
Discharge: HOME | End: 2020-09-22
Payer: COMMERCIAL

## 2020-09-22 ENCOUNTER — TELEPHONE (OUTPATIENT)
Dept: ONCOLOGY | Age: 60
End: 2020-09-22

## 2020-09-22 ENCOUNTER — TELEPHONE (OUTPATIENT)
Dept: PALLATIVE CARE | Age: 60
End: 2020-09-22

## 2020-09-22 ENCOUNTER — OFFICE VISIT (OUTPATIENT)
Dept: ONCOLOGY | Age: 60
End: 2020-09-22
Payer: COMMERCIAL

## 2020-09-22 VITALS
WEIGHT: 246.1 LBS | TEMPERATURE: 98.8 F | DIASTOLIC BLOOD PRESSURE: 81 MMHG | HEART RATE: 83 BPM | BODY MASS INDEX: 40.95 KG/M2 | RESPIRATION RATE: 16 BRPM | SYSTOLIC BLOOD PRESSURE: 171 MMHG

## 2020-09-22 DIAGNOSIS — C25.0 PRIMARY ADENOCARCINOMA OF HEAD OF PANCREAS (HCC): Primary | ICD-10-CM

## 2020-09-22 LAB
ABSOLUTE EOS #: 0.3 K/UL (ref 0–0.4)
ABSOLUTE IMMATURE GRANULOCYTE: ABNORMAL K/UL (ref 0–0.3)
ABSOLUTE LYMPH #: 0.9 K/UL (ref 1–4.8)
ABSOLUTE MONO #: 0.4 K/UL (ref 0.1–1.2)
ALBUMIN SERPL-MCNC: 3 G/DL (ref 3.5–5.2)
ALBUMIN/GLOBULIN RATIO: 1.1 (ref 1–2.5)
ALP BLD-CCNC: 152 U/L (ref 35–104)
ALT SERPL-CCNC: 30 U/L (ref 5–33)
ANION GAP SERPL CALCULATED.3IONS-SCNC: 12 MMOL/L (ref 9–17)
AST SERPL-CCNC: 31 U/L
BASOPHILS # BLD: 0 % (ref 0–2)
BASOPHILS ABSOLUTE: 0 K/UL (ref 0–0.2)
BILIRUB SERPL-MCNC: 0.42 MG/DL (ref 0.3–1.2)
BUN BLDV-MCNC: 11 MG/DL (ref 6–20)
BUN/CREAT BLD: ABNORMAL (ref 9–20)
CA 19-9: 13 U/ML (ref 0–35)
CALCIUM SERPL-MCNC: 9.2 MG/DL (ref 8.6–10.4)
CHLORIDE BLD-SCNC: 100 MMOL/L (ref 98–107)
CO2: 21 MMOL/L (ref 20–31)
CREAT SERPL-MCNC: 0.58 MG/DL (ref 0.5–0.9)
DIFFERENTIAL TYPE: ABNORMAL
EOSINOPHILS RELATIVE PERCENT: 4 % (ref 1–4)
GFR AFRICAN AMERICAN: >60 ML/MIN
GFR NON-AFRICAN AMERICAN: >60 ML/MIN
GFR SERPL CREATININE-BSD FRML MDRD: ABNORMAL ML/MIN/{1.73_M2}
GFR SERPL CREATININE-BSD FRML MDRD: ABNORMAL ML/MIN/{1.73_M2}
GLUCOSE BLD-MCNC: 319 MG/DL (ref 70–99)
HCT VFR BLD CALC: 32.4 % (ref 36–46)
HEMOGLOBIN: 10.5 G/DL (ref 12–16)
IMMATURE GRANULOCYTES: ABNORMAL %
LYMPHOCYTES # BLD: 12 % (ref 24–44)
MCH RBC QN AUTO: 27.2 PG (ref 26–34)
MCHC RBC AUTO-ENTMCNC: 32.3 G/DL (ref 31–37)
MCV RBC AUTO: 84.3 FL (ref 80–100)
MONOCYTES # BLD: 6 % (ref 2–11)
NRBC AUTOMATED: ABNORMAL PER 100 WBC
PDW BLD-RTO: 19.7 % (ref 12.5–15.4)
PLATELET # BLD: 206 K/UL (ref 140–450)
PLATELET ESTIMATE: ABNORMAL
PMV BLD AUTO: 7.6 FL (ref 6–12)
POTASSIUM SERPL-SCNC: 3.4 MMOL/L (ref 3.7–5.3)
RBC # BLD: 3.84 M/UL (ref 4–5.2)
RBC # BLD: ABNORMAL 10*6/UL
SEG NEUTROPHILS: 78 % (ref 36–66)
SEGMENTED NEUTROPHILS ABSOLUTE COUNT: 5.7 K/UL (ref 1.8–7.7)
SODIUM BLD-SCNC: 133 MMOL/L (ref 135–144)
TOTAL PROTEIN: 5.7 G/DL (ref 6.4–8.3)
WBC # BLD: 7.3 K/UL (ref 3.5–11)
WBC # BLD: ABNORMAL 10*3/UL

## 2020-09-22 PROCEDURE — 6360000002 HC RX W HCPCS: Performed by: INTERNAL MEDICINE

## 2020-09-22 PROCEDURE — 80053 COMPREHEN METABOLIC PANEL: CPT

## 2020-09-22 PROCEDURE — 86301 IMMUNOASSAY TUMOR CA 19-9: CPT

## 2020-09-22 PROCEDURE — 99215 OFFICE O/P EST HI 40 MIN: CPT | Performed by: INTERNAL MEDICINE

## 2020-09-22 PROCEDURE — 99211 OFF/OP EST MAY X REQ PHY/QHP: CPT | Performed by: INTERNAL MEDICINE

## 2020-09-22 PROCEDURE — 2580000003 HC RX 258: Performed by: INTERNAL MEDICINE

## 2020-09-22 PROCEDURE — 85025 COMPLETE CBC W/AUTO DIFF WBC: CPT

## 2020-09-22 RX ORDER — SODIUM CHLORIDE 0.9 % (FLUSH) 0.9 %
20 SYRINGE (ML) INJECTION PRN
Status: CANCELLED | OUTPATIENT
Start: 2020-09-22

## 2020-09-22 RX ORDER — SODIUM CHLORIDE 0.9 % (FLUSH) 0.9 %
10 SYRINGE (ML) INJECTION PRN
Status: DISCONTINUED | OUTPATIENT
Start: 2020-09-22 | End: 2020-09-23 | Stop reason: HOSPADM

## 2020-09-22 RX ORDER — SODIUM CHLORIDE 0.9 % (FLUSH) 0.9 %
10 SYRINGE (ML) INJECTION PRN
Status: CANCELLED | OUTPATIENT
Start: 2020-09-22

## 2020-09-22 RX ORDER — HEPARIN SODIUM (PORCINE) LOCK FLUSH IV SOLN 100 UNIT/ML 100 UNIT/ML
500 SOLUTION INTRAVENOUS PRN
Status: CANCELLED | OUTPATIENT
Start: 2020-09-22

## 2020-09-22 RX ORDER — HEPARIN SODIUM (PORCINE) LOCK FLUSH IV SOLN 100 UNIT/ML 100 UNIT/ML
500 SOLUTION INTRAVENOUS PRN
Status: DISCONTINUED | OUTPATIENT
Start: 2020-09-22 | End: 2020-09-23 | Stop reason: HOSPADM

## 2020-09-22 RX ADMIN — HEPARIN 500 UNITS: 100 SYRINGE at 10:12

## 2020-09-22 RX ADMIN — Medication 10 ML: at 10:10

## 2020-09-22 NOTE — TELEPHONE ENCOUNTER
Appt made for [facility] on 9/30 at 11:30. Pt agreeable to come.     [facility] [address] [place] [address]

## 2020-09-22 NOTE — TELEPHONE ENCOUNTER
AVS from 9/22/20     Referral to palliative care  RTC 3 weeks    mikala rai r.n notified for the palliative care referral  Per dr Villalpando Hence cancel this chemo if pt decides to get anymore chemo he will need to put in a new order for a different regimen - samara simran notifed  rv scheduled for 10/13/20 @ 9:15am    Pt was given AVS and appt schedule

## 2020-09-22 NOTE — PROGRESS NOTES
Today's Date: 2020  Patient Name: Vikki Humphries  Patient's age: 64 y. o., 1960    DIAGNOSIS: pancreatic cancer, clinical  T2N1MO stage IIB, encasement of portal vein, unresectable  Started on dose reduced FOLFIRINOX on 2020  Her chemotherapy is currently on hold as per patient's wishes    CHIEF COMPLAINT:    Chief Complaint   Patient presents with    Follow-up     doesn't feel well, wants to quit treatment, would like Palliative Care      Abdominal Pain     HISTORY OF PRESENT ILLNESS:    This is a 27-year-old female who was admitted with diabetic ketoacidosis, lower back pain. She was noted to have elevated troponin, elevated liver enzymes. Patient was seen by cardiology and had cardiac cath which showed nonobstructive CAD and medical management recommended. She had ultrasound of the liver which showed dilated pancreatic duct and intrahepatic ductal dilatation. This was followed by MRCP which showed pancreatic mass and she underwent ERCP and EUS on 20 with cholangiogram, Biliary sphincterotomy and  Placement of 10 F x 7 cm plastic biliary stent  The pancreatic head biopsy showed adenocarcinoma. Oncology consulted for further recommendations. INTERVAL HISTORY:  Patient is returning for follow up visit and to discuss further recommendations. She does not want to continue chemotherapy. She has been evaluated by radiation oncology and discuss side effects of radiation. I discussed the option of radiation with Xeloda. Patient is going to [place] for vacation with family and will decide when she comes back. During this visit patient's allergy, social, medical, surgical history and medications were reviewed and updated. Past Medical History:   has a past medical history of Anxiety, Diabetes mellitus ([address] Hyperlipidemia, Hypertension, and Pancreatic cancer ([facility] [address]     Past Surgical History:   has a past surgical history that includes  section; Gallbladder surgery; ERCP (06/04/2020); Upper gastrointestinal endoscopy (06/04/2020); Upper gastrointestinal endoscopy (6/4/2020); ERCP (6/4/2020); ERCP (6/4/2020); ERCP (07/22/2020); ERCP (7/22/2020); ERCP (7/22/2020); and ERCP (7/22/2020). Medications:    Prior to Admission medications    Medication Sig Start Date End Date Taking? Authorizing Provider   lipase-protease-amylase (CREON) 12279 units CPEP delayed release capsule Take two 36,000unit capsules with meals and one- 36,000unit capsule with snacks. 8/18/20  Yes Phuong Gallego MD   lidocaine-prilocaine (EMLA) 2.5-2.5 % cream Apply topically as needed. 7/6/20  Yes Phuong Gallego MD   ondansetron (ZOFRAN-ODT) 8 MG TBDP disintegrating tablet Place 1 tablet under the tongue every 8 hours as needed for Nausea or Vomiting 7/6/20  Yes Phuong Gallego MD   nitroGLYCERIN (NITROSTAT) 0.4 MG SL tablet up to max of 3 total doses.  If no relief after 1 dose, call 911. 6/6/20  Yes Jermaine Cabrera MD   insulin glargine (LANTUS) 100 UNIT/ML injection vial Inject 50 Units into the skin nightly 6/6/20  Yes Jermaine Cabrera MD   insulin glargine (LANTUS) 100 UNIT/ML injection vial Inject 60 Units into the skin daily 6/7/20  Yes Jermaine Cabrera MD   hydrALAZINE (APRESOLINE) 25 MG tablet Take 1 tablet by mouth every 8 hours 6/6/20  Yes Jermaine Cabrera MD   carvedilol (COREG) 12.5 MG tablet Take 1 tablet by mouth 2 times daily (with meals) 6/6/20  Yes Jermaine Cabrera MD   amLODIPine (NORVASC) 10 MG tablet Take 1 tablet by mouth daily 6/7/20  Yes Jermaine Cabrera MD   benzocaine-menthol (CEPACOL SORE THROAT) 15-3.6 MG lozenge Take 1 lozenge by mouth every 2 hours as needed for Sore Throat 6/6/20  Yes Jermaine Cabrera MD   pantoprazole (PROTONIX) 40 MG tablet Take 1 tablet by mouth every morning (before breakfast) 6/6/20  Yes Jermaine Cabrera MD   aspirin 81 MG chewable tablet Take 81 mg by mouth nightly   Yes Historical Provider, MD   insulin aspart (NOVOLOG FLEXPEN) 100 UNIT/ML injection pen Inject tablet Take 81 mg by mouth nightly      insulin aspart (NOVOLOG FLEXPEN) 100 UNIT/ML injection pen Inject into the skin      Lisdexamfetamine Dimesylate 60 MG CAPS Take 60 mg by mouth every morning.  sertraline (ZOLOFT) 100 MG tablet take 1 1/2 tablet by oral route  every day      atorvastatin (LIPITOR) 40 MG tablet nightly       LORazepam (ATIVAN) 0.5 MG tablet 1 mg nightly.  furosemide (LASIX) 20 MG tablet Take 1 tablet by mouth daily (Patient taking differently: Take 20 mg by mouth daily As needed) 30 tablet 1     No current facility-administered medications for this visit. Allergies:  Lisinopril; Sulfamethoxazole; Trimethoprim; Cefuroxime axetil; Sulfamethoxazole-trimethoprim; Clindamycin phosphate; and Penicillins    Social History:   reports that she quit smoking about 15 years ago. She has never used smokeless tobacco. She reports previous alcohol use. She reports that she does not use drugs. Family History: family history includes Cancer in her maternal grandfather, maternal grandmother, and mother; Heart Failure in her father; Hypertension in her father and mother. REVIEW OF SYSTEMS:    Constitutional: No fever or chills. No night sweats, no weight loss   Eyes: No eye discharge, double vision, or eye pain   HEENT: negative for sore mouth, sore throat, hoarseness and voice change   Respiratory: negative for cough , sputum, dyspnea, wheezing, hemoptysis, chest pain   Cardiovascular: negative for chest pain, dyspnea, palpitations, orthopnea, PND   Gastrointestinal: negative for nausea, vomiting, diarrhea, constipation, abdominal pain, Dysphagia, hematemesis and hematochezia   Genitourinary: negative for frequency, dysuria, nocturia, urinary incontinence, and hematuria   Integument: negative for rash, skin lesions, bruises.    Hematologic/Lymphatic: negative for easy bruising, bleeding, lymphadenopathy, or petechiae   Endocrine: negative for heat or cold intolerance,weight changes, DIFFERENTIATED. IMAGING DATA:  MRI abd 6/4/20  FINDINGS:   Exam degraded by motion artifact.       ABDOMEN:       The visualized lung bases reveal no signal abnormality.       The liver is normal in signal intensity and contour.  No focal liver lesion   identified.       Irregular mass in the head of the pancreas is present resulting in biliary   and pancreatic duct obstruction.  This measures 3.2 x 2.3 cm and is best   visualized on T2 imaging.  This mass extends towards the liver hilum. Elesa Pound   is close approximation with the main portal vein, which remains patent.    There is close abutment with the hepatic artery.  No inflammatory change   identified involving the pancreas.  Relative atrophy of the body and tail is   noted.       The spleen, adrenals and kidneys reveal no significant findings.  Right renal   cyst.       The visualized bowel is normal in caliber.       No ascites.  Other than periportal lymph nodes measuring up to 12 mm, no   retroperitoneal or mesenteric lymphadenopathy is otherwise appreciated.       Incidental small intramural fibroid is noted in the right body of the uterus.       No signal abnormality identified in the visualized osseous structures.       MRCP:       Gallbladder: Surgically absent.  Small amount of fluid signal within the   gallbladder fossa, likely postoperative.       Bile Ducts: Intrahepatic and extrahepatic biliary dilatation with abrupt   cutoff at the mid common duct level.  Upstream dilatation measures 16 mm at   the common duct level, 8 mm at the left hepatic duct level and 7 mm in the   right hepatic duct.       Pancreatic Duct: Dilatation with abrupt cutoff at the site of pancreatic head   mass.  Upstream dilatation of 8 mm is noted.           Impression   1.  3.2 cm soft tissue mass in the pancreatic head resulting in pancreatic   duct and biliary dilatation, compatible with primary neoplasm.  There is   abutment of the portal vein and soft tissue not to continue chemotherapy. 3. She does not want surgery as well. She understands the risk and benefits and wants to focus on quality of life. 4. We did refer her to radiation oncology for consideration of chemoradiation. Her recent scan showed stable pancreatic mass and slight increase in the lymph node in the carmel hepatis. Patient is worried about side effect. She is planning to go to [place] and wants to think about treatment when she comes back. 5. Will make appointment when she is back from [place] and discuss option of chemoradiation with her versus best supportive care   6. We will refer her to palliative care for symptom management. 7. Patient is agreeable  8. RTC in 3 weeks      Ilya Benitez MD  Hematologist/Medical Oncologist    I spent more than 40 minutes examining, evaluating, reviewing data and counseling the patient. Greater than 50% of that time was spent face-to-face with the patient in counseling and coordinating her care.

## 2020-09-23 ENCOUNTER — TELEPHONE (OUTPATIENT)
Dept: ONCOLOGY | Age: 60
End: 2020-09-23

## 2020-09-23 NOTE — TELEPHONE ENCOUNTER
Name: Marilynn Shafer  : 1960  MRN: [id number]    Oncology Navigation Follow-Up Note    Contact Type:  Telephone  Notes: Spoke with pt for f/u call. Pt stated unsure if will continue with tx. Pt stated [address] [facility] referral placed & scheduled for consultation 2020. Pt stated planned vacation to [place] with family leaving 10/1/2020. Pt stated Dr. Aj Helms f/u scheduled 10/13/2020 to discuss tx decision. Pt stated friend, Hannah Hood call into doctor Eli\" r/t \"questions I didn't ask\". Discussed [employer] resources including educational resources & support resources. Pt requested [employer] brochure sent via mail. Pt expressed gratitude for writer's call. Instructed pt may contact writer prn. Will continue to follow.     Electronically signed by Verenice Larry RN on 2020 at 11:18 AM

## 2020-09-24 ENCOUNTER — TELEPHONE (OUTPATIENT)
Dept: ONCOLOGY | Age: 60
End: 2020-09-24

## 2020-09-24 NOTE — TELEPHONE ENCOUNTER
Call received from [address] , patient's medical POA, requesting Dr Anaya Burks to call her to discuss patient's plan of care and information Dr Anaya Burks discussed with patient at [facility] on 9/22. [address] [facility] requested Dr Anaya Burks to call her at [phone]. Writer sent Dr Anaya Burks a message informing him of above.  Anderson Spore

## 2020-09-25 ENCOUNTER — TELEPHONE (OUTPATIENT)
Dept: ONCOLOGY | Age: 60
End: 2020-09-25

## 2020-09-25 NOTE — TELEPHONE ENCOUNTER
Name: Nimesh Donnelly  : 1960  MRN: [id number]    Oncology Navigation Follow-Up Note    Contact Type:  Telephone  Notes: Pt's Consuelo Cabrales, called in stating called [facility] triage twice this week requesting to speak with Dr. Freddy Fitzgerald. Riya Campos stated pt extremely anxious & requesting Riya Campos speak with Dr. Freddy Fitzgerald r/t prognosis. Upon review of chart message sent via Epic to Dr. Freddy Fitzgerald requesting contact Riya Campos. Notified Khoi Schulte will contact Dr. Freddy Fitzgerald to ensure received message. Ines verbalized understanding & thanked Raghav Serrano. Spoke with Dr. Freddy Fitzgerald, updated on conversation Coco Merchant. Dr. Freddy Fitzgerald stated currently out of office & most likely will contact Ines later tonight. Will continue to follow.     Electronically signed by Governor Robert RN on 2020 at 2:45 PM

## 2020-09-30 ENCOUNTER — INITIAL CONSULT (OUTPATIENT)
Dept: PALLATIVE CARE | Age: 60
End: 2020-09-30
Payer: COMMERCIAL

## 2020-09-30 VITALS
TEMPERATURE: 98.4 F | HEART RATE: 85 BPM | BODY MASS INDEX: 41.32 KG/M2 | HEIGHT: 65 IN | DIASTOLIC BLOOD PRESSURE: 64 MMHG | WEIGHT: 248 LBS | SYSTOLIC BLOOD PRESSURE: 168 MMHG

## 2020-09-30 PROCEDURE — 99203 OFFICE O/P NEW LOW 30 MIN: CPT | Performed by: NURSE PRACTITIONER

## 2020-09-30 NOTE — PROGRESS NOTES
Palliative Care Consultation Note    Patient: Anitha Marcum  1960    Referring physician: Dr. Kenneth Du  Consulting nurse practitioner: Wilma Cam:   Assist in symptom and pain control   Goals of care evaluation  Distress management  Facilitate communications  Non-pain symptoms:  Recommendations for the above    HISTORY OF PRESENT ILLNESS:   Anitha Marcum is a 64 y.o. female with a history of  Pancreatic cancer and follows with Ty Kline . Patient had went to Er due to back pain and was found to be in DKA. She had MRI of abdomen that revealed irregular mass in head of pancreas resulting in biliary & pancreatic duct obstruction. There is abutment of the portal vein & soft tissue extension/lymph nodes in liver hilum. Patient had underwent ERCP & EUS on 6/4/20 with cholangigram, biliary sphincterectomy, & placement of biliary stent. She was Dx with Adenocarcinoma of the pancreas, moderately differentiated. CT of chest did not reveal any mets. She had seen Dr. Peter Roberson and had diagnostic laparoscopic exam, it was noted that cancer is wrapped around portal vein so borderline unresectable. Patient had decided not to move forward with surgery. Patient was hospitalized in July d/t Cholangitis d/t clogged CBD stent. ERCP completed and metal stent placed. Patient see's Dr. Kenneth Du and was started on Folfirinox and after first cycle irinotecan was DC d/t significant fatigue and N/V. I She continued with FolFox. Recent MRI revealed slight increase in lymph node in carmel hepatis. Patient was unsure if she wanted to continue with chemotherapy. Chemoradiation discussed with patient. Patient asked for palliative care consult. Palliative Care was consulted to help manage symptoms, facilitate communications and establish goals of care. Latest labs include; 9/22- CA-19-9 13, Alk phos 152, Albumin 3.0, and Hgb 10.5. Today, Ms. Mita Weber is complaining of abdominal pain and nausea. History of Substance Abuse   Alcohol [3] Female 1 Male 3  Illegal Drugs [ ] Female 2 Male 3  Prescription Drugs [ ] Female 4 Male 4     2. Personal History of Substance Abuse   Alcohol [ ] Female 3 Male 3  Illegal Drugs [ ] Female 4 Male 4   Prescription Drugs [ ] Female 5 Male 5    3. Age Margette Roup box if 12- 39years old) [ ] Female 1 Male 1    4. History of Preadolescent Sexual Abuse [ ] Female 3 Male 0    5. Psychological Disease   Attention Deficit Disorder, Obsessive Compulsive Disorder, Bipolar, or Schizophrenia [ ] Female 2 Male 2    Depression [ ] Female 1 Male 1    TOTAL Ines.Brome ]    Total Score Risk Category: Low Risk 0 - 3 Moderate Risk 4 - 7 High Risk > 8    OARRS: Controlled Substances Monitoring: reviewed    No flowsheet data found. has a past medical history of Anxiety, Diabetes mellitus ([facility] [address] Hyperlipidemia, Hypertension, and Pancreatic cancer ([facility] [address] has a past surgical history that includes  section; Gallbladder surgery; ERCP (2020); Upper gastrointestinal endoscopy (2020); Upper gastrointestinal endoscopy (2020); ERCP (2020); ERCP (2020); ERCP (2020); ERCP (2020); ERCP (2020); and ERCP (2020). family history includes Cancer in her maternal grandfather, maternal grandmother, and mother; Heart Failure in her father; Hypertension in her father and mother.   Allergies   Allergen Reactions    Lisinopril Swelling     Other reaction(s): swollen lips  In lips      Sulfamethoxazole      Other reaction(s): lip swelling  Other reaction(s): lip swelling      Trimethoprim      Other reaction(s): lip swelling    Cefuroxime Axetil Rash     Other reaction(s): swollen lips  Swollen lips    Sulfamethoxazole-Trimethoprim      Other reaction(s): swollen lips  Other reaction(s): swollen lips      Clindamycin Phosphate Rash     Other reaction(s): rash    Penicillins Rash     Other reaction(s): rash       Medications   has a current medication list which includes the following prescription(s): lipase-protease-amylase, lidocaine-prilocaine, ondansetron, furosemide, nitroglycerin, insulin glargine, insulin glargine, hydralazine, carvedilol, amlodipine, benzocaine-menthol, pantoprazole, aspirin, novolog flexpen, lisdexamfetamine dimesylate, sertraline, atorvastatin, and lorazepam.    Personal, Social, and Family History  Marital Status:   Living situation: with family:  spouse and daughter  Importance of leonidas/Sabianism/spiritual beliefs: Somewhat  Distress: moderate  Does patient understand diagnosis/treatment? no  Does caregiver understand diagnosis/treatment? no    Substance Abuse History:  Social History     Substance and Sexual Activity   Alcohol Use Not Currently     Social History     Substance and Sexual Activity   Drug Use No       Available records including labs and imaging results werereviewed. Recent and past history, med list, family history, social history and review of systems were personally reviewed and updated in EHR.        ADVANCED CARE PLANNING:  Code status: DNRCC-A- reports to have legal form  Patient has capacity for medical decisions: yes  Health Care Power of : yes- friend Kirill Hercules that is a RN  Living Will: yes  Family aware of advance directives: yes   Family agrees with advance directives: yes    REVIEW OF SYSTEMS  Constitutional: no fever, no chills +weight loss/fatigue  Eyes: no eye pain or blurred vision  ENT: no hearing loss, congestion, or difficulty swallowing   Respiratory: no wheezing, chest tightness, +mild shortness of breath with activity   Cardiovascular: no chest pain or pressure, no diaphoresis +palpitations- \"can last for several minutes and occurring almost daily\"  Gastrointestinal: no vomiting,abdominal pain, or constipation, no melena +nausea/diarrhea/abdominal pain- \"pulling sensation\"  Genitourinary: no dysuria, frequency,hematuria , or nocturia   Musculoskeletal: no myalgias or arthralgias, no back pain +weakness- sometimes has trouble getting up. Skin: no rashes or sores   Neurological: no focal weakness, numbness, tingling, or headache, no seizures      PHYSICAL ASSESSMENT:  Constitutional: Alert and oriented to person, place, and time. Head: Normocephalic and atraumatic. Eyes: EOM are normal. Pupils are equal, round   Neck: Normal range of motion. Neck supple. No tracheal deviation present. Cardiovascular: Normal rate and regular rhythm, S1, S2, +murmur   Pulmonary/Chest: Effort normal and breath sounds normal. No rales or wheezes. Abdomen: Soft. No tenderness, not distended, no ascites, no organomegaly   Musculoskeletal: Normal range of motion. No edema lower ext. Neurological: CN II-XII grossly intact, no focal neurological deficits   Skin: Normal turgor, no bleeding, no bruising     Wt Readings from Last 3 Encounters:   09/22/20 246 lb 1.6 oz (111.6 kg)   09/08/20 253 lb 4.8 oz (114.9 kg)   09/08/20 254 lb (115.2 kg)       There were no vitals taken for this visit. See nursing notes- VS reviewed. Impression and Plan:  Patient came to appointment with good friend. She reports going to [facility] [place] on Saturday to visit friends, and hopes she feels well. Patient is not using PRN medication effectively to know if they are effective or not. She will continue with present prescription as needed/prescribed and contact us if ineffective. We will see her in 2 weeks to re-evaluate. She will schedule f/u appointment with family doctor for palpations and Dr. Beatrice Kirkland for clarification of cancer treatment options/goals/prognosis. She would like to continue with treatment to see if she can tolerate with palliative care assisting with symptom management. PalliativePerformance Scale:  x___60%    Ambulation reduced; Significant disease; Can't do hobbies/housework; intake normal or reduced; occasional assist; LOC full/confusion  ___50%    Mainly sit/lie;  Extensive disease; Can't do any work; Considerable assist; intake normal or reduced; LOC full/confusion  ___40%    Mainly in bed; Extensive disease; Mainly assist; intake normal or reduced; occasional assist; LOC full/confusion  ___30%    Bed Bound; Extensive disease; Total care; intake reduced; LOC full/confusion  ___20%    Bed Bound; Extensive disease; Total care; intake minimal; Drowsy/coma  ___10%    Bed Bound; Extensive disease;  Total care; Mouth care only; Drowsy/coma  ___0               Death    Patient Active Problem List   Diagnosis    Uncontrolled diabetes mellitus ([facility] [address]    Hypertension    Postmenopausal bleeding    Thickened endometrium    Type 2 diabetes mellitus with hyperglycemia, with long-term current use of insulin (HCC)    Hypophosphatemia    Hypomagnesemia    Hyperglycemia due to type 2 diabetes mellitus ([address]    Pancreatic Head Mass    Non-Obstructive CAD    Primary adenocarcinoma of head of pancreas (HCC)    Intractable nausea and vomiting    Class 3 severe obesity with serious comorbidity in adult ([address]    Amnesia    Anxiety    Benign neoplasm of choroid    Depression    Diabetic complication (HCC)    Diabetic peripheral neuropathy associated with type 2 diabetes mellitus (HCC)    Hepatomegaly    Methicillin resistant Staphylococcus aureus infection    Long term current use of insulin (HCC)    Hyperlipidemia    Moderate nonproliferative diabetic retinopathy associated with type 2 diabetes mellitus (HCC)    Morbid obesity (HCC)    Pain in limb    Pancreatic adenocarcinoma (HCC)    Pancreatic malignancy syndrome ([address]    Peripheral venous insufficiency    Postprocedural state    Primary localized osteoarthrosis of ankle and foot    Urinary tract infectious disease    Transaminitis    Intractable vomiting    Septicemia due to E. coli (HCC)    Acute obstructive cholangitis    TORREY (acute kidney injury) ([address]    Dehydration with hyponatremia    NSVT (nonsustained ventricular tachycardia) ([facility] [address]        1) Diagnosis  Nausea  Abdominal pain  Pancreatic Cancer  Pain management        2)   Routine meeting  Discuss goals of care  Treatment options/plans  Provide clinical updates and answer questions  Share information and provider education for the family  Listen to patient/family concerns  Assess family understanding, concerns, and coping  Interdiscplinary collaboration  Address patient/family distress  Build trust  Elicit patient's goals and values, and use these to establish or modify goals of care  Conflict, or concerns about conflict over goals of care  Patient would like to continue treatment with Dr. Kenneth Du with palliative care assisting with symptom management. Patient reports needing further clarification in regards to cancer staging and prognosis. We discussed patients cancer, control treatment goal, and cancer treatment options. She was advised to f/u with Dr. Kenneth Du to explain treatment options and goals/prognosis. We reviewed last MRI results and she is not clear on staging. I spoke with Dr. Kenneth Du in regards to this and he has explained this to her in the past. He was informed that she would like to discuss again, and was advised to bring family/friend to assist with questions. We discussed palliative care vs hospice care. Patient reports QOL being important to her and is willing to try treatment again if symptoms are better controlled. We will try to assist with symptom management. I discussed goals for short acting pain medications and effective way to take them. We will re-evaluate in 2 weeks to see if any improvement and make adjustments as necessary. 3- Code Status DNRCC-Arrest      4- Other recommendations  -Pain contract signed and script given for drug screen.   -Start taking Oxycodone as prescribed PRN and not wait for severe pain to occur. Discussed needing to stay ahead of the pain so she has better pain control.  Patient has only used approximately 4 tabs of Oxycodone 5mg IR prescribed to her on 7/28. - Patient can continue to use Imodium PRN since effective  - Monitor nausea and use Zofran as prescribed. She is to call office if not effective or runs out. (It's unclear of how many she has left)   - F/u with Dr. Citlalli Madrid and bring friend/family member to appointment to assist with better understanding of cancer staging, treatment options/goals, and prognosis. -F/u with family physician in regards to palpitations over last month. 5/31/20 heart cath nonobstructive CAD- medical therapy. -F/u with palliative care in 2 weeks for review of symptoms and management.        Electronically signed by   EMERSON Polk CNP  on 9/30/2020 at 12:00 PM    [facility] Physician  Danielle Alejandre, [address]

## 2020-10-13 ENCOUNTER — OFFICE VISIT (OUTPATIENT)
Dept: ONCOLOGY | Age: 60
End: 2020-10-13
Payer: COMMERCIAL

## 2020-10-13 ENCOUNTER — TELEPHONE (OUTPATIENT)
Dept: ONCOLOGY | Age: 60
End: 2020-10-13

## 2020-10-13 VITALS
TEMPERATURE: 98.3 F | HEART RATE: 73 BPM | RESPIRATION RATE: 18 BRPM | DIASTOLIC BLOOD PRESSURE: 78 MMHG | WEIGHT: 245.1 LBS | BODY MASS INDEX: 40.79 KG/M2 | SYSTOLIC BLOOD PRESSURE: 145 MMHG

## 2020-10-13 PROCEDURE — 99211 OFF/OP EST MAY X REQ PHY/QHP: CPT | Performed by: INTERNAL MEDICINE

## 2020-10-13 PROCEDURE — 99215 OFFICE O/P EST HI 40 MIN: CPT | Performed by: INTERNAL MEDICINE

## 2020-10-13 RX ORDER — CAPECITABINE 500 MG/1
TABLET, FILM COATED ORAL
Qty: 180 TABLET | Refills: 0 | Status: SHIPPED | OUTPATIENT
Start: 2020-10-13 | End: 2020-11-24

## 2020-10-13 RX ORDER — CAPECITABINE 150 MG/1
TABLET, FILM COATED ORAL
Qty: 60 TABLET | Refills: 0 | Status: SHIPPED | OUTPATIENT
Start: 2020-10-13 | End: 2020-10-16 | Stop reason: SDUPTHER

## 2020-10-13 NOTE — TELEPHONE ENCOUNTER
AVS from 10/13./20     Plan to start Xeloda based chemoRT (patient is now okay for chemoRT)   please coordinate with Rad onc'  Xeloda script  RTc 3 weeks    *xeloda script given to triage  *Katrina Oquendo in ro was notified and she will call pt with a follow up and sim  rv scheduled for 11/3/20 @ 9:30am    Pt was given AVS and appt schedule

## 2020-10-13 NOTE — PATIENT INSTRUCTIONS
Plan to start Xeloda based chemoRT (patient is now okay for chemoRT)   please coordinate with Rad onc'  Xeloda script  RTc 3 weeks

## 2020-10-13 NOTE — PROGRESS NOTES
Today's Date: 10/13/2020  Patient Name: Nicholas Sunshine  Patient's age: 64 y. o., 1960    DIAGNOSIS: pancreatic cancer, clinical  T2N1MO stage IIB, encasement of portal vein, unresectable  Started on dose reduced FOLFIRINOX on 2020  Her chemotherapy is currently on hold as per patient's wishes  Plan to start [facility] chemoradiation as per patient wishes    CHIEF COMPLAINT:    Chief Complaint   Patient presents with    Follow-up     review status of disease    Nausea    Diarrhea    Fatigue    Anorexia     HISTORY OF PRESENT ILLNESS:    This is a 61-year-old female who was admitted with diabetic ketoacidosis, lower back pain. She was noted to have elevated troponin, elevated liver enzymes. Patient was seen by cardiology and had cardiac cath which showed nonobstructive CAD and medical management recommended. She had ultrasound of the liver which showed dilated pancreatic duct and intrahepatic ductal dilatation. This was followed by MRCP which showed pancreatic mass and she underwent ERCP and EUS on 20 with cholangiogram, Biliary sphincterotomy and  Placement of 10 F x 7 cm plastic biliary stent  The pancreatic head biopsy showed adenocarcinoma. Oncology consulted for further recommendations. INTERVAL HISTORY:  Patient is returning for follow up visit and to discuss further recommendations. She does not want to continue chemotherapy. I had discussed option of Xeloda based chemoradiation. I reviewed the risk benefits and side effects with patient. She is agreeable. During this visit patient's allergy, social, medical, surgical history and medications were reviewed and updated. Past Medical History:   has a past medical history of Anxiety, Diabetes mellitus ([address] Hyperlipidemia, Hypertension, and Pancreatic cancer ([facility] [address] Past Surgical History:   has a past surgical history that includes  section; Gallbladder surgery; ERCP (2020);  Upper gastrointestinal endoscopy (06/04/2020); Upper gastrointestinal endoscopy (6/4/2020); ERCP (6/4/2020); ERCP (6/4/2020); ERCP (07/22/2020); ERCP (7/22/2020); ERCP (7/22/2020); and ERCP (7/22/2020). Medications:    Prior to Admission medications    Medication Sig Start Date End Date Taking? Authorizing Provider   lipase-protease-amylase (CREON) 42371 units CPEP delayed release capsule Take two 36,000unit capsules with meals and one- 36,000unit capsule with snacks. 8/18/20  Yes Joann Henderson MD   lidocaine-prilocaine (EMLA) 2.5-2.5 % cream Apply topically as needed. 7/6/20  Yes Joann Henderson MD   ondansetron (ZOFRAN-ODT) 8 MG TBDP disintegrating tablet Place 1 tablet under the tongue every 8 hours as needed for Nausea or Vomiting 7/6/20  Yes Joann Henderson MD   furosemide (LASIX) 20 MG tablet Take 1 tablet by mouth daily  Patient taking differently: Take 20 mg by mouth daily As needed 6/16/20 10/13/20 Yes Ilya Benitez MD   nitroGLYCERIN (NITROSTAT) 0.4 MG SL tablet up to max of 3 total doses.  If no relief after 1 dose, call 911. 6/6/20  Yes Esther Izaguirre MD   insulin glargine (LANTUS) 100 UNIT/ML injection vial Inject 50 Units into the skin nightly 6/6/20  Yes Esther Izaguirre MD   insulin glargine (LANTUS) 100 UNIT/ML injection vial Inject 60 Units into the skin daily 6/7/20  Yes Esther Izaguirre MD   hydrALAZINE (APRESOLINE) 25 MG tablet Take 1 tablet by mouth every 8 hours 6/6/20  Yes Esther Izaguirre MD   carvedilol (COREG) 12.5 MG tablet Take 1 tablet by mouth 2 times daily (with meals) 6/6/20  Yes Esther Izaguirre MD   amLODIPine (NORVASC) 10 MG tablet Take 1 tablet by mouth daily 6/7/20  Yes Esther Izaguirre MD   aspirin 81 MG chewable tablet Take 81 mg by mouth nightly   Yes Katrina Walker MD   insulin aspart (NOVOLOG FLEXPEN) 100 UNIT/ML injection pen Inject into the skin   Yes Historical Provider, MD   sertraline (ZOLOFT) 100 MG tablet take 1 1/2 tablet by oral route  every day   Yes Historical Provider, MD atorvastatin (LIPITOR) 40 MG tablet nightly  10/12/13  Yes Historical Provider, MD   LORazepam (ATIVAN) 0.5 MG tablet 1 mg nightly. 10/12/13  Yes Historical Provider, MD     Current Outpatient Medications   Medication Sig Dispense Refill    lipase-protease-amylase (CREON) 58702 units CPEP delayed release capsule Take two 36,000unit capsules with meals and one- 36,000unit capsule with snacks. 180 capsule 2    lidocaine-prilocaine (EMLA) 2.5-2.5 % cream Apply topically as needed. 1 Tube 2    ondansetron (ZOFRAN-ODT) 8 MG TBDP disintegrating tablet Place 1 tablet under the tongue every 8 hours as needed for Nausea or Vomiting 90 tablet 2    furosemide (LASIX) 20 MG tablet Take 1 tablet by mouth daily (Patient taking differently: Take 20 mg by mouth daily As needed) 30 tablet 1    nitroGLYCERIN (NITROSTAT) 0.4 MG SL tablet up to max of 3 total doses. If no relief after 1 dose, call 911. 25 tablet 3    insulin glargine (LANTUS) 100 UNIT/ML injection vial Inject 50 Units into the skin nightly 1 vial 3    insulin glargine (LANTUS) 100 UNIT/ML injection vial Inject 60 Units into the skin daily 1 vial 3    hydrALAZINE (APRESOLINE) 25 MG tablet Take 1 tablet by mouth every 8 hours 90 tablet 3    carvedilol (COREG) 12.5 MG tablet Take 1 tablet by mouth 2 times daily (with meals) 60 tablet 3    amLODIPine (NORVASC) 10 MG tablet Take 1 tablet by mouth daily 30 tablet 3    aspirin 81 MG chewable tablet Take 81 mg by mouth nightly      insulin aspart (NOVOLOG FLEXPEN) 100 UNIT/ML injection pen Inject into the skin      sertraline (ZOLOFT) 100 MG tablet take 1 1/2 tablet by oral route  every day      atorvastatin (LIPITOR) 40 MG tablet nightly       LORazepam (ATIVAN) 0.5 MG tablet 1 mg nightly. No current facility-administered medications for this visit.         Allergies:  Lisinopril; Sulfamethoxazole; Trimethoprim; Cefuroxime axetil; Sulfamethoxazole-trimethoprim; Clindamycin phosphate; and Penicillins    Social History:   reports that she quit smoking about 15 years ago. She has never used smokeless tobacco. She reports previous alcohol use. She reports that she does not use drugs. Family History: family history includes Cancer in her maternal grandfather, maternal grandmother, and mother; Heart Failure in her father; Hypertension in her father and mother. REVIEW OF SYSTEMS:    Constitutional: No fever or chills. No night sweats, no weight loss   Eyes: No eye discharge, double vision, or eye pain   HEENT: negative for sore mouth, sore throat, hoarseness and voice change   Respiratory: negative for cough , sputum, dyspnea, wheezing, hemoptysis, chest pain   Cardiovascular: negative for chest pain, dyspnea, palpitations, orthopnea, PND   Gastrointestinal: negative for nausea, vomiting, diarrhea, constipation, abdominal pain, Dysphagia, hematemesis and hematochezia   Genitourinary: negative for frequency, dysuria, nocturia, urinary incontinence, and hematuria   Integument: negative for rash, skin lesions, bruises.    Hematologic/Lymphatic: negative for easy bruising, bleeding, lymphadenopathy, or petechiae   Endocrine: negative for heat or cold intolerance,weight changes, change in bowel habits and hair loss   Musculoskeletal: negative for myalgias, arthralgias, pain, joint swelling,and bone pain   Neurological: negative for headaches, dizziness, seizures, weakness, numbness    PHYSICAL EXAM:      BP (!) 145/78   Pulse 73   Temp 98.3 °F (36.8 °C) (Oral)   Resp 18   Wt 245 lb 1.6 oz (111.2 kg)   BMI 40.79 kg/m²    Temp (24hrs), Av.8 °F (36.6 °C), Min:97.1 °F (36.2 °C), Max:99.4 °F (37.4 °C)    General appearance - well appearing, no in pain or distress   Mental status - alert and cooperative   Eyes - pupils equal and reactive, extraocular eye movements intact   Ears - bilateral TM's and external ear canals normal   Mouth - mucous membranes moist, pharynx normal without lesions   Neck - supple, no significant adenopathy   Lymphatics - no palpable lymphadenopathy, no hepatosplenomegaly   Chest - clear to auscultation, no wheezes, rales or rhonchi, symmetric air entry   Heart - normal rate, regular rhythm, normal S1, S2, no murmurs  Abdomen - soft, nontender, nondistended, no masses or organomegaly   Neurological - alert, oriented, normal speech, no focal findings or movement disorder noted   Musculoskeletal - no joint tenderness, deformity or swelling   Extremities - peripheral pulses normal, no pedal edema, no clubbing or cyanosis   Skin - normal coloration and turgor, no rashes, no suspicious skin lesions noted ,    DATA:    Labs:   CBC:   No results for input(s): WBC, HGB, HCT, PLT in the last 72 hours. BMP:   No results for input(s): NA, K, CO2, BUN, CREATININE, LABGLOM, GLUCOSE in the last 72 hours. PT/INR:   No results for input(s): PROTIME, INR in the last 72 hours. Surgical Pathology Report   Surgical Pathology   Collected: 06/04/20 1542   Lab status: Final   Resulting lab: [facility]   Value: -- Diagnosis --   HEAD OF PANCREAS MASS EUS FINE NEEDLE ASPIRATION:           POSITIVE FOR MALIGNANCY:   ADENOCARCINOMA, MODERATELY DIFFERENTIATED. IMAGING DATA:  MRI abd 6/4/20  FINDINGS:   Exam degraded by motion artifact.       ABDOMEN:       The visualized lung bases reveal no signal abnormality.       The liver is normal in signal intensity and contour.  No focal liver lesion   identified.       Irregular mass in the head of the pancreas is present resulting in biliary   and pancreatic duct obstruction.  This measures 3.2 x 2.3 cm and is best   visualized on T2 imaging.  This mass extends towards the liver hilum. Radha Joyce   is close approximation with the main portal vein, which remains patent.    There is close abutment with the hepatic artery.  No inflammatory change   identified involving the pancreas.  Relative atrophy of the body and tail is   noted.       The spleen, adrenals and kidneys reveal no significant findings.  Right renal   cyst.       The visualized bowel is normal in caliber.       No ascites.  Other than periportal lymph nodes measuring up to 12 mm, no   retroperitoneal or mesenteric lymphadenopathy is otherwise appreciated.       Incidental small intramural fibroid is noted in the right body of the uterus.       No signal abnormality identified in the visualized osseous structures.       MRCP:       Gallbladder: Surgically absent.  Small amount of fluid signal within the   gallbladder fossa, likely postoperative.       Bile Ducts: Intrahepatic and extrahepatic biliary dilatation with abrupt   cutoff at the mid common duct level.  Upstream dilatation measures 16 mm at   the common duct level, 8 mm at the left hepatic duct level and 7 mm in the   right hepatic duct.       Pancreatic Duct: Dilatation with abrupt cutoff at the site of pancreatic head   mass.  Upstream dilatation of 8 mm is noted.           Impression   1.  3.2 cm soft tissue mass in the pancreatic head resulting in pancreatic   duct and biliary dilatation, compatible with primary neoplasm.  There is   abutment of the portal vein and soft tissue extension/lymph nodes in the   liver hilum noted.  Given the limitations of motion artifact on this exam, a   pancreas CT should be considered for detailed vascular evaluation.       2.  No liver metastatic disease. 9/17/2020: MRI abdomen  Impression    1. Stable mass in the pancreatic head measuring up to 3.2 cm compatible with    pancreatic malignancy. Marly Whit is less than 180 degrees involvement of the    adjacent proximal SMV with some luminal narrowing, not significantly changed    from prior examination.  No additional vascular involvement is demonstrated. 2. Interval increase in size of a carmel hepatis lymph node currently    measuring up to 2.2 cm.     3. Multifocal areas of peripheral T1 hypointensity in the liver which are not    visualized on fat saturated T2 weighted sequences and do not demonstrate    enhancement on subtraction images or diffusion restriction.  Findings could    represent a geographic steatosis, perfusion anomaly, or infarct.  Attention    on follow-up recommended. 4. Trace bilateral pleural effusions. IMPRESSION:   1. Pancreatic adenocarcinoma: Head of pancreas, 3 cm,  MR abd showed no liver lesions possible T2N1MO stage IIB at this times appears borderline/unresectable because of PV involvement. She is seen by hepatobiliary surgeon as o/p  2. CT chest showed No mets. EUS showed that the mass encases the portal vein . An intact interface was seen between the mass and the celiac trunk and superior mesenteric artery suggesting a lack of invasion. Appears locally advanced  3. DKA  4. CAD  5. Abdominal pain    RECOMMENDATIONS:  1. I reviewed the imaging studies, lab data, diagnosis, prognosis and treatment options with patient. She states comfort is her primary goal but she is open to discuss options  2. She was initially started on dose reduced  FOLFIRINOX and subsequently after first cycle irinotecan was discontinued. She had significant fatigue and nausea and she decided not to continue chemotherapy. 3. She does not want surgery as well. She understands the risk and benefits and wants to focus on quality of life. 4. We discussed the option of chemoradiation. 5. I will plan for Xeloda-based chemoradiation. 6. Patient is agreeable  7. RTC in 3 weeks      Ilya Benitez MD  Hematologist/Medical Oncologist    I spent more than 40 minutes examining, evaluating, reviewing data and counseling the patient. Greater than 50% of that time was spent face-to-face with the patient in counseling and coordinating her care.

## 2020-10-14 ENCOUNTER — HOSPITAL ENCOUNTER (OUTPATIENT)
Age: 60
Setting detail: SPECIMEN
Discharge: HOME | End: 2020-10-14
Payer: COMMERCIAL

## 2020-10-14 ENCOUNTER — OFFICE VISIT (OUTPATIENT)
Dept: PALLATIVE CARE | Age: 60
End: 2020-10-14
Payer: COMMERCIAL

## 2020-10-14 ENCOUNTER — TELEPHONE (OUTPATIENT)
Dept: ONCOLOGY | Age: 60
End: 2020-10-14

## 2020-10-14 VITALS
BODY MASS INDEX: 40.77 KG/M2 | WEIGHT: 245 LBS | HEART RATE: 68 BPM | DIASTOLIC BLOOD PRESSURE: 59 MMHG | SYSTOLIC BLOOD PRESSURE: 127 MMHG | TEMPERATURE: 98.4 F

## 2020-10-14 PROCEDURE — 99214 OFFICE O/P EST MOD 30 MIN: CPT | Performed by: NURSE PRACTITIONER

## 2020-10-14 PROCEDURE — 80307 DRUG TEST PRSMV CHEM ANLYZR: CPT

## 2020-10-14 RX ORDER — OXYCODONE HYDROCHLORIDE 5 MG/1
5 TABLET ORAL EVERY 6 HOURS PRN
COMMUNITY
End: 2020-12-09 | Stop reason: SDUPTHER

## 2020-10-14 ASSESSMENT — ENCOUNTER SYMPTOMS
ABDOMINAL PAIN: 1
SHORTNESS OF BREATH: 1
DIARRHEA: 1
NAUSEA: 1

## 2020-10-14 NOTE — TELEPHONE ENCOUNTER
Per [facility], insurance requires xeloda to be prescribed through [facility]. [facility] transferred RX yesterday.

## 2020-10-14 NOTE — PROGRESS NOTES
Pt roomed and Vitals taken. Pt pleasant and appears in no distress. Pill count: Oxycodone 5mg tabs filled on 8/6=95 tabs remaining. Yecenia [facility] [id number] [place] [address], [address] [place] [address], [address] [place] [address]

## 2020-10-14 NOTE — PATIENT INSTRUCTIONS
Patient to follow up in 4 weeks at clinic. Patient to bring Jacque Lopez will, and discuss code status further next appointment.

## 2020-10-14 NOTE — PROGRESS NOTES
PALLIATIVE CARE PROGRESS NOTE     Patient: Flor Benítez  1960        Reason For Consult:  Goals of careevaluation  Distress management  Symptom Management  Guidance and support  Facilitate communications  Assistance in coordinating care  Recommendations for the above    Subjective: Flor Benítez is a 64 y.o. female with a history of Diabetes, hypertension, CAD, anxiety, neuropathy, OA  and follows with Mary Jo Foss . Flor Benítez is a 64 y.o. female with a history of  Pancreatic cancer and follows with Mary Jo Foss . Patient had went to Er due to back pain and was found to be in DKA. She had MRI of abdomen that revealed irregular mass in head of pancreas resulting in biliary & pancreatic duct obstruction. There is abutment of the portal vein & soft tissue extension/lymph nodes in liver hilum. Patient had underwent ERCP & EUS on 6/4/20 with cholangigram, biliary sphincterectomy, & placement of biliary stent. She was Dx with Adenocarcinoma of the pancreas, moderately differentiated. CT of chest did not reveal any mets. She had seen Dr. Olivas July and had diagnostic laparoscopic exam, it was noted that cancer is wrapped around portal vein so borderline unresectable. Patient had decided not to move forward with surgery. Patient was hospitalized in July d/t Cholangitis d/t clogged CBD stent. ERCP completed and metal stent placed. Patient see's Dr. Lv Robb and was started on Folfirinox and after first cycle irinotecan was DC d/t significant fatigue and N/V. I She continued with FolFox. Recent MRI revealed slight increase in lymph node in carmel hepatis. Patient was unsure if she wanted to continue with chemotherapy. Chemoradiation discussed with patient. Patient asked for palliative care consult. Palliative Care was consulted to help manage symptoms, facilitate communications and establish goals of care.           INTERIM EVENTS:  Patient states that she met with Dr Lv Robb yesterday in his office and plans to start oral chemotherapy Xeloda and radiation in about three weeks. She states she still occasionally has issues with loose stools and Imodium is helpful for that. She states her nausea is improved with eating small meals and uses Zofran as needed and she has refills available. She did start taking Oxycodone for pain between 2-3 times daily and her pain is better in control. She rates pain as a 1-2 out of 10. Patient still had 95 Oxycodone left so no new prescription ordered presently. Patient states she has Ativan for anxiety that she is getting from her PCP. She is going to follow up with her soon and going to notify her of past palpitations also. Patient states these have resolved currently. Discussed different levels of code status with patient and explained each one completely and also provided her with a worksheet that describes each level. She is going to discuss these with her friend and Marin Miller. Next visit she is going to bring in her DPOA and also wants to discuss code levels further. She is going to get her Urine drug screen done today after clinic. Patient also completed Pain contract today. Available records including labs and imaging results were reviewed. Recent and past history, med list,familyhistory, social history and review of systems were personally reviewed and updated in EHR    Review of Systems   Constitutional: Positive for appetite change (decreased appetite). Respiratory: Positive for shortness of breath (shortness of breath at times with exertion). Cardiovascular: Positive for palpitations (history of palpitation but has resolved presently) and leg swelling (Trace edema in ankles ). Gastrointestinal: Positive for abdominal pain (Pulling poking sensation in abomen), diarrhea (Occasional Imodium effective ) and nausea (zofran effective on nausea ). PHYSICAL ASSESSMENT:  Physical Exam  Vitals signs and nursing note reviewed.    Constitutional:       Appearance: Normal appearance. HENT:      Head: Normocephalic. Nose: Nose normal.      Mouth/Throat:      Mouth: Mucous membranes are moist.   Eyes:      Extraocular Movements: Extraocular movements intact. Pupils: Pupils are equal, round, and reactive to light. Neck:      Musculoskeletal: Normal range of motion. Cardiovascular:      Rate and Rhythm: Normal rate and regular rhythm. Pulses: Normal pulses. Heart sounds: Murmur present. Pulmonary:      Effort: Pulmonary effort is normal.      Breath sounds: Normal breath sounds. Abdominal:      General: Bowel sounds are normal.      Palpations: Abdomen is soft. Tenderness: There is abdominal tenderness (Poking pulling sensation in abdomen ). Musculoskeletal: Normal range of motion. Skin:     General: Skin is warm and dry. Capillary Refill: Capillary refill takes less than 2 seconds. Neurological:      Mental Status: She is alert and oriented to person, place, and time. Psychiatric:         Attention and Perception: Attention and perception normal.         Mood and Affect: Mood normal.         Speech: Speech normal.         Behavior: Behavior normal.         Thought Content: Thought content normal.         Cognition and Memory: Cognition normal.         Judgment: Judgment normal.      Comments: Patient states has had recent issues with depression due to diagnosis but Zoloft is helping            Vital Signs:  BP (!) 127/59 (Site: Left Upper Arm, Position: Sitting, Cuff Size: Medium Adult)   Pulse 68   Temp 98.4 °F (36.9 °C) (Oral)   Wt 245 lb (111.1 kg)   BMI 40.77 kg/m²      has a past medical history of Anxiety, Diabetes mellitus ([facility] [address] Hyperlipidemia, Hypertension, and Pancreatic cancer ([facility] [address] Current Outpatient Medications   Medication Sig Dispense Refill    oxyCODONE (ROXICODONE) 5 MG immediate release tablet Take 5 mg by mouth as needed for Pain.       capecitabine (XELODA) 150 MG chemo tablet Take 2 pills twice daily on Monday to Friday while on radiation 60 tablet 0    capecitabine (XELODA) 500 MG chemo tablet Take 3 pills twice daily on Monday to Friday while on radiation 180 tablet 0    lipase-protease-amylase (CREON) 26855 units CPEP delayed release capsule Take two 36,000unit capsules with meals and one- 36,000unit capsule with snacks. 180 capsule 2    lidocaine-prilocaine (EMLA) 2.5-2.5 % cream Apply topically as needed. 1 Tube 2    ondansetron (ZOFRAN-ODT) 8 MG TBDP disintegrating tablet Place 1 tablet under the tongue every 8 hours as needed for Nausea or Vomiting 90 tablet 2    furosemide (LASIX) 20 MG tablet Take 1 tablet by mouth daily (Patient taking differently: Take 20 mg by mouth daily As needed) 30 tablet 1    nitroGLYCERIN (NITROSTAT) 0.4 MG SL tablet up to max of 3 total doses. If no relief after 1 dose, call 911. 25 tablet 3    insulin glargine (LANTUS) 100 UNIT/ML injection vial Inject 50 Units into the skin nightly 1 vial 3    insulin glargine (LANTUS) 100 UNIT/ML injection vial Inject 60 Units into the skin daily 1 vial 3    hydrALAZINE (APRESOLINE) 25 MG tablet Take 1 tablet by mouth every 8 hours 90 tablet 3    carvedilol (COREG) 12.5 MG tablet Take 1 tablet by mouth 2 times daily (with meals) 60 tablet 3    amLODIPine (NORVASC) 10 MG tablet Take 1 tablet by mouth daily 30 tablet 3    aspirin 81 MG chewable tablet Take 81 mg by mouth nightly      insulin aspart (NOVOLOG FLEXPEN) 100 UNIT/ML injection pen Inject into the skin      sertraline (ZOLOFT) 100 MG tablet take 1 1/2 tablet by oral route  every day      atorvastatin (LIPITOR) 40 MG tablet nightly       LORazepam (ATIVAN) 0.5 MG tablet 1 mg nightly. No current facility-administered medications for this visit.            Patient Active Problem List   Diagnosis    Uncontrolled diabetes mellitus ([address]    Hypertension    Postmenopausal bleeding    Thickened endometrium    Type 2 diabetes mellitus with hyperglycemia, with long-term current use of insulin (HCC)    Hypophosphatemia    Hypomagnesemia    Hyperglycemia due to type 2 diabetes mellitus ([address]    Pancreatic Head Mass    Non-Obstructive CAD    Primary adenocarcinoma of head of pancreas (HCC)    Intractable nausea and vomiting    Class 3 severe obesity with serious comorbidity in adult [facility])    Amnesia    Anxiety    Benign neoplasm of choroid    Depression    Diabetic complication (HCC)    Diabetic peripheral neuropathy associated with type 2 diabetes mellitus (HCC)    Hepatomegaly    Methicillin resistant Staphylococcus aureus infection    Long term current use of insulin (HCC)    Hyperlipidemia    Moderate nonproliferative diabetic retinopathy associated with type 2 diabetes mellitus (HCC)    Morbid obesity (HCC)    Pain in limb    Pancreatic adenocarcinoma (HCC)    Pancreatic malignancy syndrome ([address]    Peripheral venous insufficiency    Postprocedural state    Primary localized osteoarthrosis of ankle and foot    Urinary tract infectious disease    Transaminitis    Intractable vomiting    Septicemia due to E. coli (HCC)    Acute obstructive cholangitis    TORREY (acute kidney injury) ([facility] [address]    Dehydration with hyponatremia    NSVT (nonsustained ventricular tachycardia) (HCC)       Palliative Interaction:  Patient is on Oxycodone for Abdomen pain due to pancreatic cancer. Pain controled with patient taking 2-3 times per day. Nausea improved with small meals and uses Zofran as needed. Patient loose stools occasional Imodium helpful. Palpitations improved is going to follow up with PCP. Discuss Code status levels and what each consisted of paperwork discussing further given to patient. Will bring in 3 [facility] will and discuss Code status further at next visit. Pain contract signed. Patient plans to start oral chemotherapy Xeloda and radiation in next couple of weeks.     Principle Problem/Diagnosis:  Pancreatic cancer  Abdominal pain  Nausea   Diarrhea           IMPRESSION/ PLAN  1- Follow-up to prior meeting  Discuss goals of care  Treatment options/plans  Listen to patient/family concerns  Build trust  Review effects of medications and symptom management     2-Code Status:  Full Code     3-OtherRecommendations: Follow up with PCP for palpitations, routine medication refills, and anxiety. Follow up with Dr Duque as scheduled.       Electronically signedby   Brant Bernheim, APRN - NP  Palliative Care Team  on 10/14/2020 at 3:55 PM    [place] [address] [phone]  Palliative Care Cell Phone: [phone]

## 2020-10-16 ENCOUNTER — TELEPHONE (OUTPATIENT)
Dept: ONCOLOGY | Age: 60
End: 2020-10-16

## 2020-10-16 RX ORDER — CAPECITABINE 150 MG/1
TABLET, FILM COATED ORAL
Qty: 120 TABLET | Refills: 0 | OUTPATIENT
Start: 2020-10-16 | End: 2020-11-24

## 2020-10-16 NOTE — TELEPHONE ENCOUNTER
Received fax from [facility]  needing clarification on quantity and day supply for both xeloda strengths; 500mg tab written for 6 week supply and 150mg tab written for 3 week supply. Pt is to take xeloda M-F during Radiation Therapy x6 weeks. Writer spoke with Owen Bergeron at [place] [address] and clarified that 500mg tab qt is 180 for 6 weeks and 150mg tab qt is 120 for 6 weeks. She read back orders and verbalized understanding.

## 2020-10-17 LAB
6-ACETYLMORPHINE, UR: NOT DETECTED
7-AMINOCLONAZEPAM, URINE: NOT DETECTED
ALPHA-OH-ALPRAZ, URINE: NOT DETECTED
ALPRAZOLAM, URINE: NOT DETECTED
AMPHETAMINES, URINE: NOT DETECTED
BARBITURATES, URINE: NOT DETECTED
BENZOYLECGONINE, UR: NOT DETECTED
BUPRENORPHINE URINE: NOT DETECTED
CARISOPRODOL, UR: NOT DETECTED
CLONAZEPAM, URINE: NOT DETECTED
CODEINE, URINE: NOT DETECTED
CREATININE URINE: 249.9 MG/DL (ref 20–400)
DIAZEPAM, URINE: NOT DETECTED
EER PAIN MGT DRUG PANEL, HIGH RES/EMIT U: NORMAL
ETHYL GLUCURONIDE UR: NOT DETECTED
FENTANYL URINE: NOT DETECTED
HYDROCODONE, URINE: NOT DETECTED
HYDROMORPHONE, URINE: NOT DETECTED
LORAZEPAM, URINE: PRESENT
MARIJUANA METAB, UR: NOT DETECTED
MDA, UR: NOT DETECTED
MDEA, EVE, UR: NOT DETECTED
MDMA URINE: NOT DETECTED
MEPERIDINE METAB, UR: NOT DETECTED
METHADONE, URINE: NOT DETECTED
METHAMPHETAMINE, URINE: NOT DETECTED
METHYLPHENIDATE: NOT DETECTED
MIDAZOLAM, URINE: NOT DETECTED
MORPHINE URINE: NOT DETECTED
NORBUPRENORPHINE, URINE: NOT DETECTED
NORDIAZEPAM, URINE: NOT DETECTED
NORFENTANYL, URINE: NOT DETECTED
NORHYDROCODONE, URINE: NOT DETECTED
NOROXYCODONE, URINE: PRESENT
NOROXYMORPHONE, URINE: PRESENT
OXAZEPAM, URINE: NOT DETECTED
OXYCODONE URINE: PRESENT
OXYMORPHONE, URINE: NOT DETECTED
PAIN MGT DRUG PANEL, HI RES, UR: NORMAL
PCP,URINE: NOT DETECTED
PHENTERMINE, UR: NOT DETECTED
PROPOXYPHENE, URINE: NOT DETECTED
TAPENTADOL, URINE: NOT DETECTED
TAPENTADOL-O-SULFATE, URINE: NOT DETECTED
TEMAZEPAM, URINE: NOT DETECTED
TRAMADOL, URINE: NOT DETECTED
ZOLPIDEM, URINE: NOT DETECTED

## 2020-10-20 ENCOUNTER — TELEPHONE (OUTPATIENT)
Dept: ONCOLOGY | Age: 60
End: 2020-10-20

## 2020-10-20 NOTE — TELEPHONE ENCOUNTER
Name: Ronny Leal  : 1960  MRN: [id number]    Oncology Navigation Follow-Up Note    Contact Type:  Telephone  Notes: Attempted to contact pt for f/u call, no answer, VM left, instructed may contact writer prn. Will continue to follow.     Electronically signed by Evelina Gresham RN on 10/20/2020 at 12:33 PM

## 2020-10-21 NOTE — TELEPHONE ENCOUNTER
Writer called [facility] for update on xeloda RX's. She states they were delivered yesterday. Writer reached out to [employer] for teach.

## 2020-10-22 ENCOUNTER — HOSPITAL ENCOUNTER (OUTPATIENT)
Dept: RADIATION ONCOLOGY | Age: 60
Discharge: HOME | End: 2020-10-22
Attending: RADIOLOGY
Payer: COMMERCIAL

## 2020-10-22 PROCEDURE — 99999 PR OFFICE/OUTPT VISIT,PROCEDURE ONLY: CPT | Performed by: RADIOLOGY

## 2020-10-22 PROCEDURE — 77334 RADIATION TREATMENT AID(S): CPT | Performed by: RADIOLOGY

## 2020-10-26 ENCOUNTER — TELEPHONE (OUTPATIENT)
Dept: ONCOLOGY | Age: 60
End: 2020-10-26

## 2020-10-26 NOTE — TELEPHONE ENCOUNTER
[facility] Nutrition Note    PG-SGA screening tool reviewed with score of 3. Pt reports unplanned wt loss, consuming less than usual, and not feeling her normal self but able to be up and about with fairly normal activity. Full nutrition assessment for scores > 4.   DARIUSZ Ward, RD, LD  Registered Dietitian  [facility]  [phone]

## 2020-11-02 ENCOUNTER — TELEPHONE (OUTPATIENT)
Dept: ONCOLOGY | Age: 60
End: 2020-11-02

## 2020-11-02 NOTE — TELEPHONE ENCOUNTER
Name: Karen Blum  : 1960  MRN: [id number]    Oncology Navigation Follow-Up Note    Contact Type:  Telephone  Notes: Upon review of chart noted pt scheduled for Dr. Reny Saavedra f/jonelle tomorrow am & no XRT scheduled. Spoke with Ed Velasco., [facility], inquired on XRT start. Kareemkacie Satya stated await XRT PA approval.  [employer] message sent to [employer], [address] oral compliance, inquired on xeloda teach. [employer] stated not completed & may coordinate with Dr. Reny Saavedra 0930 f/jonelle tomorrow. Attempted to contact pt, no answer, VM left notified pt XRT PA pending & [employer] will complete xeloda teaching tomorrow after Dr. Reny philip/jonelle. Instructed pt may contact writer prn. Will continue to follow.     Electronically signed by Kevin Dennis RN on 2020 at 1:16 PM

## 2020-11-03 ENCOUNTER — OFFICE VISIT (OUTPATIENT)
Dept: ONCOLOGY | Age: 60
End: 2020-11-03
Payer: COMMERCIAL

## 2020-11-03 ENCOUNTER — TELEPHONE (OUTPATIENT)
Dept: ONCOLOGY | Age: 60
End: 2020-11-03

## 2020-11-03 ENCOUNTER — HOSPITAL ENCOUNTER (OUTPATIENT)
Age: 60
Discharge: HOME | End: 2020-11-03
Payer: COMMERCIAL

## 2020-11-03 VITALS
HEART RATE: 76 BPM | BODY MASS INDEX: 40.85 KG/M2 | TEMPERATURE: 98 F | WEIGHT: 245.5 LBS | SYSTOLIC BLOOD PRESSURE: 138 MMHG | DIASTOLIC BLOOD PRESSURE: 86 MMHG

## 2020-11-03 DIAGNOSIS — C25.0 PRIMARY ADENOCARCINOMA OF HEAD OF PANCREAS (HCC): ICD-10-CM

## 2020-11-03 LAB
ABSOLUTE EOS #: 0.2 K/UL (ref 0–0.4)
ABSOLUTE IMMATURE GRANULOCYTE: ABNORMAL K/UL (ref 0–0.3)
ABSOLUTE LYMPH #: 1.2 K/UL (ref 1–4.8)
ABSOLUTE MONO #: 0.7 K/UL (ref 0.1–1.2)
ALBUMIN SERPL-MCNC: 3.5 G/DL (ref 3.5–5.2)
ALBUMIN/GLOBULIN RATIO: 1.1 (ref 1–2.5)
ALP BLD-CCNC: 250 U/L (ref 35–104)
ALT SERPL-CCNC: 39 U/L (ref 5–33)
ANION GAP SERPL CALCULATED.3IONS-SCNC: 10 MMOL/L (ref 9–17)
AST SERPL-CCNC: 27 U/L
BASOPHILS # BLD: 1 % (ref 0–2)
BASOPHILS ABSOLUTE: 0.1 K/UL (ref 0–0.2)
BILIRUB SERPL-MCNC: 0.23 MG/DL (ref 0.3–1.2)
BUN BLDV-MCNC: 15 MG/DL (ref 6–20)
BUN/CREAT BLD: ABNORMAL (ref 9–20)
CA 19-9: 10 U/ML (ref 0–35)
CALCIUM SERPL-MCNC: 9.7 MG/DL (ref 8.6–10.4)
CHLORIDE BLD-SCNC: 102 MMOL/L (ref 98–107)
CO2: 25 MMOL/L (ref 20–31)
CREAT SERPL-MCNC: 0.69 MG/DL (ref 0.5–0.9)
DIFFERENTIAL TYPE: ABNORMAL
EOSINOPHILS RELATIVE PERCENT: 3 % (ref 1–4)
GFR AFRICAN AMERICAN: >60 ML/MIN
GFR NON-AFRICAN AMERICAN: >60 ML/MIN
GFR SERPL CREATININE-BSD FRML MDRD: ABNORMAL ML/MIN/{1.73_M2}
GFR SERPL CREATININE-BSD FRML MDRD: ABNORMAL ML/MIN/{1.73_M2}
GLUCOSE BLD-MCNC: 243 MG/DL (ref 70–99)
HCT VFR BLD CALC: 33.5 % (ref 36–46)
HEMOGLOBIN: 10.9 G/DL (ref 12–16)
IMMATURE GRANULOCYTES: ABNORMAL %
LYMPHOCYTES # BLD: 15 % (ref 24–44)
MCH RBC QN AUTO: 26.9 PG (ref 26–34)
MCHC RBC AUTO-ENTMCNC: 32.4 G/DL (ref 31–37)
MCV RBC AUTO: 83.2 FL (ref 80–100)
MONOCYTES # BLD: 8 % (ref 2–11)
NRBC AUTOMATED: ABNORMAL PER 100 WBC
PDW BLD-RTO: 16.8 % (ref 12.5–15.4)
PLATELET # BLD: 317 K/UL (ref 140–450)
PLATELET ESTIMATE: ABNORMAL
PMV BLD AUTO: 7.8 FL (ref 6–12)
POTASSIUM SERPL-SCNC: 3.7 MMOL/L (ref 3.7–5.3)
RBC # BLD: 4.03 M/UL (ref 4–5.2)
RBC # BLD: ABNORMAL 10*6/UL
SEG NEUTROPHILS: 73 % (ref 36–66)
SEGMENTED NEUTROPHILS ABSOLUTE COUNT: 6.2 K/UL (ref 1.8–7.7)
SODIUM BLD-SCNC: 137 MMOL/L (ref 135–144)
TOTAL PROTEIN: 6.6 G/DL (ref 6.4–8.3)
WBC # BLD: 8.4 K/UL (ref 3.5–11)
WBC # BLD: ABNORMAL 10*3/UL

## 2020-11-03 PROCEDURE — 85025 COMPLETE CBC W/AUTO DIFF WBC: CPT

## 2020-11-03 PROCEDURE — 99215 OFFICE O/P EST HI 40 MIN: CPT | Performed by: INTERNAL MEDICINE

## 2020-11-03 PROCEDURE — 99211 OFF/OP EST MAY X REQ PHY/QHP: CPT | Performed by: INTERNAL MEDICINE

## 2020-11-03 PROCEDURE — 86301 IMMUNOASSAY TUMOR CA 19-9: CPT

## 2020-11-03 PROCEDURE — 80053 COMPREHEN METABOLIC PANEL: CPT

## 2020-11-03 PROCEDURE — 36415 COLL VENOUS BLD VENIPUNCTURE: CPT

## 2020-11-03 NOTE — TELEPHONE ENCOUNTER
Pt was seen today by Dr. Sara Mckeon. Per avs, pt is to be seen one week after starting RT. Pt is scheduled for f/u on 11-17-20 (if pt starts RT week of 11-09-20). Pt had labs drawn today cbc cmp [id number].

## 2020-11-03 NOTE — PROGRESS NOTES
Today's Date: 11/3/2020  Patient Name: Ludin Villafana  Patient's age: 64 y. o., 1960    DIAGNOSIS: Pancreatic cancer, clinical  T2N1MO stage IIB, encasement of portal vein, unresectable  Started on dose reduced FOLFIRINOX on 2020  Her chemotherapy is currently on hold as per patient's wishes  Plan to start [facility] chemoradiation as per patient wishes    CHIEF COMPLAINT:    Chief Complaint   Patient presents with    Follow-up     review status of disease     HISTORY OF PRESENT ILLNESS:    This is a 61-year-old female who was admitted with diabetic ketoacidosis, lower back pain. She was noted to have elevated troponin, elevated liver enzymes. Patient was seen by cardiology and had cardiac cath which showed nonobstructive CAD and medical management recommended. She had ultrasound of the liver which showed dilated pancreatic duct and intrahepatic ductal dilatation. This was followed by MRCP which showed pancreatic mass and she underwent ERCP and EUS on 20 with cholangiogram, Biliary sphincterotomy and  Placement of 10 F x 7 cm plastic biliary stent  The pancreatic head biopsy showed adenocarcinoma. Oncology consulted for further recommendations. INTERVAL HISTORY:  Patient is returning for follow up visit and to discuss further recommendations. She is agreeable for chemoraidation and will plan for Xeloda based RT. I reviewed the risk benefits and side effects with patient. She is agreeable. During this visit patient's allergy, social, medical, surgical history and medications were reviewed and updated. Past Medical History:   has a past medical history of Anxiety, Diabetes mellitus ([address] Hyperlipidemia, Hypertension, and Pancreatic cancer ([facility] [facility] Past Surgical History:   has a past surgical history that includes  section; Gallbladder surgery; ERCP (2020); Upper gastrointestinal endoscopy (2020);  Upper gastrointestinal endoscopy (2020); ERCP (2020); ERCP (6/4/2020); ERCP (07/22/2020); ERCP (7/22/2020); ERCP (7/22/2020); and ERCP (7/22/2020). Medications:    Prior to Admission medications    Medication Sig Start Date End Date Taking? Authorizing Provider   capecitabine (XELODA) 150 MG chemo tablet Take 2 pills twice daily on Monday to Friday while on radiation 10/16/20  Yes Fawn Marie MD   oxyCODONE (ROXICODONE) 5 MG immediate release tablet Take 5 mg by mouth as needed for Pain. Yes Historical Provider, MD   capecitabine (XELODA) 500 MG chemo tablet Take 3 pills twice daily on Monday to Friday while on radiation 10/13/20  Yes Fawn Marie MD   lipase-protease-amylase (CREON) 33861 units CPEP delayed release capsule Take two 36,000unit capsules with meals and one- 36,000unit capsule with snacks. 8/18/20  Yes Fawn Marie MD   lidocaine-prilocaine (EMLA) 2.5-2.5 % cream Apply topically as needed. 7/6/20  Yes Fawn Marie MD   ondansetron (ZOFRAN-ODT) 8 MG TBDP disintegrating tablet Place 1 tablet under the tongue every 8 hours as needed for Nausea or Vomiting 7/6/20  Yes Fawn Marie MD   nitroGLYCERIN (NITROSTAT) 0.4 MG SL tablet up to max of 3 total doses.  If no relief after 1 dose, call 911. 6/6/20  Yes Riki Reyes MD   insulin glargine (LANTUS) 100 UNIT/ML injection vial Inject 60 Units into the skin daily 6/7/20  Yes Riki Reyes MD   hydrALAZINE (APRESOLINE) 25 MG tablet Take 1 tablet by mouth every 8 hours 6/6/20  Yes Riki Reyes MD   carvedilol (COREG) 12.5 MG tablet Take 1 tablet by mouth 2 times daily (with meals) 6/6/20  Yes Riki Reyes MD   amLODIPine (NORVASC) 10 MG tablet Take 1 tablet by mouth daily 6/7/20  Yes Riki Reyes MD   aspirin 81 MG chewable tablet Take 81 mg by mouth nightly   Yes Historical Provider, MD   insulin aspart (NOVOLOG FLEXPEN) 100 UNIT/ML injection pen Inject into the skin   Yes Historical Provider, MD   sertraline (ZOLOFT) 100 MG tablet take 1 1/2 tablet by oral route  every day   Yes Historical Provider, MD   atorvastatin (LIPITOR) 40 MG tablet nightly  10/12/13  Yes Historical Provider, MD   LORazepam (ATIVAN) 0.5 MG tablet 1 mg nightly. 10/12/13  Yes Historical Provider, MD   furosemide (LASIX) 20 MG tablet Take 1 tablet by mouth daily  Patient not taking: Reported on 11/3/2020 6/16/20 10/13/20  El Beck MD   insulin glargine (LANTUS) 100 UNIT/ML injection vial Inject 50 Units into the skin nightly 6/6/20   Carmina Johnson MD     Current Outpatient Medications   Medication Sig Dispense Refill    capecitabine (XELODA) 150 MG chemo tablet Take 2 pills twice daily on Monday to Friday while on radiation 120 tablet 0    oxyCODONE (ROXICODONE) 5 MG immediate release tablet Take 5 mg by mouth as needed for Pain.  capecitabine (XELODA) 500 MG chemo tablet Take 3 pills twice daily on Monday to Friday while on radiation 180 tablet 0    lipase-protease-amylase (CREON) 96539 units CPEP delayed release capsule Take two 36,000unit capsules with meals and one- 36,000unit capsule with snacks. 180 capsule 2    lidocaine-prilocaine (EMLA) 2.5-2.5 % cream Apply topically as needed. 1 Tube 2    ondansetron (ZOFRAN-ODT) 8 MG TBDP disintegrating tablet Place 1 tablet under the tongue every 8 hours as needed for Nausea or Vomiting 90 tablet 2    nitroGLYCERIN (NITROSTAT) 0.4 MG SL tablet up to max of 3 total doses.  If no relief after 1 dose, call 911. 25 tablet 3    insulin glargine (LANTUS) 100 UNIT/ML injection vial Inject 60 Units into the skin daily 1 vial 3    hydrALAZINE (APRESOLINE) 25 MG tablet Take 1 tablet by mouth every 8 hours 90 tablet 3    carvedilol (COREG) 12.5 MG tablet Take 1 tablet by mouth 2 times daily (with meals) 60 tablet 3    amLODIPine (NORVASC) 10 MG tablet Take 1 tablet by mouth daily 30 tablet 3    aspirin 81 MG chewable tablet Take 81 mg by mouth nightly      insulin aspart (NOVOLOG FLEXPEN) 100 UNIT/ML injection pen Inject into the skin      sertraline (ZOLOFT) 100 MG tablet take 1 1/2 tablet by oral route  every day      atorvastatin (LIPITOR) 40 MG tablet nightly       LORazepam (ATIVAN) 0.5 MG tablet 1 mg nightly.  furosemide (LASIX) 20 MG tablet Take 1 tablet by mouth daily (Patient not taking: Reported on 11/3/2020) 30 tablet 1    insulin glargine (LANTUS) 100 UNIT/ML injection vial Inject 50 Units into the skin nightly 1 vial 3     No current facility-administered medications for this visit. Allergies:  Lisinopril; Sulfamethoxazole; Trimethoprim; Cefuroxime axetil; Sulfamethoxazole-trimethoprim; Clindamycin phosphate; and Penicillins    Social History:   reports that she quit smoking about 15 years ago. She has never used smokeless tobacco. She reports previous alcohol use. She reports that she does not use drugs. Family History: family history includes Cancer in her maternal grandfather, maternal grandmother, and mother; Heart Failure in her father; Hypertension in her father and mother. REVIEW OF SYSTEMS:    Constitutional: No fever or chills. No night sweats, no weight loss   Eyes: No eye discharge, double vision, or eye pain   HEENT: negative for sore mouth, sore throat, hoarseness and voice change   Respiratory: negative for cough , sputum, dyspnea, wheezing, hemoptysis, chest pain   Cardiovascular: negative for chest pain, dyspnea, palpitations, orthopnea, PND   Gastrointestinal: negative for nausea, vomiting, diarrhea, constipation, abdominal pain, Dysphagia, hematemesis and hematochezia   Genitourinary: negative for frequency, dysuria, nocturia, urinary incontinence, and hematuria   Integument: negative for rash, skin lesions, bruises.    Hematologic/Lymphatic: negative for easy bruising, bleeding, lymphadenopathy, or petechiae   Endocrine: negative for heat or cold intolerance,weight changes, change in bowel habits and hair loss   Musculoskeletal: negative for myalgias, arthralgias, pain, joint swelling,and bone pain Neurological: negative for headaches, dizziness, seizures, weakness, numbness    PHYSICAL EXAM:      /86   Pulse 76   Temp 98 °F (36.7 °C) (Oral)   Wt 245 lb 8 oz (111.4 kg)   BMI 40.85 kg/m²    Temp (24hrs), Av.8 °F (36.6 °C), Min:97.1 °F (36.2 °C), Max:99.4 °F (37.4 °C)    General appearance - well appearing, no in pain or distress   Mental status - alert and cooperative   Eyes - pupils equal and reactive, extraocular eye movements intact   Ears - bilateral TM's and external ear canals normal   Mouth - mucous membranes moist, pharynx normal without lesions   Neck - supple, no significant adenopathy   Lymphatics - no palpable lymphadenopathy, no hepatosplenomegaly   Chest - clear to auscultation, no wheezes, rales or rhonchi, symmetric air entry   Heart - normal rate, regular rhythm, normal S1, S2, no murmurs  Abdomen - soft, nontender, nondistended, no masses or organomegaly   Neurological - alert, oriented, normal speech, no focal findings or movement disorder noted   Musculoskeletal - no joint tenderness, deformity or swelling   Extremities - peripheral pulses normal, no pedal edema, no clubbing or cyanosis   Skin - normal coloration and turgor, no rashes, no suspicious skin lesions noted ,    DATA:    Labs:   CBC:   No results for input(s): WBC, HGB, HCT, PLT in the last 72 hours. BMP:   No results for input(s): NA, K, CO2, BUN, CREATININE, LABGLOM, GLUCOSE in the last 72 hours. PT/INR:   No results for input(s): PROTIME, INR in the last 72 hours. Surgical Pathology Report   Surgical Pathology   Collected: 20 1542   Lab status: Final   Resulting lab: [facility]   Value: -- Diagnosis --   HEAD OF PANCREAS MASS EUS FINE NEEDLE ASPIRATION:           POSITIVE FOR MALIGNANCY:   ADENOCARCINOMA, MODERATELY DIFFERENTIATED.         IMAGING DATA:  MRI abd 20  FINDINGS:   Exam degraded by motion artifact.       ABDOMEN:       The visualized lung bases reveal no signal abnormality.     liver metastatic disease. 9/17/2020: MRI abdomen  Impression    1. Stable mass in the pancreatic head measuring up to 3.2 cm compatible with    pancreatic malignancy. Kisha Cheeks is less than 180 degrees involvement of the    adjacent proximal SMV with some luminal narrowing, not significantly changed    from prior examination.  No additional vascular involvement is demonstrated. 2. Interval increase in size of a carmel hepatis lymph node currently    measuring up to 2.2 cm. 3. Multifocal areas of peripheral T1 hypointensity in the liver which are not    visualized on fat saturated T2 weighted sequences and do not demonstrate    enhancement on subtraction images or diffusion restriction.  Findings could    represent a geographic steatosis, perfusion anomaly, or infarct.  Attention    on follow-up recommended. 4. Trace bilateral pleural effusions. IMPRESSION:   1. Pancreatic adenocarcinoma: Head of pancreas, 3 cm,  MR abd showed no liver lesions possible T2N1MO stage IIB at this times appears borderline/unresectable because of PV involvement. She is seen by hepatobiliary surgeon as o/p  2. CT chest showed No mets. EUS showed that the mass encases the portal vein . An intact interface was seen between the mass and the celiac trunk and superior mesenteric artery suggesting a lack of invasion. Appears locally advanced  3. She was initially started on dose reduced  FOLFIRINOX and subsequently after first cycle irinotecan was discontinued. She had significant fatigue and nausea and she decided not to continue chemotherapy. 4. She does not want surgery as well. She understands the risk and benefits and wants to focus on quality of life. 5. DKA  6. CAD  7. Abdominal pain    RECOMMENDATIONS:  1. I reviewed the imaging studies, lab data, diagnosis, prognosis and treatment options with patient. She states comfort is her primary goal but she is open to discuss options  2.   We discussed the option of chemoradiation. 3. I will plan for Xeloda-based chemoradiation. 4. Patient is agreeable  5. RTC one week after starting chemoRT    Ilya Benitez MD  Hematologist/Medical Oncologist    I spent more than 40 minutes examining, evaluating, reviewing data and counseling the patient. Greater than 50% of that time was spent face-to-face with the patient in counseling and coordinating her care.

## 2020-11-11 ENCOUNTER — TELEPHONE (OUTPATIENT)
Dept: ONCOLOGY | Age: 60
End: 2020-11-11

## 2020-11-11 ENCOUNTER — OFFICE VISIT (OUTPATIENT)
Dept: PALLATIVE CARE | Age: 60
End: 2020-11-11
Payer: COMMERCIAL

## 2020-11-11 ENCOUNTER — HOSPITAL ENCOUNTER (OUTPATIENT)
Age: 60
Discharge: HOME | End: 2020-11-11
Payer: COMMERCIAL

## 2020-11-11 VITALS
DIASTOLIC BLOOD PRESSURE: 56 MMHG | RESPIRATION RATE: 16 BRPM | HEART RATE: 75 BPM | SYSTOLIC BLOOD PRESSURE: 131 MMHG | TEMPERATURE: 97.7 F

## 2020-11-11 LAB
ANION GAP SERPL CALCULATED.3IONS-SCNC: 13 MMOL/L (ref 9–17)
BUN BLDV-MCNC: 11 MG/DL (ref 6–20)
BUN/CREAT BLD: NORMAL (ref 9–20)
CALCIUM SERPL-MCNC: 9.7 MG/DL (ref 8.6–10.4)
CHLORIDE BLD-SCNC: 104 MMOL/L (ref 98–107)
CO2: 24 MMOL/L (ref 20–31)
CREAT SERPL-MCNC: 0.66 MG/DL (ref 0.5–0.9)
CULTURE: NORMAL
GFR AFRICAN AMERICAN: >60 ML/MIN
GFR NON-AFRICAN AMERICAN: >60 ML/MIN
GFR SERPL CREATININE-BSD FRML MDRD: NORMAL ML/MIN/{1.73_M2}
GFR SERPL CREATININE-BSD FRML MDRD: NORMAL ML/MIN/{1.73_M2}
GLUCOSE BLD-MCNC: 80 MG/DL (ref 70–99)
Lab: NORMAL
POTASSIUM SERPL-SCNC: 3.8 MMOL/L (ref 3.7–5.3)
SODIUM BLD-SCNC: 141 MMOL/L (ref 135–144)
SPECIMEN DESCRIPTION: NORMAL

## 2020-11-11 PROCEDURE — 87506 IADNA-DNA/RNA PROBE TQ 6-11: CPT

## 2020-11-11 PROCEDURE — 36415 COLL VENOUS BLD VENIPUNCTURE: CPT

## 2020-11-11 PROCEDURE — 87324 CLOSTRIDIUM AG IA: CPT

## 2020-11-11 PROCEDURE — 80048 BASIC METABOLIC PNL TOTAL CA: CPT

## 2020-11-11 PROCEDURE — 87449 NOS EACH ORGANISM AG IA: CPT

## 2020-11-11 PROCEDURE — 99214 OFFICE O/P EST MOD 30 MIN: CPT | Performed by: NURSE PRACTITIONER

## 2020-11-11 PROCEDURE — 87493 C DIFF AMPLIFIED PROBE: CPT

## 2020-11-11 ASSESSMENT — ENCOUNTER SYMPTOMS
ABDOMINAL PAIN: 1
DIARRHEA: 1
NAUSEA: 1
SHORTNESS OF BREATH: 1
EYES NEGATIVE: 1
ALLERGIC/IMMUNOLOGIC NEGATIVE: 1

## 2020-11-11 NOTE — PROGRESS NOTES
PALLIATIVE CARE PROGRESS NOTE     Patient: Raquel Owens  1960        Reason For Consult:  Goals of careevaluation  Distress management  Symptom Management  Guidance and support  Facilitate communications  Assistance in coordinating care  Recommendations for the above    Subjective:   Raquel Owens is a 64 y.o. female with a history of Diabetes, hypertension, CAD, anxiety, neuropathy, OA  and follows with Ritika Young . Raquel Owens is a 64 y.o. female with a history of  Pancreatic cancer and follows with Ritika Young . Patient had went to [facility] due to back pain and was found to be in DKA. She had MRI of abdomen that revealed irregular mass in head of pancreas resulting in biliary & pancreatic duct obstruction. There is abutment of the portal vein & soft tissue extension/lymph nodes in liver hilum. Patient had underwent ERCP & EUS on 6/4/20 with cholangigram, biliary sphincterectomy, & placement of biliary stent. She was Dx with Adenocarcinoma of the pancreas, moderately differentiated. CT of chest did not reveal any mets. She had seen Dr. Loretta Charles and had diagnostic laparoscopic exam, it was noted that cancer is wrapped around portal vein so borderline unresectable. Patient had decided not to move forward with surgery. Patient was hospitalized in July d/t Cholangitis d/t clogged CBD stent. ERCP completed and metal stent placed. Patient see's Dr. Ofelia Donnelly and was started on Folfirinox and after first cycle irinotecan was DC d/t significant fatigue and N/V. I She continued with FolFox. Recent MRI revealed slight increase in lymph node in carmel hepatis. Patient was unsure if she wanted to continue with chemotherapy. Chemoradiation discussed with patient. Patient asked for palliative care consult. Palliative Care was consulted to help manage symptoms, facilitate communications and establish goals of care. Patient is currently awaiting prior auth for Xeloda and radiation treatment.  Patient code status discussed and patient wants her code status changed to [facility] no intubation. Paperwork completed and order placed in computer.          INTERIM EVENTS:  Brian Stoddard is a 64 y.o. female with a history of Diabetes, hypertension, CAD non obstructive, anxiety, neuropathy, osteoarthritis, and follows with Chun Greenwood . Lupillo Vides is a 64year old female with a history of Pancreatic cancer and is awaiting prior auth. from insurance for radiation and Xeloda treatments. Patient was last seen on 11/3 by Dr Donte Hagan and she also received Xeloda teaching that day. Her last labs were on 11/3 Glucose 243, BUN 15, Cr 0.69, , k 3.7, chl 102, alk phos 250, ALT 39, AST 27, WBC 8.4, RBC 4.03, HGB 10.9, hct 33.5, plt 317, CA-19-9 10. Patient had drug screen completed and she was only positive for the medications she has prescribed. Her OARRS report does not show any unexpected findings. Patient does complain of some dizziness and encouraged her to keep hydrated due to her recent diarrhea. She complains of frequent diarrhea stools that she has been taking Imodium for with little affect. I ordered her Stool culture, Cdiff, and BMP. I also discussed patient pain with her and she states she feels a pulling pressure that is uncomfortable and she takes up to 2 oxycodone daily. She also has some anxiety and her PCP recently increased her ativan. Patient requested that the clinic order her Pain medications and Ativan when she needs the next refill and we can order. Pain contract completed last visit. We also discussed Code status and patient states she does not want CPR or intubation but still wants treatment. I discussed DNRCC-A no intubation with her and she stated this is what she wants. Order placed and paperwork completed. Patient to follow up with clinic in 1 month. Available records including labs and imaging results were reviewed.  Recent and past history, med list,familyhistory, social history and review of systems were personally reviewed and updated in EHR    Review of Systems   Constitutional: Positive for appetite change. HENT: Negative. Eyes: Negative. Respiratory: Positive for shortness of breath (Shortness of breath at times with movement ). Cardiovascular: Negative for chest pain and leg swelling. Gastrointestinal: Positive for abdominal pain (complains of pulling pressure discomfort in abdomen ), diarrhea (up to 5 stools a day sometimes incontinent ) and nausea (intermitant ). Endocrine: Negative. Genitourinary: Negative. Musculoskeletal: Positive for gait problem (patient complains of dizziness at times and being unsteady). Skin: Negative. Allergic/Immunologic: Negative. Neurological: Positive for dizziness and headaches (occasional ). Hematological: Negative. Psychiatric/Behavioral:        Patient with anxiety being treated with Ativan. She is anxious over the starting of her Xeloda and radiation that is awaiting prior auth. She is concerned over the extended wait. PHYSICAL ASSESSMENT:  Physical Exam  Vitals signs and nursing note reviewed. Constitutional:       Appearance: Normal appearance. She is normal weight. She is not ill-appearing or toxic-appearing. Comments: Patient weight today 241.8 pounds    HENT:      Head: Normocephalic and atraumatic. Nose: Nose normal.      Mouth/Throat:      Mouth: Mucous membranes are moist.   Eyes:      Extraocular Movements: Extraocular movements intact. Pupils: Pupils are equal, round, and reactive to light. Neck:      Musculoskeletal: Normal range of motion. No neck rigidity or muscular tenderness. Cardiovascular:      Rate and Rhythm: Normal rate and regular rhythm. Pulses: Normal pulses. Heart sounds: Normal heart sounds. Pulmonary:      Effort: Respiratory distress (Patient with some shortness of breath on and off with exertion) present. Breath sounds: Normal breath sounds.    Abdominal:      General: Bowel sounds are normal.      Palpations: Abdomen is soft. There is mass (firmness felt on left side abdomen ). Tenderness: There is abdominal tenderness (Pulling pressure discomfort being treated with Oxycodone ). Genitourinary:     Comments: Patient denies urinary incontinence, been having diarrhea stools up to 5 daily with some incontinence. Musculoskeletal: Normal range of motion. Right lower leg: No edema. Left lower leg: No edema. Skin:     General: Skin is warm and dry. Capillary Refill: Capillary refill takes less than 2 seconds. Neurological:      Mental Status: She is alert and oriented to person, place, and time. Mental status is at baseline. Gait: Gait abnormal (Patient states has unsteady gait at times ). Psychiatric:         Behavior: Behavior normal.      Comments: Patient with anxiety over treatment and awaiting prior auth for radiation and xeloda. She is concerned over the length of time it has taken. Vital Signs:  BP (!) 131/56 (Site: Left Upper Arm, Position: Sitting, Cuff Size: Large Adult)   Pulse 75   Temp 97.7 °F (36.5 °C) (Oral)   Resp 16      has a past medical history of Anxiety, Diabetes mellitus ([address] Hyperlipidemia, Hypertension, and Pancreatic cancer ([address]             Patient Active Problem List   Diagnosis    Uncontrolled diabetes mellitus ([address]    Hypertension    Postmenopausal bleeding    Thickened endometrium    Type 2 diabetes mellitus with hyperglycemia, with long-term current use of insulin (HCC)    Hypophosphatemia    Hypomagnesemia    Hyperglycemia due to type 2 diabetes mellitus ([address]    Pancreatic Head Mass    Non-Obstructive CAD    Primary adenocarcinoma of head of pancreas ([address]    Intractable nausea and vomiting    Class 3 severe obesity with serious comorbidity in adult [facility])    Amnesia    Anxiety    Benign neoplasm of choroid    Depression    Diabetic complication (HCC)    Diabetic peripheral neuropathy prior auth to start oral chemotherapy and Radiation. Follow up in 1 month. Principle Problem/Diagnosis:   Pancreatic Cancer   Abdominal pain  Nausea   Diarrhea   Dizziness   Anxiety        IMPRESSION/ PLAN    1- Discuss goals of care  Treatment options/plans  Provide clinical updates and answer questions  Build trust  Elicit patient's goals and values, and use these to establish or modify goals of care  Discussed code status     2-Code Status:  DNRCC-A without intubation      3-OtherRecommendations: Follow up with Dr Lv Robb as scheduled    Call [phone] if need oxycodone and ativan reordered.     Have labs and Stool cultures completed     Electronically signedby   EMERSON Weir NP  Palliative Care Team  on 11/11/2020 at 11:05 AM    Palliative Care Office [phone]  Palliative Care Cell Phone: [phone]

## 2020-11-11 NOTE — TELEPHONE ENCOUNTER
NUTRITION NOTE    Called pt on listed mobile phone number for nutrition follow-up. Pt reports she is currently at [place] [phone]. Pt reports she is eating well and feeling well at this point with no nutrition concerns. Phone call was brief since pt was at another televisit.  Will continue to follow prn.    Conception Art, DARIUSZ, RD, LD  Registered Dietitian  [facility] [place]  [phone]

## 2020-11-11 NOTE — TELEPHONE ENCOUNTER
LATE ENTRY; Education completed for xeloda 11/3/20. Diagnosis; Primary adenocarcinoma of head of pancreas. Barriers; waiting for insurance to approve radiation plan. Consultation for Xeloda     What is Chemotherapy   Drug action [x]   Method of Administration [x]   Handouts given [x]     Side Effects  Nausea/vomiting [x]   Diarrhea [x]   Fatigue [x]   Signs / Symptoms of infection [x]   Neutropenia [x]   Thrombocytopenia [x]   Alopecia [x]   neuropathy [x]   Treasure diet &  the importance of fluids [x]       Micellaneous  Importance of nutrition [x]   Importance of oral hygiene [x]   When to call the MD [x]   Monitoring labs [x]   Use of supportive services [x]     Explanation of Drug Regimen / Frequency  xeloda 150 mg tablet, take 2 tablets twice daily on Monday to Friday while on radiation. xeloda 500 mg tablet, take 3 tablets twice daily Monday to Friday while on radiation. Comments  Verbalized understanding to drug,action,side effects and when to call MD. [facility] Patient Education Folder with [facility] teaching sheet given to patient. The [facility] teaching sheet reviewed with patient. Questions answered to patient's satisfaction. Handouts included, Living Well Beyond Cancer Covid -23 Outreach, How to Protect Yourself and Others, Eating Hints, Let it Soak in, [employer]. suport group and community rescource information for The [address] monthly calender of community events. Follow up appointment to be determined when radiation starts.

## 2020-11-12 LAB
C DIFFICILE TOXINS, PCR: NORMAL
CAMPYLOBACTER PCR: NORMAL
E COLI ENTEROTOXIGENIC PCR: NORMAL
PLESIOMONAS SHIGELLOIDES PCR: NORMAL
SALMONELLA PCR: NORMAL
SHIGATOXIN GENE PCR: NORMAL
SHIGELLA SP PCR: NORMAL
SPECIMEN DESCRIPTION: NORMAL
SPECIMEN DESCRIPTION: NORMAL
VIBRIO PCR: NORMAL
YERSINIA ENTEROCOLITICA PCR: NORMAL

## 2020-11-13 ENCOUNTER — HOSPITAL ENCOUNTER (OUTPATIENT)
Dept: RADIATION ONCOLOGY | Age: 60
Discharge: HOME | End: 2020-11-13
Attending: RADIOLOGY
Payer: COMMERCIAL

## 2020-11-13 PROCEDURE — 77301 RADIOTHERAPY DOSE PLAN IMRT: CPT | Performed by: RADIOLOGY

## 2020-11-13 PROCEDURE — 77300 RADIATION THERAPY DOSE PLAN: CPT | Performed by: RADIOLOGY

## 2020-11-13 PROCEDURE — 77338 DESIGN MLC DEVICE FOR IMRT: CPT | Performed by: RADIOLOGY

## 2020-11-16 ENCOUNTER — APPOINTMENT (OUTPATIENT)
Dept: RADIATION ONCOLOGY | Age: 60
End: 2020-11-16
Attending: RADIOLOGY
Payer: COMMERCIAL

## 2020-11-17 ENCOUNTER — OFFICE VISIT (OUTPATIENT)
Dept: ONCOLOGY | Age: 60
End: 2020-11-17
Payer: COMMERCIAL

## 2020-11-17 ENCOUNTER — HOSPITAL ENCOUNTER (OUTPATIENT)
Dept: RADIATION ONCOLOGY | Age: 60
Discharge: HOME | End: 2020-11-17
Attending: RADIOLOGY
Payer: COMMERCIAL

## 2020-11-17 ENCOUNTER — APPOINTMENT (OUTPATIENT)
Dept: RADIATION ONCOLOGY | Age: 60
End: 2020-11-17
Attending: RADIOLOGY
Payer: COMMERCIAL

## 2020-11-17 VITALS
WEIGHT: 241.3 LBS | BODY MASS INDEX: 40.15 KG/M2 | SYSTOLIC BLOOD PRESSURE: 130 MMHG | HEART RATE: 78 BPM | TEMPERATURE: 98.1 F | DIASTOLIC BLOOD PRESSURE: 64 MMHG

## 2020-11-17 PROCEDURE — 77014 PR CT GUIDANCE PLACEMENT RAD THERAPY FIELDS: CPT | Performed by: RADIOLOGY

## 2020-11-17 PROCEDURE — 77386 HC NTSTY MODUL RAD TX DLVR CPLX: CPT

## 2020-11-17 PROCEDURE — 99215 OFFICE O/P EST HI 40 MIN: CPT | Performed by: INTERNAL MEDICINE

## 2020-11-17 PROCEDURE — 99211 OFF/OP EST MAY X REQ PHY/QHP: CPT | Performed by: INTERNAL MEDICINE

## 2020-11-17 NOTE — PROGRESS NOTES
Today's Date: 2020  Patient Name: Yady Ruelas  Patient's age: 64 y. o., 1960    DIAGNOSIS: pancreatic cancer, clinical  T2N1MO stage IIB, encasement of portal vein, unresectable  Started on dose reduced FOLFIRINOX on 2020  Her chemotherapy is currently on hold as per patient's wishes  started Xeloda-based chemoradiation as per patient wishes 20    CHIEF COMPLAINT:    Chief Complaint   Patient presents with    Follow-up     review status of disease     HISTORY OF PRESENT ILLNESS:    This is a 61-year-old female who was admitted with diabetic ketoacidosis, lower back pain. She was noted to have elevated troponin, elevated liver enzymes. Patient was seen by cardiology and had cardiac cath which showed nonobstructive CAD and medical management recommended. She had ultrasound of the liver which showed dilated pancreatic duct and intrahepatic ductal dilatation. This was followed by MRCP which showed pancreatic mass and she underwent ERCP and EUS on 20 with cholangiogram, Biliary sphincterotomy and  Placement of 10 F x 7 cm plastic biliary stent  The pancreatic head biopsy showed adenocarcinoma. Oncology consulted for further recommendations. INTERVAL HISTORY:  Patient is returning for follow up visit and to discuss further recommendations. She  Started xeloda yesterday and will start radiation today. I reviewed the risk benefits and side effects with patient. She is agreeable. During this visit patient's allergy, social, medical, surgical history and medications were reviewed and updated. Past Medical History:   has a past medical history of Anxiety, Diabetes mellitus ([address] Hyperlipidemia, Hypertension, and Pancreatic cancer ([address] Past Surgical History:   has a past surgical history that includes  section; Gallbladder surgery; ERCP (2020); Upper gastrointestinal endoscopy (2020);  Upper gastrointestinal endoscopy (2020); ERCP (2020); ERCP (6/4/2020); ERCP (07/22/2020); ERCP (7/22/2020); ERCP (7/22/2020); and ERCP (7/22/2020). Medications:    Prior to Admission medications    Medication Sig Start Date End Date Taking? Authorizing Provider   capecitabine (XELODA) 150 MG chemo tablet Take 2 pills twice daily on Monday to Friday while on radiation 10/16/20  Yes El Beck MD   oxyCODONE (ROXICODONE) 5 MG immediate release tablet Take 5 mg by mouth as needed for Pain. Yes Historical Provider, MD   capecitabine (XELODA) 500 MG chemo tablet Take 3 pills twice daily on Monday to Friday while on radiation 10/13/20  Yes El Beck MD   lidocaine-prilocaine (EMLA) 2.5-2.5 % cream Apply topically as needed. 7/6/20  Yes El Beck MD   ondansetron (ZOFRAN-ODT) 8 MG TBDP disintegrating tablet Place 1 tablet under the tongue every 8 hours as needed for Nausea or Vomiting 7/6/20  Yes El Beck MD   nitroGLYCERIN (NITROSTAT) 0.4 MG SL tablet up to max of 3 total doses.  If no relief after 1 dose, call 911. 6/6/20  Yes Carmina Johnson MD   insulin glargine (LANTUS) 100 UNIT/ML injection vial Inject 50 Units into the skin nightly 6/6/20  Yes Carmina Johnson MD   insulin glargine (LANTUS) 100 UNIT/ML injection vial Inject 60 Units into the skin daily 6/7/20  Yes Carmina Johnson MD   hydrALAZINE (APRESOLINE) 25 MG tablet Take 1 tablet by mouth every 8 hours 6/6/20  Yes Carmina Johnson MD   carvedilol (COREG) 12.5 MG tablet Take 1 tablet by mouth 2 times daily (with meals) 6/6/20  Yes Carmina Johnson MD   amLODIPine (NORVASC) 10 MG tablet Take 1 tablet by mouth daily 6/7/20  Yes Carmina Johnson MD   aspirin 81 MG chewable tablet Take 81 mg by mouth nightly   Yes Historical Provider, MD   insulin aspart (NOVOLOG FLEXPEN) 100 UNIT/ML injection pen Inject into the skin   Yes Historical Provider, MD   sertraline (ZOLOFT) 100 MG tablet take 1 1/2 tablet by oral route  every day   Yes Historical Provider, MD   atorvastatin (LIPITOR) 40 MG tablet nightly  10/12/13  Yes Historical Provider, MD   LORazepam (ATIVAN) 0.5 MG tablet 1 mg nightly. And 1/2 tab once daily as needed 10/12/13  Yes Historical Provider, MD   lipase-protease-amylase (CREON) 10580 units CPEP delayed release capsule Take two 36,000unit capsules with meals and one- 36,000unit capsule with snacks. Patient not taking: Reported on 11/11/2020 8/18/20   Dalton Grier MD   furosemide (LASIX) 20 MG tablet Take 1 tablet by mouth daily  Patient not taking: Reported on 11/3/2020 6/16/20 10/13/20  Dalton Grier MD     Current Outpatient Medications   Medication Sig Dispense Refill    capecitabine (XELODA) 150 MG chemo tablet Take 2 pills twice daily on Monday to Friday while on radiation 120 tablet 0    oxyCODONE (ROXICODONE) 5 MG immediate release tablet Take 5 mg by mouth as needed for Pain.  capecitabine (XELODA) 500 MG chemo tablet Take 3 pills twice daily on Monday to Friday while on radiation 180 tablet 0    lidocaine-prilocaine (EMLA) 2.5-2.5 % cream Apply topically as needed. 1 Tube 2    ondansetron (ZOFRAN-ODT) 8 MG TBDP disintegrating tablet Place 1 tablet under the tongue every 8 hours as needed for Nausea or Vomiting 90 tablet 2    nitroGLYCERIN (NITROSTAT) 0.4 MG SL tablet up to max of 3 total doses.  If no relief after 1 dose, call 911. 25 tablet 3    insulin glargine (LANTUS) 100 UNIT/ML injection vial Inject 50 Units into the skin nightly 1 vial 3    insulin glargine (LANTUS) 100 UNIT/ML injection vial Inject 60 Units into the skin daily 1 vial 3    hydrALAZINE (APRESOLINE) 25 MG tablet Take 1 tablet by mouth every 8 hours 90 tablet 3    carvedilol (COREG) 12.5 MG tablet Take 1 tablet by mouth 2 times daily (with meals) 60 tablet 3    amLODIPine (NORVASC) 10 MG tablet Take 1 tablet by mouth daily 30 tablet 3    aspirin 81 MG chewable tablet Take 81 mg by mouth nightly      insulin aspart (NOVOLOG FLEXPEN) 100 UNIT/ML injection pen Inject into the skin      sertraline (ZOLOFT) 100 MG tablet take 1 1/2 tablet by oral route  every day      atorvastatin (LIPITOR) 40 MG tablet nightly       LORazepam (ATIVAN) 0.5 MG tablet 1 mg nightly. And 1/2 tab once daily as needed      lipase-protease-amylase (CREON) 98586 units CPEP delayed release capsule Take two 36,000unit capsules with meals and one- 36,000unit capsule with snacks. (Patient not taking: Reported on 11/11/2020) 180 capsule 2    furosemide (LASIX) 20 MG tablet Take 1 tablet by mouth daily (Patient not taking: Reported on 11/3/2020) 30 tablet 1     No current facility-administered medications for this visit. Allergies:  Lisinopril; Sulfamethoxazole; Trimethoprim; Cefuroxime axetil; Sulfamethoxazole-trimethoprim; Clindamycin phosphate; and Penicillins    Social History:   reports that she quit smoking about 15 years ago. She has never used smokeless tobacco. She reports previous alcohol use. She reports that she does not use drugs. Family History: family history includes Cancer in her maternal grandfather, maternal grandmother, and mother; Heart Failure in her father; Hypertension in her father and mother. REVIEW OF SYSTEMS:    Constitutional: No fever or chills. No night sweats, no weight loss   Eyes: No eye discharge, double vision, or eye pain   HEENT: negative for sore mouth, sore throat, hoarseness and voice change   Respiratory: negative for cough , sputum, dyspnea, wheezing, hemoptysis, chest pain   Cardiovascular: negative for chest pain, dyspnea, palpitations, orthopnea, PND   Gastrointestinal: negative for nausea, vomiting, diarrhea, constipation, abdominal pain, Dysphagia, hematemesis and hematochezia   Genitourinary: negative for frequency, dysuria, nocturia, urinary incontinence, and hematuria   Integument: negative for rash, skin lesions, bruises.    Hematologic/Lymphatic: negative for easy bruising, bleeding, lymphadenopathy, or petechiae   Endocrine: negative for heat or cold intolerance,weight changes, change in bowel habits and hair loss   Musculoskeletal: negative for myalgias, arthralgias, pain, joint swelling,and bone pain   Neurological: negative for headaches, dizziness, seizures, weakness, numbness    PHYSICAL EXAM:      /64   Pulse 78   Temp 98.1 °F (36.7 °C) (Oral)   Wt 241 lb 4.8 oz (109.5 kg)   BMI 40.15 kg/m²    Temp (24hrs), Av.8 °F (36.6 °C), Min:97.1 °F (36.2 °C), Max:99.4 °F (37.4 °C)    General appearance - well appearing, no in pain or distress   Mental status - alert and cooperative   Eyes - pupils equal and reactive, extraocular eye movements intact   Ears - bilateral TM's and external ear canals normal   Mouth - mucous membranes moist, pharynx normal without lesions   Neck - supple, no significant adenopathy   Lymphatics - no palpable lymphadenopathy, no hepatosplenomegaly   Chest - clear to auscultation, no wheezes, rales or rhonchi, symmetric air entry   Heart - normal rate, regular rhythm, normal S1, S2, no murmurs  Abdomen - soft, nontender, nondistended, no masses or organomegaly   Neurological - alert, oriented, normal speech, no focal findings or movement disorder noted   Musculoskeletal - no joint tenderness, deformity or swelling   Extremities - peripheral pulses normal, no pedal edema, no clubbing or cyanosis   Skin - normal coloration and turgor, no rashes, no suspicious skin lesions noted ,    DATA:    Labs:   CBC:   No results for input(s): WBC, HGB, HCT, PLT in the last 72 hours. BMP:   No results for input(s): NA, K, CO2, BUN, CREATININE, LABGLOM, GLUCOSE in the last 72 hours. PT/INR:   No results for input(s): PROTIME, INR in the last 72 hours. Surgical Pathology Report   Surgical Pathology   Collected: 20 2883   Lab status: Final   Resulting lab: [facility]   Value: -- Diagnosis --   HEAD OF PANCREAS MASS EUS FINE NEEDLE ASPIRATION:           POSITIVE FOR MALIGNANCY:   ADENOCARCINOMA, MODERATELY DIFFERENTIATED. IMAGING DATA:  MRI [facility] 6/4/20  FINDINGS:   Exam degraded by motion artifact.       ABDOMEN:       The visualized lung bases reveal no signal abnormality.       The liver is normal in signal intensity and contour.  No focal liver lesion   identified.       Irregular mass in the head of the pancreas is present resulting in biliary   and pancreatic duct obstruction.  This measures 3.2 x 2.3 cm and is best   visualized on T2 imaging.  This mass extends towards the liver hilum. Suann Pillar   is close approximation with the main portal vein, which remains patent.    There is close abutment with the hepatic artery.  No inflammatory change   identified involving the pancreas.  Relative atrophy of the body and tail is   noted.       The spleen, adrenals and kidneys reveal no significant findings.  Right renal   cyst.       The visualized bowel is normal in caliber.       No ascites.  Other than periportal lymph nodes measuring up to 12 mm, no   retroperitoneal or mesenteric lymphadenopathy is otherwise appreciated.       Incidental small intramural fibroid is noted in the right body of the uterus.       No signal abnormality identified in the visualized osseous structures.       MRCP:       Gallbladder: Surgically absent.  Small amount of fluid signal within the   gallbladder fossa, likely postoperative.       Bile Ducts: Intrahepatic and extrahepatic biliary dilatation with abrupt   cutoff at the mid common duct level.  Upstream dilatation measures 16 mm at   the common duct level, 8 mm at the left hepatic duct level and 7 mm in the   right hepatic duct.       Pancreatic Duct: Dilatation with abrupt cutoff at the site of pancreatic head   mass.  Upstream dilatation of 8 mm is noted.           Impression   1.  3.2 cm soft tissue mass in the pancreatic head resulting in pancreatic   duct and biliary dilatation, compatible with primary neoplasm.  There is   abutment of the portal vein and soft tissue extension/lymph nodes in the   liver hilum noted.  Given the limitations of motion artifact on this exam, a   pancreas CT should be considered for detailed vascular evaluation.       2.  No liver metastatic disease. 9/17/2020: MRI abdomen  Impression    1. Stable mass in the pancreatic head measuring up to 3.2 cm compatible with    pancreatic malignancy. Suann Pillar is less than 180 degrees involvement of the    adjacent proximal SMV with some luminal narrowing, not significantly changed    from prior examination.  No additional vascular involvement is demonstrated. 2. Interval increase in size of a carmel hepatis lymph node currently    measuring up to 2.2 cm. 3. Multifocal areas of peripheral T1 hypointensity in the liver which are not    visualized on fat saturated T2 weighted sequences and do not demonstrate    enhancement on subtraction images or diffusion restriction.  Findings could    represent a geographic steatosis, perfusion anomaly, or infarct.  Attention    on follow-up recommended. 4. Trace bilateral pleural effusions. IMPRESSION:   1. Pancreatic adenocarcinoma: Head of pancreas, 3 cm,  MR abd showed no liver lesions possible T2N1MO stage IIB at this times appears borderline/unresectable because of PV involvement. She is seen by hepatobiliary surgeon as o/p  2. CT chest showed No mets. EUS showed that the mass encases the portal vein . An intact interface was seen between the mass and the celiac trunk and superior mesenteric artery suggesting a lack of invasion. Appears locally advanced  3. DKA  4. CAD  5. Abdominal pain    RECOMMENDATIONS:  1. I reviewed the imaging studies, lab data, diagnosis, prognosis and treatment options with patient. She states comfort is her primary goal but she is open to discuss options  2. She was initially started on dose reduced  FOLFIRINOX and subsequently after first cycle irinotecan was discontinued.   She had significant fatigue and nausea and she decided not to continue chemotherapy. 3. She does not want surgery as well. She understands the risk and benefits and wants to focus on quality of life. 4. We discussed the option of chemoradiation. 5. Collins Malin chemoradiation yesterday  6. Patient is agreeable  7. RTC in 3 weeks      Ilya Benitez MD  Hematologist/Medical Oncologist    I spent more than 40 minutes examining, evaluating, reviewing data and counseling the patient. Greater than 50% of that time was spent face-to-face with the patient in counseling and coordinating her care.

## 2020-11-18 ENCOUNTER — TELEPHONE (OUTPATIENT)
Dept: ONCOLOGY | Age: 60
End: 2020-11-18

## 2020-11-18 ENCOUNTER — HOSPITAL ENCOUNTER (OUTPATIENT)
Dept: RADIATION ONCOLOGY | Age: 60
Discharge: HOME | End: 2020-11-18
Attending: RADIOLOGY
Payer: COMMERCIAL

## 2020-11-18 VITALS
RESPIRATION RATE: 16 BRPM | DIASTOLIC BLOOD PRESSURE: 81 MMHG | TEMPERATURE: 97.6 F | WEIGHT: 240.2 LBS | SYSTOLIC BLOOD PRESSURE: 137 MMHG | BODY MASS INDEX: 39.97 KG/M2 | OXYGEN SATURATION: 96 % | HEART RATE: 71 BPM

## 2020-11-18 PROCEDURE — 77386 HC NTSTY MODUL RAD TX DLVR CPLX: CPT

## 2020-11-18 PROCEDURE — 77014 PR CT GUIDANCE PLACEMENT RAD THERAPY FIELDS: CPT | Performed by: RADIOLOGY

## 2020-11-18 NOTE — PROGRESS NOTES
[address]            Radiation Oncology          [address] [facility], [address]        Julienne Nya: [phone]        F: [phone]       [email or web address]         Date of Service: 2020      Location:  [facility] [address],   [address] [facility] [facility], [place]   [phone]     RADIATION ONCOLOGY WEEKLY PROGRESS NOTE    Patient ID:   Artie Kebede  : 1960   MRN: [id number]    DIAGNOSIS:    Cancer Staging  Primary adenocarcinoma of head of pancreas [facility])  Staging form: Exocrine Pancreas, AJCC 8th Edition  - Clinical stage from 2020: Stage IIB (cT2, cN1, cM0) - Signed by Diana Wells MD on 2020      RADIATION THERAPY COURSE:   Treatment Site: Pancreas  Actual Dose: 360 cGy  Total Planned Dose: 5040 cGy  Treatment technique: IMRT  Therapy imaging monitoring: CBCT daily  Concurrent Chemotherapy: Xeloda    SUBJECTIVE:   She has completed 2 out of 28 fractions with concurrent Xeloda for her diagnosis locally advanced unresectable pancreatic cancer. She is doing well with treatment so far. She denies N/V, diarrhea, abdominal pain, and dermatitis. She is tolerating Xeloda well so far. Her appetite is good and weight is stable. OBJECTIVE:   CHAPERONE: Family/friend/companieon Present    ECO Symptomatic but completely ambulatory    VITAL SIGNS: /81   Pulse 71   Temp 97.6 °F (36.4 °C)   Resp 16   Wt 240 lb 3.2 oz (109 kg)   SpO2 96%   BMI 39.97 kg/m²   Wt Readings from Last 5 Encounters:   20 240 lb 3.2 oz (109 kg)   20 241 lb 4.8 oz (109.5 kg)   20 245 lb 8 oz (111.4 kg)   10/14/20 245 lb (111.1 kg)   10/13/20 245 lb 1.6 oz (111.2 kg)     On exam, she is in NAD and appears well. She is breathing comfortably on room air. She does not have any dermatitis in her treatment field.      LABS:  WBC   Date Value Ref Range Status   2020 8.4 3.5 - 11.0 k/uL Final   2020 7.3 3.5 - 11.0 k/uL Final   09/08/2020 6.2 3.5 - 11.0 k/uL Final     Segs Absolute   Date Value Ref Range Status   11/03/2020 6.20 1.8 - 7.7 k/uL Final   09/22/2020 5.70 1.8 - 7.7 k/uL Final   09/08/2020 3.80 1.8 - 7.7 k/uL Final     Hemoglobin   Date Value Ref Range Status   11/03/2020 10.9 (L) 12.0 - 16.0 g/dL Final   09/22/2020 10.5 (L) 12.0 - 16.0 g/dL Final   09/08/2020 11.4 (L) 12.0 - 16.0 g/dL Final     Platelet Count   Date Value Ref Range Status   03/05/2012 298 130 - 400 k/uL Final   10/03/2011 328 140 - 450 k/uL Final     Platelets   Date Value Ref Range Status   11/03/2020 317 140 - 450 k/uL Final   09/22/2020 206 140 - 450 k/uL Final   09/08/2020 316 140 - 450 k/uL Final     CREATININE   Date Value Ref Range Status   11/11/2020 0.66 0.50 - 0.90 mg/dL Final   11/03/2020 0.69 0.50 - 0.90 mg/dL Final   09/22/2020 0.58 0.50 - 0.90 mg/dL Final     CEA   Date Value Ref Range Status   06/04/2020 2.7 <3.9 ng/mL Final     Comment:     The Roche \"ECLIA\" assay is used. Results obtained with different assay methods cannot be   used interchangeably. 06/03/2020 2.6 <3.9 ng/mL Final     Comment:     The Roche \"ECLIA\" assay is used. Results obtained with different assay methods cannot be   used interchangeably. MEDICATIONS:    Current Outpatient Medications:     capecitabine (XELODA) 150 MG chemo tablet, Take 2 pills twice daily on Monday to Friday while on radiation, Disp: 120 tablet, Rfl: 0    oxyCODONE (ROXICODONE) 5 MG immediate release tablet, Take 5 mg by mouth as needed for Pain., Disp: , Rfl:     capecitabine (XELODA) 500 MG chemo tablet, Take 3 pills twice daily on Monday to Friday while on radiation, Disp: 180 tablet, Rfl: 0    lipase-protease-amylase (CREON) 52930 units CPEP delayed release capsule, Take two 36,000unit capsules with meals and one- 36,000unit capsule with snacks.  (Patient not taking: Reported on 11/11/2020), Disp: 180 capsule, Rfl: 2    lidocaine-prilocaine (EMLA) 2.5-2.5 % cream, Apply topically as needed. , Disp: 1 Tube, Rfl: 2    ondansetron (ZOFRAN-ODT) 8 MG TBDP disintegrating tablet, Place 1 tablet under the tongue every 8 hours as needed for Nausea or Vomiting, Disp: 90 tablet, Rfl: 2    furosemide (LASIX) 20 MG tablet, Take 1 tablet by mouth daily (Patient not taking: Reported on 11/3/2020), Disp: 30 tablet, Rfl: 1    nitroGLYCERIN (NITROSTAT) 0.4 MG SL tablet, up to max of 3 total doses. If no relief after 1 dose, call 911., Disp: 25 tablet, Rfl: 3    insulin glargine (LANTUS) 100 UNIT/ML injection vial, Inject 50 Units into the skin nightly, Disp: 1 vial, Rfl: 3    insulin glargine (LANTUS) 100 UNIT/ML injection vial, Inject 60 Units into the skin daily, Disp: 1 vial, Rfl: 3    hydrALAZINE (APRESOLINE) 25 MG tablet, Take 1 tablet by mouth every 8 hours, Disp: 90 tablet, Rfl: 3    carvedilol (COREG) 12.5 MG tablet, Take 1 tablet by mouth 2 times daily (with meals), Disp: 60 tablet, Rfl: 3    amLODIPine (NORVASC) 10 MG tablet, Take 1 tablet by mouth daily, Disp: 30 tablet, Rfl: 3    aspirin 81 MG chewable tablet, Take 81 mg by mouth nightly, Disp: , Rfl:     insulin aspart (NOVOLOG FLEXPEN) 100 UNIT/ML injection pen, Inject into the skin, Disp: , Rfl:     sertraline (ZOLOFT) 100 MG tablet, take 1 1/2 tablet by oral route  every day, Disp: , Rfl:     atorvastatin (LIPITOR) 40 MG tablet, nightly , Disp: , Rfl:     LORazepam (ATIVAN) 0.5 MG tablet, 1 mg nightly. And 1/2 tab once daily as needed, Disp: , Rfl:       ASSESSMENT PLAN:     Treatment setup and plan reviewed. Port images/CBCT images reviewed. Appropriate laboratory work was reviewed. Treatment side effects and toxicities reviewed with the patient, and appropriate management was advised. Will continue radiation treatment as planned. Patient's  did ask about the goals of treatment. We discussed the fact that the tumor is considered unresectable and that the treatment strategy is definitive chemoRT.   The

## 2020-11-19 ENCOUNTER — HOSPITAL ENCOUNTER (OUTPATIENT)
Dept: RADIATION ONCOLOGY | Age: 60
Discharge: HOME | End: 2020-11-19
Attending: RADIOLOGY
Payer: COMMERCIAL

## 2020-11-19 ENCOUNTER — TELEPHONE (OUTPATIENT)
Dept: PALLATIVE CARE | Age: 60
End: 2020-11-19

## 2020-11-19 PROCEDURE — 77014 PR CT GUIDANCE PLACEMENT RAD THERAPY FIELDS: CPT | Performed by: RADIOLOGY

## 2020-11-19 PROCEDURE — 77336 RADIATION PHYSICS CONSULT: CPT

## 2020-11-19 PROCEDURE — 77386 HC NTSTY MODUL RAD TX DLVR CPLX: CPT

## 2020-11-19 NOTE — TELEPHONE ENCOUNTER
Called patient to relay that her stool cultures were normal. Pt states she has started on chemo/radiation and so far is going well. She states her diarrhea seems to be a little less. She states she is no longer taking imodium. She is not taking her creon as she states her oncologist says to hold off on that because of her diarrhea. We discussed the diarrhea may just be part of the disease process and she states that is what her oncologist told her. Will see patient in clinic in a few weeks. Pt denies needs at this time.     [address], [place], RN, [address]

## 2020-11-20 ENCOUNTER — HOSPITAL ENCOUNTER (OUTPATIENT)
Dept: RADIATION ONCOLOGY | Age: 60
Discharge: HOME | End: 2020-11-20
Attending: RADIOLOGY
Payer: COMMERCIAL

## 2020-11-20 PROCEDURE — 77014 PR CT GUIDANCE PLACEMENT RAD THERAPY FIELDS: CPT | Performed by: RADIOLOGY

## 2020-11-20 PROCEDURE — 77386 HC NTSTY MODUL RAD TX DLVR CPLX: CPT

## 2020-11-22 ENCOUNTER — HOSPITAL ENCOUNTER (OUTPATIENT)
Dept: RADIATION ONCOLOGY | Age: 60
Discharge: HOME | End: 2020-11-22
Attending: RADIOLOGY
Payer: COMMERCIAL

## 2020-11-22 PROCEDURE — 77427 RADIATION TX MANAGEMENT X5: CPT | Performed by: RADIOLOGY

## 2020-11-22 PROCEDURE — 77014 PR CT GUIDANCE PLACEMENT RAD THERAPY FIELDS: CPT | Performed by: RADIOLOGY

## 2020-11-22 PROCEDURE — 77386 HC NTSTY MODUL RAD TX DLVR CPLX: CPT | Performed by: RADIOLOGY

## 2020-11-23 ENCOUNTER — HOSPITAL ENCOUNTER (OUTPATIENT)
Dept: RADIATION ONCOLOGY | Age: 60
Discharge: HOME | End: 2020-11-23
Attending: RADIOLOGY
Payer: COMMERCIAL

## 2020-11-23 PROCEDURE — 77386 HC NTSTY MODUL RAD TX DLVR CPLX: CPT | Performed by: RADIOLOGY

## 2020-11-23 PROCEDURE — 77014 PR CT GUIDANCE PLACEMENT RAD THERAPY FIELDS: CPT | Performed by: RADIOLOGY

## 2020-11-24 ENCOUNTER — HOSPITAL ENCOUNTER (OUTPATIENT)
Dept: RADIATION ONCOLOGY | Age: 60
Discharge: HOME | End: 2020-11-24
Attending: RADIOLOGY
Payer: COMMERCIAL

## 2020-11-24 PROCEDURE — 77014 PR CT GUIDANCE PLACEMENT RAD THERAPY FIELDS: CPT | Performed by: RADIOLOGY

## 2020-11-24 PROCEDURE — 77386 HC NTSTY MODUL RAD TX DLVR CPLX: CPT | Performed by: RADIOLOGY

## 2020-11-24 RX ORDER — CAPECITABINE 150 MG/1
TABLET, FILM COATED ORAL
Qty: 40 TABLET | Refills: 0 | Status: ON HOLD | OUTPATIENT
Start: 2020-11-24 | End: 2021-01-06 | Stop reason: HOSPADM

## 2020-11-24 RX ORDER — CAPECITABINE 500 MG/1
TABLET, FILM COATED ORAL
Qty: 60 TABLET | Refills: 0 | Status: ON HOLD | OUTPATIENT
Start: 2020-11-24 | End: 2021-01-06 | Stop reason: HOSPADM

## 2020-11-25 ENCOUNTER — APPOINTMENT (OUTPATIENT)
Dept: RADIATION ONCOLOGY | Age: 60
End: 2020-11-25
Attending: RADIOLOGY
Payer: COMMERCIAL

## 2020-11-25 ENCOUNTER — HOSPITAL ENCOUNTER (OUTPATIENT)
Dept: RADIATION ONCOLOGY | Age: 60
Discharge: HOME | End: 2020-11-25
Attending: RADIOLOGY
Payer: COMMERCIAL

## 2020-11-25 PROCEDURE — 77386 HC NTSTY MODUL RAD TX DLVR CPLX: CPT | Performed by: RADIOLOGY

## 2020-11-25 PROCEDURE — 77336 RADIATION PHYSICS CONSULT: CPT | Performed by: RADIOLOGY

## 2020-11-25 PROCEDURE — 77014 PR CT GUIDANCE PLACEMENT RAD THERAPY FIELDS: CPT | Performed by: RADIOLOGY

## 2020-11-30 ENCOUNTER — APPOINTMENT (OUTPATIENT)
Dept: RADIATION ONCOLOGY | Age: 60
End: 2020-11-30
Attending: RADIOLOGY
Payer: COMMERCIAL

## 2020-12-01 ENCOUNTER — HOSPITAL ENCOUNTER (OUTPATIENT)
Dept: RADIATION ONCOLOGY | Age: 60
Discharge: HOME | End: 2020-12-01
Attending: RADIOLOGY
Payer: COMMERCIAL

## 2020-12-01 VITALS
OXYGEN SATURATION: 99 % | SYSTOLIC BLOOD PRESSURE: 168 MMHG | RESPIRATION RATE: 18 BRPM | HEART RATE: 81 BPM | TEMPERATURE: 97.8 F | DIASTOLIC BLOOD PRESSURE: 84 MMHG | WEIGHT: 241 LBS | BODY MASS INDEX: 40.1 KG/M2

## 2020-12-01 PROCEDURE — 77386 HC NTSTY MODUL RAD TX DLVR CPLX: CPT | Performed by: RADIOLOGY

## 2020-12-01 PROCEDURE — 77014 PR CT GUIDANCE PLACEMENT RAD THERAPY FIELDS: CPT | Performed by: RADIOLOGY

## 2020-12-01 ASSESSMENT — PAIN DESCRIPTION - DESCRIPTORS: DESCRIPTORS: CONSTANT;SHARP

## 2020-12-01 ASSESSMENT — PAIN DESCRIPTION - ORIENTATION: ORIENTATION: LEFT

## 2020-12-01 ASSESSMENT — PAIN SCALES - GENERAL: PAINLEVEL_OUTOF10: 8

## 2020-12-01 ASSESSMENT — PAIN DESCRIPTION - LOCATION: LOCATION: BACK;FLANK;ABDOMEN

## 2020-12-01 ASSESSMENT — PAIN DESCRIPTION - PAIN TYPE: TYPE: ACUTE PAIN

## 2020-12-01 NOTE — PROGRESS NOTES
[address]            Radiation Oncology          [address] [facility], [address]        Tiffany Baker: [phone]        F: [phone]       [email or web address]         Date of Service: 2020      Location:  [facility]  Fazal Epstein,   [address] [facility], [place]   [phone]     RADIATION ONCOLOGY WEEKLY PROGRESS NOTE    Patient ID:   Ronny Leal  : 1960   MRN: [id number]    DIAGNOSIS:  Cancer Staging  Primary adenocarcinoma of head of pancreas [facility])  Staging form: Exocrine Pancreas, AJCC 8th Edition  - Clinical stage from 2020: Stage IIB (cT2, cN1, cM0) - Signed by Quynh Haynes MD on 2020        RADIATION THERAPY COURSE:   Treatment Site: Pancreas  Actual Dose: 1620 cGy  Total Planned Dose: 5040cGy  Treatment technique: IMRT  Therapy imaging monitoring: CBCT daily  Concurrent Chemotherapy: Xeloda    SUBJECTIVE:   Patient seen for their weekly on treatment evaluation today. She is having sharp pains along the left side of her abdomen that radiate to her back. She has some nausea but is using Zofran. She does have oxycodone for pain, but is using it sparsely and did not take any today. She did not take her Xeloda today because she did not eat. She does have diarrhea and has a oily discharge. She denies any other complaints today. OBJECTIVE:   CHAPERONE: Not Required    ECO Symptomatic but completely ambulatory    VITAL SIGNS: BP (!) 168/84   Pulse 81   Temp 97.8 °F (36.6 °C) (Oral)   Resp 18   Wt 241 lb (109.3 kg)   SpO2 99%   BMI 40.10 kg/m²   Wt Readings from Last 5 Encounters:   20 241 lb (109.3 kg)   20 240 lb 3.2 oz (109 kg)   20 241 lb 4.8 oz (109.5 kg)   20 245 lb 8 oz (111.4 kg)   10/14/20 245 lb (111.1 kg)     GENERAL:  General appearance is that of a well-nourished, well-developed in no apparent distress. ABDOMEN:  Soft, non distended, sharp pains on L side.   SKIN: No erythema or desquamation. LABS:  WBC   Date Value Ref Range Status   11/03/2020 8.4 3.5 - 11.0 k/uL Final   09/22/2020 7.3 3.5 - 11.0 k/uL Final   09/08/2020 6.2 3.5 - 11.0 k/uL Final     Segs Absolute   Date Value Ref Range Status   11/03/2020 6.20 1.8 - 7.7 k/uL Final   09/22/2020 5.70 1.8 - 7.7 k/uL Final   09/08/2020 3.80 1.8 - 7.7 k/uL Final     Hemoglobin   Date Value Ref Range Status   11/03/2020 10.9 (L) 12.0 - 16.0 g/dL Final   09/22/2020 10.5 (L) 12.0 - 16.0 g/dL Final   09/08/2020 11.4 (L) 12.0 - 16.0 g/dL Final     Platelet Count   Date Value Ref Range Status   03/05/2012 298 130 - 400 k/uL Final   10/03/2011 328 140 - 450 k/uL Final     Platelets   Date Value Ref Range Status   11/03/2020 317 140 - 450 k/uL Final   09/22/2020 206 140 - 450 k/uL Final   09/08/2020 316 140 - 450 k/uL Final     CREATININE   Date Value Ref Range Status   11/11/2020 0.66 0.50 - 0.90 mg/dL Final   11/03/2020 0.69 0.50 - 0.90 mg/dL Final   09/22/2020 0.58 0.50 - 0.90 mg/dL Final     CEA   Date Value Ref Range Status   06/04/2020 2.7 <3.9 ng/mL Final     Comment:     The Roche \"ECLIA\" assay is used. Results obtained with different assay methods cannot be   used interchangeably. 06/03/2020 2.6 <3.9 ng/mL Final     Comment:     The Roche \"ECLIA\" assay is used. Results obtained with different assay methods cannot be   used interchangeably.          MEDICATIONS:    Current Outpatient Medications:     capecitabine (XELODA) 150 MG chemo tablet, TAKE 2 TABLETS BY MOUTH TWICE DAILY ON MONDAY TO FRIDAY WHILE ON RADIATION., Disp: 40 tablet, Rfl: 0    capecitabine (XELODA) 500 MG chemo tablet, TAKE 3 TABLETS BY MOUTH TWICE DAILY ON MONDAY TO FRIDAY WHILE ON RADIATION., Disp: 60 tablet, Rfl: 0    oxyCODONE (ROXICODONE) 5 MG immediate release tablet, Take 5 mg by mouth every 6 hours as needed for Pain. , Disp: , Rfl:     lipase-protease-amylase (CREON) 77450 units CPEP delayed release capsule, Take two 36,000unit capsules with meals and one- 36,000unit capsule with snacks. (Patient not taking: Reported on 11/11/2020), Disp: 180 capsule, Rfl: 2    lidocaine-prilocaine (EMLA) 2.5-2.5 % cream, Apply topically as needed. , Disp: 1 Tube, Rfl: 2    ondansetron (ZOFRAN-ODT) 8 MG TBDP disintegrating tablet, Place 1 tablet under the tongue every 8 hours as needed for Nausea or Vomiting, Disp: 90 tablet, Rfl: 2    furosemide (LASIX) 20 MG tablet, Take 1 tablet by mouth daily (Patient not taking: Reported on 11/3/2020), Disp: 30 tablet, Rfl: 1    nitroGLYCERIN (NITROSTAT) 0.4 MG SL tablet, up to max of 3 total doses. If no relief after 1 dose, call 911., Disp: 25 tablet, Rfl: 3    insulin glargine (LANTUS) 100 UNIT/ML injection vial, Inject 50 Units into the skin nightly, Disp: 1 vial, Rfl: 3    insulin glargine (LANTUS) 100 UNIT/ML injection vial, Inject 60 Units into the skin daily, Disp: 1 vial, Rfl: 3    hydrALAZINE (APRESOLINE) 25 MG tablet, Take 1 tablet by mouth every 8 hours, Disp: 90 tablet, Rfl: 3    carvedilol (COREG) 12.5 MG tablet, Take 1 tablet by mouth 2 times daily (with meals), Disp: 60 tablet, Rfl: 3    amLODIPine (NORVASC) 10 MG tablet, Take 1 tablet by mouth daily, Disp: 30 tablet, Rfl: 3    aspirin 81 MG chewable tablet, Take 81 mg by mouth nightly, Disp: , Rfl:     insulin aspart (NOVOLOG FLEXPEN) 100 UNIT/ML injection pen, Inject into the skin, Disp: , Rfl:     sertraline (ZOLOFT) 100 MG tablet, take 1 1/2 tablet by oral route  every day, Disp: , Rfl:     atorvastatin (LIPITOR) 40 MG tablet, nightly , Disp: , Rfl:     LORazepam (ATIVAN) 0.5 MG tablet, 1 mg nightly. And 1/2 tab once daily as needed, Disp: , Rfl:       ASSESSMENT PLAN:   Treatment setup and plan reviewed. Port images/CBCT images reviewed. Appropriate laboratory work was reviewed. Treatment side effects and toxicities reviewed with the patient, and appropriate management was advised. Patient advised to take Xeloda before radiation treatments.  She was advised to take Oxycodone more scheduled and to take her Creon to see if it helps with her symptoms. Will continue radiation treatment as planned, and recommend patient contact us if they have any questions or concerns. Electronically signed by Antonio Habermann, MD on 12/1/2020 at 3:47 PM      Drugs Prescribed:  New Prescriptions    No medications on file       Other Orders Placed:  No orders of the defined types were placed in this encounter.

## 2020-12-01 NOTE — PROGRESS NOTES
[facility]  12/1/2020  Wt Readings from Last 3 Encounters:   12/01/20 241 lb (109.3 kg)   11/18/20 240 lb 3.2 oz (109 kg)   11/17/20 241 lb 4.8 oz (109.5 kg)     Body mass index is 40.1 kg/m². Treatment Area: Pancreas         Comfort Alteration  Fatigue: Moderate      Nutritional Alteration  Anorexia: Yes and no depending on the day    Nausea: Yes   Vomiting: No       Elimination Alterations  Constipation: no  Diarrhea:  Yes, using imodium     Skin Alteration   Sensation:no issues     Radiation Dermatitis:  Intact [x]     Erythema  []     Discoloration  []     Rash []     Dry desquamation  []     Moist desquamation []       Emotional  Coping: somewhat effective      Injury, potential bleeding or infection:  No concerns    Lab Results   Component Value Date    WBC 8.4 11/03/2020     11/03/2020         BP (!) 168/84   Pulse 81   Temp 97.8 °F (36.6 °C) (Oral)   Resp 18   Wt 241 lb (109.3 kg)   SpO2 99%   BMI 40.10 kg/m²   Patient Currently in Pain: Yes  Pain Assessment: 0-10  Pain Level: 8           Assessment/Plan: Patient was seen today for weekly visit. Notes increased pain to left abd/flank. Pt using pain meds, but not noticing much relief. Some nausea and loose stools. Dr. Kamar Oviedo updated and examined pt. Encouraged her to increase pain meds and we will reevaluate Friday this week.      Ann Grimaldo

## 2020-12-02 ENCOUNTER — HOSPITAL ENCOUNTER (OUTPATIENT)
Dept: RADIATION ONCOLOGY | Age: 60
Discharge: HOME | End: 2020-12-02
Attending: RADIOLOGY
Payer: COMMERCIAL

## 2020-12-02 PROCEDURE — 77427 RADIATION TX MANAGEMENT X5: CPT | Performed by: RADIOLOGY

## 2020-12-02 PROCEDURE — 77336 RADIATION PHYSICS CONSULT: CPT | Performed by: RADIOLOGY

## 2020-12-02 PROCEDURE — 77014 PR CT GUIDANCE PLACEMENT RAD THERAPY FIELDS: CPT | Performed by: RADIOLOGY

## 2020-12-02 PROCEDURE — 77386 HC NTSTY MODUL RAD TX DLVR CPLX: CPT | Performed by: RADIOLOGY

## 2020-12-03 ENCOUNTER — HOSPITAL ENCOUNTER (OUTPATIENT)
Dept: RADIATION ONCOLOGY | Age: 60
Discharge: HOME | End: 2020-12-03
Attending: RADIOLOGY
Payer: COMMERCIAL

## 2020-12-03 PROCEDURE — 77014 PR CT GUIDANCE PLACEMENT RAD THERAPY FIELDS: CPT | Performed by: RADIOLOGY

## 2020-12-03 PROCEDURE — 77386 HC NTSTY MODUL RAD TX DLVR CPLX: CPT | Performed by: RADIOLOGY

## 2020-12-04 ENCOUNTER — HOSPITAL ENCOUNTER (OUTPATIENT)
Dept: RADIATION ONCOLOGY | Age: 60
Discharge: HOME | End: 2020-12-04
Attending: RADIOLOGY
Payer: COMMERCIAL

## 2020-12-04 PROCEDURE — 77014 PR CT GUIDANCE PLACEMENT RAD THERAPY FIELDS: CPT | Performed by: RADIOLOGY

## 2020-12-04 PROCEDURE — 77386 HC NTSTY MODUL RAD TX DLVR CPLX: CPT | Performed by: RADIOLOGY

## 2020-12-07 ENCOUNTER — HOSPITAL ENCOUNTER (OUTPATIENT)
Dept: RADIATION ONCOLOGY | Age: 60
Discharge: HOME | End: 2020-12-07
Attending: RADIOLOGY
Payer: COMMERCIAL

## 2020-12-07 PROCEDURE — 77014 PR CT GUIDANCE PLACEMENT RAD THERAPY FIELDS: CPT | Performed by: RADIOLOGY

## 2020-12-07 PROCEDURE — 77386 HC NTSTY MODUL RAD TX DLVR CPLX: CPT | Performed by: RADIOLOGY

## 2020-12-08 ENCOUNTER — HOSPITAL ENCOUNTER (OUTPATIENT)
Age: 60
Discharge: HOME | End: 2020-12-08
Payer: COMMERCIAL

## 2020-12-08 ENCOUNTER — HOSPITAL ENCOUNTER (OUTPATIENT)
Dept: INFUSION THERAPY | Age: 60
Discharge: HOME | End: 2020-12-08
Payer: COMMERCIAL

## 2020-12-08 ENCOUNTER — HOSPITAL ENCOUNTER (OUTPATIENT)
Dept: RADIATION ONCOLOGY | Age: 60
Discharge: HOME | End: 2020-12-08
Attending: RADIOLOGY
Payer: COMMERCIAL

## 2020-12-08 ENCOUNTER — OFFICE VISIT (OUTPATIENT)
Dept: ONCOLOGY | Age: 60
End: 2020-12-08
Payer: COMMERCIAL

## 2020-12-08 VITALS
HEART RATE: 72 BPM | DIASTOLIC BLOOD PRESSURE: 82 MMHG | TEMPERATURE: 98 F | SYSTOLIC BLOOD PRESSURE: 145 MMHG | BODY MASS INDEX: 40.49 KG/M2 | WEIGHT: 243.3 LBS

## 2020-12-08 DIAGNOSIS — C25.0 PRIMARY ADENOCARCINOMA OF HEAD OF PANCREAS (HCC): ICD-10-CM

## 2020-12-08 DIAGNOSIS — C25.0 PRIMARY ADENOCARCINOMA OF HEAD OF PANCREAS (HCC): Primary | ICD-10-CM

## 2020-12-08 LAB
ABSOLUTE EOS #: 0.14 K/UL (ref 0–0.4)
ABSOLUTE IMMATURE GRANULOCYTE: ABNORMAL K/UL (ref 0–0.3)
ABSOLUTE LYMPH #: 0.62 K/UL (ref 1–4.8)
ABSOLUTE MONO #: 0.38 K/UL (ref 0.1–0.8)
ALBUMIN SERPL-MCNC: 3 G/DL (ref 3.5–5.2)
ALBUMIN/GLOBULIN RATIO: 1.2 (ref 1–2.5)
ALP BLD-CCNC: 124 U/L (ref 35–104)
ALT SERPL-CCNC: 13 U/L (ref 5–33)
ANION GAP SERPL CALCULATED.3IONS-SCNC: 8 MMOL/L (ref 9–17)
AST SERPL-CCNC: 15 U/L
BASOPHILS # BLD: 0 % (ref 0–2)
BASOPHILS ABSOLUTE: 0 K/UL (ref 0–0.2)
BILIRUB SERPL-MCNC: 0.32 MG/DL (ref 0.3–1.2)
BUN BLDV-MCNC: 8 MG/DL (ref 6–20)
BUN/CREAT BLD: ABNORMAL (ref 9–20)
CA 19-9: 8 U/ML (ref 0–35)
CALCIUM SERPL-MCNC: 9.6 MG/DL (ref 8.6–10.4)
CHLORIDE BLD-SCNC: 102 MMOL/L (ref 98–107)
CO2: 28 MMOL/L (ref 20–31)
CREAT SERPL-MCNC: 0.63 MG/DL (ref 0.5–0.9)
DIFFERENTIAL TYPE: ABNORMAL
EOSINOPHILS RELATIVE PERCENT: 3 % (ref 1–4)
GFR AFRICAN AMERICAN: >60 ML/MIN
GFR NON-AFRICAN AMERICAN: >60 ML/MIN
GFR SERPL CREATININE-BSD FRML MDRD: ABNORMAL ML/MIN/{1.73_M2}
GFR SERPL CREATININE-BSD FRML MDRD: ABNORMAL ML/MIN/{1.73_M2}
GLUCOSE BLD-MCNC: 147 MG/DL (ref 70–99)
HCT VFR BLD CALC: 29.2 % (ref 36–46)
HEMOGLOBIN: 9.5 G/DL (ref 12–16)
IMMATURE GRANULOCYTES: ABNORMAL %
LYMPHOCYTES # BLD: 13 % (ref 24–44)
MCH RBC QN AUTO: 27.1 PG (ref 26–34)
MCHC RBC AUTO-ENTMCNC: 32.6 G/DL (ref 31–37)
MCV RBC AUTO: 83 FL (ref 80–100)
MONOCYTES # BLD: 8 % (ref 1–7)
MORPHOLOGY: NORMAL
NRBC AUTOMATED: ABNORMAL PER 100 WBC
PDW BLD-RTO: 16.9 % (ref 12.5–15.4)
PLATELET # BLD: 217 K/UL (ref 140–450)
PLATELET ESTIMATE: ABNORMAL
PMV BLD AUTO: 8.3 FL (ref 6–12)
POTASSIUM SERPL-SCNC: 3.5 MMOL/L (ref 3.7–5.3)
RBC # BLD: 3.52 M/UL (ref 4–5.2)
RBC # BLD: ABNORMAL 10*6/UL
SEG NEUTROPHILS: 76 % (ref 36–66)
SEGMENTED NEUTROPHILS ABSOLUTE COUNT: 3.66 K/UL (ref 1.8–7.7)
SODIUM BLD-SCNC: 138 MMOL/L (ref 135–144)
TOTAL PROTEIN: 5.6 G/DL (ref 6.4–8.3)
WBC # BLD: 4.8 K/UL (ref 3.5–11)
WBC # BLD: ABNORMAL 10*3/UL

## 2020-12-08 PROCEDURE — 80053 COMPREHEN METABOLIC PANEL: CPT

## 2020-12-08 PROCEDURE — 86301 IMMUNOASSAY TUMOR CA 19-9: CPT

## 2020-12-08 PROCEDURE — 99215 OFFICE O/P EST HI 40 MIN: CPT | Performed by: INTERNAL MEDICINE

## 2020-12-08 PROCEDURE — 77386 HC NTSTY MODUL RAD TX DLVR CPLX: CPT | Performed by: RADIOLOGY

## 2020-12-08 PROCEDURE — 6360000002 HC RX W HCPCS: Performed by: INTERNAL MEDICINE

## 2020-12-08 PROCEDURE — 2580000003 HC RX 258: Performed by: INTERNAL MEDICINE

## 2020-12-08 PROCEDURE — 77014 PR CT GUIDANCE PLACEMENT RAD THERAPY FIELDS: CPT | Performed by: RADIOLOGY

## 2020-12-08 PROCEDURE — 85025 COMPLETE CBC W/AUTO DIFF WBC: CPT

## 2020-12-08 RX ORDER — SODIUM CHLORIDE 0.9 % (FLUSH) 0.9 %
10 SYRINGE (ML) INJECTION PRN
Status: CANCELLED | OUTPATIENT
Start: 2020-12-08

## 2020-12-08 RX ORDER — SODIUM CHLORIDE 0.9 % (FLUSH) 0.9 %
20 SYRINGE (ML) INJECTION PRN
Status: DISCONTINUED | OUTPATIENT
Start: 2020-12-08 | End: 2020-12-09 | Stop reason: HOSPADM

## 2020-12-08 RX ORDER — HEPARIN SODIUM (PORCINE) LOCK FLUSH IV SOLN 100 UNIT/ML 100 UNIT/ML
500 SOLUTION INTRAVENOUS PRN
Status: CANCELLED | OUTPATIENT
Start: 2020-12-08

## 2020-12-08 RX ORDER — HEPARIN SODIUM (PORCINE) LOCK FLUSH IV SOLN 100 UNIT/ML 100 UNIT/ML
500 SOLUTION INTRAVENOUS PRN
Status: DISCONTINUED | OUTPATIENT
Start: 2020-12-08 | End: 2020-12-09 | Stop reason: HOSPADM

## 2020-12-08 RX ORDER — PROCHLORPERAZINE MALEATE 10 MG
10 TABLET ORAL EVERY 6 HOURS PRN
Qty: 120 TABLET | Refills: 3 | Status: ON HOLD | OUTPATIENT
Start: 2020-12-08 | End: 2021-01-06 | Stop reason: HOSPADM

## 2020-12-08 RX ORDER — SODIUM CHLORIDE 0.9 % (FLUSH) 0.9 %
20 SYRINGE (ML) INJECTION PRN
Status: CANCELLED | OUTPATIENT
Start: 2020-12-08

## 2020-12-08 RX ADMIN — SODIUM CHLORIDE, PRESERVATIVE FREE 20 ML: 5 INJECTION INTRAVENOUS at 12:00

## 2020-12-08 RX ADMIN — HEPARIN 500 UNITS: 100 SYRINGE at 12:04

## 2020-12-08 NOTE — PROGRESS NOTES
Today's Date: 2020  Patient Name: Sherley Lock  Patient's age: 64 y. o., 1960    DIAGNOSIS: pancreatic cancer, clinical  T2N1MO stage IIB, encasement of portal vein, unresectable  Started on dose reduced FOLFIRINOX on 2020  Her chemotherapy is currently on hold as per patient's wishes  started Xeloda-based chemoradiation as per patient wishes 20  RT planned till     CHIEF COMPLAINT:    Chief Complaint   Patient presents with    Follow-up     review status of disease    Nausea     Needs something else besides Zofran    Other     Stomach pains     HISTORY OF PRESENT ILLNESS:    This is a 61-year-old female who was admitted with diabetic ketoacidosis, lower back pain. She was noted to have elevated troponin, elevated liver enzymes. Patient was seen by cardiology and had cardiac cath which showed nonobstructive CAD and medical management recommended. She had ultrasound of the liver which showed dilated pancreatic duct and intrahepatic ductal dilatation. This was followed by MRCP which showed pancreatic mass and she underwent ERCP and EUS on 20 with cholangiogram, Biliary sphincterotomy and  Placement of 10 F x 7 cm plastic biliary stent  The pancreatic head biopsy showed adenocarcinoma. Oncology consulted for further recommendations. INTERVAL HISTORY:  Patient is returning for follow up visit and to discuss further recommendations. She is tolerating chemoradiation well. She has mild nausea not well controlled with Zofran so I will add Compazine. She is following with pain clinic. During this visit patient's allergy, social, medical, surgical history and medications were reviewed and updated. Past Medical History:   has a past medical history of Anxiety, Diabetes mellitus ([address] Hyperlipidemia, Hypertension, and Pancreatic cancer ([address]     Past Surgical History:   has a past surgical history that includes  section; Gallbladder surgery; ERCP (06/04/2020); Upper gastrointestinal endoscopy (06/04/2020); Upper gastrointestinal endoscopy (6/4/2020); ERCP (6/4/2020); ERCP (6/4/2020); ERCP (07/22/2020); ERCP (7/22/2020); ERCP (7/22/2020); and ERCP (7/22/2020). Medications:    Prior to Admission medications    Medication Sig Start Date End Date Taking? Authorizing Provider   prochlorperazine (COMPAZINE) 10 MG tablet Take 1 tablet by mouth every 6 hours as needed (NV) 12/8/20  Yes Ilya Benitez MD   capecitabine (XELODA) 150 MG chemo tablet TAKE 2 TABLETS BY MOUTH TWICE DAILY ON MONDAY TO FRIDAY WHILE ON RADIATION. 11/24/20  Yes Santhosh Fatima MD   capecitabine (XELODA) 500 MG chemo tablet TAKE 3 TABLETS BY MOUTH TWICE DAILY ON MONDAY TO FRIDAY WHILE ON RADIATION. 11/24/20  Yes Santhosh Fatima MD   oxyCODONE (ROXICODONE) 5 MG immediate release tablet Take 5 mg by mouth every 6 hours as needed for Pain. Yes Historical MD Aaron   lipase-protease-amylase (CREON) 32854 units CPEP delayed release capsule Take two 36,000unit capsules with meals and one- 36,000unit capsule with snacks. 8/18/20  Yes Santhosh Fatima MD   lidocaine-prilocaine (EMLA) 2.5-2.5 % cream Apply topically as needed. 7/6/20  Yes Santhosh Fatima MD   ondansetron (ZOFRAN-ODT) 8 MG TBDP disintegrating tablet Place 1 tablet under the tongue every 8 hours as needed for Nausea or Vomiting 7/6/20  Yes Santhosh Fatima MD   nitroGLYCERIN (NITROSTAT) 0.4 MG SL tablet up to max of 3 total doses.  If no relief after 1 dose, call 911. 6/6/20  Yes Loco Jauregui MD   insulin glargine (LANTUS) 100 UNIT/ML injection vial Inject 50 Units into the skin nightly 6/6/20  Yes Loco Jauregui MD   insulin glargine (LANTUS) 100 UNIT/ML injection vial Inject 60 Units into the skin daily 6/7/20  Yes Loco Jauregui MD   hydrALAZINE (APRESOLINE) 25 MG tablet Take 1 tablet by mouth every 8 hours 6/6/20  Yes Loco Jauregui MD   carvedilol (COREG) 12.5 MG tablet Take 1 tablet by mouth 2 times daily (with meals) 6/6/20  Yes Kaushal Richard MD   amLODIPine (NORVASC) 10 MG tablet Take 1 tablet by mouth daily 6/7/20  Yes Kaushal Richard MD   aspirin 81 MG chewable tablet Take 81 mg by mouth nightly   Yes Historical Provider, MD   insulin aspart (NOVOLOG FLEXPEN) 100 UNIT/ML injection pen Inject into the skin   Yes Historical Provider, MD   sertraline (ZOLOFT) 100 MG tablet take 1 1/2 tablet by oral route  every day   Yes Historical Provider, MD   atorvastatin (LIPITOR) 40 MG tablet nightly  10/12/13  Yes Historical Provider, MD   LORazepam (ATIVAN) 0.5 MG tablet 1 mg nightly. And 1/2 tab once daily as needed 10/12/13  Yes Historical Provider, MD   furosemide (LASIX) 20 MG tablet Take 1 tablet by mouth daily  Patient not taking: Reported on 11/3/2020 6/16/20 10/13/20  Franck Morgan MD     Current Outpatient Medications   Medication Sig Dispense Refill    prochlorperazine (COMPAZINE) 10 MG tablet Take 1 tablet by mouth every 6 hours as needed (NV) 120 tablet 3    capecitabine (XELODA) 150 MG chemo tablet TAKE 2 TABLETS BY MOUTH TWICE DAILY ON MONDAY TO FRIDAY WHILE ON RADIATION. 40 tablet 0    capecitabine (XELODA) 500 MG chemo tablet TAKE 3 TABLETS BY MOUTH TWICE DAILY ON MONDAY TO FRIDAY WHILE ON RADIATION. 60 tablet 0    oxyCODONE (ROXICODONE) 5 MG immediate release tablet Take 5 mg by mouth every 6 hours as needed for Pain.  lipase-protease-amylase (CREON) 33593 units CPEP delayed release capsule Take two 36,000unit capsules with meals and one- 36,000unit capsule with snacks. 180 capsule 2    lidocaine-prilocaine (EMLA) 2.5-2.5 % cream Apply topically as needed. 1 Tube 2    ondansetron (ZOFRAN-ODT) 8 MG TBDP disintegrating tablet Place 1 tablet under the tongue every 8 hours as needed for Nausea or Vomiting 90 tablet 2    nitroGLYCERIN (NITROSTAT) 0.4 MG SL tablet up to max of 3 total doses.  If no relief after 1 dose, call 911. 25 tablet 3    insulin glargine (LANTUS) 100 UNIT/ML injection vial Inject 50 Units into the skin nightly 1 vial 3    insulin glargine (LANTUS) 100 UNIT/ML injection vial Inject 60 Units into the skin daily 1 vial 3    hydrALAZINE (APRESOLINE) 25 MG tablet Take 1 tablet by mouth every 8 hours 90 tablet 3    carvedilol (COREG) 12.5 MG tablet Take 1 tablet by mouth 2 times daily (with meals) 60 tablet 3    amLODIPine (NORVASC) 10 MG tablet Take 1 tablet by mouth daily 30 tablet 3    aspirin 81 MG chewable tablet Take 81 mg by mouth nightly      insulin aspart (NOVOLOG FLEXPEN) 100 UNIT/ML injection pen Inject into the skin      sertraline (ZOLOFT) 100 MG tablet take 1 1/2 tablet by oral route  every day      atorvastatin (LIPITOR) 40 MG tablet nightly       LORazepam (ATIVAN) 0.5 MG tablet 1 mg nightly. And 1/2 tab once daily as needed      furosemide (LASIX) 20 MG tablet Take 1 tablet by mouth daily (Patient not taking: Reported on 11/3/2020) 30 tablet 1     No current facility-administered medications for this visit. Facility-Administered Medications Ordered in Other Visits   Medication Dose Route Frequency Provider Last Rate Last Dose    sodium chloride flush 0.9 % injection 20 mL  20 mL Intravenous PRN Martha Keene MD   20 mL at 12/08/20 1200    heparin flush 100 UNIT/ML injection 500 Units  500 Units Intracatheter PRN aMrtha Keene MD   500 Units at 12/08/20 1204       Allergies:  Lisinopril; Sulfamethoxazole; Trimethoprim; Cefuroxime axetil; Sulfamethoxazole-trimethoprim; Clindamycin phosphate; and Penicillins    Social History:   reports that she quit smoking about 15 years ago. She has never used smokeless tobacco. She reports previous alcohol use. She reports that she does not use drugs. Family History: family history includes Cancer in her maternal grandfather, maternal grandmother, and mother; Heart Failure in her father; Hypertension in her father and mother. REVIEW OF SYSTEMS:    Constitutional: No fever or chills.  No night sweats, no weight loss peripheral pulses normal, no pedal edema, no clubbing or cyanosis   Skin - normal coloration and turgor, no rashes, no suspicious skin lesions noted ,    DATA:    Labs:   CBC:   Recent Labs     12/08/20  1213   WBC 4.8   HGB 9.5*   HCT 29.2*        BMP:   Recent Labs     12/08/20  1213      K 3.5*   CO2 28   BUN 8   CREATININE 0.63   LABGLOM >60   GLUCOSE 147*     PT/INR:   No results for input(s): PROTIME, INR in the last 72 hours. Surgical Pathology Report   Surgical Pathology   Collected: 06/04/20 1547   Lab status: Final   Resulting lab: [email or web address]   Value: -- Diagnosis --   HEAD OF PANCREAS MASS EUS FINE NEEDLE ASPIRATION:           POSITIVE FOR MALIGNANCY:   ADENOCARCINOMA, MODERATELY DIFFERENTIATED. IMAGING DATA:  MRI abd 6/4/20  FINDINGS:   Exam degraded by motion artifact.       ABDOMEN:       The visualized lung bases reveal no signal abnormality.       The liver is normal in signal intensity and contour.  No focal liver lesion   identified.       Irregular mass in the head of the pancreas is present resulting in biliary   and pancreatic duct obstruction.  This measures 3.2 x 2.3 cm and is best   visualized on T2 imaging.  This mass extends towards the liver hilum. Crispin Luke   is close approximation with the main portal vein, which remains patent.    There is close abutment with the hepatic artery.  No inflammatory change   identified involving the pancreas.  Relative atrophy of the body and tail is   noted.       The spleen, adrenals and kidneys reveal no significant findings.  Right renal   cyst.       The visualized bowel is normal in caliber.       No ascites.  Other than periportal lymph nodes measuring up to 12 mm, no   retroperitoneal or mesenteric lymphadenopathy is otherwise appreciated.       Incidental small intramural fibroid is noted in the right body of the uterus.       No signal abnormality identified in the visualized osseous structures.       MRCP:     Gallbladder: Surgically absent.  Small amount of fluid signal within the   gallbladder fossa, likely postoperative.       Bile Ducts: Intrahepatic and extrahepatic biliary dilatation with abrupt   cutoff at the mid common duct level.  Upstream dilatation measures 16 mm at   the common duct level, 8 mm at the left hepatic duct level and 7 mm in the   right hepatic duct.       Pancreatic Duct: Dilatation with abrupt cutoff at the site of pancreatic head   mass.  Upstream dilatation of 8 mm is noted.           Impression   1.  3.2 cm soft tissue mass in the pancreatic head resulting in pancreatic   duct and biliary dilatation, compatible with primary neoplasm.  There is   abutment of the portal vein and soft tissue extension/lymph nodes in the   liver hilum noted.  Given the limitations of motion artifact on this exam, a   pancreas CT should be considered for detailed vascular evaluation.       2.  No liver metastatic disease. 9/17/2020: MRI abdomen  Impression    1. Stable mass in the pancreatic head measuring up to 3.2 cm compatible with    pancreatic malignancy. Awilda Ab is less than 180 degrees involvement of the    adjacent proximal SMV with some luminal narrowing, not significantly changed    from prior examination.  No additional vascular involvement is demonstrated. 2. Interval increase in size of a carmel hepatis lymph node currently    measuring up to 2.2 cm. 3. Multifocal areas of peripheral T1 hypointensity in the liver which are not    visualized on fat saturated T2 weighted sequences and do not demonstrate    enhancement on subtraction images or diffusion restriction.  Findings could    represent a geographic steatosis, perfusion anomaly, or infarct.  Attention    on follow-up recommended. 4. Trace bilateral pleural effusions. IMPRESSION:   1.  Pancreatic adenocarcinoma: Head of pancreas, 3 cm,  MR abd showed no liver lesions possible T2N1MO stage IIB at this times appears borderline/unresectable because of PV involvement. She is seen by hepatobiliary surgeon as o/p  2. CT chest showed No mets. EUS showed that the mass encases the portal vein . An intact interface was seen between the mass and the celiac trunk and superior mesenteric artery suggesting a lack of invasion. Appears locally advanced  3. Chemotherapy-induced nausea  4. DKA  5. CAD  6. Abdominal pain    RECOMMENDATIONS:  1. I reviewed the imaging studies, lab data, diagnosis, prognosis and treatment options with patient. She states comfort is her primary goal but she is open to discuss options  2. She was initially started on dose reduced  FOLFIRINOX and subsequently after first cycle irinotecan was discontinued. She had significant fatigue and nausea and she decided not to continue chemotherapy. 3. She does not want surgery as well. She understands the risk and benefits and wants to focus on quality of life. 4. We discussed the option of chemoradiation. 5. Started  Xeloda-based chemoradiation and so far tolerating well  6. Patient is agreeable  7. RTC in 4 weeks      Ilya Benitez MD  Hematologist/Medical Oncologist    I spent more than 40 minutes examining, evaluating, reviewing data and counseling the patient. Greater than 50% of that time was spent face-to-face with the patient in counseling and coordinating her care.

## 2020-12-09 ENCOUNTER — HOSPITAL ENCOUNTER (OUTPATIENT)
Dept: RADIATION ONCOLOGY | Age: 60
Discharge: HOME | End: 2020-12-09
Attending: RADIOLOGY
Payer: COMMERCIAL

## 2020-12-09 ENCOUNTER — OFFICE VISIT (OUTPATIENT)
Dept: PALLATIVE CARE | Age: 60
End: 2020-12-09
Payer: COMMERCIAL

## 2020-12-09 ENCOUNTER — TELEPHONE (OUTPATIENT)
Dept: ONCOLOGY | Age: 60
End: 2020-12-09

## 2020-12-09 VITALS
HEART RATE: 80 BPM | TEMPERATURE: 98.3 F | WEIGHT: 242 LBS | BODY MASS INDEX: 40.27 KG/M2 | RESPIRATION RATE: 18 BRPM | SYSTOLIC BLOOD PRESSURE: 146 MMHG | DIASTOLIC BLOOD PRESSURE: 76 MMHG | OXYGEN SATURATION: 98 %

## 2020-12-09 VITALS
TEMPERATURE: 98.5 F | BODY MASS INDEX: 40.48 KG/M2 | WEIGHT: 243 LBS | HEIGHT: 65 IN | RESPIRATION RATE: 20 BRPM | DIASTOLIC BLOOD PRESSURE: 67 MMHG | SYSTOLIC BLOOD PRESSURE: 157 MMHG | HEART RATE: 80 BPM

## 2020-12-09 PROCEDURE — 77014 PR CT GUIDANCE PLACEMENT RAD THERAPY FIELDS: CPT | Performed by: RADIOLOGY

## 2020-12-09 PROCEDURE — 99214 OFFICE O/P EST MOD 30 MIN: CPT | Performed by: INTERNAL MEDICINE

## 2020-12-09 PROCEDURE — 77386 HC NTSTY MODUL RAD TX DLVR CPLX: CPT | Performed by: RADIOLOGY

## 2020-12-09 PROCEDURE — 77427 RADIATION TX MANAGEMENT X5: CPT | Performed by: RADIOLOGY

## 2020-12-09 PROCEDURE — 77336 RADIATION PHYSICS CONSULT: CPT | Performed by: RADIOLOGY

## 2020-12-09 RX ORDER — OXYCODONE HYDROCHLORIDE 10 MG/1
10 TABLET ORAL EVERY 6 HOURS PRN
Qty: 120 TABLET | Refills: 0 | Status: SHIPPED | OUTPATIENT
Start: 2020-12-09 | End: 2021-01-08

## 2020-12-09 ASSESSMENT — ENCOUNTER SYMPTOMS
SHORTNESS OF BREATH: 1
NAUSEA: 1
EYES NEGATIVE: 1
ABDOMINAL PAIN: 1
ALLERGIC/IMMUNOLOGIC NEGATIVE: 1
DIARRHEA: 1

## 2020-12-09 ASSESSMENT — PAIN DESCRIPTION - DESCRIPTORS: DESCRIPTORS: DISCOMFORT

## 2020-12-09 ASSESSMENT — PAIN SCALES - GENERAL: PAINLEVEL_OUTOF10: 4

## 2020-12-09 ASSESSMENT — PAIN DESCRIPTION - LOCATION: LOCATION: ABDOMEN;BACK

## 2020-12-09 NOTE — PROGRESS NOTES
[facility]  12/9/2020  Wt Readings from Last 3 Encounters:   12/09/20 242 lb (109.8 kg)   12/09/20 243 lb (110.2 kg)   12/08/20 243 lb 4.8 oz (110.4 kg)     Body mass index is 40.27 kg/m². Treatment Area: pancrease         Comfort Alteration  Fatigue: Mild      Nutritional Alteration  Anorexia: No   Nausea: Yes   Vomiting: No       Elimination Alterations  Constipation: no  Diarrhea:  yes    Skin Alteration   Sensation:intact     Radiation Dermatitis:  Intact [x]     Erythema  []     Discoloration  []     Rash []     Dry desquamation  []     Moist desquamation []       Emotional  Coping: effective      Injury, potential bleeding or infection: n/a     Lab Results   Component Value Date    WBC 4.8 12/08/2020     12/08/2020         BP (!) 146/76   Pulse 80   Temp 98.3 °F (36.8 °C)   Resp 18   Wt 242 lb (109.8 kg)   SpO2 98%   BMI 40.27 kg/m²         Pain Level: 4           Assessment/Plan: Patient was seen today for weekly visit. She reports discomfort in her ABD/Back and she stated she met with her palliative care team today who upped her medication. She also c/o ABD distention. Dr Palomino Forward notified and examined pt.        Juvenal Andujar

## 2020-12-09 NOTE — PROGRESS NOTES
PALLIATIVE CARE PROGRESS NOTE     Patient: Agapito Polanco  1960        Reason For Consult:  Goals of careevaluation  Distress management  Symptom Management  Guidance and support  Facilitate communications  Assistance in coordinating care  Recommendations for the above    Subjective:   Agapito Polanco is a 64 y.o. female with a history of Diabetes, hypertension, CAD, anxiety, neuropathy, OA  and follows with Fawn Duarte . Agapito Polanco is a 64 y.o. female with a history of  Pancreatic cancer and follows with Fawn Duarte . Patient had went to Er due to back pain and was found to be in DKA. She had MRI of abdomen that revealed irregular mass in head of pancreas resulting in biliary & pancreatic duct obstruction. There is abutment of the portal vein & soft tissue extension/lymph nodes in liver hilum. Patient had underwent ERCP & EUS on 6/4/20 with cholangigram, biliary sphincterectomy, & placement of biliary stent. She was Dx with Adenocarcinoma of the pancreas, moderately differentiated. CT of chest did not reveal any mets. She had seen Dr. Tima Lackey and had diagnostic laparoscopic exam, it was noted that cancer is wrapped around portal vein so borderline unresectable. Patient had decided not to move forward with surgery. Patient was hospitalized in July d/t Cholangitis d/t clogged CBD stent. ERCP completed and metal stent placed. Patient see's Dr. Olympia Essex and was started on Folfirinox and after first cycle irinotecan was DC d/t significant fatigue and N/V. I She continued with FolFox. Recent MRI revealed slight increase in lymph node in carmel hepatis. Patient was unsure if she wanted to continue with chemotherapy. Chemoradiation discussed with patient. Patient asked for palliative care consult. Palliative Care was consulted to help manage symptoms, facilitate communications and establish goals of care. Patient is currently awaiting prior auth for Xeloda and radiation treatment.  Patient code status discussed and patient wants her code status changed to [facility] no intubation. Paperwork completed and order placed in computer.          INTERIM EVENTS:  Maribel Vizcaino is a 64 y.o. female with a history of Diabetes, hypertension, CAD non obstructive, anxiety, neuropathy, osteoarthritis, and follows with Linda Briggs . Angel Luis Layton is a 64year old female with a history of Pancreatic cancer. She is undergoing chemoradiation with Xeloda. She has unresectable disease and she understand that care will be palliative. She is here mostly for symptom management. She is on oxycodone 5 mg for breakthrough pain medication and thus not helping. We talked about adjusting it to about 10 mg every 6 hours and she is agreeable. She is also having nausea which is controlled trolled goal with a combination of Zofran and Compazine. She using alternating dose of each medication. Her discomfort is in the back radiates to the upper upper abdomen and around to the ribs. It seems to follow the distribution of celiac plexus. She is having intermittent diarrhea. She feels a bloated sensation. She has no bleeding. Her weight is stable. Performance status ECOG 1. Available records including labs and imaging results were reviewed. Recent and past history, med list,familyhistory, social history and review of systems were personally reviewed and updated in EHR    Review of Systems   Constitutional: Positive for appetite change. HENT: Negative. Eyes: Negative. Respiratory: Positive for shortness of breath (Shortness of breath at times with movement ). Cardiovascular: Negative for chest pain and leg swelling. Gastrointestinal: Positive for abdominal pain (complains of pulling pressure discomfort in abdomen ), diarrhea (up to 5 stools a day sometimes incontinent ) and nausea (intermitant ). Endocrine: Negative. Genitourinary: Negative.     Musculoskeletal: Positive for gait problem (patient complains of dizziness at times and being unsteady). Skin: Negative. Allergic/Immunologic: Negative. Neurological: Positive for dizziness and headaches (occasional ). Hematological: Negative. Psychiatric/Behavioral:        Patient with anxiety being treated with Ativan. Anxiety is well controlled with the current medication        PHYSICAL ASSESSMENT:  Physical Exam  Vitals signs and nursing note reviewed. Constitutional:       Appearance: Normal appearance. She is normal weight. She is not ill-appearing or toxic-appearing. Comments: Patient weight today 241.8 pounds    HENT:      Head: Normocephalic and atraumatic. Nose: Nose normal.      Mouth/Throat:      Mouth: Mucous membranes are moist.   Eyes:      Extraocular Movements: Extraocular movements intact. Pupils: Pupils are equal, round, and reactive to light. Neck:      Musculoskeletal: Normal range of motion. No neck rigidity or muscular tenderness. Cardiovascular:      Rate and Rhythm: Normal rate and regular rhythm. Pulses: Normal pulses. Heart sounds: Normal heart sounds. Pulmonary:      Effort: Respiratory distress (Patient with some shortness of breath on and off with exertion) present. Breath sounds: Normal breath sounds. Abdominal:      General: Bowel sounds are normal.      Palpations: Abdomen is soft. There is mass (firmness felt on left side abdomen ). Tenderness: There is abdominal tenderness (Pulling pressure discomfort being treated with Oxycodone ). Musculoskeletal: Normal range of motion. Right lower leg: No edema. Left lower leg: No edema. Skin:     General: Skin is warm and dry. Capillary Refill: Capillary refill takes less than 2 seconds. Neurological:      Mental Status: She is alert and oriented to person, place, and time. Mental status is at baseline. Gait: Gait abnormal (Patient states has unsteady gait at times ).    Psychiatric:         Behavior: Behavior normal.           Vital Signs: BP (!) 157/67 (Site: Left Upper Arm, Position: Sitting, Cuff Size: Large Adult)   Pulse 80   Temp 98.5 °F (36.9 °C) (Oral)   Resp 20   Ht 5' 5\" (1.651 m)   Wt 243 lb (110.2 kg)   BMI 40.44 kg/m²      has a past medical history of Anxiety, Diabetes mellitus ([address] Hyperlipidemia, Hypertension, and Pancreatic cancer ([address]             Patient Active Problem List   Diagnosis    Uncontrolled diabetes mellitus ([address]    Hypertension    Postmenopausal bleeding    Thickened endometrium    Type 2 diabetes mellitus with hyperglycemia, with long-term current use of insulin (HCC)    Hypophosphatemia    Hypomagnesemia    Hyperglycemia due to type 2 diabetes mellitus ([address]    Pancreatic Head Mass    Non-Obstructive CAD    Primary adenocarcinoma of head of pancreas (HCC)    Intractable nausea and vomiting    Class 3 severe obesity with serious comorbidity in adult ([address]    Amnesia    Anxiety    Benign neoplasm of choroid    Depression    Diabetic complication (HCC)    Diabetic peripheral neuropathy associated with type 2 diabetes mellitus (HCC)    Hepatomegaly    Methicillin resistant Staphylococcus aureus infection    Long term current use of insulin (HCC)    Hyperlipidemia    Moderate nonproliferative diabetic retinopathy associated with type 2 diabetes mellitus (HCC)    Morbid obesity (HCC)    Pain in limb    Pancreatic adenocarcinoma (HCC)    Pancreatic malignancy syndrome (HCC)    Peripheral venous insufficiency    Postprocedural state    Primary localized osteoarthrosis of ankle and foot    Urinary tract infectious disease    Transaminitis    Intractable vomiting    Septicemia due to E. coli (HCC)    Acute obstructive cholangitis    TORREY (acute kidney injury) ([address]    Dehydration with hyponatremia    NSVT (nonsustained ventricular tachycardia) (HCC)       Palliative Interaction:  For pain management, will adjust her oxycodone to up to 10 mg every 6 hours as needed  For nausea, she is encouraged to continue using Zofran and Compazine . We talked about celiac block. She is hesitant at this point. She decided to wait to see if the adjusted dose oxycodone to control her pain otherwise she will call us to get the referral and she can call her oncologist as well. 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 Follow up in 1 month. Principle Problem/Diagnosis:   Pancreatic Cancer   Abdominal pain  Nausea   Diarrhea   Discussed CODE STATUS, and goals of care. While the patient is interested in continuing on with therapy, she has very   realistic outlook and she is awaiting the results of evaluation after chemoradiation to make Decision. She is leaning towards not getting any further chemotherapy. Anxiety        IMPRESSION/ PLAN    1- Discuss goals of care  Treatment options/plans  Provide clinical updates and answer questions  Build trust  Elicit patient's goals and values, and use these to establish or modify goals of care  Discussed code status     2-Code Status:  DNRCC-A without intubation      3-OtherRecommendations: Follow up with Dr Ofelia Donnelly as scheduled    Call [phone] if need oxycodone and ativan reordered.     Have labs and Stool cultures completed     Electronically signedby   Tari Sharma MD  Palliative Care Team  on 12/9/2020 at 9:55 AM    Palliative Care Office [phone]  Palliative Care Cell Phone: [phone]

## 2020-12-09 NOTE — TELEPHONE ENCOUNTER
AVS from 12/8/20     RTC in 4 weeks with labs     rv scheduled for 1/5/21 @ 2p  Pt will have labs (cbc,cp,ca19-9) one week prior    Pt was given AVS and appt schedule

## 2020-12-10 ENCOUNTER — HOSPITAL ENCOUNTER (OUTPATIENT)
Dept: RADIATION ONCOLOGY | Age: 60
Discharge: HOME | End: 2020-12-10
Attending: RADIOLOGY
Payer: COMMERCIAL

## 2020-12-10 PROCEDURE — 77014 PR CT GUIDANCE PLACEMENT RAD THERAPY FIELDS: CPT | Performed by: RADIOLOGY

## 2020-12-10 PROCEDURE — 77386 HC NTSTY MODUL RAD TX DLVR CPLX: CPT | Performed by: RADIOLOGY

## 2020-12-10 NOTE — PROGRESS NOTES
distended, sharp pains on L side. SKIN: No erythema or desquamation. LABS:  WBC   Date Value Ref Range Status   12/08/2020 4.8 3.5 - 11.0 k/uL Final   11/03/2020 8.4 3.5 - 11.0 k/uL Final   09/22/2020 7.3 3.5 - 11.0 k/uL Final     Segs Absolute   Date Value Ref Range Status   12/08/2020 3.66 1.8 - 7.7 k/uL Final   11/03/2020 6.20 1.8 - 7.7 k/uL Final   09/22/2020 5.70 1.8 - 7.7 k/uL Final     Hemoglobin   Date Value Ref Range Status   12/08/2020 9.5 (L) 12.0 - 16.0 g/dL Final   11/03/2020 10.9 (L) 12.0 - 16.0 g/dL Final   09/22/2020 10.5 (L) 12.0 - 16.0 g/dL Final     Platelet Count   Date Value Ref Range Status   03/05/2012 298 130 - 400 k/uL Final   10/03/2011 328 140 - 450 k/uL Final     Platelets   Date Value Ref Range Status   12/08/2020 217 140 - 450 k/uL Final   11/03/2020 317 140 - 450 k/uL Final   09/22/2020 206 140 - 450 k/uL Final     CREATININE   Date Value Ref Range Status   12/08/2020 0.63 0.50 - 0.90 mg/dL Final   11/11/2020 0.66 0.50 - 0.90 mg/dL Final   11/03/2020 0.69 0.50 - 0.90 mg/dL Final     CEA   Date Value Ref Range Status   06/04/2020 2.7 <3.9 ng/mL Final     Comment:     The Roche \"ECLIA\" assay is used. Results obtained with different assay methods cannot be   used interchangeably. 06/03/2020 2.6 <3.9 ng/mL Final     Comment:     The Roche \"ECLIA\" assay is used. Results obtained with different assay methods cannot be   used interchangeably. MEDICATIONS:    Current Outpatient Medications:     oxyCODONE (OXY-IR) 10 MG immediate release tablet, Take 1 tablet by mouth every 6 hours as needed for Pain for up to 30 days. , Disp: 120 tablet, Rfl: 0    prochlorperazine (COMPAZINE) 10 MG tablet, Take 1 tablet by mouth every 6 hours as needed (NV), Disp: 120 tablet, Rfl: 3    capecitabine (XELODA) 150 MG chemo tablet, TAKE 2 TABLETS BY MOUTH TWICE DAILY ON MONDAY TO FRIDAY WHILE ON RADIATION., Disp: 40 tablet, Rfl: 0    capecitabine (XELODA) 500 MG chemo tablet, TAKE 3 TABLETS BY MOUTH TWICE DAILY ON MONDAY TO FRIDAY WHILE ON RADIATION., Disp: 60 tablet, Rfl: 0    lipase-protease-amylase (CREON) 40218 units CPEP delayed release capsule, Take two 36,000unit capsules with meals and one- 36,000unit capsule with snacks. (Patient not taking: Reported on 12/9/2020), Disp: 180 capsule, Rfl: 2    lidocaine-prilocaine (EMLA) 2.5-2.5 % cream, Apply topically as needed. , Disp: 1 Tube, Rfl: 2    ondansetron (ZOFRAN-ODT) 8 MG TBDP disintegrating tablet, Place 1 tablet under the tongue every 8 hours as needed for Nausea or Vomiting, Disp: 90 tablet, Rfl: 2    furosemide (LASIX) 20 MG tablet, Take 1 tablet by mouth daily (Patient not taking: Reported on 11/3/2020), Disp: 30 tablet, Rfl: 1    nitroGLYCERIN (NITROSTAT) 0.4 MG SL tablet, up to max of 3 total doses. If no relief after 1 dose, call 911., Disp: 25 tablet, Rfl: 3    insulin glargine (LANTUS) 100 UNIT/ML injection vial, Inject 50 Units into the skin nightly, Disp: 1 vial, Rfl: 3    insulin glargine (LANTUS) 100 UNIT/ML injection vial, Inject 60 Units into the skin daily, Disp: 1 vial, Rfl: 3    hydrALAZINE (APRESOLINE) 25 MG tablet, Take 1 tablet by mouth every 8 hours, Disp: 90 tablet, Rfl: 3    carvedilol (COREG) 12.5 MG tablet, Take 1 tablet by mouth 2 times daily (with meals), Disp: 60 tablet, Rfl: 3    amLODIPine (NORVASC) 10 MG tablet, Take 1 tablet by mouth daily, Disp: 30 tablet, Rfl: 3    aspirin 81 MG chewable tablet, Take 81 mg by mouth nightly, Disp: , Rfl:     insulin aspart (NOVOLOG FLEXPEN) 100 UNIT/ML injection pen, Inject into the skin, Disp: , Rfl:     sertraline (ZOLOFT) 100 MG tablet, take 1 1/2 tablet by oral route  every day, Disp: , Rfl:     atorvastatin (LIPITOR) 40 MG tablet, nightly , Disp: , Rfl:     LORazepam (ATIVAN) 0.5 MG tablet, 1 mg nightly. And 1/2 tab once daily as needed, Disp: , Rfl:       ASSESSMENT PLAN:   Treatment setup and plan reviewed. Port images/CBCT images reviewed.  Appropriate laboratory work was reviewed. Treatment side effects and toxicities reviewed with the patient, and appropriate management was advised. Patient advised to take Xeloda before radiation treatments. She was advised to take the medication as scheduled and to take her Creon to see if it helps with her bowel symptoms. Will continue radiation treatment as planned, and recommend patient contact us if they have any questions or concerns. Electronically signed by Jarrod Lund MD on 12/10/2020 at 8:30 AM      Drugs Prescribed:  New Prescriptions    No medications on file       Other Orders Placed:  No orders of the defined types were placed in this encounter.

## 2020-12-11 ENCOUNTER — HOSPITAL ENCOUNTER (OUTPATIENT)
Dept: RADIATION ONCOLOGY | Age: 60
Discharge: HOME | End: 2020-12-11
Attending: RADIOLOGY
Payer: COMMERCIAL

## 2020-12-11 PROCEDURE — 77014 PR CT GUIDANCE PLACEMENT RAD THERAPY FIELDS: CPT | Performed by: RADIOLOGY

## 2020-12-11 PROCEDURE — 77386 HC NTSTY MODUL RAD TX DLVR CPLX: CPT | Performed by: RADIOLOGY

## 2020-12-14 ENCOUNTER — HOSPITAL ENCOUNTER (OUTPATIENT)
Dept: RADIATION ONCOLOGY | Age: 60
Discharge: HOME | End: 2020-12-14
Attending: RADIOLOGY
Payer: COMMERCIAL

## 2020-12-14 PROCEDURE — 77386 HC NTSTY MODUL RAD TX DLVR CPLX: CPT | Performed by: RADIOLOGY

## 2020-12-14 PROCEDURE — 77014 PR CT GUIDANCE PLACEMENT RAD THERAPY FIELDS: CPT | Performed by: RADIOLOGY

## 2020-12-15 ENCOUNTER — HOSPITAL ENCOUNTER (OUTPATIENT)
Dept: RADIATION ONCOLOGY | Age: 60
Discharge: HOME | End: 2020-12-15
Attending: RADIOLOGY
Payer: COMMERCIAL

## 2020-12-15 PROCEDURE — 77386 HC NTSTY MODUL RAD TX DLVR CPLX: CPT | Performed by: RADIOLOGY

## 2020-12-15 PROCEDURE — 77014 PR CT GUIDANCE PLACEMENT RAD THERAPY FIELDS: CPT | Performed by: RADIOLOGY

## 2020-12-16 ENCOUNTER — HOSPITAL ENCOUNTER (OUTPATIENT)
Dept: RADIATION ONCOLOGY | Age: 60
Discharge: HOME | End: 2020-12-16
Attending: RADIOLOGY
Payer: COMMERCIAL

## 2020-12-16 ENCOUNTER — NURSE ONLY (OUTPATIENT)
Dept: PRIMARY CARE CLINIC | Age: 60
End: 2020-12-16

## 2020-12-16 ENCOUNTER — HOSPITAL ENCOUNTER (OUTPATIENT)
Age: 60
Setting detail: SPECIMEN
Discharge: HOME | End: 2020-12-16
Payer: COMMERCIAL

## 2020-12-16 VITALS
RESPIRATION RATE: 18 BRPM | HEART RATE: 99 BPM | BODY MASS INDEX: 40.1 KG/M2 | DIASTOLIC BLOOD PRESSURE: 78 MMHG | TEMPERATURE: 98.5 F | OXYGEN SATURATION: 95 % | WEIGHT: 241 LBS | SYSTOLIC BLOOD PRESSURE: 135 MMHG

## 2020-12-16 LAB
ABSOLUTE EOS #: 0.21 K/UL (ref 0–0.4)
ABSOLUTE IMMATURE GRANULOCYTE: ABNORMAL K/UL (ref 0–0.3)
ABSOLUTE LYMPH #: 0.42 K/UL (ref 1–4.8)
ABSOLUTE MONO #: 0.36 K/UL (ref 0.1–0.8)
ALBUMIN SERPL-MCNC: 3.2 G/DL (ref 3.5–5.2)
ALBUMIN/GLOBULIN RATIO: 1.3 (ref 1–2.5)
ALP BLD-CCNC: 109 U/L (ref 35–104)
ALT SERPL-CCNC: 12 U/L (ref 5–33)
ANION GAP SERPL CALCULATED.3IONS-SCNC: 12 MMOL/L (ref 9–17)
AST SERPL-CCNC: 15 U/L
BASOPHILS # BLD: 0 % (ref 0–2)
BASOPHILS ABSOLUTE: 0 K/UL (ref 0–0.2)
BILIRUB SERPL-MCNC: 0.36 MG/DL (ref 0.3–1.2)
BUN BLDV-MCNC: 14 MG/DL (ref 6–20)
BUN/CREAT BLD: ABNORMAL (ref 9–20)
CALCIUM SERPL-MCNC: 9.3 MG/DL (ref 8.6–10.4)
CHLORIDE BLD-SCNC: 99 MMOL/L (ref 98–107)
CO2: 24 MMOL/L (ref 20–31)
CREAT SERPL-MCNC: 0.8 MG/DL (ref 0.5–0.9)
DIFFERENTIAL TYPE: ABNORMAL
EOSINOPHILS RELATIVE PERCENT: 4 % (ref 1–4)
GFR AFRICAN AMERICAN: >60 ML/MIN
GFR NON-AFRICAN AMERICAN: >60 ML/MIN
GFR SERPL CREATININE-BSD FRML MDRD: ABNORMAL ML/MIN/{1.73_M2}
GFR SERPL CREATININE-BSD FRML MDRD: ABNORMAL ML/MIN/{1.73_M2}
GLUCOSE BLD-MCNC: 421 MG/DL (ref 70–99)
HCT VFR BLD CALC: 29.5 % (ref 36–46)
HEMOGLOBIN: 9.4 G/DL (ref 12–16)
IMMATURE GRANULOCYTES: ABNORMAL %
LYMPHOCYTES # BLD: 8 % (ref 24–44)
MCH RBC QN AUTO: 26.9 PG (ref 26–34)
MCHC RBC AUTO-ENTMCNC: 31.8 G/DL (ref 31–37)
MCV RBC AUTO: 84.8 FL (ref 80–100)
MONOCYTES # BLD: 7 % (ref 1–7)
MORPHOLOGY: NORMAL
NRBC AUTOMATED: ABNORMAL PER 100 WBC
PDW BLD-RTO: 19 % (ref 12.5–15.4)
PLATELET # BLD: 197 K/UL (ref 140–450)
PLATELET ESTIMATE: ABNORMAL
PMV BLD AUTO: 7.9 FL (ref 6–12)
POTASSIUM SERPL-SCNC: 3.9 MMOL/L (ref 3.7–5.3)
RBC # BLD: 3.48 M/UL (ref 4–5.2)
RBC # BLD: ABNORMAL 10*6/UL
SEG NEUTROPHILS: 81 % (ref 36–66)
SEGMENTED NEUTROPHILS ABSOLUTE COUNT: 4.21 K/UL (ref 1.8–7.7)
SODIUM BLD-SCNC: 135 MMOL/L (ref 135–144)
TOTAL PROTEIN: 5.7 G/DL (ref 6.4–8.3)
WBC # BLD: 5.2 K/UL (ref 3.5–11)
WBC # BLD: ABNORMAL 10*3/UL

## 2020-12-16 PROCEDURE — 80053 COMPREHEN METABOLIC PANEL: CPT

## 2020-12-16 PROCEDURE — 77427 RADIATION TX MANAGEMENT X5: CPT | Performed by: RADIOLOGY

## 2020-12-16 PROCEDURE — 77014 PR CT GUIDANCE PLACEMENT RAD THERAPY FIELDS: CPT | Performed by: RADIOLOGY

## 2020-12-16 PROCEDURE — 36415 COLL VENOUS BLD VENIPUNCTURE: CPT

## 2020-12-16 PROCEDURE — 85025 COMPLETE CBC W/AUTO DIFF WBC: CPT

## 2020-12-16 PROCEDURE — 77386 HC NTSTY MODUL RAD TX DLVR CPLX: CPT | Performed by: RADIOLOGY

## 2020-12-16 PROCEDURE — 77336 RADIATION PHYSICS CONSULT: CPT | Performed by: RADIOLOGY

## 2020-12-16 ASSESSMENT — PAIN DESCRIPTION - LOCATION: LOCATION: ABDOMEN;BACK

## 2020-12-16 ASSESSMENT — PAIN SCALES - GENERAL: PAINLEVEL_OUTOF10: 3

## 2020-12-16 NOTE — PROGRESS NOTES
COVID-19 testing performed orally per orders from oncology. Patient was instructed to remain in quarantine for 10 days from onset of symptoms or until negative test result is received.

## 2020-12-17 ENCOUNTER — HOSPITAL ENCOUNTER (OUTPATIENT)
Dept: RADIATION ONCOLOGY | Age: 60
Discharge: HOME | End: 2020-12-17
Attending: RADIOLOGY
Payer: COMMERCIAL

## 2020-12-17 PROCEDURE — 77386 HC NTSTY MODUL RAD TX DLVR CPLX: CPT | Performed by: RADIOLOGY

## 2020-12-17 PROCEDURE — 77014 PR CT GUIDANCE PLACEMENT RAD THERAPY FIELDS: CPT | Performed by: RADIOLOGY

## 2020-12-17 NOTE — PROGRESS NOTES
[facility]            Radiation Oncology          [address]          [place] [place], [address]        Jeanine Holland: [phone]        F: [phone]       [email or web address]         Date of Service: 2020      Location:  [facility] [address],   [address] [place]   [phone]     RADIATION ONCOLOGY WEEKLY PROGRESS NOTE    Patient ID:   Bill Lees  : 1960   MRN: [id number]    DIAGNOSIS:  Cancer Staging  Primary adenocarcinoma of head of pancreas [facility])  Staging form: Exocrine Pancreas, AJCC 8th Edition  - Clinical stage from 2020: Stage IIB (cT2, cN1, cM0) - Signed by Rashida Cano MD on 2020        RADIATION THERAPY COURSE:   Treatment Site: Pancreas  Actual Dose: 3600 cGy  Total Planned Dose: 5040cGy  Treatment technique: IMRT  Therapy imaging monitoring: CBCT daily  Concurrent Chemotherapy: Xeloda    SUBJECTIVE:   Patient seen for their weekly on treatment evaluation today. She is still having sharp pains along the left side of her abdomen that radiates to her back. She follows with palliative care who manages her pain and nausea medicine. She tried Creon but it caused more diarrhea so she stopped it. She does not some dryness and redness of her tongue but is using salt water rinse. She also reports a cough, runny nose, and fatigue for the past few days. OBJECTIVE:   CHAPERONE: Not Required    ECO Symptomatic but completely ambulatory    VITAL SIGNS: /78   Pulse 99   Temp 98.5 °F (36.9 °C)   Resp 18   Wt 241 lb (109.3 kg)   SpO2 95%   BMI 40.10 kg/m²   Wt Readings from Last 5 Encounters:   20 241 lb (109.3 kg)   20 242 lb (109.8 kg)   20 243 lb (110.2 kg)   20 243 lb 4.8 oz (110.4 kg)   20 241 lb (109.3 kg)     GENERAL:  General appearance is that of a well-nourished, well-developed in no apparent distress. ABDOMEN:  Soft, non distended, sharp pains on L side.   SKIN: No erythema or desquamation. LABS:  WBC   Date Value Ref Range Status   12/16/2020 5.2 3.5 - 11.0 k/uL Final   12/08/2020 4.8 3.5 - 11.0 k/uL Final   11/03/2020 8.4 3.5 - 11.0 k/uL Final     Segs Absolute   Date Value Ref Range Status   12/16/2020 4.21 1.8 - 7.7 k/uL Final   12/08/2020 3.66 1.8 - 7.7 k/uL Final   11/03/2020 6.20 1.8 - 7.7 k/uL Final     Hemoglobin   Date Value Ref Range Status   12/16/2020 9.4 (L) 12.0 - 16.0 g/dL Final   12/08/2020 9.5 (L) 12.0 - 16.0 g/dL Final   11/03/2020 10.9 (L) 12.0 - 16.0 g/dL Final     Platelet Count   Date Value Ref Range Status   03/05/2012 298 130 - 400 k/uL Final   10/03/2011 328 140 - 450 k/uL Final     Platelets   Date Value Ref Range Status   12/16/2020 197 140 - 450 k/uL Final   12/08/2020 217 140 - 450 k/uL Final   11/03/2020 317 140 - 450 k/uL Final     CREATININE   Date Value Ref Range Status   12/16/2020 0.80 0.50 - 0.90 mg/dL Final   12/08/2020 0.63 0.50 - 0.90 mg/dL Final   11/11/2020 0.66 0.50 - 0.90 mg/dL Final     CEA   Date Value Ref Range Status   06/04/2020 2.7 <3.9 ng/mL Final     Comment:     The Roche \"ECLIA\" assay is used. Results obtained with different assay methods cannot be   used interchangeably. 06/03/2020 2.6 <3.9 ng/mL Final     Comment:     The Roche \"ECLIA\" assay is used. Results obtained with different assay methods cannot be   used interchangeably. MEDICATIONS:    Current Outpatient Medications:     oxyCODONE (OXY-IR) 10 MG immediate release tablet, Take 1 tablet by mouth every 6 hours as needed for Pain for up to 30 days. , Disp: 120 tablet, Rfl: 0    prochlorperazine (COMPAZINE) 10 MG tablet, Take 1 tablet by mouth every 6 hours as needed (NV), Disp: 120 tablet, Rfl: 3    capecitabine (XELODA) 150 MG chemo tablet, TAKE 2 TABLETS BY MOUTH TWICE DAILY ON MONDAY TO FRIDAY WHILE ON RADIATION., Disp: 40 tablet, Rfl: 0    capecitabine (XELODA) 500 MG chemo tablet, TAKE 3 TABLETS BY MOUTH TWICE DAILY ON MONDAY TO FRIDAY WHILE ON RADIATION., Disp: 60 tablet, Rfl: 0    lipase-protease-amylase (CREON) 91410 units CPEP delayed release capsule, Take two 36,000unit capsules with meals and one- 36,000unit capsule with snacks. (Patient not taking: Reported on 12/9/2020), Disp: 180 capsule, Rfl: 2    lidocaine-prilocaine (EMLA) 2.5-2.5 % cream, Apply topically as needed. , Disp: 1 Tube, Rfl: 2    ondansetron (ZOFRAN-ODT) 8 MG TBDP disintegrating tablet, Place 1 tablet under the tongue every 8 hours as needed for Nausea or Vomiting, Disp: 90 tablet, Rfl: 2    furosemide (LASIX) 20 MG tablet, Take 1 tablet by mouth daily (Patient not taking: Reported on 11/3/2020), Disp: 30 tablet, Rfl: 1    nitroGLYCERIN (NITROSTAT) 0.4 MG SL tablet, up to max of 3 total doses. If no relief after 1 dose, call 911., Disp: 25 tablet, Rfl: 3    insulin glargine (LANTUS) 100 UNIT/ML injection vial, Inject 50 Units into the skin nightly, Disp: 1 vial, Rfl: 3    insulin glargine (LANTUS) 100 UNIT/ML injection vial, Inject 60 Units into the skin daily, Disp: 1 vial, Rfl: 3    hydrALAZINE (APRESOLINE) 25 MG tablet, Take 1 tablet by mouth every 8 hours, Disp: 90 tablet, Rfl: 3    carvedilol (COREG) 12.5 MG tablet, Take 1 tablet by mouth 2 times daily (with meals), Disp: 60 tablet, Rfl: 3    amLODIPine (NORVASC) 10 MG tablet, Take 1 tablet by mouth daily, Disp: 30 tablet, Rfl: 3    aspirin 81 MG chewable tablet, Take 81 mg by mouth nightly, Disp: , Rfl:     insulin aspart (NOVOLOG FLEXPEN) 100 UNIT/ML injection pen, Inject into the skin, Disp: , Rfl:     sertraline (ZOLOFT) 100 MG tablet, take 1 1/2 tablet by oral route  every day, Disp: , Rfl:     atorvastatin (LIPITOR) 40 MG tablet, nightly , Disp: , Rfl:     LORazepam (ATIVAN) 0.5 MG tablet, 1 mg nightly. And 1/2 tab once daily as needed, Disp: , Rfl:       ASSESSMENT PLAN:   Treatment setup and plan reviewed. Port images/CBCT images reviewed. Appropriate laboratory work was reviewed.  Treatment side effects and toxicities reviewed with the patient, and appropriate management was advised. Patient advised to take Xeloda before radiation treatments, but to use salt rinse as directed. She is going to undergo CBC CMP testing today, and also is encouraged to get a COVID test as she has mild symptoms. ADDENDUM:  Patient's CMP showed a glucose of 421. Patient was called and she stated she did not take her insulin today. I advised her to take her home medications and to monitor her glucose at home. It could explain her vague fatigue symptoms.      Electronically signed by Patria Lemus MD on 12/16/2020 at 10:37 PM      Drugs Prescribed:  New Prescriptions    No medications on file       Other Orders Placed:  Orders Placed This Encounter   Procedures    CBC With Auto Differential    Comprehensive Metabolic Panel    [facility] Ambulatory

## 2020-12-18 ENCOUNTER — APPOINTMENT (OUTPATIENT)
Dept: RADIATION ONCOLOGY | Age: 60
End: 2020-12-18
Attending: RADIOLOGY
Payer: COMMERCIAL

## 2020-12-18 ENCOUNTER — TELEPHONE (OUTPATIENT)
Dept: RADIATION ONCOLOGY | Age: 60
End: 2020-12-18

## 2020-12-18 LAB — SARS-COV-2, NAA: NOT DETECTED

## 2020-12-18 NOTE — TELEPHONE ENCOUNTER
Pt called and left messg on voicemail stating she is having car trouble and will not make her radiation therapy appt at 11:30. I called pt back, no answer, I left her a voicemail stating she may come later this afternoon @2:15 if she gets a ride, if not her tx for today will be added onto the end of her therapy course.

## 2020-12-20 ENCOUNTER — TELEPHONE (OUTPATIENT)
Dept: FAMILY MEDICINE CLINIC | Age: 60
End: 2020-12-20

## 2020-12-21 ENCOUNTER — TELEPHONE (OUTPATIENT)
Dept: RADIATION ONCOLOGY | Age: 60
End: 2020-12-21

## 2020-12-21 ENCOUNTER — HOSPITAL ENCOUNTER (OUTPATIENT)
Age: 60
Setting detail: OBSERVATION
Discharge: HOME | DRG: 638 | End: 2020-12-23
Attending: EMERGENCY MEDICINE | Admitting: INTERNAL MEDICINE
Payer: COMMERCIAL

## 2020-12-21 ENCOUNTER — APPOINTMENT (OUTPATIENT)
Dept: RADIATION ONCOLOGY | Age: 60
End: 2020-12-21
Attending: RADIOLOGY
Payer: COMMERCIAL

## 2020-12-21 DIAGNOSIS — E16.2 HYPOGLYCEMIA: Primary | ICD-10-CM

## 2020-12-21 DIAGNOSIS — E11.649 UNCONTROLLED TYPE 2 DIABETES MELLITUS WITH HYPOGLYCEMIA WITHOUT COMA (HCC): ICD-10-CM

## 2020-12-21 LAB
ABSOLUTE EOS #: 0.26 K/UL (ref 0–0.4)
ABSOLUTE IMMATURE GRANULOCYTE: 0 K/UL (ref 0–0.3)
ABSOLUTE LYMPH #: 0.26 K/UL (ref 1–4.8)
ABSOLUTE MONO #: 1.29 K/UL (ref 0.1–0.8)
ALBUMIN SERPL-MCNC: 3.1 G/DL (ref 3.5–5.2)
ALBUMIN/GLOBULIN RATIO: 1 (ref 1–2.5)
ALP BLD-CCNC: 131 U/L (ref 35–104)
ALT SERPL-CCNC: 20 U/L (ref 5–33)
ANION GAP SERPL CALCULATED.3IONS-SCNC: 11 MMOL/L (ref 9–17)
AST SERPL-CCNC: 32 U/L
BASOPHILS # BLD: 0 % (ref 0–2)
BASOPHILS ABSOLUTE: 0 K/UL (ref 0–0.2)
BILIRUB SERPL-MCNC: 0.39 MG/DL (ref 0.3–1.2)
BUN BLDV-MCNC: 8 MG/DL (ref 6–20)
BUN/CREAT BLD: ABNORMAL (ref 9–20)
C-PEPTIDE: <0.1 NG/ML (ref 1.1–4.4)
CALCIUM SERPL-MCNC: 9 MG/DL (ref 8.6–10.4)
CHLORIDE BLD-SCNC: 106 MMOL/L (ref 98–107)
CHP ED QC CHECK: NORMAL
CHP ED QC CHECK: NORMAL
CO2: 24 MMOL/L (ref 20–31)
CREAT SERPL-MCNC: 0.65 MG/DL (ref 0.5–0.9)
DIFFERENTIAL TYPE: ABNORMAL
EOSINOPHILS RELATIVE PERCENT: 3 % (ref 1–4)
ESTIMATED AVERAGE GLUCOSE: 192 MG/DL
GFR AFRICAN AMERICAN: >60 ML/MIN
GFR NON-AFRICAN AMERICAN: >60 ML/MIN
GFR SERPL CREATININE-BSD FRML MDRD: ABNORMAL ML/MIN/{1.73_M2}
GFR SERPL CREATININE-BSD FRML MDRD: ABNORMAL ML/MIN/{1.73_M2}
GLUCOSE BLD-MCNC: 119 MG/DL
GLUCOSE BLD-MCNC: 128 MG/DL (ref 65–105)
GLUCOSE BLD-MCNC: 165 MG/DL
GLUCOSE BLD-MCNC: 52 MG/DL (ref 65–105)
GLUCOSE BLD-MCNC: 53 MG/DL (ref 70–99)
GLUCOSE BLD-MCNC: 84 MG/DL (ref 65–105)
HBA1C MFR BLD: 8.3 % (ref 4–6)
HCT VFR BLD CALC: 35.3 % (ref 36.3–47.1)
HEMOGLOBIN: 10.8 G/DL (ref 11.9–15.1)
IMMATURE GRANULOCYTES: 0 %
INSULIN COMMENT: NORMAL
INSULIN REFERENCE RANGE:: NORMAL
INSULIN: 7.2 MU/L
LIPASE: 4 U/L (ref 13–60)
LYMPHOCYTES # BLD: 3 % (ref 24–44)
MCH RBC QN AUTO: 27.3 PG (ref 25.2–33.5)
MCHC RBC AUTO-ENTMCNC: 30.6 G/DL (ref 28.4–34.8)
MCV RBC AUTO: 89.4 FL (ref 82.6–102.9)
MONOCYTES # BLD: 15 % (ref 1–7)
MORPHOLOGY: ABNORMAL
NRBC AUTOMATED: 0 PER 100 WBC
PDW BLD-RTO: 18.4 % (ref 11.8–14.4)
PLATELET # BLD: 211 K/UL (ref 138–453)
PLATELET ESTIMATE: ABNORMAL
PMV BLD AUTO: 9.9 FL (ref 8.1–13.5)
POTASSIUM SERPL-SCNC: 3.7 MMOL/L (ref 3.7–5.3)
RBC # BLD: 3.95 M/UL (ref 3.95–5.11)
RBC # BLD: ABNORMAL 10*6/UL
SEG NEUTROPHILS: 79 % (ref 36–66)
SEGMENTED NEUTROPHILS ABSOLUTE COUNT: 6.79 K/UL (ref 1.8–7.7)
SODIUM BLD-SCNC: 141 MMOL/L (ref 135–144)
TOTAL PROTEIN: 6.1 G/DL (ref 6.4–8.3)
WBC # BLD: 8.6 K/UL (ref 3.5–11.3)
WBC # BLD: ABNORMAL 10*3/UL

## 2020-12-21 PROCEDURE — 80053 COMPREHEN METABOLIC PANEL: CPT

## 2020-12-21 PROCEDURE — 84681 ASSAY OF C-PEPTIDE: CPT

## 2020-12-21 PROCEDURE — 1200000000 HC SEMI PRIVATE

## 2020-12-21 PROCEDURE — 85025 COMPLETE CBC W/AUTO DIFF WBC: CPT

## 2020-12-21 PROCEDURE — 2580000003 HC RX 258: Performed by: STUDENT IN AN ORGANIZED HEALTH CARE EDUCATION/TRAINING PROGRAM

## 2020-12-21 PROCEDURE — 6370000000 HC RX 637 (ALT 250 FOR IP): Performed by: STUDENT IN AN ORGANIZED HEALTH CARE EDUCATION/TRAINING PROGRAM

## 2020-12-21 PROCEDURE — 99254 IP/OBS CNSLTJ NEW/EST MOD 60: CPT | Performed by: INTERNAL MEDICINE

## 2020-12-21 PROCEDURE — 2580000003 HC RX 258

## 2020-12-21 PROCEDURE — 6360000002 HC RX W HCPCS: Performed by: STUDENT IN AN ORGANIZED HEALTH CARE EDUCATION/TRAINING PROGRAM

## 2020-12-21 PROCEDURE — 99285 EMERGENCY DEPT VISIT HI MDM: CPT

## 2020-12-21 PROCEDURE — 83036 HEMOGLOBIN GLYCOSYLATED A1C: CPT

## 2020-12-21 PROCEDURE — 93005 ELECTROCARDIOGRAM TRACING: CPT | Performed by: STUDENT IN AN ORGANIZED HEALTH CARE EDUCATION/TRAINING PROGRAM

## 2020-12-21 PROCEDURE — 83525 ASSAY OF INSULIN: CPT

## 2020-12-21 PROCEDURE — 96366 THER/PROPH/DIAG IV INF ADDON: CPT

## 2020-12-21 PROCEDURE — 96375 TX/PRO/DX INJ NEW DRUG ADDON: CPT

## 2020-12-21 PROCEDURE — 96372 THER/PROPH/DIAG INJ SC/IM: CPT

## 2020-12-21 PROCEDURE — G0378 HOSPITAL OBSERVATION PER HR: HCPCS

## 2020-12-21 PROCEDURE — 82947 ASSAY GLUCOSE BLOOD QUANT: CPT

## 2020-12-21 PROCEDURE — 83690 ASSAY OF LIPASE: CPT

## 2020-12-21 PROCEDURE — 96365 THER/PROPH/DIAG IV INF INIT: CPT

## 2020-12-21 RX ORDER — DEXTROSE MONOHYDRATE 25 G/50ML
12.5 INJECTION, SOLUTION INTRAVENOUS PRN
Status: DISCONTINUED | OUTPATIENT
Start: 2020-12-21 | End: 2020-12-23 | Stop reason: HOSPADM

## 2020-12-21 RX ORDER — ACETAMINOPHEN 325 MG/1
650 TABLET ORAL EVERY 6 HOURS PRN
Status: DISCONTINUED | OUTPATIENT
Start: 2020-12-21 | End: 2020-12-23 | Stop reason: HOSPADM

## 2020-12-21 RX ORDER — ATORVASTATIN CALCIUM 80 MG/1
40 TABLET, FILM COATED ORAL NIGHTLY
Status: DISCONTINUED | OUTPATIENT
Start: 2020-12-21 | End: 2020-12-23 | Stop reason: HOSPADM

## 2020-12-21 RX ORDER — SODIUM CHLORIDE 0.9 % (FLUSH) 0.9 %
10 SYRINGE (ML) INJECTION PRN
Status: DISCONTINUED | OUTPATIENT
Start: 2020-12-21 | End: 2020-12-23 | Stop reason: HOSPADM

## 2020-12-21 RX ORDER — ONDANSETRON 2 MG/ML
4 INJECTION INTRAMUSCULAR; INTRAVENOUS EVERY 6 HOURS PRN
Status: DISCONTINUED | OUTPATIENT
Start: 2020-12-21 | End: 2020-12-23 | Stop reason: HOSPADM

## 2020-12-21 RX ORDER — SODIUM CHLORIDE 0.9 % (FLUSH) 0.9 %
10 SYRINGE (ML) INJECTION EVERY 12 HOURS SCHEDULED
Status: DISCONTINUED | OUTPATIENT
Start: 2020-12-21 | End: 2020-12-23 | Stop reason: HOSPADM

## 2020-12-21 RX ORDER — PROMETHAZINE HYDROCHLORIDE 12.5 MG/1
12.5 TABLET ORAL EVERY 6 HOURS PRN
Status: DISCONTINUED | OUTPATIENT
Start: 2020-12-21 | End: 2020-12-23 | Stop reason: HOSPADM

## 2020-12-21 RX ORDER — OXYCODONE HYDROCHLORIDE 5 MG/1
10 TABLET ORAL EVERY 6 HOURS PRN
Status: DISCONTINUED | OUTPATIENT
Start: 2020-12-21 | End: 2020-12-23 | Stop reason: HOSPADM

## 2020-12-21 RX ORDER — ACETAMINOPHEN 650 MG/1
650 SUPPOSITORY RECTAL EVERY 6 HOURS PRN
Status: DISCONTINUED | OUTPATIENT
Start: 2020-12-21 | End: 2020-12-23 | Stop reason: HOSPADM

## 2020-12-21 RX ORDER — INSULIN GLARGINE 100 [IU]/ML
5 INJECTION, SOLUTION SUBCUTANEOUS NIGHTLY
Status: DISCONTINUED | OUTPATIENT
Start: 2020-12-21 | End: 2020-12-22

## 2020-12-21 RX ORDER — ASPIRIN 81 MG/1
81 TABLET, CHEWABLE ORAL NIGHTLY
Status: DISCONTINUED | OUTPATIENT
Start: 2020-12-21 | End: 2020-12-23 | Stop reason: HOSPADM

## 2020-12-21 RX ORDER — SENNOSIDES 8.8 MG/5ML
5 LIQUID ORAL 2 TIMES DAILY PRN
Status: DISCONTINUED | OUTPATIENT
Start: 2020-12-21 | End: 2020-12-23 | Stop reason: HOSPADM

## 2020-12-21 RX ORDER — MAGNESIUM SULFATE 1 G/100ML
1 INJECTION INTRAVENOUS PRN
Status: DISCONTINUED | OUTPATIENT
Start: 2020-12-21 | End: 2020-12-23 | Stop reason: HOSPADM

## 2020-12-21 RX ORDER — AMLODIPINE BESYLATE 10 MG/1
10 TABLET ORAL DAILY
Status: DISCONTINUED | OUTPATIENT
Start: 2020-12-21 | End: 2020-12-23 | Stop reason: HOSPADM

## 2020-12-21 RX ORDER — LOPERAMIDE HYDROCHLORIDE 2 MG/1
2 CAPSULE ORAL ONCE
Status: COMPLETED | OUTPATIENT
Start: 2020-12-21 | End: 2020-12-21

## 2020-12-21 RX ORDER — DEXTROSE MONOHYDRATE 25 G/50ML
25 INJECTION, SOLUTION INTRAVENOUS ONCE
Status: COMPLETED | OUTPATIENT
Start: 2020-12-21 | End: 2020-12-21

## 2020-12-21 RX ORDER — CARVEDILOL 12.5 MG/1
12.5 TABLET ORAL 2 TIMES DAILY WITH MEALS
Status: DISCONTINUED | OUTPATIENT
Start: 2020-12-21 | End: 2020-12-23 | Stop reason: HOSPADM

## 2020-12-21 RX ORDER — DEXTROSE MONOHYDRATE 25 G/50ML
INJECTION, SOLUTION INTRAVENOUS
Status: COMPLETED
Start: 2020-12-21 | End: 2020-12-21

## 2020-12-21 RX ORDER — DEXTROSE MONOHYDRATE 50 MG/ML
100 INJECTION, SOLUTION INTRAVENOUS PRN
Status: DISCONTINUED | OUTPATIENT
Start: 2020-12-21 | End: 2020-12-23 | Stop reason: HOSPADM

## 2020-12-21 RX ORDER — NICOTINE POLACRILEX 4 MG
15 LOZENGE BUCCAL PRN
Status: DISCONTINUED | OUTPATIENT
Start: 2020-12-21 | End: 2020-12-23 | Stop reason: HOSPADM

## 2020-12-21 RX ADMIN — AMLODIPINE BESYLATE 10 MG: 10 TABLET ORAL at 11:33

## 2020-12-21 RX ADMIN — CARVEDILOL 12.5 MG: 12.5 TABLET, FILM COATED ORAL at 17:25

## 2020-12-21 RX ADMIN — DEXTROSE AND SODIUM CHLORIDE: 5; 450 INJECTION, SOLUTION INTRAVENOUS at 11:17

## 2020-12-21 RX ADMIN — DEXTROSE MONOHYDRATE 25 G: 25 INJECTION, SOLUTION INTRAVENOUS at 08:39

## 2020-12-21 RX ADMIN — ENOXAPARIN SODIUM 40 MG: 40 INJECTION SUBCUTANEOUS at 13:53

## 2020-12-21 RX ADMIN — CARVEDILOL 12.5 MG: 12.5 TABLET, FILM COATED ORAL at 11:33

## 2020-12-21 RX ADMIN — INSULIN GLARGINE 5 UNITS: 100 INJECTION, SOLUTION SUBCUTANEOUS at 22:46

## 2020-12-21 RX ADMIN — SERTRALINE 100 MG: 50 TABLET, FILM COATED ORAL at 21:00

## 2020-12-21 RX ADMIN — LOPERAMIDE HYDROCHLORIDE 2 MG: 2 CAPSULE ORAL at 10:38

## 2020-12-21 RX ADMIN — ASPIRIN 81 MG: 81 TABLET, CHEWABLE ORAL at 21:00

## 2020-12-21 RX ADMIN — OXYCODONE HYDROCHLORIDE 10 MG: 5 TABLET ORAL at 11:33

## 2020-12-21 RX ADMIN — DEXTROSE AND SODIUM CHLORIDE: 5; 450 INJECTION, SOLUTION INTRAVENOUS at 09:34

## 2020-12-21 RX ADMIN — SODIUM CHLORIDE, PRESERVATIVE FREE 10 ML: 5 INJECTION INTRAVENOUS at 21:09

## 2020-12-21 RX ADMIN — OXYCODONE HYDROCHLORIDE 10 MG: 5 TABLET ORAL at 21:29

## 2020-12-21 RX ADMIN — ATORVASTATIN CALCIUM 40 MG: 80 TABLET, FILM COATED ORAL at 21:58

## 2020-12-21 ASSESSMENT — PAIN SCALES - GENERAL
PAINLEVEL_OUTOF10: 7
PAINLEVEL_OUTOF10: 3

## 2020-12-21 ASSESSMENT — ENCOUNTER SYMPTOMS
NAUSEA: 0
COUGH: 0
VOMITING: 0
SHORTNESS OF BREATH: 0
WHEEZING: 0
ABDOMINAL PAIN: 0
BACK PAIN: 0

## 2020-12-21 ASSESSMENT — PAIN DESCRIPTION - PAIN TYPE: TYPE: ACUTE PAIN

## 2020-12-21 ASSESSMENT — PAIN DESCRIPTION - PROGRESSION: CLINICAL_PROGRESSION: GRADUALLY IMPROVING

## 2020-12-21 ASSESSMENT — PAIN DESCRIPTION - FREQUENCY: FREQUENCY: CONTINUOUS

## 2020-12-21 NOTE — ED PROVIDER NOTES
[facility] ED  Emergency Department Encounter  Emergency Medicine Resident     Pt Name: Naomi Alford  MRN: [id number]  [place] 1960  Date of evaluation: 20  PCP:  [facility]       Chief Complaint   Patient presents with    Hypoglycemia       [facility]  (Location/Symptom, Timing/Onset, Context/Setting, Quality, Duration, Modifying Factors,Severity.)      Naomi Alford is a 63 yo female who presents with hypoglycemia. Patient states that her blood sugar keeps dropping below 50 for the past 24 hours. She states that she takes insulin for diabetes but denies any oral medications. She states that she takes Lantus in the morning and at night however she has not taken any of her insulin since yesterday morning and her blood sugar continues to drop. She notes that she has been diagnosed with pancreatic cancer and is currently receiving chemo and radiation treatments last of which was on Thursday. She denies any other symptoms including fevers, chills, sweats, chest pain, new abdominal pain, nausea, vomiting. PAST MEDICAL / SURGICAL / SOCIAL / FAMILY HISTORY      has a past medical history of Anxiety, Diabetes mellitus ([address] Hyperlipidemia, Hypertension, and Pancreatic cancer ([facility] [address] has a past surgical history that includes  section; Gallbladder surgery; ERCP (2020); Upper gastrointestinal endoscopy (2020); Upper gastrointestinal endoscopy (2020); ERCP (2020); ERCP (2020); ERCP (2020); ERCP (2020); ERCP (2020); and ERCP (2020).      Social History     Socioeconomic History    Marital status:      Spouse name: Not on file    Number of children: Not on file    Years of education: Not on file    Highest education level: Not on file   Occupational History    Not on file   Social Needs    Financial resource strain: Not on file    Food insecurity     Worry: Not on file Inability: Not on file    Transportation needs     Medical: Not on file     Non-medical: Not on file   Tobacco Use    Smoking status: Former Smoker     Quit date: 7/14/2005     Years since quitting: 15.4    Smokeless tobacco: Never Used   Substance and Sexual Activity    Alcohol use: Not Currently    Drug use: No    Sexual activity: Not on file   Lifestyle    Physical activity     Days per week: Not on file     Minutes per session: Not on file    Stress: Not on file   Relationships    Social connections     Talks on phone: Not on file     Gets together: Not on file     Attends Hoahaoism service: Not on file     Active member of club or organization: Not on file     Attends meetings of clubs or organizations: Not on file     Relationship status: Not on file    Intimate partner violence     Fear of current or ex partner: Not on file     Emotionally abused: Not on file     Physically abused: Not on file     Forced sexual activity: Not on file   Other Topics Concern    Not on file   Social History Narrative    Not on file       Family History   Problem Relation Age of Onset    Cancer Mother     Hypertension Mother     Heart Failure Father     Hypertension Father     Cancer Maternal Grandmother         colon     Cancer Maternal Grandfather         lung         Allergies:  Lisinopril, Sulfamethoxazole, Trimethoprim, Cefuroxime axetil, Sulfamethoxazole-trimethoprim, Clindamycin phosphate, and Penicillins    Home Medications:  Prior to Admission medications    Medication Sig Start Date End Date Taking? Authorizing Provider   oxyCODONE (OXY-IR) 10 MG immediate release tablet Take 1 tablet by mouth every 6 hours as needed for Pain for up to 30 days.  12/9/20 1/8/21  Jarred Hoover MD   prochlorperazine (COMPAZINE) 10 MG tablet Take 1 tablet by mouth every 6 hours as needed (NV) 12/8/20   Channing Oliver MD atorvastatin (LIPITOR) 40 MG tablet nightly  10/12/13   Historical Provider, MD   LORazepam (ATIVAN) 0.5 MG tablet 1 mg nightly. And 1/2 tab once daily as needed 10/12/13   Historical Provider, MD       REVIEW OFSYSTEMS    (2-9 systems for level 4, 10 or more for level 5)      Review of Systems   Constitutional: Negative for chills and fever. HENT: Negative. Eyes: Negative for visual disturbance. Respiratory: Negative for cough, shortness of breath and wheezing. Cardiovascular: Negative for chest pain, palpitations and leg swelling. Gastrointestinal: Negative for abdominal pain, nausea and vomiting. Endocrine:        Recurrent hypoglycemia. Genitourinary: Negative for dysuria and hematuria. Musculoskeletal: Negative for back pain and neck pain. Skin: Negative for rash and wound. Neurological: Positive for syncope. Negative for weakness, light-headedness and numbness. PHYSICAL EXAM   (up to 7 for level 4, 8 or more forlevel 5)      INITIAL VITALS:   ED Triage Vitals   BP Temp Temp Source Pulse Resp SpO2 Height Weight   12/21/20 0836 -- 12/21/20 0830 12/21/20 0830 12/21/20 0830 12/21/20 0830 12/21/20 0830 12/21/20 0830   (!) 164/93  Oral 85 19 99 % 5' 5\" (1.651 m) 242 lb (109.8 kg)       Physical Exam  Vitals signs and nursing note reviewed. Constitutional:       General: She is not in acute distress. Appearance: Normal appearance. She is not ill-appearing, toxic-appearing or diaphoretic. Cardiovascular:      Rate and Rhythm: Normal rate and regular rhythm. Heart sounds: No murmur. No gallop. Pulmonary:      Effort: Pulmonary effort is normal.      Breath sounds: Normal breath sounds. Abdominal:      General: There is no distension. Palpations: Abdomen is soft. Tenderness: There is no abdominal tenderness. There is no guarding. Musculoskeletal:         General: No tenderness. Right lower leg: No edema. Left lower leg: No edema.    Skin: General: Skin is warm and dry. Neurological:      General: No focal deficit present. Mental Status: She is alert and oriented to person, place, and time.          DIFFERENTIAL  DIAGNOSIS     PLAN (LABS / IMAGING / EKG):  Orders Placed This Encounter   Procedures    CBC WITH AUTO DIFFERENTIAL    Comprehensive Metabolic Panel    INSULIN, TOTAL    C-PEPTIDE    LIPASE    Basic Metabolic Panel w/ Reflex to MG    CBC auto differential    Hemoglobin A1C    DIET GENERAL;    Telemetry Monitoring    HYPOGLYCEMIA TREATMENT: blood glucose less than 50 mg/dL and patient  ALERT and TOLERATING PO    HYPOGLYCEMIA TREATMENT: blood glucose less than 70 mg/dL and patient ALERT and TOLERATING PO    HYPOGLYCEMIA TREATMENT: blood glucose less than 70 mg/dL and patient NOT ALERT or NPO    Notify physician    Daily weights    Intake and output    Neurovascular checks    DNR comfort care - arrest    Inpatient consult to Internal Medicine    Inpatient consult to Case Management    Inpatient consult to Hem/Onc    OT eval and treat    PT evaluation and treat    Initiate Oxygen Therapy Protocol    POC Glucose Fingerstick    POC Glucose Fingerstick    POCT Glucose    POCT glucose    POC Glucose Fingerstick    PATIENT STATUS (FROM ED OR OR/PROCEDURAL) Inpatient       MEDICATIONS ORDERED:  Orders Placed This Encounter   Medications    dextrose 50 % solution     AJIT EMMANUEL: cabinet override    dextrose 50 % IV solution    DISCONTD: dextrose 5 % and 0.45 % NaCl 1,000 mL infusion    loperamide (IMODIUM) capsule 2 mg    dextrose 5 % and 0.45 % NaCl 1,000 mL infusion    aspirin chewable tablet 81 mg    atorvastatin (LIPITOR) tablet 40 mg    amLODIPine (NORVASC) tablet 10 mg    carvedilol (COREG) tablet 12.5 mg    oxyCODONE (ROXICODONE) immediate release tablet 10 mg    sertraline (ZOLOFT) tablet 100 mg    glucose (GLUTOSE) 40 % oral gel 15 g    dextrose 50 % IV solution  glucagon (rDNA) injection 1 mg    dextrose 5 % solution    magnesium sulfate 1 g in dextrose 5% 100 mL IVPB    enoxaparin (LOVENOX) injection 40 mg    OR Linked Order Group     promethazine (PHENERGAN) tablet 12.5 mg     ondansetron (ZOFRAN) injection 4 mg    magnesium hydroxide (MILK OF MAGNESIA) 400 MG/5ML suspension 30 mL    senna (SENOKOT) 8.8 MG/5ML syrup 8.8 mg    OR Linked Order Group     acetaminophen (TYLENOL) tablet 650 mg     acetaminophen (TYLENOL) suppository 650 mg       Initial MDM/Plan/ED course: 64 y.o. female who presents with hypoglycemia. On exam vitals normal patient is in no acute distress. Physical exam unremarkable. Basic labs, abdominal labs, insulin and C-peptide levels were obtained. On presentation patient's blood sugar was again low in the low 50s even though given glucose by EMS. Patient was given an amp of D50 and started on a D5 infusion as this has been going on for the past 24 hours. Lab work unremarkable. Patient admitted to internal medicine. Patient agreed with this plan.     DIAGNOSTIC RESULTS / EMERGENCY DEPARTMENT COURSE / MDM     LABS:  Labs Reviewed   CBC WITH AUTO DIFFERENTIAL - Abnormal; Notable for the following components:       Result Value    Hemoglobin 10.8 (*)     Hematocrit 35.3 (*)     RDW 18.4 (*)     Seg Neutrophils 79 (*)     Lymphocytes 3 (*)     Monocytes 15 (*)     Absolute Lymph # 0.26 (*)     Absolute Mono # 1.29 (*)     All other components within normal limits   COMPREHENSIVE METABOLIC PANEL - Abnormal; Notable for the following components:    Glucose 53 (*)     Alkaline Phosphatase 131 (*)     AST 32 (*)     Total Protein 6.1 (*)     Alb 3.1 (*)     All other components within normal limits   C-PEPTIDE - Abnormal; Notable for the following components:    C-Peptide <0.1 (*)     All other components within normal limits   LIPASE - Abnormal; Notable for the following components:    Lipase 4 (*) All other components within normal limits   POC GLUCOSE FINGERSTICK - Abnormal; Notable for the following components:    POC Glucose 52 (*)     All other components within normal limits   POC GLUCOSE FINGERSTICK - Abnormal; Notable for the following components:    POC Glucose 128 (*)     All other components within normal limits   INSULIN, TOTAL   HEMOGLOBIN A1C   POC GLUCOSE FINGERSTICK   POCT GLUCOSE   POCT GLUCOSE         RADIOLOGY:  No results found. EKG      All EKG's are interpreted by the [address] [place] [address] Drive Physician who either signs or Co-signs this chart in the absence of a cardiologist.      PROCEDURES:  None    CONSULTS:  IP CONSULT TO INTERNAL MEDICINE  IP CONSULT TO HEM/ONC  IP CONSULT TO CASE MANAGEMENT    CRITICAL CARE:  Please see attending note    FINAL IMPRESSION      1. Hypoglycemia          DISPOSITION / PLAN     DISPOSITION Admitted 12/21/2020 09:50:42 AM      PATIENT REFERRED TO:  No follow-up provider specified.     DISCHARGE MEDICATIONS:  New Prescriptions    No medications on file       Oliva Galvan DO  Emergency Medicine Resident    (Please note that portions of this note were completed with a voice recognition program.Efforts were made to edit the dictations but occasionally words are mis-transcribed.)        Malinda Maldonado DO  Resident  12/21/20 4911

## 2020-12-21 NOTE — TELEPHONE ENCOUNTER
Pt called stating she is currently in the ER @ [facility] because of low blood sugar and fainting. Pt states she will c/b later today for possible RT if they let her go. RN and rad techs advised.

## 2020-12-21 NOTE — ED NOTES
Patient ambulates to bathroom with steady gait  Nondistressed  GCS=15    Denies complaints at this time, call light in reach     Michael Gonzalez RN  12/21/20 1800

## 2020-12-21 NOTE — ED NOTES
Pt to ED via EMS. EMS reports pt was found down, with a blood sugar of 33. EMS reports they gave oral glucose x2, which brought her blood sugar up to 229. Pt reports she remembers feeling dizzy and hitting her head. Pt reports she is going through radiation and chemo for stage 4 pancreatic cancer. Pt reports her last treatment was Thursday. Pt currently denies any pain or any other complaints. Pt laying on cot. Placed on full cardiac monitor. RR even and non labored. NAD noted at this time. Call light within reach. Will continue to monitor.       Pramod Felix RN  12/21/20 1012

## 2020-12-21 NOTE — ED NOTES
Bed: 26  Expected date:   Expected time:   Means of arrival:   Comments:  624 Brian Mercado RN  12/21/20 9178

## 2020-12-21 NOTE — ED NOTES
Patient oriented to room and surroundings.   Nondistressed  Denies complaints at this time  Calm, call light in reach  Finishes supper without issues    Additional po fluids provided    Will continue to monitor and update     Joseph Henley RN  12/21/20 [id number]

## 2020-12-21 NOTE — Clinical Note
Patient Class: Observation [104]   REQUIRED: Diagnosis: Hypoglycemia [104741]   Estimated Length of Stay: Estimated stay of less than 2 midnights   Telemetry Bed Required?: Yes

## 2020-12-21 NOTE — ED PROVIDER NOTES
[facility]     Emergency Department     Faculty Attestation    I performed a history and physical examination of the patient and discussed management with the resident. I reviewed the residents note and agree with the documented findings and plan of care. Any areas of disagreement are noted on the chart. I was personally present for the key portions of any procedures. I have documented in the chart those procedures where I was not present during the key portions. I have reviewed the emergency nurses triage note. I agree with the chief complaint, past medical history, past surgical history, allergies, medications, social and family history as documented unless otherwise noted below. For Physician Assistant/ Nurse Practitioner cases/documentation I have personally evaluated this patient and have completed at least one if not all key elements of the E/M (history, physical exam, and MDM). Additional findings are as noted. I have personally seen and evaluated the patient. I find the patient's history and physical exam are consistent with the NP/PA documentation. I agree with the care provided, treatment rendered, disposition and follow-up plan. 73-year-old female with a history of pancreatic cancer, currently undergoing chemotherapy and radiation presenting with hyperglycemia. Has been struggling with hypoglycemia over the last week. Has not been using her insulin due to the hypoglycemia. Has been tolerating oral intake without vomiting. No other recent changes to medication other than addition of Compazine for nausea.     Exam:  General: Laying on the bed, awake, alert and in no acute distress  CV: normal rate and regular rhythm  Lungs: Breathing comfortably on room air with no tachypnea, hypoxia, or increased work of breathing    Plan:

## 2020-12-21 NOTE — PROGRESS NOTES
Senior note    16-year-old female with history of diabetes, essential hypertension, anxiety and recently diagnosed pancreatic cancer on chemoradiation came in with a chief complaint of hypoglycemia. Of note,  Back in June patient came to the ER for back pain and was found to be in DKA. MRI abdomen revealed an irregular mass in the head of the pancreas resulting in biliary and pancreatic duct obstruction. Patient underwent ERCP with EUS on 6/4/2020 with biliary sphincterotomy and biliary stent placement. Biopsy results came back positive for moderately differentiated adenocarcinoma of the pancreas. Patient's cancer was borderline unresectable and she did not wish to move forward with surgery. Patient was again hospitalized in July for cholangitis secondary to clogged CBD stent. Repeat ERCP was done and a metal stent was placed. Patient follows up with Dr. Ruben Cordoba outpatient and was started on folfirinox, however the patient could not tolerate it because of side effects. Patient was then switched to chemoradiation with Xeloda on 11/16/20. Patient also follows up with palliative care and her CODE STATUS was changed to DNR CCA. As per the chart patient continued to have some nausea with the chemotherapy and was started on Zofran/Compazine. Patient has been on Lantus 60 units in the morning and 50 units at night with sliding scale since June for type 2 diabetes. Patient checks her sugars regularly, and noticed since the last 1-1/2 days her sugars have been running low in 50s to 70s. Patient has been compliant with her Lantus regime daily. She has not noticed any changes in her diet, however because of her chemotherapy sometimes her appetite is not that good. Also experiences diarrhea and some nausea as a side effect of the chemotherapy. Yesterday morning patient's blood sugar was in 240s and she took her 60 units of Lantus, at night it dropped to 40s after which she did not take her nighttime dose of Lantus. This morning patient's sugar dropped to 30s. Patient did experience dizziness, diaphoresis and confusion. This morning patient passed out and fell on the floor and hit her lip. Her son called the EMS, her blood sugar was in 30s patient was given oral glucose and brought to the ER. In the ER, patient's blood sugar in 50s and she received 25 g dextrose 50% IV followed by D5 and half-normal saline infusion running at 100 mL/h. Repeat blood sugar in 120s. On my evaluation, patient was alert oriented x3. She was aware of her hypoglycemic episodes. Currently denied any dizziness, diaphoresis, chest pain, palpitations, tremors or any history of seizures. Principal Problem:    Hypoglycemia  Active Problems:    Hypertension    Primary adenocarcinoma of head of pancreas ([facility] [address]    Long term current use of insulin ([facility] [address]  Resolved Problems:    * No resolved hospital problems. *      Plan  1. We will hold off on Lantus, encouraged the patient to eat and check her blood sugars before meals and at bedtime. We will start the patient on low-dose of Lantus tonight and adjusted according to her sugars. 2. Hypoglycemia protocol  3. Follow-up on A1c  4. Consulted Dr. Kelsey Trujillo, who also follows the patient outpatient for chemoradiation. Patient has not taken her Xeloda today and missed her radiation session since she came to the ER.     Jazmyn Silverio MD  PGY-2 Internal Medicine Resident  [address] [place] [address]  12/21/2020 11:09 AM

## 2020-12-21 NOTE — CONSULTS
Today's Date: 2020  Patient Name: Rafael aLgunas  Date of admission: 2020  8:27 AM  Patient's age: 64 y. o., 1960  Admission Dx: Hypoglycemia [E16.2]    Reason for Consult: syncope, Hypoglycemia  Requesting Physician: Martir Rojas MD    CHIEF COMPLAINT:    Chief Complaint   Patient presents with    Hypoglycemia     History Obtained From:  Pt and chart    HISTORY OF PRESENT ILLNESS:      Rafael Lagunas is a 64 y.o.  female who is admitted to the hospital for episode of hypoglycemia. Patient has history of locally advanced pancreatic cancer currently receiving chemoradiation, capecitabine-based. Patient has history of diabetes and reported that her sugars lately have been on the low side. She reported that she passed out at home and did not have any major injuries. She did not hurt her head. She denies any chest pain, shortness of breath. Patient has had about 7-8 treatment of radiation left. She is on oral chemotherapy with Xeloda and tolerating well so far. She denies any fever chills, nausea vomiting, diarrhea. She does have abdominal pain from her pancreatic cancer which is controlled with pain medications. Past Medical History:   has a past medical history of Anxiety, Diabetes mellitus ([address] Hyperlipidemia, Hypertension, and Pancreatic cancer ([address] Past Surgical History:   has a past surgical history that includes  section; Gallbladder surgery; ERCP (2020); Upper gastrointestinal endoscopy (2020); Upper gastrointestinal endoscopy (2020); ERCP (2020); ERCP (2020); ERCP (2020); ERCP (2020); ERCP (2020); and ERCP (2020). Medications:    Prior to Admission medications    Medication Sig Start Date End Date Taking? Authorizing Provider   oxyCODONE (OXY-IR) 10 MG immediate release tablet Take 1 tablet by mouth every 6 hours as needed for Pain for up to 30 days.  20  Daylin Mcadams MD prochlorperazine (COMPAZINE) 10 MG tablet Take 1 tablet by mouth every 6 hours as needed (NV) 12/8/20   Ilya Benitez MD   capecitabine (XELODA) 150 MG chemo tablet TAKE 2 TABLETS BY MOUTH TWICE DAILY ON MONDAY TO FRIDAY WHILE ON RADIATION. 11/24/20   Patricia Rodriguez MD   capecitabine (XELODA) 500 MG chemo tablet TAKE 3 TABLETS BY MOUTH TWICE DAILY ON MONDAY TO FRIDAY WHILE ON RADIATION. 11/24/20   Patricia Rodriguez MD   lipase-protease-amylase (CREON) 90183 units CPEP delayed release capsule Take two 36,000unit capsules with meals and one- 36,000unit capsule with snacks. Patient not taking: Reported on 12/9/2020 8/18/20   Patricia Rodriguez MD   lidocaine-prilocaine (EMLA) 2.5-2.5 % cream Apply topically as needed. 7/6/20   Patricia Rodriguez MD   ondansetron (ZOFRAN-ODT) 8 MG TBDP disintegrating tablet Place 1 tablet under the tongue every 8 hours as needed for Nausea or Vomiting 7/6/20   Patricia Rodriguez MD   furosemide (LASIX) 20 MG tablet Take 1 tablet by mouth daily  Patient not taking: Reported on 11/3/2020 6/16/20 10/13/20  Patricia Rodriguez MD   nitroGLYCERIN (NITROSTAT) 0.4 MG SL tablet up to max of 3 total doses.  If no relief after 1 dose, call 911. 6/6/20   Júnior Hu MD   insulin glargine (LANTUS) 100 UNIT/ML injection vial Inject 50 Units into the skin nightly 6/6/20   Júnior Hu MD   insulin glargine (LANTUS) 100 UNIT/ML injection vial Inject 60 Units into the skin daily 6/7/20   Júnior Hu MD   hydrALAZINE (APRESOLINE) 25 MG tablet Take 1 tablet by mouth every 8 hours 6/6/20   Júnior Hu MD   carvedilol (COREG) 12.5 MG tablet Take 1 tablet by mouth 2 times daily (with meals) 6/6/20   Júnior Hu MD   amLODIPine (NORVASC) 10 MG tablet Take 1 tablet by mouth daily 6/7/20   Júnior Hu MD   aspirin 81 MG chewable tablet Take 81 mg by mouth nightly    Historical Provider, MD   insulin aspart (NOVOLOG FLEXPEN) 100 UNIT/ML injection pen Inject into the skin    Historical Provider, MD sertraline (ZOLOFT) 100 MG tablet take 1 1/2 tablet by oral route  every day    Historical Provider, MD   atorvastatin (LIPITOR) 40 MG tablet nightly  10/12/13   Historical Provider, MD   LORazepam (ATIVAN) 0.5 MG tablet 1 mg nightly.  And 1/2 tab once daily as needed 10/12/13   Historical Provider, MD     Current Facility-Administered Medications   Medication Dose Route Frequency Provider Last Rate Last Admin    aspirin chewable tablet 81 mg  81 mg Oral Nightly Katharina Spear MD        atorvastatin (LIPITOR) tablet 40 mg  40 mg Oral Nightly Katharina Spear MD        amLODIPine (NORVASC) tablet 10 mg  10 mg Oral Daily Katharina Spear MD   10 mg at 12/21/20 1133    carvedilol (COREG) tablet 12.5 mg  12.5 mg Oral BID WC Katharina Spear MD   12.5 mg at 12/21/20 1725    oxyCODONE (ROXICODONE) immediate release tablet 10 mg  10 mg Oral Q6H PRN Katharina Spear MD   10 mg at 12/21/20 1133    sertraline (ZOLOFT) tablet 100 mg  100 mg Oral Nightly Katharina Spear MD        glucose (GLUTOSE) 40 % oral gel 15 g  15 g Oral PRN Katharina Spear MD        dextrose 50 % IV solution  12.5 g Intravenous PRN Katharina Spear MD        glucagon (rDNA) injection 1 mg  1 mg Intramuscular PRN Katharina Spear MD        dextrose 5 % solution  100 mL/hr Intravenous PRN Katharina Spear MD        sodium chloride flush 0.9 % injection 10 mL  10 mL Intravenous 2 times per day Katharina Spear MD        sodium chloride flush 0.9 % injection 10 mL  10 mL Intravenous PRN Katharina Spear MD        magnesium sulfate 1 g in dextrose 5% 100 mL IVPB  1 g Intravenous PRN Katharina Spear MD        enoxaparin (LOVENOX) injection 40 mg  40 mg Subcutaneous Daily Katharina Spear MD   40 mg at 12/21/20 1353    promethazine (PHENERGAN) tablet 12.5 mg  12.5 mg Oral Q6H PRN Katharina Spear MD        Or    ondansetron (ZOFRAN) injection 4 mg  4 mg Intravenous Q6H PRN Katharina Spear MD  magnesium hydroxide (MILK OF MAGNESIA) 400 MG/5ML suspension 30 mL  30 mL Oral Daily PRN Natalya Ramires MD        senna (SENOKOT) 8.8 MG/5ML syrup 8.8 mg  5 mL Oral BID PRN Natalya Ramires MD        acetaminophen (TYLENOL) tablet 650 mg  650 mg Oral Q6H PRN MD Aman Kaplan acetaminophen (TYLENOL) suppository 650 mg  650 mg Rectal Q6H PRN Natalya Ramires MD         Current Outpatient Medications   Medication Sig Dispense Refill    oxyCODONE (OXY-IR) 10 MG immediate release tablet Take 1 tablet by mouth every 6 hours as needed for Pain for up to 30 days. 120 tablet 0    prochlorperazine (COMPAZINE) 10 MG tablet Take 1 tablet by mouth every 6 hours as needed (NV) 120 tablet 3    capecitabine (XELODA) 150 MG chemo tablet TAKE 2 TABLETS BY MOUTH TWICE DAILY ON MONDAY TO FRIDAY WHILE ON RADIATION. 40 tablet 0    capecitabine (XELODA) 500 MG chemo tablet TAKE 3 TABLETS BY MOUTH TWICE DAILY ON MONDAY TO FRIDAY WHILE ON RADIATION. 60 tablet 0    lipase-protease-amylase (CREON) 78141 units CPEP delayed release capsule Take two 36,000unit capsules with meals and one- 36,000unit capsule with snacks. (Patient not taking: Reported on 12/9/2020) 180 capsule 2    lidocaine-prilocaine (EMLA) 2.5-2.5 % cream Apply topically as needed. 1 Tube 2    ondansetron (ZOFRAN-ODT) 8 MG TBDP disintegrating tablet Place 1 tablet under the tongue every 8 hours as needed for Nausea or Vomiting 90 tablet 2    furosemide (LASIX) 20 MG tablet Take 1 tablet by mouth daily (Patient not taking: Reported on 11/3/2020) 30 tablet 1    nitroGLYCERIN (NITROSTAT) 0.4 MG SL tablet up to max of 3 total doses.  If no relief after 1 dose, call 911. 25 tablet 3    insulin glargine (LANTUS) 100 UNIT/ML injection vial Inject 50 Units into the skin nightly 1 vial 3    insulin glargine (LANTUS) 100 UNIT/ML injection vial Inject 60 Units into the skin daily 1 vial 3  hydrALAZINE (APRESOLINE) 25 MG tablet Take 1 tablet by mouth every 8 hours 90 tablet 3    carvedilol (COREG) 12.5 MG tablet Take 1 tablet by mouth 2 times daily (with meals) 60 tablet 3    amLODIPine (NORVASC) 10 MG tablet Take 1 tablet by mouth daily 30 tablet 3    aspirin 81 MG chewable tablet Take 81 mg by mouth nightly      insulin aspart (NOVOLOG FLEXPEN) 100 UNIT/ML injection pen Inject into the skin      sertraline (ZOLOFT) 100 MG tablet take 1 1/2 tablet by oral route  every day      atorvastatin (LIPITOR) 40 MG tablet nightly       LORazepam (ATIVAN) 0.5 MG tablet 1 mg nightly. And 1/2 tab once daily as needed         Allergies:  Lisinopril, Sulfamethoxazole, Trimethoprim, Cefuroxime axetil, Sulfamethoxazole-trimethoprim, Clindamycin phosphate, and Penicillins    Social History:   reports that she quit smoking about 15 years ago. She has never used smokeless tobacco. She reports previous alcohol use. She reports that she does not use drugs. Family History: family history includes Cancer in her maternal grandfather, maternal grandmother, and mother; Heart Failure in her father; Hypertension in her father and mother. REVIEW OF SYSTEMS:    Constitutional:+fatigue,  No fever or chills. No night sweats, no weight loss   Eyes: No eye discharge, double vision, or eye pain   HEENT: negative for sore mouth, sore throat, hoarseness and voice change   Respiratory: negative for cough , sputum, dyspnea, wheezing, hemoptysis, chest pain   Cardiovascular: negative for chest pain, dyspnea, palpitations, orthopnea, PND   Gastrointestinal: negative for nausea, vomiting, diarrhea, constipation, ++abdominal pain, Dysphagia, hematemesis and hematochezia   Genitourinary: negative for frequency, dysuria, nocturia, urinary incontinence, and hematuria   Integument: negative for rash, skin lesions, bruises.    Hematologic/Lymphatic: negative for easy bruising, bleeding, lymphadenopathy, or petechiae Endocrine: negative for heat or cold intolerance,weight changes, change in bowel habits and hair loss   Musculoskeletal: negative for myalgias, arthralgias, pain, joint swelling,and bone pain   Neurological: negative for headaches, ++dizziness, seizures, weakness, numbness    PHYSICAL EXAM:      BP (!) 164/71   Pulse 88   Temp 97.7 °F (36.5 °C) (Oral)   Resp 18   Ht 5' 5\" (1.651 m)   Wt 242 lb (109.8 kg)   SpO2 95%   BMI 40.27 kg/m²    Temp (24hrs), Av.7 °F (36.5 °C), Min:97.7 °F (36.5 °C), Max:97.7 °F (36.5 °C)    General appearance - well appearing, no in pain or distress   Mental status - alert and cooperative   Eyes - pupils equal and reactive, extraocular eye movements intact   Ears - bilateral TM's and external ear canals normal   Mouth - mucous membranes moist, pharynx normal without lesions   Neck - supple, no significant adenopathy   Lymphatics - no palpable lymphadenopathy, no hepatosplenomegaly   Chest - clear to auscultation, no wheezes, rales or rhonchi, symmetric air entry   Heart - normal rate, regular rhythm, normal S1, S2, no murmurs  Abdomen - soft, nontender, nondistended, no masses or organomegaly   Neurological - alert, oriented, normal speech, no focal findings or movement disorder noted   Musculoskeletal - no joint tenderness, deformity or swelling   Extremities - peripheral pulses normal, no pedal edema, no clubbing or cyanosis   Skin - normal coloration and turgor, no rashes, no suspicious skin lesions noted ,    DATA:    Labs:   CBC:   Recent Labs     20  0839   WBC 8.6   HGB 10.8*   HCT 35.3*        BMP:   Recent Labs     20  0839 20  1726     --    K 3.7  --    CO2 24  --    BUN 8  --    CREATININE 0.65  --    LABGLOM >60  --    GLUCOSE 53* 119     PT/INR: No results for input(s): PROTIME, INR in the last 72 hours.     IMAGING DATA:  @IMG@   No orders to display       Primary Problem  Hypoglycemia    Active Hospital Problems Diagnosis Date Noted    Hypoglycemia [E16.2] 12/21/2020    Long term current use of insulin ([facility] [Z79.4] 07/21/2020    Primary adenocarcinoma of head of pancreas ([facility] [C25.0] 06/05/2020    Hypertension [I10] 12/27/2013     IMPRESSION:   1. Locally advanced pancreatic cancer currently receiving Xeloda-based chemoradiation  2. Hypoglycemia  3. Diabetes  4. Hypertension    RECOMMENDATIONS:  1. I reviewed the lab work, imaging studies, diagnosis, prognosis and treatment options with patient  2. Given the chemotherapy and duration treatment to her pancreas, she may need to adjust her insulin dosage and monitor her sugar more closely  3. She will hold off on Xeloda until she resumes her radiation treatment  4. Once stable from medicine standpoint, she may be discharged to resume her chemoradiation as outpatient  5. Will follow      Discussed with patient and Nurse. Thank you for asking us to see this patient. Ilya Cordoba MD  Hematologist/Medical Oncologist    Cell: [phone]      This note is created with the assistance of a speech recognition program.  While intending to generate a document that actually reflects the content of the visit, the document can still have some errors including those of syntax and sound a like substitutions which may escape proof reading. It such instances, actual meaning can be extrapolated by contextual diversion.

## 2020-12-22 ENCOUNTER — APPOINTMENT (OUTPATIENT)
Dept: RADIATION ONCOLOGY | Age: 60
End: 2020-12-22
Attending: RADIOLOGY
Payer: COMMERCIAL

## 2020-12-22 LAB
ABSOLUTE EOS #: 0.23 K/UL (ref 0–0.44)
ABSOLUTE IMMATURE GRANULOCYTE: 0.06 K/UL (ref 0–0.3)
ABSOLUTE LYMPH #: 0.51 K/UL (ref 1.1–3.7)
ABSOLUTE MONO #: 0.51 K/UL (ref 0.1–1.2)
ANION GAP SERPL CALCULATED.3IONS-SCNC: 9 MMOL/L (ref 9–17)
BASOPHILS # BLD: 0 % (ref 0–2)
BASOPHILS ABSOLUTE: 0 K/UL (ref 0–0.2)
BUN BLDV-MCNC: 7 MG/DL (ref 6–20)
BUN/CREAT BLD: ABNORMAL (ref 9–20)
CALCIUM SERPL-MCNC: 8.8 MG/DL (ref 8.6–10.4)
CHLORIDE BLD-SCNC: 103 MMOL/L (ref 98–107)
CHP ED QC CHECK: YES
CO2: 26 MMOL/L (ref 20–31)
CREAT SERPL-MCNC: 0.6 MG/DL (ref 0.5–0.9)
DIFFERENTIAL TYPE: ABNORMAL
EKG ATRIAL RATE: 85 BPM
EKG P AXIS: 107 DEGREES
EKG P-R INTERVAL: 150 MS
EKG Q-T INTERVAL: 394 MS
EKG QRS DURATION: 86 MS
EKG QTC CALCULATION (BAZETT): 468 MS
EKG R AXIS: -5 DEGREES
EKG T AXIS: 24 DEGREES
EKG VENTRICULAR RATE: 85 BPM
EOSINOPHILS RELATIVE PERCENT: 4 % (ref 1–4)
GFR AFRICAN AMERICAN: >60 ML/MIN
GFR NON-AFRICAN AMERICAN: >60 ML/MIN
GFR SERPL CREATININE-BSD FRML MDRD: ABNORMAL ML/MIN/{1.73_M2}
GFR SERPL CREATININE-BSD FRML MDRD: ABNORMAL ML/MIN/{1.73_M2}
GLUCOSE BLD-MCNC: 119 MG/DL (ref 65–105)
GLUCOSE BLD-MCNC: 165 MG/DL (ref 65–105)
GLUCOSE BLD-MCNC: 195 MG/DL
GLUCOSE BLD-MCNC: 195 MG/DL (ref 65–105)
GLUCOSE BLD-MCNC: 221 MG/DL (ref 65–105)
GLUCOSE BLD-MCNC: 229 MG/DL (ref 70–99)
GLUCOSE BLD-MCNC: 235 MG/DL (ref 65–105)
GLUCOSE BLD-MCNC: 243 MG/DL (ref 65–105)
GLUCOSE BLD-MCNC: 251 MG/DL (ref 65–105)
HCT VFR BLD CALC: 32.9 % (ref 36.3–47.1)
HEMOGLOBIN: 10 G/DL (ref 11.9–15.1)
IMMATURE GRANULOCYTES: 1 %
LYMPHOCYTES # BLD: 9 % (ref 24–43)
MCH RBC QN AUTO: 26.9 PG (ref 25.2–33.5)
MCHC RBC AUTO-ENTMCNC: 30.4 G/DL (ref 28.4–34.8)
MCV RBC AUTO: 88.4 FL (ref 82.6–102.9)
MONOCYTES # BLD: 9 % (ref 3–12)
MORPHOLOGY: ABNORMAL
NRBC AUTOMATED: 0 PER 100 WBC
PDW BLD-RTO: 18.7 % (ref 11.8–14.4)
PLATELET # BLD: 185 K/UL (ref 138–453)
PLATELET ESTIMATE: ABNORMAL
PMV BLD AUTO: 10 FL (ref 8.1–13.5)
POTASSIUM SERPL-SCNC: 3.9 MMOL/L (ref 3.7–5.3)
RBC # BLD: 3.72 M/UL (ref 3.95–5.11)
RBC # BLD: ABNORMAL 10*6/UL
SEG NEUTROPHILS: 77 % (ref 36–65)
SEGMENTED NEUTROPHILS ABSOLUTE COUNT: 4.39 K/UL (ref 1.5–8.1)
SODIUM BLD-SCNC: 138 MMOL/L (ref 135–144)
WBC # BLD: 5.7 K/UL (ref 3.5–11.3)
WBC # BLD: ABNORMAL 10*3/UL

## 2020-12-22 PROCEDURE — 97165 OT EVAL LOW COMPLEX 30 MIN: CPT

## 2020-12-22 PROCEDURE — 93010 ELECTROCARDIOGRAM REPORT: CPT | Performed by: INTERNAL MEDICINE

## 2020-12-22 PROCEDURE — 1200000000 HC SEMI PRIVATE

## 2020-12-22 PROCEDURE — G0378 HOSPITAL OBSERVATION PER HR: HCPCS

## 2020-12-22 PROCEDURE — 82947 ASSAY GLUCOSE BLOOD QUANT: CPT

## 2020-12-22 PROCEDURE — 80048 BASIC METABOLIC PNL TOTAL CA: CPT

## 2020-12-22 PROCEDURE — 6370000000 HC RX 637 (ALT 250 FOR IP): Performed by: STUDENT IN AN ORGANIZED HEALTH CARE EDUCATION/TRAINING PROGRAM

## 2020-12-22 PROCEDURE — 99219 PR INITIAL OBSERVATION CARE/DAY 50 MINUTES: CPT | Performed by: INTERNAL MEDICINE

## 2020-12-22 PROCEDURE — 97535 SELF CARE MNGMENT TRAINING: CPT

## 2020-12-22 PROCEDURE — 85025 COMPLETE CBC W/AUTO DIFF WBC: CPT

## 2020-12-22 PROCEDURE — 2580000003 HC RX 258: Performed by: STUDENT IN AN ORGANIZED HEALTH CARE EDUCATION/TRAINING PROGRAM

## 2020-12-22 RX ORDER — INSULIN GLARGINE 100 [IU]/ML
10 INJECTION, SOLUTION SUBCUTANEOUS NIGHTLY
Status: DISCONTINUED | OUTPATIENT
Start: 2020-12-22 | End: 2020-12-23 | Stop reason: HOSPADM

## 2020-12-22 RX ORDER — INSULIN GLARGINE 100 [IU]/ML
10 INJECTION, SOLUTION SUBCUTANEOUS
Status: DISCONTINUED | OUTPATIENT
Start: 2020-12-22 | End: 2020-12-22

## 2020-12-22 RX ADMIN — SODIUM CHLORIDE, PRESERVATIVE FREE 10 ML: 5 INJECTION INTRAVENOUS at 21:35

## 2020-12-22 RX ADMIN — OXYCODONE HYDROCHLORIDE 10 MG: 5 TABLET ORAL at 05:14

## 2020-12-22 RX ADMIN — SERTRALINE 100 MG: 50 TABLET, FILM COATED ORAL at 21:24

## 2020-12-22 RX ADMIN — Medication 10 ML: at 08:57

## 2020-12-22 RX ADMIN — INSULIN GLARGINE 10 UNITS: 100 INJECTION, SOLUTION SUBCUTANEOUS at 08:00

## 2020-12-22 RX ADMIN — CARVEDILOL 12.5 MG: 12.5 TABLET, FILM COATED ORAL at 16:43

## 2020-12-22 RX ADMIN — SODIUM CHLORIDE, PRESERVATIVE FREE 10 ML: 5 INJECTION INTRAVENOUS at 09:00

## 2020-12-22 RX ADMIN — INSULIN LISPRO 3 UNITS: 100 INJECTION, SOLUTION INTRAVENOUS; SUBCUTANEOUS at 08:00

## 2020-12-22 RX ADMIN — INSULIN LISPRO 2 UNITS: 100 INJECTION, SOLUTION INTRAVENOUS; SUBCUTANEOUS at 12:38

## 2020-12-22 RX ADMIN — CARVEDILOL 12.5 MG: 12.5 TABLET, FILM COATED ORAL at 07:59

## 2020-12-22 RX ADMIN — INSULIN LISPRO 1 UNITS: 100 INJECTION, SOLUTION INTRAVENOUS; SUBCUTANEOUS at 21:20

## 2020-12-22 RX ADMIN — ATORVASTATIN CALCIUM 40 MG: 80 TABLET, FILM COATED ORAL at 21:19

## 2020-12-22 RX ADMIN — INSULIN LISPRO 2 UNITS: 100 INJECTION, SOLUTION INTRAVENOUS; SUBCUTANEOUS at 16:42

## 2020-12-22 RX ADMIN — ASPIRIN 81 MG: 81 TABLET, CHEWABLE ORAL at 21:19

## 2020-12-22 RX ADMIN — AMLODIPINE BESYLATE 10 MG: 10 TABLET ORAL at 08:56

## 2020-12-22 RX ADMIN — OXYCODONE HYDROCHLORIDE 10 MG: 5 TABLET ORAL at 16:43

## 2020-12-22 ASSESSMENT — PAIN SCALES - GENERAL
PAINLEVEL_OUTOF10: 7
PAINLEVEL_OUTOF10: 4
PAINLEVEL_OUTOF10: 8

## 2020-12-22 ASSESSMENT — PAIN DESCRIPTION - PAIN TYPE: TYPE: ACUTE PAIN

## 2020-12-22 ASSESSMENT — PAIN DESCRIPTION - LOCATION: LOCATION: GENERALIZED

## 2020-12-22 NOTE — ED NOTES
Patient remains resting on bed, speaking with writer  Nondistressed  VSS  Denies complaints   Lights turned off per patient request    Will continue to monitor     Nicola Holland RN  12/21/20 1305

## 2020-12-22 NOTE — H&P
[facility]     Department of Internal Medicine - Staff Internal Medicine Teaching Service          ADMISSION NOTE/HISTORY AND PHYSICAL EXAMINATION   Date: 12/21/2020  Patient Name: Sayda José  Date of admission: 12/21/2020  8:27 AM  YOB: 1960  PCP: Hoda Back  History Obtained From:  patient, electronic medical record    CHIEF COMPLAINT     Chief complaint: syncopal episode, dizziness, lightheadedness, hypoglycemia  HISTORY OF PRESENTING ILLNESS     The patient is a pleasant 64 y.o. female with PMH significant for bladder cancer, diabetes mellitus type 2, hypertension, hyperlipidemia presents with a chief complaint of syncopal episode in her bathroom this morning, associated with dizziness, lightheadedness. Patient has PMH notable for pancreatic cancer, on active chemoradiation therapy with daily radiation and daily oral Xeloda chemotherapy. According to the patient, he has been having low blood sugars on glucose checks for past 2-3 days with yesterday night blood glucose 47. She states that she ate banana with peanut butter last night. This morning patient was feeling confused, dizzy and lightheaded, she was standing in her bathroom when she had 1 syncopal episode and fell to the ground. Patient is unsure whether she had LOC but denied any shaking movements, tongue biting, upper rolling of her eyes, jerking movements. Patient wears a brief for stools and urinary incontinence since starting chemoradiation, therefore could not give accurate history for stool or urine incontinence. EMS was called with blood glucose in 30s, patient's blood glucose was 53 at time of admission. Patient uses Lantus 110 units, 6 units in the morning, 50 units at night along with NovoLog sliding scale. She follows up with Dr. Trish Crespo hematology oncology, was diagnosed with pancreatic adenocarcinoma, borderline unresectable in 2020. Patient was again hospitalized in July for cholangitis secondary to clogged CBD stent. Repeat ERCP was done and a metal stent was placed. Patient follows up with Dr. Trish Crespo outpatient and was started on folfirinox, however the patient could not tolerate it because of side effects. Patient was then switched to chemoradiation with Xeloda on 20. Patient also follows up with palliative care and her CODE STATUS was changed to DNR CCA. Denied any recent fevers, chills, dysuria, vomiting, cough, shortness of breath, leg swelling. Patient states that she often experiences nausea after chemotherapy and has also been having loose stools as a side effect of chemo. In the ED, patient was given 1 L D5 plus half normal saline infusion at 100 mL/h, D50 25 g IV, followed by D5 at 100 mL/h.   Blood sugar was improved to 119    Review of Systems:  Negaitve except as above  PAST MEDICAL HISTORY     Past Medical History:   Diagnosis Date    Anxiety     Diabetes mellitus ([facility]     Hyperlipidemia     Hypertension     Pancreatic cancer ([facility]        PAST SURGICAL HISTORY     Past Surgical History:   Procedure Laterality Date     SECTION      x2    ERCP  2020    SPHINCTER/PAPILLOTOMY, STENT INSERTION    ERCP  2020    ERCP SPHINCTER/PAPILLOTOMY performed by Jazmín Herrera MD at [facility]    ERCP  2020    ERCP STENT INSERTION performed by Jazmín Herrera MD at [facility]    ERCP  2020     ERCP ENDOSCOPIC RETROGRADE CHOLANGIOPANCREATOGRAPHY -   ERCP STENT REMOVAL     ERCP  2020    ERCP STENT REMOVAL performed by Jaleesa Khan MD at [address] [place] [address] ERCP  2020    ERCP STENT INSERTION performed by Jaleesa Khan MD at [place] [address] nitroGLYCERIN (NITROSTAT) 0.4 MG SL tablet up to max of 3 total doses. If no relief after 1 dose, call 911. 20   Patricia Guerrero MD   insulin glargine (LANTUS) 100 UNIT/ML injection vial Inject 50 Units into the skin nightly 20   Patricia Guerrero MD   insulin glargine (LANTUS) 100 UNIT/ML injection vial Inject 60 Units into the skin daily 20   Patricia Guerrero MD   hydrALAZINE (APRESOLINE) 25 MG tablet Take 1 tablet by mouth every 8 hours 20   Patricia Guerrero MD   carvedilol (COREG) 12.5 MG tablet Take 1 tablet by mouth 2 times daily (with meals) 20   Patricia Guerrero MD   amLODIPine (NORVASC) 10 MG tablet Take 1 tablet by mouth daily 20   Patricia Guerrero MD   aspirin 81 MG chewable tablet Take 81 mg by mouth nightly    Historical Provider, MD   insulin aspart (NOVOLOG FLEXPEN) 100 UNIT/ML injection pen Inject into the skin    Historical Provider, MD   sertraline (ZOLOFT) 100 MG tablet take 1 1/2 tablet by oral route  every day    Historical Provider, MD   atorvastatin (LIPITOR) 40 MG tablet nightly  10/12/13   Historical Provider, MD   LORazepam (ATIVAN) 0.5 MG tablet 1 mg nightly.  And 1/2 tab once daily as needed 10/12/13   Historical Provider, MD       SOCIAL HISTORY     Tobacco: denied  Alcohol: denied  Illicits: denied  Occupation:     FAMILY HISTORY     Family History   Problem Relation Age of Onset    Cancer Mother     Hypertension Mother     Heart Failure Father     Hypertension Father     Cancer Maternal Grandmother         colon     Cancer Maternal Grandfather         lung        PHYSICAL EXAM     Vitals: BP (!) 127/49   Pulse 80   Temp 97.7 °F (36.5 °C) (Oral)   Resp 15   Ht 5' 5\" (1.651 m)   Wt 242 lb (109.8 kg)   SpO2 96%   BMI 40.27 kg/m²   Tmax: Temp (24hrs), Av.7 °F (36.5 °C), Min:97.7 °F (36.5 °C), Max:97.7 °F (36.5 °C)    Last Body weight:   Wt Readings from Last 3 Encounters:   20 242 lb (109.8 kg)   20 241 lb (109.3 kg) Result Value Ref Range    POC Glucose 52 (L) 65 - 105 mg/dL   CBC WITH AUTO DIFFERENTIAL    Collection Time: 12/21/20  8:39 AM   Result Value Ref Range    WBC 8.6 3.5 - 11.3 k/uL    RBC 3.95 3.95 - 5.11 m/uL    Hemoglobin 10.8 (L) 11.9 - 15.1 g/dL    Hematocrit 35.3 (L) 36.3 - 47.1 %    MCV 89.4 82.6 - 102.9 fL    MCH 27.3 25.2 - 33.5 pg    MCHC 30.6 28.4 - 34.8 g/dL    RDW 18.4 (H) 11.8 - 14.4 %    Platelets 001 701 - 382 k/uL    MPV 9.9 8.1 - 13.5 fL    NRBC Automated 0.0 0.0 per 100 WBC    Differential Type NOT REPORTED     WBC Morphology NOT REPORTED     RBC Morphology NOT REPORTED     Platelet Estimate NOT REPORTED     Immature Granulocytes 0 0 %    Seg Neutrophils 79 (H) 36 - 66 %    Lymphocytes 3 (L) 24 - 44 %    Monocytes 15 (H) 1 - 7 %    Eosinophils % 3 1 - 4 %    Basophils 0 0 - 2 %    Absolute Immature Granulocyte 0.00 0.00 - 0.30 k/uL    Segs Absolute 6.79 1.8 - 7.7 k/uL    Absolute Lymph # 0.26 (L) 1.0 - 4.8 k/uL    Absolute Mono # 1.29 (H) 0.1 - 0.8 k/uL    Absolute Eos # 0.26 0.0 - 0.4 k/uL    Basophils Absolute 0.00 0.0 - 0.2 k/uL    Morphology ANISOCYTOSIS PRESENT    Comprehensive Metabolic Panel    Collection Time: 12/21/20  8:39 AM   Result Value Ref Range    Glucose 53 (L) 70 - 99 mg/dL    BUN 8 6 - 20 mg/dL    CREATININE 0.65 0.50 - 0.90 mg/dL    Bun/Cre Ratio NOT REPORTED 9 - 20    Calcium 9.0 8.6 - 10.4 mg/dL    Sodium 141 135 - 144 mmol/L    Potassium 3.7 3.7 - 5.3 mmol/L    Chloride 106 98 - 107 mmol/L    CO2 24 20 - 31 mmol/L    Anion Gap 11 9 - 17 mmol/L    Alkaline Phosphatase 131 (H) 35 - 104 U/L    ALT 20 5 - 33 U/L    AST 32 (H) <32 U/L    Total Bilirubin 0.39 0.3 - 1.2 mg/dL    Total Protein 6.1 (L) 6.4 - 8.3 g/dL    Alb 3.1 (L) 3.5 - 5.2 g/dL    Albumin/Globulin Ratio 1.0 1.0 - 2.5    GFR Non-African American >60 >60 mL/min    GFR African American >60 >60 mL/min    GFR Comment          GFR Staging NOT REPORTED    INSULIN, TOTAL    Collection Time: 12/21/20  8:39 AM Result Value Ref Range    Insulin Comment NOT REPORTED     Insulin 7.2 mU/L    Insulin Reference Range:         C-PEPTIDE    Collection Time: 12/21/20  8:39 AM   Result Value Ref Range    C-Peptide <0.1 (L) 1.1 - 4.4 ng/mL   LIPASE    Collection Time: 12/21/20  8:39 AM   Result Value Ref Range    Lipase 4 (L) 13 - 60 U/L   Hemoglobin A1C    Collection Time: 12/21/20  8:39 AM   Result Value Ref Range    Hemoglobin A1C 8.3 (H) 4.0 - 6.0 %    Estimated Avg Glucose 192 mg/dL   POC Glucose Fingerstick    Collection Time: 12/21/20  9:17 AM   Result Value Ref Range    POC Glucose 128 (H) 65 - 105 mg/dL   POC Glucose Fingerstick    Collection Time: 12/21/20 12:57 PM   Result Value Ref Range    POC Glucose 84 65 - 105 mg/dL   POCT glucose    Collection Time: 12/21/20  5:26 PM   Result Value Ref Range    Glucose 119 mg/dL    QC OK? ok    POCT glucose    Collection Time: 12/21/20  9:22 PM   Result Value Ref Range    Glucose 165 mg/dL    QC OK? ok        Imaging:   No results found. ASSESSMENT & PLAN     ASSESSMENT / PLAN:     IMPRESSION  This is a 64 y.o. female who presented with syncopal event with dizziness and found to have hypoglycemia. Patient admitted to inpatient status for:    Principal Problem:    Hypoglycemia  Active Problems:    Hypertension    Primary adenocarcinoma of head of pancreas ([facility]    Long term current use of insulin ([facility]  Resolved Problems:    * No resolved hospital problems. *    Hypoglycemia:  -home doses of lantus held, hypoglycemia protocol in place. Patient started on lantus 5 units nightly. Will adjust patient's dose of lantus as needed in the morning, based on blood glucose checks. Hb A1c 8.3. History of pancreatic adenocarcinoma:  -Consulted Dr. Osiel White, who also follows the patient outpatient for chemoradiation. Patient has not taken her Xeloda today. Plan for outpatient restarting of chemoradiation once sugars are controlled and patient is discharged.     Hypertension: norvasc 10 mg daily, coreg 12.5 daily. Hyperlipidemia:  Lipitor 40 mg oral daily        DVT ppx: Lovenox 40 mg daily  GI ppx: none    PT/OT/SW: ordered  Discharge Planning: case management consulted. Darryl Curry MD  Internal Medicine Resident, PGY-1  [facility];  [place], [place]  12/21/2020, 9:25 PM

## 2020-12-22 NOTE — ED NOTES
Pt ambulatory to restroom to shower. Pt provided with shampoo, soap and towels.  Pt independent, minimal assist.      Nadir Fraser RN  12/22/20 3919

## 2020-12-22 NOTE — PROGRESS NOTES
Occupational Therapy   Occupational Therapy Initial Assessment  Date: 2020   Patient Name: Frances Aly  MRN: [id number]     : 1960    Date of Service: 2020    Discharge Recommendations:     OT Equipment Recommendations  Equipment Needed: No  Copied from Internal Medicine:  -year-old female with history of diabetes, essential hypertension, anxiety and recently diagnosed pancreatic cancer on chemoradiation came in with a chief complaint of hypoglycemia.     Of note,  Back in  patient came to the ER for back pain and was found to be in DKA. MRI abdomen revealed an irregular mass in the head of the pancreas resulting in biliary and pancreatic duct obstruction. Patient underwent ERCP with EUS on 2020 with biliary sphincterotomy and biliary stent placement. Biopsy results came back positive for moderately differentiated adenocarcinoma of the pancreas. Patient's cancer was borderline unresectable and she did not wish to move forward with surgery. Patient was again hospitalized in July for cholangitis secondary to clogged CBD stent. Repeat ERCP was done and a metal stent was placed. Patient follows up with Dr. Meryle Fuse outpatient and was started on folfirinox, however the patient could not tolerate it because of side effects. Patient was then switched to chemoradiation with Xeloda on 20. Patient also follows up with palliative care and her CODE STATUS was changed to DNR CCA.   As per the chart patient continued to have some nausea with the chemotherapy and was started on Zofran/Compazine.    Patient has been on Lantus 60 units in the morning and 50 units at night with sliding scale since June for type 2 diabetes. Patient checks her sugars regularly, and noticed since the last 1-1/2 days her sugars have been running low in 50s to 70s. Patient has been compliant with her Lantus regime daily. She has not noticed any changes in her diet, however because of her chemotherapy sometimes her appetite is not that good. Also experiences diarrhea and some nausea as a side effect of the chemotherapy. Yesterday morning patient's blood sugar was in 240s and she took her 60 units of Lantus, at night it dropped to 40s after which she did not take her nighttime dose of Lantus. This morning patient's sugar dropped to 30s. Patient did experience dizziness, diaphoresis and confusion. This morning patient passed out and fell on the floor and hit her lip. Her son called the EMS, her blood sugar was in 30s patient was given oral glucose and brought to the ER.     Assessment    Pt lying supine in bed upon entrance to room. Pt completed bed mobility with Sammie. Pt sat at eob 15 minutes with good unsupported sitting balance. Sit to stand with I. Pt completed dynamic mob in room with I. Please refer to below assist levels for adl completion. Pt ed on OT POC, safety awareness tech, proper hand placement for transfers, and energy conservation tech with proper breathing tech with good return. Pt retired supine in bed with call light and phone in reach. All needs met upon exit. Pt has no concerns for discharge home. Pt reports sometimes having difficulty picking legs up to go up the stairs and into Jeep. Pt is not interested in any home or out pt PT, but is interested in a HEP. Informed PT. Treatment Diagnosis: Hypoglycemia  Prognosis: Good  Decision Making: Low Complexity  OT Education: OT Role;Plan of Care  No Skilled OT: Independent with functional mobility; Independent with ADL's;No OT goals identified REQUIRES OT FOLLOW UP: No  Activity Tolerance  Activity Tolerance: Patient Tolerated treatment well  Safety Devices  Safety Devices in place: Yes  Type of devices: Call light within reach; Left in bed  Restraints  Initially in place: No         Patient Diagnosis(es): The encounter diagnosis was Hypoglycemia. has a past medical history of Anxiety, Diabetes mellitus ([facility] [address] Hyperlipidemia, Hypertension, and Pancreatic cancer ([facility] [address] has a past surgical history that includes  section; Gallbladder surgery; ERCP (2020); Upper gastrointestinal endoscopy (2020); Upper gastrointestinal endoscopy (2020); ERCP (2020); ERCP (2020); ERCP (2020); ERCP (2020); ERCP (2020); and ERCP (2020). Treatment Diagnosis: Hypoglycemia      Restrictions  Restrictions/Precautions  Restrictions/Precautions: Up as Tolerated  Required Braces or Orthoses?: No  Position Activity Restriction  Other position/activity restrictions: up with assistance    Subjective   General  Patient assessed for rehabilitation services?: Yes  Family / Caregiver Present: No  Patient Currently in Pain: Yes  Pain Assessment  Pain Assessment: 0-10  Pain Level: 7  Pain Type: Acute pain(pt relates pain to recent fall, \"it hurts all over\")  Pain Location: Generalized  Non-Pharmaceutical Pain Intervention(s): Repositioned; Ambulation/Increased Activity; Therapeutic presence;Distraction  Vital Signs  Patient Currently in Pain: Yes  Oxygen Therapy  SpO2: 92 %  O2 Device: None (Room air)  Social/Functional History  Social/Functional History  Lives With: Family, Son(ex spouse, 2 adult sons)  Type of Home: House  Home Layout: Two level(dormer upstairs that pt does not use)  Home Access: Stairs to enter with rails  Entrance Stairs - Number of Steps: 3 steps to enter  Entrance Stairs - Rails: Right  Bathroom Shower/Tub: Curtain, Walk-in shower  Bathroom Toilet: Standard Home Equipment: Cane(small base 4 prong all on a base that does not swivel. Pt does not use)  Receives Help From: Family(supportive ex spouse and 2 adult sons)  ADL Assistance: Independent  Homemaking Assistance: Independent  Homemaking Responsibilities: Yes  Meal Prep Responsibility: Primary(family also assists)  Laundry Responsibility: Primary(pt completes primarily, son also completes)  Cleaning Responsibility: Secondary(shared with family)  Bill Paying/Finance Responsibility: Primary  Shopping Responsibility: Secondary(shared with family. pt able to traverse throughout store pushing cart)  Ambulation Assistance: Independent  Transfer Assistance: Independent  Active : Yes  Mode of Transportation: [facility]  Occupation: Unemployed, Retired  Type of occupation:  for 30 years  Leisure & Hobbies: pt is very social and loved going out to eat with friends. Has not completed this during [facility]. Read, recently downloaded books on ipad     Objective   Vision: Impaired  Vision Exceptions: Wears glasses for reading(glasses present in hospital)  Hearing: Within functional limits    Orientation  Overall Orientation Status: Within Functional Limits     Balance  Sitting Balance: Independent  Standing Balance: Independent  Standing Balance  Time: ~4 min  Activity: static and dynamic  Functional Mobility  Functional - Mobility Device: No device(pt had 1 LOB able to self correct)  Activity: To/from bathroom  Assist Level: Independent  Toilet Transfers  Toilet - Technique: Ambulating  Equipment Used: Grab bars  Toilet Transfer: Independent  ADL  Feeding: Independent;Setup  Grooming: Independent;Setup  UE Bathing: Independent;Setup  LE Bathing: Independent;Setup  UE Dressing: Independent;Setup(donning/doffing nightgown)  LE Dressing: Independent(donning B hosp socks)  Toileting: Independent  Tone RUE  RUE Tone: Normotonic  Tone LUE  LUE Tone: Normotonic  Coordination  Movements Are Fluid And Coordinated:  Yes Bed mobility  Rolling to Right: Independent  Supine to Sit: Independent  Sit to Supine: Independent  Scooting: Independent  Transfers  Stand Step Transfers: Independent  Sit to stand: Independent  Stand to sit:  Independent     Cognition  Overall Cognitive Status: WFL     Sensation  Overall Sensation Status: Impaired  Light Touch: Partial deficits in the RUE;Partial deficits in the LUE(pt reports B digits with numbness/tingling at times due to chemo/radiation)    LUE PROM (degrees)  LUE PROM: WFL  LUE AROM (degrees)  LUE AROM : WFL  LUE General AROM: L shoulder 0-160 old rotator cuff injury  Left Hand PROM (degrees)  Left Hand PROM: WFL  Left Hand AROM (degrees)  Left Hand AROM: WFL  RUE PROM (degrees)  RUE PROM: WFL  RUE AROM (degrees)  RUE AROM : WFL  Right Hand PROM (degrees)  Right Hand PROM: WFL  Right Hand AROM (degrees)  Right Hand AROM: WFL  LUE Strength  Gross LUE Strength: WFL  L Hand General: 4-/5  LUE Strength Comment: 4-/5 overall muscle strength  RUE Strength  Gross RUE Strength: WFL  R Hand General: 4-/5  RUE Strength Comment: 4-/5 overall muscle strength     Plan   Plan  Times per week: OT eval and treat only    AM-PAC Score  AM-PAC Inpatient Daily Activity Raw Score: 24 (12/22/20 1004)  AM-PAC Inpatient ADL T-Scale Score : 57.54 (12/22/20 1004)  ADL Inpatient CMS 0-100% Score: 0 (12/22/20 1004)  ADL Inpatient CMS G-Code Modifier : CH (12/22/20 1004)    Therapy Time   Individual Concurrent Group Co-treatment   Time In 0912         Time Out 0943         Minutes 31         Timed Code Treatment Minutes: [address] [facility], OTR/L

## 2020-12-22 NOTE — ED NOTES
Pt ambulatory to restroom, steady gait. Pt denies any needs at this time. Pt updated on plan of care. Will continue plan of care. Call light within reach.        Corin Arellano RN  12/22/20 8090

## 2020-12-22 NOTE — CARE COORDINATION
Case Management Initial Discharge Plan  Roula Monroy,             Met with:patient to discuss discharge plans. Information verified: address, contacts, phone number, , insurance Yes    Emergency Contact/Next of Kin name & number: Shreya Castaneda (ex-) [phone]  NEGRO Gilmore [phone]    PCP: Lorena Smith  Date of last visit: 1 months ago    Insurance Provider: MAYTE    Discharge Planning    Living Arrangements:  Spouse/Significant Other   Support Systems:  Spouse/Significant Other, [id number] [address] has 1 stories  3 stairs to climb to get into front door,     Patient able to perform ADL's:Independent    Current Services (outpatient & in home) none  DME equipment: glucometer  DME provider:     Receiving oral anticoagulation therapy? No    If indicated:   Physician managing anticoagulation treatment:   Where does patient obtain lab work for ATC treatment? Potential Assistance Needed:  N/A    Patient agreeable to home care: No  [place] of choice provided:  n/a    Prior SNF/Rehab Placement and Facility:   Agreeable to SNF/Rehab: No  [place] of choice provided: n/a     Evaluation: no    Expected Discharge date:  20    Patient expects to be discharged to:  return to home, no skilled needs identified at present time  Follow Up Appointment: Best Day/ Time:      Transportation provider: self  Transportation arrangements needed for discharge: No, family will provide transport. Readmission Risk              Risk of Unplanned Readmission:        23             Does patient have a readmission risk score greater than 14?: Yes  If yes, follow-up appointment must be made within 7 days of discharge. Goals of Care:       Discharge Plan: return to home. Patient is asking for assistance with JFS forms for applying for Medicaid, as she states her [employer]" insurance will run out 2020. 1430: Estrellita Najera (help) informed regrading patient's request for help with applying for Medicaid,  Santana's number number provided, she is grateful for the assistance.       Electronically signed by Dilan Vásquez RN on 12/22/20 at 10:58 AM EST

## 2020-12-22 NOTE — ED NOTES
Pt resting on stretcher, in NAD, RR even and unlabored. Pt updated on plan of care. Will continue plan of care.  Call light within reach.'     Alec Carrera RN  12/22/20 2024

## 2020-12-22 NOTE — ED NOTES
Pt resting on stretcher, in NAD, RR even and unlabored. Pt updated on plan of care. Will continue plan of care. Call light within reach.        Pam Vu RN  12/22/20 0008

## 2020-12-22 NOTE — PROGRESS NOTES
[facility]  Internal Medicine Teaching Residency Program  Inpatient Daily Progress Note  ______________________________________________________________________________    Patient: Jackelyn Severance  YOB: 1960   US    Acct: [de-identified]     Room: 36/  Admit date: 2020  Today's date: 20  Number of days in the hospital: 1    SUBJECTIVE   Admitting Diagnosis: Hypoglycemia  CC: syncopal episode, dizziness, lightheadedness, hypoglycemia    Pt examined at bedside. Chart & results reviewed. Vitals stable. Saturating in 90s on room air. Blood glucose 229 this a.m. On Lantus 5 units nightly, will increase dosing today. Monitor blood glucose for hypoglycemia.     ROS:  Constitutional:  negative for chills, fevers, sweats  Respiratory:  negative for cough, dyspnea on exertion, hemoptysis, shortness of breath, wheezing  Cardiovascular:  negative for chest pain, chest pressure/discomfort, lower extremity edema, palpitations  Gastrointestinal:  negative for abdominal pain, constipation, diarrhea, nausea, vomiting  Neurological:  negative for dizziness, headache  BRIEF HISTORY     The patient is a pleasant 64 y.o. female with PMH significant for bladder cancer, diabetes mellitus type 2, hypertension, hyperlipidemia presents with a chief complaint of syncopal episode in her bathroom this morning, associated with dizziness, lightheadedness.    Patient has PMH notable for pancreatic cancer, on active chemoradiation therapy with daily radiation and daily oral Xeloda chemotherapy. According to the patient, he has been having low blood sugars on glucose checks for past 2-3 days with yesterday night blood glucose 47. She states that she ate banana with peanut butter last night. This morning patient was feeling confused, dizzy and lightheaded, she was standing in her bathroom when she had 1 syncopal episode and fell to the ground. Patient is unsure whether she had LOC but denied any shaking movements, tongue biting, upper rolling of her eyes, jerking movements. Patient wears a brief for stools and urinary incontinence since starting chemoradiation, therefore could not give accurate history for stool or urine incontinence. EMS was called with blood glucose in 30s, patient's blood glucose was 53 at time of admission.     Patient uses Lantus 110 units, 6 units in the morning, 50 units at night along with NovoLog sliding scale. She follows up with Dr. Rashid Ortiz hematology oncology, was diagnosed with pancreatic adenocarcinoma, borderline unresectable in June 2020. Patient was again hospitalized in July for cholangitis secondary to clogged CBD stent.  Repeat ERCP was done and a metal stent was placed. Patient follows up with Dr. Cheng Clemente and was started on folfirinox, however the patient could not tolerate it because of side effects.  Patient was then switched to chemoradiation with Xeloda on 11/16/20.  Patient also follows up with palliative care and her CODE STATUS was changed to DNR CCA. Denied any recent fevers, chills, dysuria, vomiting, cough, shortness of breath, leg swelling.   Patient states that she often experiences nausea after chemotherapy and has also been having loose stools as a side effect of chemo.    In the ED, patient was given 1 L D5 plus half normal saline infusion at 100 mL/h, D50 25 g IV, followed by D5 at 100 mL/h. Blood sugar was improved to 119       OBJECTIVE     Vital Signs:  BP (!) 169/69   Pulse 75   Temp 97.7 °F (36.5 °C) (Oral)   Resp 18   Ht 5' 5\" (1.651 m)   Wt 242 lb (109.8 kg)   SpO2 92%   BMI 40.27 kg/m²     Temp (24hrs), Av.7 °F (36.5 °C), Min:97.7 °F (36.5 °C), Max:97.7 °F (36.5 °C)    No intake/output data recorded. Physical Exam:  Constitutional: This is a well developed, well nourished, Greater than 40 - Morbid Obesity / Extreme Obesity / Grade III 64y.o. year old female who is alert, oriented, cooperative and in no apparent distress. Head:normocephalic and atraumatic. EENT:  PERRLA. No conjunctival injections. Septum was midline, mucosa was without erythema, exudates or cobblestoning. No thrush was noted. Neck: Supple without thyromegaly. No elevated JVP. Trachea was midline. Respiratory: Chest was symmetrical without dullness to percussion. Breath sounds bilaterally were clear to auscultation. There were no wheezes, rhonchi or rales. There is no intercostal retraction or use of accessory muscles. No egophony noted. Cardiovascular: Regular without murmur, clicks, gallops or rubs. Abdomen: Slightly rounded and soft without organomegaly. No rebound, rigidity or guarding was appreciated. Lymphatic: No lymphadenopathy. Musculoskeletal: Normal curvature of the spine. No gross muscle weakness. Extremities:  No lower extremity edema, ulcerations, tenderness, varicosities or erythema. Muscle size, tone and strength are normal.  No involuntary movements are noted. Skin:  Warm and dry. Good color, turgor and pigmentation. No lesions or scars.   No cyanosis or clubbing  Neurological/Psychiatric: The patient's general behavior, level of consciousness, thought content and emotional status is normal.        Medications:  Scheduled Medications:  insulin glargine  10 Units Subcutaneous QAM AC    insulin lispro  0-6 Units Subcutaneous TID WC    insulin lispro  0-3 Units Subcutaneous Nightly    aspirin  81 mg Oral Nightly    atorvastatin  40 mg Oral Nightly    amLODIPine  10 mg Oral Daily    carvedilol  12.5 mg Oral BID WC    sertraline  100 mg Oral Nightly    sodium chloride flush  10 mL Intravenous 2 times per day    enoxaparin  40 mg Subcutaneous Daily    insulin glargine  5 Units Subcutaneous Nightly     Continuous Infusions:    dextrose       PRN Medications    oxyCODONE HCl, 10 mg, Q6H PRN      glucose, 15 g, PRN      dextrose, 12.5 g, PRN      glucagon (rDNA), 1 mg, PRN      dextrose, 100 mL/hr, PRN      sodium chloride flush, 10 mL, PRN      magnesium sulfate, 1 g, PRN      promethazine, 12.5 mg, Q6H PRN    Or      ondansetron, 4 mg, Q6H PRN      magnesium hydroxide, 30 mL, Daily PRN      senna, 5 mL, BID PRN      acetaminophen, 650 mg, Q6H PRN    Or      acetaminophen, 650 mg, Q6H PRN        Diagnostic Labs:  CBC:   Recent Labs     12/21/20  0839 12/22/20  0550   WBC 8.6 5.7   RBC 3.95 3.72*   HGB 10.8* 10.0*   HCT 35.3* 32.9*   MCV 89.4 88.4   RDW 18.4* 18.7*    185     BMP:   Recent Labs     12/21/20  0839 12/22/20  0550    138   K 3.7 3.9    103   CO2 24 26   BUN 8 7   CREATININE 0.65 0.60     BNP: No results for input(s): BNP in the last 72 hours. PT/INR: No results for input(s): PROTIME, INR in the last 72 hours. APTT: No results for input(s): APTT in the last 72 hours. CARDIAC ENZYMES: No results for input(s): CKMB, CKMBINDEX, TROPONINI in the last 72 hours.     Invalid input(s): CKTOTAL;3  FASTING LIPID PANEL:  Lab Results   Component Value Date    CHOL 122 06/03/2020    HDL 43 06/03/2020    TRIG 145 06/03/2020     LIVER PROFILE:   Recent Labs     12/21/20  0839   AST 32*   ALT 20   BILITOT 0.39   ALKPHOS 131*      MICROBIOLOGY:   Lab Results   Component Value Date/Time CULTURE  11/11/2020 11:31 AM     DUE TO THE SPECIMEN TYPE, THE ORDER WAS CANCELED AND REORDERED. PLEASE REFER TO: STOOL PCR BATTERY       Imaging:    No results found. ASSESSMENT & PLAN     ASSESSMENT / PLAN:     Principal Problem:    Hypoglycemia  Active Problems:    Hypertension    Primary adenocarcinoma of head of pancreas ([facility] [address]    Long term current use of insulin ([facility] [address]  Resolved Problems:    * No resolved hospital problems. *    Hypoglycemia:  -home doses of lantus held, hypoglycemia protocol in place. Patient started on lantus 5 units nightly. Will adjust patient's dose of lantus as needed in the morning, based on blood glucose checks. Hb A1c 8.3.     History of pancreatic adenocarcinoma:  -Consulted Dr. Yessenia Motta also follows the patient outpatient for chemoradiation.  Patient has not taken her Xeloda today. Plan for outpatient restarting of chemoradiation once sugars are controlled and patient is discharged.     Hypertension:  norvasc 10 mg daily, coreg 12.5 daily.     Hyperlipidemia:  Lipitor 40 mg oral daily           DVT ppx: Lovenox 40 mg daily  GI ppx: none     PT/OT/SW: ordered  Discharge Planning: case management consulted.  Pavan Felix MD  Internal Medicine Resident, PGY-1  [address], [place]  12/22/2020, 11:13 AM

## 2020-12-22 NOTE — ED NOTES
Report received, pt resting comfortably at this time     [facility]  12/21/20 1815

## 2020-12-22 NOTE — ED NOTES
Meal tray delivered. Pt resting on stretcher, in NAD, RR even and unlabored. Pt updated on plan of care. Will continue plan of care. Call light within reach.        Little Ba RN  12/22/20 7575

## 2020-12-22 NOTE — ED NOTES
Pt alert & oriented at this time, return from bathroom, steady gait, updated with POC, no signs of hypoglycemia noted     [address], [address] [place] [address]  12/22/20 6970

## 2020-12-22 NOTE — ED NOTES
Pt resting on stretcher, in NAD, RR even and unlabored. Pt updated on plan of care. Will continue plan of care. Call light within reach.        Geno Riley RN  12/22/20 5502

## 2020-12-22 NOTE — ED NOTES
Medicated for abdominal pain related to pancreatic cancer  Pain 4/10  Patient denies any other needs              Carlyle Kehr, RN  12/22/20 0560

## 2020-12-22 NOTE — PROGRESS NOTES
Physical Therapy  DATE: 2020    NAME: Wendy Churchill  MRN: [id number]   : 1960    Patient not seen this date for Physical Therapy due to:  [] Blood transfusion in progress  [] Hemodialysis  [] Patient Declined  [] Spine Precautions   [] Strict Bedrest  [] Surgery/ Procedure  [] Testing      [x] Other: Pt reports baseline mobility and declines any PT both in hospital or after discharge. Pt does verbalize difficulting with steps and getting in jeep, but requests home exercises only. Pt states main difficulty with initial step, then improves. LE exercise program issued, all questions answered, pt states understanding. Pt edu to let staff know if any mobility concerns arise and pt stated understanding. [x] PT is being discontinued at this time. Patient baseline. No further needs. [] PT is being discontinued at this time due to declining physical/ medical status. Therapy is not appropriate at this time. Physical therapy to sign off.   Karly Tillman, PT

## 2020-12-22 NOTE — ED NOTES
Report handoff given to [facility] RN  Patient nondistressed, stable  [address] [facility]  12/21/20 9311

## 2020-12-22 NOTE — ED NOTES
[facility]  29/02/28 1917

## 2020-12-22 NOTE — ED NOTES
Consumed meal  Verbalizes \"surprised I even ate most of my meal\"  Pt provided blankets,denies further needs at this time. Pt non distressed, remains on full monitor. Bed locked in lowest position, adequate lighting provided. Call light within reach, will continue to monitor.        Agnes Arreguin RN  12/22/20 0970

## 2020-12-23 ENCOUNTER — HOSPITAL ENCOUNTER (OUTPATIENT)
Dept: RADIATION ONCOLOGY | Age: 60
End: 2020-12-23
Attending: RADIOLOGY
Payer: COMMERCIAL

## 2020-12-23 VITALS
HEART RATE: 83 BPM | HEIGHT: 65 IN | BODY MASS INDEX: 40.32 KG/M2 | DIASTOLIC BLOOD PRESSURE: 83 MMHG | SYSTOLIC BLOOD PRESSURE: 173 MMHG | TEMPERATURE: 98.2 F | WEIGHT: 242 LBS | OXYGEN SATURATION: 98 % | RESPIRATION RATE: 16 BRPM

## 2020-12-23 LAB
ABSOLUTE EOS #: 0.22 K/UL (ref 0–0.44)
ABSOLUTE IMMATURE GRANULOCYTE: 0 K/UL (ref 0–0.3)
ABSOLUTE LYMPH #: 0.33 K/UL (ref 1.1–3.7)
ABSOLUTE MONO #: 0.5 K/UL (ref 0.1–1.2)
ANION GAP SERPL CALCULATED.3IONS-SCNC: 8 MMOL/L (ref 9–17)
BASOPHILS # BLD: 0 % (ref 0–2)
BASOPHILS ABSOLUTE: 0 K/UL (ref 0–0.2)
BUN BLDV-MCNC: 6 MG/DL (ref 6–20)
BUN/CREAT BLD: ABNORMAL (ref 9–20)
CALCIUM SERPL-MCNC: 8.6 MG/DL (ref 8.6–10.4)
CHLORIDE BLD-SCNC: 105 MMOL/L (ref 98–107)
CO2: 28 MMOL/L (ref 20–31)
CREAT SERPL-MCNC: 0.54 MG/DL (ref 0.5–0.9)
DIFFERENTIAL TYPE: ABNORMAL
EOSINOPHILS RELATIVE PERCENT: 4 % (ref 1–4)
GFR AFRICAN AMERICAN: >60 ML/MIN
GFR NON-AFRICAN AMERICAN: >60 ML/MIN
GFR SERPL CREATININE-BSD FRML MDRD: ABNORMAL ML/MIN/{1.73_M2}
GFR SERPL CREATININE-BSD FRML MDRD: ABNORMAL ML/MIN/{1.73_M2}
GLUCOSE BLD-MCNC: 200 MG/DL (ref 70–99)
GLUCOSE BLD-MCNC: 231 MG/DL (ref 65–105)
GLUCOSE BLD-MCNC: 239 MG/DL (ref 65–105)
HCT VFR BLD CALC: 30.9 % (ref 36.3–47.1)
HEMOGLOBIN: 9.5 G/DL (ref 11.9–15.1)
IMMATURE GRANULOCYTES: 0 %
LYMPHOCYTES # BLD: 6 % (ref 24–43)
MCH RBC QN AUTO: 27 PG (ref 25.2–33.5)
MCHC RBC AUTO-ENTMCNC: 30.7 G/DL (ref 28.4–34.8)
MCV RBC AUTO: 87.8 FL (ref 82.6–102.9)
MONOCYTES # BLD: 9 % (ref 3–12)
MORPHOLOGY: ABNORMAL
NRBC AUTOMATED: 0 PER 100 WBC
PDW BLD-RTO: 18.4 % (ref 11.8–14.4)
PLATELET # BLD: 162 K/UL (ref 138–453)
PLATELET ESTIMATE: ABNORMAL
PMV BLD AUTO: 9.9 FL (ref 8.1–13.5)
POTASSIUM SERPL-SCNC: 4.1 MMOL/L (ref 3.7–5.3)
RBC # BLD: 3.52 M/UL (ref 3.95–5.11)
RBC # BLD: ABNORMAL 10*6/UL
SEG NEUTROPHILS: 81 % (ref 36–65)
SEGMENTED NEUTROPHILS ABSOLUTE COUNT: 4.45 K/UL (ref 1.5–8.1)
SODIUM BLD-SCNC: 141 MMOL/L (ref 135–144)
WBC # BLD: 5.5 K/UL (ref 3.5–11.3)
WBC # BLD: ABNORMAL 10*3/UL

## 2020-12-23 PROCEDURE — G0378 HOSPITAL OBSERVATION PER HR: HCPCS

## 2020-12-23 PROCEDURE — 99217 PR OBSERVATION CARE DISCHARGE MANAGEMENT: CPT | Performed by: INTERNAL MEDICINE

## 2020-12-23 PROCEDURE — 6370000000 HC RX 637 (ALT 250 FOR IP): Performed by: STUDENT IN AN ORGANIZED HEALTH CARE EDUCATION/TRAINING PROGRAM

## 2020-12-23 PROCEDURE — 85025 COMPLETE CBC W/AUTO DIFF WBC: CPT

## 2020-12-23 PROCEDURE — 80048 BASIC METABOLIC PNL TOTAL CA: CPT

## 2020-12-23 PROCEDURE — 96372 THER/PROPH/DIAG INJ SC/IM: CPT

## 2020-12-23 PROCEDURE — 6360000002 HC RX W HCPCS: Performed by: STUDENT IN AN ORGANIZED HEALTH CARE EDUCATION/TRAINING PROGRAM

## 2020-12-23 PROCEDURE — 6370000000 HC RX 637 (ALT 250 FOR IP): Performed by: NURSE PRACTITIONER

## 2020-12-23 RX ORDER — INSULIN GLARGINE 100 [IU]/ML
22 INJECTION, SOLUTION SUBCUTANEOUS DAILY
Qty: 5 PEN | Refills: 3 | Status: ON HOLD | OUTPATIENT
Start: 2020-12-23 | End: 2021-01-06 | Stop reason: HOSPADM

## 2020-12-23 RX ORDER — BLOOD-GLUCOSE METER
1 KIT MISCELLANEOUS DAILY
Qty: 1 KIT | Refills: 0 | Status: SHIPPED | OUTPATIENT
Start: 2020-12-23

## 2020-12-23 RX ORDER — LOPERAMIDE HYDROCHLORIDE 2 MG/1
2 CAPSULE ORAL 4 TIMES DAILY PRN
Status: DISCONTINUED | OUTPATIENT
Start: 2020-12-23 | End: 2020-12-23 | Stop reason: HOSPADM

## 2020-12-23 RX ORDER — INSULIN LISPRO 100 [IU]/ML
0-12 INJECTION, SOLUTION INTRAVENOUS; SUBCUTANEOUS
Qty: 20 PEN | Refills: 3 | Status: SHIPPED | OUTPATIENT
Start: 2020-12-23

## 2020-12-23 RX ORDER — GLUCOSAMINE HCL/CHONDROITIN SU 500-400 MG
CAPSULE ORAL
Qty: 100 STRIP | Refills: 11 | Status: SHIPPED | OUTPATIENT
Start: 2020-12-23

## 2020-12-23 RX ORDER — LANCETS 30 GAUGE
1 EACH MISCELLANEOUS 3 TIMES DAILY
Qty: 600 EACH | Refills: 1 | Status: SHIPPED | OUTPATIENT
Start: 2020-12-23

## 2020-12-23 RX ORDER — PEN NEEDLE, DIABETIC 29 GAUGE
1 NEEDLE, DISPOSABLE MISCELLANEOUS DAILY
Qty: 100 EACH | Refills: 3 | Status: SHIPPED | OUTPATIENT
Start: 2020-12-23

## 2020-12-23 RX ADMIN — INSULIN LISPRO 3 UNITS: 100 INJECTION, SOLUTION INTRAVENOUS; SUBCUTANEOUS at 12:21

## 2020-12-23 RX ADMIN — ENOXAPARIN SODIUM 40 MG: 40 INJECTION SUBCUTANEOUS at 09:17

## 2020-12-23 RX ADMIN — AMLODIPINE BESYLATE 10 MG: 10 TABLET ORAL at 09:17

## 2020-12-23 RX ADMIN — CARVEDILOL 12.5 MG: 12.5 TABLET, FILM COATED ORAL at 09:17

## 2020-12-23 RX ADMIN — INSULIN GLARGINE 10 UNITS: 100 INJECTION, SOLUTION SUBCUTANEOUS at 00:18

## 2020-12-23 RX ADMIN — INSULIN LISPRO 2 UNITS: 100 INJECTION, SOLUTION INTRAVENOUS; SUBCUTANEOUS at 08:18

## 2020-12-23 RX ADMIN — LOPERAMIDE HYDROCHLORIDE 2 MG: 2 CAPSULE ORAL at 04:04

## 2020-12-23 NOTE — ED NOTES
Patient verbalizes she is hearing thumping noises from room next door  Patient in next room is resting quietly  Patient given reassurance that everything is ok     Jennifer Hackett RN  12/23/20 5932

## 2020-12-23 NOTE — ED NOTES
Resting quietly, eyes closed, respirations unlabored  Call light at side  Denies any needs  Will continue to monitor patient  Remains on moniters       Minnie Sy, NUPUR  12/23/20 3218

## 2020-12-23 NOTE — PROGRESS NOTES
[facility]  Internal Medicine Teaching Residency Program  Inpatient Daily Progress Note  ______________________________________________________________________________    Patient: Kalie Lowe  YOB: 1960   [id number]    Acct: [de-identified]     Room: 36/  Admit date: 12/21/2020  Today's date: 12/23/20  Number of days in the hospital: 2    SUBJECTIVE   Admitting Diagnosis: Hypoglycemia  CC: syncopal episode, dizziness, lightheadedness, hypoglycemia    Pt examined at bedside. Chart & results reviewed. Vitals stable. Saturating in 90s on room air. Blood glucose 229 this a.m. On Lantus 5 units nightly, will increase dosing today. Monitor blood glucose for hypoglycemia.     ROS:  Constitutional:  negative for chills, fevers, sweats  Respiratory:  negative for cough, dyspnea on exertion, hemoptysis, shortness of breath, wheezing  Cardiovascular:  negative for chest pain, chest pressure/discomfort, lower extremity edema, palpitations  Gastrointestinal:  negative for abdominal pain, constipation, diarrhea, nausea, vomiting  Neurological:  negative for dizziness, headache  BRIEF HISTORY     The patient is a pleasant 64 y.o. female with PMH significant for bladder cancer, diabetes mellitus type 2, hypertension, hyperlipidemia presents with a chief complaint of syncopal episode in her bathroom this morning, associated with dizziness, lightheadedness.    Patient has PMH notable for pancreatic cancer, on active chemoradiation therapy with daily radiation and daily oral Xeloda chemotherapy. According to the patient, he has been having low blood sugars on glucose checks for past 2-3 days with yesterday night blood glucose 47. She states that she ate banana with peanut butter last night. This morning patient was feeling confused, dizzy and lightheaded, she was standing in her bathroom when she had 1 syncopal episode and fell to the ground. Patient is unsure whether she had LOC but denied any shaking movements, tongue biting, upper rolling of her eyes, jerking movements. Patient wears a brief for stools and urinary incontinence since starting chemoradiation, therefore could not give accurate history for stool or urine incontinence. EMS was called with blood glucose in 30s, patient's blood glucose was 53 at time of admission.     Patient uses Lantus 110 units, 6 units in the morning, 50 units at night along with NovoLog sliding scale. She follows up with Dr. B[facility]bara South hematology oncology, was diagnosed with pancreatic adenocarcinoma, borderline unresectable in June 2020. Patient was again hospitalized in July for cholangitis secondary to clogged CBD stent.  Repeat ERCP was done and a metal stent was placed. Patient follows up with Dr. Herminio Perkins and was started on folfirinox, however the patient could not tolerate it because of side effects.  Patient was then switched to chemoradiation with Xeloda on 11/16/20.  Patient also follows up with palliative care and her CODE STATUS was changed to DNR CCA. Denied any recent fevers, chills, dysuria, vomiting, cough, shortness of breath, leg swelling.   Patient states that she often experiences nausea after chemotherapy and has also been having loose stools as a side effect of chemo.    In the ED, patient was given 1 L D5 plus half normal saline infusion at 100 mL/h, D50 25 g IV, followed by D5 at 100 mL/h. Blood sugar was improved to 119       OBJECTIVE     Vital Signs:  BP (!) 173/83   Pulse 83   Temp 98.2 °F (36.8 °C) (Oral)   Resp 16   Ht 5' 5\" (1.651 m)   Wt 242 lb (109.8 kg)   SpO2 98%   BMI 40.27 kg/m²     Temp (24hrs), Av.2 °F (36.8 °C), Min:98.2 °F (36.8 °C), Max:98.2 °F (36.8 °C)    No intake/output data recorded. Physical Exam:  Constitutional: This is a well developed, well nourished, Greater than 40 - Morbid Obesity / Extreme Obesity / Grade III 64y.o. year old female who is alert, oriented, cooperative and in no apparent distress. Head:normocephalic and atraumatic. EENT:  PERRLA. No conjunctival injections. Septum was midline, mucosa was without erythema, exudates or cobblestoning. No thrush was noted. Neck: Supple without thyromegaly. No elevated JVP. Trachea was midline. Respiratory: Chest was symmetrical without dullness to percussion. Breath sounds bilaterally were clear to auscultation. There were no wheezes, rhonchi or rales. There is no intercostal retraction or use of accessory muscles. No egophony noted. Cardiovascular: Regular without murmur, clicks, gallops or rubs. Abdomen: Slightly rounded and soft without organomegaly. No rebound, rigidity or guarding was appreciated. Lymphatic: No lymphadenopathy. Musculoskeletal: Normal curvature of the spine. No gross muscle weakness. Extremities:  No lower extremity edema, ulcerations, tenderness, varicosities or erythema. Muscle size, tone and strength are normal.  No involuntary movements are noted. Skin:  Warm and dry. Good color, turgor and pigmentation. No lesions or scars.   No cyanosis or clubbing  Neurological/Psychiatric: The patient's general behavior, level of consciousness, thought content and emotional status is normal.        Medications:  Scheduled Medications:  insulin lispro  0-6 Units Subcutaneous TID WC    insulin lispro  0-3 Units Subcutaneous Nightly    insulin glargine  10 Units Subcutaneous Nightly    aspirin  81 mg Oral Nightly    atorvastatin  40 mg Oral Nightly    amLODIPine  10 mg Oral Daily    carvedilol  12.5 mg Oral BID WC    sertraline  100 mg Oral Nightly    sodium chloride flush  10 mL Intravenous 2 times per day    enoxaparin  40 mg Subcutaneous Daily     Continuous Infusions:    dextrose       PRN Medications    loperamide, 2 mg, 4x Daily PRN      oxyCODONE HCl, 10 mg, Q6H PRN      glucose, 15 g, PRN      dextrose, 12.5 g, PRN      glucagon (rDNA), 1 mg, PRN      dextrose, 100 mL/hr, PRN      sodium chloride flush, 10 mL, PRN      magnesium sulfate, 1 g, PRN      promethazine, 12.5 mg, Q6H PRN    Or      ondansetron, 4 mg, Q6H PRN      magnesium hydroxide, 30 mL, Daily PRN      senna, 5 mL, BID PRN      acetaminophen, 650 mg, Q6H PRN    Or      acetaminophen, 650 mg, Q6H PRN        Diagnostic Labs:  CBC:   Recent Labs     12/21/20  0839 12/22/20  0550 12/23/20  0630   WBC 8.6 5.7 5.5   RBC 3.95 3.72* 3.52*   HGB 10.8* 10.0* 9.5*   HCT 35.3* 32.9* 30.9*   MCV 89.4 88.4 87.8   RDW 18.4* 18.7* 18.4*    185 162     BMP:   Recent Labs     12/21/20  0839 12/22/20  0550 12/23/20  0630    138 141   K 3.7 3.9 4.1    103 105   CO2 24 26 28   BUN 8 7 6   CREATININE 0.65 0.60 0.54     BNP: No results for input(s): BNP in the last 72 hours. PT/INR: No results for input(s): PROTIME, INR in the last 72 hours. APTT: No results for input(s): APTT in the last 72 hours. CARDIAC ENZYMES: No results for input(s): CKMB, CKMBINDEX, TROPONINI in the last 72 hours.     Invalid input(s): CKTOTAL;3  FASTING LIPID PANEL:  Lab Results   Component Value Date    CHOL 122 06/03/2020    HDL 43 06/03/2020    TRIG 145 06/03/2020     LIVER PROFILE:   Recent Labs     12/21/20  0839   AST 32*   ALT 20   BILITOT 0.39   ALKPHOS 131* MICROBIOLOGY:   Lab Results   Component Value Date/Time    CULTURE  11/11/2020 11:31 AM     DUE TO THE SPECIMEN TYPE, THE ORDER WAS CANCELED AND REORDERED. PLEASE REFER TO: STOOL PCR BATTERY       Imaging:    No results found. ASSESSMENT & PLAN     ASSESSMENT / PLAN:     Principal Problem:    Hypoglycemia  Active Problems:    Hypertension    Primary adenocarcinoma of head of pancreas ([facility] [address]    Long term current use of insulin ([facility] [address]  Resolved Problems:    * No resolved hospital problems. *    Hypoglycemia:  -home doses of lantus held, hypoglycemia protocol in place. Patient started on lantus 5 units nightly. Will adjust patient's dose of lantus as needed in the morning, based on blood glucose checks. Hb A1c 8.3.     History of pancreatic adenocarcinoma:  -Consulted Dr. Makenna Patrick also follows the patient outpatient for chemoradiation.  Patient has not taken her Xeloda today. Plan for outpatient restarting of chemoradiation once sugars are controlled and patient is discharged.     Hypertension:  norvasc 10 mg daily, coreg 12.5 daily.     Hyperlipidemia:  Lipitor 40 mg oral daily           DVT ppx: Lovenox 40 mg daily  GI ppx: none     PT/OT/SW: ordered  Discharge Planning: case management consulted.  Samreen Crowell MD  Internal Medicine Resident, PGY-1  [facility];  [place], [place]  12/23/2020, 1:19 PM

## 2020-12-23 NOTE — ED NOTES
Report given to [facility]  No change in patient status  Continues to rest quietly       [facility]  12/23/20 [id number]

## 2020-12-23 NOTE — ED NOTES
Patient up to bathroom. Ambulated without any difficulties  Returned to room.   Will continue to monitor       Sheila Griffiths RN  12/22/20 [id number]

## 2020-12-23 NOTE — ED NOTES
Pt resting on cot. No signs of distress noted. Respirations unlabored. Pt denies needs at this time. Pt remains on cardiac monitor. Call light within reach.  Will continue to monitor     Eliana Gonzalez RN  12/23/20 0079

## 2020-12-23 NOTE — ED NOTES
Pt provided blankets,denies further needs at this time. Pt non distressed, remains on full monitor. Patient requested and given peanut butter and travis crackers  Bed locked in lowest position, adequate lighting provided. Call light within reach, will continue to monitor.        Tremaine Londono RN  12/22/20 9746

## 2020-12-23 NOTE — ED NOTES
Pt provided blankets,denies further needs at this time. Pt non distressed, remains on full monitor. Bed locked in lowest position, adequate lighting provided. Call light within reach, will continue to monitor.        Kathy Wolfe RN  12/23/20 [id number]

## 2020-12-23 NOTE — PROGRESS NOTES
[facility]  Internal Medicine Teaching Residency Program  Inpatient Daily Progress Note  ______________________________________________________________________________    Patient: Kalie Lowe  YOB: 1960   IHS:[id number]    Acct: [de-identified]     Room: [facility]  Admit date: 12/21/2020  Today's date: 12/23/20  Number of days in the hospital: 2    SUBJECTIVE   Admitting Diagnosis: Hypoglycemia  CC: Syncopal Episode, Dizziness, Lightheadedness, Hypoglycemia   Pt examined at bedside. Chart & results reviewed. No acute episodes overnight  Patient is heme stable and febrile  Glucose 200 this am - Will adjust medications for better control   Patient had 2 episodes of transient diarrhea overnight that is not too concerning   Abdominal pain 4/10 that is well controlled with pain medication    ROS:  Constitutional:  negative for chills, fevers, sweats  Respiratory:  negative for cough, dyspnea on exertion, hemoptysis, shortness of breath, wheezing  Cardiovascular:  negative for chest pain, chest pressure/discomfort, lower extremity edema, palpitations  Gastrointestinal:  negative for abdominal pain, constipation, diarrhea, nausea, vomiting  Neurological:  negative for dizziness, headache    BRIEF HISTORY     The patient is a pleasant 59 y. o. female with PMH significant for bladder cancer, diabetes mellitus type 2, hypertension, hyperlipidemia presents with a chief complaint of syncopal episode in her bathroom this morning, associated with dizziness, lightheadedness.    In the ED, patient was given 1 L D5 plus half normal saline infusion at 100 mL/h, D50 25 g IV, followed by D5 at 100 mL/h.  Blood sugar was improved to 119    OBJECTIVE     Vital Signs:  BP (!) 173/83   Pulse 83   Temp 98.2 °F (36.8 °C) (Oral)   Resp 16   Ht 5' 5\" (1.651 m)   Wt 242 lb (109.8 kg)   SpO2 98%   BMI 40.27 kg/m²     Temp (24hrs), Av.2 °F (36.8 °C), Min:98.2 °F (36.8 °C), Max:98.2 °F (36.8 °C)    No intake/output data recorded. Physical Exam:  Constitutional: This is a well developed, well nourished, Greater than 40 - Morbid Obesity / Extreme Obesity / Grade III 64y.o. year old female who is alert, oriented, cooperative and in no apparent distress. Respiratory: Chest was symmetrical without dullness to percussion. Breath sounds bilaterally were clear to auscultation. There were no wheezes, rhonchi or rales. There is no intercostal retraction or use of accessory muscles. Cardiovascular: Regular without murmur, clicks, gallops or rubs. Abdomen: Slightly rounded and soft without organomegaly. No rebound, rigidity or guarding was appreciated. Musculoskeletal: Normal curvature of the spine. No gross muscle weakness. Extremities:  No lower extremity edema, ulcerations, tenderness, varicosities or erythema. Muscle size, tone and strength are normal.  Skin:  Warm and dry. Good color, turgor and pigmentation. No lesions or scars.   No cyanosis or clubbing  Neurological/Psychiatric: The patient's general behavior, level of consciousness, thought content and emotional status is normal    Medications:  Scheduled Medications:    insulin lispro  0-6 Units Subcutaneous TID WC    insulin lispro  0-3 Units Subcutaneous Nightly    insulin glargine  10 Units Subcutaneous Nightly    aspirin  81 mg Oral Nightly    atorvastatin  40 mg Oral Nightly    amLODIPine  10 mg Oral Daily    carvedilol  12.5 mg Oral BID WC    sertraline  100 mg Oral Nightly -Norvasc 10 mg daily, coreg 12.5 daily.     Hyperlipidemia:  -Lipitor 40 mg oral daily     DVT ppx: Lovenox 40 mg daily     PT/OT/SW: On Board   Discharge Planning: Home Today    Damien Kumar MD  Internal Medicine Resident, PGY-1  [facility];  [place], [place]  12/23/2020, 3:01 PM

## 2020-12-23 NOTE — ED NOTES
Pt back in room from taking shower, resting quietly, no complaints at this time     Fransisco He RN  12/23/20 [id number]

## 2020-12-24 ENCOUNTER — APPOINTMENT (OUTPATIENT)
Dept: RADIATION ONCOLOGY | Age: 60
End: 2020-12-24
Attending: RADIOLOGY
Payer: COMMERCIAL

## 2020-12-28 ENCOUNTER — HOSPITAL ENCOUNTER (INPATIENT)
Age: 60
LOS: 9 days | Discharge: HOME | DRG: 871 | End: 2021-01-06
Attending: EMERGENCY MEDICINE | Admitting: INTERNAL MEDICINE
Payer: COMMERCIAL

## 2020-12-28 ENCOUNTER — APPOINTMENT (OUTPATIENT)
Dept: RADIATION ONCOLOGY | Age: 60
End: 2020-12-28
Attending: RADIOLOGY
Payer: COMMERCIAL

## 2020-12-28 ENCOUNTER — TELEPHONE (OUTPATIENT)
Dept: RADIATION ONCOLOGY | Age: 60
End: 2020-12-28

## 2020-12-28 DIAGNOSIS — Z51.5 ENCOUNTER FOR PALLIATIVE CARE: ICD-10-CM

## 2020-12-28 DIAGNOSIS — R10.13 EPIGASTRIC PAIN: ICD-10-CM

## 2020-12-28 DIAGNOSIS — L03.114 CELLULITIS OF LEFT UPPER EXTREMITY: Primary | ICD-10-CM

## 2020-12-28 DIAGNOSIS — C25.9 PANCREATIC ADENOCARCINOMA (HCC): ICD-10-CM

## 2020-12-28 PROBLEM — L03.90 CELLULITIS: Status: ACTIVE | Noted: 2020-12-28

## 2020-12-28 LAB
ABSOLUTE EOS #: 0 K/UL (ref 0–0.44)
ABSOLUTE IMMATURE GRANULOCYTE: 0 K/UL (ref 0–0.3)
ABSOLUTE LYMPH #: 0.16 K/UL (ref 1.1–3.7)
ABSOLUTE MONO #: 0.47 K/UL (ref 0.1–1.2)
ANION GAP SERPL CALCULATED.3IONS-SCNC: 14 MMOL/L (ref 9–17)
BASOPHILS # BLD: 0 % (ref 0–2)
BASOPHILS ABSOLUTE: 0 K/UL (ref 0–0.2)
BUN BLDV-MCNC: 14 MG/DL (ref 6–20)
BUN/CREAT BLD: 19 (ref 9–20)
CALCIUM SERPL-MCNC: 8.9 MG/DL (ref 8.6–10.4)
CHLORIDE BLD-SCNC: 94 MMOL/L (ref 98–107)
CO2: 22 MMOL/L (ref 20–31)
CREAT SERPL-MCNC: 0.73 MG/DL (ref 0.5–0.9)
DIFFERENTIAL TYPE: ABNORMAL
EOSINOPHILS RELATIVE PERCENT: 0 % (ref 1–4)
GFR AFRICAN AMERICAN: >60 ML/MIN
GFR NON-AFRICAN AMERICAN: >60 ML/MIN
GFR SERPL CREATININE-BSD FRML MDRD: ABNORMAL ML/MIN/{1.73_M2}
GFR SERPL CREATININE-BSD FRML MDRD: ABNORMAL ML/MIN/{1.73_M2}
GLUCOSE BLD-MCNC: 341 MG/DL (ref 70–99)
GLUCOSE BLD-MCNC: 369 MG/DL (ref 65–105)
GLUCOSE BLD-MCNC: 466 MG/DL (ref 65–105)
HCT VFR BLD CALC: 29.9 % (ref 36.3–47.1)
HEMOGLOBIN: 9.1 G/DL (ref 11.9–15.1)
IMMATURE GRANULOCYTES: 0 %
LACTIC ACID: 1.7 MMOL/L (ref 0.5–2.2)
LYMPHOCYTES # BLD: 3 % (ref 24–43)
MCH RBC QN AUTO: 27.2 PG (ref 25.2–33.5)
MCHC RBC AUTO-ENTMCNC: 30.4 G/DL (ref 28.4–34.8)
MCV RBC AUTO: 89.3 FL (ref 82.6–102.9)
MONOCYTES # BLD: 9 % (ref 3–12)
NRBC AUTOMATED: 0 PER 100 WBC
PDW BLD-RTO: 18.1 % (ref 11.8–14.4)
PLATELET # BLD: 103 K/UL (ref 138–453)
PLATELET ESTIMATE: ABNORMAL
PMV BLD AUTO: 11.6 FL (ref 8.1–13.5)
POTASSIUM SERPL-SCNC: 3.5 MMOL/L (ref 3.7–5.3)
RBC # BLD: 3.35 M/UL (ref 3.95–5.11)
RBC # BLD: ABNORMAL 10*6/UL
SARS-COV-2, RAPID: NOT DETECTED
SARS-COV-2: NORMAL
SARS-COV-2: NOT DETECTED
SEG NEUTROPHILS: 88 % (ref 36–65)
SEGMENTED NEUTROPHILS ABSOLUTE COUNT: 4.57 K/UL (ref 1.5–8.1)
SODIUM BLD-SCNC: 130 MMOL/L (ref 135–144)
SOURCE: NORMAL
WBC # BLD: 5.2 K/UL (ref 3.5–11.3)
WBC # BLD: ABNORMAL 10*3/UL

## 2020-12-28 PROCEDURE — 83605 ASSAY OF LACTIC ACID: CPT

## 2020-12-28 PROCEDURE — 83036 HEMOGLOBIN GLYCOSYLATED A1C: CPT

## 2020-12-28 PROCEDURE — 99283 EMERGENCY DEPT VISIT LOW MDM: CPT

## 2020-12-28 PROCEDURE — 82947 ASSAY GLUCOSE BLOOD QUANT: CPT

## 2020-12-28 PROCEDURE — 87040 BLOOD CULTURE FOR BACTERIA: CPT

## 2020-12-28 PROCEDURE — 93971 EXTREMITY STUDY: CPT

## 2020-12-28 PROCEDURE — 85025 COMPLETE CBC W/AUTO DIFF WBC: CPT

## 2020-12-28 PROCEDURE — 87205 SMEAR GRAM STAIN: CPT

## 2020-12-28 PROCEDURE — 1200000000 HC SEMI PRIVATE

## 2020-12-28 PROCEDURE — U0003 INFECTIOUS AGENT DETECTION BY NUCLEIC ACID (DNA OR RNA); SEVERE ACUTE RESPIRATORY SYNDROME CORONAVIRUS 2 (SARS-COV-2) (CORONAVIRUS DISEASE [COVID-19]), AMPLIFIED PROBE TECHNIQUE, MAKING USE OF HIGH THROUGHPUT TECHNOLOGIES AS DESCRIBED BY CMS-2020-01-R: HCPCS

## 2020-12-28 PROCEDURE — 6360000002 HC RX W HCPCS: Performed by: EMERGENCY MEDICINE

## 2020-12-28 PROCEDURE — 80048 BASIC METABOLIC PNL TOTAL CA: CPT

## 2020-12-28 PROCEDURE — 6370000000 HC RX 637 (ALT 250 FOR IP): Performed by: INTERNAL MEDICINE

## 2020-12-28 PROCEDURE — 2580000003 HC RX 258: Performed by: EMERGENCY MEDICINE

## 2020-12-28 PROCEDURE — 87186 SC STD MICRODIL/AGAR DIL: CPT

## 2020-12-28 PROCEDURE — U0002 COVID-19 LAB TEST NON-CDC: HCPCS

## 2020-12-28 PROCEDURE — 86403 PARTICLE AGGLUT ANTBDY SCRN: CPT

## 2020-12-28 PROCEDURE — 6360000002 HC RX W HCPCS: Performed by: NURSE PRACTITIONER

## 2020-12-28 PROCEDURE — 87147 CULTURE TYPE IMMUNOLOGIC: CPT

## 2020-12-28 RX ORDER — ACETAMINOPHEN 325 MG/1
650 TABLET ORAL EVERY 6 HOURS PRN
Status: DISCONTINUED | OUTPATIENT
Start: 2020-12-28 | End: 2021-01-06 | Stop reason: HOSPADM

## 2020-12-28 RX ORDER — INSULIN GLARGINE 100 [IU]/ML
22 INJECTION, SOLUTION SUBCUTANEOUS DAILY
Status: DISCONTINUED | OUTPATIENT
Start: 2020-12-28 | End: 2021-01-01

## 2020-12-28 RX ORDER — SODIUM CHLORIDE 0.9 % (FLUSH) 0.9 %
10 SYRINGE (ML) INJECTION EVERY 12 HOURS SCHEDULED
Status: DISCONTINUED | OUTPATIENT
Start: 2020-12-28 | End: 2021-01-06 | Stop reason: HOSPADM

## 2020-12-28 RX ORDER — POLYETHYLENE GLYCOL 3350 17 G/17G
17 POWDER, FOR SOLUTION ORAL DAILY PRN
Status: DISCONTINUED | OUTPATIENT
Start: 2020-12-28 | End: 2021-01-06 | Stop reason: HOSPADM

## 2020-12-28 RX ORDER — SODIUM CHLORIDE 0.9 % (FLUSH) 0.9 %
10 SYRINGE (ML) INJECTION PRN
Status: DISCONTINUED | OUTPATIENT
Start: 2020-12-28 | End: 2021-01-06 | Stop reason: HOSPADM

## 2020-12-28 RX ORDER — AMLODIPINE BESYLATE 5 MG/1
10 TABLET ORAL DAILY
Status: DISCONTINUED | OUTPATIENT
Start: 2020-12-28 | End: 2021-01-06 | Stop reason: HOSPADM

## 2020-12-28 RX ORDER — SERTRALINE HYDROCHLORIDE 100 MG/1
100 TABLET, FILM COATED ORAL DAILY
Status: DISCONTINUED | OUTPATIENT
Start: 2020-12-28 | End: 2020-12-29

## 2020-12-28 RX ORDER — PROMETHAZINE HYDROCHLORIDE 25 MG/1
12.5 TABLET ORAL EVERY 6 HOURS PRN
Status: DISCONTINUED | OUTPATIENT
Start: 2020-12-28 | End: 2021-01-06 | Stop reason: HOSPADM

## 2020-12-28 RX ORDER — ATORVASTATIN CALCIUM 40 MG/1
40 TABLET, FILM COATED ORAL NIGHTLY
Status: DISCONTINUED | OUTPATIENT
Start: 2020-12-29 | End: 2021-01-06 | Stop reason: HOSPADM

## 2020-12-28 RX ORDER — OXYCODONE HYDROCHLORIDE 5 MG/1
10 TABLET ORAL EVERY 6 HOURS PRN
Status: DISCONTINUED | OUTPATIENT
Start: 2020-12-28 | End: 2021-01-06 | Stop reason: HOSPADM

## 2020-12-28 RX ORDER — NICOTINE POLACRILEX 4 MG
15 LOZENGE BUCCAL PRN
Status: DISCONTINUED | OUTPATIENT
Start: 2020-12-28 | End: 2021-01-06 | Stop reason: HOSPADM

## 2020-12-28 RX ORDER — DEXTROSE MONOHYDRATE 50 MG/ML
100 INJECTION, SOLUTION INTRAVENOUS PRN
Status: DISCONTINUED | OUTPATIENT
Start: 2020-12-28 | End: 2021-01-06 | Stop reason: HOSPADM

## 2020-12-28 RX ORDER — SODIUM CHLORIDE 0.9 % (FLUSH) 0.9 %
10 SYRINGE (ML) INJECTION PRN
Status: DISCONTINUED | OUTPATIENT
Start: 2020-12-28 | End: 2020-12-28 | Stop reason: SDUPTHER

## 2020-12-28 RX ORDER — ONDANSETRON 2 MG/ML
4 INJECTION INTRAMUSCULAR; INTRAVENOUS EVERY 6 HOURS PRN
Status: DISCONTINUED | OUTPATIENT
Start: 2020-12-28 | End: 2021-01-06 | Stop reason: HOSPADM

## 2020-12-28 RX ORDER — CARVEDILOL 12.5 MG/1
12.5 TABLET ORAL 2 TIMES DAILY WITH MEALS
Status: DISCONTINUED | OUTPATIENT
Start: 2020-12-28 | End: 2021-01-06 | Stop reason: HOSPADM

## 2020-12-28 RX ORDER — HYDRALAZINE HYDROCHLORIDE 25 MG/1
25 TABLET, FILM COATED ORAL EVERY 8 HOURS SCHEDULED
Status: DISCONTINUED | OUTPATIENT
Start: 2020-12-28 | End: 2021-01-06 | Stop reason: HOSPADM

## 2020-12-28 RX ORDER — ACETAMINOPHEN 325 MG/1
650 TABLET ORAL EVERY 4 HOURS PRN
Status: DISCONTINUED | OUTPATIENT
Start: 2020-12-28 | End: 2020-12-28 | Stop reason: SDUPTHER

## 2020-12-28 RX ORDER — ASPIRIN 81 MG/1
81 TABLET, CHEWABLE ORAL NIGHTLY
Status: DISCONTINUED | OUTPATIENT
Start: 2020-12-28 | End: 2021-01-06 | Stop reason: HOSPADM

## 2020-12-28 RX ORDER — HYDROMORPHONE HYDROCHLORIDE 1 MG/ML
1 INJECTION, SOLUTION INTRAMUSCULAR; INTRAVENOUS; SUBCUTANEOUS ONCE
Status: COMPLETED | OUTPATIENT
Start: 2020-12-28 | End: 2020-12-28

## 2020-12-28 RX ORDER — DEXTROSE MONOHYDRATE 25 G/50ML
12.5 INJECTION, SOLUTION INTRAVENOUS PRN
Status: DISCONTINUED | OUTPATIENT
Start: 2020-12-28 | End: 2021-01-06 | Stop reason: HOSPADM

## 2020-12-28 RX ORDER — SODIUM CHLORIDE 0.9 % (FLUSH) 0.9 %
10 SYRINGE (ML) INJECTION EVERY 12 HOURS SCHEDULED
Status: DISCONTINUED | OUTPATIENT
Start: 2020-12-28 | End: 2020-12-28 | Stop reason: SDUPTHER

## 2020-12-28 RX ORDER — ACETAMINOPHEN 650 MG/1
650 SUPPOSITORY RECTAL EVERY 6 HOURS PRN
Status: DISCONTINUED | OUTPATIENT
Start: 2020-12-28 | End: 2021-01-06 | Stop reason: HOSPADM

## 2020-12-28 RX ADMIN — OXYCODONE HYDROCHLORIDE 10 MG: 5 TABLET ORAL at 20:07

## 2020-12-28 RX ADMIN — ASPIRIN 81 MG: 81 TABLET, CHEWABLE ORAL at 22:01

## 2020-12-28 RX ADMIN — INSULIN GLARGINE 22 UNITS: 100 INJECTION, SOLUTION SUBCUTANEOUS at 22:13

## 2020-12-28 RX ADMIN — VANCOMYCIN HYDROCHLORIDE 2500 MG: 1 INJECTION, POWDER, LYOPHILIZED, FOR SOLUTION INTRAVENOUS at 15:24

## 2020-12-28 RX ADMIN — INSULIN LISPRO 3 UNITS: 100 INJECTION, SOLUTION INTRAVENOUS; SUBCUTANEOUS at 22:00

## 2020-12-28 RX ADMIN — CARVEDILOL 12.5 MG: 12.5 TABLET, FILM COATED ORAL at 22:10

## 2020-12-28 RX ADMIN — ACETAMINOPHEN 650 MG: 325 TABLET ORAL at 22:02

## 2020-12-28 RX ADMIN — INSULIN LISPRO 6 UNITS: 100 INJECTION, SOLUTION INTRAVENOUS; SUBCUTANEOUS at 18:57

## 2020-12-28 RX ADMIN — HYDROMORPHONE HYDROCHLORIDE 1 MG: 1 INJECTION, SOLUTION INTRAMUSCULAR; INTRAVENOUS; SUBCUTANEOUS at 15:01

## 2020-12-28 ASSESSMENT — ENCOUNTER SYMPTOMS
WHEEZING: 0
SINUS PRESSURE: 0
NAUSEA: 0
COLOR CHANGE: 0
SORE THROAT: 0
CONSTIPATION: 0
ABDOMINAL PAIN: 0
SHORTNESS OF BREATH: 0
VOMITING: 0
DIARRHEA: 0
COUGH: 0
RHINORRHEA: 0

## 2020-12-28 ASSESSMENT — PAIN SCALES - GENERAL
PAINLEVEL_OUTOF10: 7
PAINLEVEL_OUTOF10: 4

## 2020-12-28 NOTE — ED PROVIDER NOTES
[address] [place] [address] ED  eMERGENCY dEPARTMENT eNCOUnter      Pt Name: Kathya Wilson  MRN: [id number]  [place] 1960  Date of evaluation: 12/28/2020  Provider: Akash Avalos NP, [facility] - [id number]       Chief Complaint   Patient presents with    Arm Pain     swelling and redness where prior IV was. HISTORY OF PRESENT ILLNESS  (Location/Symptom, Timing/Onset, Context/Setting, Quality, Duration, Modifying Factors, Severity.)   Kathya Wilson is a 64 y.o. female who presents to the emergency department by private vehicle for evaluation of swelling and redness to the IV site. Patient was admitted to [facility] from Monday to Wednesday she had an IV in her left forearm. She is currently undergoing oral chemotherapy and radiation for pancreatic cancer. She states that her left arm has been red, swollen and warm. She states that the area of redness and swelling has gotten progressively worse. She states that today she started having fevers and chills so she came to the emergency room for evaluation. She rates the pain a 7 on a 0-to-10 scale. Pain is worse with movement. Nursing Notes were reviewed. ALLERGIES     Lisinopril, Sulfamethoxazole, Trimethoprim, Cefuroxime axetil, Sulfamethoxazole-trimethoprim, Clindamycin phosphate, and Penicillins    CURRENT MEDICATIONS       Previous Medications    AMLODIPINE (NORVASC) 10 MG TABLET    Take 1 tablet by mouth daily    ASPIRIN 81 MG CHEWABLE TABLET    Take 81 mg by mouth nightly    ATORVASTATIN (LIPITOR) 40 MG TABLET    nightly     BLOOD GLUCOSE MONITOR STRIPS    Test 3  times daily  Insulin Dependent mellitus    CAPECITABINE (XELODA) 150 MG CHEMO TABLET    TAKE 2 TABLETS BY MOUTH TWICE DAILY ON MONDAY TO FRIDAY WHILE ON RADIATION. CAPECITABINE (XELODA) 500 MG CHEMO TABLET    TAKE 3 TABLETS BY MOUTH TWICE DAILY ON MONDAY TO FRIDAY WHILE ON RADIATION. CARVEDILOL (COREG) 12.5 MG TABLET    Take 1 tablet by mouth 2 times daily (with meals)    FUROSEMIDE (LASIX) 20 MG TABLET    Take 1 tablet by mouth daily    GLUCOSE MONITORING KIT (FREESTYLE) MONITORING KIT    1 kit by Does not apply route daily    HYDRALAZINE (APRESOLINE) 25 MG TABLET    Take 1 tablet by mouth every 8 hours    INSULIN GLARGINE (LANTUS SOLOSTAR) 100 UNIT/ML INJECTION PEN    Inject 22 Units into the skin daily    INSULIN LISPRO, 1 UNIT DIAL, (HUMALOG KWIKPEN) 100 UNIT/ML SOPN    Inject 0-12 Units into the skin 3 times daily (before meals) **Medium Dose Corrective Algorithm** Glucose: Dose: If <139 - No Insulin. 140-199 -  2 Units. 200-249 4 Units. 250-299 6 Units. 300-349 8 Units. 350-400 10 Units. Above 400- 12 Units    INSULIN PEN NEEDLE ([employer] PEN NEEDLES 29G) 29G X 12MM MISC    1 each by Does not apply route daily    LANCETS MISC    1 each by Does not apply route 3 times daily    LIDOCAINE-PRILOCAINE (EMLA) 2.5-2.5 % CREAM    Apply topically as needed. LIPASE-PROTEASE-AMYLASE (CREON) 59121 UNITS CPEP DELAYED RELEASE CAPSULE    Take two 36,000unit capsules with meals and one- 36,000unit capsule with snacks. LORAZEPAM (ATIVAN) 0.5 MG TABLET    1 mg nightly. And 1/2 tab once daily as needed    NITROGLYCERIN (NITROSTAT) 0.4 MG SL TABLET    up to max of 3 total doses. If no relief after 1 dose, call 911. ONDANSETRON (ZOFRAN-ODT) 8 MG TBDP DISINTEGRATING TABLET    Place 1 tablet under the tongue every 8 hours as needed for Nausea or Vomiting    OXYCODONE (OXY-IR) 10 MG IMMEDIATE RELEASE TABLET    Take 1 tablet by mouth every 6 hours as needed for Pain for up to 30 days.     PROCHLORPERAZINE (COMPAZINE) 10 MG TABLET    Take 1 tablet by mouth every 6 hours as needed (NV)    SERTRALINE (ZOLOFT) 100 MG TABLET    take 1 1/2 tablet by oral route  every day       PAST MEDICAL HISTORY         Diagnosis Date    Anxiety     Diabetes mellitus ([address]     Hyperlipidemia     Hypertension  Pancreatic cancer [facility])        SURGICAL HISTORY           Procedure Laterality Date     SECTION      x2    ERCP  2020    SPHINCTER/PAPILLOTOMY, STENT INSERTION    ERCP  2020    ERCP SPHINCTER/PAPILLOTOMY performed by Ciro Gonzalez MD at [facility]    ERCP  2020    ERCP STENT INSERTION [facility] at [facility]    ERCP  2020     ERCP ENDOSCOPIC RETROGRADE CHOLANGIOPANCREATOGRAPHY -   ERCP STENT REMOVAL     ERCP  2020    ERCP STENT REMOVAL performed by Rahel Cowart MD at [address] ERCP  2020    ERCP STENT INSERTION performed by Rahel Cowart MD at [address] ERCP  2020    ERCP DILATION BALLOON performed by Rahel Cowart MD at [facility] ENDOSCOPY  2020    W/EUS FNA     UPPER GASTROINTESTINAL ENDOSCOPY  2020    EGD W/EUS FNA in OR with GI staff performed by Ciro Gonzalez MD at [facility]         FAMILY HISTORY           Problem Relation Age of Onset    Cancer Mother     Hypertension Mother     Heart Failure Father     Hypertension Father     Cancer Maternal Grandmother         colon     Cancer Maternal Grandfather         lung      Family Status   Relation Name Status    Mother  (Not Specified)    Father  (Not Specified)    MGM  (Not Specified)    MGF  (Not Specified)        SOCIAL HISTORY      reports that she quit smoking about 15 years ago. She has never used smokeless tobacco. She reports previous alcohol use. She reports that she does not use drugs. REVIEW OF SYSTEMS    (2-9 systems for level 4, 10 or more for level 5)     Review of Systems   Constitutional: Negative for chills, fever and unexpected weight change. HENT: Negative for congestion, rhinorrhea, sinus pressure and sore throat. Respiratory: Negative for cough, shortness of breath and wheezing. Cardiovascular: Negative for chest pain and palpitations. Gastrointestinal: Negative for abdominal pain, constipation, diarrhea, nausea and vomiting. Genitourinary: Negative for dysuria and hematuria. Musculoskeletal: Negative for arthralgias and myalgias. Skin: Positive for wound. Negative for color change and rash. Neurological: Negative for dizziness, weakness and headaches. Hematological: Negative for adenopathy. All other systems reviewed and are negative. Except as noted above the remainder of the review of systems was reviewed and negative. PHYSICAL EXAM    (up to 7 for level 4, 8 or more for level 5)     ED Triage Vitals [12/28/20 1312]   BP Temp Temp Source Pulse Resp SpO2 Height Weight   (!) 187/60 101.5 °F (38.6 °C) Oral 93 16 95 % 5' 5\" (1.651 m) 236 lb (107 kg)       Physical Exam  Vitals signs reviewed. Constitutional:       Appearance: She is well-developed. HENT:      Head: Normocephalic and atraumatic. Eyes:      Conjunctiva/sclera: Conjunctivae normal.      Pupils: Pupils are equal, round, and reactive to light. Neck:      Musculoskeletal: Normal range of motion and neck supple. Cardiovascular:      Rate and Rhythm: Normal rate and regular rhythm. Pulmonary:      Effort: Pulmonary effort is normal. No respiratory distress. Breath sounds: Normal breath sounds. No stridor. Abdominal:      General: Bowel sounds are normal.      Palpations: Abdomen is soft. Musculoskeletal: Normal range of motion. Lymphadenopathy:      Cervical: No cervical adenopathy. Skin:     General: Skin is warm and dry. Findings: No rash. Neurological:      Mental Status: She is alert and oriented to person, place, and time.              DIAGNOSTIC RESULTS       RADIOLOGY:   Non-plain film images such as CT, Ultrasound and MRI are read by the radiologist. Plain radiographic images are visualized and preliminarily interpreted by the emergency physician with the below findings: Interpretation per the Radiologist below, if available at the time of this note:    VL DUP UPPER EXTREMITY VENOUS LEFT    (Results Pending)           LABS:  Labs Reviewed   CBC WITH AUTO DIFFERENTIAL - Abnormal; Notable for the following components:       Result Value    RBC 3.35 (*)     Hemoglobin 9.1 (*)     Hematocrit 29.9 (*)     RDW 18.1 (*)     Platelets 326 (*)     Seg Neutrophils 88 (*)     Lymphocytes 3 (*)     Eosinophils % 0 (*)     Absolute Lymph # 0.16 (*)     All other components within normal limits   BASIC METABOLIC PANEL - Abnormal; Notable for the following components:    Glucose 341 (*)     Sodium 130 (*)     Potassium 3.5 (*)     Chloride 94 (*)     All other components within normal limits   CULTURE, BLOOD 1   CULTURE, BLOOD 1   LACTIC ACID   COVID-19       All other labs were within normal range or not returned as of this dictation. EMERGENCY DEPARTMENT COURSE and DIFFERENTIAL DIAGNOSIS/MDM:   Vitals:    Vitals:    12/28/20 1312   BP: (!) 187/60   Pulse: 93   Resp: 16   Temp: 101.5 °F (38.6 °C)   TempSrc: Oral   SpO2: 95%   Weight: 236 lb (107 kg)   Height: 5' 5\" (1.651 m)       Medical Decision Making: Patient has significant cellulitis of her arm with a fever of 101.5. Covid swab is pending. The patient will be started on IV vancomycin and admitted to the hospital.  Case was discussed with Dr. Han Fink  Medications   vancomycin (VANCOCIN) 2,500 mg in dextrose 5 % 500 mL IVPB (2,500 mg Intravenous New Bag 12/28/20 1524)   HYDROmorphone HCl PF (DILAUDID) injection 1 mg (1 mg Intravenous Given 12/28/20 1501)       CONSULTS:  IP CONSULT TO INTERNAL MEDICINE  PHARMACY TO DOSE VANCOMYCIN      FINAL IMPRESSION      1. Cellulitis of left upper extremity          DISPOSITION/PLAN   DISPOSITION Admitted 12/28/2020 02:52:29 PM      PATIENT REFERRED TO:   No follow-up provider specified.     DISCHARGE MEDICATIONS:     New Prescriptions    No medications on file (Please note that portions of this note were completed with a voice recognition program.  Efforts were made to edit the dictations but occasionally words are mis-transcribed.)    Citlalli Lomax NP, APRN - CNP  Certified Nurse Practitioner            [facility] Cobos CNP  12/28/20 [id number]

## 2020-12-28 NOTE — TELEPHONE ENCOUNTER
Pt called stating she will not be in for RT today. She was released from hospital, however, she thinks she has cellulitis in her arm from IV. She has an appt w/ PCP today. I notified techs, pt will not be here today for radiation therapy.

## 2020-12-28 NOTE — ED PROVIDER NOTES
[address] [place] [address] ED  EMERGENCY DEPARTMENT ENCOUNTER   ATTENDING ATTESTATION     Pt Name: Petrona Mackenzie  MRN: [id number]  Erastogfyo 1960  Date of evaluation: 12/28/20       Petrona Mackenzie is a 64 y.o. female who presents with Arm Pain (swelling and redness where prior IV was. )      MDM:     This is a 68-year-old female with a history of pancreatic cancer, recently discharged from [facility], the patient had a left sided IV placement, she developed increasing redness swelling and pain, she has a low-grade fever. Patient has an obvious cellulitis, possibly a blood clot as well. Plan is basic labs IV antibiotics and reevaluation. We will also obtain an ultrasound of the left arm. Vitals:   Vitals:    12/28/20 1312   BP: (!) 187/60   Pulse: 93   Resp: 16   Temp: 101.5 °F (38.6 °C)   TempSrc: Oral   SpO2: 95%   Weight: 236 lb (107 kg)   Height: 5' 5\" (1.651 m)         I personally evaluated and examined the patient in conjunction with the Midlevel provider and agree with the assessment, treatment plan, and disposition of the patient as recorded by the midlevel. I performed a history and physical examination of the patient and discussed management with the midlevel. I reviewed the midlevels note and agree with the documented findings and plan of care. Any areas of disagreement are noted on the chart. I was personally present for the key portions of any procedures. I have documented in the chart those procedures where I was not present during the key portions. I have personally reviewed all images and agree with the midlevel's interpretation. I have reviewed the emergency nurses triage note. I agree with the chief complaint, past medical history, past surgical history, allergies, medications, social and family history as documented unless otherwise noted.     Junior Eldridge MD  Attending Emergency  Physician                 Robby Mcgowan MD  12/28/20 7108

## 2020-12-28 NOTE — PROGRESS NOTES
Patient weight is between 101-149kg. For prophylaxis with Enoxaparin, Pharmacy adjusted the dose to account for the patient's increased body weight in accordance with hospital approved protocol. The dose has been changed to 30mg BID. Please contact pharmacy with any concerns @ [phone]. Thank you.    Eric Back  12/28/2020 6:38 PM

## 2020-12-29 ENCOUNTER — APPOINTMENT (OUTPATIENT)
Dept: RADIATION ONCOLOGY | Age: 60
End: 2020-12-29
Attending: RADIOLOGY
Payer: COMMERCIAL

## 2020-12-29 ENCOUNTER — HOSPITAL ENCOUNTER (OUTPATIENT)
Dept: RADIATION ONCOLOGY | Age: 60
End: 2020-12-29
Attending: RADIOLOGY
Payer: COMMERCIAL

## 2020-12-29 LAB
ABSOLUTE EOS #: 0 K/UL (ref 0–0.44)
ABSOLUTE IMMATURE GRANULOCYTE: 0.04 K/UL (ref 0–0.3)
ABSOLUTE LYMPH #: 0.18 K/UL (ref 1.1–3.7)
ABSOLUTE MONO #: 0.32 K/UL (ref 0.1–1.2)
ALBUMIN SERPL-MCNC: 2 G/DL (ref 3.5–5.2)
ALBUMIN/GLOBULIN RATIO: ABNORMAL (ref 1–2.5)
ALP BLD-CCNC: 93 U/L (ref 35–104)
ALT SERPL-CCNC: 17 U/L (ref 5–33)
ANION GAP SERPL CALCULATED.3IONS-SCNC: 10 MMOL/L (ref 9–17)
ANION GAP SERPL CALCULATED.3IONS-SCNC: 9 MMOL/L (ref 9–17)
AST SERPL-CCNC: 22 U/L
BASOPHILS # BLD: 0 % (ref 0–2)
BASOPHILS ABSOLUTE: 0 K/UL (ref 0–0.2)
BILIRUB SERPL-MCNC: 0.48 MG/DL (ref 0.3–1.2)
BUN BLDV-MCNC: 17 MG/DL (ref 6–20)
BUN BLDV-MCNC: 17 MG/DL (ref 6–20)
BUN/CREAT BLD: 18 (ref 9–20)
BUN/CREAT BLD: 18 (ref 9–20)
CALCIUM SERPL-MCNC: 9 MG/DL (ref 8.6–10.4)
CALCIUM SERPL-MCNC: 9.5 MG/DL (ref 8.6–10.4)
CHLORIDE BLD-SCNC: 93 MMOL/L (ref 98–107)
CHLORIDE BLD-SCNC: 94 MMOL/L (ref 98–107)
CO2: 24 MMOL/L (ref 20–31)
CO2: 28 MMOL/L (ref 20–31)
CREAT SERPL-MCNC: 0.93 MG/DL (ref 0.5–0.9)
CREAT SERPL-MCNC: 0.95 MG/DL (ref 0.5–0.9)
DIFFERENTIAL TYPE: ABNORMAL
EOSINOPHILS RELATIVE PERCENT: 0 % (ref 1–4)
ESTIMATED AVERAGE GLUCOSE: 197 MG/DL
GFR AFRICAN AMERICAN: >60 ML/MIN
GFR AFRICAN AMERICAN: >60 ML/MIN
GFR NON-AFRICAN AMERICAN: >60 ML/MIN
GFR NON-AFRICAN AMERICAN: >60 ML/MIN
GFR SERPL CREATININE-BSD FRML MDRD: ABNORMAL ML/MIN/{1.73_M2}
GLUCOSE BLD-MCNC: 155 MG/DL (ref 65–105)
GLUCOSE BLD-MCNC: 218 MG/DL (ref 65–105)
GLUCOSE BLD-MCNC: 279 MG/DL (ref 65–105)
GLUCOSE BLD-MCNC: 279 MG/DL (ref 70–99)
GLUCOSE BLD-MCNC: 288 MG/DL (ref 65–105)
GLUCOSE BLD-MCNC: 305 MG/DL (ref 65–105)
GLUCOSE BLD-MCNC: 336 MG/DL (ref 70–99)
HBA1C MFR BLD: 8.5 % (ref 4–6)
HCT VFR BLD CALC: 29.3 % (ref 36.3–47.1)
HEMOGLOBIN: 8.7 G/DL (ref 11.9–15.1)
IMMATURE GRANULOCYTES: 1 %
LACTIC ACID: 2.2 MMOL/L (ref 0.5–2.2)
LYMPHOCYTES # BLD: 5 % (ref 24–43)
MAGNESIUM: 1 MG/DL (ref 1.6–2.6)
MCH RBC QN AUTO: 26.7 PG (ref 25.2–33.5)
MCHC RBC AUTO-ENTMCNC: 29.7 G/DL (ref 28.4–34.8)
MCV RBC AUTO: 89.9 FL (ref 82.6–102.9)
MONOCYTES # BLD: 9 % (ref 3–12)
NRBC AUTOMATED: 0 PER 100 WBC
PDW BLD-RTO: 18.3 % (ref 11.8–14.4)
PLATELET # BLD: 93 K/UL (ref 138–453)
PLATELET ESTIMATE: ABNORMAL
PMV BLD AUTO: 12.2 FL (ref 8.1–13.5)
POTASSIUM SERPL-SCNC: 3.5 MMOL/L (ref 3.7–5.3)
POTASSIUM SERPL-SCNC: 3.5 MMOL/L (ref 3.7–5.3)
RBC # BLD: 3.26 M/UL (ref 3.95–5.11)
RBC # BLD: ABNORMAL 10*6/UL
SEG NEUTROPHILS: 85 % (ref 36–65)
SEGMENTED NEUTROPHILS ABSOLUTE COUNT: 2.96 K/UL (ref 1.5–8.1)
SODIUM BLD-SCNC: 128 MMOL/L (ref 135–144)
SODIUM BLD-SCNC: 130 MMOL/L (ref 135–144)
TOTAL PROTEIN: 4.9 G/DL (ref 6.4–8.3)
WBC # BLD: 3.5 K/UL (ref 3.5–11.3)
WBC # BLD: ABNORMAL 10*3/UL

## 2020-12-29 PROCEDURE — 6370000000 HC RX 637 (ALT 250 FOR IP): Performed by: INTERNAL MEDICINE

## 2020-12-29 PROCEDURE — 36415 COLL VENOUS BLD VENIPUNCTURE: CPT

## 2020-12-29 PROCEDURE — 85025 COMPLETE CBC W/AUTO DIFF WBC: CPT

## 2020-12-29 PROCEDURE — 82947 ASSAY GLUCOSE BLOOD QUANT: CPT

## 2020-12-29 PROCEDURE — 6360000002 HC RX W HCPCS: Performed by: INTERNAL MEDICINE

## 2020-12-29 PROCEDURE — 2580000003 HC RX 258: Performed by: INTERNAL MEDICINE

## 2020-12-29 PROCEDURE — 83735 ASSAY OF MAGNESIUM: CPT

## 2020-12-29 PROCEDURE — 80053 COMPREHEN METABOLIC PANEL: CPT

## 2020-12-29 PROCEDURE — 83605 ASSAY OF LACTIC ACID: CPT

## 2020-12-29 PROCEDURE — 99255 IP/OBS CONSLTJ NEW/EST HI 80: CPT | Performed by: INTERNAL MEDICINE

## 2020-12-29 PROCEDURE — 80048 BASIC METABOLIC PNL TOTAL CA: CPT

## 2020-12-29 PROCEDURE — 1200000000 HC SEMI PRIVATE

## 2020-12-29 PROCEDURE — 99254 IP/OBS CNSLTJ NEW/EST MOD 60: CPT | Performed by: INTERNAL MEDICINE

## 2020-12-29 RX ORDER — HYDROMORPHONE HYDROCHLORIDE 1 MG/ML
1 INJECTION, SOLUTION INTRAMUSCULAR; INTRAVENOUS; SUBCUTANEOUS EVERY 6 HOURS PRN
Status: DISCONTINUED | OUTPATIENT
Start: 2020-12-29 | End: 2021-01-02

## 2020-12-29 RX ORDER — SODIUM CHLORIDE 9 MG/ML
INJECTION, SOLUTION INTRAVENOUS CONTINUOUS
Status: DISCONTINUED | OUTPATIENT
Start: 2020-12-29 | End: 2021-01-06 | Stop reason: HOSPADM

## 2020-12-29 RX ORDER — MAGNESIUM SULFATE 1 G/100ML
1 INJECTION INTRAVENOUS PRN
Status: COMPLETED | OUTPATIENT
Start: 2020-12-29 | End: 2020-12-29

## 2020-12-29 RX ORDER — LORAZEPAM 1 MG/1
0.5 TABLET ORAL DAILY PRN
COMMUNITY

## 2020-12-29 RX ORDER — POTASSIUM CHLORIDE 20 MEQ/1
20 TABLET, EXTENDED RELEASE ORAL 2 TIMES DAILY WITH MEALS
Status: DISCONTINUED | OUTPATIENT
Start: 2020-12-29 | End: 2021-01-03

## 2020-12-29 RX ORDER — FUROSEMIDE 20 MG/1
20 TABLET ORAL DAILY PRN
COMMUNITY

## 2020-12-29 RX ADMIN — POTASSIUM CHLORIDE 20 MEQ: 20 TABLET, EXTENDED RELEASE ORAL at 16:43

## 2020-12-29 RX ADMIN — MAGNESIUM SULFATE HEPTAHYDRATE 1 G: 1 INJECTION, SOLUTION INTRAVENOUS at 12:30

## 2020-12-29 RX ADMIN — INSULIN LISPRO 1 UNITS: 100 INJECTION, SOLUTION INTRAVENOUS; SUBCUTANEOUS at 22:08

## 2020-12-29 RX ADMIN — HYDROMORPHONE HYDROCHLORIDE 0.5 MG: 1 INJECTION, SOLUTION INTRAMUSCULAR; INTRAVENOUS; SUBCUTANEOUS at 10:12

## 2020-12-29 RX ADMIN — ATORVASTATIN CALCIUM 40 MG: 40 TABLET, FILM COATED ORAL at 22:07

## 2020-12-29 RX ADMIN — VANCOMYCIN HYDROCHLORIDE 1500 MG: 5 INJECTION, POWDER, LYOPHILIZED, FOR SOLUTION INTRAVENOUS at 17:53

## 2020-12-29 RX ADMIN — INSULIN LISPRO 4 UNITS: 100 INJECTION, SOLUTION INTRAVENOUS; SUBCUTANEOUS at 08:20

## 2020-12-29 RX ADMIN — MAGNESIUM SULFATE HEPTAHYDRATE 1 G: 1 INJECTION, SOLUTION INTRAVENOUS at 10:51

## 2020-12-29 RX ADMIN — HYDRALAZINE HYDROCHLORIDE 25 MG: 25 TABLET, FILM COATED ORAL at 13:33

## 2020-12-29 RX ADMIN — ENOXAPARIN SODIUM 30 MG: 30 INJECTION SUBCUTANEOUS at 22:07

## 2020-12-29 RX ADMIN — VANCOMYCIN HYDROCHLORIDE 1500 MG: 5 INJECTION, POWDER, LYOPHILIZED, FOR SOLUTION INTRAVENOUS at 06:17

## 2020-12-29 RX ADMIN — OXYCODONE HYDROCHLORIDE 10 MG: 5 TABLET ORAL at 14:14

## 2020-12-29 RX ADMIN — INSULIN GLARGINE 22 UNITS: 100 INJECTION, SOLUTION SUBCUTANEOUS at 09:44

## 2020-12-29 RX ADMIN — Medication 10 ML: at 16:44

## 2020-12-29 RX ADMIN — OXYCODONE HYDROCHLORIDE 10 MG: 5 TABLET ORAL at 20:32

## 2020-12-29 RX ADMIN — POTASSIUM CHLORIDE 20 MEQ: 20 TABLET, EXTENDED RELEASE ORAL at 10:50

## 2020-12-29 RX ADMIN — INSULIN LISPRO 2 UNITS: 100 INJECTION, SOLUTION INTRAVENOUS; SUBCUTANEOUS at 16:43

## 2020-12-29 RX ADMIN — HYDRALAZINE HYDROCHLORIDE 25 MG: 25 TABLET, FILM COATED ORAL at 22:07

## 2020-12-29 RX ADMIN — HYDROMORPHONE HYDROCHLORIDE 0.5 MG: 1 INJECTION, SOLUTION INTRAMUSCULAR; INTRAVENOUS; SUBCUTANEOUS at 12:26

## 2020-12-29 RX ADMIN — INSULIN LISPRO 3 UNITS: 100 INJECTION, SOLUTION INTRAVENOUS; SUBCUTANEOUS at 14:11

## 2020-12-29 RX ADMIN — SODIUM CHLORIDE: 9 INJECTION, SOLUTION INTRAVENOUS at 14:52

## 2020-12-29 RX ADMIN — AMLODIPINE BESYLATE 10 MG: 5 TABLET ORAL at 13:33

## 2020-12-29 RX ADMIN — SERTRALINE HYDROCHLORIDE 100 MG: 100 TABLET ORAL at 08:21

## 2020-12-29 RX ADMIN — ASPIRIN 81 MG: 81 TABLET, CHEWABLE ORAL at 22:07

## 2020-12-29 RX ADMIN — ENOXAPARIN SODIUM 30 MG: 30 INJECTION SUBCUTANEOUS at 08:20

## 2020-12-29 RX ADMIN — CARVEDILOL 12.5 MG: 12.5 TABLET, FILM COATED ORAL at 16:44

## 2020-12-29 RX ADMIN — HYDROMORPHONE HYDROCHLORIDE 1 MG: 1 INJECTION, SOLUTION INTRAMUSCULAR; INTRAVENOUS; SUBCUTANEOUS at 16:44

## 2020-12-29 ASSESSMENT — PAIN DESCRIPTION - ONSET
ONSET: ON-GOING

## 2020-12-29 ASSESSMENT — PAIN DESCRIPTION - PROGRESSION: CLINICAL_PROGRESSION: NOT CHANGED

## 2020-12-29 ASSESSMENT — PAIN DESCRIPTION - PAIN TYPE
TYPE: ACUTE PAIN

## 2020-12-29 ASSESSMENT — PAIN DESCRIPTION - FREQUENCY
FREQUENCY: CONTINUOUS
FREQUENCY: CONTINUOUS

## 2020-12-29 ASSESSMENT — PAIN SCALES - GENERAL
PAINLEVEL_OUTOF10: 10
PAINLEVEL_OUTOF10: 9
PAINLEVEL_OUTOF10: 8
PAINLEVEL_OUTOF10: 9
PAINLEVEL_OUTOF10: 8

## 2020-12-29 ASSESSMENT — PAIN DESCRIPTION - LOCATION: LOCATION: ARM

## 2020-12-29 ASSESSMENT — PAIN DESCRIPTION - ORIENTATION: ORIENTATION: LEFT

## 2020-12-29 ASSESSMENT — PAIN DESCRIPTION - DESCRIPTORS: DESCRIPTORS: BURNING;ACHING

## 2020-12-29 NOTE — ED NOTES
Patient up to restroom and changed out stretcher to hospital bed.       Radha Baker RN  12/29/20 8443

## 2020-12-29 NOTE — PROGRESS NOTES
Transitions of Care Pharmacy Service   Medication Review    The patient's list of current home medications has been reviewed with her earlier today in the ED. Source(s) of information: patient, Epic, Surescripts refill report    PROVIDER ACTION REQUESTED  Medications that need to be addressed by a physician/nurse practitioner:    Medication Action Requested        Home med list was only partially reviewed by physician at admission -- remaining home med list needs review/reorder         Please feel free to call me with any questions about this encounter. Thank you. Ubaldo Luna [facility]   Transitions of Care Pharmacy Service  Phone:  [phone]  Fax: [phone]      Electronically signed by Ubaldo Luna [facility] on 12/29/2020 at 5:53 PM           Medications Prior to Admission:   furosemide (LASIX) 20 MG tablet, Take 20 mg by mouth daily as needed (swelling)  LORazepam (ATIVAN) 1 MG tablet, Take 0.5 mg by mouth daily as needed for Anxiety. insulin lispro, 1 Unit Dial, (HUMALOG KWIKPEN) 100 UNIT/ML SOPN, Inject 0-12 Units into the skin 3 times daily (before meals) **Medium Dose Corrective Algorithm** Glucose: Dose: If <139 - No Insulin. 140-199 -  2 Units. 200-249 4 Units. 250-299 6 Units. 300-349 8 Units. 350-400 10 Units. Above 400- 12 Units  insulin glargine (LANTUS SOLOSTAR) 100 UNIT/ML injection pen, Inject 22 Units into the skin daily  oxyCODONE (OXY-IR) 10 MG immediate release tablet, Take 1 tablet by mouth every 6 hours as needed for Pain for up to 30 days. prochlorperazine (COMPAZINE) 10 MG tablet, Take 1 tablet by mouth every 6 hours as needed (NV)  capecitabine (XELODA) 150 MG chemo tablet, TAKE 2 TABLETS BY MOUTH TWICE DAILY ON MONDAY TO FRIDAY WHILE ON RADIATION. capecitabine (XELODA) 500 MG chemo tablet, TAKE 3 TABLETS BY MOUTH TWICE DAILY ON MONDAY TO FRIDAY WHILE ON RADIATION. ondansetron (ZOFRAN-ODT) 8 MG TBDP disintegrating tablet, Place 1 tablet under the tongue every 8 hours as needed for Nausea or Vomiting  hydrALAZINE (APRESOLINE) 25 MG tablet, Take 1 tablet by mouth every 8 hours  carvedilol (COREG) 12.5 MG tablet, Take 1 tablet by mouth 2 times daily (with meals)  amLODIPine (NORVASC) 10 MG tablet, Take 1 tablet by mouth daily  aspirin 81 MG chewable tablet, Take 81 mg by mouth nightly  sertraline (ZOLOFT) 100 MG tablet, Take 150 mg by mouth daily Takes 1.5 tabs (=150mg) daily  atorvastatin (LIPITOR) 40 MG tablet, Take 40 mg by mouth nightly   LORazepam (ATIVAN) 0.5 MG tablet, Take 1 mg by mouth nightly. And 1/2 tab once daily as needed  nitroGLYCERIN (NITROSTAT) 0.4 MG SL tablet, up to max of 3 total doses. If no relief after 1 dose, call 911.

## 2020-12-29 NOTE — CONSULTS
Pharmacy Note  Vancomycin Consult - Initial Note     Cher Tuttle is a 64 y.o. female ordered Vancomycin. .  Current diagnosis for which MRSA is suspected/confirmed: cellulitis of left upper extremity  Vancomycin order/consult received from Dr. Collins Gerardo .     Additional antimicrobials:  none    Patient Active Problem List   Diagnosis    Uncontrolled diabetes mellitus (HCC)    Hypertension    Postmenopausal bleeding    Thickened endometrium    Type 2 diabetes mellitus with hyperglycemia, with long-term current use of insulin (HCC)    Hypophosphatemia    Hypomagnesemia    Hyperglycemia due to type 2 diabetes mellitus (HCC)    Pancreatic Head Mass    Non-Obstructive CAD    Primary adenocarcinoma of head of pancreas (HCC)    Intractable nausea and vomiting    Class 3 severe obesity with serious comorbidity in adult [facility])    Amnesia    Anxiety    Benign neoplasm of choroid    Depression    Diabetic complication (HCC)    Diabetic peripheral neuropathy associated with type 2 diabetes mellitus (HCC)    Hepatomegaly    Methicillin resistant Staphylococcus aureus infection    Long term current use of insulin (HCC)    Hyperlipidemia    Moderate nonproliferative diabetic retinopathy associated with type 2 diabetes mellitus (HCC)    Morbid obesity (HCC)    Pain in limb    Pancreatic adenocarcinoma (HCC)    Pancreatic malignancy syndrome (HCC)    Peripheral venous insufficiency    Postprocedural state    Primary localized osteoarthrosis of ankle and foot    Urinary tract infectious disease    Transaminitis    Intractable vomiting    Septicemia due to E. coli (HCC)    Acute obstructive cholangitis    TORREY (acute kidney injury) ([facility] [address]    Dehydration with hyponatremia    NSVT (nonsustained ventricular tachycardia) (HCC)    Hypoglycemia    Cellulitis       Height:     Wt Readings from Last 1 Encounters:   12/28/20 236 lb (107 kg) Allergies:  Lisinopril, Sulfamethoxazole, Trimethoprim, Cefuroxime axetil, Sulfamethoxazole-trimethoprim, Clindamycin phosphate, and Penicillins       Procalcitonin   Date Value Ref Range Status   07/21/2020 10.17 (H) <0.09 ng/mL Final     Comment:           Suspected Sepsis:  <0.50 ng/mL     Low likelihood of sepsis. 0.50-2.00 ng/mL     Increased likelihood of sepsis. Antibiotics encouraged. >2.00 ng/mL     High risk of sepsis/shock. Antibiotics strongly encouraged. Suspected Lower Resp Tract Infections:  <0.24 ng/mL     Low likelihood of bacterial infection. >0.24 ng/mL     Increased likelihood of bacterial infection. Antibiotics encouraged. With successful antibiotic therapy, PCT levels should decrease rapidly. (Half-life of 24 to   36 hours.)        Procalcitonin values from samples collected within the first 6 hours of systemic infection   may still be low. Retesting may be indicated. Values from day 1 and day 4 can be entered into the Change in Procalcitonin Calculator   (www.[facility] City[facility]OFFERpct-calculator. [employer]) to determine the patient's Mortality Risk Prognosis        In healthy neonates, plasma Procalcitonin (PCT) concentrations increase gradually after   birth, reaching peak values at about 24 hours of age then decrease to normal values below   0.5 ng/mL by 48-72 hours of age. Recent Labs     12/28/20  1400   BUN 14     Recent Labs     12/28/20  1400   CREATININE 0.73     Recent Labs     12/28/20  1400   WBC 5.2     No intake or output data in the 24 hours ending 12/29/20 0617    Temp: 101.5 F    Culture Date / Source  /  Results  12/28/20   blood RFA   gram positive cocci in clusters; pending  12/28/20   blood VAC   no growth 7hrs; pending    Actual Weight:    Wt Readings from Last 1 Encounters:   12/28/20 236 lb (107 kg)     CrCl based on IBW\"  75.6 ml/min        PLAN   Initial loading dose of 25mg/kg (max of 2500 mg) = 2500  mg   2. Vancomycin 1500 mg IV every 12 hours. 3.   Ensured BUN/sCr ordered at baseline and at least every 3rd day. 4.   ONLY for suspected pneumonia or COPD: MRSA nasal swab  is not ordered. Non respiratory infection. .    5. Trough ordered for: 12/30/20 @0500 . Trough Goals (Non-dialysis patient) Peaks are not routinely recommended. 10-20 mcg/mL for mild skin/soft tissue infections or UTI.  15-20 mcg/mL is the target trough for all other indications that MRSA infection is suspected. TROUGH TIMING: (Additional levels drawn based on renal function and/or clinical response). Dosing interval Timing of Trough   Every 8 hr, 12 hr, or 18 hr regimen Prior to the 4th dose  Twice weekly troughs for every 8 hour dosing   Every 24 hr regimen Prior to the 3rd or 4th dose   Every 36 hr regimen Prior to the 3rd dose           Thank you for the consult. Pharmacy will continue to follow.   NOBLE JIM RPh/PharmROXANNA  12/29/2020  6:17 AM

## 2020-12-29 NOTE — H&P
History and Physical      CHIEF COMPLAINT:  Lt arm swelling fever    History of Present Illness: lt arm redness sxwelling x few days was in hospital few days before for nausea vomiting due to chemo low-grade fever chills denies any chest pain palpitation dizziness no fever no chills no cough onset [place] where she had IVs in the left arm  Past Medical History:   Diagnosis Date    Anxiety     Diabetes mellitus ([address]     Hyperlipidemia     Hypertension     Pancreatic cancer [facility])          Past Surgical History:   Procedure Laterality Date     SECTION      x2    ERCP  2020    SPHINCTER/PAPILLOTOMY, STENT INSERTION    ERCP  2020    ERCP SPHINCTER/PAPILLOTOMY performed by Kelly Parikh MD at [facility]    ERCP  2020    ERCP STENT INSERTION performed by Kelly Parikh MD at [address] [place] [address] ERCP  2020     ERCP ENDOSCOPIC RETROGRADE CHOLANGIOPANCREATOGRAPHY -   ERCP STENT REMOVAL     ERCP  2020    ERCP STENT REMOVAL performed by Mague Galdamez MD at [address] [place] [address] ERCP  2020    ERCP STENT INSERTION performed by Mague Galdamez MD at [address] [place] [address] ERCP  2020    ERCP DILATION BALLOON performed by Mague Galdamez MD at [address] [place] [address]  2020    W/EUS FNA     UPPER GASTROINTESTINAL ENDOSCOPY  2020    EGD W/EUS FNA in OR with GI staff performed by Kelly Parikh MD at [facility]       Medications Prior to Admission:    Not in a hospital admission. Allergies:    Lisinopril, Sulfamethoxazole, Trimethoprim, Cefuroxime axetil, Sulfamethoxazole-trimethoprim, Clindamycin phosphate, and Penicillins    Social History:    reports that she quit smoking about 15 years ago. She has never used smokeless tobacco. She reports previous alcohol use. She reports that she does not use drugs.     Family History: family history includes Cancer in her maternal grandfather, maternal grandmother, and mother; Heart Failure in her father; Hypertension in her father and mother.     REVIEW OF SYSTEMS:  Constitutional: negative, Fever chills  Eyes: negative  Ears, nose, mouth, throat, and face: negative  Respiratory: negative, No cough no shortness of breath no wheezing no tachypnea  Cardiovascular: negative, No chest pain no palpitation no dizziness  Gastrointestinal: negative, Had nausea vomiting few days back no hematemesis no abdominal pain no change in bowel habits much better today asymptomatic  Genitourinary:negative  Integument/breast: negative  Hematologic/lymphatic: negative  Musculoskeletal:negative  Neurological: negative, No headache no seizures no TIAs  Behavioral/Psych: negative  Endocrine: negative, No polyuria no polydipsia no hypoglycemia  Allergic/Immunologic: negative  PHYSICAL EXAM:  General Appearance: alert and oriented to person, place and time and in no acute distress  Skin: warm and dry, no rash or erythema  Head: normocephalic and atraumatic  Eyes: pupils equal, round, and reactive to light, conjunctivae normal and sclera anicteric  Neck: neck supple and non tender without mass   Pulmonary/Chest: clear to auscultation bilaterally- no wheezes, rales or rhonchi, normal air movement, no respiratory distress  Cardiovascular: normal rate, regular rhythm, normal S1 and S2, no gallops, intact distal pulses and no carotid bruits  Abdomen: soft, non-tender, non-distended, normal bowel sounds, no masses or organomegaly  Extremities: no edema and good pulses no Homans' sign  Neurologic: Alert oriented x3 with no focal deficits    Vitals:  BP (!) 122/41   Pulse 70   Temp 98.7 °F (37.1 °C) (Oral)   Resp 16   Ht 5' 5\" (1.651 m)   Wt 236 lb (107 kg)   SpO2 100%   BMI 39.27 kg/m²       LABS:  CBC:   Lab Results   Component Value Date    WBC 3.5 12/29/2020    RBC 3.26 12/29/2020    RBC 4.33 03/05/2012 HGB 8.7 12/29/2020    HCT 29.3 12/29/2020    MCV 89.9 12/29/2020    MCH 26.7 12/29/2020    MCHC 29.7 12/29/2020    RDW 18.3 12/29/2020    PLT 93 12/29/2020     03/05/2012    MPV 12.2 12/29/2020     BMP:    Lab Results   Component Value Date     12/29/2020    K 3.5 12/29/2020    CL 94 12/29/2020    CO2 24 12/29/2020    BUN 17 12/29/2020    LABALBU 2.0 12/29/2020    CREATININE 0.95 12/29/2020    CALCIUM 9.0 12/29/2020    GFRAA >60 12/29/2020    LABGLOM >60 12/29/2020    GLUCOSE 336 12/29/2020    GLUCOSE 278 03/05/2012         ASSESSMENT:    Lt arm cellulitis with bacteremia     dm2 with hyperglycemia    hyponatremia   rfenal insuff    pancytopenia due to chemo    pancreatic ca    hypok    hypomag    Patient Active Problem List   Diagnosis    Uncontrolled diabetes mellitus ([address]    Hypertension    Postmenopausal bleeding    Thickened endometrium    Type 2 diabetes mellitus with hyperglycemia, with long-term current use of insulin (HCC)    Hypophosphatemia    Hypomagnesemia    Hyperglycemia due to type 2 diabetes mellitus ([address]    Pancreatic Head Mass    Non-Obstructive CAD    Primary adenocarcinoma of head of pancreas (HCC)    Intractable nausea and vomiting    Class 3 severe obesity with serious comorbidity in adult ([address]    Amnesia    Anxiety    Benign neoplasm of choroid    Depression    Diabetic complication (HCC)    Diabetic peripheral neuropathy associated with type 2 diabetes mellitus (HCC)    Hepatomegaly    Methicillin resistant Staphylococcus aureus infection    Long term current use of insulin (HCC)    Hyperlipidemia    Moderate nonproliferative diabetic retinopathy associated with type 2 diabetes mellitus (HCC)    Morbid obesity (HCC)    Pain in limb    Pancreatic adenocarcinoma (HCC)    Pancreatic malignancy syndrome ([address]    Peripheral venous insufficiency    Postprocedural state    Primary localized osteoarthrosis of ankle and foot  Urinary tract infectious disease    Transaminitis    Intractable vomiting    Septicemia due to E. coli (HCC)    Acute obstructive cholangitis    TORREY (acute kidney injury) ([facility] [address]    Dehydration with hyponatremia    NSVT (nonsustained ventricular tachycardia) ([facility])    Hypoglycemia    Cellulitis       PLAN:    Cultures iv vanco dvt prophylaxis  Insulin coverage see orders diabetic diet check before meals and at bedtime Accu-Cheks IV fluids K supplement magnesium supplement oncology and infectious disease consultation            Susan Zhu MD  12:34 PM  12/29/2020

## 2020-12-29 NOTE — ED NOTES
Critical blood culture result reported. Consult to Dr Yumiko Hayes completed in perfect serve.       Basim Solomon RN  12/29/20 9011

## 2020-12-29 NOTE — CONSULTS
VASCULAR SURGERY   CONSULT        Name: Shirin Porter  MRN: [id number]     Nichelle Camposney: [de-identified]  Room:     Admit Date: 2020  PCP: Angela Lopez    Physician Requesting Consult:  Dr. Fady Gregory    Reason for Consult: Left arm cellulitis/superficial thrombophlebitis    Chief Complaint:     Chief Complaint   Patient presents with    Arm Pain     swelling and redness where prior IV was. History Obtained From:     patient, electronic medical record and nursing    History of Present Illness:      Shirin Porter is a  64 y.o.  female who presents with Arm Pain (swelling and redness where prior IV was. )  Patient presents with pain, redness and swelling in the left arm. She was recently discharged from [facility] on  [address] and had an IV in the left arm at that site. Per the patient they had had difficulty with the IV. She developed cellulitis and venous duplex demonstrates superficial bone phlebitis involving the cephalic and basilic veins. There was no evidence of deep venous thrombosis. Patient does have pancreatic cancer and had been undergoing chemotherapy. She denies any previous DVT.     Past Medical History:     Past Medical History:   Diagnosis Date    Anxiety     Diabetes mellitus ([facility] [address]     Hyperlipidemia     Hypertension     Pancreatic cancer [facility])         Past Surgical History:     Past Surgical History:   Procedure Laterality Date     SECTION      x2    ERCP  2020    SPHINCTER/PAPILLOTOMY, STENT INSERTION    ERCP  2020    ERCP SPHINCTER/PAPILLOTOMY performed by Roxana Kay MD at [facility]    ERCP  2020    ERCP STENT INSERTION performed by Roxana Kay MD at [facility]    ERCP  2020     ERCP ENDOSCOPIC RETROGRADE CHOLANGIOPANCREATOGRAPHY -   ERCP STENT REMOVAL     ERCP  2020    ERCP STENT REMOVAL performed by Keenan Guerrero MD at [address] [place] [address] ERCP  2020 ERCP STENT INSERTION performed by Aida Castillo MD at [facility] ERCP  7/22/2020    ERCP DILATION BALLOON performed by Aida Castillo MD at [facility] ENDOSCOPY  06/04/2020    W/EUS FNA     UPPER GASTROINTESTINAL ENDOSCOPY  6/4/2020    EGD W/EUS FNA in OR with GI staff performed by Mariola Ortiz MD at [facility]        Medications Prior to Admission:       Prior to Admission medications    Medication Sig Start Date End Date Taking? Authorizing Provider   furosemide (LASIX) 20 MG tablet Take 20 mg by mouth daily as needed (swelling)   Yes Historical Provider, MD   LORazepam (ATIVAN) 1 MG tablet Take 0.5 mg by mouth daily as needed for Anxiety. Yes Historical Provider, MD   insulin lispro, 1 Unit Dial, (HUMALOG KWIKPEN) 100 UNIT/ML SOPN Inject 0-12 Units into the skin 3 times daily (before meals) **Medium Dose Corrective Algorithm** Glucose: Dose: If <139 - No Insulin. 140-199 -  2 Units. 200-249 4 Units. 250-299 6 Units. 300-349 8 Units. 350-400 10 Units. Above 400- 12 Units 12/23/20  Yes Kamar Jones MD   insulin glargine (LANTUS SOLOSTAR) 100 UNIT/ML injection pen Inject 22 Units into the skin daily 12/23/20  Yes Kamar Jones MD   oxyCODONE (OXY-IR) 10 MG immediate release tablet Take 1 tablet by mouth every 6 hours as needed for Pain for up to 30 days. 12/9/20 1/8/21 Yes Eula Ward MD   prochlorperazine (COMPAZINE) 10 MG tablet Take 1 tablet by mouth every 6 hours as needed (NV) 12/8/20  Yes Coco Trevino MD   capecitabine (XELODA) 150 MG chemo tablet TAKE 2 TABLETS BY MOUTH TWICE DAILY ON MONDAY TO FRIDAY WHILE ON RADIATION. 11/24/20  Yes Coco Trevino MD   capecitabine (XELODA) 500 MG chemo tablet TAKE 3 TABLETS BY MOUTH TWICE DAILY ON MONDAY TO FRIDAY WHILE ON RADIATION.  11/24/20  Yes Coco Trevino MD ondansetron (ZOFRAN-ODT) 8 MG TBDP disintegrating tablet Place 1 tablet under the tongue every 8 hours as needed for Nausea or Vomiting 7/6/20  Yes Valorie Troncoso MD   hydrALAZINE (APRESOLINE) 25 MG tablet Take 1 tablet by mouth every 8 hours 6/6/20  Yes Castillo Ridley MD   carvedilol (COREG) 12.5 MG tablet Take 1 tablet by mouth 2 times daily (with meals) 6/6/20  Yes Castillo Ridley MD   amLODIPine (NORVASC) 10 MG tablet Take 1 tablet by mouth daily 6/7/20  Yes Castillo Ridley MD   aspirin 81 MG chewable tablet Take 81 mg by mouth nightly   Yes Historical Provider, MD   sertraline (ZOLOFT) 100 MG tablet Take 150 mg by mouth daily Takes 1.5 tabs (=150mg) daily   Yes Historical Provider, MD   atorvastatin (LIPITOR) 40 MG tablet Take 40 mg by mouth nightly  10/12/13  Yes Historical Provider, MD   LORazepam (ATIVAN) 0.5 MG tablet Take 1 mg by mouth nightly. And 1/2 tab once daily as needed 10/12/13  Yes Historical Provider, MD   Insulin Pen Needle ([employer] PEN NEEDLES 29G) 29G X 12MM MISC 1 each by Does not apply route daily 12/23/20   Tanya Duarte MD   glucose monitoring kit (FREESTYLE) monitoring kit 1 kit by Does not apply route daily 12/23/20   Tanya Duarte MD   blood glucose monitor strips Test 3  times daily  Insulin Dependent mellitus 12/23/20   Tanya Duarte MD   Lancets MISC 1 each by Does not apply route 3 times daily 12/23/20   Tanya Duarte MD   nitroGLYCERIN (NITROSTAT) 0.4 MG SL tablet up to max of 3 total doses. If no relief after 1 dose, call 911. 6/6/20   Castillo Ridley MD        Allergies:       Lisinopril, Sulfamethoxazole, Trimethoprim, Cefuroxime axetil, Sulfamethoxazole-trimethoprim, Clindamycin phosphate, and Penicillins    Social History:     Tobacco:    reports that she quit smoking about 15 years ago. She has never used smokeless tobacco.  Alcohol:      reports previous alcohol use. Drug Use:  reports no history of drug use.     Family History:     Family History Problem Relation Age of Onset    Cancer Mother     Hypertension Mother     Heart Failure Father     Hypertension Father     Cancer Maternal Grandmother         colon     Cancer Maternal Grandfather         lung        Review of Systems:     Positive and Negative as described in HPI    Constitutional:  negative for  fevers, chills, sweats, fatigue, and weight loss  HEENT:  negative for vision or hearing changes,   Respiratory:  negative for shortness of breath, cough, or congestion  Cardiovascular:  negative for  chest pain, palpitations  Gastrointestinal:  negative for nausea, vomiting, diarrhea, constipation, abdominal pain  Genitourinary:  negative for frequency, dysuria  Integument/Breast:  negative for rash, skin lesions  Musculoskeletal:  negative for muscle aches or joint pain  Neurological:  negative for headaches, dizziness, lightheadedness, numbness, pain and tingling extremities  Behavior/Psych:  negative for depression and anxiety    Code Status:  Full Code    Physical Exam:     Vitals:  BP (!) 134/54   Pulse 76   Temp 97.7 °F (36.5 °C) (Oral)   Resp 16   Ht 5' 5\" (1.651 m)   Wt 236 lb (107 kg)   SpO2 98%   BMI 39.27 kg/m²   Temp (24hrs), Av.8 °F (37.1 °C), Min:97.5 °F (36.4 °C), Max:101.4 °F (38.6 °C)      General appearance - alert, well appearing and in no acute distress  Mental status - oriented to person, place and time with normal affect  Head - normocephalic and atraumatic  Eyes - pupils equal and reactive, extraocular eye movements intact, conjunctiva clear  Ears - hearing appears to be intact  Nose - no drainage noted  Mouth - mucous membranes moist  Neck - supple, no carotid bruits, thyroid not palpable, no JVD  Chest - clear to auscultation, normal effort  Heart - normal rate, regular rhythm, no murmurs  Abdomen - soft, obese, non-tender, non-distended, bowel sounds present all four quadrants, no masses, hepatomegaly, splenomegaly or aortic enlargement Neurological - normal speech, no focal findings or movement disorder noted, cranial nerves II through XII grossly intact  Extremities - peripheral pulses palpable, left arm with erythema at the previous IV site and forearm, moderate swelling  Skin - no gross lesions, rashes, or induration noted        R brachial 2+ L brachial 2+   R radial 2+ L radial 2+   R femoral 2+ L femoral 2+   R popliteal 2+ L popliteal 2+   R posterior tibial 2+ L posterior tibial 2+   R dorsalis pedis 2+ L dorsalis pedis 2+         Data:     Hematology:  Recent Labs     12/28/20  1400 12/29/20  0612   WBC 5.2 3.5   RBC 3.35* 3.26*   HGB 9.1* 8.7*   HCT 29.9* 29.3*   MCV 89.3 89.9   MCH 27.2 26.7   MCHC 30.4 29.7   RDW 18.1* 18.3*   * 93*   MPV 11.6 12.2   SEGS 88* 85*   LYMPHOPCT 3* 5*   MONOPCT 9 9   EOSRELPCT 0* 0*   BASOPCT 0 0     Chemistry:  Recent Labs     12/28/20  1400 12/29/20  0612   * 128*   K 3.5* 3.5*   CL 94* 94*   CO2 22 24   GLUCOSE 341* 336*   BUN 14 17   CREATININE 0.73 0.95*   MG  --  1.0*   ANIONGAP 14 10   LABGLOM >60 >60   GFRAA >60 >60   CALCIUM 8.9 9.0   LACTA 1.7  --      Recent Labs     12/28/20  1400 12/29/20  0612   PROT  --  4.9*   LABALBU  --  2.0*     --    AST  --  22   ALT  --  17   ALKPHOS  --  93   BILITOT  --  0.48     Glucose:  Recent Labs     12/28/20  1400 12/28/20  1841 12/28/20  2118 12/29/20  0818 12/29/20  0943   LABA1C 8.5*  --   --   --   --    POCGLU  --  466* 369* 305* 279*         Assessment:     Primary Problem  <principal problem not specified>  3 61-year-old female with metastatic pancreatic carcinoma, left arm cellulitis and superficial thrombophlebitis  2. No evidence of left upper extremity DVT    Active Hospital Problems    Diagnosis Date Noted    Cellulitis [L03.90] 12/28/2020       Plan:     1. Continue vancomycin  2. Warm compresses to the left arm as needed  3. Left arm elevation  4.  No need for anticoagulation from a vascular standpoint 5. Continue aspirin daily      Electronically signed by Eula Thompson MD on 12/29/2020 at 1:43 PM     Copy sent to Dr. Laurel Bence

## 2020-12-29 NOTE — ED NOTES
Into assess patient and administer meds. Dr Mora Munoz rounds into examine patient.       Abbie Fernandez RN  12/29/20 2239

## 2020-12-29 NOTE — CONSULTS
[facility]  Initial Consult Note  Date: 12/29/2020    Hospital day :1     Impression:   1. Left upper extremity/forearm septic thrombophlebitis  2. Gram-positive bacteremia likely MRSA  3. Advanced pancreatic cancer on chemo radiation therapy  4. Diabetes mellitus type 2    Recommendations   · Continue intravenous antimicrobial therapy with vancomycin. · Await identification of the gram-positive cocci. · The patient will likely need 2 to 4 weeks of intravenous antimicrobial therapy. · I have asked the nurse to access the right upper chest port  · Continue warm compresses to the left upper extremity    Chief complaint/reason for consultation:   Gram-positive bacteremia    History of Present Illness:   Jullie Schirmer is a 64y.o.-year-old female who was initially admitted on 12/28/2020. Anastasia Alcantara has a history of diabetes mellitus, hyperlipidemia, hypertension, anxiety disorder, and locally advanced pancreatic cancer and was receiving chemotherapy with Xeloda and radiation therapy. She was recently hospitalized at [place] [address] after a syncopal episode and hypoglycemia. The patient was discharged 12/23/2020. The patient presented to the emergency department with swelling and redness at a prior IV site in her left forearm. The patient started having fevers/chills on the day of admission which prompted the emergency room evaluation. The temperature was 101.5 degrees in the emergency department. She was started on IV antimicrobial therapy with vancomycin and blood cultures were sent. Blood cultures done in the emergency department have come back 2 out of 2 with gram-positive cocci and I was asked to evaluate and help with antibiotic choice. I have personally reviewed the past medical history, past surgical history, medications, social history, and family history, and I have updated the database accordingly.   Past Medical History:     Past Medical History: Diagnosis Date    Anxiety     Diabetes mellitus ([facility] [address]     Hyperlipidemia     Hypertension     Pancreatic cancer [facility])      Past Surgical  History:     Past Surgical History:   Procedure Laterality Date     SECTION      x2    ERCP  2020    SPHINCTER/PAPILLOTOMY, STENT INSERTION    ERCP  2020    ERCP SPHINCTER/PAPILLOTOMY performed by Paige Carmichael MD at [facility]    ERCP  2020    ERCP STENT INSERTION performed by Paige Carmichael MD at [facility]    ERCP  2020     ERCP ENDOSCOPIC RETROGRADE CHOLANGIOPANCREATOGRAPHY -   ERCP STENT REMOVAL     ERCP  2020    ERCP STENT REMOVAL performed by Mac Dietz MD at [address] ERCP  2020    ERCP STENT INSERTION performed by Mac Ditez MD at [facility] ERCP  2020    ERCP DILATION BALLOON performed by Mac Dietz MD at [facility]  2020    W/EUS FNA     UPPER GASTROINTESTINAL ENDOSCOPY  2020    EGD W/EUS FNA in OR with GI staff performed by Paige Carmichael MD at [facility]     Medications:      vancomycin  1,500 mg Intravenous Q12H    vancomycin (VANCOCIN) intermittent dosing (placeholder)   Other RX Placeholder    potassium chloride  20 mEq Oral BID WC    [START ON 2020] sertraline  150 mg Oral Daily    amLODIPine  10 mg Oral Daily    aspirin  81 mg Oral Nightly    atorvastatin  40 mg Oral Nightly    carvedilol  12.5 mg Oral BID WC    hydrALAZINE  25 mg Oral 3 times per day    insulin glargine  22 Units Subcutaneous Daily    lipase-protease-amylase  35,000 Units Oral TID WC    sodium chloride flush  10 mL Intravenous 2 times per day    insulin lispro  0-6 Units Subcutaneous TID WC    insulin lispro  0-3 Units Subcutaneous Nightly    enoxaparin  30 mg Subcutaneous BID     Social History:     Social History     Socioeconomic History    Marital status:      Spouse name: Not on file Abdomen: No nausea, vomiting, diarrhea, or abdominal pain. Genitourinary: No increased urinary frequency, or dysuria. Musculoskeletal: Left upper extremity swelling, redness, pain  Hematologic: No bleeding or bruising. Neurologic: No headache, weakness, numbness, or tingling. Physical Examination :   BP (!) 104/48   Pulse 66   Temp 97.5 °F (36.4 °C) (Oral)   Resp 16   Ht 5' 5\" (1.651 m)   Wt 236 lb (107 kg)   SpO2 100%   BMI 39.27 kg/m²     Temperature Range: Temp: 97.5 °F (36.4 °C) Temp  Av.8 °F (37.7 °C)  Min: 97.5 °F (36.4 °C)  Max: 101.5 °F (38.6 °C)  General Appearance: Awake, alert, and in no apparent distress  Head: Normocephalic, without obvious abnormality, atraumatic  Eyes: Pupils equal, round, reactive, to light and accommodation; extraocular movements intact; sclera anicteric; conjunctivae pink  ENT: Oropharynx clear, without erythema, exudate, or thrush. Neck: Supple, without lymphadenopathy. Pulmonary/Chest: Clear to auscultation, without wheezes, rales, or rhonchi  Cardiovascular: Regular rate and rhythm without murmurs, rubs, or gallops. Abdomen: soft, non-tender, non-distended, normal bowel sounds, no masses or organomegaly and obese abdomen  Extremities: The left upper extremity/forearm does have soft tissue induration, erythema around old IV site        Neurologic: No gross sensory or motor deficits. Skin: There is redness induration noted in the forearm.     Medical Decision Making:   I have independently reviewed/ordered the following labs:  CBC with Differential:   Recent Labs     20  1400 20  0612   WBC 5.2 3.5   HGB 9.1* 8.7*   HCT 29.9* 29.3*   * 93*   LYMPHOPCT 3* 5*   MONOPCT 9 9     BMP:   Recent Labs     20  1400 20  0612   * 128*   K 3.5* 3.5*   CL 94* 94*   CO2 22 24   BUN 14 17   CREATININE 0.73 0.95*   MG  --  1.0*     Hepatic Function Panel:   Recent Labs     20  0612   PROT 4.9*   LABALBU 2.0*   BILITOT 0.48 ALKPHOS 93   ALT 17   AST 22       Lab Results   Component Value Date    PROCAL 10.17 07/21/2020       No results found for: CRP  Lab Results   Component Value Date    FERRITIN 148 06/03/2020     No results found for: FIBRINOGEN  Lab Results   Component Value Date    DDIMER 1.92 06/08/2018     No results found for: LDH    No results found for: [address]    Lab Results   Component Value Date    COVID19 Not Detected 12/28/2020    COVID19 Not Detected 12/28/2020    COVID19 Not Detected 12/16/2020    COVID19 Not Detected 07/21/2020    COVID19 Not Detected 06/03/2020     No results found for requested labs within last 30 days.        Imaging Studies:   Vl Dup Upper Extremity Venous Left    Result Date: 12/28/2020 30 MINOO Mendoza  Vascular Upper Extremities Veins Procedure   Patient Name   NICOLAS     Date of Study           12/28/2020                 Wilian Davis   Date of Birth  1960  Gender                  Female   Age            64 year(s)  Race                       Room Number    14   Corporate ID # [id number]   Patient Acct # [de-identified]   MR #           [id number]     Sonographer             Issa Stanley   Accession #    [id number]  Interpreting Physician  [facility]   Referring                  Referring Physician     [address]  Nurse  Practitioner  Procedure Type of Study:   Veins: Upper Extremities Veins, Venous Scan Upper Left. Indications for Study:R/O DVT. Patient Status:ER. Technical Quality:Adequate visualization. Conclusions   Summary   Sub-acute superficial vein thrombosis of the left upper extremity  involving the basilic and cephalic veins. Signature   ----------------------------------------------------------------  Electronically signed by Jyoti Alexander on  12/28/2020 02:15 PM  ----------------------------------------------------------------   ----------------------------------------------------------------  Electronically signed by Equilla Abe Reyes,Arthur(Interpreting  physician) on 12/28/2020 09:07 PM  ----------------------------------------------------------------    Left Impression: The mid basilic vein is non-compressible with mixed echoes and dilation   noted. The mid and distal cephalic vein is non-compressible with mixed echoes   and dilation noted. Left internal jugular, subclavian, axillary, brachial, ulnar and radial   veins are compressible with normal doppler responses. Technically limited visualization of the left radial and ulnar veins due   to patients arm being tender.   Velocities are measured in cm/s ; Diameters are measured in cm Left UE Vein Measurements 2D Measurements !Yes       !No             !SubAcute  ! +------------------------------------+----------+---------------+----------+ ! Basilic at [address]                       ! Yes       ! Yes            ! None      ! +------------------------------------+----------+---------------+----------+ ! Cephalic at UA                      ! Yes       ! Yes            ! None      ! +------------------------------------+----------+---------------+----------+ ! Cephalic at AF                      ! Yes       ! No             !SubAcute  ! +------------------------------------+----------+---------------+----------+ ! Cephalic at [address]                      ! Yes       ! No             !SubAcute  ! +------------------------------------+----------+---------------+----------+ Doppler Measurements +-------------------------+-----------------------+------------------------+ ! Location                 ! Signal                 !Reflux                  ! +-------------------------+-----------------------+------------------------+ ! IJV                      ! Phasic                 !                        ! +-------------------------+-----------------------+------------------------+ ! SCV                      ! Phasic                 !                        ! +-------------------------+-----------------------+------------------------+ ! Axillary                 ! Phasic                 !                        ! +-------------------------+-----------------------+------------------------+ ! Brachial                 !Phasic                 !                        ! +-------------------------+-----------------------+------------------------+      Cultures:     Culture, Blood 1 [[id number]] (Abnormal) Collected: 12/28/20 1400   Order Status: Completed Specimen: Blood Updated: 12/29/20 0724    Specimen Description . BLOOD    Special Requests 6ML VAC    Culture POSITIVE Blood Culture Results called to and read back by: Otis Whalen AT 0845 12/29/20Abnormal DIRECT GRAM STAIN FROM BOTTLE: GRAM POSITIVE COCCI IN CLUSTERS   Culture, Blood 1 [[id number]] (Abnormal) Collected: 12/28/20 1430   Order Status: Completed Specimen: Blood Updated: 12/29/20 0834    Specimen Description . BLOOD    Special Requests RFA 10ML    Culture POSITIVE Blood Culture Results called to and read back by: NUPUR Morley 0400 12/29/20Abnormal      DIRECT GRAM STAIN FROM BOTTLE: GRAM POSITIVE COCCI IN CLUSTERS           Thank you for allowing us to participate in the care of this patient. Please call with questions. Electronically signed by Feroz Rutherford MD on 12/29/2020 at 12:10 PM      [facility]  [address] [employer]  OFFICE: [phone]      This note is created with the assistance of a speech recognition program.  While intending to generate a document that actually reflects the content of the visit, the document can still have some errors including those of syntax and sound a like substitutions which may escape proof reading. In such instances, actual meaning can be extrapolated by contextual diversion.

## 2020-12-30 ENCOUNTER — HOSPITAL ENCOUNTER (OUTPATIENT)
Dept: RADIATION ONCOLOGY | Age: 60
End: 2020-12-30
Attending: RADIOLOGY
Payer: COMMERCIAL

## 2020-12-30 LAB
ABSOLUTE EOS #: 0.12 K/UL (ref 0–0.4)
ABSOLUTE IMMATURE GRANULOCYTE: 0.06 K/UL (ref 0–0.3)
ABSOLUTE LYMPH #: 0.3 K/UL (ref 1–4.8)
ABSOLUTE MONO #: 0.6 K/UL (ref 0.2–0.8)
ANION GAP SERPL CALCULATED.3IONS-SCNC: 9 MMOL/L (ref 9–17)
BASOPHILS # BLD: 0 %
BASOPHILS ABSOLUTE: 0 K/UL (ref 0–0.2)
BUN BLDV-MCNC: 17 MG/DL (ref 6–20)
BUN/CREAT BLD: 15 (ref 9–20)
CALCIUM SERPL-MCNC: 9.1 MG/DL (ref 8.6–10.4)
CHLORIDE BLD-SCNC: 97 MMOL/L (ref 98–107)
CO2: 26 MMOL/L (ref 20–31)
CREAT SERPL-MCNC: 1.15 MG/DL (ref 0.5–0.9)
CULTURE: ABNORMAL
DIFFERENTIAL TYPE: ABNORMAL
EOSINOPHILS RELATIVE PERCENT: 2 % (ref 1–4)
GFR AFRICAN AMERICAN: 59 ML/MIN
GFR NON-AFRICAN AMERICAN: 48 ML/MIN
GFR SERPL CREATININE-BSD FRML MDRD: ABNORMAL ML/MIN/{1.73_M2}
GFR SERPL CREATININE-BSD FRML MDRD: ABNORMAL ML/MIN/{1.73_M2}
GLUCOSE BLD-MCNC: 159 MG/DL (ref 65–105)
GLUCOSE BLD-MCNC: 181 MG/DL (ref 65–105)
GLUCOSE BLD-MCNC: 233 MG/DL (ref 65–105)
GLUCOSE BLD-MCNC: 95 MG/DL (ref 70–99)
HCT VFR BLD CALC: 30.2 % (ref 36.3–47.1)
HEMOGLOBIN: 9.3 G/DL (ref 11.9–15.1)
IMMATURE GRANULOCYTES: 1 %
LYMPHOCYTES # BLD: 5 % (ref 24–44)
Lab: ABNORMAL
Lab: ABNORMAL
MAGNESIUM: 1.1 MG/DL (ref 1.6–2.6)
MCH RBC QN AUTO: 26.7 PG (ref 25.2–33.5)
MCHC RBC AUTO-ENTMCNC: 30.8 G/DL (ref 28.4–34.8)
MCV RBC AUTO: 86.8 FL (ref 82.6–102.9)
MONOCYTES # BLD: 10 % (ref 1–7)
NRBC AUTOMATED: 0 PER 100 WBC
PDW BLD-RTO: 17.8 % (ref 11.8–14.4)
PLATELET # BLD: 133 K/UL (ref 138–453)
PLATELET ESTIMATE: ABNORMAL
PMV BLD AUTO: 11.5 FL (ref 8.1–13.5)
POTASSIUM SERPL-SCNC: 3.4 MMOL/L (ref 3.7–5.3)
RBC # BLD: 3.48 M/UL (ref 3.95–5.11)
RBC # BLD: ABNORMAL 10*6/UL
SEG NEUTROPHILS: 82 % (ref 36–66)
SEGMENTED NEUTROPHILS ABSOLUTE COUNT: 4.92 K/UL (ref 1.8–7.7)
SODIUM BLD-SCNC: 132 MMOL/L (ref 135–144)
SPECIMEN DESCRIPTION: ABNORMAL
SPECIMEN DESCRIPTION: ABNORMAL
VANCOMYCIN TROUGH DATE LAST DOSE: ABNORMAL
VANCOMYCIN TROUGH DOSE AMOUNT: ABNORMAL
VANCOMYCIN TROUGH TIME LAST DOSE: ABNORMAL
VANCOMYCIN TROUGH: 24 UG/ML (ref 10–20)
WBC # BLD: 6 K/UL (ref 3.5–11.3)
WBC # BLD: ABNORMAL 10*3/UL

## 2020-12-30 PROCEDURE — 1200000000 HC SEMI PRIVATE

## 2020-12-30 PROCEDURE — 6370000000 HC RX 637 (ALT 250 FOR IP): Performed by: INTERNAL MEDICINE

## 2020-12-30 PROCEDURE — 2580000003 HC RX 258: Performed by: INTERNAL MEDICINE

## 2020-12-30 PROCEDURE — 82947 ASSAY GLUCOSE BLOOD QUANT: CPT

## 2020-12-30 PROCEDURE — 6360000002 HC RX W HCPCS: Performed by: INTERNAL MEDICINE

## 2020-12-30 PROCEDURE — 99232 SBSQ HOSP IP/OBS MODERATE 35: CPT | Performed by: INTERNAL MEDICINE

## 2020-12-30 PROCEDURE — 80202 ASSAY OF VANCOMYCIN: CPT

## 2020-12-30 PROCEDURE — 83735 ASSAY OF MAGNESIUM: CPT

## 2020-12-30 PROCEDURE — 85025 COMPLETE CBC W/AUTO DIFF WBC: CPT

## 2020-12-30 PROCEDURE — 80048 BASIC METABOLIC PNL TOTAL CA: CPT

## 2020-12-30 PROCEDURE — 36415 COLL VENOUS BLD VENIPUNCTURE: CPT

## 2020-12-30 RX ORDER — MAGNESIUM SULFATE 1 G/100ML
1 INJECTION INTRAVENOUS
Status: DISPENSED | OUTPATIENT
Start: 2020-12-30 | End: 2020-12-30

## 2020-12-30 RX ORDER — POLYVINYL ALCOHOL 14 MG/ML
1 SOLUTION/ DROPS OPHTHALMIC PRN
Status: DISCONTINUED | OUTPATIENT
Start: 2020-12-30 | End: 2021-01-06 | Stop reason: HOSPADM

## 2020-12-30 RX ORDER — MAGNESIUM SULFATE 1 G/100ML
1 INJECTION INTRAVENOUS
Status: COMPLETED | OUTPATIENT
Start: 2020-12-31 | End: 2020-12-31

## 2020-12-30 RX ADMIN — CARVEDILOL 12.5 MG: 12.5 TABLET, FILM COATED ORAL at 09:17

## 2020-12-30 RX ADMIN — SERTRALINE HYDROCHLORIDE 150 MG: 100 TABLET ORAL at 09:18

## 2020-12-30 RX ADMIN — POLYVINYL ALCOHOL 1 DROP: 14 SOLUTION/ DROPS OPHTHALMIC at 16:54

## 2020-12-30 RX ADMIN — VANCOMYCIN HYDROCHLORIDE 1500 MG: 1 INJECTION, POWDER, LYOPHILIZED, FOR SOLUTION INTRAVENOUS at 16:56

## 2020-12-30 RX ADMIN — SODIUM CHLORIDE: 9 INJECTION, SOLUTION INTRAVENOUS at 11:09

## 2020-12-30 RX ADMIN — HYDRALAZINE HYDROCHLORIDE 25 MG: 25 TABLET, FILM COATED ORAL at 23:10

## 2020-12-30 RX ADMIN — ATORVASTATIN CALCIUM 40 MG: 40 TABLET, FILM COATED ORAL at 23:11

## 2020-12-30 RX ADMIN — INSULIN LISPRO 1 UNITS: 100 INJECTION, SOLUTION INTRAVENOUS; SUBCUTANEOUS at 12:47

## 2020-12-30 RX ADMIN — ASPIRIN 81 MG: 81 TABLET, CHEWABLE ORAL at 23:10

## 2020-12-30 RX ADMIN — MAGNESIUM SULFATE HEPTAHYDRATE 1 G: 1 INJECTION, SOLUTION INTRAVENOUS at 23:53

## 2020-12-30 RX ADMIN — INSULIN GLARGINE 22 UNITS: 100 INJECTION, SOLUTION SUBCUTANEOUS at 09:17

## 2020-12-30 RX ADMIN — OXYCODONE HYDROCHLORIDE 10 MG: 5 TABLET ORAL at 15:35

## 2020-12-30 RX ADMIN — INSULIN LISPRO 1 UNITS: 100 INJECTION, SOLUTION INTRAVENOUS; SUBCUTANEOUS at 16:54

## 2020-12-30 RX ADMIN — OXYCODONE HYDROCHLORIDE 10 MG: 5 TABLET ORAL at 09:26

## 2020-12-30 RX ADMIN — SODIUM CHLORIDE, PRESERVATIVE FREE 10 ML: 5 INJECTION INTRAVENOUS at 09:18

## 2020-12-30 RX ADMIN — POTASSIUM CHLORIDE 20 MEQ: 20 TABLET, EXTENDED RELEASE ORAL at 16:54

## 2020-12-30 RX ADMIN — ENOXAPARIN SODIUM 30 MG: 30 INJECTION SUBCUTANEOUS at 09:17

## 2020-12-30 RX ADMIN — POLYVINYL ALCOHOL 1 DROP: 14 SOLUTION/ DROPS OPHTHALMIC at 14:50

## 2020-12-30 RX ADMIN — HYDROMORPHONE HYDROCHLORIDE 1 MG: 1 INJECTION, SOLUTION INTRAMUSCULAR; INTRAVENOUS; SUBCUTANEOUS at 12:49

## 2020-12-30 RX ADMIN — ENOXAPARIN SODIUM 30 MG: 30 INJECTION SUBCUTANEOUS at 23:09

## 2020-12-30 RX ADMIN — Medication 10 ML: at 12:50

## 2020-12-30 RX ADMIN — AMLODIPINE BESYLATE 10 MG: 5 TABLET ORAL at 09:18

## 2020-12-30 RX ADMIN — HYDRALAZINE HYDROCHLORIDE 25 MG: 25 TABLET, FILM COATED ORAL at 14:50

## 2020-12-30 RX ADMIN — HYDROMORPHONE HYDROCHLORIDE 1 MG: 1 INJECTION, SOLUTION INTRAMUSCULAR; INTRAVENOUS; SUBCUTANEOUS at 06:44

## 2020-12-30 RX ADMIN — CARVEDILOL 12.5 MG: 12.5 TABLET, FILM COATED ORAL at 16:55

## 2020-12-30 RX ADMIN — SODIUM CHLORIDE: 9 INJECTION, SOLUTION INTRAVENOUS at 02:03

## 2020-12-30 RX ADMIN — POTASSIUM CHLORIDE 20 MEQ: 20 TABLET, EXTENDED RELEASE ORAL at 09:17

## 2020-12-30 RX ADMIN — INSULIN LISPRO 1 UNITS: 100 INJECTION, SOLUTION INTRAVENOUS; SUBCUTANEOUS at 23:10

## 2020-12-30 ASSESSMENT — PAIN DESCRIPTION - PROGRESSION
CLINICAL_PROGRESSION: GRADUALLY WORSENING

## 2020-12-30 ASSESSMENT — PAIN DESCRIPTION - DESCRIPTORS
DESCRIPTORS: ACHING;TENDER
DESCRIPTORS: ACHING;BURNING;TENDER

## 2020-12-30 ASSESSMENT — PAIN SCALES - GENERAL
PAINLEVEL_OUTOF10: 6
PAINLEVEL_OUTOF10: 6
PAINLEVEL_OUTOF10: 7
PAINLEVEL_OUTOF10: 5
PAINLEVEL_OUTOF10: 6

## 2020-12-30 ASSESSMENT — PAIN DESCRIPTION - LOCATION
LOCATION: ARM
LOCATION: ARM

## 2020-12-30 ASSESSMENT — PAIN DESCRIPTION - PAIN TYPE
TYPE: ACUTE PAIN

## 2020-12-30 ASSESSMENT — PAIN DESCRIPTION - FREQUENCY
FREQUENCY: CONTINUOUS

## 2020-12-30 ASSESSMENT — ENCOUNTER SYMPTOMS
GASTROINTESTINAL NEGATIVE: 1
RESPIRATORY NEGATIVE: 1

## 2020-12-30 ASSESSMENT — PAIN DESCRIPTION - ONSET: ONSET: ON-GOING

## 2020-12-30 ASSESSMENT — PAIN DESCRIPTION - ORIENTATION
ORIENTATION: LEFT
ORIENTATION: LEFT

## 2020-12-30 NOTE — PROGRESS NOTES
Pharmacy Vancomycin Consult     Vancomycin Day: 3  Current Dosing: Vancomycin 1500 mg IV every 12 hours    Temp max:  99 F    Recent Labs     12/29/20  1421 12/30/20  0529   BUN 17 17       Recent Labs     12/29/20  1421 12/30/20  0529   CREATININE 0.93* 1.15*       Recent Labs     12/29/20  0612 12/30/20  0529   WBC 3.5 6.0         Intake/Output Summary (Last 24 hours) at 12/30/2020 0640  Last data filed at 12/30/2020 0453  Gross per 24 hour   Intake 487 ml   Output 300 ml   Net 187 ml       Culture Date      Source                       Results  12/28/20   blood x2   MRSA    Ht Readings from Last 1 Encounters:   12/28/20 5' 5\" (1.651 m)        Wt Readings from Last 1 Encounters:   12/30/20 250 lb (113.4 kg)         Body mass index is 41.6 kg/m². Estimated Creatinine Clearance: 67 mL/min (A) (based on SCr of 1.15 mg/dL (H)). Trough: 24 mcg/ml 12/30/20 5:29    Assessment/Plan:  Vancomycin trough level is supratherapeutic. Will shorten interval between doses to target goal trough 15-20 mcg/ml. Will give Vancomycin 1500 mg IV every 24 hours. Will monitor renal function as scr increased from 0.73 on 12/28/20 to 1.15 today. Trough prior to 3rd dose of new dosing regimen 1//01/20 1700. Thank you for the consult. Will continue to follow. Jing Van.  Filippo Page  12/30/2020  6:48 AM

## 2020-12-30 NOTE — PLAN OF CARE
Problem: Pain:  Description: Pain management should include both nonpharmacologic and pharmacologic interventions. Goal: Pain level will decrease  Description: Pain level will decrease  Outcome: Ongoing  Note: Pain level assessment complete. Patient educated on pain scale and control interventions  PRN pain medication given per patient request  Patient instructed to call out with new onset of pain or unrelieved pain    Goal: Control of acute pain  Description: Control of acute pain  Outcome: Ongoing  Goal: Control of chronic pain  Description: Control of chronic pain  Outcome: Ongoing     Problem: Discharge Planning:  Goal: Discharged to appropriate level of care  Description: Discharged to appropriate level of care  Outcome: Ongoing     Problem: Body Temperature - Imbalanced:  Goal: Ability to maintain a body temperature in the normal range will improve  Description: Ability to maintain a body temperature in the normal range will improve  Outcome: Ongoing     Problem: Mobility - Impaired:  Goal: Mobility will improve to maximum level  Description: Mobility will improve to maximum level  Outcome: Ongoing     Problem: Pain:  Description: Pain management should include both nonpharmacologic and pharmacologic interventions. Goal: Pain level will decrease  Description: Pain level will decrease  Outcome: Ongoing  Note: Pain level assessment complete.    Patient educated on pain scale and control interventions  PRN pain medication given per patient request  Patient instructed to call out with new onset of pain or unrelieved pain    Goal: Control of acute pain  Description: Pain level will decrease  Outcome: Ongoing  Goal: Control of chronic pain  Description: Control of acute pain  Outcome: Ongoing     Problem: Skin Integrity - Impaired:  Goal: Will show no infection signs and symptoms  Description: Will show no infection signs and symptoms  Outcome: Ongoing  Goal: Absence of new skin breakdown Description: Absence of new skin breakdown  Outcome: Ongoing     Problem: Falls - Risk of:  Goal: Will remain free from falls  Description: Will remain free from falls  Outcome: Ongoing  Goal: Absence of physical injury  Description: Absence of physical injury  Outcome: Ongoing

## 2020-12-30 NOTE — CARE COORDINATION
Case Management Initial Discharge Plan  Fe Pilarthalia,         Readmission Risk              Risk of Unplanned Readmission:        29             Met with:patient to discuss discharge plans. Information verified: address, contacts, phone number, , insurance Yes  PCP: [facility]  Date of last visit: few weeks ago-phone call follows with Dr. Sofi Cintron Provider: Marlon Tse    Discharge Planning  Current Residence:  One story home  Living Arrangements:  Spouse/Significant Other, Naina Multani has 0 stories/3 stairs to climb into home  Support Systems:  Spouse/Significant Other, Children, Friends/Neighbors  Current Services PTA:  [address]: Chemoradiation  Patient able to perform ADL's:Independent  DME in home:  Glucometer, insulin supplies  DME used to aid ambulation prior to admission:   na  DME used during admission:  na    Potential Assistance Needed:  N/A    Pharmacy: [facility]   Potential Assistance Purchasing Medications:  No  Does patient want to participate in local refill/ meds to beds program?  Yes    Patient agreeable to home care: No  [place] of choice provided:  n/a      Type of Home Care Services:  None  Patient expects to be discharged to:  home    Prior SNF/Rehab Placement and Facility: no  Agreeable to SNF/Rehab: No  [place] of choice provided: n/a   Evaluation: n/a    Expected Discharge date:  20  Follow Up Appointment: Best Day/ Time: Monday AM    Transportation provider: family  Transportation arrangements needed for discharge: No    Discharge Plan: Pt is from home with family, independent, able to drive. DME-glucometer, insulin supplies. Goes to [facility] for chemoradiation-last rad tx-. Not sure if she wants to continue has appt with Dr. Mary Lou Mckeon 2021 Called HELP for Medicaid song. Declines VNS.  Will need to f/u for IV ABX Last rad tx-12/17/2020-she is unsure if she wants to cont. She will discuss with Dr. Noy Reese she has appt 1/5/2021. Pt was at [facility]. Vs admitted for hypoglycemia 12/21-12/23 then discharged. She is requesting HELP to see for Medicaid application-spoke with Tony Vanessa he is here and will see today.      Palliative care consult/ID/Eli/Vasc  DVT scan-   Sub-acute superficial vein thrombosis of the left upper extremity    involving the basilic and cephalic veins.         BC +X2- MRSA  Vanco IV      Electronically signed by Magen Mariee RN on 12/30/20 at 3:36 PM EST

## 2020-12-30 NOTE — PLAN OF CARE
Problem: Pain:  Description: Pain management should include both nonpharmacologic and pharmacologic interventions. Goal: Pain level will decrease  Description: Pain level will decrease  12/30/2020 1503 by Tamar Devlin RN  Outcome: Ongoing  Note: Pain level assessment complete. Patient educated on pain scale and control interventions  PRN pain medication given per patient request  Patient instructed to call out with new onset of pain or unrelieved pain    12/30/2020 0512 by Sergio Bermudez RN  Outcome: Ongoing  Goal: Control of acute pain  Description: Control of acute pain  12/30/2020 1503 by Tamar Devlin RN  Outcome: Ongoing  12/30/2020 0512 by Sergio Bermudez RN  Outcome: Ongoing  Goal: Control of chronic pain  Description: Control of chronic pain  12/30/2020 1503 by Tamar Devlin RN  Outcome: Ongoing  12/30/2020 0512 by Sergio Bermudez RN  Outcome: Ongoing     Problem: Discharge Planning:  Goal: Discharged to appropriate level of care  Description: Discharged to appropriate level of care  12/30/2020 1503 by Tamar Devlin RN  Outcome: Ongoing  12/30/2020 0512 by Sergio Bermudez RN  Outcome: Ongoing     Problem:  Body Temperature - Imbalanced:  Goal: Ability to maintain a body temperature in the normal range will improve  Description: Ability to maintain a body temperature in the normal range will improve  12/30/2020 1503 by Tamar Devlin RN  Outcome: Ongoing  12/30/2020 0512 by Sergio Bermudez RN  Outcome: Ongoing     Problem: Skin Integrity - Impaired:  Goal: Will show no infection signs and symptoms  Description: Will show no infection signs and symptoms  12/30/2020 1503 by Tamar Devlin RN  Outcome: Ongoing  12/30/2020 0512 by Sergio Bermudez RN  Outcome: Ongoing  Goal: Absence of new skin breakdown  Description: Absence of new skin breakdown  12/30/2020 1503 by Tamar Devlin RN  Outcome: Ongoing  12/30/2020 0512 by Sergio Bermudez RN  Outcome: Ongoing     Problem: Falls - Risk of: Goal: Will remain free from falls  Description: Will remain free from falls  12/30/2020 1503 by Shannan Gee RN  Outcome: Ongoing  12/30/2020 0512 by Denia Moyer RN  Outcome: Ongoing  Goal: Absence of physical injury  Description: Absence of physical injury  12/30/2020 1503 by Shannan Gee RN  Outcome: Ongoing  12/30/2020 0512 by Denia Moyer RN  Outcome: Ongoing

## 2020-12-30 NOTE — PROGRESS NOTES
Subjective:     Follow-up type 2 diabetes  No polyuria no polydipsia no hypoglycemia blood sugars reviewed  ROS  No fever no chills no GI/ complaints at present no cardiopulmonary complaints, no TIA no bleeding no headache no sore throat no skin lesions except redness and a small scab left forearm swelling pain left eye  physical exam  General Appearance: alert and oriented to person, place and time and in no acute distress  Skin: Left forearm swollen tender red  Head: normocephalic and atraumatic  Eyes: Pallor left conjunctiva redNeck: neck supple and non tender without mass   Pulmonary/Chest: clear to auscultation bilaterally- no wheezes, rales or rhonchi, normal air movement, no respiratory distress  Cardiovascular: normal rate, regular rhythm, normal S1 and S2, no gallops, intact distal pulses and no carotid bruits  Abdomen: soft, non-tender, non-distended, normal bowel sounds, no masses or organomegaly  Extremities: Trace edema good pulses negative Homans' sign  Neurologic: Alert oriented x3 with no focal deficit    BP (!) 126/52   Pulse 76   Temp 98.4 °F (36.9 °C) (Oral)   Resp 17   Ht 5' 5\" (1.651 m)   Wt 250 lb (113.4 kg)   SpO2 90%   BMI 41.60 kg/m²     CBC:   Lab Results   Component Value Date    WBC 6.0 12/30/2020    RBC 3.48 12/30/2020    RBC 4.33 03/05/2012    HGB 9.3 12/30/2020    HCT 30.2 12/30/2020    MCV 86.8 12/30/2020    MCH 26.7 12/30/2020    MCHC 30.8 12/30/2020    RDW 17.8 12/30/2020     12/30/2020     03/05/2012    MPV 11.5 12/30/2020     BMP:    Lab Results   Component Value Date     12/30/2020    K 3.4 12/30/2020    CL 97 12/30/2020    CO2 26 12/30/2020    BUN 17 12/30/2020    LABALBU 2.0 12/29/2020    CREATININE 1.15 12/30/2020    CALCIUM 9.1 12/30/2020    GFRAA 59 12/30/2020    LABGLOM 48 12/30/2020    GLUCOSE 95 12/30/2020    GLUCOSE 278 03/05/2012        Assessment:  Patient Active Problem List   Diagnosis    Uncontrolled diabetes mellitus ([address]  Hypertension    Postmenopausal bleeding    Thickened endometrium    Type 2 diabetes mellitus with hyperglycemia, with long-term current use of insulin (HCC)    Hypophosphatemia    Hypomagnesemia    Hyperglycemia due to type 2 diabetes mellitus ([address]    Pancreatic Head Mass    Non-Obstructive CAD    Primary adenocarcinoma of head of pancreas (HCC)    Intractable nausea and vomiting    Class 3 severe obesity with serious comorbidity in adult (HCC)    Amnesia    Anxiety    Benign neoplasm of choroid    Depression    Diabetic complication (HCC)    Diabetic peripheral neuropathy associated with type 2 diabetes mellitus (HCC)    Hepatomegaly    Methicillin resistant Staphylococcus aureus infection    Long term current use of insulin (HCC)    Hyperlipidemia    Moderate nonproliferative diabetic retinopathy associated with type 2 diabetes mellitus ([address]    Morbid obesity (HCC)    Pain in limb    Pancreatic adenocarcinoma (HCC)    Pancreatic malignancy syndrome (HCC)    Peripheral venous insufficiency    Postprocedural state    Primary localized osteoarthrosis of ankle and foot    Urinary tract infectious disease    Transaminitis    Intractable vomiting    Septicemia due to E. coli (HCC)    Acute obstructive cholangitis    TORREY (acute kidney injury) ([address]    Dehydration with hyponatremia    NSVT (nonsustained ventricular tachycardia) (HCC)    Hypoglycemia    Cellulitis     Acute cellulitis left forearm with bacteremia  Left forearm thrombophlebitis infected  Hypokalemia hyponatremia hypomagnesemia  Type 2 diabetes with hyperglycemia and renal complication insulin  Treated  CKD 3  Pancytopenia secondary to chemotherapy getting better  Morbid obesity  Metastatic pancreatic CA  Hypertension essential  Situational depression on Zoloft    Plan: Meds labs reviewed, continue with IV vancomycin continue with insulin coverage potassium supplements IV fluids avoid nephrotoxic drugs continue with DVT prophylaxis see orders  Discussed with the patient she wants DNR oLbo Chandler MD  1:59 PM

## 2020-12-30 NOTE — PROGRESS NOTES
[facility]  Progress Note    Kevin Lancaster  MRN: [id number]  Date: 12/30/2020  LOS: 2     Reason for F/U :   MRSA sepsis    Impression :   1. Left upper extremity/forearm septic thrombophlebitis  2. MRSA sepsis secondary to above  3. Advanced pancreatic cancer on chemo radiation therapy  4. Diabetes mellitus type 2    Recommendations:   · Continue intravenous antimicrobial therapy with vancomycin. · The patient soft tissue changes in the left forearm seem to have worsened and it is possible she has developing a soft tissue abscess. · The care was discussed with the patient, and Dr. Santi Rodriguez at bedside. · If the soft tissue changes in the left forearm continue to worsen the patient will be scheduled for a CT scan of the left upper extremity    Infection Control Recommendations:   Contact precautions    Discharge Planning:   Estimated Length of IV antimicrobials: At least 2 weeks more likely 4 weeks  Patient will need Midline Catheter Insertion/ PICC line Insertion: No  Patient will need: Home IV , [place],  SNF,  LTAC: Undetermined  Patient willneed outpatient wound care: No    Medical Decision making / Summary of Stay:   Kevin Lancaster is a 64y.o.-year-old female who was initially admitted on 12/28/2020. Kusum Marvin has a history of diabetes mellitus, hyperlipidemia, hypertension, anxiety disorder, and locally advanced pancreatic cancer and was receiving chemotherapy with Xeloda and radiation therapy. She was recently hospitalized at [place] [address] after a syncopal episode and hypoglycemia. The patient was discharged 12/23/2020.     The patient presented to the emergency department with swelling and redness at a prior IV site in her left forearm. The patient started having fevers/chills on the day of admission which prompted the emergency room evaluation. The temperature was 101.5 degrees in the emergency department. She was started on IV antimicrobial therapy with vancomycin and blood cultures were sent.     Blood cultures done in the emergency department have come back 2 out of 2 with gram-positive cocci and I was asked to evaluate and help with antibiotic choice. Current evaluation:2020    BP (!) 126/52   Pulse 76   Temp 98.4 °F (36.9 °C) (Oral)   Resp 17   Ht 5' 5\" (1.651 m)   Wt 250 lb (113.4 kg)   SpO2 90%   BMI 41.60 kg/m²     Temperature Range: Temp: 98.4 °F (36.9 °C) Temp  Av.4 °F (36.9 °C)  Min: 97.6 °F (36.4 °C)  Max: 99 °F (37.2 °C)  The patient is seen and evaluated at bedside she is awake and alert in no acute distress. No subjective fever or chills. Does have some pain in the left forearm and reports that the pain is worsened today. No subjective fever or chills. Review of Systems   Constitutional: Negative. Respiratory: Negative. Cardiovascular: Negative. Gastrointestinal: Negative. Genitourinary: Negative. Musculoskeletal:        Left forearm pain and soft tissue swelling   Skin: Negative. Neurological: Negative. Psychiatric/Behavioral: Negative. Physical Examination :     Physical Exam  Constitutional:       Appearance: She is well-developed. HENT:      Head: Normocephalic and atraumatic. Neck:      Musculoskeletal: Normal range of motion and neck supple. Cardiovascular:      Rate and Rhythm: Regular rhythm. Heart sounds: Normal heart sounds. Pulmonary:      Effort: Pulmonary effort is normal.      Breath sounds: Normal breath sounds. Abdominal:      General: Bowel sounds are normal.      Palpations: Abdomen is soft. Musculoskeletal:      Comments: The left forearm has soft tissue swelling, induration and the scab at the prior IV site was removed and is able to get about 1 to 2 cc of thick purulent exudate   Skin:     General: Skin is warm and dry. Neurological:      Mental Status: She is alert and oriented to person, place, and time. Laboratory data:   I have independently reviewed the followinglabs:  CBC with Differential:   Recent Labs     12/29/20  0612 12/30/20  0529   WBC 3.5 6.0   HGB 8.7* 9.3*   HCT 29.3* 30.2*   PLT 93* 133*   LYMPHOPCT 5* 5*   MONOPCT 9 10*     BMP:   Recent Labs     12/29/20  0612 12/29/20  1421 12/30/20  0529   * 130* 132*   K 3.5* 3.5* 3.4*   CL 94* 93* 97*   CO2 24 28 26   BUN 17 17 17   CREATININE 0.95* 0.93* 1.15*   MG 1.0*  --   --      Hepatic Function Panel:   Recent Labs     12/29/20  0612   PROT 4.9*   LABALBU 2.0*   BILITOT 0.48   ALKPHOS 93   ALT 17   AST 22         Lab Results   Component Value Date    PROCAL 10.17 07/21/2020     No results found for: CRP  No results found for: SEDRATE      Lab Results   Component Value Date    DDIMER 1.92 06/08/2018     Lab Results   Component Value Date    FERRITIN 148 06/03/2020     No results found for: LDH  No results found for: FIBRINOGEN    Results in Past 30 Days  Result Component Current Result Ref Range Previous Result Ref Range   SARS-CoV-2      (12/28/2020)  Not Detected (12/16/2020) Not Detected    Not Detected (12/28/2020) Not Detected      Not Detected (12/28/2020) Not Detected       Lab Results   Component Value Date    COVID19 Not Detected 12/28/2020    COVID19 Not Detected 12/28/2020    COVID19 Not Detected 12/16/2020    COVID19 Not Detected 07/21/2020    COVID19 Not Detected 06/03/2020       Recent Labs     12/30/20  0529   [facility]       Imaging Studies:   No new imaging    Cultures:     Culture, Blood 1 [[id number]] (Abnormal) Collected: 12/28/20 1400   Order Status: Completed Specimen: Blood Updated: 12/30/20 0745    Specimen Description . BLOOD    Special Requests 6ML VAC    Culture POSITIVE Blood Culture Results called to and read back by: Masoud Campbell. AT 0845 12/29/20Abnormal      DIRECT GRAM STAIN FROM BOTTLE: GRAM POSITIVE COCCI IN CLUSTERS METHICILLIN RESISTANT STAPHYLOCOCCUS AUREUS For susceptibility, refer to previous culture. Abnormal    Culture, Blood 1 [[id number]] (Abnormal)  Collected: 12/28/20 1430   Order Status: Completed Specimen: Blood Updated: 12/30/20 0742    Specimen Description . BLOOD    Special Requests RFA 10ML    Culture POSITIVE Blood Culture Results called to and read back by: NUPUR Guerra Cap 0400 12/29/20Abnormal      DIRECT GRAM STAIN FROM BOTTLE: GRAM POSITIVE COCCI IN CLUSTERS     METHICILLIN RESISTANT STAPHYLOCOCCUS AUREUSAbnormal    Methicillin resistant staphylococcus aureus (3)    Antibiotic Interpretation DENA Status    penicillin Resistant  Final     >=0.5   RESISTANT   cefoxitin screen  NOT REPORTED Final    ciprofloxacin   Final     NOT REPORTED    clindamycin Sensitive  Final     <=0.25   SUSCEPTIBLE   erythromycin Resistant  Final     >=8   RESISTANT   gentamicin Sensitive  Final     <=0.5   SUSCEPTIBLE   gentamicin Sensitive Gentamicin is used only in combination with other active agents that test susceptible.  Final    Induced Clind Resist Negative NEGATIVE Final    levofloxacin Intermediate  Final     4   INTERMEDIATE   linezolid   Final     NOT REPORTED    moxifloxacin   Final     NOT REPORTED    nitrofurantoin   Final     NOT REPORTED    oxacillin Resistant  Final     >=4   RESISTANT   Synercid   Final     NOT REPORTED    rifampin   Final     NOT REPORTED    tetracycline Sensitive  Final     <=1   SUSCEPTIBLE   tigecycline   Final     NOT REPORTED    trimethoprim-sulfamethoxazole Sensitive  Final     <=10   SUSCEPTIBLE   vancomycin Sensitive  Final     <=0.5   SUSCEPTIBLE           Medications:      vancomycin  1,500 mg Intravenous Q24H    vancomycin (VANCOCIN) intermittent dosing (placeholder)   Other RX Placeholder    potassium chloride  20 mEq Oral BID WC    sertraline  150 mg Oral Daily    amLODIPine  10 mg Oral Daily    aspirin  81 mg Oral Nightly    atorvastatin  40 mg Oral Nightly  carvedilol  12.5 mg Oral BID     hydrALAZINE  25 mg Oral 3 times per day    insulin glargine  22 Units Subcutaneous Daily    sodium chloride flush  10 mL Intravenous 2 times per day    insulin lispro  0-6 Units Subcutaneous TID WC    insulin lispro  0-3 Units Subcutaneous Nightly    enoxaparin  30 mg Subcutaneous BID           [facility]  [address]  [employer] [facility] [employer] messaging  OFFICE: [phone]      Electronically signed by Jennifer Mercado MD on 12/30/2020 at 12:36 PM  Thank you for allowing us to participate in the care of this patient. Please call with questions. This note iscreated with the assistance of a speech recognition program.  While intending to generate a document that actually reflects the content of the visit, the document can still have some errors including those of syntax andsound a like substitutions which may escape proof reading. In such instances, actual meaning can be extrapolated by contextual diversion.

## 2020-12-30 NOTE — PROGRESS NOTES
Correction: Vancomycin trough = 24 mcg/ml. Will extend interval between doses. Will change Vancomycin 1500mg IV every 12 hours to Vancomycin 1500 mg IV every 24 hours with next dose 12/30/20 18:00. Portia Kay.  Lisandra Recinos  12/30/2020  7:11 AM

## 2020-12-30 NOTE — CARE COORDINATION
Social work; spoke to Help program as pt has questions about medicaid application. Help will see pt or speak to her about setting up an appointment.   Magalis dacostaw

## 2020-12-30 NOTE — PLAN OF CARE
Description: Will remain free from falls  12/30/2020 0095 by Mei Brunson RN  Outcome: Ongoing     Problem: Falls - Risk of:  Goal: Absence of physical injury  Description: Absence of physical injury  12/30/2020 5631 by Mei Brunson RN  Outcome: Ongoing

## 2020-12-30 NOTE — CONSULTS
..    Palliative Care Inpatient Consult    NAME:  Karina Hoyt Dr RECORD NUMBER:  [id number]  AGE: 64 y.o. GENDER: female  : 1960  TODAY'S DATE:  2020    Reasons for Consultation:    Provision of information regarding PC and/or hospice philosophies  Complex, time-intensive communication and interdisciplinary psychosocial support  Clarification of goals of care and/or assistance with difficult decision-making  Guidance in regards to resources and transition(s)    Code Status: Pt was [facility] (no intubation) on previous admissions and wishes to remain this status. Will notify MD of patient's wishes    Members of PC team contributing to this consultation are :  Katelynn Boone RN    History of Present Illness     The patient is a 64 y.o. Non-/non  female who presents with Arm Pain (swelling and redness where prior IV was. )    Referred to Palliative Care by   [x] Physician   [] Nursing  [] Family Request   [] Other:       She was admitted to the primary service for Cellulitis [L03.90]. Her hospital course has been associated with <principal problem not specified>. The patient has a complicated medical history and has been hospitalized since 2020  1:26 PM.    Active Hospital Problems    Diagnosis Date Noted    Cellulitis [L03.90] 2020       Data        Code Status: Full Code     ADVANCED CARE PLANNING:  Patient has capacity for medical decisions: yes  Health Care Power of : yes  Living Will: yes     Personal, Social, and Family History  Marital Status: single  Living situation:alone  Bel/Spiritual History:   Presybeterian/Buddhist  Psychological Distress: moderate- anxiety r/t forgiveness and death  Does patient understand diagnosis/treatment?  yes  Does family/caregiver understand diagnosis/treatment? not asked    Assessment        Palliative Performance Scale: ___100% Full ambulation; normal activity and work; no evidence of disease; able to do own self care; normal intake; fully conscious  ___90% Full ambulation; normal activity and work; some evidence of disease; able to do own self care; normal intake; fully conscious  ___80% Full ambulation; normal activity with effort; some evidence of disease; able to do own self care; normal or reduced intake; fully conscious  ___70% Ambulation reduced; unable to perform normal job/work; significant disease; able to do own self care; normal or reduced intake; fully conscious  ___60%  Ambulation reduced; cannot do hobbies/housework; significant disease; occasional assist; intake normal or reduced; fully conscious/some confusion  ___50%  Mainly sit/lie; can't do any work; extensive disease; considerable assist; intake normal or reduced; fully conscious/some confusion  ___40%  Mainly in bed; extensive disease; mainly assist; intake normal or reduced; fully conscious/ some confusion   ___30%  Bed bound; extensive disease; total care; intake reduced; fully conscious/some confusion  ___20%  Bed bound; extensive disease; total care; intake minimal; drowsy/coma  ___10%  Bed bound; extensive disease; total care; mouth care only; drowsy/coma  ___0       Death       Risk Assessments:    Maryellen Risk Score: [unfilled]    Readmission Risk Score: 28        1 Year Mortality Risk Score: @1YEARMORTALITYRISK@     Plan        Palliative Interaction: Pt known to our team from our outpatient palliative/symptom mgt. Clinic where we manage her pain and other symptoms. SYMPTOM MANAGEMENT: Pt seems in good spirits. She is alert and oriented. We talked about events leading up to admission. Pt states she passed out at home from a low blood sugar and went to [facility] [place]. Vs. While at 's for 2 days, her IV infiltrated and she now is re- admitted for cellulitis of the left arm. She had positive blood cultures. This morning she is complaining of having no/limited vision in her left eye and new pain to that eye. She is currently receiving dilaudid for arm pain which she states \"helps take the edge off\". Pt states her pain from her pancreatic cancer is not controlled anymore and the roxicodone we are prescribing is not helping. She also states of a \"spasm-like\" pain to RUQ of her abdomen which lasts only a few seconds then dissipates. Pt had an episode of these spasms while I was talking with her. Will discuss with my team to adjust patient's pain regimen so she will have better relief before going home. Pt and I discuss her quality of life and patient states she is thinking about stopping the rest of her treatments (xeloda and 7 more radiation treatments) due to xeloda making her \"sick\". Pt states she is tolerating the radiation surprisingly well. I encouraged her to have this conversation with her oncologist and ask if stopping xeloda and continuing radiation is a possibility. She states she will talk with oncologist.     CODE STATUS: Pt has been a [facility] (no intubation) on previous admission and also paperwork was done in our clinic to confirm this. I talked with patient about this today and she states she still feels the same and would like to be a DNRCCA- no intubation. Will relay message to the medical team.     SUPPORT: Emotional support offered to patient. She states, \"i'm not sure i'm gonna make it out of this\". Pt states she talked with  yesterday which helped. She states she is struggling with some issues of forgiveness from God. We had prayer together. Will continue to follow for support. Education/support to patient  Discharge planning/helping to coordinate care  Communications with primary service  Providing support for coping/adaptation/distress of patient  Discussing meaning/purpose   Continue with current plan of care  Clarification of medical condition to patient and family  Validating patient/family distress  Continued communication updates  Recognizing, reflecting, and empathizing with the patient's anticipatory grief  Pt known to us from outpatient palliative care clinic. Will have our team re-evaluate patient's pain regimen as she states the roxicodone has not been helping anymore. Pt would also like to be a DNRCCA-no intubation as in the past. Medical team made aware. Principle Problem/Diagnosis:  Cellulitis [L03.90]    Goals of care evaluation:  The patient goals of care are improve or maintain function/quality of life   Goals of care discussed with:    [x] Patient independently    [] Patient and Family    [] Family or Healthcare DPOA independently    [] Unable to discuss with patient, family/DPOA not present    Code Status  Full Code    Other recommendations:  Please call with any palliative questions or concerns. Palliative Care Team is available via perfect serve or via phone - [phone]. Palliative Care will continue to follow Ms. Osman's care as needed. Thank you for allowing Palliative Care to participate in the care of Ms. Opal Cornelius .     Electronically signed by   Angelic Calvin RN  Palliative Care Team  on 12/30/2020 at 10:57 AM

## 2020-12-31 ENCOUNTER — APPOINTMENT (OUTPATIENT)
Dept: RADIATION ONCOLOGY | Age: 60
End: 2020-12-31
Attending: RADIOLOGY
Payer: COMMERCIAL

## 2020-12-31 ENCOUNTER — APPOINTMENT (OUTPATIENT)
Dept: CT IMAGING | Age: 60
DRG: 871 | End: 2020-12-31
Payer: COMMERCIAL

## 2020-12-31 LAB
ABSOLUTE EOS #: 0.32 K/UL (ref 0–0.4)
ABSOLUTE IMMATURE GRANULOCYTE: 0 K/UL (ref 0–0.3)
ABSOLUTE LYMPH #: 0.19 K/UL (ref 1–4.8)
ABSOLUTE MONO #: 0.38 K/UL (ref 0.2–0.8)
ANION GAP SERPL CALCULATED.3IONS-SCNC: 8 MMOL/L (ref 9–17)
BASOPHILS # BLD: 0 %
BASOPHILS ABSOLUTE: 0 K/UL (ref 0–0.2)
BUN BLDV-MCNC: 19 MG/DL (ref 6–20)
BUN/CREAT BLD: 13 (ref 9–20)
CALCIUM SERPL-MCNC: 9 MG/DL (ref 8.6–10.4)
CHLORIDE BLD-SCNC: 97 MMOL/L (ref 98–107)
CO2: 24 MMOL/L (ref 20–31)
CREAT SERPL-MCNC: 1.44 MG/DL (ref 0.5–0.9)
DIFFERENTIAL TYPE: ABNORMAL
EOSINOPHILS RELATIVE PERCENT: 5 % (ref 1–4)
GFR AFRICAN AMERICAN: 45 ML/MIN
GFR NON-AFRICAN AMERICAN: 37 ML/MIN
GFR SERPL CREATININE-BSD FRML MDRD: ABNORMAL ML/MIN/{1.73_M2}
GFR SERPL CREATININE-BSD FRML MDRD: ABNORMAL ML/MIN/{1.73_M2}
GLUCOSE BLD-MCNC: 237 MG/DL (ref 65–105)
GLUCOSE BLD-MCNC: 240 MG/DL (ref 65–105)
GLUCOSE BLD-MCNC: 241 MG/DL (ref 65–105)
GLUCOSE BLD-MCNC: 243 MG/DL (ref 70–99)
GLUCOSE BLD-MCNC: 259 MG/DL (ref 65–105)
HCT VFR BLD CALC: 26.8 % (ref 36.3–47.1)
HEMOGLOBIN: 8.4 G/DL (ref 11.9–15.1)
IMMATURE GRANULOCYTES: 0 %
LYMPHOCYTES # BLD: 3 % (ref 24–44)
MAGNESIUM: 1.5 MG/DL (ref 1.6–2.6)
MCH RBC QN AUTO: 27.2 PG (ref 25.2–33.5)
MCHC RBC AUTO-ENTMCNC: 31.3 G/DL (ref 28.4–34.8)
MCV RBC AUTO: 86.7 FL (ref 82.6–102.9)
MONOCYTES # BLD: 6 % (ref 1–7)
NRBC AUTOMATED: 0 PER 100 WBC
PDW BLD-RTO: 18.1 % (ref 11.8–14.4)
PLATELET # BLD: 117 K/UL (ref 138–453)
PLATELET ESTIMATE: ABNORMAL
PMV BLD AUTO: 11.2 FL (ref 8.1–13.5)
POTASSIUM SERPL-SCNC: 3.9 MMOL/L (ref 3.7–5.3)
RBC # BLD: 3.09 M/UL (ref 3.95–5.11)
RBC # BLD: ABNORMAL 10*6/UL
SEG NEUTROPHILS: 86 % (ref 36–66)
SEGMENTED NEUTROPHILS ABSOLUTE COUNT: 5.41 K/UL (ref 1.8–7.7)
SODIUM BLD-SCNC: 129 MMOL/L (ref 135–144)
WBC # BLD: 6.3 K/UL (ref 3.5–11.3)
WBC # BLD: ABNORMAL 10*3/UL

## 2020-12-31 PROCEDURE — 83735 ASSAY OF MAGNESIUM: CPT

## 2020-12-31 PROCEDURE — 6370000000 HC RX 637 (ALT 250 FOR IP): Performed by: INTERNAL MEDICINE

## 2020-12-31 PROCEDURE — 97535 SELF CARE MNGMENT TRAINING: CPT

## 2020-12-31 PROCEDURE — 73200 CT UPPER EXTREMITY W/O DYE: CPT

## 2020-12-31 PROCEDURE — 36415 COLL VENOUS BLD VENIPUNCTURE: CPT

## 2020-12-31 PROCEDURE — 97162 PT EVAL MOD COMPLEX 30 MIN: CPT

## 2020-12-31 PROCEDURE — 99232 SBSQ HOSP IP/OBS MODERATE 35: CPT | Performed by: INTERNAL MEDICINE

## 2020-12-31 PROCEDURE — 93971 EXTREMITY STUDY: CPT

## 2020-12-31 PROCEDURE — 2580000003 HC RX 258: Performed by: INTERNAL MEDICINE

## 2020-12-31 PROCEDURE — 80048 BASIC METABOLIC PNL TOTAL CA: CPT

## 2020-12-31 PROCEDURE — 99232 SBSQ HOSP IP/OBS MODERATE 35: CPT | Performed by: NURSE PRACTITIONER

## 2020-12-31 PROCEDURE — 6360000002 HC RX W HCPCS: Performed by: INTERNAL MEDICINE

## 2020-12-31 PROCEDURE — 1200000000 HC SEMI PRIVATE

## 2020-12-31 PROCEDURE — 85025 COMPLETE CBC W/AUTO DIFF WBC: CPT

## 2020-12-31 PROCEDURE — 82947 ASSAY GLUCOSE BLOOD QUANT: CPT

## 2020-12-31 PROCEDURE — 97530 THERAPEUTIC ACTIVITIES: CPT

## 2020-12-31 PROCEDURE — 97166 OT EVAL MOD COMPLEX 45 MIN: CPT

## 2020-12-31 RX ORDER — MAGNESIUM SULFATE 1 G/100ML
1 INJECTION INTRAVENOUS ONCE
Status: COMPLETED | OUTPATIENT
Start: 2020-12-31 | End: 2020-12-31

## 2020-12-31 RX ADMIN — INSULIN LISPRO 2 UNITS: 100 INJECTION, SOLUTION INTRAVENOUS; SUBCUTANEOUS at 13:03

## 2020-12-31 RX ADMIN — POLYVINYL ALCOHOL 1 DROP: 14 SOLUTION/ DROPS OPHTHALMIC at 18:39

## 2020-12-31 RX ADMIN — HYDRALAZINE HYDROCHLORIDE 25 MG: 25 TABLET, FILM COATED ORAL at 18:41

## 2020-12-31 RX ADMIN — INSULIN LISPRO 2 UNITS: 100 INJECTION, SOLUTION INTRAVENOUS; SUBCUTANEOUS at 09:36

## 2020-12-31 RX ADMIN — SERTRALINE HYDROCHLORIDE 150 MG: 100 TABLET ORAL at 09:34

## 2020-12-31 RX ADMIN — MAGNESIUM SULFATE HEPTAHYDRATE 1 G: 1 INJECTION, SOLUTION INTRAVENOUS at 06:43

## 2020-12-31 RX ADMIN — ASPIRIN 81 MG: 81 TABLET, CHEWABLE ORAL at 21:40

## 2020-12-31 RX ADMIN — SODIUM CHLORIDE: 9 INJECTION, SOLUTION INTRAVENOUS at 19:11

## 2020-12-31 RX ADMIN — HYDRALAZINE HYDROCHLORIDE 25 MG: 25 TABLET, FILM COATED ORAL at 05:35

## 2020-12-31 RX ADMIN — POLYVINYL ALCOHOL 1 DROP: 14 SOLUTION/ DROPS OPHTHALMIC at 09:36

## 2020-12-31 RX ADMIN — POTASSIUM CHLORIDE 20 MEQ: 20 TABLET, EXTENDED RELEASE ORAL at 18:34

## 2020-12-31 RX ADMIN — ENOXAPARIN SODIUM 30 MG: 30 INJECTION SUBCUTANEOUS at 21:40

## 2020-12-31 RX ADMIN — HYDROMORPHONE HYDROCHLORIDE 1 MG: 1 INJECTION, SOLUTION INTRAMUSCULAR; INTRAVENOUS; SUBCUTANEOUS at 21:49

## 2020-12-31 RX ADMIN — OXYCODONE HYDROCHLORIDE 10 MG: 5 TABLET ORAL at 13:03

## 2020-12-31 RX ADMIN — SODIUM CHLORIDE, PRESERVATIVE FREE 10 ML: 5 INJECTION INTRAVENOUS at 09:35

## 2020-12-31 RX ADMIN — CARVEDILOL 12.5 MG: 12.5 TABLET, FILM COATED ORAL at 13:03

## 2020-12-31 RX ADMIN — SODIUM CHLORIDE: 9 INJECTION, SOLUTION INTRAVENOUS at 05:35

## 2020-12-31 RX ADMIN — MAGNESIUM SULFATE HEPTAHYDRATE 1 G: 1 INJECTION, SOLUTION INTRAVENOUS at 00:58

## 2020-12-31 RX ADMIN — POLYVINYL ALCOHOL 1 DROP: 14 SOLUTION/ DROPS OPHTHALMIC at 13:08

## 2020-12-31 RX ADMIN — ENOXAPARIN SODIUM 30 MG: 30 INJECTION SUBCUTANEOUS at 09:40

## 2020-12-31 RX ADMIN — HYDROMORPHONE HYDROCHLORIDE 1 MG: 1 INJECTION, SOLUTION INTRAMUSCULAR; INTRAVENOUS; SUBCUTANEOUS at 09:40

## 2020-12-31 RX ADMIN — SODIUM CHLORIDE 500 MG: 900 INJECTION INTRAVENOUS at 14:40

## 2020-12-31 RX ADMIN — INSULIN LISPRO 1 UNITS: 100 INJECTION, SOLUTION INTRAVENOUS; SUBCUTANEOUS at 21:40

## 2020-12-31 RX ADMIN — INSULIN LISPRO 3 UNITS: 100 INJECTION, SOLUTION INTRAVENOUS; SUBCUTANEOUS at 18:35

## 2020-12-31 RX ADMIN — AMLODIPINE BESYLATE 10 MG: 5 TABLET ORAL at 13:03

## 2020-12-31 RX ADMIN — OXYCODONE HYDROCHLORIDE 10 MG: 5 TABLET ORAL at 19:11

## 2020-12-31 RX ADMIN — POTASSIUM CHLORIDE 20 MEQ: 20 TABLET, EXTENDED RELEASE ORAL at 13:03

## 2020-12-31 RX ADMIN — INSULIN GLARGINE 22 UNITS: 100 INJECTION, SOLUTION SUBCUTANEOUS at 09:36

## 2020-12-31 ASSESSMENT — PAIN DESCRIPTION - ORIENTATION: ORIENTATION: LEFT

## 2020-12-31 ASSESSMENT — PAIN SCALES - GENERAL
PAINLEVEL_OUTOF10: 6
PAINLEVEL_OUTOF10: 4
PAINLEVEL_OUTOF10: 10

## 2020-12-31 ASSESSMENT — PAIN DESCRIPTION - DESCRIPTORS
DESCRIPTORS: ACHING
DESCRIPTORS: ACHING

## 2020-12-31 ASSESSMENT — PAIN DESCRIPTION - PAIN TYPE
TYPE: ACUTE PAIN
TYPE: ACUTE PAIN

## 2020-12-31 ASSESSMENT — PAIN DESCRIPTION - FREQUENCY
FREQUENCY: INTERMITTENT
FREQUENCY: INTERMITTENT

## 2020-12-31 ASSESSMENT — PAIN DESCRIPTION - LOCATION
LOCATION: ARM
LOCATION: ARM

## 2020-12-31 ASSESSMENT — PAIN DESCRIPTION - PROGRESSION
CLINICAL_PROGRESSION: GRADUALLY WORSENING

## 2020-12-31 ASSESSMENT — PAIN DESCRIPTION - ONSET: ONSET: ON-GOING

## 2020-12-31 NOTE — PLAN OF CARE
Nutrition Problem #1: Mild malnutrition  Intervention: Food and/or Nutrient Delivery: Continue Current Diet  Nutritional

## 2020-12-31 NOTE — PROGRESS NOTES
Today's Date: 12/31/2020  Patient Name: Griselda Mclean  Date of admission: 12/28/2020  1:26 PM  Patient's age: 64 y. o., 1960  Admission Dx: Cellulitis [L03.90]    Reason for Consult: management recommendations  Requesting Physician: Lola Valdovinos MD    CHIEF COMPLAINT: Left upper extremity swelling pain and redness    History Obtained From:  patient, electronic medical record    Interval history:    Patient seen and examined. Labs and vitals reviewed  Patient complains of worsening of swelling of left arm  Planes of severe pain  Denies fever chills. HISTORY OF PRESENT ILLNESS:      The patient is a 64 y.o.  female who is admitted to the hospital for presents to the ER with chief complaint swelling redness of left upper extremity around the IV site. Patient was recently hospitalized at [facility] with complains of hypoglycemia. Patient has history of locally advanced, unresectable pancreatic cancer. Patient was initially challenged with FOLFIRINOX chemotherapy but it was discontinued due to intolerance. Subsequently patient was started on radiation therapy along with Xeloda. Patient is currently off the Xeloda. Patient complains of swelling redness and pain in the left upper extremity. Patient also started having fevers and presented to the ER. Patient's labs at presentation showed sodium of 128. Albumin is 2.0. Remaining LFTs are unremarkable. Patient WBC count yesterday was 5.2 with ANC of 4.5. Hemoglobin was 9.1 and platelet count of 150. Patient has been started on antibiotics using vancomycin. Ultrasound left upper extremity showed a subacute superficial vein DVT involving the basilic and cephalic vein. Past Medical History:   has a past medical history of Anxiety, Diabetes mellitus ([address] Hyperlipidemia, Hypertension, and Pancreatic cancer ([facility] [address] Past Surgical History:   has a past surgical history that includes  section; Gallbladder surgery; ERCP (2020); Upper gastrointestinal endoscopy (2020); Upper gastrointestinal endoscopy (2020); ERCP (2020); ERCP (2020); ERCP (2020); ERCP (2020); ERCP (2020); and ERCP (2020). Medications:    Prior to Admission medications    Medication Sig Start Date End Date Taking? Authorizing Provider   furosemide (LASIX) 20 MG tablet Take 20 mg by mouth daily as needed (swelling)   Yes Historical Provider, MD   LORazepam (ATIVAN) 1 MG tablet Take 0.5 mg by mouth daily as needed for Anxiety. Yes Historical Provider, MD   insulin lispro, 1 Unit Dial, (HUMALOG KWIKPEN) 100 UNIT/ML SOPN Inject 0-12 Units into the skin 3 times daily (before meals) **Medium Dose Corrective Algorithm** Glucose: Dose: If <139 - No Insulin. 140-199 -  2 Units. 200-249 4 Units. 250-299 6 Units. 300-349 8 Units. 350-400 10 Units. Above 400- 12 Units 20  Yes Zakia Cintron MD   insulin glargine (LANTUS SOLOSTAR) 100 UNIT/ML injection pen Inject 22 Units into the skin daily 20  Yes Zakia Cintron MD   oxyCODONE (OXY-IR) 10 MG immediate release tablet Take 1 tablet by mouth every 6 hours as needed for Pain for up to 30 days. 20 Yes Concepción Carter MD   prochlorperazine (COMPAZINE) 10 MG tablet Take 1 tablet by mouth every 6 hours as needed (NV) 20  Yes Franko Gautam MD   capecitabine (XELODA) 150 MG chemo tablet TAKE 2 TABLETS BY MOUTH TWICE DAILY ON MONDAY TO FRIDAY WHILE ON RADIATION. 20  Yes Franko Gautam MD   capecitabine (XELODA) 500 MG chemo tablet TAKE 3 TABLETS BY MOUTH TWICE DAILY ON MONDAY TO FRIDAY WHILE ON RADIATION.  20  Yes Franko Gautam MD   ondansetron (ZOFRAN-ODT) 8 MG TBDP disintegrating tablet Place 1 tablet under the tongue every 8 hours as needed for Nausea or Vomiting 20  Yes Franko Gautam MD hydrALAZINE (APRESOLINE) 25 MG tablet Take 1 tablet by mouth every 8 hours 6/6/20  Yes Cliff Jacobsen MD   carvedilol (COREG) 12.5 MG tablet Take 1 tablet by mouth 2 times daily (with meals) 6/6/20  Yes Cliff Jacobsen MD   amLODIPine (NORVASC) 10 MG tablet Take 1 tablet by mouth daily 6/7/20  Yes Cliff Jacobsen MD   aspirin 81 MG chewable tablet Take 81 mg by mouth nightly   Yes Historical Provider, MD   sertraline (ZOLOFT) 100 MG tablet Take 150 mg by mouth daily Takes 1.5 tabs (=150mg) daily   Yes Historical Provider, MD   atorvastatin (LIPITOR) 40 MG tablet Take 40 mg by mouth nightly  10/12/13  Yes Historical Provider, MD   LORazepam (ATIVAN) 0.5 MG tablet Take 1 mg by mouth nightly. And 1/2 tab once daily as needed 10/12/13  Yes Historical Provider, MD   Insulin Pen Needle ([employer] PEN NEEDLES 29G) 29G X 12MM MISC 1 each by Does not apply route daily 12/23/20   Maite Fajardo MD   glucose monitoring kit (FREESTYLE) monitoring kit 1 kit by Does not apply route daily 12/23/20   Maite Fajardo MD   blood glucose monitor strips Test 3  times daily  Insulin Dependent mellitus 12/23/20   Maite Fajardo MD   Lancets MISC 1 each by Does not apply route 3 times daily 12/23/20   Maite Fajardo MD   nitroGLYCERIN (NITROSTAT) 0.4 MG SL tablet up to max of 3 total doses.  If no relief after 1 dose, call 911. 6/6/20   Cliff Jacobsen MD     Current Facility-Administered Medications   Medication Dose Route Frequency Provider Last Rate Last Admin    DAPTOmycin (CUBICIN) 500 mg in sodium chloride 0.9 % 50 mL IVPB  6 mg/kg (Adjusted) Intravenous Q24H Irasema Alejandro MD        polyvinyl alcohol (LIQUIFILM TEARS) 1.4 % ophthalmic solution 1 drop  1 drop Left Eye PRN Scott Garrett MD   1 drop at 12/30/20 1654    potassium chloride (KLOR-CON M) extended release tablet 20 mEq  20 mEq Oral BID WC Scott Garrett MD   20 mEq at 12/30/20 1654  sertraline (ZOLOFT) tablet 150 mg  150 mg Oral Daily Marco A Brenner MD   150 mg at 12/30/20 0918    HYDROmorphone HCl PF (DILAUDID) injection 1 mg  1 mg Intravenous Q6H PRN Marco A Brenner MD   1 mg at 12/30/20 1249    0.9 % sodium chloride infusion   Intravenous Continuous Marco A Brenner  mL/hr at 12/31/20 0535 New Bag at 12/31/20 0535    amLODIPine (NORVASC) tablet 10 mg  10 mg Oral Daily Marco A Brenner MD   10 mg at 12/30/20 2349    aspirin chewable tablet 81 mg  81 mg Oral Nightly Marco A Brenner MD   81 mg at 12/30/20 2310    [Held by provider] atorvastatin (LIPITOR) tablet 40 mg  40 mg Oral Nightly Marco A Brenner MD   40 mg at 12/30/20 2311    carvedilol (COREG) tablet 12.5 mg  12.5 mg Oral BID WC Marco A Brenner MD   12.5 mg at 12/30/20 1655    hydrALAZINE (APRESOLINE) tablet 25 mg  25 mg Oral 3 times per day Marco A Brenner MD   25 mg at 12/31/20 0535    insulin glargine (LANTUS) injection vial 22 Units  22 Units Subcutaneous Daily Marco A Brenner MD   22 Units at 12/30/20 0917    oxyCODONE (ROXICODONE) immediate release tablet 10 mg  10 mg Oral Q6H PRN Marco A Brenner MD   10 mg at 12/30/20 1535    sodium chloride flush 0.9 % injection 10 mL  10 mL Intravenous 2 times per day Marco A Brenner MD   10 mL at 12/30/20 0918    sodium chloride flush 0.9 % injection 10 mL  10 mL Intravenous PRN Marco A Brenner MD   10 mL at 12/30/20 1250    promethazine (PHENERGAN) tablet 12.5 mg  12.5 mg Oral Q6H PRN Marco A Brenner MD        Or    ondansetron (ZOFRAN) injection 4 mg  4 mg Intravenous Q6H PRN Marco A Brenner MD        polyethylene glycol (GLYCOLAX) packet 17 g  17 g Oral Daily PRN Marco A Brenner MD        acetaminophen (TYLENOL) tablet 650 mg  650 mg Oral Q6H PRN Marco A Brenner MD   650 mg at 12/28/20 2202    Or    acetaminophen (TYLENOL) suppository 650 mg  650 mg Rectal Q6H ERICH Brenner MD  glucose (GLUTOSE) 40 % oral gel 15 g  15 g Oral PRN Agus Costello MD        dextrose 50 % IV solution  12.5 g Intravenous PRN Agus Costello MD        glucagon (rDNA) injection 1 mg  1 mg Intramuscular PRN Agus Costello MD        dextrose 5 % solution  100 mL/hr Intravenous PRN Agus Costello MD        insulin lispro (HUMALOG) injection vial 0-6 Units  0-6 Units Subcutaneous TID WC Agus Costello MD   1 Units at 12/30/20 1654    insulin lispro (HUMALOG) injection vial 0-3 Units  0-3 Units Subcutaneous Nightly Agus Costello MD   1 Units at 12/30/20 2310    enoxaparin (LOVENOX) injection 30 mg  30 mg Subcutaneous BID Agus Costello MD   30 mg at 12/30/20 2309       Allergies:  Lisinopril, Sulfamethoxazole, Trimethoprim, Cefuroxime axetil, Sulfamethoxazole-trimethoprim, Clindamycin phosphate, and Penicillins    Social History:   reports that she quit smoking about 15 years ago. She has never used smokeless tobacco. She reports previous alcohol use. She reports that she does not use drugs. Family History: family history includes Cancer in her maternal grandfather, maternal grandmother, and mother; Heart Failure in her father; Hypertension in her father and mother. REVIEW OF SYSTEMS:      Constitutional: No fever or chills. No night sweats, no weight loss   Eyes: No eye discharge, double vision, or eye pain   HEENT: negative for sore mouth, sore throat, hoarseness and voice change   Respiratory: negative for cough , sputum, dyspnea, wheezing, hemoptysis, chest pain   Cardiovascular: negative for chest pain, dyspnea, palpitations, orthopnea, PND   Gastrointestinal: negative for nausea, vomiting, diarrhea, constipation, abdominal pain, Dysphagia, hematemesis and hematochezia   Genitourinary: negative for frequency, dysuria, nocturia, urinary incontinence, and hematuria   Integument: negative for rash, skin lesions, bruises. Hematologic/Lymphatic: negative for easy bruising, bleeding, lymphadenopathy, or petechiae   Endocrine: negative for heat or cold intolerance,weight changes, change in bowel habits and hair loss   Musculoskeletal: Positive left upper extremity swelling redness and pain  Neurological: negative for headaches, dizziness, seizures, weakness, numbness    PHYSICAL EXAM:        BP (!) 119/42   Pulse 74   Temp 98.2 °F (36.8 °C) (Oral)   Resp 16   Ht 5' 5\" (1.651 m)   Wt 254 lb 4.8 oz (115.3 kg)   SpO2 92%   BMI 42.32 kg/m²    Temp (24hrs), Av.6 °F (37 °C), Min:98.2 °F (36.8 °C), Max:98.8 °F (37.1 °C)      General appearance - well appearing, no in pain or distress   Mental status - alert and cooperative   Eyes - pupils equal and reactive, extraocular eye movements intact   Ears - bilateral TM's and external ear canals normal   Mouth - mucous membranes moist, pharynx normal without lesions   Neck - supple, no significant adenopathy   Lymphatics - no palpable lymphadenopathy, no hepatosplenomegaly   Chest - clear to auscultation, no wheezes, rales or rhonchi, symmetric air entry   Heart - normal rate, regular rhythm, normal S1, S2, no murmurs  Abdomen - soft, nontender, nondistended, no masses or organomegaly   Neurological - alert, oriented, normal speech, no focal findings or movement disorder noted   Musculoskeletal - no joint tenderness, deformity or swelling   Extremities -left upper extremity swelling  Skin -erythema of left upper extremity around elbow      DATA:      Labs:     Results for orders placed or performed during the hospital encounter of 20   Culture, Blood 1    Specimen: Blood   Result Value Ref Range    Specimen Description . BLOOD     Special Requests RFA 10ML     Culture (A)      POSITIVE Blood Culture Results called to and read back by: NUPUR Guerra Cap [id number] 20    Culture       DIRECT GRAM STAIN FROM BOTTLE: GRAM POSITIVE COCCI IN CLUSTERS Culture METHICILLIN RESISTANT STAPHYLOCOCCUS AUREUS (A)        Susceptibility    Methicillin resistant staphylococcus aureus - BACTERIAL SUSCEPTIBILITY PANEL DENA     penicillin Value in next row Resistant       >=0.5RESISTANT     cefoxitin screen Value in next row        >=0.5RESISTANT     ciprofloxacin Value in next row        NOT REPORTED     clindamycin Value in next row Sensitive       <=0.25SUSCEPTIBLE     erythromycin Value in next row Resistant       >=8RESISTANT     gentamicin Value in next row Sensitive       <=0.5SUSCEPTIBLE     gentamicin Value in next row Sensitive       <=0.5SUSCEPTIBLE     Induced Clind Resist Value in next row Negative       <=0.5SUSCEPTIBLE     levofloxacin Value in next row Intermediate       4INTERMEDIATE     linezolid Value in next row        NOT REPORTED     moxifloxacin Value in next row        NOT REPORTED     nitrofurantoin Value in next row        NOT REPORTED     oxacillin Value in next row Resistant       >=4RESISTANT     Synercid Value in next row        NOT REPORTED     rifampin Value in next row        NOT REPORTED     tetracycline Value in next row Sensitive       <=1SUSCEPTIBLE     tigecycline Value in next row        NOT REPORTED     trimethoprim-sulfamethoxazole Value in next row Sensitive       <=10SUSCEPTIBLE     vancomycin Value in next row Sensitive       <=0.5SUSCEPTIBLE   Culture, Blood 1    Specimen: Blood   Result Value Ref Range    Specimen Description . BLOOD     Special Requests 6ML VAC     Culture (A)      POSITIVE Blood Culture Results called to and read back by: EDU DUBON AT 0845 12/29/20    Culture       DIRECT GRAM STAIN FROM BOTTLE: GRAM POSITIVE COCCI IN CLUSTERS    Culture (A)      METHICILLIN RESISTANT STAPHYLOCOCCUS AUREUS For susceptibility, refer to previous culture.    CBC Auto Differential   Result Value Ref Range    WBC 5.2 3.5 - 11.3 k/uL    RBC 3.35 (L) 3.95 - 5.11 m/uL    Hemoglobin 9.1 (L) 11.9 - 15.1 g/dL Hematocrit 29.9 (L) 36.3 - 47.1 %    MCV 89.3 82.6 - 102.9 fL    MCH 27.2 25.2 - 33.5 pg    MCHC 30.4 28.4 - 34.8 g/dL    RDW 18.1 (H) 11.8 - 14.4 %    Platelets 312 (L) 626 - 453 k/uL    MPV 11.6 8.1 - 13.5 fL    NRBC Automated 0.0 0.0 per 100 WBC    Differential Type NOT REPORTED     WBC Morphology NOT REPORTED     RBC Morphology NOT REPORTED     Platelet Estimate NOT REPORTED     Seg Neutrophils 88 (H) 36 - 65 %    Lymphocytes 3 (L) 24 - 43 %    Monocytes 9 3 - 12 %    Eosinophils % 0 (L) 1 - 4 %    Basophils 0 0 - 2 %    Immature Granulocytes 0 0 %    Segs Absolute 4.57 1.50 - 8.10 k/uL    Absolute Lymph # 0.16 (L) 1.10 - 3.70 k/uL    Absolute Mono # 0.47 0.10 - 1.20 k/uL    Absolute Eos # 0.00 0.00 - 0.44 k/uL    Basophils Absolute 0.00 0.00 - 0.20 k/uL    Absolute Immature Granulocyte 0.00 0.00 - 0.30 k/uL   Basic Metabolic Panel   Result Value Ref Range    Glucose 341 (H) 70 - 99 mg/dL    BUN 14 6 - 20 mg/dL    CREATININE 0.73 0.50 - 0.90 mg/dL    Bun/Cre Ratio 19 9 - 20    Calcium 8.9 8.6 - 10.4 mg/dL    Sodium 130 (L) 135 - 144 mmol/L    Potassium 3.5 (L) 3.7 - 5.3 mmol/L    Chloride 94 (L) 98 - 107 mmol/L    CO2 22 20 - 31 mmol/L    Anion Gap 14 9 - 17 mmol/L    GFR Non-African American >60 >60 mL/min    GFR African American >60 >60 mL/min    GFR Comment          GFR Staging NOT REPORTED    Lactic Acid   Result Value Ref Range    Lactic Acid 1.7 0.5 - 2.2 mmol/L   COVID-19    Specimen: Other   Result Value Ref Range    SARS-CoV-2          SARS-CoV-2, Rapid Not Detected Not Detected    Source . NASOPHARYNGEAL SWAB     SARS-CoV-2 Not Detected Not Detected   Comprehensive Metabolic Panel w/ Reflex to MG   Result Value Ref Range    Glucose 336 (H) 70 - 99 mg/dL    BUN 17 6 - 20 mg/dL    CREATININE 0.95 (H) 0.50 - 0.90 mg/dL    Bun/Cre Ratio 18 9 - 20    Calcium 9.0 8.6 - 10.4 mg/dL    Sodium 128 (L) 135 - 144 mmol/L    Potassium 3.5 (L) 3.7 - 5.3 mmol/L    Chloride 94 (L) 98 - 107 mmol/L Anion Gap 9 9 - 17 mmol/L    GFR Non-African American >60 >60 mL/min    GFR African American >60 >60 mL/min    GFR Comment          GFR Staging NOT REPORTED    Lactic Acid   Result Value Ref Range    Lactic Acid 2.2 0.5 - 2.2 mmol/L   VANCOMYCIN, TROUGH   Result Value Ref Range    Vancomycin Tr 24.0 (HH) 10.0 - 20.0 ug/mL    Vancomycin Trough Dose amount NOT REPORTED     Vancomycin Trough Date last dose NOT REPORTED     Vancomycin Trough Time last dose NOT REPORTED    CBC Auto Differential   Result Value Ref Range    WBC 6.0 3.5 - 11.3 k/uL    RBC 3.48 (L) 3.95 - 5.11 m/uL    Hemoglobin 9.3 (L) 11.9 - 15.1 g/dL    Hematocrit 30.2 (L) 36.3 - 47.1 %    MCV 86.8 82.6 - 102.9 fL    MCH 26.7 25.2 - 33.5 pg    MCHC 30.8 28.4 - 34.8 g/dL    RDW 17.8 (H) 11.8 - 14.4 %    Platelets 191 (L) 590 - 453 k/uL    MPV 11.5 8.1 - 13.5 fL    NRBC Automated 0.0 0.0 per 100 WBC    Differential Type NOT REPORTED     WBC Morphology NOT REPORTED     RBC Morphology NOT REPORTED     Platelet Estimate NOT REPORTED     Seg Neutrophils 82 (H) 36 - 66 %    Lymphocytes 5 (L) 24 - 44 %    Monocytes 10 (H) 1 - 7 %    Eosinophils % 2 1 - 4 %    Basophils 0 %    Immature Granulocytes 1 (H) 0 %    Segs Absolute 4.92 1.8 - 7.7 k/uL    Absolute Lymph # 0.30 (L) 1.0 - 4.8 k/uL    Absolute Mono # 0.60 0.2 - 0.8 k/uL    Absolute Eos # 0.12 0.0 - 0.4 k/uL    Basophils Absolute 0.00 0.0 - 0.2 k/uL    Absolute Immature Granulocyte 0.06 0.00 - 0.30 k/uL   Basic Metabolic Panel   Result Value Ref Range    Glucose 95 70 - 99 mg/dL    BUN 17 6 - 20 mg/dL    CREATININE 1.15 (H) 0.50 - 0.90 mg/dL    Bun/Cre Ratio 15 9 - 20    Calcium 9.1 8.6 - 10.4 mg/dL    Sodium 132 (L) 135 - 144 mmol/L    Potassium 3.4 (L) 3.7 - 5.3 mmol/L    Chloride 97 (L) 98 - 107 mmol/L    CO2 26 20 - 31 mmol/L    Anion Gap 9 9 - 17 mmol/L    GFR Non-African American 48 (L) >60 mL/min    GFR  59 (L) >60 mL/min    GFR Comment          GFR Staging NOT REPORTED    Magnesium Result Value Ref Range    Magnesium 1.1 (L) 1.6 - 2.6 mg/dL   Basic Metabolic Panel   Result Value Ref Range    Glucose 243 (H) 70 - 99 mg/dL    BUN 19 6 - 20 mg/dL    CREATININE 1.44 (H) 0.50 - 0.90 mg/dL    Bun/Cre Ratio 13 9 - 20    Calcium 9.0 8.6 - 10.4 mg/dL    Sodium 129 (L) 135 - 144 mmol/L    Potassium 3.9 3.7 - 5.3 mmol/L    Chloride 97 (L) 98 - 107 mmol/L    CO2 24 20 - 31 mmol/L    Anion Gap 8 (L) 9 - 17 mmol/L    GFR Non-African American 37 (L) >60 mL/min    GFR  45 (L) >60 mL/min    GFR Comment          GFR Staging NOT REPORTED    CBC Auto Differential   Result Value Ref Range    WBC 6.3 3.5 - 11.3 k/uL    RBC 3.09 (L) 3.95 - 5.11 m/uL    Hemoglobin 8.4 (L) 11.9 - 15.1 g/dL    Hematocrit 26.8 (L) 36.3 - 47.1 %    MCV 86.7 82.6 - 102.9 fL    MCH 27.2 25.2 - 33.5 pg    MCHC 31.3 28.4 - 34.8 g/dL    RDW 18.1 (H) 11.8 - 14.4 %    Platelets 601 (L) 645 - 453 k/uL    MPV 11.2 8.1 - 13.5 fL    NRBC Automated 0.0 0.0 per 100 WBC    Differential Type NOT REPORTED     WBC Morphology NOT REPORTED     RBC Morphology NOT REPORTED     Platelet Estimate NOT REPORTED     Seg Neutrophils 86 (H) 36 - 66 %    Lymphocytes 3 (L) 24 - 44 %    Monocytes 6 1 - 7 %    Eosinophils % 5 (H) 1 - 4 %    Basophils 0 %    Immature Granulocytes 0 0 %    Segs Absolute 5.41 1.8 - 7.7 k/uL    Absolute Lymph # 0.19 (L) 1.0 - 4.8 k/uL    Absolute Mono # 0.38 0.2 - 0.8 k/uL    Absolute Eos # 0.32 0.0 - 0.4 k/uL    Basophils Absolute 0.00 0.0 - 0.2 k/uL    Absolute Immature Granulocyte 0.00 0.00 - 0.30 k/uL   Magnesium   Result Value Ref Range    Magnesium 1.5 (L) 1.6 - 2.6 mg/dL   POC Glucose Fingerstick   Result Value Ref Range    POC Glucose 466 (HH) 65 - 105 mg/dL   POC Glucose Fingerstick   Result Value Ref Range    POC Glucose 369 (H) 65 - 105 mg/dL   POC Glucose Fingerstick   Result Value Ref Range    POC Glucose 305 (H) 65 - 105 mg/dL   POC Glucose Fingerstick   Result Value Ref Range POC Glucose 279 (H) 65 - 105 mg/dL   POC Glucose Fingerstick   Result Value Ref Range    POC Glucose 288 (H) 65 - 105 mg/dL   POC Glucose Fingerstick   Result Value Ref Range    POC Glucose 218 (H) 65 - 105 mg/dL   POC Glucose Fingerstick   Result Value Ref Range    POC Glucose 155 (H) 65 - 105 mg/dL   POC Glucose Fingerstick   Result Value Ref Range    POC Glucose 159 (H) 65 - 105 mg/dL   POC Glucose Fingerstick   Result Value Ref Range    POC Glucose 181 (H) 65 - 105 mg/dL   POC Glucose Fingerstick   Result Value Ref Range    POC Glucose 233 (H) 65 - 105 mg/dL   POC Glucose Fingerstick   Result Value Ref Range    POC Glucose 240 (H) 65 - 105 mg/dL         IMAGING DATA:    Vl Dup Upper Extremity Venous Left    Result Date: 12/28/2020 !Yes       !No             !SubAcute  ! +------------------------------------+----------+---------------+----------+ ! Basilic at [address]                       ! Yes       ! Yes            ! None      ! +------------------------------------+----------+---------------+----------+ ! Cephalic at UA                      ! Yes       ! Yes            ! None      ! +------------------------------------+----------+---------------+----------+ ! Cephalic at AF                      ! Yes       ! No             !SubAcute  ! +------------------------------------+----------+---------------+----------+ ! Cephalic at [address]                      ! Yes       ! No             !SubAcute  ! +------------------------------------+----------+---------------+----------+ Doppler Measurements +-------------------------+-----------------------+------------------------+ ! Location                 ! Signal                 !Reflux                  ! +-------------------------+-----------------------+------------------------+ ! IJV                      ! Phasic                 !                        ! +-------------------------+-----------------------+------------------------+ ! SCV                      ! Phasic                 !                        ! +-------------------------+-----------------------+------------------------+ ! Axillary                 ! Phasic                 !                        ! +-------------------------+-----------------------+------------------------+ ! Brachial                 !Phasic                 !                        ! +-------------------------+-----------------------+------------------------+        IMPRESSION:   Primary Problem  <principal problem not specified>    Active Hospital Problems    Diagnosis Date Noted    Cellulitis [L03.90] 12/28/2020       Superficial vein thrombosis, provoked by IV catheter in  background of hypercoagulable state due to pancreatic cancer  Cellulitis, purulent Unresectable pancreatic cancer  Ongoing concurrent chemoradiation    RECOMMENDATIONS:  I personally reviewed results of lab work-up imaging studies and other relevant clinical data. Reviewed previous treatment record  Continue to hold Xeloda  antibiotics per ID  In light of worsening of swelling will order ultrasound to see if there is any progression of blood clot. In the meanwhile continue Lovenox. GFR greater than 30. Monitor kidney function. Xarelto 10 mg daily for 45 days for SVT in background of hypercoagulable state. Patient will also need outpatient follow-up on SVT before discontinuing anticoagulation to confirm improvement  Pain management. Discussed with patient and Nurse. Thank you for asking us to see this patient. Ignacia Farias MD          This note is created with the assistance of a speech recognition program.  While intending to generate a document that actually reflects the content of the visit, the document can still have some errors including those of syntax and sound a like substitutions which may escape proof reading. It such instances, actual meaning can be extrapolated by contextual diversion.

## 2020-12-31 NOTE — PLAN OF CARE
Problem: Pain:  Goal: Pain level will decrease  Description: Pain level will decrease  12/31/2020 1237 by Elgin Kay RN  Outcome: Ongoing  12/31/2020 0332 by Kike Paul RN  Outcome: Ongoing  Goal: Control of acute pain  Description: Control of acute pain  12/31/2020 1237 by Elgin Kay RN  Outcome: Ongoing  12/31/2020 0332 by Kike Paul RN  Outcome: Ongoing  Goal: Control of chronic pain  Description: Control of chronic pain  12/31/2020 1237 by Elgin Kay RN  Outcome: Ongoing  12/31/2020 0332 by Kike Paul RN  Outcome: Ongoing     Problem: Discharge Planning:  Goal: Discharged to appropriate level of care  Description: Discharged to appropriate level of care  12/31/2020 1237 by Elgin Kay RN  Outcome: Ongoing  12/31/2020 0332 by Kike Paul RN  Outcome: Ongoing     Problem:  Body Temperature - Imbalanced:  Goal: Ability to maintain a body temperature in the normal range will improve  Description: Ability to maintain a body temperature in the normal range will improve  12/31/2020 1237 by Elgin Kay RN  Outcome: Ongoing  12/31/2020 0332 by Kike Paul RN  Outcome: Ongoing     Problem: Mobility - Impaired:  Goal: Mobility will improve to maximum level  Description: Mobility will improve to maximum level  12/31/2020 1237 by Elgin Kay RN  Outcome: Ongoing  12/31/2020 0332 by Kike Paul RN  Outcome: Ongoing     Problem: Pain:  Goal: Pain level will decrease  Description: Pain level will decrease  12/31/2020 1237 by Elgin Kay RN  Outcome: Ongoing  12/31/2020 0332 by Kike Paul RN  Outcome: Ongoing  Goal: Control of acute pain  Description: Control of acute pain  12/31/2020 1237 by Elgin Kay RN  Outcome: Ongoing  12/31/2020 0332 by Kike Paul RN  Outcome: Ongoing  Goal: Control of chronic pain  Description: Control of chronic pain  12/31/2020 1237 by Elgin Kay RN  Outcome: Ongoing  12/31/2020 0332 by Kike Paul RN  Outcome: Ongoing

## 2020-12-31 NOTE — PROGRESS NOTES
[facility]  Progress Note    Dillon Donato  MRN: [id number]  Date: 12/31/2020  LOS: 3     Reason for F/U :   MRSA sepsis    Impression :   1. Left upper extremity/forearm septic thrombophlebitis  2. MRSA sepsis secondary to above  3. Advanced pancreatic cancer, on chemotherapy and radiation therapy  4. Diabetes mellitus type 2    Recommendations:   · Patient continues on IV antimicrobial therapy with daptomycin  · Patient with continued and worsening swelling and redness according to the patient, I have ordered a CT scan of the left upper extremity to assess for abscess  · Patient will need follow-up blood cultures to document clearance of bacteremia  · Patient will need a PICC line but not until follow-up blood cultures are negative    Infection Control Recommendations:   Contact precautions    Discharge Planning:   Estimated Length of IV antimicrobials: At least 2 weeks, though likely 4 weeks  Patient will need Midline Catheter Insertion/ PICC line Insertion: No  Patient will need: Home IV , [place],  SNF,  LTAC: Undetermined  Patient willneed outpatient wound care: No    Medical Decision making / Summary of Stay:   Eleanor Lund a 64y.o.-year-old female who was initially admitted on 12/28/2020.   Sarika has a history of diabetes mellitus, hyperlipidemia, hypertension, anxiety disorder, and locally advanced pancreatic cancer and was receiving chemotherapy with Xeloda and radiation therapy. She was recently hospitalized at [place] [address] after a syncopal episode and hypoglycemia.  The patient was discharged 12/23/2020.     The patient presented to the emergency department with swelling and redness at a prior IV site in her left forearm.  The patient started having fevers/chills on the day of admission which prompted the emergency room evaluation.  The temperature was 101.5 degrees in the emergency department. General: No focal deficit present. Mental Status: She is alert and oriented to person, place, and time. Mental status is at baseline. Psychiatric:         Mood and Affect: Mood normal.         Behavior: Behavior normal.                 Laboratory data:   I have independently reviewed the followinglabs:  CBC with Differential:   Recent Labs     12/29/20  0612 12/30/20  0529   WBC 3.5 6.0   HGB 8.7* 9.3*   HCT 29.3* 30.2*   PLT 93* 133*   LYMPHOPCT 5* 5*   MONOPCT 9 10*     BMP:   Recent Labs     12/29/20  0612 12/29/20  1421 12/30/20  0529 12/30/20  1612   * 130* 132*  --    K 3.5* 3.5* 3.4*  --    CL 94* 93* 97*  --    CO2 24 28 26  --    BUN 17 17 17  --    CREATININE 0.95* 0.93* 1.15*  --    MG 1.0*  --   --  1.1*     Hepatic Function Panel:   Recent Labs     12/29/20  0612   PROT 4.9*   LABALBU 2.0*   BILITOT 0.48   ALKPHOS 93   ALT 17   AST 22         Lab Results   Component Value Date    PROCAL 10.17 07/21/2020     No results found for: CRP  No results found for: SEDRATE      Lab Results   Component Value Date    DDIMER 1.92 06/08/2018     Lab Results   Component Value Date    FERRITIN 148 06/03/2020     No results found for: LDH  No results found for: FIBRINOGEN    Results in Past 30 Days  Result Component Current Result Ref Range Previous Result Ref Range   SARS-CoV-2      (12/28/2020)  Not Detected (12/16/2020) Not Detected    Not Detected (12/28/2020) Not Detected      Not Detected (12/28/2020) Not Detected       Lab Results   Component Value Date    COVID19 Not Detected 12/28/2020    COVID19 Not Detected 12/28/2020    COVID19 Not Detected 12/16/2020    COVID19 Not Detected 07/21/2020    COVID19 Not Detected 06/03/2020       Recent Labs     12/30/20  0529   VANCOTROUGH 24.0*       Imaging Studies:   No new imaging    Cultures:     Culture, Blood 1 [[id number]] (Abnormal) Collected: 12/28/20 1400   Order Status: Completed Specimen: Blood Updated: 12/30/20 0745    Specimen Description . BLOOD Special Requests 6ML VAC    Culture POSITIVE Blood Culture Results called to and read back by: EDU DUBON AT 0845 12/29/20Abnormal      DIRECT GRAM STAIN FROM BOTTLE: GRAM POSITIVE COCCI IN CLUSTERS     METHICILLIN RESISTANT STAPHYLOCOCCUS AUREUS For susceptibility, refer to previous culture. Abnormal      12/30/2020  7:42 AM - Sarahi Gordon Incoming Lab Results From [facility]    Specimen Information: Blood        Component Collected Lab   Specimen Description 12/28/2020  2:30  [facility]   . BLOOD    Special Requests 12/28/2020  2:30  [facility]   RFA 10ML    Culture Abnormal  12/28/2020  2:30 PM [address] Blood Culture Results called to and read back by: NUPUR Paiz Lamp M 0400 12/29/20    Culture 12/28/2020  2:30 PM [address] [facility] FROM BOTTLE: Everrett Lemming POSITIVE COCCI IN CLUSTERS    Culture Abnormal  12/28/2020  2:30 PM [address]    Testing Performed By    [facility] Gianni Multani Name Director Address Valid Date Range   [address] [address] 08/30/17 0801-Present   245-- 1246 [address] LAB Joe Salomon MD [address] [place] [address] 08/30/17 0757-Present   Susceptibility    Methicillin resistant staphylococcus aureus (3)    Antibiotic Interpretation DENA Status    penicillin Resistant  Final     >=0.5   RESISTANT   cefoxitin screen  NOT REPORTED Final    ciprofloxacin   Final     NOT REPORTED    clindamycin Sensitive  Final     <=0.25   SUSCEPTIBLE   erythromycin Resistant  Final     >=8   RESISTANT   gentamicin Sensitive  Final     <=0.5   SUSCEPTIBLE   gentamicin Sensitive Gentamicin is used only in combination with other active agents that test susceptible.  Final    Induced Clind Resist Negative NEGATIVE Final    levofloxacin Intermediate  Final 4   INTERMEDIATE   linezolid   Final     NOT REPORTED    moxifloxacin   Final     NOT REPORTED    nitrofurantoin   Final     NOT REPORTED    oxacillin Resistant  Final     >=4   RESISTANT   Synercid   Final     NOT REPORTED    rifampin   Final     NOT REPORTED    tetracycline Sensitive  Final     <=1   SUSCEPTIBLE   tigecycline   Final     NOT REPORTED    trimethoprim-sulfamethoxazole Sensitive  Final     <=10   SUSCEPTIBLE   vancomycin Sensitive  Final     <=0.5   SUSCEPTIBLE   Lab and Collection        Medications:      vancomycin  1,500 mg Intravenous Q24H    vancomycin (VANCOCIN) intermittent dosing (placeholder)   Other RX Placeholder    potassium chloride  20 mEq Oral BID WC    sertraline  150 mg Oral Daily    amLODIPine  10 mg Oral Daily    aspirin  81 mg Oral Nightly    atorvastatin  40 mg Oral Nightly    carvedilol  12.5 mg Oral BID WC    hydrALAZINE  25 mg Oral 3 times per day    insulin glargine  22 Units Subcutaneous Daily    sodium chloride flush  10 mL Intravenous 2 times per day    insulin lispro  0-6 Units Subcutaneous TID WC    insulin lispro  0-3 Units Subcutaneous Nightly    enoxaparin  30 mg Subcutaneous BID           [facility]  [address] [place] [address]  Perfect Serve messaging  OFFICE: [phone]      Electronically signed by [address] [place], [place] [address]EMERSON CNP on 12/31/2020 at 5:57 AM  Thank you for allowing us to participate in the care of this patient. Please call with questions. This note iscreated with the assistance of a speech recognition program.  While intending to generate a document that actually reflects the content of the visit, the document can still have some errors including those of syntax andsound a like substitutions which may escape proof reading. In such instances, actual meaning can be extrapolated by contextual diversion.

## 2020-12-31 NOTE — CARE COORDINATION
Edward Bee from [place] informed of potential need for home IV ATB. CT scan of lt upper extremity ordered to assess for abscess. Continue to follow ID plan of care.

## 2020-12-31 NOTE — PROGRESS NOTES
 Peripheral venous insufficiency    Postprocedural state    Primary localized osteoarthrosis of ankle and foot    Urinary tract infectious disease    Transaminitis    Intractable vomiting    Septicemia due to E. coli ([facility])    Acute obstructive cholangitis    TORREY [facility]acute kidney injury) [facility] [address]    Dehydration with hyponatremia    NSVT (nonsustained ventricular tachycardia) ([facility])    Hypoglycemia    Cellulitis       Palliative Interaction: Pt known to us from our [facility] where we manage patient's pain medicine. Pt states she feels her arm is not much better today. CT scan was done today to r/o abscess. Pt will most likely be going home with IV antibiotics. Will need PICC line once blood cultures negative. Palliative NP to see patient tomorrow to adjust pain meds for home as patient states oxycodone 10mg is no longer effective. Comfort measures and emotional support offered to patient. Recliner put in patient's room for comfort. Will follow along for support. Goals/Plan of care  Education/support to patient  Discharge planning/helping to coordinate care  Communications with primary service  Providing support for coping/adaptation/distress of patient  Discussing meaning/purpose   Specific spiritual beliefs/practices  Continue with current plan of care  Clarification of medical condition to patient and family  Code status clarified: [facility]  Validating patient/family distress  Continued communication updates  Recognizing, reflecting, and empathizing with the patient's anticipatory grief  Palliative NP to come see patient to adjust home pain medications as current regimen is no longer effective. Will continue to see patient in our palliative care clinic to manage her pain.        Palliative Care Coordinator  TAMIR [facility] NUPUR Retana, ALONSO ROJAS [facility] Office: [address] Office: [phone]    For Symptom Management Clinic scheduling please call [phone]

## 2020-12-31 NOTE — PROGRESS NOTES
Pharmacy Note  Vancomycin Consult - Follow Up     Vancomycin Therapy Day: 4  Current Dosinmg Q24h  Current diagnosis for which MRSA is suspected/confirmed: MRSA bacteremia  ONLY for suspected pneumonia or COPD: MRSA nasal swab   N/A: Non respiratory infection. .      Temp: 98.2    Actual Weight:   Wt Readings from Last 1 Encounters:   20 254 lb 4.8 oz (115.3 kg)       Recent Labs     20  0529 20  0530   CREATININE 1.15* 1.44*     Calculate CrCl based on IBW: 38 mL/min    Recent Labs     20  0529 20  0530   WBC 6.0 6.3         Intake/Output Summary (Last 24 hours) at 2020 0739  Last data filed at 2020 1703  Gross per 24 hour   Intake 2449 ml   Output 200 ml   Net 2249 ml           ASSESSMENT/PLAN    Patient's cr increased again today to 1.44 from 1.15 yesterday. Increased from baseline at admission of 0.73. Discussed with Dr. Ubaldo Ackerman and will switch to daptomycin. See orders. Thank you for the consult. Will Continue to follow. Shannan López.  Marley Castorena, PharmD, BCPS  Emergency Department and Critical Care Pharmacist  2020  7:39 AM

## 2020-12-31 NOTE — PLAN OF CARE
Problem: Pain:  Goal: Pain level will decrease  Description: Pain level will decrease  12/31/2020 0332 by Negra Cline RN  Outcome: Ongoing     Problem: Pain:  Goal: Control of acute pain  Description: Control of acute pain  12/31/2020 0332 by Negra Cline RN  Outcome: Ongoing     Problem: Pain:  Goal: Control of chronic pain  Description: Control of chronic pain  12/31/2020 0332 by Negra Cline RN  Outcome: Ongoing     Problem: Discharge Planning:  Goal: Discharged to appropriate level of care  Description: Discharged to appropriate level of care  12/31/2020 0332 by Negra Cline RN  Outcome: Ongoing     Problem:  Body Temperature - Imbalanced:  Goal: Ability to maintain a body temperature in the normal range will improve  Description: Ability to maintain a body temperature in the normal range will improve  12/31/2020 0332 by Negra Cline RN  Outcome: Ongoing     Problem: Mobility - Impaired:  Goal: Mobility will improve to maximum level  Description: Mobility will improve to maximum level  12/31/2020 0332 by Negra Cline RN  Outcome: Ongoing     Problem: Pain:  Goal: Pain level will decrease  Description: Pain level will decrease  12/31/2020 0332 by Negra Cline RN  Outcome: Ongoing     Problem: Pain:  Goal: Control of acute pain  Description: Control of acute pain  12/31/2020 0332 by Negra lCine RN  Outcome: Ongoing     Problem: Pain:  Goal: Control of chronic pain  Description: Control of chronic pain  12/31/2020 0332 by Negra Cline RN  Outcome: Ongoing     Problem: Skin Integrity - Impaired:  Goal: Will show no infection signs and symptoms  Description: Will show no infection signs and symptoms  12/31/2020 0332 by Negra Cline RN  Outcome: Ongoing     Problem: Skin Integrity - Impaired:  Goal: Absence of new skin breakdown  Description: Absence of new skin breakdown  12/31/2020 0332 by Negra Cline RN  Outcome: Ongoing     Problem: Falls - Risk of:  Goal: Will remain free from falls Description: Will remain free from falls  12/31/2020 0332 by Tatiana Echeverria RN  Outcome: Ongoing     Problem: Falls - Risk of:  Goal: Absence of physical injury  Description: Absence of physical injury  12/31/2020 0332 by Tatiana Echeverria RN  Outcome: Ongoing

## 2020-12-31 NOTE — PROGRESS NOTES
VASCULAR SURGERY  PROGRESS NOTE      12/31/2020 6:11 PM  Subjective:   Admit Date: 12/28/2020  PCP: Marcelle Spain    Chief Complaint   Patient presents with    Arm Pain     swelling and redness where prior IV was. Interval History: No complaints. LUE cellulitis improving. Diet: DIET CARB CONTROL;    Medications:   Scheduled Meds:   daptomycin (CUBICIN) IVPB  6 mg/kg (Adjusted) Intravenous Q24H    potassium chloride  20 mEq Oral BID WC    sertraline  150 mg Oral Daily    amLODIPine  10 mg Oral Daily    aspirin  81 mg Oral Nightly    [Held by provider] atorvastatin  40 mg Oral Nightly    carvedilol  12.5 mg Oral BID WC    hydrALAZINE  25 mg Oral 3 times per day    insulin glargine  22 Units Subcutaneous Daily    sodium chloride flush  10 mL Intravenous 2 times per day    insulin lispro  0-6 Units Subcutaneous TID WC    insulin lispro  0-3 Units Subcutaneous Nightly    enoxaparin  30 mg Subcutaneous BID     Continuous Infusions:   sodium chloride 100 mL/hr at 12/31/20 0535    dextrose           Labs:   CBC:   Recent Labs     12/29/20  0612 12/30/20  0529 12/31/20  0530   WBC 3.5 6.0 6.3   HGB 8.7* 9.3* 8.4*   PLT 93* 133* 117*     BMP:    Recent Labs     12/29/20  1421 12/30/20  0529 12/31/20  0530   * 132* 129*   K 3.5* 3.4* 3.9   CL 93* 97* 97*   CO2 28 26 24   BUN 17 17 19   CREATININE 0.93* 1.15* 1.44*   GLUCOSE 279* 95 243*     Hepatic:   Recent Labs     12/29/20  0612   AST 22   ALT 17   BILITOT 0.48   ALKPHOS 93     Troponin: Invalid input(s): TROPONIN  BNP: No results for input(s): BNP in the last 72 hours. Lipids: No results for input(s): CHOL, HDL in the last 72 hours. Invalid input(s): LDLCALCU  INR: No results for input(s): INR in the last 72 hours.     Objective:   Vitals: BP (!) 142/52   Pulse 76   Temp 98.2 °F (36.8 °C) (Oral)   Resp 16   Ht 5' 5\" (1.651 m)   Wt 254 lb 4.8 oz (115.3 kg)   SpO2 97%   BMI 42.32 kg/m² General appearance: alert, cooperative and no distress  Mental Status: oriented to person, place and time with normal affect  Neck: good carotid pulses, no JVD  Lungs: clear to auscultation bilaterally, normal effort  Heart: regular rate and rhythm, no murmur,  Abdomen: soft, non-tender, non-distended, bowel sounds present all four quadrants, no masses, hepatomegaly, splenomegaly or aortic enlargement  Extremities: no edema, erythema or tenderness in the calves. Moderate Lt arm swelling with decreased erythema  Skin: no gross lesions, rashes, or induration    Assessment:   3 74-year-old female with metastatic pancreatic carcinoma, left arm cellulitis and superficial thrombophlebitis  2.  No evidence of left upper extremity DVT    Patient Active Problem List:     Uncontrolled diabetes mellitus (HCC)     Hypertension     Postmenopausal bleeding     Thickened endometrium     Type 2 diabetes mellitus with hyperglycemia, with long-term current use of insulin (HCC)     Hypophosphatemia     Hypomagnesemia     Hyperglycemia due to type 2 diabetes mellitus (HCC)     Pancreatic Head Mass     Non-Obstructive CAD     Primary adenocarcinoma of head of pancreas (HCC)     Intractable nausea and vomiting     Class 3 severe obesity with serious comorbidity in adult [facility])     Amnesia     Anxiety     Benign neoplasm of choroid     Depression     Diabetic complication (HCC)     Diabetic peripheral neuropathy associated with type 2 diabetes mellitus (HCC)     Hepatomegaly     Methicillin resistant Staphylococcus aureus infection     Long term current use of insulin (HCC)     Hyperlipidemia     Moderate nonproliferative diabetic retinopathy associated with type 2 diabetes mellitus (HCC)     Morbid obesity (HCC)     Pain in limb     Pancreatic adenocarcinoma (HCC)     Pancreatic malignancy syndrome ([facility] [address]     Peripheral venous insufficiency     Postprocedural state     Primary localized osteoarthrosis of ankle and foot Urinary tract infectious disease     Transaminitis     Intractable vomiting     Septicemia due to E. coli (HCC)     Acute obstructive cholangitis     TORREY (acute kidney injury) ([facility] [address]     Dehydration with hyponatremia     NSVT (nonsustained ventricular tachycardia) ([facility])     Hypoglycemia     Cellulitis      Plan:   1. Continue ABX  2. Continue ASA  3.  Arm elevation    Electronically signed by Pam Greenberg MD on 12/31/2020 at 6:11 PM

## 2020-12-31 NOTE — PROGRESS NOTES
Subjective:    follow up dm2  No ;polyuria no polydypsia no hypoglycemia.  Blood sugars reviewed    ROS  No fever no chills no gu gi or cardio pulm co, cstill pain redness swelling lt forearm , no tia nop bleeding no headache no skin lesions  physical exam  General Appearance: alert and oriented to person, place and time and in no acute distress  Skin: lt forearm swollen red warm  eyesnormocephalic and atraumatic  Eyes: pupils equal, round, and reactive to light and conjunctivae normal     Neck: neck supple and non tender without mass   Pulmonary/Chest: clear to auscultation bilaterally- no wheezes, rales or rhonchi, normal air movement, no respiratory distress  Cardiovascular: normal rate, regular rhythm, normal S1 and S2, no gallops, intact distal pulses and no carotid bruits  Abdomen: soft, non-tender, non-distended, normal bowel sounds, no masses or organomegaly  Extremities: trace edema good pulses no sherly sign    Neurologic: alert oriented x 3 no focal deficit    BP (!) 142/52   Pulse 76   Temp 98.2 °F (36.8 °C) (Oral)   Resp 16   Ht 5' 5\" (1.651 m)   Wt 254 lb 4.8 oz (115.3 kg)   SpO2 97%   BMI 42.32 kg/m²     CBC:   Lab Results   Component Value Date    WBC 6.3 12/31/2020    RBC 3.09 12/31/2020    RBC 4.33 03/05/2012    HGB 8.4 12/31/2020    HCT 26.8 12/31/2020    MCV 86.7 12/31/2020    MCH 27.2 12/31/2020    MCHC 31.3 12/31/2020    RDW 18.1 12/31/2020     12/31/2020     03/05/2012    MPV 11.2 12/31/2020     BMP:    Lab Results   Component Value Date     12/31/2020    K 3.9 12/31/2020    CL 97 12/31/2020    CO2 24 12/31/2020    BUN 19 12/31/2020    LABALBU 2.0 12/29/2020    CREATININE 1.44 12/31/2020    CALCIUM 9.0 12/31/2020    GFRAA 45 12/31/2020    LABGLOM 37 12/31/2020    GLUCOSE 243 12/31/2020    GLUCOSE 278 03/05/2012        Assessment:  Patient Active Problem List   Diagnosis    Uncontrolled diabetes mellitus ([address]    Hypertension    Postmenopausal bleeding  Thickened endometrium    Type 2 diabetes mellitus with hyperglycemia, with long-term current use of insulin (HCC)    Hypophosphatemia    Hypomagnesemia    Hyperglycemia due to type 2 diabetes mellitus ([address]    Pancreatic Head Mass    Non-Obstructive CAD    Primary adenocarcinoma of head of pancreas (HCC)    Intractable nausea and vomiting    Class 3 severe obesity with serious comorbidity in adult [facility])    Amnesia    Anxiety    Benign neoplasm of choroid    Depression    Diabetic complication (HCC)    Diabetic peripheral neuropathy associated with type 2 diabetes mellitus (HCC)    Hepatomegaly    Methicillin resistant Staphylococcus aureus infection    Long term current use of insulin (HCC)    Hyperlipidemia    Moderate nonproliferative diabetic retinopathy associated with type 2 diabetes mellitus (HCC)    Morbid obesity (HCC)    Pain in limb    Pancreatic adenocarcinoma (HCC)    Pancreatic malignancy syndrome ([address]    Peripheral venous insufficiency    Postprocedural state    Primary localized osteoarthrosis of ankle and foot    Urinary tract infectious disease    Transaminitis    Intractable vomiting    Septicemia due to E. coli (HCC)    Acute obstructive cholangitis    TORREY (acute kidney injury) ([address]    Dehydration with hyponatremia    NSVT (nonsustained ventricular tachycardia) (HCC)    Hypoglycemia    Cellulitis   cellulitis lt forearm    dm2 with hyperglycemia insulin treated  dm2 with remal complication , ckd3 vs torrey   hyponatremia hypok    Morbid obesity  Metastatic  Pancreatic ca    Plan:    meds labs reviewed, dvt prophylaxis , avoid nephrotoxic drugs , ac hs accucheck bs  Insulin coverage continue antibiotics , ct arm no abscess found, see orders      Liliana Beltran MD  4:55 PM

## 2020-12-31 NOTE — PROGRESS NOTES
The writer spoke with Dr Paulina Rios on the phone; CT results read to physician including status and medications reviewed. No new orders.

## 2020-12-31 NOTE — PROGRESS NOTES
Occupational Therapy   Occupational Therapy Initial Assessment  Date: 2020   Patient Name: Emre Herndon  MRN: [id number]     : 1960    Date of Service: 2020     RN MARIA ELENA reports patient is medically stable for therapy treatment this date. Chart reviewed prior to treatment and patient is agreeable for therapy. All lines intact and patient positioned comfortably at end of treatment. All patient needs addressed prior to ending therapy session. Discharge Recommendations:  Home with assist PRN  OT Equipment Recommendations  Equipment Needed: Yes  Mobility Devices: ADL Assistive Devices  ADL Assistive Devices: Long-handled Shoe Horn;Long-handled Sponge;Sock-Aid Soft; Toileting - 3-in-1 Commode;Grab Bars - toilet;Grab Bars - shower;Emergency Alert System; Reacher    Assessment   Performance deficits / Impairments: Decreased functional mobility ; Decreased ADL status; Decreased ROM; Decreased safe awareness;Decreased endurance;Decreased sensation;Decreased high-level IADLs;Decreased fine motor control;Decreased coordination;Decreased posture  Prognosis: Fair  Decision Making: Medium Complexity  OT Education: OT Role;Plan of Care;ADL Adaptive Strategies;Transfer Training;Energy Conservation  Patient Education: fall prevention/call light use, recommendations for continued therapy, safety in function, edema   REQUIRES OT FOLLOW UP: Yes  Activity Tolerance  Activity Tolerance: Patient Tolerated treatment well  Safety Devices  Safety Devices in place: Yes  Type of devices: Call light within reach;Gait belt;Nurse notified; Left in chair(elevated LUE with 2 pillows under for increased support/edema management as well as elevated BLE on stool with pillow underneath for increased comfort/edema management. Pt was educated on ankle pumping/edema , pt verbalized understanding.)           Patient Diagnosis(es): The encounter diagnosis was Cellulitis of left upper extremity. has a past medical history of Anxiety, Diabetes mellitus ([facility] [address] Hyperlipidemia, Hypertension, and Pancreatic cancer ([facility] [address] has a past surgical history that includes  section; Gallbladder surgery; ERCP (2020); Upper gastrointestinal endoscopy (2020); Upper gastrointestinal endoscopy (2020); ERCP (2020); ERCP (2020); ERCP (2020); ERCP (2020); ERCP (2020); and ERCP (2020). Per H&P: Catalina Yo is a 64 y.o. female who presents to the emergency department by private vehicle for evaluation of swelling and redness to the IV site. Patient was admitted to [facility] from Monday to Wednesday she had an IV in her left forearm. She is currently undergoing oral chemotherapy and radiation for pancreatic cancer. She states that her left arm has been red, swollen and warm. She states that the area of redness and swelling has gotten progressively worse. She states that today she started having fevers and chills so she came to the emergency room for evaluation. She rates the pain a 7 on a 0-to-10 scale. Pain is worse with movement.      Restrictions  Restrictions/Precautions  Restrictions/Precautions: General Precautions, Contact Precautions, Isolation  Position Activity Restriction  Other position/activity restrictions: RUE IV, R chest port, current chemo treatment, telemetry, up as mariposa , DNR-CC    Subjective   General  Chart Reviewed: Yes  Patient assessed for rehabilitation services?: Yes  Family / Caregiver Present: No  Pt stated she has 10/10 pain in LUE, RN notified    Social/Functional History  Social/Functional History  Lives With: Son, Other (comment)(Pt states her son and ex  live with her and are both are supportive)  Type of Home: House  Home Layout: One level  Home Access: Stairs to enter with rails  Entrance Stairs - Number of Steps: 3  Entrance Stairs - Rails: Right  Bathroom Shower/Tub: Walk-in shower  Bathroom Toilet: Standard Bathroom Equipment: (Pt states there is a vanity close by and can be used as support)  Home Equipment: Jazmin Ramirez Help From: Family(Pt states family is supportive)  ADL Assistance: Independent  Homemaking Assistance: Independent(Pt states family shares household chores)  Homemaking Responsibilities: Yes  Ambulation Assistance: Independent  Transfer Assistance: Independent  Active : Yes  Occupation: Retired  Type of occupation: Worked at [address] [place] [address] Drive: going out with friends  Additional Comments: Pt states she had a fall before venecia d/t low blood sugar. Pt was admitted to [facility] after fall and was discharge home prior to arrival at [address] [place], [place] [address],[address]. Objective   Vision: Impaired(Pt states she has blurry vision in L eye since admission)  Vision Exceptions: Wears glasses at all times  Hearing: Within functional limits    Orientation  Overall Orientation Status: Within Functional Limits  Observation/Palpation  Posture: Fair  Observation: swelling and redness in LUE, RUE IV, redness and complaints of blurry vision in R eye, DNR-CC,  Edema: BLE and LUE  Balance  Sitting Balance: Stand by assistance  Standing Balance: Contact guard assistance(Pt denied AD use)  Standing Balance  Time: standing mariposa < 2 min for functional mobility  Functional Mobility  Functional - Mobility Device: No device  Activity: (bed to bedside chair)  Assist Level: Contact guard assistance  Functional Mobility Comments: Pt required Min verbal cues for slowing down movements, and awareness/assist with lines all to increase safety and reduce fall risk.   Toilet Transfers  Toilet Transfer: Unable to assess  Toilet Transfers Comments: N/T Pt with no needs at this time     ADL  Feeding: Setup  Grooming: Setup;Stand by assistance  UE Bathing: Setup;Stand by assistance  LE Bathing: Setup;Maximum assistance  UE Dressing: Minimal assistance;Setup(with hosp gown) LE Dressing: Maximum assistance(D/t swelling in LUE pt required Max A to don B socks on EOB.)  Toileting: Stand by assistance  Tone RUE  RUE Tone: Normotonic  Tone LUE  LUE Tone: Normotonic  Coordination  Movements Are Fluid And Coordinated: No(Pt unable to oppose L 3 and 4th digit d/t edema.)  Coordination and Movement description: Fine motor impairments     Bed mobility  Bridging: Stand by assistance  Supine to Sit: Stand by assistance  Sit to Supine: Stand by assistance  Scooting: Stand by assistance  Comment: Pt with good use of bed rail  Transfers  Stand Step Transfers: Contact guard assistance(Pt declined AD)  Sit to stand: Contact guard assistance  Stand to sit: Contact guard assistance  Transfer Comments: Pt required Min verbal cues/ tactile assist for controlled stand to sit, reaching back prior to sitting and slowing down movements and awareness/assist with lines all to increase safety. Cognition  Overall Cognitive Status: Exceptions  Arousal/Alertness: Appropriate responses to stimuli  Following Commands:  Follows all commands without difficulty  Attention Span: Appears intact  Safety Judgement: Decreased awareness of need for assistance;Decreased awareness of need for safety  Problem Solving: Able to problem solve independently  Insights: Decreased awareness of deficits  Initiation: Does not require cues  Sequencing: Does not require cues  Perception  Overall Perceptual Status: WFL     Sensation  Overall Sensation Status: Impaired(Pt states she has intermitent numbness in B hands and feet)        LUE AROM (degrees)  LUE General AROM: N/T d/t pain/Edema  RUE AROM (degrees)  RUE AROM : WFL  LUE Strength  Gross LUE Strength: WFL  LUE Strength Comment: N/T d/t pain/edema  RUE Strength  Gross RUE Strength: Exceptions to [facility]  RUE Strength Comment: Grossly 4-/5                   Plan   Plan  Times per week: 4-5x/wk 1x/day as Vani Mike Current Treatment Recommendations: ROM, Functional Mobility Training, Endurance Training, Equipment Evaluation, Education, & procurement, Patient/Caregiver Education & Training, Self-Care / ADL, Home Management Training, Safety Education & Training, Pain Management      AM-PAC Score: 18              Goals  Short term goals  Time Frame for Short term goals: By discharge, pt to demo  Short term goal 1: bed mobility to Mod I with use of bedrails as needed. Short term goal 2: total body ADLs to Mod I with use of AD/grab bars/AE as needed. Short term goal 3: increased BUE strength by 1/2 grade/I with BUE HEP with use of handouts to assist with self care. Short term goal 4: ADL transfers and functional mobility to SUP with use of AD as needed. Short term goal 5: increased standing mariposa > 6 min with SBA using AD as needed to reduce fall risk during functional task. Long term goals  Long term goal 1: Pt to be I with fall prevention/ EC/WS tech, recommendations for AE/DME with use of handouts as needed. Patient Goals   Patient goals : To go home!        Therapy Time   Individual Concurrent Group Co-treatment   Time In 0926(plus 10 min for chart review/RN communication)         Time Out 1002         [address], OT

## 2020-12-31 NOTE — PROGRESS NOTES
Today's Date: 12/30/2020  Patient Name: Emre Herndon  Date of admission: 12/28/2020  1:26 PM  Patient's age: 64 y. o., 1960  Admission Dx: Cellulitis [L03.90]    Reason for Consult: management recommendations  Requesting Physician: Crescencio Myers MD    CHIEF COMPLAINT: Left upper extremity swelling pain and redness    History Obtained From:  patient, electronic medical record    Interval history:    Patient seen and examined. Labs and vitals reviewed  Patient complains of pain and swelling of left upper extremity with redness. She feels it is better compared to yesterday afternoon  Denies fever chills  Patient is receiving antibiotics. CODE STATUS changed to DNR CCA    HISTORY OF PRESENT ILLNESS:      The patient is a 64 y.o.  female who is admitted to the hospital for presents to the ER with chief complaint swelling redness of left upper extremity around the IV site. Patient was recently hospitalized at [place] with complains of hypoglycemia. Patient has history of locally advanced, unresectable pancreatic cancer. Patient was initially challenged with FOLFIRINOX chemotherapy but it was discontinued due to intolerance. Subsequently patient was started on radiation therapy along with Xeloda. Patient is currently off the Xeloda. Patient complains of swelling redness and pain in the left upper extremity. Patient also started having fevers and presented to the ER. Patient's labs at presentation showed sodium of 128. Albumin is 2.0. Remaining LFTs are unremarkable. Patient WBC count yesterday was 5.2 with ANC of 4.5. Hemoglobin was 9.1 and platelet count of 123. Patient has been started on antibiotics using vancomycin. Ultrasound left upper extremity showed a subacute superficial vein DVT involving the basilic and cephalic vein. Past Medical History:   has a past medical history of Anxiety, Diabetes mellitus ([facility] [address] Hyperlipidemia, Hypertension, and Pancreatic cancer ([facility] [address] Past Surgical History:   has a past surgical history that includes  section; Gallbladder surgery; ERCP (2020); Upper gastrointestinal endoscopy (2020); Upper gastrointestinal endoscopy (2020); ERCP (2020); ERCP (2020); ERCP (2020); ERCP (2020); ERCP (2020); and ERCP (2020). Medications:    Prior to Admission medications    Medication Sig Start Date End Date Taking? Authorizing Provider   furosemide (LASIX) 20 MG tablet Take 20 mg by mouth daily as needed (swelling)   Yes Historical Provider, MD   LORazepam (ATIVAN) 1 MG tablet Take 0.5 mg by mouth daily as needed for Anxiety. Yes Historical Provider, MD   insulin lispro, 1 Unit Dial, (HUMALOG KWIKPEN) 100 UNIT/ML SOPN Inject 0-12 Units into the skin 3 times daily (before meals) **Medium Dose Corrective Algorithm** Glucose: Dose: If <139 - No Insulin. 140-199 -  2 Units. 200-249 4 Units. 250-299 6 Units. 300-349 8 Units. 350-400 10 Units. Above 400- 12 Units 20  Yes Valorie Rodgers MD   insulin glargine (LANTUS SOLOSTAR) 100 UNIT/ML injection pen Inject 22 Units into the skin daily 20  Yes Valorie Rodgers MD   oxyCODONE (OXY-IR) 10 MG immediate release tablet Take 1 tablet by mouth every 6 hours as needed for Pain for up to 30 days. 20 Yes Neptali Izaguirre MD   prochlorperazine (COMPAZINE) 10 MG tablet Take 1 tablet by mouth every 6 hours as needed (NV) 20  Yes Mary Jane Campbell MD   capecitabine (XELODA) 150 MG chemo tablet TAKE 2 TABLETS BY MOUTH TWICE DAILY ON MONDAY TO FRIDAY WHILE ON RADIATION. 20  Yes Mary Jane Campbell MD   capecitabine (XELODA) 500 MG chemo tablet TAKE 3 TABLETS BY MOUTH TWICE DAILY ON MONDAY TO FRIDAY WHILE ON RADIATION.  20  Yes Mary Jane Campbell MD ondansetron (ZOFRAN-ODT) 8 MG TBDP disintegrating tablet Place 1 tablet under the tongue every 8 hours as needed for Nausea or Vomiting 7/6/20  Yes Veto Mason MD   hydrALAZINE (APRESOLINE) 25 MG tablet Take 1 tablet by mouth every 8 hours 6/6/20  Yes Cielo Guajardo MD   carvedilol (COREG) 12.5 MG tablet Take 1 tablet by mouth 2 times daily (with meals) 6/6/20  Yes Cielo Guajardo MD   amLODIPine (NORVASC) 10 MG tablet Take 1 tablet by mouth daily 6/7/20  Yes Cielo Guajardo MD   aspirin 81 MG chewable tablet Take 81 mg by mouth nightly   Yes Historical Provider, MD   sertraline (ZOLOFT) 100 MG tablet Take 150 mg by mouth daily Takes 1.5 tabs (=150mg) daily   Yes Historical Provider, MD   atorvastatin (LIPITOR) 40 MG tablet Take 40 mg by mouth nightly  10/12/13  Yes Historical Provider, MD   LORazepam (ATIVAN) 0.5 MG tablet Take 1 mg by mouth nightly. And 1/2 tab once daily as needed 10/12/13  Yes Historical Provider, MD   Insulin Pen Needle ([employer] PEN NEEDLES 29G) 29G X 12MM MISC 1 each by Does not apply route daily 12/23/20   Nesha Hernandez MD   glucose monitoring kit (FREESTYLE) monitoring kit 1 kit by Does not apply route daily 12/23/20   Nesha Hernandez MD   blood glucose monitor strips Test 3  times daily  Insulin Dependent mellitus 12/23/20   Nesha Hernandez MD   Lancets MISC 1 each by Does not apply route 3 times daily 12/23/20   Nesha Hernandez MD   nitroGLYCERIN (NITROSTAT) 0.4 MG SL tablet up to max of 3 total doses.  If no relief after 1 dose, call 911. 6/6/20   Cielo Guajardo MD     Current Facility-Administered Medications   Medication Dose Route Frequency Provider Last Rate Last Admin    vancomycin (VANCOCIN) 1,500 mg in dextrose 5 % 250 mL IVPB  1,500 mg Intravenous Q24H Crescencio Myers .7 mL/hr at 12/30/20 1852 Restarted at 12/30/20 1852    polyvinyl alcohol (LIQUIFILM TEARS) 1.4 % ophthalmic solution 1 drop  1 drop Left Eye ERICH Myers MD   1 drop at 12/30/20 3568  magnesium sulfate 1 g in dextrose 5% 100 mL IVPB  1 g Intravenous Q1H Arabella Petersen MD        vancomycin (VANCOCIN) intermittent dosing (placeholder)   Other RX Lul Casey MD   Given at 12/29/20 1337    potassium chloride (KLOR-CON M) extended release tablet 20 mEq  20 mEq Oral BID  Arabella Petersen MD   20 mEq at 12/30/20 1654    sertraline (ZOLOFT) tablet 150 mg  150 mg Oral Daily Arabella Petersen MD   150 mg at 12/30/20 0918    HYDROmorphone HCl PF (DILAUDID) injection 1 mg  1 mg Intravenous Q6H PRN Arabella Petersen MD   1 mg at 12/30/20 1249    0.9 % sodium chloride infusion   Intravenous Continuous Arabella Petersen  mL/hr at 12/30/20 1450 Rate Change at 12/30/20 1450    amLODIPine (NORVASC) tablet 10 mg  10 mg Oral Daily Arabella Petersen MD   10 mg at 12/30/20 4663    aspirin chewable tablet 81 mg  81 mg Oral Nightly Arabella Petersen MD   81 mg at 12/29/20 2207    atorvastatin (LIPITOR) tablet 40 mg  40 mg Oral Nightly Arabella Petersen MD   40 mg at 12/29/20 2207    carvedilol (COREG) tablet 12.5 mg  12.5 mg Oral BID  Arabella Petersen MD   12.5 mg at 12/30/20 1655    hydrALAZINE (APRESOLINE) tablet 25 mg  25 mg Oral 3 times per day Arabella Petersen MD   25 mg at 12/30/20 1450    insulin glargine (LANTUS) injection vial 22 Units  22 Units Subcutaneous Daily Arabella Petersen MD   22 Units at 12/30/20 0917    oxyCODONE (ROXICODONE) immediate release tablet 10 mg  10 mg Oral Q6H PRN Arabella Petersen MD   10 mg at 12/30/20 1535    sodium chloride flush 0.9 % injection 10 mL  10 mL Intravenous 2 times per day Arabella Petersen MD   10 mL at 12/30/20 0918    sodium chloride flush 0.9 % injection 10 mL  10 mL Intravenous PRN Arabella Petersen MD   10 mL at 12/30/20 1250    promethazine (PHENERGAN) tablet 12.5 mg  12.5 mg Oral Q6H PRN Arabella Petersen MD        Or    ondansetron (ZOFRAN) injection 4 mg  4 mg Intravenous Q6H PRN Arabella Petersen MD  polyethylene glycol (GLYCOLAX) packet 17 g  17 g Oral Daily PRN Mei Arredondo MD        acetaminophen (TYLENOL) tablet 650 mg  650 mg Oral Q6H PRN Mei Arredondo MD   650 mg at 12/28/20 2202    Or    acetaminophen (TYLENOL) suppository 650 mg  650 mg Rectal Q6H PRN Mei Arredondo MD        glucose (GLUTOSE) 40 % oral gel 15 g  15 g Oral PRN Mei Arredondo MD        dextrose 50 % IV solution  12.5 g Intravenous PRN Mei Arredondo MD        glucagon (rDNA) injection 1 mg  1 mg Intramuscular PRN Mei Arredondo MD        dextrose 5 % solution  100 mL/hr Intravenous PRN Mei Arredondo MD        insulin lispro (HUMALOG) injection vial 0-6 Units  0-6 Units Subcutaneous TID WC Mei Arredondo MD   1 Units at 12/30/20 1654    insulin lispro (HUMALOG) injection vial 0-3 Units  0-3 Units Subcutaneous Nightly Mei Arredondo MD   1 Units at 12/29/20 2208    enoxaparin (LOVENOX) injection 30 mg  30 mg Subcutaneous BID Mei Arredondo MD   30 mg at 12/30/20 9837       Allergies:  Lisinopril, Sulfamethoxazole, Trimethoprim, Cefuroxime axetil, Sulfamethoxazole-trimethoprim, Clindamycin phosphate, and Penicillins    Social History:   reports that she quit smoking about 15 years ago. She has never used smokeless tobacco. She reports previous alcohol use. She reports that she does not use drugs. Family History: family history includes Cancer in her maternal grandfather, maternal grandmother, and mother; Heart Failure in her father; Hypertension in her father and mother. REVIEW OF SYSTEMS:      Constitutional: No fever or chills.  No night sweats, no weight loss   Eyes: No eye discharge, double vision, or eye pain   HEENT: negative for sore mouth, sore throat, hoarseness and voice change   Respiratory: negative for cough , sputum, dyspnea, wheezing, hemoptysis, chest pain   Cardiovascular: negative for chest pain, dyspnea, palpitations, orthopnea, PND Gastrointestinal: negative for nausea, vomiting, diarrhea, constipation, abdominal pain, Dysphagia, hematemesis and hematochezia   Genitourinary: negative for frequency, dysuria, nocturia, urinary incontinence, and hematuria   Integument: negative for rash, skin lesions, bruises.    Hematologic/Lymphatic: negative for easy bruising, bleeding, lymphadenopathy, or petechiae   Endocrine: negative for heat or cold intolerance,weight changes, change in bowel habits and hair loss   Musculoskeletal: Positive left upper extremity swelling redness and pain  Neurological: negative for headaches, dizziness, seizures, weakness, numbness    PHYSICAL EXAM:        BP (!) 130/45   Pulse 77   Temp 98.8 °F (37.1 °C) (Oral)   Resp 17   Ht 5' 5\" (1.651 m)   Wt 250 lb (113.4 kg)   SpO2 92%   BMI 41.60 kg/m²    Temp (24hrs), Av.7 °F (37.1 °C), Min:98.4 °F (36.9 °C), Max:99 °F (37.2 °C)      General appearance - well appearing, no in pain or distress   Mental status - alert and cooperative   Eyes - pupils equal and reactive, extraocular eye movements intact   Ears - bilateral TM's and external ear canals normal   Mouth - mucous membranes moist, pharynx normal without lesions   Neck - supple, no significant adenopathy   Lymphatics - no palpable lymphadenopathy, no hepatosplenomegaly   Chest - clear to auscultation, no wheezes, rales or rhonchi, symmetric air entry   Heart - normal rate, regular rhythm, normal S1, S2, no murmurs  Abdomen - soft, nontender, nondistended, no masses or organomegaly   Neurological - alert, oriented, normal speech, no focal findings or movement disorder noted   Musculoskeletal - no joint tenderness, deformity or swelling   Extremities -left upper extremity swelling  Skin -erythema of left upper extremity around elbow      DATA:      Labs:     Results for orders placed or performed during the hospital encounter of 20   Culture, Blood 1    Specimen: Blood   Result Value Ref Range Specimen Description . BLOOD     Special Requests RFA 10ML     Culture (A)      POSITIVE Blood Culture Results called to and read back by: NUPUR Moni Debbie 0400 12/29/20    Culture       DIRECT GRAM STAIN FROM BOTTLE: GRAM POSITIVE COCCI IN CLUSTERS    Culture METHICILLIN RESISTANT STAPHYLOCOCCUS AUREUS (A)        Susceptibility    Methicillin resistant staphylococcus aureus - BACTERIAL SUSCEPTIBILITY PANEL DENA     penicillin Value in next row Resistant       >=0.5RESISTANT     cefoxitin screen Value in next row        >=0.5RESISTANT     ciprofloxacin Value in next row        NOT REPORTED     clindamycin Value in next row Sensitive       <=0.25SUSCEPTIBLE     erythromycin Value in next row Resistant       >=8RESISTANT     gentamicin Value in next row Sensitive       <=0.5SUSCEPTIBLE     gentamicin Value in next row Sensitive       <=0.5SUSCEPTIBLE     Induced Clind Resist Value in next row Negative       <=0.5SUSCEPTIBLE     levofloxacin Value in next row Intermediate       4INTERMEDIATE     linezolid Value in next row        NOT REPORTED     moxifloxacin Value in next row        NOT REPORTED     nitrofurantoin Value in next row        NOT REPORTED     oxacillin Value in next row Resistant       >=4RESISTANT     Synercid Value in next row        NOT REPORTED     rifampin Value in next row        NOT REPORTED     tetracycline Value in next row Sensitive       <=1SUSCEPTIBLE     tigecycline Value in next row        NOT REPORTED     trimethoprim-sulfamethoxazole Value in next row Sensitive       <=10SUSCEPTIBLE     vancomycin Value in next row Sensitive       <=0.5SUSCEPTIBLE   Culture, Blood 1    Specimen: Blood   Result Value Ref Range    Specimen Description . BLOOD     Special Requests 6ML VAC     Culture (A)      POSITIVE Blood Culture Results called to and read back by: EDU DUBON AT 0845 12/29/20    Culture       DIRECT GRAM STAIN FROM BOTTLE: GRAM POSITIVE COCCI IN CLUSTERS    Culture (A) METHICILLIN RESISTANT STAPHYLOCOCCUS AUREUS For susceptibility, refer to previous culture. CBC Auto Differential   Result Value Ref Range    WBC 5.2 3.5 - 11.3 k/uL    RBC 3.35 (L) 3.95 - 5.11 m/uL    Hemoglobin 9.1 (L) 11.9 - 15.1 g/dL    Hematocrit 29.9 (L) 36.3 - 47.1 %    MCV 89.3 82.6 - 102.9 fL    MCH 27.2 25.2 - 33.5 pg    MCHC 30.4 28.4 - 34.8 g/dL    RDW 18.1 (H) 11.8 - 14.4 %    Platelets 260 (L) 673 - 453 k/uL    MPV 11.6 8.1 - 13.5 fL    NRBC Automated 0.0 0.0 per 100 WBC    Differential Type NOT REPORTED     WBC Morphology NOT REPORTED     RBC Morphology NOT REPORTED     Platelet Estimate NOT REPORTED     Seg Neutrophils 88 (H) 36 - 65 %    Lymphocytes 3 (L) 24 - 43 %    Monocytes 9 3 - 12 %    Eosinophils % 0 (L) 1 - 4 %    Basophils 0 0 - 2 %    Immature Granulocytes 0 0 %    Segs Absolute 4.57 1.50 - 8.10 k/uL    Absolute Lymph # 0.16 (L) 1.10 - 3.70 k/uL    Absolute Mono # 0.47 0.10 - 1.20 k/uL    Absolute Eos # 0.00 0.00 - 0.44 k/uL    Basophils Absolute 0.00 0.00 - 0.20 k/uL    Absolute Immature Granulocyte 0.00 0.00 - 0.30 k/uL   Basic Metabolic Panel   Result Value Ref Range    Glucose 341 (H) 70 - 99 mg/dL    BUN 14 6 - 20 mg/dL    CREATININE 0.73 0.50 - 0.90 mg/dL    Bun/Cre Ratio 19 9 - 20    Calcium 8.9 8.6 - 10.4 mg/dL    Sodium 130 (L) 135 - 144 mmol/L    Potassium 3.5 (L) 3.7 - 5.3 mmol/L    Chloride 94 (L) 98 - 107 mmol/L    CO2 22 20 - 31 mmol/L    Anion Gap 14 9 - 17 mmol/L    GFR Non-African American >60 >60 mL/min    GFR African American >60 >60 mL/min    GFR Comment          GFR Staging NOT REPORTED    Lactic Acid   Result Value Ref Range    Lactic Acid 1.7 0.5 - 2.2 mmol/L   COVID-19    Specimen: Other   Result Value Ref Range    SARS-CoV-2          SARS-CoV-2, Rapid Not Detected Not Detected    Source . NASOPHARYNGEAL SWAB     SARS-CoV-2 Not Detected Not Detected   Comprehensive Metabolic Panel w/ Reflex to MG   Result Value Ref Range    Glucose 336 (H) 70 - 99 mg/dL BUN 17 6 - 20 mg/dL    CREATININE 0.95 (H) 0.50 - 0.90 mg/dL    Bun/Cre Ratio 18 9 - 20    Calcium 9.0 8.6 - 10.4 mg/dL    Sodium 128 (L) 135 - 144 mmol/L    Potassium 3.5 (L) 3.7 - 5.3 mmol/L    Chloride 94 (L) 98 - 107 mmol/L    CO2 24 20 - 31 mmol/L    Anion Gap 10 9 - 17 mmol/L    Alkaline Phosphatase 93 35 - 104 U/L    ALT 17 5 - 33 U/L    AST 22 <32 U/L    Total Bilirubin 0.48 0.3 - 1.2 mg/dL    Total Protein 4.9 (L) 6.4 - 8.3 g/dL    Alb 2.0 (L) 3.5 - 5.2 g/dL    Albumin/Globulin Ratio NOT REPORTED 1.0 - 2.5    GFR Non-African American >60 >60 mL/min    GFR African American >60 >60 mL/min    GFR Comment          GFR Staging NOT REPORTED    CBC auto differential   Result Value Ref Range    WBC 3.5 3.5 - 11.3 k/uL    RBC 3.26 (L) 3.95 - 5.11 m/uL    Hemoglobin 8.7 (L) 11.9 - 15.1 g/dL    Hematocrit 29.3 (L) 36.3 - 47.1 %    MCV 89.9 82.6 - 102.9 fL    MCH 26.7 25.2 - 33.5 pg    MCHC 29.7 28.4 - 34.8 g/dL    RDW 18.3 (H) 11.8 - 14.4 %    Platelets 93 (L) 862 - 453 k/uL    MPV 12.2 8.1 - 13.5 fL    NRBC Automated 0.0 0.0 per 100 WBC    Differential Type NOT REPORTED     WBC Morphology NOT REPORTED     RBC Morphology NOT REPORTED     Platelet Estimate NOT REPORTED     Seg Neutrophils 85 (H) 36 - 65 %    Lymphocytes 5 (L) 24 - 43 %    Monocytes 9 3 - 12 %    Eosinophils % 0 (L) 1 - 4 %    Basophils 0 0 - 2 %    Immature Granulocytes 1 (H) 0 %    Segs Absolute 2.96 1.50 - 8.10 k/uL    Absolute Lymph # 0.18 (L) 1.10 - 3.70 k/uL    Absolute Mono # 0.32 0.10 - 1.20 k/uL    Absolute Eos # 0.00 0.00 - 0.44 k/uL    Basophils Absolute 0.00 0.00 - 0.20 k/uL    Absolute Immature Granulocyte 0.04 0.00 - 0.30 k/uL   Hemoglobin A1C   Result Value Ref Range    Hemoglobin A1C 8.5 (H) 4.0 - 6.0 %    Estimated Avg Glucose 197 mg/dL   Magnesium   Result Value Ref Range    Magnesium 1.0 (L) 1.6 - 2.6 mg/dL   Basic Metabolic Panel   Result Value Ref Range    Glucose 279 (H) 70 - 99 mg/dL    BUN 17 6 - 20 mg/dL CREATININE 0.93 (H) 0.50 - 0.90 mg/dL    Bun/Cre Ratio 18 9 - 20    Calcium 9.5 8.6 - 10.4 mg/dL    Sodium 130 (L) 135 - 144 mmol/L    Potassium 3.5 (L) 3.7 - 5.3 mmol/L    Chloride 93 (L) 98 - 107 mmol/L    CO2 28 20 - 31 mmol/L    Anion Gap 9 9 - 17 mmol/L    GFR Non-African American >60 >60 mL/min    GFR African American >60 >60 mL/min    GFR Comment          GFR Staging NOT REPORTED    Lactic Acid   Result Value Ref Range    Lactic Acid 2.2 0.5 - 2.2 mmol/L   VANCOMYCIN, TROUGH   Result Value Ref Range    Vancomycin Tr 24.0 (HH) 10.0 - 20.0 ug/mL    Vancomycin Trough Dose amount NOT REPORTED     Vancomycin Trough Date last dose NOT REPORTED     Vancomycin Trough Time last dose NOT REPORTED    CBC Auto Differential   Result Value Ref Range    WBC 6.0 3.5 - 11.3 k/uL    RBC 3.48 (L) 3.95 - 5.11 m/uL    Hemoglobin 9.3 (L) 11.9 - 15.1 g/dL    Hematocrit 30.2 (L) 36.3 - 47.1 %    MCV 86.8 82.6 - 102.9 fL    MCH 26.7 25.2 - 33.5 pg    MCHC 30.8 28.4 - 34.8 g/dL    RDW 17.8 (H) 11.8 - 14.4 %    Platelets 254 (L) 539 - 453 k/uL    MPV 11.5 8.1 - 13.5 fL    NRBC Automated 0.0 0.0 per 100 WBC    Differential Type NOT REPORTED     WBC Morphology NOT REPORTED     RBC Morphology NOT REPORTED     Platelet Estimate NOT REPORTED     Seg Neutrophils 82 (H) 36 - 66 %    Lymphocytes 5 (L) 24 - 44 %    Monocytes 10 (H) 1 - 7 %    Eosinophils % 2 1 - 4 %    Basophils 0 %    Immature Granulocytes 1 (H) 0 %    Segs Absolute 4.92 1.8 - 7.7 k/uL    Absolute Lymph # 0.30 (L) 1.0 - 4.8 k/uL    Absolute Mono # 0.60 0.2 - 0.8 k/uL    Absolute Eos # 0.12 0.0 - 0.4 k/uL    Basophils Absolute 0.00 0.0 - 0.2 k/uL    Absolute Immature Granulocyte 0.06 0.00 - 0.30 k/uL   Basic Metabolic Panel   Result Value Ref Range    Glucose 95 70 - 99 mg/dL    BUN 17 6 - 20 mg/dL    CREATININE 1.15 (H) 0.50 - 0.90 mg/dL    Bun/Cre Ratio 15 9 - 20    Calcium 9.1 8.6 - 10.4 mg/dL    Sodium 132 (L) 135 - 144 mmol/L    Potassium 3.4 (L) 3.7 - 5.3 mmol/L Chloride 97 (L) 98 - 107 mmol/L    CO2 26 20 - 31 mmol/L    Anion Gap 9 9 - 17 mmol/L    GFR Non-African American 48 (L) >60 mL/min    GFR  59 (L) >60 mL/min    GFR Comment          GFR Staging NOT REPORTED    Magnesium   Result Value Ref Range    Magnesium 1.1 (L) 1.6 - 2.6 mg/dL   POC Glucose Fingerstick   Result Value Ref Range    POC Glucose 466 (HH) 65 - 105 mg/dL   POC Glucose Fingerstick   Result Value Ref Range    POC Glucose 369 (H) 65 - 105 mg/dL   POC Glucose Fingerstick   Result Value Ref Range    POC Glucose 305 (H) 65 - 105 mg/dL   POC Glucose Fingerstick   Result Value Ref Range    POC Glucose 279 (H) 65 - 105 mg/dL   POC Glucose Fingerstick   Result Value Ref Range    POC Glucose 288 (H) 65 - 105 mg/dL   POC Glucose Fingerstick   Result Value Ref Range    POC Glucose 218 (H) 65 - 105 mg/dL   POC Glucose Fingerstick   Result Value Ref Range    POC Glucose 155 (H) 65 - 105 mg/dL   POC Glucose Fingerstick   Result Value Ref Range    POC Glucose 159 (H) 65 - 105 mg/dL   POC Glucose Fingerstick   Result Value Ref Range    POC Glucose 181 (H) 65 - 105 mg/dL         IMAGING DATA:    Vl Dup Upper Extremity Venous Left    Result Date: 12/28/2020 +------------------------------------+----------+---------------+----------+ ! Location                            ! Visualized! Compressibility! Thrombosis! +------------------------------------+----------+---------------+----------+ ! Prox IJV                            ! Yes       ! Yes            ! None      ! +------------------------------------+----------+---------------+----------+ ! Prox SCV                            ! Yes       ! Yes            ! None      ! +------------------------------------+----------+---------------+----------+ ! Prox Axillary                       ! Yes       ! Yes            ! None      ! +------------------------------------+----------+---------------+----------+ ! Dist Axillary                       ! Yes       ! Yes            ! None      ! +------------------------------------+----------+---------------+----------+ ! Prox Brachial                       !Yes       ! Yes            ! None      ! +------------------------------------+----------+---------------+----------+ ! Dist Brachial                       !Yes       ! Yes            ! None      ! +------------------------------------+----------+---------------+----------+ ! Prox Radial                         !Yes       ! Yes            ! None      ! +------------------------------------+----------+---------------+----------+ ! Dist Radial                         !Partial   !Yes            ! None      ! +------------------------------------+----------+---------------+----------+ ! Prox Ulnar                          ! Yes       ! Yes            ! None      ! +------------------------------------+----------+---------------+----------+ ! Dist Ulnar                          ! Partial   !Yes            ! None      ! +------------------------------------+----------+---------------+----------+ ! Basilic at                        ! Yes       ! Yes            ! None      ! +------------------------------------+----------+---------------+----------+ ! Baseric at AF !Yes       !No             !SubAcute  ! +------------------------------------+----------+---------------+----------+ ! Basilic at [address]                       ! Yes       ! Yes            ! None      ! +------------------------------------+----------+---------------+----------+ ! Cephalic at UA                      ! Yes       ! Yes            ! None      ! +------------------------------------+----------+---------------+----------+ ! Cephalic at AF                      ! Yes       ! No             !SubAcute  ! +------------------------------------+----------+---------------+----------+ ! Cephalic at [address]                      ! Yes       ! No             !SubAcute  ! +------------------------------------+----------+---------------+----------+ Doppler Measurements +-------------------------+-----------------------+------------------------+ ! Location                 ! Signal                 !Reflux                  ! +-------------------------+-----------------------+------------------------+ ! IJV                      ! Phasic                 !                        ! +-------------------------+-----------------------+------------------------+ ! SCV                      ! Phasic                 !                        ! +-------------------------+-----------------------+------------------------+ ! Axillary                 ! Phasic                 !                        ! +-------------------------+-----------------------+------------------------+ ! Brachial                 !Phasic                 !                        ! +-------------------------+-----------------------+------------------------+        IMPRESSION:   Primary Problem  <principal problem not specified>    Active Hospital Problems    Diagnosis Date Noted    Cellulitis [L03.90] 12/28/2020       Superficial vein thrombosis, provoked by IV catheter in  background of hypercoagulable state due to pancreatic cancer  Cellulitis, purulent Unresectable pancreatic cancer  Ongoing concurrent chemoradiation    RECOMMENDATIONS:  I personally reviewed results of lab work-up imaging studies and other relevant clinical data. Reviewed previous treatment record  Hold Xeloda   Continue symptomatic supportive care  Continue anticoagulation  Recommend discharging patient on low-dose anticoagulation, Xarelto 10 mg daily for 45 days SVT background of hypercoagulable state. Patient will also need outpatient follow-up on SVT before discontinuing anticoagulation to confirm improvement  Antibiotics per infectious disease team  Given Staph aureus bacteremia patient will also likely need echocardiogram  I am concerned about seeding of the port however okay to use it once peripheral blood cultures are negative  Pain management. Discussed with patient and Nurse. Thank you for asking us to see this patient. Arnold Farias MD          This note is created with the assistance of a speech recognition program.  While intending to generate a document that actually reflects the content of the visit, the document can still have some errors including those of syntax and sound a like substitutions which may escape proof reading. It such instances, actual meaning can be extrapolated by contextual diversion.

## 2020-12-31 NOTE — PROGRESS NOTES
has a past surgical history that includes  section; Gallbladder surgery; ERCP (2020); Upper gastrointestinal endoscopy (2020); Upper gastrointestinal endoscopy (2020); ERCP (2020); ERCP (2020); ERCP (2020); ERCP (2020); ERCP (2020); and ERCP (2020). Restrictions  Restrictions/Precautions  Restrictions/Precautions: General Precautions, Contact Precautions, Isolation  Position Activity Restriction  Other position/activity restrictions: RUE IV, R chest port, current chemo treatment, telemetry, up as mariposa , DNR-CC  Vision/Hearing  Vision: Impaired(Pt states she has blurry vision in L eye since admission)  Vision Exceptions: Wears glasses at all times  Hearing: Within functional limits     Subjective  General  Chart Reviewed: Yes  Patient assessed for rehabilitation services?: Yes  Response To Previous Treatment: Not applicable  Family / Caregiver Present: No  Follows Commands: Within Functional Limits  Subjective  Subjective: Pt stating 10/10 pain in LUE. RN in room to give pt pain meds.   Pain Screening  Patient Currently in Pain: Yes          Orientation  Orientation  Overall Orientation Status: Within Normal Limits  Social/Functional History  Social/Functional History  Lives With: Son, Other (comment)(Pt states her son and ex  live with her and are both are supportive)  Type of Home: House  Home Layout: One level  Home Access: Stairs to enter with rails  Entrance Stairs - Number of Steps: 3  Entrance Stairs - Rails: Right  Bathroom Shower/Tub: Walk-in shower  Bathroom Toilet: Standard  Bathroom Equipment: (Pt states there is a vanity close by and can be used as support)  Home Equipment: [employer] Help From: Family(Pt states family is supportive)  ADL Assistance: Independent  Homemaking Assistance: Independent(Pt states family shares household chores)  Homemaking Responsibilities: Yes  Ambulation Assistance: Independent Transfer Assistance: Independent  Active : Yes  Occupation: Retired  Type of occupation: Worked at [address] [place] [address] Drive: going out with friends  Additional Comments: Pt states she had a fall before venecia d/t low blood sugar. Pt was admitted to [facility]. V after fall and was discharge home prior to arrival at [facility] [address]. Cognition   Cognition  Overall Cognitive Status: Exceptions  Arousal/Alertness: Appropriate responses to stimuli  Following Commands: Follows all commands without difficulty  Attention Span: Appears intact  Memory: Appears intact  Safety Judgement: Decreased awareness of need for assistance;Decreased awareness of need for safety  Problem Solving: Able to problem solve independently  Insights: Decreased awareness of deficits  Initiation: Does not require cues  Sequencing: Does not require cues    Objective     Observation/Palpation  Posture: Fair  Observation: swelling and redness in LUE, RUE IV, redness and complaints of blurry vision in R eye, DNR-CC,  Edema: BLE and LUE    AROM RLE (degrees)  RLE AROM: WFL  AROM LLE (degrees)  LLE AROM : WFL  AROM RUE (degrees)  RUE General AROM: see OT eval  AROM LUE (degrees)  LUE General AROM: see OT eval  Strength RLE  Comment: grossly 4/5  Strength LLE  Comment: grossly 4/5  Strength RUE  Comment: see OT eval  Strength LUE  Comment: see OT eval  Tone RLE  RLE Tone: Normotonic  Tone LLE  LLE Tone: Normotonic  Sensation  Overall Sensation Status: Impaired(Pt states she has intermitent numbness in B hands and feet)  Bed mobility  Bridging: Stand by assistance  Supine to Sit: Stand by assistance  Sit to Supine: Stand by assistance  Scooting: Stand by assistance  Comment: HOB elevated, good use of handrails  Transfers  Sit to Stand: Contact guard assistance  Stand to sit: Contact guard assistance  Bed to Chair: Contact guard assistance  Comment: No AD used, increaed time to complete.   Ambulation  Ambulation?: Yes  Ambulation 1  Surface: level tile Device: No Device  Assistance: Contact guard assistance  Gait Deviations: Slow Esther;Decreased step length;Decreased step height; Increased KATIA  Distance: 4 steps from bed>chair  Comments: No LOB or unsteadiness. Pt demo's very slow mobility due to increased fear of falling. Balance  Posture: Good  Sitting - Static: Good  Sitting - Dynamic: Good  Standing - Static: Good;-  Standing - Dynamic: Fair;+  Exercises  Comments: Educated on ankle pumps and all LE AROM, pt with good carryover     Plan   Plan  Times per week: 1-2 x day / 5-6 days per week  Current Treatment Recommendations: Strengthening, Endurance Training, Transfer Training, Balance Training, Gait Training, Home Exercise Program, Functional Mobility Training, Safety Education & Training, Positioning  Safety Devices  Type of devices: Call light within reach, Gait belt, Nurse notified, Left in chair      OutComes Score  AM-PAC Score  AM-PAC Inpatient Mobility Raw Score : 20 (12/31/20 1250)  AM-PAC Inpatient T-Scale Score : 47.67 (12/31/20 1250)  Mobility Inpatient CMS 0-100% Score: 35.83 (12/31/20 1250)  Mobility Inpatient CMS G-Code Modifier : Ismael Churchill (12/31/20 1250)          Goals  Short term goals  Time Frame for Short term goals: 12 visits  Short term goal 1: Pt corey perform all bed mobility indep  Short term goal 2: Pt will transfer indep  Short term goal 3: Pt will ambulate 50ft with no AD/LRAD and SBA  Short term goal 4: Pt will tolerate 25mins of PT including therex, theract, and gait training to improve functional mobility and activity tolerance.   Patient Goals   Patient goals : to go home       Therapy Time   Individual Concurrent Group Co-treatment   Time In 0925 + 10min chart review         Time Out 1002         Minutes 47          tx time: 18mins, split with OT         Ines Carrero, PT

## 2020-12-31 NOTE — PROGRESS NOTES
Comprehensive Nutrition Assessment    Type and Reason for Visit:  Positive Nutrition Screen(Unplanned weight loss)    Nutrition Recommendations/Plan:   1. Continue Carb Control  2. Monitor p.oo intakes and labs    Nutrition Assessment:  Patient admission is related to cellulitis on left forearm. Patient reports she has lost some weight related to pancreatitic cancer but she has be eating failrly well overall. Patient has lost about 5.2% of weight in 5 months. Patient is mildly malnourished. Continue Carb Control diet. Monitor p.o intakes and labs. Malnutrition Assessment:  Malnutrition Status:  Mild malnutrition    Context:  Chronic Illness     Findings of the 6 clinical characteristics of malnutrition:  Energy Intake:  7 - 75% or less estimated energy requirements for 1 month or longer  Weight Loss:  1 - Mild weight loss (specify amount and time period)(5.2% loss in 5 months)     Body Fat Loss:  Unable to assess     Muscle Mass Loss:  Unable to assess    Fluid Accumulation:  1 - Mild Extremities   Strength:  Not Performed    Estimated Daily Nutrient Needs:  Energy (kcal):  2080 kcal based on [place]-St. Jeor (1.2 factor); Weight Used for Energy Requirements:  Current     Protein (g):  113 gm based on 2 gm/kg; Weight Used for Protein Requirements:  Current          Nutrition Related Findings:  +2 pitting LUE edema. Cellulitis- left forearm      Wounds:  None       Current Nutrition Therapies:    DIET CARB CONTROL;     Anthropometric Measures:  · Height: 5' 5\" (165.1 cm)  · Current Body Weight: 254 lb (115.2 kg)   · Usual Body Weight: 268 lb (121.6 kg)     · Ideal Body Weight: 125 lbs; % Ideal Body Weight 203.2 %   · BMI: 42.3  · BMI Categories: Obese Class 3 (BMI 40.0 or greater)       Nutrition Diagnosis:   · Mild malnutrition related to catabolic illness as evidenced by intake 51-75%, weight loss      Nutrition Interventions:   Food and/or Nutrient Delivery:  Continue Current Diet Nutrition Education/Counseling:  Education not indicated   Coordination of Nutrition Care:  Continue to monitor while inpatient    Goals:   PO intakes are greater than 75% at meals       Nutrition Monitoring and Evaluation:   Food/Nutrient Intake Outcomes:  Food and Nutrient Intake  Physical Signs/Symptoms Outcomes:  Biochemical Data, Skin, Weight, Fluid Status or Edema     Discharge Planning:    Continue current diet         Мария BON, RDN, LDN  Lead Clinical Dietitian  RD Office Phone [phone]

## 2020-12-31 NOTE — PROGRESS NOTES
VASCULAR SURGERY  PROGRESS NOTE      12/31/2020 1:55 AM  Subjective:   Admit Date: 12/28/2020  PCP: Angela Lopez    Chief Complaint   Patient presents with    Arm Pain     swelling and redness where prior IV was. Interval History: No complaints. LUE cellulitis improved. Diet: DIET CARB CONTROL;    Medications:   Scheduled Meds:   vancomycin  1,500 mg Intravenous Q24H    magnesium sulfate  1 g Intravenous Q1H    vancomycin (VANCOCIN) intermittent dosing (placeholder)   Other RX Placeholder    potassium chloride  20 mEq Oral BID WC    sertraline  150 mg Oral Daily    amLODIPine  10 mg Oral Daily    aspirin  81 mg Oral Nightly    atorvastatin  40 mg Oral Nightly    carvedilol  12.5 mg Oral BID WC    hydrALAZINE  25 mg Oral 3 times per day    insulin glargine  22 Units Subcutaneous Daily    sodium chloride flush  10 mL Intravenous 2 times per day    insulin lispro  0-6 Units Subcutaneous TID WC    insulin lispro  0-3 Units Subcutaneous Nightly    enoxaparin  30 mg Subcutaneous BID     Continuous Infusions:   sodium chloride 100 mL/hr at 12/30/20 1450    dextrose           Labs:   CBC:   Recent Labs     12/28/20  1400 12/29/20  0612 12/30/20  0529   WBC 5.2 3.5 6.0   HGB 9.1* 8.7* 9.3*   * 93* 133*     BMP:    Recent Labs     12/29/20  0612 12/29/20  1421 12/30/20  0529   * 130* 132*   K 3.5* 3.5* 3.4*   CL 94* 93* 97*   CO2 24 28 26   BUN 17 17 17   CREATININE 0.95* 0.93* 1.15*   GLUCOSE 336* 279* 95     Hepatic:   Recent Labs     12/29/20  0612   AST 22   ALT 17   BILITOT 0.48   ALKPHOS 93     Troponin: Invalid input(s): TROPONIN  BNP: No results for input(s): BNP in the last 72 hours. Lipids: No results for input(s): CHOL, HDL in the last 72 hours. Invalid input(s): LDLCALCU  INR: No results for input(s): INR in the last 72 hours.     Objective: Vitals: BP (!) 125/44   Pulse 76   Temp 98.6 °F (37 °C) (Oral)   Resp 16   Ht 5' 5\" (1.651 m)   Wt 250 lb (113.4 kg)   SpO2 91%   BMI 41.60 kg/m²   General appearance: alert, cooperative and no distress  Mental Status: oriented to person, place and time with normal affect  Neck: good carotid pulses, no JVD  Lungs: clear to auscultation bilaterally, normal effort  Heart: regular rate and rhythm, no murmur,  Abdomen: soft, non-tender, non-distended, bowel sounds present all four quadrants, no masses, hepatomegaly, splenomegaly or aortic enlargement  Extremities: no edema, erythema or tenderness in the calves. Moderate Lt arm swelling with decreased erythema  Skin: no gross lesions, rashes, or induration    Assessment:   3 61-year-old female with metastatic pancreatic carcinoma, left arm cellulitis and superficial thrombophlebitis  2.  No evidence of left upper extremity DVT    Patient Active Problem List:     Uncontrolled diabetes mellitus ([address]     Hypertension     Postmenopausal bleeding     Thickened endometrium     Type 2 diabetes mellitus with hyperglycemia, with long-term current use of insulin (HCC)     Hypophosphatemia     Hypomagnesemia     Hyperglycemia due to type 2 diabetes mellitus (HCC)     Pancreatic Head Mass     Non-Obstructive CAD     Primary adenocarcinoma of head of pancreas (HCC)     Intractable nausea and vomiting     Class 3 severe obesity with serious comorbidity in adult [facility])     Amnesia     Anxiety     Benign neoplasm of choroid     Depression     Diabetic complication (HCC)     Diabetic peripheral neuropathy associated with type 2 diabetes mellitus (HCC)     Hepatomegaly     Methicillin resistant Staphylococcus aureus infection     Long term current use of insulin (HCC)     Hyperlipidemia     Moderate nonproliferative diabetic retinopathy associated with type 2 diabetes mellitus ([address]     Morbid obesity ([address]     Pain in limb     Pancreatic adenocarcinoma ([address] Pancreatic malignancy syndrome ([facility]     Peripheral venous insufficiency     Postprocedural state     Primary localized osteoarthrosis of ankle and foot     Urinary tract infectious disease     Transaminitis     Intractable vomiting     Septicemia due to E. coli (HCC)     Acute obstructive cholangitis     TORREY (acute kidney injury) ([facility]     Dehydration with hyponatremia     NSVT (nonsustained ventricular tachycardia) (HCC)     Hypoglycemia     Cellulitis      Plan:   1. Continue ABX  2. Continue ASA  3.  Arm elevation    Electronically signed by Vladislav Farley MD on 12/31/2020 at 1:55 AM

## 2021-01-01 LAB
ANION GAP SERPL CALCULATED.3IONS-SCNC: 7 MMOL/L (ref 9–17)
BUN BLDV-MCNC: 18 MG/DL (ref 6–20)
BUN/CREAT BLD: 13 (ref 9–20)
CALCIUM SERPL-MCNC: 9.1 MG/DL (ref 8.6–10.4)
CHLORIDE BLD-SCNC: 101 MMOL/L (ref 98–107)
CO2: 22 MMOL/L (ref 20–31)
CREAT SERPL-MCNC: 1.36 MG/DL (ref 0.5–0.9)
GFR AFRICAN AMERICAN: 48 ML/MIN
GFR NON-AFRICAN AMERICAN: 40 ML/MIN
GFR SERPL CREATININE-BSD FRML MDRD: ABNORMAL ML/MIN/{1.73_M2}
GFR SERPL CREATININE-BSD FRML MDRD: ABNORMAL ML/MIN/{1.73_M2}
GLUCOSE BLD-MCNC: 103 MG/DL (ref 65–105)
GLUCOSE BLD-MCNC: 133 MG/DL (ref 65–105)
GLUCOSE BLD-MCNC: 218 MG/DL (ref 65–105)
GLUCOSE BLD-MCNC: 227 MG/DL (ref 65–105)
GLUCOSE BLD-MCNC: 237 MG/DL (ref 70–99)
POTASSIUM SERPL-SCNC: 4.1 MMOL/L (ref 3.7–5.3)
SODIUM BLD-SCNC: 130 MMOL/L (ref 135–144)

## 2021-01-01 PROCEDURE — 87040 BLOOD CULTURE FOR BACTERIA: CPT

## 2021-01-01 PROCEDURE — 82947 ASSAY GLUCOSE BLOOD QUANT: CPT

## 2021-01-01 PROCEDURE — 86403 PARTICLE AGGLUT ANTBDY SCRN: CPT

## 2021-01-01 PROCEDURE — 99232 SBSQ HOSP IP/OBS MODERATE 35: CPT | Performed by: NURSE PRACTITIONER

## 2021-01-01 PROCEDURE — 87205 SMEAR GRAM STAIN: CPT

## 2021-01-01 PROCEDURE — 80048 BASIC METABOLIC PNL TOTAL CA: CPT

## 2021-01-01 PROCEDURE — 87147 CULTURE TYPE IMMUNOLOGIC: CPT

## 2021-01-01 PROCEDURE — 2580000003 HC RX 258: Performed by: INTERNAL MEDICINE

## 2021-01-01 PROCEDURE — 1200000000 HC SEMI PRIVATE

## 2021-01-01 PROCEDURE — 6370000000 HC RX 637 (ALT 250 FOR IP): Performed by: INTERNAL MEDICINE

## 2021-01-01 PROCEDURE — 6360000002 HC RX W HCPCS: Performed by: INTERNAL MEDICINE

## 2021-01-01 PROCEDURE — 87186 SC STD MICRODIL/AGAR DIL: CPT

## 2021-01-01 PROCEDURE — 36415 COLL VENOUS BLD VENIPUNCTURE: CPT

## 2021-01-01 PROCEDURE — 99232 SBSQ HOSP IP/OBS MODERATE 35: CPT | Performed by: INTERNAL MEDICINE

## 2021-01-01 RX ORDER — INSULIN GLARGINE 100 [IU]/ML
28 INJECTION, SOLUTION SUBCUTANEOUS DAILY
Status: DISCONTINUED | OUTPATIENT
Start: 2021-01-02 | End: 2021-01-03

## 2021-01-01 RX ADMIN — SODIUM CHLORIDE 500 MG: 900 INJECTION INTRAVENOUS at 13:18

## 2021-01-01 RX ADMIN — OXYCODONE HYDROCHLORIDE 10 MG: 5 TABLET ORAL at 17:29

## 2021-01-01 RX ADMIN — POTASSIUM CHLORIDE 20 MEQ: 20 TABLET, EXTENDED RELEASE ORAL at 17:27

## 2021-01-01 RX ADMIN — HYDRALAZINE HYDROCHLORIDE 25 MG: 25 TABLET, FILM COATED ORAL at 08:41

## 2021-01-01 RX ADMIN — CARVEDILOL 12.5 MG: 12.5 TABLET, FILM COATED ORAL at 17:27

## 2021-01-01 RX ADMIN — INSULIN GLARGINE 22 UNITS: 100 INJECTION, SOLUTION SUBCUTANEOUS at 08:35

## 2021-01-01 RX ADMIN — HYDRALAZINE HYDROCHLORIDE 25 MG: 25 TABLET, FILM COATED ORAL at 15:09

## 2021-01-01 RX ADMIN — ASPIRIN 81 MG: 81 TABLET, CHEWABLE ORAL at 22:00

## 2021-01-01 RX ADMIN — INSULIN LISPRO 4 UNITS: 100 INJECTION, SOLUTION INTRAVENOUS; SUBCUTANEOUS at 08:35

## 2021-01-01 RX ADMIN — ENOXAPARIN SODIUM 30 MG: 30 INJECTION SUBCUTANEOUS at 22:00

## 2021-01-01 RX ADMIN — AMLODIPINE BESYLATE 10 MG: 5 TABLET ORAL at 08:32

## 2021-01-01 RX ADMIN — CARVEDILOL 12.5 MG: 12.5 TABLET, FILM COATED ORAL at 08:32

## 2021-01-01 RX ADMIN — SODIUM CHLORIDE: 9 INJECTION, SOLUTION INTRAVENOUS at 16:27

## 2021-01-01 RX ADMIN — POTASSIUM CHLORIDE 20 MEQ: 20 TABLET, EXTENDED RELEASE ORAL at 08:32

## 2021-01-01 RX ADMIN — SERTRALINE HYDROCHLORIDE 150 MG: 100 TABLET ORAL at 08:32

## 2021-01-01 RX ADMIN — ENOXAPARIN SODIUM 30 MG: 30 INJECTION SUBCUTANEOUS at 08:32

## 2021-01-01 RX ADMIN — INSULIN LISPRO 2 UNITS: 100 INJECTION, SOLUTION INTRAVENOUS; SUBCUTANEOUS at 12:19

## 2021-01-01 ASSESSMENT — ENCOUNTER SYMPTOMS
GASTROINTESTINAL NEGATIVE: 1
RESPIRATORY NEGATIVE: 1

## 2021-01-01 ASSESSMENT — PAIN SCALES - GENERAL: PAINLEVEL_OUTOF10: 7

## 2021-01-01 NOTE — PROGRESS NOTES
VASCULAR SURGERY  PROGRESS NOTE      1/1/2021 3:52 PM  Subjective:   Admit Date: 12/28/2020  PCP: Stan Waterman    Chief Complaint   Patient presents with    Arm Pain     swelling and redness where prior IV was. Interval History: No complaints. MRSA sepsis. Diet: DIET CARB CONTROL;    Medications:   Scheduled Meds:   [START ON 1/2/2021] insulin glargine  28 Units Subcutaneous Daily    daptomycin (CUBICIN) IVPB  6 mg/kg (Adjusted) Intravenous Q24H    potassium chloride  20 mEq Oral BID WC    sertraline  150 mg Oral Daily    amLODIPine  10 mg Oral Daily    aspirin  81 mg Oral Nightly    [Held by provider] atorvastatin  40 mg Oral Nightly    carvedilol  12.5 mg Oral BID WC    hydrALAZINE  25 mg Oral 3 times per day    sodium chloride flush  10 mL Intravenous 2 times per day    insulin lispro  0-6 Units Subcutaneous TID WC    insulin lispro  0-3 Units Subcutaneous Nightly    enoxaparin  30 mg Subcutaneous BID     Continuous Infusions:   sodium chloride 100 mL/hr at 12/31/20 1911    dextrose           Labs:   CBC:   Recent Labs     12/30/20  0529 12/31/20  0530   WBC 6.0 6.3   HGB 9.3* 8.4*   * 117*     BMP:    Recent Labs     12/30/20  0529 12/31/20  0530 01/01/21  0633   * 129* 130*   K 3.4* 3.9 4.1   CL 97* 97* 101   CO2 26 24 22   BUN 17 19 18   CREATININE 1.15* 1.44* 1.36*   GLUCOSE 95 243* 237*     Hepatic:   No results for input(s): AST, ALT, ALB, BILITOT, ALKPHOS in the last 72 hours. Troponin: Invalid input(s): TROPONIN  BNP: No results for input(s): BNP in the last 72 hours. Lipids: No results for input(s): CHOL, HDL in the last 72 hours. Invalid input(s): LDLCALCU  INR: No results for input(s): INR in the last 72 hours.     Objective:   Vitals: BP (!) 113/50   Pulse 75   Temp 98.2 °F (36.8 °C) (Oral)   Resp 18   Ht 5' 5\" (1.651 m)   Wt 258 lb (117 kg)   SpO2 98%   BMI 42.93 kg/m²   General appearance: alert, cooperative and no distress Mental Status: oriented to person, place and time with normal affect  Neck: good carotid pulses, no JVD  Lungs: clear to auscultation bilaterally, normal effort  Heart: regular rate and rhythm, no murmur,  Abdomen: soft, non-tender, non-distended, bowel sounds present all four quadrants, no masses, hepatomegaly, splenomegaly or aortic enlargement  Extremities: no edema, erythema or tenderness in the calves. Moderate Lt arm swelling with erythema  Skin: no gross lesions, rashes, or induration    Assessment:   3 81-year-old female with metastatic pancreatic carcinoma, left arm cellulitis and superficial thrombophlebitis  2.  No evidence of left upper extremity DVT    Patient Active Problem List:     Uncontrolled diabetes mellitus (HCC)     Hypertension     Postmenopausal bleeding     Thickened endometrium     Type 2 diabetes mellitus with hyperglycemia, with long-term current use of insulin (HCC)     Hypophosphatemia     Hypomagnesemia     Hyperglycemia due to type 2 diabetes mellitus (HCC)     Pancreatic Head Mass     Non-Obstructive CAD     Primary adenocarcinoma of head of pancreas (HCC)     Intractable nausea and vomiting     Class 3 severe obesity with serious comorbidity in adult [facility])     Amnesia     Anxiety     Benign neoplasm of choroid     Depression     Diabetic complication (HCC)     Diabetic peripheral neuropathy associated with type 2 diabetes mellitus (HCC)     Hepatomegaly     Methicillin resistant Staphylococcus aureus infection     Long term current use of insulin (HCC)     Hyperlipidemia     Moderate nonproliferative diabetic retinopathy associated with type 2 diabetes mellitus (HCC)     Morbid obesity (HCC)     Pain in limb     Pancreatic adenocarcinoma (HCC)     Pancreatic malignancy syndrome (HCC)     Peripheral venous insufficiency     Postprocedural state     Primary localized osteoarthrosis of ankle and foot     Urinary tract infectious disease     Transaminitis     Intractable vomiting Septicemia due to E. coli (HCC)     Acute obstructive cholangitis     TORREY (acute kidney injury) ([facility]     Dehydration with hyponatremia     NSVT (nonsustained ventricular tachycardia) ([facility])     Hypoglycemia     Cellulitis      Plan:   1. Continue ABX  2. Continue ASA  3.  Arm elevation    Electronically signed by Maurice Cohen MD on 1/1/2021 at 3:52 PM

## 2021-01-01 NOTE — PLAN OF CARE
Problem: Pain:  Goal: Control of acute pain  Description: Control of acute pain  1/1/2021 1348 by Keisha Bernabe RN  Outcome: Ongoing  Note: Pt reports improvement in pain/redness  1/1/2021 0504 by Payton Ghosh RN  Outcome: Ongoing     Problem:  Body Temperature - Imbalanced:  Goal: Ability to maintain a body temperature in the normal range will improve  Description: Ability to maintain a body temperature in the normal range will improve  1/1/2021 1348 by Keisha Bernabe RN  Outcome: Ongoing  Note: afebrile  1/1/2021 0504 by Payton Ghosh RN  Outcome: Ongoing     Problem: Mobility - Impaired:  Goal: Mobility will improve to maximum level  Description: Mobility will improve to maximum level  Outcome: Ongoing  Note: Able to move about in room

## 2021-01-01 NOTE — PROGRESS NOTES
Today's Date: 1/1/2021  Patient Name: Senait Conley  Date of admission: 12/28/2020  1:26 PM  Patient's age: 64 y. o., 1960  Admission Dx: Cellulitis [L03.90]    Reason for Consult: management recommendations  Requesting Physician: Lisa Hanna MD    CHIEF COMPLAINT: Left upper extremity swelling pain and redness    History Obtained From:  patient, electronic medical record    Interval history:    Patient seen and examined. Labs and vitals reviewed  Left arm swelling is improved. Pain has also improved. Patient afebrile overnight. Ultrasound shows SVT unchanged. CT without contrast does not show any abscess. Labs pending. Repeat cultures drawn today. HISTORY OF PRESENT ILLNESS:      The patient is a 64 y.o.  female who is admitted to the hospital for presents to the ER with chief complaint swelling redness of left upper extremity around the IV site. Patient was recently hospitalized at [facility] with complains of hypoglycemia. Patient has history of locally advanced, unresectable pancreatic cancer. Patient was initially challenged with FOLFIRINOX chemotherapy but it was discontinued due to intolerance. Subsequently patient was started on radiation therapy along with Xeloda. Patient is currently off the Xeloda. Patient complains of swelling redness and pain in the left upper extremity. Patient also started having fevers and presented to the ER. Patient's labs at presentation showed sodium of 128. Albumin is 2.0. Remaining LFTs are unremarkable. Patient WBC count yesterday was 5.2 with ANC of 4.5. Hemoglobin was 9.1 and platelet count of 350. Patient has been started on antibiotics using vancomycin. Ultrasound left upper extremity showed a subacute superficial vein DVT involving the basilic and cephalic vein. Past Medical History:   has a past medical history of Anxiety, Diabetes mellitus ([address] Hyperlipidemia, Hypertension, and Pancreatic cancer ([facility] [address] Past Surgical History:   has a past surgical history that includes  section; Gallbladder surgery; ERCP (2020); Upper gastrointestinal endoscopy (2020); Upper gastrointestinal endoscopy (2020); ERCP (2020); ERCP (2020); ERCP (2020); ERCP (2020); ERCP (2020); and ERCP (2020). Medications:    Prior to Admission medications    Medication Sig Start Date End Date Taking? Authorizing Provider   furosemide (LASIX) 20 MG tablet Take 20 mg by mouth daily as needed (swelling)   Yes Historical Provider, MD   LORazepam (ATIVAN) 1 MG tablet Take 0.5 mg by mouth daily as needed for Anxiety. Yes Historical Provider, MD   insulin lispro, 1 Unit Dial, (HUMALOG KWIKPEN) 100 UNIT/ML SOPN Inject 0-12 Units into the skin 3 times daily (before meals) **Medium Dose Corrective Algorithm** Glucose: Dose: If <139 - No Insulin. 140-199 -  2 Units. 200-249 4 Units. 250-299 6 Units. 300-349 8 Units. 350-400 10 Units. Above 400- 12 Units 20  Yes Hawk Rodarte MD   insulin glargine (LANTUS SOLOSTAR) 100 UNIT/ML injection pen Inject 22 Units into the skin daily 20  Yes Hawk Rodarte MD   oxyCODONE (OXY-IR) 10 MG immediate release tablet Take 1 tablet by mouth every 6 hours as needed for Pain for up to 30 days. 20 Yes Mack Baker MD   prochlorperazine (COMPAZINE) 10 MG tablet Take 1 tablet by mouth every 6 hours as needed (NV) 20  Yes Edward Bonilla MD   capecitabine (XELODA) 150 MG chemo tablet TAKE 2 TABLETS BY MOUTH TWICE DAILY ON MONDAY TO FRIDAY WHILE ON RADIATION. 20  Yes Edward Bonilla MD   capecitabine (XELODA) 500 MG chemo tablet TAKE 3 TABLETS BY MOUTH TWICE DAILY ON MONDAY TO FRIDAY WHILE ON RADIATION.  20  Yes Edward Bonilla MD   ondansetron (ZOFRAN-ODT) 8 MG TBDP disintegrating tablet Place 1 tablet under the tongue every 8 hours as needed for Nausea or Vomiting 20  Yes Edward Bonilla MD hydrALAZINE (APRESOLINE) 25 MG tablet Take 1 tablet by mouth every 8 hours 6/6/20  Yes Shawanda Rodriguez MD   carvedilol (COREG) 12.5 MG tablet Take 1 tablet by mouth 2 times daily (with meals) 6/6/20  Yes Shawanda Rodriguez MD   amLODIPine (NORVASC) 10 MG tablet Take 1 tablet by mouth daily 6/7/20  Yes Shawanda Rodriguez MD   aspirin 81 MG chewable tablet Take 81 mg by mouth nightly   Yes Historical Provider, MD   sertraline (ZOLOFT) 100 MG tablet Take 150 mg by mouth daily Takes 1.5 tabs (=150mg) daily   Yes Historical Provider, MD   atorvastatin (LIPITOR) 40 MG tablet Take 40 mg by mouth nightly  10/12/13  Yes Historical Provider, MD   LORazepam (ATIVAN) 0.5 MG tablet Take 1 mg by mouth nightly. And 1/2 tab once daily as needed 10/12/13  Yes Historical Provider, MD   Insulin Pen Needle ([employer] PEN NEEDLES 29G) 29G X 12MM MISC 1 each by Does not apply route daily 12/23/20   Patricia Mcgarry MD   glucose monitoring kit (FREESTYLE) monitoring kit 1 kit by Does not apply route daily 12/23/20   Patricia Mcgarry MD   blood glucose monitor strips Test 3  times daily  Insulin Dependent mellitus 12/23/20   Patricia Mcgarry MD   Lancets MISC 1 each by Does not apply route 3 times daily 12/23/20   Patricia Mcgarry MD   nitroGLYCERIN (NITROSTAT) 0.4 MG SL tablet up to max of 3 total doses.  If no relief after 1 dose, call 911. 6/6/20   Shawanda Rodriguez MD     Current Facility-Administered Medications   Medication Dose Route Frequency Provider Last Rate Last Admin    DAPTOmycin (CUBICIN) 500 mg in sodium chloride 0.9 % 50 mL IVPB  6 mg/kg (Adjusted) Intravenous Q24H [address], MD   Stopped at 12/31/20 1510    polyvinyl alcohol (LIQUIFILM TEARS) 1.4 % ophthalmic solution 1 drop  1 drop Left Eye PRN Liliana Beltran MD   1 drop at 12/31/20 0237    potassium chloride (KLOR-CON M) extended release tablet 20 mEq  20 mEq Oral BID  Liliana Beltran MD   20 mEq at 12/31/20 2941  sertraline (ZOLOFT) tablet 150 mg  150 mg Oral Daily Edith Mackey MD   150 mg at 12/31/20 0934    HYDROmorphone HCl PF (DILAUDID) injection 1 mg  1 mg Intravenous Q6H PRN Ediht Mackey MD   1 mg at 12/31/20 2149    0.9 % sodium chloride infusion   Intravenous Continuous Edith Mackey  mL/hr at 12/31/20 1911 New Bag at 12/31/20 1911    amLODIPine (NORVASC) tablet 10 mg  10 mg Oral Daily Edith Mackey MD   10 mg at 12/31/20 1303    aspirin chewable tablet 81 mg  81 mg Oral Nightly Edith Mackey MD   81 mg at 12/31/20 2140    [Held by provider] atorvastatin (LIPITOR) tablet 40 mg  40 mg Oral Nightly Edith Mackey MD   40 mg at 12/30/20 2311    carvedilol (COREG) tablet 12.5 mg  12.5 mg Oral BID WC Edith Mackey MD   12.5 mg at 12/31/20 1303    hydrALAZINE (APRESOLINE) tablet 25 mg  25 mg Oral 3 times per day Edith Mackey MD   25 mg at 12/31/20 1841    insulin glargine (LANTUS) injection vial 22 Units  22 Units Subcutaneous Daily Edith Mackey MD   22 Units at 12/31/20 0936    oxyCODONE (ROXICODONE) immediate release tablet 10 mg  10 mg Oral Q6H PRN Edith Mackey MD   10 mg at 12/31/20 1911    sodium chloride flush 0.9 % injection 10 mL  10 mL Intravenous 2 times per day Edith Mackey MD   10 mL at 12/31/20 0935    sodium chloride flush 0.9 % injection 10 mL  10 mL Intravenous PRN Edith Mackey MD   10 mL at 12/30/20 1250    promethazine (PHENERGAN) tablet 12.5 mg  12.5 mg Oral Q6H PRN Edith Mackey MD        Or    ondansetron (ZOFRAN) injection 4 mg  4 mg Intravenous Q6H PRN Edith Mackey MD        polyethylene glycol (GLYCOLAX) packet 17 g  17 g Oral Daily PRN Edith Mackey MD        acetaminophen (TYLENOL) tablet 650 mg  650 mg Oral Q6H PRN Edith Mackey MD   650 mg at 12/28/20 2202    Or    acetaminophen (TYLENOL) suppository 650 mg  650 mg Rectal Q6H PRN Edith Mackey MD  glucose (GLUTOSE) 40 % oral gel 15 g  15 g Oral PRN Jillian Silva MD        dextrose 50 % IV solution  12.5 g Intravenous PRN Jillian Silva MD        glucagon (rDNA) injection 1 mg  1 mg Intramuscular PRN Jillian Silva MD        dextrose 5 % solution  100 mL/hr Intravenous PRN Jillian Silva MD        insulin lispro (HUMALOG) injection vial 0-6 Units  0-6 Units Subcutaneous TID WC Jillian Silva MD   3 Units at 12/31/20 1835    insulin lispro (HUMALOG) injection vial 0-3 Units  0-3 Units Subcutaneous Nightly Jillian Silva MD   1 Units at 12/31/20 2140    enoxaparin (LOVENOX) injection 30 mg  30 mg Subcutaneous BID Jillian Silva MD   30 mg at 12/31/20 2140       Allergies:  Lisinopril, Sulfamethoxazole, Trimethoprim, Cefuroxime axetil, Sulfamethoxazole-trimethoprim, Clindamycin phosphate, and Penicillins    Social History:   reports that she quit smoking about 15 years ago. She has never used smokeless tobacco. She reports previous alcohol use. She reports that she does not use drugs. Family History: family history includes Cancer in her maternal grandfather, maternal grandmother, and mother; Heart Failure in her father; Hypertension in her father and mother. REVIEW OF SYSTEMS:      Constitutional: No fever or chills. No night sweats, no weight loss   Eyes: No eye discharge, double vision, or eye pain   HEENT: negative for sore mouth, sore throat, hoarseness and voice change   Respiratory: negative for cough , sputum, dyspnea, wheezing, hemoptysis, chest pain   Cardiovascular: negative for chest pain, dyspnea, palpitations, orthopnea, PND   Gastrointestinal: negative for nausea, vomiting, diarrhea, constipation, abdominal pain, Dysphagia, hematemesis and hematochezia   Genitourinary: negative for frequency, dysuria, nocturia, urinary incontinence, and hematuria   Integument: negative for rash, skin lesions, bruises. Hematologic/Lymphatic: negative for easy bruising, bleeding, lymphadenopathy, or petechiae   Endocrine: negative for heat or cold intolerance,weight changes, change in bowel habits and hair loss   Musculoskeletal: Positive left upper extremity swelling redness and pain  Neurological: negative for headaches, dizziness, seizures, weakness, numbness    PHYSICAL EXAM:        BP (!) 137/49   Pulse 76   Temp 97.9 °F (36.6 °C) (Oral)   Resp 16   Ht 5' 5\" (1.651 m)   Wt 258 lb (117 kg)   SpO2 96%   BMI 42.93 kg/m²    Temp (24hrs), Av.1 °F (36.7 °C), Min:97.9 °F (36.6 °C), Max:98.2 °F (36.8 °C)      General appearance - well appearing, no in pain or distress   Mental status - alert and cooperative   Eyes - pupils equal and reactive, extraocular eye movements intact   Ears - bilateral TM's and external ear canals normal   Mouth - mucous membranes moist, pharynx normal without lesions   Neck - supple, no significant adenopathy   Lymphatics - no palpable lymphadenopathy, no hepatosplenomegaly   Chest - clear to auscultation, no wheezes, rales or rhonchi, symmetric air entry   Heart - normal rate, regular rhythm, normal S1, S2, no murmurs  Abdomen - soft, nontender, nondistended, no masses or organomegaly   Neurological - alert, oriented, normal speech, no focal findings or movement disorder noted   Musculoskeletal - no joint tenderness, deformity or swelling   Extremities -left upper extremity swelling  Skin -erythema of left upper extremity around elbow      DATA:      Labs:     Results for orders placed or performed during the hospital encounter of 20   Culture, Blood 1    Specimen: Blood   Result Value Ref Range    Specimen Description . BLOOD     Special Requests RFA 10ML     Culture (A)      POSITIVE Blood Culture Results called to and read back by: NUPUR Pardo 0400 20    Culture       DIRECT GRAM STAIN FROM BOTTLE: GRAM POSITIVE COCCI IN CLUSTERS Hematocrit 29.9 (L) 36.3 - 47.1 %    MCV 89.3 82.6 - 102.9 fL    MCH 27.2 25.2 - 33.5 pg    MCHC 30.4 28.4 - 34.8 g/dL    RDW 18.1 (H) 11.8 - 14.4 %    Platelets 053 (L) 924 - 453 k/uL    MPV 11.6 8.1 - 13.5 fL    NRBC Automated 0.0 0.0 per 100 WBC    Differential Type NOT REPORTED     WBC Morphology NOT REPORTED     RBC Morphology NOT REPORTED     Platelet Estimate NOT REPORTED     Seg Neutrophils 88 (H) 36 - 65 %    Lymphocytes 3 (L) 24 - 43 %    Monocytes 9 3 - 12 %    Eosinophils % 0 (L) 1 - 4 %    Basophils 0 0 - 2 %    Immature Granulocytes 0 0 %    Segs Absolute 4.57 1.50 - 8.10 k/uL    Absolute Lymph # 0.16 (L) 1.10 - 3.70 k/uL    Absolute Mono # 0.47 0.10 - 1.20 k/uL    Absolute Eos # 0.00 0.00 - 0.44 k/uL    Basophils Absolute 0.00 0.00 - 0.20 k/uL    Absolute Immature Granulocyte 0.00 0.00 - 0.30 k/uL   Basic Metabolic Panel   Result Value Ref Range    Glucose 341 (H) 70 - 99 mg/dL    BUN 14 6 - 20 mg/dL    CREATININE 0.73 0.50 - 0.90 mg/dL    Bun/Cre Ratio 19 9 - 20    Calcium 8.9 8.6 - 10.4 mg/dL    Sodium 130 (L) 135 - 144 mmol/L    Potassium 3.5 (L) 3.7 - 5.3 mmol/L    Chloride 94 (L) 98 - 107 mmol/L    CO2 22 20 - 31 mmol/L    Anion Gap 14 9 - 17 mmol/L    GFR Non-African American >60 >60 mL/min    GFR African American >60 >60 mL/min    GFR Comment          GFR Staging NOT REPORTED    Lactic Acid   Result Value Ref Range    Lactic Acid 1.7 0.5 - 2.2 mmol/L   COVID-19    Specimen: Other   Result Value Ref Range    SARS-CoV-2          SARS-CoV-2, Rapid Not Detected Not Detected    Source . NASOPHARYNGEAL SWAB     SARS-CoV-2 Not Detected Not Detected   Comprehensive Metabolic Panel w/ Reflex to MG   Result Value Ref Range    Glucose 336 (H) 70 - 99 mg/dL    BUN 17 6 - 20 mg/dL    CREATININE 0.95 (H) 0.50 - 0.90 mg/dL    Bun/Cre Ratio 18 9 - 20    Calcium 9.0 8.6 - 10.4 mg/dL    Sodium 128 (L) 135 - 144 mmol/L    Potassium 3.5 (L) 3.7 - 5.3 mmol/L    Chloride 94 (L) 98 - 107 mmol/L CO2 24 20 - 31 mmol/L    Anion Gap 10 9 - 17 mmol/L    Alkaline Phosphatase 93 35 - 104 U/L    ALT 17 5 - 33 U/L    AST 22 <32 U/L    Total Bilirubin 0.48 0.3 - 1.2 mg/dL    Total Protein 4.9 (L) 6.4 - 8.3 g/dL    Alb 2.0 (L) 3.5 - 5.2 g/dL    Albumin/Globulin Ratio NOT REPORTED 1.0 - 2.5    GFR Non-African American >60 >60 mL/min    GFR African American >60 >60 mL/min    GFR Comment          GFR Staging NOT REPORTED    CBC auto differential   Result Value Ref Range    WBC 3.5 3.5 - 11.3 k/uL    RBC 3.26 (L) 3.95 - 5.11 m/uL    Hemoglobin 8.7 (L) 11.9 - 15.1 g/dL    Hematocrit 29.3 (L) 36.3 - 47.1 %    MCV 89.9 82.6 - 102.9 fL    MCH 26.7 25.2 - 33.5 pg    MCHC 29.7 28.4 - 34.8 g/dL    RDW 18.3 (H) 11.8 - 14.4 %    Platelets 93 (L) 567 - 453 k/uL    MPV 12.2 8.1 - 13.5 fL    NRBC Automated 0.0 0.0 per 100 WBC    Differential Type NOT REPORTED     WBC Morphology NOT REPORTED     RBC Morphology NOT REPORTED     Platelet Estimate NOT REPORTED     Seg Neutrophils 85 (H) 36 - 65 %    Lymphocytes 5 (L) 24 - 43 %    Monocytes 9 3 - 12 %    Eosinophils % 0 (L) 1 - 4 %    Basophils 0 0 - 2 %    Immature Granulocytes 1 (H) 0 %    Segs Absolute 2.96 1.50 - 8.10 k/uL    Absolute Lymph # 0.18 (L) 1.10 - 3.70 k/uL    Absolute Mono # 0.32 0.10 - 1.20 k/uL    Absolute Eos # 0.00 0.00 - 0.44 k/uL    Basophils Absolute 0.00 0.00 - 0.20 k/uL    Absolute Immature Granulocyte 0.04 0.00 - 0.30 k/uL   Hemoglobin A1C   Result Value Ref Range    Hemoglobin A1C 8.5 (H) 4.0 - 6.0 %    Estimated Avg Glucose 197 mg/dL   Magnesium   Result Value Ref Range    Magnesium 1.0 (L) 1.6 - 2.6 mg/dL   Basic Metabolic Panel   Result Value Ref Range    Glucose 279 (H) 70 - 99 mg/dL    BUN 17 6 - 20 mg/dL    CREATININE 0.93 (H) 0.50 - 0.90 mg/dL    Bun/Cre Ratio 18 9 - 20    Calcium 9.5 8.6 - 10.4 mg/dL    Sodium 130 (L) 135 - 144 mmol/L    Potassium 3.5 (L) 3.7 - 5.3 mmol/L    Chloride 93 (L) 98 - 107 mmol/L    CO2 28 20 - 31 mmol/L Anion Gap 9 9 - 17 mmol/L    GFR Non-African American >60 >60 mL/min    GFR African American >60 >60 mL/min    GFR Comment          GFR Staging NOT REPORTED    Lactic Acid   Result Value Ref Range    Lactic Acid 2.2 0.5 - 2.2 mmol/L   VANCOMYCIN, TROUGH   Result Value Ref Range    Vancomycin Tr 24.0 (HH) 10.0 - 20.0 ug/mL    Vancomycin Trough Dose amount NOT REPORTED     Vancomycin Trough Date last dose NOT REPORTED     Vancomycin Trough Time last dose NOT REPORTED    CBC Auto Differential   Result Value Ref Range    WBC 6.0 3.5 - 11.3 k/uL    RBC 3.48 (L) 3.95 - 5.11 m/uL    Hemoglobin 9.3 (L) 11.9 - 15.1 g/dL    Hematocrit 30.2 (L) 36.3 - 47.1 %    MCV 86.8 82.6 - 102.9 fL    MCH 26.7 25.2 - 33.5 pg    MCHC 30.8 28.4 - 34.8 g/dL    RDW 17.8 (H) 11.8 - 14.4 %    Platelets 604 (L) 043 - 453 k/uL    MPV 11.5 8.1 - 13.5 fL    NRBC Automated 0.0 0.0 per 100 WBC    Differential Type NOT REPORTED     WBC Morphology NOT REPORTED     RBC Morphology NOT REPORTED     Platelet Estimate NOT REPORTED     Seg Neutrophils 82 (H) 36 - 66 %    Lymphocytes 5 (L) 24 - 44 %    Monocytes 10 (H) 1 - 7 %    Eosinophils % 2 1 - 4 %    Basophils 0 %    Immature Granulocytes 1 (H) 0 %    Segs Absolute 4.92 1.8 - 7.7 k/uL    Absolute Lymph # 0.30 (L) 1.0 - 4.8 k/uL    Absolute Mono # 0.60 0.2 - 0.8 k/uL    Absolute Eos # 0.12 0.0 - 0.4 k/uL    Basophils Absolute 0.00 0.0 - 0.2 k/uL    Absolute Immature Granulocyte 0.06 0.00 - 0.30 k/uL   Basic Metabolic Panel   Result Value Ref Range    Glucose 95 70 - 99 mg/dL    BUN 17 6 - 20 mg/dL    CREATININE 1.15 (H) 0.50 - 0.90 mg/dL    Bun/Cre Ratio 15 9 - 20    Calcium 9.1 8.6 - 10.4 mg/dL    Sodium 132 (L) 135 - 144 mmol/L    Potassium 3.4 (L) 3.7 - 5.3 mmol/L    Chloride 97 (L) 98 - 107 mmol/L    CO2 26 20 - 31 mmol/L    Anion Gap 9 9 - 17 mmol/L    GFR Non-African American 48 (L) >60 mL/min    GFR  59 (L) >60 mL/min    GFR Comment          GFR Staging NOT REPORTED    Magnesium Result Value Ref Range    Magnesium 1.1 (L) 1.6 - 2.6 mg/dL   Basic Metabolic Panel   Result Value Ref Range    Glucose 243 (H) 70 - 99 mg/dL    BUN 19 6 - 20 mg/dL    CREATININE 1.44 (H) 0.50 - 0.90 mg/dL    Bun/Cre Ratio 13 9 - 20    Calcium 9.0 8.6 - 10.4 mg/dL    Sodium 129 (L) 135 - 144 mmol/L    Potassium 3.9 3.7 - 5.3 mmol/L    Chloride 97 (L) 98 - 107 mmol/L    CO2 24 20 - 31 mmol/L    Anion Gap 8 (L) 9 - 17 mmol/L    GFR Non-African American 37 (L) >60 mL/min    GFR  45 (L) >60 mL/min    GFR Comment          GFR Staging NOT REPORTED    CBC Auto Differential   Result Value Ref Range    WBC 6.3 3.5 - 11.3 k/uL    RBC 3.09 (L) 3.95 - 5.11 m/uL    Hemoglobin 8.4 (L) 11.9 - 15.1 g/dL    Hematocrit 26.8 (L) 36.3 - 47.1 %    MCV 86.7 82.6 - 102.9 fL    MCH 27.2 25.2 - 33.5 pg    MCHC 31.3 28.4 - 34.8 g/dL    RDW 18.1 (H) 11.8 - 14.4 %    Platelets 758 (L) 197 - 453 k/uL    MPV 11.2 8.1 - 13.5 fL    NRBC Automated 0.0 0.0 per 100 WBC    Differential Type NOT REPORTED     WBC Morphology NOT REPORTED     RBC Morphology NOT REPORTED     Platelet Estimate NOT REPORTED     Seg Neutrophils 86 (H) 36 - 66 %    Lymphocytes 3 (L) 24 - 44 %    Monocytes 6 1 - 7 %    Eosinophils % 5 (H) 1 - 4 %    Basophils 0 %    Immature Granulocytes 0 0 %    Segs Absolute 5.41 1.8 - 7.7 k/uL    Absolute Lymph # 0.19 (L) 1.0 - 4.8 k/uL    Absolute Mono # 0.38 0.2 - 0.8 k/uL    Absolute Eos # 0.32 0.0 - 0.4 k/uL    Basophils Absolute 0.00 0.0 - 0.2 k/uL    Absolute Immature Granulocyte 0.00 0.00 - 0.30 k/uL   Magnesium   Result Value Ref Range    Magnesium 1.5 (L) 1.6 - 2.6 mg/dL   POC Glucose Fingerstick   Result Value Ref Range    POC Glucose 466 (HH) 65 - 105 mg/dL   POC Glucose Fingerstick   Result Value Ref Range    POC Glucose 369 (H) 65 - 105 mg/dL   POC Glucose Fingerstick   Result Value Ref Range    POC Glucose 305 (H) 65 - 105 mg/dL   POC Glucose Fingerstick   Result Value Ref Range POC Glucose 279 (H) 65 - 105 mg/dL   POC Glucose Fingerstick   Result Value Ref Range    POC Glucose 288 (H) 65 - 105 mg/dL   POC Glucose Fingerstick   Result Value Ref Range    POC Glucose 218 (H) 65 - 105 mg/dL   POC Glucose Fingerstick   Result Value Ref Range    POC Glucose 155 (H) 65 - 105 mg/dL   POC Glucose Fingerstick   Result Value Ref Range    POC Glucose 159 (H) 65 - 105 mg/dL   POC Glucose Fingerstick   Result Value Ref Range    POC Glucose 181 (H) 65 - 105 mg/dL   POC Glucose Fingerstick   Result Value Ref Range    POC Glucose 233 (H) 65 - 105 mg/dL   POC Glucose Fingerstick   Result Value Ref Range    POC Glucose 240 (H) 65 - 105 mg/dL   POC Glucose Fingerstick   Result Value Ref Range    POC Glucose 237 (H) 65 - 105 mg/dL   POC Glucose Fingerstick   Result Value Ref Range    POC Glucose 259 (H) 65 - 105 mg/dL   POC Glucose Fingerstick   Result Value Ref Range    POC Glucose 241 (H) 65 - 105 mg/dL   POC Glucose Fingerstick   Result Value Ref Range    POC Glucose 227 (H) 65 - 105 mg/dL         IMAGING DATA:    Ct Radius Ulna Left Wo Contrast    Result Date: 12/31/2020 EXAMINATION: CT OF THE LEFT UPPER EXTREMITY WITHOUT CONTRAST 12/31/2020 1:15 pm TECHNIQUE: CT of the left upper extremity was performed without the administration of intravenous contrast. Multiplanar reformatted images are provided for review. Dose modulation, iterative reconstruction, and/or weight based adjustment of the mA/kV was utilized to reduce the radiation dose to as low as reasonably achievable. COMPARISON: None. HISTORY ORDERING SYSTEM PROVIDED HISTORY: left forearm cellulitis, assess abscess TECHNOLOGIST PROVIDED HISTORY: left forearm cellulitis, assess abscess Reason for Exam: Redness and swelling to mid dorsal forearm in site of previous IV access. Being treated for cellulitis; r/o abscess. Unable to have IV contrast due to kidney function. Acuity: Acute Type of Exam: Initial FINDINGS: Bones: No acute osseous abnormality of the forearm identified. No fracture or dislocation. No definite cortical disruption identified to suggest osteomyelitis. Soft Tissue: There is diffuse skin thickening and subcutaneous soft tissue edema essentially throughout the forearm. The soft tissue edema is most pronounced overlying the olecranon process and within the volar soft tissues of the wrist.  Within the limitations of this noncontrast study, there is no obvious well-formed drainable fluid collection to suggest abscess formation. Edema within the anterior and posterior compartments of the forearm is suggestive of myositis. Diffuse skin thickening, myositis, and cellulitis. Within the limitations of noncontrast imaging, there is no obvious well-formed or drainable fluid collection identified to suggest abscess formation.      Vl Upper Extremity Venous Duplex Left    Result Date: 12/31/2020 30 MINOO Mendoza  Vascular Upper Extremities Veins Procedure   Patient Name   NICOLAS      Date of Study           12/31/2020                 Sabrina Atwood   Date of Birth  1960   Gender                  Female   Age            64 year(s)   Race                       Room Number    2021   Corporate ID # [id number]   Patient Acct # [de-identified]   MR #           [id number]      Sonographer             Caitlin Rey   Accession #    [id number]   Interpreting Physician  [facility]   Referring                   Referring Physician     Sean Mckeon  Nurse  Practitioner  Procedure Type of Study:   Veins: Upper Extremities Veins, Venous Scan Upper Left. Indications for Study:Increased pain and swelling. Patient Status: In Patient. Technical Quality:Good visualization. Comments:H/O IV stick. Conclusions   Summary   Sub-acute superficial vein thrombosis of the left basilic and cephalic  veins. Unchanged from previous study on 12/28/2020. Signature   ----------------------------------------------------------------  Electronically signed by Caitlin Rey(Sonographer) on  12/31/2020 10:50 AM  ----------------------------------------------------------------   ----------------------------------------------------------------  Electronically signed by Abelardo Sportsman Reyes,Arthur(Interpreting  physician) on 12/31/2020 01:26 PM  ----------------------------------------------------------------    Left Impression:   The left mid basilic and the mid and distal cephalic veins are non   compressible with echoes noted. Left internal jugular, subclavian, axillary, brachial, ulnar, radial,   veins are compressible with normal doppler responses. No change from study don on 12/28/20. Velocities are measured in cm/s ; Diameters are measured in cm Left UE Vein Measurements 2D Measurements +------------------------------------+----------+---------------+----------+ ! Location !Visualized! Compressibility! Thrombosis! +------------------------------------+----------+---------------+----------+ ! Prox IJV                            ! Yes       ! Yes            ! None      ! +------------------------------------+----------+---------------+----------+ ! Prox SCV                            ! Yes       ! Yes            ! None      ! +------------------------------------+----------+---------------+----------+ ! Prox Axillary                       ! Yes       ! Yes            ! None      ! +------------------------------------+----------+---------------+----------+ ! Dist Axillary                       ! Yes       ! Yes            ! None      ! +------------------------------------+----------+---------------+----------+ ! Prox Brachial                       !Yes       ! Yes            ! None      ! +------------------------------------+----------+---------------+----------+ ! Dist Brachial                       !Yes       ! Yes            ! None      ! +------------------------------------+----------+---------------+----------+ ! Prox Radial                         !Yes       ! Yes            ! None      ! +------------------------------------+----------+---------------+----------+ ! Dist Radial                         !Yes       ! Yes            ! None      ! +------------------------------------+----------+---------------+----------+ ! Prox Ulnar                          ! Yes       ! Yes            ! None      ! +------------------------------------+----------+---------------+----------+ ! Dist Ulnar                          ! Yes       ! Yes            ! None      ! +------------------------------------+----------+---------------+----------+ ! Basilic at UA                       ! Yes       ! Yes            ! None      ! +------------------------------------+----------+---------------+----------+ ! Basilic at AF                       ! Yes       ! No             !SubAcute  ! +------------------------------------+----------+---------------+----------+ ! Cephalic at UA                      ! Yes       ! Yes            ! None      ! +------------------------------------+----------+---------------+----------+ ! Cephalic at AF                      ! Yes       ! No             !SubAcute  ! +------------------------------------+----------+---------------+----------+ ! Cephalic at [address]                      ! Yes       ! No             !SubAcute  ! +------------------------------------+----------+---------------+----------+ Doppler Measurements +-------------------------+-----------------------+------------------------+ ! Location                 ! Signal                 !Reflux                  ! +-------------------------+-----------------------+------------------------+ ! IJV                      ! Phasic                 !                        ! +-------------------------+-----------------------+------------------------+ ! SCV                      ! Phasic                 !                        ! +-------------------------+-----------------------+------------------------+ ! Axillary                 ! Phasic                 !                        ! +-------------------------+-----------------------+------------------------+ ! Brachial                 !Phasic                 !                        ! +-------------------------+-----------------------+------------------------+    Vl Dup Upper Extremity Venous Left    Result Date: 12/28/2020 +------------------------------------+----------+---------------+----------+ ! Location                            ! Visualized! Compressibility! Thrombosis! +------------------------------------+----------+---------------+----------+ ! Prox IJV                            ! Yes       ! Yes            ! None      ! +------------------------------------+----------+---------------+----------+ ! Prox SCV                            ! Yes       ! Yes            ! None      ! +------------------------------------+----------+---------------+----------+ ! Prox Axillary                       ! Yes       ! Yes            ! None      ! +------------------------------------+----------+---------------+----------+ ! Dist Axillary                       ! Yes       ! Yes            ! None      ! +------------------------------------+----------+---------------+----------+ ! Prox Brachial                       !Yes       ! Yes            ! None      ! +------------------------------------+----------+---------------+----------+ ! Dist Brachial                       !Yes       ! Yes            ! None      ! +------------------------------------+----------+---------------+----------+ ! Prox Radial                         !Yes       ! Yes            ! None      ! +------------------------------------+----------+---------------+----------+ ! Dist Radial                         !Partial   !Yes            ! None      ! +------------------------------------+----------+---------------+----------+ ! Prox Ulnar                          ! Yes       ! Yes            ! None      ! +------------------------------------+----------+---------------+----------+ ! Dist Ulnar                          ! Partial   !Yes            ! None      ! +------------------------------------+----------+---------------+----------+ ! Basilic at                        ! Yes       ! Yes            ! None      ! +------------------------------------+----------+---------------+----------+ ! Baseric at AF !Yes       !No             !SubAcute  ! +------------------------------------+----------+---------------+----------+ ! Basilic at [address]                       ! Yes       ! Yes            ! None      ! +------------------------------------+----------+---------------+----------+ ! Cephalic at UA                      ! Yes       ! Yes            ! None      ! +------------------------------------+----------+---------------+----------+ ! Cephalic at AF                      ! Yes       ! No             !SubAcute  ! +------------------------------------+----------+---------------+----------+ ! Cephalic at [address]                      ! Yes       ! No             !SubAcute  ! +------------------------------------+----------+---------------+----------+ Doppler Measurements +-------------------------+-----------------------+------------------------+ ! Location                 ! Signal                 !Reflux                  ! +-------------------------+-----------------------+------------------------+ ! IJV                      ! Phasic                 !                        ! +-------------------------+-----------------------+------------------------+ ! SCV                      ! Phasic                 !                        ! +-------------------------+-----------------------+------------------------+ ! Axillary                 ! Phasic                 !                        ! +-------------------------+-----------------------+------------------------+ ! Brachial                 !Phasic                 !                        ! +-------------------------+-----------------------+------------------------+      IMPRESSION:   Primary Problem  <principal problem not specified>    Active Hospital Problems    Diagnosis Date Noted    Cellulitis [L03.90] 12/28/2020       Superficial vein thrombosis, provoked by IV catheter in  background of hypercoagulable state due to pancreatic cancer  Cellulitis, purulent  Unresectable pancreatic cancer Ongoing concurrent chemoradiation    RECOMMENDATIONS:  I personally reviewed results of lab work-up imaging studies and other relevant clinical data. Reviewed previous treatment record  Continue to hold Xeloda  antibiotics per ID  Ultrasound does not show any extension of SVT. No abscess noted on CT scan  Follow-up on renal function  In the meanwhile continue Lovenox. GFR greater than 30. Monitor kidney function. Xarelto 10 mg daily for 45 days for SVT in background of hypercoagulable state. Patient will also need outpatient follow-up on SVT before discontinuing anticoagulation to confirm improvement  Pain management. Discussed with patient and Nurse. Thank you for asking us to see this patient. Maryann Farias MD          This note is created with the assistance of a speech recognition program.  While intending to generate a document that actually reflects the content of the visit, the document can still have some errors including those of syntax and sound a like substitutions which may escape proof reading. It such instances, actual meaning can be extrapolated by contextual diversion.

## 2021-01-01 NOTE — PROGRESS NOTES
Subjective:     Follow-up type 2 diabetes  Polyuria no polydipsia no hypoglycemia blood sugars reviewed still in the 200 range  ROS  Fever no chills no GI/ complaints no cardiopulmonary complaints no TIA no bleeding no headache no sore throat no skin lesions except left forearm swollen red even though the swelling better  physical exam  General Appearance: alert and oriented to person, place and time and in no acute distress  Skin: warm and dry, no rash or erythema and left forearm swollen red tender  Head: normocephalic and atraumatic  Eyes: pupils equal, round, and reactive to light, conjunctivae normal and sclera anicteric  Neck: neck supple and non tender without mass   Pulmonary/Chest: clear to auscultation bilaterally- no wheezes, rales or rhonchi, normal air movement, no respiratory distress  Cardiovascular: normal rate, regular rhythm, normal S1 and S2, no gallops, intact distal pulses and no carotid bruits  Abdomen: soft, non-tender, non-distended, normal bowel sounds, no masses or organomegaly  Extremities: Trace edema good pulses negative Homans' sign  Neurologic: Alert oriented x3 with no focal deficit    BP (!) 123/40   Pulse 72   Temp 98.1 °F (36.7 °C) (Oral)   Resp 16   Ht 5' 5\" (1.651 m)   Wt 258 lb (117 kg)   SpO2 99%   BMI 42.93 kg/m²     CBC:   Lab Results   Component Value Date    WBC 6.3 12/31/2020    RBC 3.09 12/31/2020    RBC 4.33 03/05/2012    HGB 8.4 12/31/2020    HCT 26.8 12/31/2020    MCV 86.7 12/31/2020    MCH 27.2 12/31/2020    MCHC 31.3 12/31/2020    RDW 18.1 12/31/2020     12/31/2020     03/05/2012    MPV 11.2 12/31/2020     CMP:    Lab Results   Component Value Date     12/31/2020    K 3.9 12/31/2020    CL 97 12/31/2020    CO2 24 12/31/2020    BUN 19 12/31/2020    CREATININE 1.44 12/31/2020    GFRAA 45 12/31/2020    LABGLOM 37 12/31/2020    GLUCOSE 243 12/31/2020    GLUCOSE 278 03/05/2012    PROT 4.9 12/29/2020    LABALBU 2.0 12/29/2020 CALCIUM 9.0 12/31/2020    BILITOT 0.48 12/29/2020    ALKPHOS 93 12/29/2020    AST 22 12/29/2020    ALT 17 12/29/2020        Assessment:  Patient Active Problem List   Diagnosis    Uncontrolled diabetes mellitus ([address]    Hypertension    Postmenopausal bleeding    Thickened endometrium    Type 2 diabetes mellitus with hyperglycemia, with long-term current use of insulin (HCC)    Hypophosphatemia    Hypomagnesemia    Hyperglycemia due to type 2 diabetes mellitus ([address]    Pancreatic Head Mass    Non-Obstructive CAD    Primary adenocarcinoma of head of pancreas (HCC)    Intractable nausea and vomiting    Class 3 severe obesity with serious comorbidity in adult ([address]    Amnesia    Anxiety    Benign neoplasm of choroid    Depression    Diabetic complication (HCC)    Diabetic peripheral neuropathy associated with type 2 diabetes mellitus (HCC)    Hepatomegaly    Methicillin resistant Staphylococcus aureus infection    Long term current use of insulin (HCC)    Hyperlipidemia    Moderate nonproliferative diabetic retinopathy associated with type 2 diabetes mellitus (HCC)    Morbid obesity (HCC)    Pain in limb    Pancreatic adenocarcinoma (HCC)    Pancreatic malignancy syndrome ([address]    Peripheral venous insufficiency    Postprocedural state    Primary localized osteoarthrosis of ankle and foot    Urinary tract infectious disease    Transaminitis    Intractable vomiting    Septicemia due to E. coli (HCC)    Acute obstructive cholangitis    TORREY (acute kidney injury) ([address]    Dehydration with hyponatremia    NSVT (nonsustained ventricular tachycardia) (HCC)    Hypoglycemia    Cellulitis     Myelitis left forearm continue with IV antibiotics warm compresses  Type 2 diabetes with hyperglycemia insulin treated continue with before meals and at bedtime Accu-Cheks insulin coverage and increase Lantus  Type 2 diabetes with renal complications  CKD 3 avoid nephrotoxic drugs  Hyponatremia Hypokalemia  Morbid obesity excess calories  Metastatic pancreatic CA  panCytopenia secondary to chemotherapy resolving  Hypertension essential controlled  Plan:    Labs reviewed continue with IV antibiotics continue with before meals and at bedtime Accu-Cheks with insulin coverage pain control see orders      Scott Garrett MD  1:32 PM

## 2021-01-01 NOTE — PROGRESS NOTES
[facility]  Progress Note    Prabhjot Glass  MRN: [id number]  Date: 1/1/2021  LOS: 4     Reason for F/U :   MRSA sepsis    Impression :   1. Left upper extremity/forearm septic thrombophlebitis  2. MRSA sepsis secondary to above  3. Advanced pancreatic cancer, on chemotherapy and radiation therapy  4. Diabetes mellitus type 2    Recommendations:   · Patient continues on IV antimicrobial therapy with daptomycin  · CT scan of the left upper extremity with myositis, no evidence of abscess  · Repeat blood cultures were obtained this morning  · Patient will need a line but not until follow-up blood cultures are negative    Infection Control Recommendations:   Contact precautions    Discharge Planning:   Estimated Length of IV antimicrobials: At least 2 weeks, though likely 4 weeks  Patient will need Midline Catheter Insertion/ PICC line Insertion: No  Patient will need: Home IV , [place],  SNF,  LTAC: Undetermined  Patient willneed outpatient wound care: No    Medical Decision making / Summary of Stay:   George Bland a 64y.o.-year-old female who was initially admitted on 12/28/2020.   Sarika has a history of diabetes mellitus, hyperlipidemia, hypertension, anxiety disorder, and locally advanced pancreatic cancer and was receiving chemotherapy with Xeloda and radiation therapy. She was recently hospitalized at [place] [address] after a syncopal episode and hypoglycemia.  The patient was discharged 12/23/2020.     The patient presented to the emergency department with swelling and redness at a prior IV site in her left forearm.  The patient started having fevers/chills on the day of admission which prompted the emergency room evaluation.  The temperature was 101.5 degrees in the emergency department.   She was started on IV antimicrobial therapy with vancomycin and blood cultures were sent.    Blood cultures done in the emergency department have come back 2 out of 2 with gram-positive cocci and I was asked to evaluate and help with antibiotic choice. Current evaluation:2021    BP (!) 123/40   Pulse 72   Temp 98.1 °F (36.7 °C) (Oral)   Resp 16   Ht 5' 5\" (1.651 m)   Wt 258 lb (117 kg)   SpO2 99%   BMI 42.93 kg/m²     Temperature Range: Temp: 98.1 °F (36.7 °C) Temp  Av °F (36.7 °C)  Min: 97.9 °F (36.6 °C)  Max: 98.1 °F (36.7 °C)    The patient is seen and evaluated sitting up in bed. Does continue to have pain in her left upper extremity. States that she does believe that her arm has improved. She states no drainage throughout the night. No fevers    Review of Systems   Constitutional: Negative. HENT: Negative. Respiratory: Negative. Cardiovascular: Negative. Gastrointestinal: Negative. Genitourinary: Negative. Musculoskeletal: Negative. Skin: Positive for wound (Left upper extremity redness and swelling as increased. Pain in forearm). Neurological: Negative. Psychiatric/Behavioral: Negative. Physical Examination :     Physical Exam  Constitutional:       General: She is not in acute distress. Appearance: Normal appearance. HENT:      Head: Normocephalic and atraumatic. Neck:      Musculoskeletal: Normal range of motion and neck supple. Cardiovascular:      Rate and Rhythm: Normal rate and regular rhythm. Pulmonary:      Effort: Pulmonary effort is normal.      Breath sounds: Normal breath sounds. Abdominal:      General: Abdomen is flat. Bowel sounds are normal.      Palpations: Abdomen is soft. Skin:     Comments: Left upper extremity cellulitis continues but has improved   Neurological:      General: No focal deficit present. Mental Status: She is alert and oriented to person, place, and time. Mental status is at baseline.    Psychiatric:         Mood and Affect: Mood normal.         Behavior: Behavior normal.     2020 12/31/2020          Laboratory data:   I have independently reviewed the followinglabs:  CBC with Differential:   Recent Labs     12/30/20  0529 12/31/20  0530   WBC 6.0 6.3   HGB 9.3* 8.4*   HCT 30.2* 26.8*   * 117*   LYMPHOPCT 5* 3*   MONOPCT 10* 6     BMP:   Recent Labs     12/30/20  0529 12/30/20  1612 12/31/20  0530   *  --  129*   K 3.4*  --  3.9   CL 97*  --  97*   CO2 26  --  24   BUN 17  --  19   CREATININE 1.15*  --  1.44*   MG  --  1.1* 1.5*     Hepatic Function Panel:   No results for input(s): PROT, LABALBU, BILIDIR, IBILI, BILITOT, ALKPHOS, ALT, AST in the last 72 hours. Lab Results   Component Value Date    PROCAL 10.17 07/21/2020     No results found for: CRP  No results found for: SEDRATE      Lab Results   Component Value Date    DDIMER 1.92 06/08/2018     Lab Results   Component Value Date    FERRITIN 148 06/03/2020     No results found for: LDH  No results found for: FIBRINOGEN    Results in Past 30 Days  Result Component Current Result Ref Range Previous Result Ref Range   SARS-CoV-2      (12/28/2020)  Not Detected (12/16/2020) Not Detected    Not Detected (12/28/2020) Not Detected      Not Detected (12/28/2020) Not Detected       Lab Results   Component Value Date    COVID19 Not Detected 12/28/2020    COVID19 Not Detected 12/28/2020    COVID19 Not Detected 12/16/2020    COVID19 Not Detected 07/21/2020    COVID19 Not Detected 06/03/2020       Recent Labs     12/30/20  0529   VANCOTROUGH 24.0*       Imaging Studies:     CT LUE  Impression:        Diffuse skin thickening, myositis, and cellulitis.  Within the limitations of   noncontrast imaging, there is no obvious well-formed or drainable fluid   collection identified to suggest abscess formation. Cultures:     Culture, Blood 1 [[id number]] Collected: 01/01/21 0626   Order Status: Completed Specimen: Blood Updated: 01/01/21 1223    Specimen Description . BLOOD    Special Requests R HAND 2 ML    Culture NO GROWTH 2 HOURS Culture, Blood 1 [[id number]] Collected: 01/01/21 0198   Order Status: Completed Specimen: Blood Updated: 01/01/21 1223    Specimen Description . BLOOD    Special Requests R AC 6 ML    Culture NO GROWTH 2 HOURS       Culture, Blood 1 [[id number]] (Abnormal) Collected: 12/28/20 1400   Order Status: Completed Specimen: Blood Updated: 12/30/20 0745    Specimen Description . BLOOD    Special Requests 6ML VAC    Culture POSITIVE Blood Culture Results called to and read back by: Kirsten Soler AT 0845 12/29/20Abnormal      DIRECT GRAM STAIN FROM BOTTLE: GRAM POSITIVE COCCI IN CLUSTERS     METHICILLIN RESISTANT STAPHYLOCOCCUS AUREUS For susceptibility, refer to previous culture. Abnormal      12/30/2020  7:42 AM - EvanSarahi Incoming Lab Results From Definition 6    Specimen Information: Blood        Component Collected Lab   Specimen Description 12/28/2020  2:30  [facility] [place] [place] [facility]   . BLOOD    Special Requests 12/28/2020  2:30  [facility] [place] [place] [facility]   RFA 10ML    Culture Abnormal  12/28/2020  2:30 PM [address] Blood Culture Results called to and read back by: NUPUR Driscoll M 0400 12/29/20    Culture 12/28/2020  2:30 PM [facility] FROM BOTTLE: Nury Sever POSITIVE COCCI IN CLUSTERS    Culture Abnormal  12/28/2020  2:30 PM [address]    Testing Performed By    Lab - Abbreviation Name Director Address Valid Date Range   [address] [address] 08/30/17 0801-Present   [facility]-- [address] LAB Bin Harper MD [address] [place] [address] 08/30/17 0757-Present   Susceptibility    Methicillin resistant staphylococcus aureus (3)    Antibiotic Interpretation DENA Status    penicillin Resistant  Final     >=0.5   RESISTANT   cefoxitin screen  NOT REPORTED Final    ciprofloxacin   Final NOT REPORTED    clindamycin Sensitive  Final     <=0.25   SUSCEPTIBLE   erythromycin Resistant  Final     >=8   RESISTANT   gentamicin Sensitive  Final     <=0.5   SUSCEPTIBLE   gentamicin Sensitive Gentamicin is used only in combination with other active agents that test susceptible. Final    Induced Clind Resist Negative NEGATIVE Final    levofloxacin Intermediate  Final     4   INTERMEDIATE   linezolid   Final     NOT REPORTED    moxifloxacin   Final     NOT REPORTED    nitrofurantoin   Final     NOT REPORTED    oxacillin Resistant  Final     >=4   RESISTANT   Synercid   Final     NOT REPORTED    rifampin   Final     NOT REPORTED    tetracycline Sensitive  Final     <=1   SUSCEPTIBLE   tigecycline   Final     NOT REPORTED    trimethoprim-sulfamethoxazole Sensitive  Final     <=10   SUSCEPTIBLE   vancomycin Sensitive  Final     <=0.5   SUSCEPTIBLE   Lab and Collection        Medications:      daptomycin (CUBICIN) IVPB  6 mg/kg (Adjusted) Intravenous Q24H    potassium chloride  20 mEq Oral BID WC    sertraline  150 mg Oral Daily    amLODIPine  10 mg Oral Daily    aspirin  81 mg Oral Nightly    [Held by provider] atorvastatin  40 mg Oral Nightly    carvedilol  12.5 mg Oral BID WC    hydrALAZINE  25 mg Oral 3 times per day    insulin glargine  22 Units Subcutaneous Daily    sodium chloride flush  10 mL Intravenous 2 times per day    insulin lispro  0-6 Units Subcutaneous TID WC    insulin lispro  0-3 Units Subcutaneous Nightly    enoxaparin  30 mg Subcutaneous BID           [facility]  [address] [place] [address]  [employer] messaging  OFFICE: [phone]      Electronically signed by [address] [place] [address], APRN - CNP on 1/1/2021 at 1:07 PM  Thank you for allowing us to participate in the care of this patient. Please call with questions. This note iscreated with the assistance of a speech recognition program.  While intending to generate a document that actually reflects the content of the visit, the document can still have some errors including those of syntax andsound a like substitutions which may escape proof reading. In such instances, actual meaning can be extrapolated by contextual diversion.

## 2021-01-02 LAB
ABSOLUTE EOS #: 0.11 K/UL (ref 0–0.4)
ABSOLUTE IMMATURE GRANULOCYTE: 0.22 K/UL (ref 0–0.3)
ABSOLUTE LYMPH #: 0.44 K/UL (ref 1–4.8)
ABSOLUTE MONO #: 0.55 K/UL (ref 0.2–0.8)
ANION GAP SERPL CALCULATED.3IONS-SCNC: 9 MMOL/L (ref 9–17)
BASOPHILS # BLD: 0 %
BASOPHILS ABSOLUTE: 0 K/UL (ref 0–0.2)
BUN BLDV-MCNC: 15 MG/DL (ref 6–20)
BUN/CREAT BLD: 11 (ref 9–20)
CALCIUM SERPL-MCNC: 9.2 MG/DL (ref 8.6–10.4)
CHLORIDE BLD-SCNC: 105 MMOL/L (ref 98–107)
CO2: 21 MMOL/L (ref 20–31)
CREAT SERPL-MCNC: 1.35 MG/DL (ref 0.5–0.9)
DIFFERENTIAL TYPE: ABNORMAL
EOSINOPHILS RELATIVE PERCENT: 2 % (ref 1–4)
GFR AFRICAN AMERICAN: 49 ML/MIN
GFR NON-AFRICAN AMERICAN: 40 ML/MIN
GFR SERPL CREATININE-BSD FRML MDRD: ABNORMAL ML/MIN/{1.73_M2}
GFR SERPL CREATININE-BSD FRML MDRD: ABNORMAL ML/MIN/{1.73_M2}
GLUCOSE BLD-MCNC: 142 MG/DL (ref 65–105)
GLUCOSE BLD-MCNC: 184 MG/DL (ref 65–105)
GLUCOSE BLD-MCNC: 208 MG/DL (ref 70–99)
GLUCOSE BLD-MCNC: 226 MG/DL (ref 65–105)
HCT VFR BLD CALC: 27.3 % (ref 36.3–47.1)
HEMOGLOBIN: 8.4 G/DL (ref 11.9–15.1)
IMMATURE GRANULOCYTES: 4 %
LYMPHOCYTES # BLD: 8 % (ref 24–44)
MCH RBC QN AUTO: 26.7 PG (ref 25.2–33.5)
MCHC RBC AUTO-ENTMCNC: 30.8 G/DL (ref 28.4–34.8)
MCV RBC AUTO: 86.7 FL (ref 82.6–102.9)
MONOCYTES # BLD: 10 % (ref 1–7)
MORPHOLOGY: ABNORMAL
NRBC AUTOMATED: 0 PER 100 WBC
PDW BLD-RTO: 18.3 % (ref 11.8–14.4)
PLATELET # BLD: 214 K/UL (ref 138–453)
PLATELET ESTIMATE: ABNORMAL
PMV BLD AUTO: 10.1 FL (ref 8.1–13.5)
POTASSIUM SERPL-SCNC: 4.1 MMOL/L (ref 3.7–5.3)
RBC # BLD: 3.15 M/UL (ref 3.95–5.11)
RBC # BLD: ABNORMAL 10*6/UL
SEG NEUTROPHILS: 76 % (ref 36–66)
SEGMENTED NEUTROPHILS ABSOLUTE COUNT: 4.18 K/UL (ref 1.8–7.7)
SODIUM BLD-SCNC: 135 MMOL/L (ref 135–144)
WBC # BLD: 5.5 K/UL (ref 3.5–11.3)
WBC # BLD: ABNORMAL 10*3/UL

## 2021-01-02 PROCEDURE — 99232 SBSQ HOSP IP/OBS MODERATE 35: CPT | Performed by: NURSE PRACTITIONER

## 2021-01-02 PROCEDURE — 80048 BASIC METABOLIC PNL TOTAL CA: CPT

## 2021-01-02 PROCEDURE — 6360000002 HC RX W HCPCS: Performed by: INTERNAL MEDICINE

## 2021-01-02 PROCEDURE — 6370000000 HC RX 637 (ALT 250 FOR IP): Performed by: NURSE PRACTITIONER

## 2021-01-02 PROCEDURE — 1200000000 HC SEMI PRIVATE

## 2021-01-02 PROCEDURE — 2580000003 HC RX 258: Performed by: INTERNAL MEDICINE

## 2021-01-02 PROCEDURE — 97110 THERAPEUTIC EXERCISES: CPT

## 2021-01-02 PROCEDURE — 97116 GAIT TRAINING THERAPY: CPT

## 2021-01-02 PROCEDURE — 99232 SBSQ HOSP IP/OBS MODERATE 35: CPT | Performed by: INTERNAL MEDICINE

## 2021-01-02 PROCEDURE — 36415 COLL VENOUS BLD VENIPUNCTURE: CPT

## 2021-01-02 PROCEDURE — 85025 COMPLETE CBC W/AUTO DIFF WBC: CPT

## 2021-01-02 PROCEDURE — 6370000000 HC RX 637 (ALT 250 FOR IP): Performed by: INTERNAL MEDICINE

## 2021-01-02 PROCEDURE — 82947 ASSAY GLUCOSE BLOOD QUANT: CPT

## 2021-01-02 RX ORDER — ZOLPIDEM TARTRATE 5 MG/1
2.5 TABLET ORAL NIGHTLY PRN
Status: DISCONTINUED | OUTPATIENT
Start: 2021-01-02 | End: 2021-01-06 | Stop reason: HOSPADM

## 2021-01-02 RX ORDER — OXYCODONE HCL 10 MG/1
10 TABLET, FILM COATED, EXTENDED RELEASE ORAL EVERY 12 HOURS SCHEDULED
Status: DISCONTINUED | OUTPATIENT
Start: 2021-01-02 | End: 2021-01-06 | Stop reason: HOSPADM

## 2021-01-02 RX ADMIN — CARVEDILOL 12.5 MG: 12.5 TABLET, FILM COATED ORAL at 09:09

## 2021-01-02 RX ADMIN — HYDRALAZINE HYDROCHLORIDE 25 MG: 25 TABLET, FILM COATED ORAL at 12:50

## 2021-01-02 RX ADMIN — HYDROMORPHONE HYDROCHLORIDE 1 MG: 1 INJECTION, SOLUTION INTRAMUSCULAR; INTRAVENOUS; SUBCUTANEOUS at 12:55

## 2021-01-02 RX ADMIN — INSULIN LISPRO 2 UNITS: 100 INJECTION, SOLUTION INTRAVENOUS; SUBCUTANEOUS at 13:28

## 2021-01-02 RX ADMIN — SERTRALINE HYDROCHLORIDE 150 MG: 100 TABLET ORAL at 09:09

## 2021-01-02 RX ADMIN — SODIUM CHLORIDE 500 MG: 900 INJECTION INTRAVENOUS at 12:50

## 2021-01-02 RX ADMIN — POTASSIUM CHLORIDE 20 MEQ: 20 TABLET, EXTENDED RELEASE ORAL at 16:44

## 2021-01-02 RX ADMIN — INSULIN LISPRO 1 UNITS: 100 INJECTION, SOLUTION INTRAVENOUS; SUBCUTANEOUS at 16:44

## 2021-01-02 RX ADMIN — HYDRALAZINE HYDROCHLORIDE 25 MG: 25 TABLET, FILM COATED ORAL at 09:25

## 2021-01-02 RX ADMIN — OXYCODONE HYDROCHLORIDE 10 MG: 10 TABLET, FILM COATED, EXTENDED RELEASE ORAL at 21:23

## 2021-01-02 RX ADMIN — POTASSIUM CHLORIDE 20 MEQ: 20 TABLET, EXTENDED RELEASE ORAL at 09:09

## 2021-01-02 RX ADMIN — INSULIN GLARGINE 28 UNITS: 100 INJECTION, SOLUTION SUBCUTANEOUS at 09:10

## 2021-01-02 RX ADMIN — ZOLPIDEM TARTRATE 2.5 MG: 5 TABLET ORAL at 21:23

## 2021-01-02 RX ADMIN — CARVEDILOL 12.5 MG: 12.5 TABLET, FILM COATED ORAL at 16:44

## 2021-01-02 RX ADMIN — AMLODIPINE BESYLATE 10 MG: 5 TABLET ORAL at 09:08

## 2021-01-02 RX ADMIN — SODIUM CHLORIDE: 9 INJECTION, SOLUTION INTRAVENOUS at 02:53

## 2021-01-02 RX ADMIN — ENOXAPARIN SODIUM 30 MG: 30 INJECTION SUBCUTANEOUS at 21:24

## 2021-01-02 RX ADMIN — ASPIRIN 81 MG: 81 TABLET, CHEWABLE ORAL at 21:22

## 2021-01-02 RX ADMIN — INSULIN LISPRO 1 UNITS: 100 INJECTION, SOLUTION INTRAVENOUS; SUBCUTANEOUS at 21:24

## 2021-01-02 RX ADMIN — HYDRALAZINE HYDROCHLORIDE 25 MG: 25 TABLET, FILM COATED ORAL at 21:23

## 2021-01-02 RX ADMIN — SODIUM CHLORIDE, PRESERVATIVE FREE 10 ML: 5 INJECTION INTRAVENOUS at 22:46

## 2021-01-02 RX ADMIN — INSULIN LISPRO 1 UNITS: 100 INJECTION, SOLUTION INTRAVENOUS; SUBCUTANEOUS at 09:10

## 2021-01-02 RX ADMIN — ENOXAPARIN SODIUM 30 MG: 30 INJECTION SUBCUTANEOUS at 09:17

## 2021-01-02 ASSESSMENT — ENCOUNTER SYMPTOMS
GASTROINTESTINAL NEGATIVE: 1
RESPIRATORY NEGATIVE: 1

## 2021-01-02 ASSESSMENT — PAIN SCALES - GENERAL: PAINLEVEL_OUTOF10: 0

## 2021-01-02 NOTE — FLOWSHEET NOTE
Patient was sitting up. States doing ok. Shared about feelings. Shared about her wanting quality of life.  shared in presence, support. Follow up as needed. 01/02/21 1456   Encounter Summary   Services provided to: Patient   Referral/Consult From: [facility]   (1-2-21)   Complexity of Encounter Moderate   Length of Encounter 15 minutes   Spiritual Assessment Completed Yes   Routine   Type Follow up   Spiritual/Congregational   Type Spiritual support   Grief and Life Adjustment   Type End of life   Assessment Calm; Approachable   Intervention Active listening;Explored feelings, thoughts, concerns; Discussed illness/injury and it's impact; Discussed belief system/M[employer]on practices/leonidas   Outcome Expressed gratitude;Receptive;Engaged in conversation;Expressed feelings/needs/concerns

## 2021-01-02 NOTE — PROGRESS NOTES
Progress Note               Date:                           1/2/2021  Patient name:           Kaushal Barragan  Date of admission:  12/28/2020  1:26 PM  MRN:   [id number]  YOB: 1960  PCP:                           Josi Snowden        Subjective:   Patient is seen and examined, the arm continues to improve progressively    Continues to have pain in the area and redness but overall seems to be improving. No nausea or vomiting but she has decreased appetite. She continues to have mild abdominal pain and diarrhea. Tolerating antibiotics well. HPI in Brief:         Review of systems  General: no fever or night sweats, Weight is stable. Poor appetite  ENT: No double or blurred vision, no tinnitus or hearing problem, no dysphagia or sore throat   Respiratory: No chest pain, no shortness of breath, no cough or hemoptysis. Cardiovascular: Denies chest pain, PND or orthopnea. No L E swelling or palpitations. Gastrointestinal:    No nausea or vomiting, there is abdominal pain and intermittent diarrhea   Genitourinary: Denies dysuria, hematuria, frequency, urgency or incontinence. Neurological: Denies headaches, decreased LOC, no sensory or motor focal deficits. Medications:   Reviewed in Epic     Objective:   Vitals: BP (!) 155/54   Pulse 87   Temp 97.9 °F (36.6 °C) (Oral)   Resp 18   Ht 5' 5\" (1.651 m)   Wt 255 lb 1.6 oz (115.7 kg)   SpO2 99%   BMI 42.45 kg/m²   Gen.  Exam, showed a well-appearing patient without evidence of distress or pain  HEENT, normocephalic and atraumatic, PERRLA extraocular muscles are intact, subconjunctival hemorrhage on the left  Neck showed no JVD no carotid bruit, no cervical adenopathy  Chest is clear to auscultation bilaterally  Heart is regular without any murmur  Abdomen soft nontender no hepatosplenomegaly  Lower extremities showed no edema clubbing or cyanosis Left upper extremity:  swollen with redness. Although improved significantly according to patient  Neurological examination was nonfocal, with intact cranial nerves  Skin  No rashes or bruising appreciated      Data:  No intake/output data recorded. No intake/output data recorded. CBC:   Recent Labs     12/31/20  0530 01/02/21  0649   WBC 6.3 5.5   HGB 8.4* 8.4*   * 214     BMP:    Recent Labs     12/31/20  0530 01/01/21  0633 01/02/21  0649   * 130* 135   K 3.9 4.1 4.1   CL 97* 101 105   CO2 24 22 21   BUN 19 18 15   CREATININE 1.44* 1.36* 1.35*   GLUCOSE 243* 237* 208*     Hepatic: No results for input(s): AST, ALT, ALB, BILITOT, ALKPHOS in the last 72 hours. INR: No results for input(s): INR in the last 72 hours. IMAGING DATA:    Problem Lists:   Primary Problem:  <principal problem not specified>   Current Problems:  Active Hospital Problems    Diagnosis Date Noted    Cellulitis [L03.90] 12/28/2020     PMH:  Past Medical History:   Diagnosis Date    Anxiety     Diabetes mellitus ([facility] [address]     Hyperlipidemia     Hypertension     Pancreatic cancer ([facility] [address]       Allergies: Allergies   Allergen Reactions    Lisinopril Swelling     Other reaction(s): swollen lips  In lips      Sulfamethoxazole      Other reaction(s): lip swelling  Other reaction(s): lip swelling      Trimethoprim      Other reaction(s): lip swelling    Cefuroxime Axetil Rash     Other reaction(s): swollen lips  Swollen lips    Sulfamethoxazole-Trimethoprim      Other reaction(s): swollen lips  Other reaction(s): swollen lips      Clindamycin Phosphate Rash     Other reaction(s): rash    Penicillins Rash     Other reaction(s): rash      Assessment    1. Pancreatic cancer, locally advanced and unresectable  2. Status post chemotherapy, poorly tolerated currently on chemoradiation with Xeloda  3.  Repeated hospitalizations for various reasons leading to holding chemotherapy and radiation

## 2021-01-02 NOTE — PLAN OF CARE
Problem: Pain:  Goal: Control of acute pain  Description: Control of acute pain  Outcome: Ongoing     Problem: Discharge Planning:  Goal: Discharged to appropriate level of care  Description: Discharged to appropriate level of care  Outcome: Ongoing     Problem: Falls - Risk of:  Goal: Will remain free from falls  Description: Will remain free from falls  Outcome: Ongoing     Problem: Nutrition  Goal: Optimal nutrition therapy  Outcome: Ongoing

## 2021-01-02 NOTE — PLAN OF CARE
Problem: Falls - Risk of:  Goal: Will remain free from falls  Description: Will remain free from falls  1/2/2021 0617 by Karla Mackay RN  Outcome: Ongoing   Bed alarm on, call light within reach, bed in lowest position  Problem: Pain:  Goal: Pain level will decrease  Description: Pain level will decrease  Outcome: Ongoing   prn and scheduled pain med pt states meds are effective for pain control

## 2021-01-02 NOTE — PROGRESS NOTES
Palliative Care Progress Note    NAME:  Karina Hoyt  RECORD NUMBER:  [id number]  AGE: 64 y.o. GENDER: female  : 1960  TODAY'S DATE:  2021    Reason for Consult:  goals of care  History of Present Illness     The patient is a 64 y.o. Non-/non  female who presents with Arm Pain (swelling and redness where prior IV was. )      Referred to Palliative Care by  [x] Physician   [] Nursing  [] Family Request   [] Other:       She was admitted to the Primary service for Cellulitis [L03.90]. Her hospital course has been associated with Pancreatic cancer, cellulitis from IV infiltration in left arm. The patient has a complicated medical history and has been hospitalized since 2020  1:26 PM. Leilani Garcia continues on IV Daptomycin for her left arm cellulitis. Her arm remains reddened and warm to touch but patient states that pain is improved. The plan is for patient to finish IV antibiotics outpatient. Awaiting blood culture results and a PICC line will be placed. Patient spoke with Dr Beverley Glass today and informed him that she does not want to continue with radiation or chemotherapy any longer. Patient was on Roxicodone IR 10 mg orally every 6 hours at home and was having breakthrough pain and is interested in starting a long term pain control with a prn breakthrough pain medication also. I checked her OARRS report and there was no discrepancy noted on it. Patients labs from today include Glucose 208, bun 15, cr 1.35, na 135, k 4.1, chl 105, wbc 5.5, rbc 3.15, hgb 8.4, hct 27.3, plt 214.  Palliative care will continue to follow patient and provide emotional support and response to new medications   OVERNIGHT EVENTS:  Patient DNRCC-A  Patient continues on IV Daptomycin   Start Oxycontin ER with Roxicodone IR for break through pain     BP (!) 155/54   Pulse 87   Temp 97.9 °F (36.6 °C) (Oral)   Resp 18   Ht 5' 5\" (1.651 m)   Wt 255 lb 1.6 oz (115.7 kg)   SpO2 99%   BMI 42.45 kg/m² Assessment        REVIEW OF SYSTEMS    []   UNABLE TO OBTAIN:     Constitutional:  []   Chills   []  Fatigue   [x]  Fevers   []  Malaise   []  Weight loss   [x] Other: alert and oriented   Respiratory:   []  Cough    []  Shortness of breath    []  Chest pain    [] Other: denies cough or shortness of breath     Cardiovascular:   []  Chest pain  []  Dyspnea    []  Exertional chest pressure/discomfort     [] Fatigue      []  Palpitations    []  Syncope   [x] Other: denies cp or pressure patient has a murmur  Gastrointestinal:   [x]  Abdominal pain   []  Constipation    []  Diarrhea    []   Dysphagia   []  Reflux             []  Vomiting   [x] Other: complains of twisting pressure pain in epigastric region     Genitourinary:  []  Dysuria     []  Frequency   []  Hematuria   [] Nocturia   []  Urinary incontinence   [] Other: no complaints     Musculoskeletal:   [] Back pain    []  Muscle weakness   []  Myalgias    []  Neck pain   []  Stiff joints   [x]  Other: discomfort in left lower arm   Behavioral/Psych:   [] Anxiety    []  Depression     []  Mood swings   [x] Other: patient is pleasant   PHYSICAL ASSESSMENT:     General: [x]  Oriented x3      [] well appearing      [] Intubated      [] ill appearing      [x] Other:pleasant   Mental Status: [x] normal mental status exam      [] drowsy      [] Confused      [] Other:     Cardiovascular: [x]  Regular rate/rhythm      [] Arrhythmia      [x] Other: murmur   Chest: [x] Effort normal      [x] lungs clear      [] respiratory distress      [] Tachypnea      [x]  Other:denies SOB   Abdomen: [] Soft/non-tender      []  Normal appearance      [] Distended      [] Ascites      [x] Other:bsx4 complains of twisting pressure like pain in epigastric area at times   Neurological: [x] Normal Speech      [x] Normal Sensation      []  Deficits present:      Extremity:  [] normal skin color/temp      [] clubbing/cyanosis      []  No edema      [x] Other: cellulitis left lower arm Palliative Performance Scale:  ___60%  Ambulation reduced; Significant disease; Can't do hobbies/housework; intake normal or reduced; occasional assist; LOC full/confusion  __x_50%  Mainly sit/lie; Extensive disease; Can't do any work; Considerable assist; intake normal or reduced; LOC full/confusion  ___40%  Mainly in bed; Extensive disease; Mainly assist; intake normal or reduced; LOC full/confusion   ___30%  Bed Bound; Extensive disease; Total care; intake reduced; LOCfull/confusion  ___20%  Bed Bound; Extensive disease; Total care; intake minimal; Drowsy/coma  ___10%  Bed Bound; Extensive disease; Total care; Mouth care only; Drowsy/coma  ___0       Death      Plan      Palliative Interaction:  Spoke with [facility] regarding her pain control. Patient states she has a twisting pressure pain that comes and goes in epigastric area that she rates a 5-6. She is currently being treated for Pancreatic cancer with chemotherapy and radiation. Patient is being seen in Palliative clinic and is on Roxicodone IR 10 mg every 6 hours as needed. She states she has been using and they help but not entirely. I discussed starting a long acting pain medication Oxycontin ER 10 mg twice daily and also order her Roxicodone IR 10 mg every 6 hours as needed for pain. I told her we would start her on this and evaluate the effectiveness on her pain control. [facility] was agreeable. [facility] was seen by Dr Johnathan Zazueta today and she informed him that she does not want any further chemotherapy or radiation. I spoke with [facility] and offered for her emotional support and allowed her to express her feelings. I also discussed with [facility] about an informational meeting with hospice that would enable her to find out what they could offer her. I told her that it did not have to be right away but it would provide her knowledge for later on. [facility] wants to think about it for now. I reviewed plan for pain medications with Dr Johnathan Zazueta and he agreed.

## 2021-01-02 NOTE — PROGRESS NOTES
Physical Therapy  Facility/Department: [facility] MED SURG  Daily Treatment Note  NAME: Pierce Najera  : 1960  MRN: [id number]    Date of Service: 2021    Discharge Recommendations:  Home with assist PRN, Home with Home health PT        Assessment   Body structures, Functions, Activity limitations: Decreased functional mobility ; Decreased endurance;Decreased strength;Decreased balance; Increased pain;Decreased ADL status  Assessment: Pt tolerated session, will benefit with  continued skilled PT to address balance, strength, activity tolerance, and safe mobility. Recommend home with assist PRN. Prognosis: Fair  Decision Making: Medium Complexity  Exam: ROM, MMT, balance, endurance, AM-PAC  Clinical Presentation: evolving  REQUIRES PT FOLLOW UP: Yes  Activity Tolerance  Activity Tolerance: Patient limited by fatigue;Patient limited by pain     Patient Diagnosis(es): The encounter diagnosis was Cellulitis of left upper extremity. has a past medical history of Anxiety, Diabetes mellitus ([facility] [address] Hyperlipidemia, Hypertension, and Pancreatic cancer ([facility] [address] has a past surgical history that includes  section; Gallbladder surgery; ERCP (2020); Upper gastrointestinal endoscopy (2020); Upper gastrointestinal endoscopy (2020); ERCP (2020); ERCP (2020); ERCP (2020); ERCP (2020); ERCP (2020); and ERCP (2020).     Restrictions  Restrictions/Precautions  Restrictions/Precautions: General Precautions, Contact Precautions, Isolation  Position Activity Restriction  Other position/activity restrictions: RUE IV, R chest port, current chemo treatment, telemetry, up as mariposa , DNR-CC  Subjective   General  Chart Reviewed: Yes  Response To Previous Treatment: Not applicable  Family / Caregiver Present: No  Subjective  Subjective: Pt agreeable to PT  Pain Assessment  Clinical Progression: Gradually improving  Response to Pain Intervention: Patient Satisfied  Vital Signs BP Location: Right Arm  Oxygen Therapy  O2 Device: None (Room air)       Orientation  Orientation  Overall Orientation Status: Within Normal Limits  Cognition   Cognition  Overall Cognitive Status: Exceptions  Arousal/Alertness: Appropriate responses to stimuli  Following Commands: Follows all commands without difficulty  Attention Span: Appears intact  Memory: Appears intact  Safety Judgement: Decreased awareness of need for safety  Problem Solving: Able to problem solve independently  Insights: Decreased awareness of deficits  Initiation: Does not require cues  Sequencing: Does not require cues  Objective   Bed mobility  Bridging: Stand by assistance  Scooting: Stand by assistance  Transfers  Sit to Stand: Contact guard assistance  Stand to sit: Contact guard assistance  Bed to Chair: Contact guard assistance  Comment: used on R UB support due to pain LUE  Ambulation  Ambulation?: Yes  Ambulation 1  Surface: level tile  Device: No Device  Assistance: Stand by assistance  Gait Deviations: Slow Esther;Decreased step length;Decreased step height; Increased KATIA  Distance: 25ftx 3 laps     Balance  Posture: Good  Sitting - Static: Good  Sitting - Dynamic: Good  Standing - Static: Good;-  Standing - Dynamic: Fair;+  Exercises  Comments: seated LE ex's & sit to stands(w/o using LUB)   All lines intact, call light within reach, and patient positioned comfortably at end of treatment. All patient needs addressed prior to ending therapy session.                          G-Code     OutComes Score                                                     AM-PAC Score  AM-PAC Inpatient Mobility Raw Score : 20 (01/02/21 0935)  AM-PAC Inpatient T-Scale Score : 47.67 (01/02/21 0935)  Mobility Inpatient CMS 0-100% Score: 35.83 (01/02/21 0935)  Mobility Inpatient CMS G-Code Modifier : CJ (01/02/21 0935)          Goals  Short term goals  Time Frame for Short term goals: 12 visits  Short term goal 1: Pt corey perform all bed mobility indep Short term goal 2: Pt will transfer indep  Short term goal 3: Pt will ambulate 50ft with no AD/LRAD and SBA  Short term goal 4: Pt will tolerate 25mins of PT including therex, theract, and gait training to improve functional mobility and activity tolerance.   Patient Goals   Patient goals : to go home    Plan    Plan  Times per week: 1-2 x day / 5-6 days per week  Current Treatment Recommendations: Strengthening, Endurance Training, Transfer Training, Balance Training, Gait Training, Home Exercise Program, Functional Mobility Training, Safety Education & Training, Positioning  Safety Devices  Type of devices: Call light within reach, Gait belt, Nurse notified, Left in chair     Therapy Time   Individual Concurrent Group Co-treatment   Time In 0907         Time Out 0935         Minutes ZAYDA Chatman

## 2021-01-02 NOTE — PROGRESS NOTES
[facility]  Progress Note    Jessica Angeles  MRN: [id number]  Date: 1/2/2021  LOS: 5     Reason for F/U :   MRSA sepsis    Impression :   1. Left upper extremity/forearm septic thrombophlebitis  2. MRSA sepsis secondary to above  3. Advanced pancreatic cancer, on chemotherapy and radiation therapy  4. Diabetes mellitus type 2    Recommendations:   · Patient continues on IV antimicrobial therapy with daptomycin  · CT scan of the left upper extremity with myositis, no evidence of abscess  · Repeat blood cultures remain no growth to date  · Patient will need a line but not until follow-up blood cultures are negative  ·     Infection Control Recommendations:   Contact precautions    Discharge Planning:   Estimated Length of IV antimicrobials: 4 weeks  Patient will need Midline Catheter Insertion/ PICC line Insertion: No  Patient will need: Home IV , [place],  SNF,  LTAC: Undetermined  Patient willneed outpatient wound care: No    Medical Decision making / Summary of Stay:   Jessy Lofton a 64y.o.-year-old female who was initially admitted on 12/28/2020.   Sarika has a history of diabetes mellitus, hyperlipidemia, hypertension, anxiety disorder, and locally advanced pancreatic cancer and was receiving chemotherapy with Xeloda and radiation therapy. She was recently hospitalized at [place] [address] after a syncopal episode and hypoglycemia.  The patient was discharged 12/23/2020.     The patient presented to the emergency department with swelling and redness at a prior IV site in her left forearm.  The patient started having fevers/chills on the day of admission which prompted the emergency room evaluation.  The temperature was 101.5 degrees in the emergency department.   She was started on IV antimicrobial therapy with vancomycin and blood cultures were sent.    Blood cultures done in the emergency department have come back 2 out of 2 with gram-positive cocci and I was asked to evaluate and help with antibiotic choice. Current evaluation:2021    BP (!) 155/54   Pulse 87   Temp 97.9 °F (36.6 °C) (Oral)   Resp 18   Ht 5' 5\" (1.651 m)   Wt 255 lb 1.6 oz (115.7 kg)   SpO2 99%   BMI 42.45 kg/m²     Temperature Range: Temp: 97.9 °F (36.6 °C) Temp  Av.1 °F (36.7 °C)  Min: 97.9 °F (36.6 °C)  Max: 98.4 °F (36.9 °C)    The patient was seen and evaluated sitting up in chair. States that her arm does feel better, less red and less swollen today  No fevers or chills  Patient did tell me yesterday that she was having a haze with redness on her left eye. I did instruct her to call her provider that manages this    Review of Systems   Constitutional: Negative. HENT: Negative. Respiratory: Negative. Cardiovascular: Negative. Gastrointestinal: Negative. Genitourinary: Negative. Musculoskeletal: Negative. Skin: Positive for wound (Left upper extremity redness and swelling has improved). Neurological: Negative. Psychiatric/Behavioral: Negative. Physical Examination :     Physical Exam  Constitutional:       General: She is not in acute distress. Appearance: Normal appearance. HENT:      Head: Normocephalic and atraumatic. Neck:      Musculoskeletal: Normal range of motion and neck supple. Cardiovascular:      Rate and Rhythm: Normal rate and regular rhythm. Pulmonary:      Effort: Pulmonary effort is normal.      Breath sounds: Normal breath sounds. Abdominal:      General: Abdomen is flat. Bowel sounds are normal.      Palpations: Abdomen is soft. Skin:     Comments: Left upper extremity cellulitis continues but has improved. Small amount of purulent drainage expressed. Induration continues. Neurological:      General: No focal deficit present. Mental Status: She is alert and oriented to person, place, and time. Mental status is at baseline. Psychiatric:         Mood and Affect: Mood normal.         Behavior: Behavior normal.     1-2-2021 1-2-2021            Laboratory data:   I have independently reviewed the followinglabs:  CBC with Differential:   Recent Labs     12/31/20  0530 01/02/21  0649   WBC 6.3 5.5   HGB 8.4* 8.4*   HCT 26.8* 27.3*   * 214   LYMPHOPCT 3* 8*   MONOPCT 6 10*     BMP:   Recent Labs     12/30/20  1612 12/30/20  1612 12/31/20  0530 01/01/21  0633 01/02/21  0649   NA  --    < > 129* 130* 135   K  --    < > 3.9 4.1 4.1   CL  --    < > 97* 101 105   CO2  --    < > 24 22 21   BUN  --    < > 19 18 15   CREATININE  --    < > 1.44* 1.36* 1.35*   MG 1.1*  --  1.5*  --   --     < > = values in this interval not displayed. Hepatic Function Panel:   No results for input(s): PROT, LABALBU, BILIDIR, IBILI, BILITOT, ALKPHOS, ALT, AST in the last 72 hours. Lab Results   Component Value Date    PROCAL 10.17 07/21/2020     No results found for: CRP  No results found for: SEDRATE      Lab Results   Component Value Date    DDIMER 1.92 06/08/2018     Lab Results   Component Value Date    FERRITIN 148 06/03/2020     No results found for: LDH  No results found for: FIBRINOGEN    Results in Past 30 Days  Result Component Current Result Ref Range Previous Result Ref Range   SARS-CoV-2      (12/28/2020)  Not Detected (12/16/2020) Not Detected    Not Detected (12/28/2020) Not Detected      Not Detected (12/28/2020) Not Detected       Lab Results   Component Value Date    COVID19 Not Detected 12/28/2020    COVID19 Not Detected 12/28/2020    COVID19 Not Detected 12/16/2020    COVID19 Not Detected 07/21/2020    COVID19 Not Detected 06/03/2020       No results for input(s): VANCOTROUGH in the last 72 hours.     Imaging Studies:     CT LUE  Impression:        Diffuse skin thickening, myositis, and cellulitis.  Within the limitations of noncontrast imaging, there is no obvious well-formed or drainable fluid   collection identified to suggest abscess formation. Cultures:     Culture, Blood 1 [[id number]] Collected: 01/01/21 0626   Order Status: Completed Specimen: Blood Updated: 01/02/21 0734    Specimen Description . BLOOD    Special Requests R HAND 2 ML    Culture NO GROWTH 21 HOURS   Culture, Blood 1 [[id number]] Collected: 01/01/21 2497   Order Status: Completed Specimen: Blood Updated: 01/02/21 0734    Specimen Description . BLOOD    Special Requests R AC 6 ML    Culture NO GROWTH 21 HOURS       Culture, Blood 1 [[id number]] (Abnormal) Collected: 12/28/20 1400   Order Status: Completed Specimen: Blood Updated: 12/30/20 0745    Specimen Description . BLOOD    Special Requests 6ML VAC    Culture POSITIVE Blood Culture Results called to and read back by: Valery Fregoso. AT 0845 12/29/20Abnormal      DIRECT GRAM STAIN FROM BOTTLE: GRAM POSITIVE COCCI IN CLUSTERS     METHICILLIN RESISTANT STAPHYLOCOCCUS AUREUS For susceptibility, refer to previous culture. Abnormal      12/30/2020  7:42 AM - EvanSarahi Incoming Lab Results From [facility]    Specimen Information: Blood        Component Collected Lab   Specimen Description 12/28/2020  2:30  [facility]   . BLOOD    Special Requests 12/28/2020  2:30  [facility]   RFA 10ML    Culture Abnormal  12/28/2020  2:30 PM [address] Blood Culture Results called to and read back by: NUPUR Mora M 0400 12/29/20    Culture 12/28/2020  2:30 PM [address] [place] [address] FROM BOTTLE: GRAM POSITIVE COCCI IN CLUSTERS    Culture Abnormal  12/28/2020  2:30 PM [address]    Testing Performed By    Shandra Multani Name Director Address Valid Date Range   [facility], [address] [place] [address] [facility] 08/30/17 0801-Present 245-- [address] [facility] [place] [address] [facility] [address] 08/30/17 0757-Present   Susceptibility    Methicillin resistant staphylococcus aureus (3)    Antibiotic Interpretation DENA Status    penicillin Resistant  Final     >=0.5   RESISTANT   cefoxitin screen  NOT REPORTED Final    ciprofloxacin   Final     NOT REPORTED    clindamycin Sensitive  Final     <=0.25   SUSCEPTIBLE   erythromycin Resistant  Final     >=8   RESISTANT   gentamicin Sensitive  Final     <=0.5   SUSCEPTIBLE   gentamicin Sensitive Gentamicin is used only in combination with other active agents that test susceptible.  Final    Induced Clind Resist Negative NEGATIVE Final    levofloxacin Intermediate  Final     4   INTERMEDIATE   linezolid   Final     NOT REPORTED    moxifloxacin   Final     NOT REPORTED    nitrofurantoin   Final     NOT REPORTED    oxacillin Resistant  Final     >=4   RESISTANT   Synercid   Final     NOT REPORTED    rifampin   Final     NOT REPORTED    tetracycline Sensitive  Final     <=1   SUSCEPTIBLE   tigecycline   Final     NOT REPORTED    trimethoprim-sulfamethoxazole Sensitive  Final     <=10   SUSCEPTIBLE   vancomycin Sensitive  Final     <=0.5   SUSCEPTIBLE   Lab and Collection        Medications:      insulin glargine  28 Units Subcutaneous Daily    daptomycin (CUBICIN) IVPB  6 mg/kg (Adjusted) Intravenous Q24H    potassium chloride  20 mEq Oral BID WC    sertraline  150 mg Oral Daily    amLODIPine  10 mg Oral Daily    aspirin  81 mg Oral Nightly    [Held by provider] atorvastatin  40 mg Oral Nightly    carvedilol  12.5 mg Oral BID WC    hydrALAZINE  25 mg Oral 3 times per day    sodium chloride flush  10 mL Intravenous 2 times per day    insulin lispro  0-6 Units Subcutaneous TID WC    insulin lispro  0-3 Units Subcutaneous Nightly    enoxaparin  30 mg Subcutaneous BID           [facility]  [facility] Radha Zimmerman Perfect Serve messaging  OFFICE: [phone]      Electronically signed by EMERSON Boothe CNP on 1/2/2021 at 9:58 AM  Thank you for allowing us to participate in the care of this patient. Please call with questions. This note iscreated with the assistance of a speech recognition program.  While intending to generate a document that actually reflects the content of the visit, the document can still have some errors including those of syntax andsound a like substitutions which may escape proof reading. In such instances, actual meaning can be extrapolated by contextual diversion.

## 2021-01-02 NOTE — PROGRESS NOTES
Subjective:   Follow-up type 2 diabetes  No polyuria no polydipsia no hypoglycemia blood sugars reviewed    ROS  Fever no chills no GI/ complaints at present no cardiopulmonary complaints no TIA no bleeding no headache no sore throat no skin lesions except left forearm swollen red better, decreased appetite insomnia  physical exam  General Appearance: alert and oriented to person, place and time and in no acute distress  Skin: Left forearm decreased swelling decreased redness less pain  Head: normocephalic and atraumatic  Eyes: pupils equal, round, and reactive to light, conjunctivae normal and sclera anicteric    Neck: neck supple and non tender without mass   Pulmonary/Chest: clear to auscultation bilaterally- no wheezes, rales or rhonchi, normal air movement, no respiratory distress  Cardiovascular: normal rate, regular rhythm, normal S1 and S2, no gallops, intact distal pulses and no carotid bruits  Abdomen: soft, non-tender, non-distended, normal bowel sounds, no masses or organomegaly  Extremities: Trace edema good pulses negative Homans' sign  Neurologic: Alert oriented x3 with no focal deficit    BP (!) 116/39   Pulse 78   Temp 98 °F (36.7 °C) (Oral)   Resp 18   Ht 5' 5\" (1.651 m)   Wt 255 lb 1.6 oz (115.7 kg)   SpO2 98%   BMI 42.45 kg/m²     CBC:   Lab Results   Component Value Date    WBC 5.5 01/02/2021    RBC 3.15 01/02/2021    RBC 4.33 03/05/2012    HGB 8.4 01/02/2021    HCT 27.3 01/02/2021    MCV 86.7 01/02/2021    MCH 26.7 01/02/2021    MCHC 30.8 01/02/2021    RDW 18.3 01/02/2021     01/02/2021     03/05/2012    MPV 10.1 01/02/2021     BMP:    Lab Results   Component Value Date     01/02/2021    K 4.1 01/02/2021     01/02/2021    CO2 21 01/02/2021    BUN 15 01/02/2021    LABALBU 2.0 12/29/2020    CREATININE 1.35 01/02/2021    CALCIUM 9.2 01/02/2021    GFRAA 49 01/02/2021    LABGLOM 40 01/02/2021    GLUCOSE 208 01/02/2021    GLUCOSE 278 03/05/2012        Assessment: Patient Active Problem List   Diagnosis    Uncontrolled diabetes mellitus ([facility]    Hypertension    Postmenopausal bleeding    Thickened endometrium    Type 2 diabetes mellitus with hyperglycemia, with long-term current use of insulin (HCC)    Hypophosphatemia    Hypomagnesemia    Hyperglycemia due to type 2 diabetes mellitus ([address]    Pancreatic Head Mass    Non-Obstructive CAD    Primary adenocarcinoma of head of pancreas (HCC)    Intractable nausea and vomiting    Class 3 severe obesity with serious comorbidity in adult ([facility]    Amnesia    Anxiety    Benign neoplasm of choroid    Depression    Diabetic complication (HCC)    Diabetic peripheral neuropathy associated with type 2 diabetes mellitus (HCC)    Hepatomegaly    Methicillin resistant Staphylococcus aureus infection    Long term current use of insulin (HCC)    Hyperlipidemia    Moderate nonproliferative diabetic retinopathy associated with type 2 diabetes mellitus (HCC)    Morbid obesity (HCC)    Pain in limb    Pancreatic adenocarcinoma (HCC)    Pancreatic malignancy syndrome (HCC)    Peripheral venous insufficiency    Postprocedural state    Primary localized osteoarthrosis of ankle and foot    Urinary tract infectious disease    Transaminitis    Intractable vomiting    Septicemia due to E. coli (HCC)    Acute obstructive cholangitis    TORREY (acute kidney injury) ([facility]    Dehydration with hyponatremia    NSVT (nonsustained ventricular tachycardia) (HCC)    Hypoglycemia    Cellulitis    Encounter for palliative care     Cellulitis of the left forearm with bacteremia  Type 2 diabetes with hyperglycemia insulin treated  Type 2 diabetes with renal complications  CKD 3  Hyponatremia  Hypokalemia  Morbid obesity excess calories  Metastatic pancreatic CVA  Superficial thrombophlebitis left forearm  Pancytopenia secondary to chemotherapy resolving still with anemia  Insomnia   ess htn   Plan: Meds labs reviewed continue with before meals and at bedtime Accu-Cheks with insulin coverage continue with long-acting Lantus avoid nephrotoxic drugs continue with IV antibiotics continue with DVT prophylaxis add Ambien for insomnia, add protein supplements  See orders      Claude Brooklyn MD  1:33 PM

## 2021-01-03 LAB
ABSOLUTE EOS #: 0.17 K/UL (ref 0–0.44)
ABSOLUTE IMMATURE GRANULOCYTE: 0.29 K/UL (ref 0–0.3)
ABSOLUTE LYMPH #: 0.46 K/UL (ref 1.1–3.7)
ABSOLUTE MONO #: 0.68 K/UL (ref 0.1–1.2)
ANION GAP SERPL CALCULATED.3IONS-SCNC: 6 MMOL/L (ref 9–17)
BASOPHILS # BLD: 1 % (ref 0–2)
BASOPHILS ABSOLUTE: 0.06 K/UL (ref 0–0.2)
BUN BLDV-MCNC: 13 MG/DL (ref 6–20)
BUN/CREAT BLD: 10 (ref 9–20)
CALCIUM SERPL-MCNC: 9.3 MG/DL (ref 8.6–10.4)
CHLORIDE BLD-SCNC: 110 MMOL/L (ref 98–107)
CO2: 23 MMOL/L (ref 20–31)
CREAT SERPL-MCNC: 1.32 MG/DL (ref 0.5–0.9)
DIFFERENTIAL TYPE: ABNORMAL
EOSINOPHILS RELATIVE PERCENT: 3 % (ref 1–4)
GFR AFRICAN AMERICAN: 50 ML/MIN
GFR NON-AFRICAN AMERICAN: 41 ML/MIN
GFR SERPL CREATININE-BSD FRML MDRD: ABNORMAL ML/MIN/{1.73_M2}
GFR SERPL CREATININE-BSD FRML MDRD: ABNORMAL ML/MIN/{1.73_M2}
GLUCOSE BLD-MCNC: 110 MG/DL (ref 65–105)
GLUCOSE BLD-MCNC: 112 MG/DL (ref 70–99)
GLUCOSE BLD-MCNC: 200 MG/DL (ref 65–105)
GLUCOSE BLD-MCNC: 211 MG/DL (ref 65–105)
GLUCOSE BLD-MCNC: 217 MG/DL (ref 65–105)
HCT VFR BLD CALC: 26.8 % (ref 36.3–47.1)
HEMOGLOBIN: 8.2 G/DL (ref 11.9–15.1)
IMMATURE GRANULOCYTES: 5 %
LYMPHOCYTES # BLD: 8 % (ref 24–43)
MCH RBC QN AUTO: 26.5 PG (ref 25.2–33.5)
MCHC RBC AUTO-ENTMCNC: 30.6 G/DL (ref 28.4–34.8)
MCV RBC AUTO: 86.5 FL (ref 82.6–102.9)
MONOCYTES # BLD: 12 % (ref 3–12)
NRBC AUTOMATED: 0 PER 100 WBC
PDW BLD-RTO: 18.7 % (ref 11.8–14.4)
PLATELET # BLD: 259 K/UL (ref 138–453)
PLATELET ESTIMATE: ABNORMAL
PMV BLD AUTO: 10.1 FL (ref 8.1–13.5)
POTASSIUM SERPL-SCNC: 4.3 MMOL/L (ref 3.7–5.3)
RBC # BLD: 3.1 M/UL (ref 3.95–5.11)
RBC # BLD: ABNORMAL 10*6/UL
SEG NEUTROPHILS: 71 % (ref 36–65)
SEGMENTED NEUTROPHILS ABSOLUTE COUNT: 4.04 K/UL (ref 1.5–8.1)
SODIUM BLD-SCNC: 139 MMOL/L (ref 135–144)
WBC # BLD: 5.7 K/UL (ref 3.5–11.3)
WBC # BLD: ABNORMAL 10*3/UL

## 2021-01-03 PROCEDURE — 85025 COMPLETE CBC W/AUTO DIFF WBC: CPT

## 2021-01-03 PROCEDURE — 6370000000 HC RX 637 (ALT 250 FOR IP): Performed by: NURSE PRACTITIONER

## 2021-01-03 PROCEDURE — 99232 SBSQ HOSP IP/OBS MODERATE 35: CPT | Performed by: INTERNAL MEDICINE

## 2021-01-03 PROCEDURE — 82947 ASSAY GLUCOSE BLOOD QUANT: CPT

## 2021-01-03 PROCEDURE — 6360000002 HC RX W HCPCS: Performed by: INTERNAL MEDICINE

## 2021-01-03 PROCEDURE — 6370000000 HC RX 637 (ALT 250 FOR IP): Performed by: INTERNAL MEDICINE

## 2021-01-03 PROCEDURE — 80048 BASIC METABOLIC PNL TOTAL CA: CPT

## 2021-01-03 PROCEDURE — 1200000000 HC SEMI PRIVATE

## 2021-01-03 PROCEDURE — 99232 SBSQ HOSP IP/OBS MODERATE 35: CPT | Performed by: NURSE PRACTITIONER

## 2021-01-03 PROCEDURE — 2580000003 HC RX 258: Performed by: INTERNAL MEDICINE

## 2021-01-03 PROCEDURE — 87040 BLOOD CULTURE FOR BACTERIA: CPT

## 2021-01-03 PROCEDURE — 36415 COLL VENOUS BLD VENIPUNCTURE: CPT

## 2021-01-03 RX ORDER — INSULIN GLARGINE 100 [IU]/ML
32 INJECTION, SOLUTION SUBCUTANEOUS DAILY
Status: DISCONTINUED | OUTPATIENT
Start: 2021-01-04 | End: 2021-01-06 | Stop reason: HOSPADM

## 2021-01-03 RX ADMIN — INSULIN GLARGINE 28 UNITS: 100 INJECTION, SOLUTION SUBCUTANEOUS at 09:49

## 2021-01-03 RX ADMIN — CARVEDILOL 12.5 MG: 12.5 TABLET, FILM COATED ORAL at 16:46

## 2021-01-03 RX ADMIN — HYDRALAZINE HYDROCHLORIDE 25 MG: 25 TABLET, FILM COATED ORAL at 14:23

## 2021-01-03 RX ADMIN — INSULIN LISPRO 2 UNITS: 100 INJECTION, SOLUTION INTRAVENOUS; SUBCUTANEOUS at 12:05

## 2021-01-03 RX ADMIN — OXYCODONE HYDROCHLORIDE 10 MG: 10 TABLET, FILM COATED, EXTENDED RELEASE ORAL at 09:49

## 2021-01-03 RX ADMIN — ACETAMINOPHEN 650 MG: 325 TABLET ORAL at 07:40

## 2021-01-03 RX ADMIN — SODIUM CHLORIDE, PRESERVATIVE FREE 10 ML: 5 INJECTION INTRAVENOUS at 09:51

## 2021-01-03 RX ADMIN — CARVEDILOL 12.5 MG: 12.5 TABLET, FILM COATED ORAL at 09:50

## 2021-01-03 RX ADMIN — OXYCODONE HYDROCHLORIDE 10 MG: 5 TABLET ORAL at 14:26

## 2021-01-03 RX ADMIN — POTASSIUM CHLORIDE 20 MEQ: 20 TABLET, EXTENDED RELEASE ORAL at 09:56

## 2021-01-03 RX ADMIN — HYDRALAZINE HYDROCHLORIDE 25 MG: 25 TABLET, FILM COATED ORAL at 21:02

## 2021-01-03 RX ADMIN — SERTRALINE HYDROCHLORIDE 150 MG: 100 TABLET ORAL at 09:49

## 2021-01-03 RX ADMIN — ASPIRIN 81 MG: 81 TABLET, CHEWABLE ORAL at 21:02

## 2021-01-03 RX ADMIN — SODIUM CHLORIDE 500 MG: 900 INJECTION INTRAVENOUS at 14:26

## 2021-01-03 RX ADMIN — HYDRALAZINE HYDROCHLORIDE 25 MG: 25 TABLET, FILM COATED ORAL at 05:47

## 2021-01-03 RX ADMIN — INSULIN LISPRO 1 UNITS: 100 INJECTION, SOLUTION INTRAVENOUS; SUBCUTANEOUS at 21:02

## 2021-01-03 RX ADMIN — OXYCODONE HYDROCHLORIDE 10 MG: 10 TABLET, FILM COATED, EXTENDED RELEASE ORAL at 21:02

## 2021-01-03 RX ADMIN — AMLODIPINE BESYLATE 10 MG: 5 TABLET ORAL at 09:49

## 2021-01-03 RX ADMIN — INSULIN LISPRO 2 UNITS: 100 INJECTION, SOLUTION INTRAVENOUS; SUBCUTANEOUS at 16:46

## 2021-01-03 RX ADMIN — APIXABAN 5 MG: 5 TABLET, FILM COATED ORAL at 21:02

## 2021-01-03 RX ADMIN — ENOXAPARIN SODIUM 30 MG: 30 INJECTION SUBCUTANEOUS at 09:50

## 2021-01-03 ASSESSMENT — PAIN SCALES - GENERAL
PAINLEVEL_OUTOF10: 7
PAINLEVEL_OUTOF10: 4
PAINLEVEL_OUTOF10: 5
PAINLEVEL_OUTOF10: 1

## 2021-01-03 ASSESSMENT — ENCOUNTER SYMPTOMS
GASTROINTESTINAL NEGATIVE: 1
RESPIRATORY NEGATIVE: 1

## 2021-01-03 NOTE — PROGRESS NOTES
Results of Blood Culture 1/2 Gram positive cocci in clusters perfectserved Rex Holland NP from [facility]. Automated message will be handled in the morning.

## 2021-01-03 NOTE — PROGRESS NOTES
VASCULAR SURGERY  PROGRESS NOTE      1/3/2021 5:47 PM  Subjective:   Admit Date: 12/28/2020  PCP: Austin Varela    Chief Complaint   Patient presents with    Arm Pain     swelling and redness where prior IV was. Interval History: No complaints. Left upper extremity cellulitis improving. Okay for transition to Eliquis. Diet: DIET CARB CONTROL; Dietary Nutrition Supplements: Diabetic Oral Supplement    Medications:   Scheduled Meds:   apixaban  5 mg Oral BID    [START ON 1/4/2021] insulin glargine  32 Units Subcutaneous Daily    oxyCODONE  10 mg Oral 2 times per day    daptomycin (CUBICIN) IVPB  6 mg/kg (Adjusted) Intravenous Q24H    sertraline  150 mg Oral Daily    amLODIPine  10 mg Oral Daily    aspirin  81 mg Oral Nightly    [Held by provider] atorvastatin  40 mg Oral Nightly    carvedilol  12.5 mg Oral BID WC    hydrALAZINE  25 mg Oral 3 times per day    sodium chloride flush  10 mL Intravenous 2 times per day    insulin lispro  0-6 Units Subcutaneous TID WC    insulin lispro  0-3 Units Subcutaneous Nightly     Continuous Infusions:   sodium chloride 50 mL/hr at 01/03/21 1605    dextrose           Labs:   CBC:   Recent Labs     01/02/21  0649 01/03/21  0636   WBC 5.5 5.7   HGB 8.4* 8.2*    259     BMP:    Recent Labs     01/01/21  0633 01/02/21  0649 01/03/21  0636   * 135 139   K 4.1 4.1 4.3    105 110*   CO2 22 21 23   BUN 18 15 13   CREATININE 1.36* 1.35* 1.32*   GLUCOSE 237* 208* 112*     Hepatic:   No results for input(s): AST, ALT, ALB, BILITOT, ALKPHOS in the last 72 hours. Troponin: Invalid input(s): TROPONIN  BNP: No results for input(s): BNP in the last 72 hours. Lipids: No results for input(s): CHOL, HDL in the last 72 hours. Invalid input(s): LDLCALCU  INR: No results for input(s): INR in the last 72 hours.     Objective: Vitals: BP (!) 131/55   Pulse 76   Temp 97.5 °F (36.4 °C) (Oral)   Resp 16   Ht 5' 5\" (1.651 m)   Wt 253 lb (114.8 kg)   SpO2 99%   BMI 42.10 kg/m²   General appearance: alert, cooperative and no distress  Mental Status: oriented to person, place and time with normal affect  Neck: good carotid pulses, no JVD  Lungs: clear to auscultation bilaterally, normal effort  Heart: regular rate and rhythm, no murmur,  Abdomen: soft, non-tender, non-distended, bowel sounds present all four quadrants, no masses, hepatomegaly, splenomegaly or aortic enlargement  Extremities: no edema, erythema or tenderness in the calves. Moderate Lt arm swelling with erythema  Skin: no gross lesions, rashes, or induration    Assessment:   3 51-year-old female with metastatic pancreatic carcinoma, left arm cellulitis and superficial thrombophlebitis  2.  No evidence of left upper extremity DVT    Patient Active Problem List:     Uncontrolled diabetes mellitus ([address]     Hypertension     Postmenopausal bleeding     Thickened endometrium     Type 2 diabetes mellitus with hyperglycemia, with long-term current use of insulin (HCC)     Hypophosphatemia     Hypomagnesemia     Hyperglycemia due to type 2 diabetes mellitus (HCC)     Pancreatic Head Mass     Non-Obstructive CAD     Primary adenocarcinoma of head of pancreas (HCC)     Intractable nausea and vomiting     Class 3 severe obesity with serious comorbidity in adult [facility])     Amnesia     Anxiety     Benign neoplasm of choroid     Depression     Diabetic complication (HCC)     Diabetic peripheral neuropathy associated with type 2 diabetes mellitus (HCC)     Hepatomegaly     Methicillin resistant Staphylococcus aureus infection     Long term current use of insulin (HCC)     Hyperlipidemia     Moderate nonproliferative diabetic retinopathy associated with type 2 diabetes mellitus ([address]     Morbid obesity ([address]     Pain in limb     Pancreatic adenocarcinoma ([address] Pancreatic malignancy syndrome ([facility]     Peripheral venous insufficiency     Postprocedural state     Primary localized osteoarthrosis of ankle and foot     Urinary tract infectious disease     Transaminitis     Intractable vomiting     Septicemia due to E. coli (HCC)     Acute obstructive cholangitis     TORREY (acute kidney injury) ([facility]     Dehydration with hyponatremia     NSVT (nonsustained ventricular tachycardia) (HCC)     Hypoglycemia     Cellulitis      Plan:   1. Continue ABX  2. Continue ASA   3. Ok for Eliquis per hematology  4. Arm elevation  5.  Toan Stapleton for discharge from a vascular standpoint    Electronically signed by Crispin Anderson MD on 1/3/2021 at 5:47 PM

## 2021-01-03 NOTE — PROGRESS NOTES
Subjective:    2 diabetes follow-up  No polyuria no polydipsia no hypoglycemia blood sugars reviewed still in the 200  ROS  No fever no chills no GI/ complaints no cardiopulmonary complaints, no TIA no bleeding no headache no sore throat left arm less swollen less red and has 2 areas of induration, no fatigue  physical exam  General Appearance: alert and oriented to person, place and time and in no acute distress  Skin: Left forearm swollen red head: normocephalic and atraumatic  Eyes: pupils equal, round, and reactive to light, sclera anicteric and positive pallor left conjunctiva red painful  Neck: neck supple and non tender without mass   Pulmonary/Chest: clear to auscultation bilaterally- no wheezes, rales or rhonchi, normal air movement, no respiratory distress  Cardiovascular: normal rate, regular rhythm, normal S1 and S2, no gallops, intact distal pulses and no carotid bruits  Abdomen: soft, non-tender, non-distended, normal bowel sounds, no masses or organomegaly  Extremities:  Ace edema good pulses negative Homans' sign  Neurologic: Alert oriented x3 with no focal deficits    BP (!) 134/51   Pulse 80   Temp 97.5 °F (36.4 °C) (Oral)   Resp 16   Ht 5' 5\" (1.651 m)   Wt 253 lb (114.8 kg)   SpO2 97%   BMI 42.10 kg/m²     CBC:   Lab Results   Component Value Date    WBC 5.7 01/03/2021    RBC 3.10 01/03/2021    RBC 4.33 03/05/2012    HGB 8.2 01/03/2021    HCT 26.8 01/03/2021    MCV 86.5 01/03/2021    MCH 26.5 01/03/2021    MCHC 30.6 01/03/2021    RDW 18.7 01/03/2021     01/03/2021     03/05/2012    MPV 10.1 01/03/2021     BMP:    Lab Results   Component Value Date     01/03/2021    K 4.3 01/03/2021     01/03/2021    CO2 23 01/03/2021    BUN 13 01/03/2021    LABALBU 2.0 12/29/2020    CREATININE 1.32 01/03/2021    CALCIUM 9.3 01/03/2021    GFRAA 50 01/03/2021    LABGLOM 41 01/03/2021    GLUCOSE 112 01/03/2021    GLUCOSE 278 03/05/2012        Assessment: Patient Active Problem List   Diagnosis    Uncontrolled diabetes mellitus ([facility]    Hypertension    Postmenopausal bleeding    Thickened endometrium    Type 2 diabetes mellitus with hyperglycemia, with long-term current use of insulin (HCC)    Hypophosphatemia    Hypomagnesemia    Hyperglycemia due to type 2 diabetes mellitus ([address]    Pancreatic Head Mass    Non-Obstructive CAD    Primary adenocarcinoma of head of pancreas (HCC)    Intractable nausea and vomiting    Class 3 severe obesity with serious comorbidity in adult ([facility] [facility]    Amnesia    Anxiety    Benign neoplasm of choroid    Depression    Diabetic complication (HCC)    Diabetic peripheral neuropathy associated with type 2 diabetes mellitus (HCC)    Hepatomegaly    Methicillin resistant Staphylococcus aureus infection    Long term current use of insulin (HCC)    Hyperlipidemia    Moderate nonproliferative diabetic retinopathy associated with type 2 diabetes mellitus (HCC)    Morbid obesity (HCC)    Pain in limb    Pancreatic adenocarcinoma (HCC)    Pancreatic malignancy syndrome (HCC)    Peripheral venous insufficiency    Postprocedural state    Primary localized osteoarthrosis of ankle and foot    Urinary tract infectious disease    Transaminitis    Intractable vomiting    Septicemia due to E. coli (HCC)    Acute obstructive cholangitis    TORREY (acute kidney injury) ([facility]    Dehydration with hyponatremia    NSVT (nonsustained ventricular tachycardia) (HCC)    Hypoglycemia    Cellulitis    Encounter for palliative care     Left forearm cellulitis with bacteremia  Left forearm thrombophlebitis infected  Anemia of chemotherapy  CKD 3  Type 2 diabetes with hyperglycemia insulin treated  Type 2 diabetes with renal complication  Metastatic pancreatic CA  Morbid obesity excess calories  Thrombocytopenia leukopenia secondary to chemotherapy resolved  Plan: Meds labs reviewed we will adjust Lantus continue with before meals and at bedtime Accu-Cheks with insulin coverage continue with DVT prophylaxis we will repeat venous Doppler of the left forearm and delay to the echo because of recurrent positive cultures continue with IV antibiotics, avoid nephrotoxic drugs see orders discussed with oncology agree with Eliquis treatment, decrease iv fluids      Lola Valdovinos MD  2:35 PM

## 2021-01-03 NOTE — PLAN OF CARE
Problem: Pain:  Goal: Pain level will decrease  Description: Pain level will decrease  1/3/2021 0237 by Naveed Metcalf RN  Outcome: Ongoing     Problem: Pain:  Goal: Control of acute pain  Description: Control of acute pain  Outcome: Ongoing     Problem: Pain:  Goal: Control of chronic pain  Description: Control of chronic pain  Outcome: Ongoing     Problem: Skin Integrity - Impaired:  Goal: Absence of new skin breakdown  Description: Absence of new skin breakdown  Outcome: Ongoing     Problem: Falls - Risk of:  Goal: Will remain free from falls  Description: Will remain free from falls  Outcome: Ongoing  Goal: Absence of physical injury  Description: Absence of physical injury  Outcome: Ongoing

## 2021-01-03 NOTE — PROGRESS NOTES
Progress Note               Date:                           1/3/2021  Patient name:           Esaw Heimlich  Date of admission:  12/28/2020  1:26 PM  MRN:   [id number]  YOB: 1960  PCP:                           Emi Nicole        Subjective:   Patient is seen and examined, the arm continues to improve progressively but they are to small areas that look like early abscesses in the left forearm. Cultures are still positive. ID input appreciated. No fever or chills, continues to have abdominal discomfort and diarrhea. Refusing Creon. HPI in Brief:         Review of systems  General: no fever or night sweats, Weight is stable. Poor appetite  ENT: No double or blurred vision, no tinnitus or hearing problem, no dysphagia or sore throat   Respiratory: No chest pain, no shortness of breath, no cough or hemoptysis. Cardiovascular: Denies chest pain, PND or orthopnea. No L E swelling or palpitations. Gastrointestinal:    No nausea or vomiting, there is abdominal pain and intermittent diarrhea   Genitourinary: Denies dysuria, hematuria, frequency, urgency or incontinence. Neurological: Denies headaches, decreased LOC, no sensory or motor focal deficits. Medications:   Reviewed in Epic     Objective:   Vitals: BP (!) 134/51   Pulse 80   Temp 97.5 °F (36.4 °C) (Oral)   Resp 16   Ht 5' 5\" (1.651 m)   Wt 253 lb (114.8 kg)   SpO2 97%   BMI 42.10 kg/m²   Gen.  Exam, showed a well-appearing patient without evidence of distress or pain  HEENT, normocephalic and atraumatic, PERRLA extraocular muscles are intact, subconjunctival hemorrhage on the left  Neck showed no JVD no carotid bruit, no cervical adenopathy  Chest is clear to auscultation bilaterally  Heart is regular without any murmur  Abdomen soft nontender no hepatosplenomegaly  Lower extremities showed no edema clubbing or cyanosis Left upper extremity:  swollen with redness. Although improved significantly according to patient  Neurological examination was nonfocal, with intact cranial nerves  Skin  No rashes or bruising appreciated      Data:  I/O this shift:  In: 120 [P.O.:120]  Out: -   In: 120 [P.O.:120]  Out: -   CBC:   Recent Labs     01/02/21  0649 01/03/21  0636   WBC 5.5 5.7   HGB 8.4* 8.2*    259     BMP:    Recent Labs     01/01/21  0633 01/02/21  0649 01/03/21  0636   * 135 139   K 4.1 4.1 4.3    105 110*   CO2 22 21 23   BUN 18 15 13   CREATININE 1.36* 1.35* 1.32*   GLUCOSE 237* 208* 112*     Hepatic: No results for input(s): AST, ALT, ALB, BILITOT, ALKPHOS in the last 72 hours. INR: No results for input(s): INR in the last 72 hours. IMAGING DATA:    Problem Lists:   Primary Problem:  <principal problem not specified>   Current Problems:  Active Hospital Problems    Diagnosis Date Noted    Encounter for palliative care [Z51.5]     Cellulitis [L03.90] 12/28/2020     PMH:  Past Medical History:   Diagnosis Date    Anxiety     [facility] mellitus [facility]     Hyperlipidemia     Hypertension     Pancreatic cancer ([facility] [address]       Allergies: Allergies   Allergen Reactions    Lisinopril Swelling     Other reaction(s): swollen lips  In lips      Sulfamethoxazole      Other reaction(s): lip swelling  Other reaction(s): lip swelling      Trimethoprim      Other reaction(s): lip swelling    Cefuroxime Axetil Rash     Other reaction(s): swollen lips  Swollen lips    Sulfamethoxazole-Trimethoprim      Other reaction(s): swollen lips  Other reaction(s): swollen lips      Clindamycin Phosphate Rash     Other reaction(s): rash    Penicillins Rash     Other reaction(s): rash      Assessment    1. Pancreatic cancer, locally advanced and unresectable  2.  Status post chemotherapy, poorly tolerated currently on chemoradiation with Xeloda

## 2021-01-03 NOTE — PROGRESS NOTES
VASCULAR SURGERY  PROGRESS NOTE      1/2/2021 11:22 PM  Subjective:   Admit Date: 12/28/2020  PCP: Stephanie Jauregui    Chief Complaint   Patient presents with    Arm Pain     swelling and redness where prior IV was. Interval History: No complaints. Diet: DIET CARB CONTROL; Dietary Nutrition Supplements: Diabetic Oral Supplement    Medications:   Scheduled Meds:   oxyCODONE  10 mg Oral 2 times per day    insulin glargine  28 Units Subcutaneous Daily    daptomycin (CUBICIN) IVPB  6 mg/kg (Adjusted) Intravenous Q24H    potassium chloride  20 mEq Oral BID WC    sertraline  150 mg Oral Daily    amLODIPine  10 mg Oral Daily    aspirin  81 mg Oral Nightly    [Held by provider] atorvastatin  40 mg Oral Nightly    carvedilol  12.5 mg Oral BID WC    hydrALAZINE  25 mg Oral 3 times per day    sodium chloride flush  10 mL Intravenous 2 times per day    insulin lispro  0-6 Units Subcutaneous TID WC    insulin lispro  0-3 Units Subcutaneous Nightly    enoxaparin  30 mg Subcutaneous BID     Continuous Infusions:   sodium chloride 100 mL/hr at 01/02/21 0253    dextrose           Labs:   CBC:   Recent Labs     12/31/20  0530 01/02/21  0649   WBC 6.3 5.5   HGB 8.4* 8.4*   * 214     BMP:    Recent Labs     12/31/20  0530 01/01/21  0633 01/02/21  0649   * 130* 135   K 3.9 4.1 4.1   CL 97* 101 105   CO2 24 22 21   BUN 19 18 15   CREATININE 1.44* 1.36* 1.35*   GLUCOSE 243* 237* 208*     Hepatic:   No results for input(s): AST, ALT, ALB, BILITOT, ALKPHOS in the last 72 hours. Troponin: Invalid input(s): TROPONIN  BNP: No results for input(s): BNP in the last 72 hours. Lipids: No results for input(s): CHOL, HDL in the last 72 hours. Invalid input(s): LDLCALCU  INR: No results for input(s): INR in the last 72 hours.     Objective: Vitals: BP (!) 117/46   Pulse 71   Temp 97.7 °F (36.5 °C) (Oral)   Resp 16   Ht 5' 5\" (1.651 m)   Wt 255 lb 1.6 oz (115.7 kg)   SpO2 97%   BMI 42.45 kg/m²   General appearance: alert, cooperative and no distress  Mental Status: oriented to person, place and time with normal affect  Neck: good carotid pulses, no JVD  Lungs: clear to auscultation bilaterally, normal effort  Heart: regular rate and rhythm, no murmur,  Abdomen: soft, non-tender, non-distended, bowel sounds present all four quadrants, no masses, hepatomegaly, splenomegaly or aortic enlargement  Extremities: no edema, erythema or tenderness in the calves. Moderate Lt arm swelling with erythema  Skin: no gross lesions, rashes, or induration    Assessment:   3 51-year-old female with metastatic pancreatic carcinoma, left arm cellulitis and superficial thrombophlebitis  2.  No evidence of left upper extremity DVT    Patient Active Problem List:     Uncontrolled diabetes mellitus ([address]     Hypertension     Postmenopausal bleeding     Thickened endometrium     Type 2 diabetes mellitus with hyperglycemia, with long-term current use of insulin (HCC)     Hypophosphatemia     Hypomagnesemia     Hyperglycemia due to type 2 diabetes mellitus (HCC)     Pancreatic Head Mass     Non-Obstructive CAD     Primary adenocarcinoma of head of pancreas (HCC)     Intractable nausea and vomiting     Class 3 severe obesity with serious comorbidity in adult [facility])     Amnesia     Anxiety     Benign neoplasm of choroid     Depression     Diabetic complication (HCC)     Diabetic peripheral neuropathy associated with type 2 diabetes mellitus (HCC)     Hepatomegaly     Methicillin resistant Staphylococcus aureus infection     Long term current use of insulin (HCC)     Hyperlipidemia     Moderate nonproliferative diabetic retinopathy associated with type 2 diabetes mellitus ([address]     Morbid obesity ([address]     Pain in limb     Pancreatic adenocarcinoma ([address] Pancreatic malignancy syndrome ([facility] [address]     Peripheral venous insufficiency     Postprocedural state     Primary localized osteoarthrosis of ankle and foot     Urinary tract infectious disease     Transaminitis     Intractable vomiting     Septicemia due to E. coli (HCC)     Acute obstructive cholangitis     TORREY (acute kidney injury) ([facility] [address]     Dehydration with hyponatremia     NSVT (nonsustained ventricular tachycardia) (HCC)     Hypoglycemia     Cellulitis      Plan:   1. Continue ABX  2. Continue ASA  3.  Arm elevation    Electronically signed by Lindsay Delgado MD on 1/2/2021 at 11:22 PM

## 2021-01-03 NOTE — PROGRESS NOTES
[facility]  Progress Note    Rafael Malone  MRN: [id number]  Date: 1/3/2021  LOS: 6     Reason for F/U :   MRSA sepsis    Impression :   1. Left upper extremity/forearm septic thrombophlebitis  2. MRSA sepsis secondary to above  3. Advanced pancreatic cancer, on chemotherapy and radiation therapy  4. Diabetes mellitus type 2    Recommendations:   · Patient continues on IV antimicrobial therapy with daptomycin  · CT scan of the left upper extremity with myositis, no evidence of abscess  · Repeat blood cultures 1/2 sets positive for gram positive cocci in clusters  · Repeat blood cultures  · Patient does have a right chest port in place, this may aline to be removed if we are not able to clear the bacteremia; however, with the extent of infection in her left upper extremity, I am not surprised that her repeat blood cultures were again positive and I suspect this is why her cultures were positive again  · Patient will need a line if oncology is not comfortable with utilizing her chest port for IV antibiotics but not until blood cultures are negative    Infection Control Recommendations:   Contact precautions    Discharge Planning:   Estimated Length of IV antimicrobials: 4 weeks  Patient will need Midline Catheter Insertion/ PICC line Insertion: No  Patient will need: Home IV , [place],  SNF,  LTAC: Undetermined  Patient willneed outpatient wound care: No    Medical Decision making / Summary of Stay:   Franko Flnyn a 64y.o.-year-old female who was initially admitted on 12/28/2020.   Sarika has a history of diabetes mellitus, hyperlipidemia, hypertension, anxiety disorder, and locally advanced pancreatic cancer and was receiving chemotherapy with Xeloda and radiation therapy.   She was recently hospitalized at [place] [address] after a syncopal episode and hypoglycemia.  The patient was discharged 12/23/2020.    The patient presented to the emergency department with swelling and redness at a prior IV site in her left forearm.  The patient started having fevers/chills on the day of admission which prompted the emergency room evaluation.  The temperature was 101.5 degrees in the emergency department. She was started on IV antimicrobial therapy with vancomycin and blood cultures were sent.     Blood cultures done in the emergency department have come back 2 out of 2 with gram-positive cocci and I was asked to evaluate and help with antibiotic choice. Current evaluation:1/3/2021    BP (!) 134/51   Pulse 80   Temp 97.5 °F (36.4 °C) (Oral)   Resp 16   Ht 5' 5\" (1.651 m)   Wt 253 lb (114.8 kg)   SpO2 97%   BMI 42.10 kg/m²     Temperature Range: Temp: 97.5 °F (36.4 °C) Temp  Av.5 °F (36.4 °C)  Min: 96.8 °F (36 °C)  Max: 98 °F (36.7 °C)    The patient was seen and evaluated sitting up in chair. Phlebotomy is at bedside to repeat blood cultures  Patient states that the swelling and redness has improved in her arm but she now has bumps on her forearm. She continues to have pain in her left upper extremity. No fevers or chills. Review of Systems   Constitutional: Negative. HENT: Negative. Respiratory: Negative. Cardiovascular: Negative. Gastrointestinal: Negative. Genitourinary: Negative. Musculoskeletal:        Left arm pain   Skin: Positive for wound (Left upper extremity redness and swelling has improved but now with lumps on forearm). Neurological: Negative. Psychiatric/Behavioral: Negative. Physical Examination :     Physical Exam  Constitutional:       General: She is not in acute distress. Appearance: Normal appearance. HENT:      Head: Normocephalic and atraumatic. Neck:      Musculoskeletal: Normal range of motion and neck supple. Cardiovascular:      Rate and Rhythm: Normal rate and regular rhythm.    Pulmonary:      Effort: Pulmonary effort is normal. Breath sounds: Normal breath sounds. Abdominal:      General: Abdomen is flat. Bowel sounds are normal.      Palpations: Abdomen is soft. Skin:     Comments: Left upper extremity cellulitis continues but has improved. Nodules on forearm. Harm remains indurated, no fluctuance. Neurological:      General: No focal deficit present. Mental Status: She is alert and oriented to person, place, and time. Mental status is at baseline. Psychiatric:         Mood and Affect: Mood normal.         Behavior: Behavior normal.     1-3-2021        Laboratory data:   I have independently reviewed the followinglabs:  CBC with Differential:   Recent Labs     01/02/21  0649 01/03/21  0636   WBC 5.5 5.7   HGB 8.4* 8.2*   HCT 27.3* 26.8*    259   LYMPHOPCT 8* 8*   MONOPCT 10* 12     BMP:   Recent Labs     01/02/21  0649 01/03/21  0636    139   K 4.1 4.3    110*   CO2 21 23   BUN 15 13   CREATININE 1.35* 1.32*     Hepatic Function Panel:   No results for input(s): PROT, LABALBU, BILIDIR, IBILI, BILITOT, ALKPHOS, ALT, AST in the last 72 hours. Lab Results   Component Value Date    PROCAL 10.17 07/21/2020     No results found for: CRP  No results found for: SEDRATE      Lab Results   Component Value Date    DDIMER 1.92 06/08/2018     Lab Results   Component Value Date    FERRITIN 148 06/03/2020     No results found for: LDH  No results found for: FIBRINOGEN    Results in Past 30 Days  Result Component Current Result Ref Range Previous Result Ref Range   SARS-CoV-2      (12/28/2020)  Not Detected (12/16/2020) Not Detected    Not Detected (12/28/2020) Not Detected      Not Detected (12/28/2020) Not Detected       Lab Results   Component Value Date    COVID19 Not Detected 12/28/2020    COVID19 Not Detected 12/28/2020    COVID19 Not Detected 12/16/2020    COVID19 Not Detected 07/21/2020    COVID19 Not Detected 06/03/2020       No results for input(s): VANCOTROUGH in the last 72 hours.     Imaging Studies: CT LUE  Impression:        Diffuse skin thickening, myositis, and cellulitis.  Within the limitations of   noncontrast imaging, there is no obvious well-formed or drainable fluid   collection identified to suggest abscess formation. Cultures:     Culture, Blood 1 [[id number]] Collected: 01/01/21 7155   Order Status: Completed Specimen: Blood Updated: 01/03/21 0754    Specimen Description . BLOOD    Special Requests R AC 6 ML    Culture NO GROWTH 2 DAYS   Culture, Blood 1 [[id number]] (Abnormal) Collected: 01/01/21 0626   Order Status: Completed Specimen: Blood Updated: 01/02/21 2254    Specimen Description . BLOOD    Special Requests R HAND 2 ML    Culture POSITIVE Blood Culture Results called to and read back by: JOHNATHAN VICENTE 1/2/21 2255Abnormal      DIRECT GRAM STAIN FROM BOTTLE: GRAM POSITIVE COCCI IN CLUSTERS       Culture, Blood 1 [[id number]] (Abnormal) Collected: 12/28/20 1400   Order Status: Completed Specimen: Blood Updated: 12/30/20 0745    Specimen Description . BLOOD    Special Requests 6ML VAC    Culture POSITIVE Blood Culture Results called to and read back by: Main العلي. AT 0845 12/29/20Abnormal      DIRECT GRAM STAIN FROM BOTTLE: GRAM POSITIVE COCCI IN CLUSTERS     METHICILLIN RESISTANT STAPHYLOCOCCUS AUREUS For susceptibility, refer to previous culture. Abnormal      12/30/2020  7:42 AM - Sarahi Gordon Incoming Lab Results From [employer]    Specimen Information: Blood        Component Collected Lab   Specimen Description 12/28/2020  2:30  [facility]   . BLOOD    Special Requests 12/28/2020  2:30  [facility]   RFA 10ML    Culture Abnormal  12/28/2020  2:30 PM [address] Blood Culture Results called to and read back by: NUPUR THOMAS 0400 12/29/20    Culture 12/28/2020  2:30 PM [address] [facility] FROM BOTTLE: Rodrick Knife POSITIVE COCCI IN CLUSTERS    Culture Abnormal  12/28/2020  2:30  [address] METHICILLIN RESISTANT STAPHYLOCOCCUS AUREUS    Testing Performed By    Lab - Abbreviation Name Director Address Valid Date Range   [address] [address] [address] 08/30/17 0801-Present   [address] [place] [address] [address] [place] [address] LAB Alex Gan MD [address] [facility] [address] 08/30/17 0757-Present   Susceptibility    Methicillin resistant staphylococcus aureus (3)    Antibiotic Interpretation DENA Status    penicillin Resistant  Final     >=0.5   RESISTANT   cefoxitin screen  NOT REPORTED Final    ciprofloxacin   Final     NOT REPORTED    clindamycin Sensitive  Final     <=0.25   SUSCEPTIBLE   erythromycin Resistant  Final     >=8   RESISTANT   gentamicin Sensitive  Final     <=0.5   SUSCEPTIBLE   gentamicin Sensitive Gentamicin is used only in combination with other active agents that test susceptible.  Final    Induced Clind Resist Negative NEGATIVE Final    levofloxacin Intermediate  Final     4   INTERMEDIATE   linezolid   Final     NOT REPORTED    moxifloxacin   Final     NOT REPORTED    nitrofurantoin   Final     NOT REPORTED    oxacillin Resistant  Final     >=4   RESISTANT   Synercid   Final     NOT REPORTED    rifampin   Final     NOT REPORTED    tetracycline Sensitive  Final     <=1   SUSCEPTIBLE   tigecycline   Final     NOT REPORTED    trimethoprim-sulfamethoxazole Sensitive  Final     <=10   SUSCEPTIBLE   vancomycin Sensitive  Final     <=0.5   SUSCEPTIBLE   Lab and Collection        Medications:      oxyCODONE  10 mg Oral 2 times per day    insulin glargine  28 Units Subcutaneous Daily    daptomycin (CUBICIN) IVPB  6 mg/kg (Adjusted) Intravenous Q24H    potassium chloride  20 mEq Oral BID WC    sertraline  150 mg Oral Daily    amLODIPine  10 mg Oral Daily    aspirin  81 mg Oral Nightly    [Held by provider] atorvastatin  40 mg Oral Nightly    carvedilol  12.5 mg Oral BID WC  hydrALAZINE  25 mg Oral 3 times per day    sodium chloride flush  10 mL Intravenous 2 times per day    insulin lispro  0-6 Units Subcutaneous TID WC    insulin lispro  0-3 Units Subcutaneous Nightly    enoxaparin  30 mg Subcutaneous BID           [facility]  Juan Perkins  [employer]  OFFICE: [phone]      Electronically signed by EMERSON Bearden CNP on 1/3/2021 at 8:03 AM  Thank you for allowing us to participate in the care of this patient. Please call with questions. This note iscreated with the assistance of a speech recognition program.  While intending to generate a document that actually reflects the content of the visit, the document can still have some errors including those of syntax andsound a like substitutions which may escape proof reading. In such instances, actual meaning can be extrapolated by contextual diversion.

## 2021-01-03 NOTE — PLAN OF CARE
Problem: Pain:  Goal: Pain level will decrease  Description: Pain level will decrease  1/3/2021 1557 by Syed Ugarte RN  Outcome: Ongoing  1/3/2021 0237 by Chucho Stout RN  Outcome: Ongoing   Prn Oxycodone, scheduled oxycodone are effective for pain control

## 2021-01-04 LAB
ABSOLUTE EOS #: 0 K/UL (ref 0–0.4)
ABSOLUTE IMMATURE GRANULOCYTE: 0.17 K/UL (ref 0–0.3)
ABSOLUTE LYMPH #: 0.26 K/UL (ref 1–4.8)
ABSOLUTE MONO #: 0.77 K/UL (ref 0.2–0.8)
ANION GAP SERPL CALCULATED.3IONS-SCNC: 14 MMOL/L (ref 9–17)
BASOPHILS # BLD: 0 %
BASOPHILS ABSOLUTE: 0 K/UL (ref 0–0.2)
BUN BLDV-MCNC: 15 MG/DL (ref 6–20)
BUN/CREAT BLD: 11 (ref 9–20)
CALCIUM SERPL-MCNC: 9.1 MG/DL (ref 8.6–10.4)
CHLORIDE BLD-SCNC: 103 MMOL/L (ref 98–107)
CO2: 18 MMOL/L (ref 20–31)
CREAT SERPL-MCNC: 1.42 MG/DL (ref 0.5–0.9)
CULTURE: ABNORMAL
DIFFERENTIAL TYPE: ABNORMAL
EOSINOPHILS RELATIVE PERCENT: 0 % (ref 1–4)
GFR AFRICAN AMERICAN: 46 ML/MIN
GFR NON-AFRICAN AMERICAN: 38 ML/MIN
GFR SERPL CREATININE-BSD FRML MDRD: ABNORMAL ML/MIN/{1.73_M2}
GFR SERPL CREATININE-BSD FRML MDRD: ABNORMAL ML/MIN/{1.73_M2}
GLUCOSE BLD-MCNC: 143 MG/DL (ref 65–105)
GLUCOSE BLD-MCNC: 156 MG/DL (ref 65–105)
GLUCOSE BLD-MCNC: 191 MG/DL (ref 65–105)
GLUCOSE BLD-MCNC: 268 MG/DL (ref 65–105)
GLUCOSE BLD-MCNC: 296 MG/DL (ref 65–105)
GLUCOSE BLD-MCNC: 310 MG/DL (ref 70–99)
HCT VFR BLD CALC: 28.2 % (ref 36.3–47.1)
HEMOGLOBIN: 8.5 G/DL (ref 11.9–15.1)
IMMATURE GRANULOCYTES: 2 %
LV EF: 55 %
LVEF MODALITY: NORMAL
LYMPHOCYTES # BLD: 3 % (ref 24–44)
Lab: ABNORMAL
MCH RBC QN AUTO: 26.5 PG (ref 25.2–33.5)
MCHC RBC AUTO-ENTMCNC: 30.1 G/DL (ref 28.4–34.8)
MCV RBC AUTO: 87.9 FL (ref 82.6–102.9)
MONOCYTES # BLD: 9 % (ref 1–7)
MORPHOLOGY: ABNORMAL
NRBC AUTOMATED: 0 PER 100 WBC
PDW BLD-RTO: 18.8 % (ref 11.8–14.4)
PLATELET # BLD: 290 K/UL (ref 138–453)
PLATELET ESTIMATE: ABNORMAL
PMV BLD AUTO: 9.9 FL (ref 8.1–13.5)
POTASSIUM SERPL-SCNC: 5.1 MMOL/L (ref 3.7–5.3)
RBC # BLD: 3.21 M/UL (ref 3.95–5.11)
RBC # BLD: ABNORMAL 10*6/UL
SEG NEUTROPHILS: 86 % (ref 36–66)
SEGMENTED NEUTROPHILS ABSOLUTE COUNT: 7.3 K/UL (ref 1.8–7.7)
SODIUM BLD-SCNC: 135 MMOL/L (ref 135–144)
SPECIMEN DESCRIPTION: ABNORMAL
WBC # BLD: 8.5 K/UL (ref 3.5–11.3)
WBC # BLD: ABNORMAL 10*3/UL

## 2021-01-04 PROCEDURE — 6370000000 HC RX 637 (ALT 250 FOR IP): Performed by: INTERNAL MEDICINE

## 2021-01-04 PROCEDURE — 85025 COMPLETE CBC W/AUTO DIFF WBC: CPT

## 2021-01-04 PROCEDURE — 36415 COLL VENOUS BLD VENIPUNCTURE: CPT

## 2021-01-04 PROCEDURE — 2580000003 HC RX 258: Performed by: INTERNAL MEDICINE

## 2021-01-04 PROCEDURE — 93971 EXTREMITY STUDY: CPT

## 2021-01-04 PROCEDURE — 97530 THERAPEUTIC ACTIVITIES: CPT

## 2021-01-04 PROCEDURE — 99232 SBSQ HOSP IP/OBS MODERATE 35: CPT | Performed by: INTERNAL MEDICINE

## 2021-01-04 PROCEDURE — 80048 BASIC METABOLIC PNL TOTAL CA: CPT

## 2021-01-04 PROCEDURE — 93306 TTE W/DOPPLER COMPLETE: CPT

## 2021-01-04 PROCEDURE — 82947 ASSAY GLUCOSE BLOOD QUANT: CPT

## 2021-01-04 PROCEDURE — 1200000000 HC SEMI PRIVATE

## 2021-01-04 PROCEDURE — 97116 GAIT TRAINING THERAPY: CPT

## 2021-01-04 PROCEDURE — 6360000002 HC RX W HCPCS: Performed by: INTERNAL MEDICINE

## 2021-01-04 PROCEDURE — 6370000000 HC RX 637 (ALT 250 FOR IP): Performed by: NURSE PRACTITIONER

## 2021-01-04 RX ADMIN — OXYCODONE HYDROCHLORIDE 10 MG: 10 TABLET, FILM COATED, EXTENDED RELEASE ORAL at 09:19

## 2021-01-04 RX ADMIN — ASPIRIN 81 MG: 81 TABLET, CHEWABLE ORAL at 21:12

## 2021-01-04 RX ADMIN — APIXABAN 5 MG: 5 TABLET, FILM COATED ORAL at 21:12

## 2021-01-04 RX ADMIN — APIXABAN 5 MG: 5 TABLET, FILM COATED ORAL at 09:19

## 2021-01-04 RX ADMIN — AMLODIPINE BESYLATE 10 MG: 5 TABLET ORAL at 09:19

## 2021-01-04 RX ADMIN — CARVEDILOL 12.5 MG: 12.5 TABLET, FILM COATED ORAL at 17:54

## 2021-01-04 RX ADMIN — POLYVINYL ALCOHOL 1 DROP: 14 SOLUTION/ DROPS OPHTHALMIC at 05:17

## 2021-01-04 RX ADMIN — INSULIN LISPRO 1 UNITS: 100 INJECTION, SOLUTION INTRAVENOUS; SUBCUTANEOUS at 17:54

## 2021-01-04 RX ADMIN — HYDRALAZINE HYDROCHLORIDE 25 MG: 25 TABLET, FILM COATED ORAL at 21:12

## 2021-01-04 RX ADMIN — INSULIN LISPRO 3 UNITS: 100 INJECTION, SOLUTION INTRAVENOUS; SUBCUTANEOUS at 09:20

## 2021-01-04 RX ADMIN — INSULIN LISPRO 1 UNITS: 100 INJECTION, SOLUTION INTRAVENOUS; SUBCUTANEOUS at 21:12

## 2021-01-04 RX ADMIN — HYDRALAZINE HYDROCHLORIDE 25 MG: 25 TABLET, FILM COATED ORAL at 12:51

## 2021-01-04 RX ADMIN — OXYCODONE HYDROCHLORIDE 10 MG: 5 TABLET ORAL at 18:02

## 2021-01-04 RX ADMIN — INSULIN LISPRO 3 UNITS: 100 INJECTION, SOLUTION INTRAVENOUS; SUBCUTANEOUS at 12:51

## 2021-01-04 RX ADMIN — OXYCODONE HYDROCHLORIDE 10 MG: 10 TABLET, FILM COATED, EXTENDED RELEASE ORAL at 22:02

## 2021-01-04 RX ADMIN — INSULIN GLARGINE 32 UNITS: 100 INJECTION, SOLUTION SUBCUTANEOUS at 09:20

## 2021-01-04 RX ADMIN — SERTRALINE HYDROCHLORIDE 150 MG: 100 TABLET ORAL at 09:19

## 2021-01-04 RX ADMIN — SODIUM CHLORIDE, PRESERVATIVE FREE 10 ML: 5 INJECTION INTRAVENOUS at 09:20

## 2021-01-04 RX ADMIN — SODIUM CHLORIDE 500 MG: 900 INJECTION INTRAVENOUS at 12:51

## 2021-01-04 RX ADMIN — CARVEDILOL 12.5 MG: 12.5 TABLET, FILM COATED ORAL at 09:19

## 2021-01-04 RX ADMIN — HYDRALAZINE HYDROCHLORIDE 25 MG: 25 TABLET, FILM COATED ORAL at 05:17

## 2021-01-04 ASSESSMENT — PAIN SCALES - GENERAL
PAINLEVEL_OUTOF10: 4
PAINLEVEL_OUTOF10: 6
PAINLEVEL_OUTOF10: 6
PAINLEVEL_OUTOF10: 4

## 2021-01-04 ASSESSMENT — PAIN DESCRIPTION - PAIN TYPE
TYPE: CHRONIC PAIN

## 2021-01-04 ASSESSMENT — PAIN DESCRIPTION - ORIENTATION: ORIENTATION: LEFT

## 2021-01-04 ASSESSMENT — PAIN DESCRIPTION - LOCATION
LOCATION: ABDOMEN
LOCATION: ABDOMEN

## 2021-01-04 ASSESSMENT — PAIN DESCRIPTION - DESCRIPTORS
DESCRIPTORS: CONSTANT
DESCRIPTORS: CONSTANT

## 2021-01-04 ASSESSMENT — PAIN DESCRIPTION - ONSET
ONSET: ON-GOING
ONSET: ON-GOING

## 2021-01-04 ASSESSMENT — PAIN DESCRIPTION - PROGRESSION
CLINICAL_PROGRESSION: NOT CHANGED

## 2021-01-04 ASSESSMENT — ENCOUNTER SYMPTOMS
GASTROINTESTINAL NEGATIVE: 1
RESPIRATORY NEGATIVE: 1

## 2021-01-04 NOTE — PROGRESS NOTES
VASCULAR SURGERY  PROGRESS NOTE      1/4/2021 3:20 PM  Subjective:   Admit Date: 12/28/2020  PCP: Kam Bermudez    Chief Complaint   Patient presents with    Arm Pain     swelling and redness where prior IV was. Interval History: No complaints. Left upper extremity cellulitis improving. Diet: DIET CARB CONTROL;    Medications:   Scheduled Meds:   apixaban  5 mg Oral BID    insulin glargine  32 Units Subcutaneous Daily    oxyCODONE  10 mg Oral 2 times per day    daptomycin (CUBICIN) IVPB  6 mg/kg (Adjusted) Intravenous Q24H    sertraline  150 mg Oral Daily    amLODIPine  10 mg Oral Daily    aspirin  81 mg Oral Nightly    [Held by provider] atorvastatin  40 mg Oral Nightly    carvedilol  12.5 mg Oral BID WC    hydrALAZINE  25 mg Oral 3 times per day    sodium chloride flush  10 mL Intravenous 2 times per day    insulin lispro  0-6 Units Subcutaneous TID WC    insulin lispro  0-3 Units Subcutaneous Nightly     Continuous Infusions:   sodium chloride 50 mL/hr at 01/03/21 1605    dextrose           Labs:   CBC:   Recent Labs     01/02/21  0649 01/03/21  0636 01/04/21  0706   WBC 5.5 5.7 8.5   HGB 8.4* 8.2* 8.5*    259 290     BMP:    Recent Labs     01/02/21  0649 01/03/21  0636 01/04/21  0706    139 135   K 4.1 4.3 5.1    110* 103   CO2 21 23 18*   BUN 15 13 15   CREATININE 1.35* 1.32* 1.42*   GLUCOSE 208* 112* 310*     Hepatic:   No results for input(s): AST, ALT, ALB, BILITOT, ALKPHOS in the last 72 hours. Troponin: Invalid input(s): TROPONIN  BNP: No results for input(s): BNP in the last 72 hours. Lipids: No results for input(s): CHOL, HDL in the last 72 hours. Invalid input(s): LDLCALCU  INR: No results for input(s): INR in the last 72 hours.     Objective:   Vitals: BP (!) 122/42   Pulse 74   Temp 97.9 °F (36.6 °C) (Oral)   Resp 18   Ht 5' 5\" (1.651 m)   Wt 251 lb (113.9 kg)   SpO2 98%   BMI 41.77 kg/m² General appearance: alert, cooperative and no distress  Mental Status: oriented to person, place and time with normal affect  Neck: good carotid pulses, no JVD  Lungs: clear to auscultation bilaterally, normal effort  Heart: regular rate and rhythm, no murmur,  Abdomen: soft, non-tender, non-distended, bowel sounds present all four quadrants, no masses, hepatomegaly, splenomegaly or aortic enlargement  Extremities: no edema, erythema or tenderness in the calves. Moderate Lt arm swelling with erythema  Skin: no gross lesions, rashes, or induration    Assessment:   3 62-year-old female with metastatic pancreatic carcinoma, left arm cellulitis and superficial thrombophlebitis  2.  No evidence of left upper extremity DVT    Patient Active Problem List:     Uncontrolled diabetes mellitus (HCC)     Hypertension     Postmenopausal bleeding     Thickened endometrium     Type 2 diabetes mellitus with hyperglycemia, with long-term current use of insulin (HCC)     Hypophosphatemia     Hypomagnesemia     Hyperglycemia due to type 2 diabetes mellitus (HCC)     Pancreatic Head Mass     Non-Obstructive CAD     Primary adenocarcinoma of head of pancreas (HCC)     Intractable nausea and vomiting     Class 3 severe obesity with serious comorbidity in adult [facility])     Amnesia     Anxiety     Benign neoplasm of choroid     Depression     Diabetic complication (HCC)     Diabetic peripheral neuropathy associated with type 2 diabetes mellitus (HCC)     Hepatomegaly     Methicillin resistant Staphylococcus aureus infection     Long term current use of insulin (HCC)     Hyperlipidemia     Moderate nonproliferative diabetic retinopathy associated with type 2 diabetes mellitus (HCC)     Morbid obesity (HCC)     Pain in limb     Pancreatic adenocarcinoma (HCC)     Pancreatic malignancy syndrome ([facility]     Peripheral venous insufficiency     Postprocedural state     Primary localized osteoarthrosis of ankle and foot Urinary tract infectious disease     Transaminitis     Intractable vomiting     Septicemia due to E. coli (HCC)     Acute obstructive cholangitis     TORREY (acute kidney injury) ([facility] [address]     Dehydration with hyponatremia     NSVT (nonsustained ventricular tachycardia) ([facility])     Hypoglycemia     Cellulitis      Plan:   1. Continue ABX  2. Continue ASA   3. Continue Eliquis per hematology  4. Arm elevation  5.  [facility] for discharge from a vascular standpoint    Electronically signed by Sreekanth Haynes MD on 1/4/2021 at 3:20 PM

## 2021-01-04 NOTE — PROGRESS NOTES
Progress Note               Date:                           1/4/2021  Patient name:           Kevin Lancaster  Date of admission:  12/28/2020  1:26 PM  MRN:   [id number]  YOB: 1960  PCP:                           Elli Luo        Subjective:   Patient is seen and examined, the arm continues to improve progressively but they are to small areas that look like early abscesses in the left forearm. Awaiting Doppler study  Creatinine is slowly rising at 1.42. Blood sugars are in the 200-300 range  She continues to complain of epigastric pain  HPI in Brief:         Review of systems  General: no fever or night sweats, Weight is stable. Poor appetite  ENT: No double or blurred vision, no tinnitus or hearing problem, no dysphagia or sore throat   Respiratory: No chest pain, no shortness of breath, no cough or hemoptysis. Cardiovascular: Denies chest pain, PND or orthopnea. No L E swelling or palpitations. Gastrointestinal:    No nausea or vomiting, there is abdominal pain and intermittent diarrhea, unchanged from yesterday   Genitourinary: Denies dysuria, hematuria, frequency, urgency or incontinence. Neurological: Denies headaches, decreased LOC, no sensory or motor focal deficits. Medications:   Reviewed in Epic     Objective:   Vitals: BP (!) 122/42   Pulse 74   Temp 97.9 °F (36.6 °C) (Oral)   Resp 18   Ht 5' 5\" (1.651 m)   Wt 251 lb (113.9 kg)   SpO2 98%   BMI 41.77 kg/m²   Gen.  Exam, showed a well-appearing patient without evidence of distress or pain  HEENT, normocephalic and atraumatic, PERRLA extraocular muscles are intact, subconjunctival hemorrhage on the left  Neck showed no JVD no carotid bruit, no cervical adenopathy  Chest is clear to auscultation bilaterally  Heart is regular without any murmur  Abdomen soft nontender no hepatosplenomegaly  Lower extremities showed no edema clubbing or cyanosis Left upper extremity:  swollen with redness. Although improved significantly according to patient  Neurological examination was nonfocal, with intact cranial nerves  Skin  No rashes or bruising appreciated      Data:  No intake/output data recorded. No intake/output data recorded. CBC:   Recent Labs     01/02/21  0649 01/03/21  0636 01/04/21  0706   WBC 5.5 5.7 8.5   HGB 8.4* 8.2* 8.5*    259 290     BMP:    Recent Labs     01/02/21  0649 01/03/21  0636 01/04/21  0706    139 135   K 4.1 4.3 5.1    110* 103   CO2 21 23 18*   BUN 15 13 15   CREATININE 1.35* 1.32* 1.42*   GLUCOSE 208* 112* 310*     Hepatic: No results for input(s): AST, ALT, ALB, BILITOT, ALKPHOS in the last 72 hours. INR: No results for input(s): INR in the last 72 hours. IMAGING DATA:    Problem Lists:   Primary Problem:  <principal problem not specified>   Current Problems:  Active Hospital Problems    Diagnosis Date Noted    Encounter for palliative care [Z51.5]     Cellulitis [L03.90] 12/28/2020     PMH:  Past Medical History:   Diagnosis Date    Anxiety     Diabetes mellitus ([facility] [address]     Hyperlipidemia     Hypertension     Pancreatic cancer ([facility] [address]       Allergies: Allergies   Allergen Reactions    Lisinopril Swelling     Other reaction(s): swollen lips  In lips      Sulfamethoxazole      Other reaction(s): lip swelling  Other reaction(s): lip swelling      Trimethoprim      Other reaction(s): lip swelling    Cefuroxime Axetil Rash     Other reaction(s): swollen lips  Swollen lips    Sulfamethoxazole-Trimethoprim      Other reaction(s): swollen lips  Other reaction(s): swollen lips      Clindamycin Phosphate Rash     Other reaction(s): rash    Penicillins Rash     Other reaction(s): rash      Assessment    1. Pancreatic cancer, locally advanced and unresectable  2.  Status post chemotherapy, poorly tolerated currently on chemoradiation with Xeloda

## 2021-01-04 NOTE — PLAN OF CARE
Problem: Pain:  Goal: Pain level will decrease  Description: Pain level will decrease  1/4/2021 0147 by Louise Harris RN  Outcome: Ongoing  1/3/2021 1557 by Saranya oWng RN  Outcome: Ongoing  Goal: Control of acute pain  Description: Control of acute pain  1/4/2021 0147 by Louise Harris RN  Outcome: Ongoing  1/3/2021 1557 by Saranya Wong RN  Outcome: Ongoing  Goal: Control of chronic pain  Description: Control of chronic pain  Outcome: Ongoing     Problem: Discharge Planning:  Goal: Discharged to appropriate level of care  Description: Discharged to appropriate level of care  Outcome: Ongoing

## 2021-01-04 NOTE — PLAN OF CARE
Problem: Pain:  Goal: Pain level will decrease  Description: Pain level will decrease  Outcome: Ongoing  Note: Pt medicated with pain medication prn. Assessed all pain characteristics including level, type, location, frequency, and onset. Non-pharmacologic interventions offered to pt as well. Pt states pain is tolerable at this time. Will continue to monitor      Problem: Body Temperature - Imbalanced:  Goal: Ability to maintain a body temperature in the normal range will improve  Description: Ability to maintain a body temperature in the normal range will improve  Outcome: Ongoing  Note: Patient remains afebrile, vitals continued to be monitored every 4 hours.       Problem: Serum Glucose Level - Abnormal:  Goal: Ability to maintain appropriate glucose levels will improve  Description: Ability to maintain appropriate glucose levels will improve  Outcome: Ongoing

## 2021-01-04 NOTE — PROGRESS NOTES
Occupational Therapy  Facility/Department: [facility]  Daily Treatment Note  NAME: Eleanor Gill  : 1960  MRN: [id number]    Date of Service: 2021    Discharge Recommendations:  Home with assist PRN       Assessment   Performance deficits / Impairments: Decreased functional mobility ; Decreased ADL status; Decreased ROM; Decreased safe awareness;Decreased endurance;Decreased sensation;Decreased high-level IADLs;Decreased fine motor control;Decreased coordination;Decreased posture  Prognosis: Good  OT Education: Plan of Care;Home Exercise Program;Energy Conservation  Patient Education: Pt issued B UE HEP handout with resistance band and handouts on EC and fall safety checklist.  Barriers to Learning: None  No Skilled OT: Independent with functional mobility; Independent with ADL's;At baseline function; Safe to return home  REQUIRES OT FOLLOW UP: No  Activity Tolerance  Activity Tolerance: Patient Tolerated treatment well  Safety Devices  Safety Devices in place: Yes  Type of devices: Left in chair;Call light within reach         Patient Diagnosis(es): The encounter diagnosis was Cellulitis of left upper extremity. has a past medical history of Anxiety, Diabetes mellitus ([facility] [address] Hyperlipidemia, Hypertension, and Pancreatic cancer ([facility] [address] has a past surgical history that includes  section; Gallbladder surgery; ERCP (2020); Upper gastrointestinal endoscopy (2020); Upper gastrointestinal endoscopy (2020); ERCP (2020); ERCP (2020); ERCP (2020); ERCP (2020); ERCP (2020); and ERCP (2020).     Restrictions  Restrictions/Precautions  Restrictions/Precautions: Contact Precautions  Required Braces or Orthoses?: No  Position Activity Restriction  Other position/activity restrictions: RUE IV, R chest port, current chemo treatment, telemetry, up as mariposa , DNR-CC  Subjective   General  Chart Reviewed: Yes  Patient assessed for rehabilitation services?: Yes Response to previous treatment: Patient with no complaints from previous session  Family / Caregiver Present: No      Orientation  Orientation  Overall Orientation Status: Within Normal Limits  Objective    ADL  LE Dressing: Independent(too yesi socks)  Additional Comments: Pt reports showering this morning independently and has NO self care needs or concerns. Balance  Sitting Balance: Independent  Standing Balance: Independent  Standing Balance  Time: 5+ min  Activity: mobility  Functional Mobility  Functional - Mobility Device: No device  Activity: Other  Assist Level: Independent  Functional Mobility Comments: Pt independently ambulates around room without device even moving furniture in room and maneuvering thru small spaces without difficulty or LOB. Bed mobility  Supine to Sit: Independent  Sit to Supine: Independent  Scooting: Independent  Transfers  Stand Step Transfers: Independent  Stand Pivot Transfers: Independent  Sit to stand: Independent  Stand to sit: Independent  Transfer Comments: No device used                       Cognition  Overall Cognitive Status: WNL     Perception  Overall Perceptual Status: WFL                                   Plan   Plan  Times per week: 4-5x/wk 1x/day as mariposa  Current Treatment Recommendations: ROM, Functional Mobility Training, Endurance Training, Equipment Evaluation, Education, & procurement, Patient/Caregiver Education & Training, Self-Care / ADL, Home Management Training, Safety Education & Training, Pain Management             Goals  Short term goals  Time Frame for Short term goals: By discharge, pt to demo  Short term goal 1: bed mobility to Mod I with use of bedrails as needed. Short term goal 2: total body ADLs to Mod I with use of AD/grab bars/AE as needed. Short term goal 3: increased BUE strength by 1/2 grade/I with BUE HEP with use of handouts to assist with self care.

## 2021-01-04 NOTE — PROGRESS NOTES
[facility]  Progress Note    Magdalena Villar  MRN: [id number]  Date: 1/4/2021  LOS: 7     Reason for F/U :   MRSA sepsis    Impression :   1. Left upper extremity/forearm septic thrombophlebitis  2. MRSA sepsis secondary to above  3. Advanced pancreatic cancer on chemo radiation therapy  4. Diabetes mellitus type 2    Recommendations:   · Continue intravenous antimicrobial therapy with daptomycin  · The soft tissue changes in the right forearm are markedly improved. · While there is still some erythroderma as well as some double cords this is to be expected. · Plan will be for discharge on IV antimicrobial therapy complete a 4-week course of therapy    Infection Control Recommendations:   Contact precautions    Discharge Planning:   Estimated Length of IV antimicrobials: 4 weeks  Patient will need Midline Catheter Insertion/ PICC line Insertion: No  Patient will need: Home IV , [place],  SNF,  LTAC: Undetermined  Patient willneed outpatient wound care: No    Medical Decision making / Summary of Stay:   Magdalena Villar is a 64y.o.-year-old female who was initially admitted on 12/28/2020. Toribio Bates has a history of diabetes mellitus, hyperlipidemia, hypertension, anxiety disorder, and locally advanced pancreatic cancer and was receiving chemotherapy with Xeloda and radiation therapy. She was recently hospitalized at [place] [address] after a syncopal episode and hypoglycemia. The patient was discharged 12/23/2020.     The patient presented to the emergency department with swelling and redness at a prior IV site in her left forearm. The patient started having fevers/chills on the day of admission which prompted the emergency room evaluation. The temperature was 101.5 degrees in the emergency department.   She was started on IV antimicrobial therapy with vancomycin and blood cultures were sent.    Blood cultures done in the emergency department have come back 2 out of 2 with gram-positive cocci and I was asked to evaluate and help with antibiotic choice. Current evaluation:2021    BP (!) 78/66   Pulse 74   Temp 98.1 °F (36.7 °C) (Oral)   Resp 16   Ht 5' 5\" (1.651 m)   Wt 251 lb (113.9 kg)   SpO2 98%   BMI 41.77 kg/m²     Temperature Range: Temp: 98.1 °F (36.7 °C) Temp  Av.3 °F (36.8 °C)  Min: 97.5 °F (36.4 °C)  Max: 99.7 °F (37.6 °C)  The patient is seen and evaluated at bedside she is awake and alert in no acute distress. Overall she feels markedly better. The swelling in the soft tissues of the left forearm are improved. No subjective fever or chills. Review of Systems   Constitutional: Negative. Respiratory: Negative. Cardiovascular: Negative. Gastrointestinal: Negative. Genitourinary: Negative. Musculoskeletal:        Left forearm pain and soft tissue swelling   Skin: Negative. Neurological: Negative. Psychiatric/Behavioral: Negative. Physical Examination :     Physical Exam  Constitutional:       Appearance: She is well-developed. HENT:      Head: Normocephalic and atraumatic. Neck:      Musculoskeletal: Normal range of motion and neck supple. Cardiovascular:      Rate and Rhythm: Regular rhythm. Heart sounds: Normal heart sounds. Pulmonary:      Effort: Pulmonary effort is normal.      Breath sounds: Normal breath sounds. Abdominal:      General: Bowel sounds are normal.      Palpations: Abdomen is soft. Musculoskeletal:      Comments: Left forearm erythroderma, palpable cords, and small areas of soft tissue swelling   Skin:     General: Skin is warm and dry. Neurological:      Mental Status: She is alert and oriented to person, place, and time.          Laboratory data:   I have independently reviewed the followinglabs:  CBC with Differential:   Recent Labs     21  0636 21  0706   WBC 5.7 8.5   HGB 8.2* 8.5* gentamicin Sensitive Gentamicin is used only in combination with other active agents that test susceptible. Final    Induced Clind Resist Negative NEGATIVE Final    levofloxacin Intermediate  Final     4   INTERMEDIATE   linezolid   Final     NOT REPORTED    moxifloxacin   Final     NOT REPORTED    nitrofurantoin   Final     NOT REPORTED    oxacillin Resistant  Final     >=4   RESISTANT   Synercid   Final     NOT REPORTED    rifampin   Final     NOT REPORTED    tetracycline Sensitive  Final     <=1   SUSCEPTIBLE   tigecycline   Final     NOT REPORTED    trimethoprim-sulfamethoxazole Sensitive  Final     <=10   SUSCEPTIBLE   vancomycin Sensitive  Final     <=0.5   SUSCEPTIBLE           Medications:      apixaban  5 mg Oral BID    insulin glargine  32 Units Subcutaneous Daily    oxyCODONE  10 mg Oral 2 times per day    daptomycin (CUBICIN) IVPB  6 mg/kg (Adjusted) Intravenous Q24H    sertraline  150 mg Oral Daily    amLODIPine  10 mg Oral Daily    aspirin  81 mg Oral Nightly    [Held by provider] atorvastatin  40 mg Oral Nightly    carvedilol  12.5 mg Oral BID WC    hydrALAZINE  25 mg Oral 3 times per day    sodium chloride flush  10 mL Intravenous 2 times per day    insulin lispro  0-6 Units Subcutaneous TID WC    insulin lispro  0-3 Units Subcutaneous Nightly           [facility]  [address]  Perfect [employer] messaging  OFFICE: [phone]      Electronically signed by [address], [place] on 1/4/2021 at 12:37 PM  Thank you for allowing us to participate in the care of this patient. Please call with questions. This note iscreated with the assistance of a speech recognition program.  While intending to generate a document that actually reflects the content of the visit, the document can still have some errors including those of syntax andsound a like substitutions which may escape proof reading. In such instances, actual meaning can be extrapolated by contextual diversion.

## 2021-01-04 NOTE — PROGRESS NOTES
Physical Therapy  Facility/Department: [facility]  Daily Treatment Note  NAME: Jennifer Valenzuela  : 1960  MRN: [id number]    Date of Service: 2021    Discharge Recommendations:  Home with assist PRN, Home with Home health PT        Assessment   Body structures, Functions, Activity limitations: Decreased functional mobility ; Decreased endurance;Decreased strength;Decreased balance; Increased pain;Decreased ADL status Patient has met all PT goals at this time. Assessment: Pt tolerated session well and demonstrating independence with gait and balance in the room. Patient states she is ready for discharge home with pallative care. Patient educated on the importance of physical activity to enhance functional and mobility activities. Decision Making: Medium Complexity  Exam: ROM, MMT, balance, endurance, AM-PAC  Clinical Presentation: evolving  PT Education: Goals;Transfer Training;PT Role;Energy Conservation; Functional Mobility Training;Plan of Care;General Safety;Pressure Relief  Patient Education: Patient educated on the importance of physical activity to enhance balance and gait tasks. REQUIRES PT FOLLOW UP: Yes  Activity Tolerance  Activity Tolerance: Patient limited by fatigue;Patient limited by pain  Activity Tolerance: Paitient able to complete all THer ex with no rest breaks. Patient displays good understanding of postural control and safety for functional activities. Patient Diagnosis(es): The encounter diagnosis was Cellulitis of left upper extremity. has a past medical history of Anxiety, Diabetes mellitus ([address] Hyperlipidemia, Hypertension, and Pancreatic cancer ([address] has a past surgical history that includes  section; Gallbladder surgery; ERCP (2020); Upper gastrointestinal endoscopy (2020); Upper gastrointestinal endoscopy (2020); ERCP (2020); ERCP (2020); ERCP (2020); ERCP (2020); ERCP (2020); and ERCP (2020). Restrictions  Restrictions/Precautions  Restrictions/Precautions: Contact Precautions  Required Braces or Orthoses?: No  Position Activity Restriction  Other position/activity restrictions: RUE IV, R chest port, current chemo treatment, telemetry, up as mariposa , DNR-CC  Subjective   General  Chart Reviewed: Yes  Response To Previous Treatment: Not applicable  Subjective  Subjective: Pt agreeable to PT. Patient looking forward to return home to spend time with her family. States ex  and 2 sons live at home with her. General Comment  Comments: RN Mar Og patient appropriate for PT treatment. Orientation  Orientation  Overall Orientation Status: Within Normal Limits  Cognition      Objective   Bed mobility  Bridging: Independent  Rolling to Left: Independent  Rolling to Right: Independent  Supine to Sit: Independent  Sit to Supine: Independent  Scooting: Independent  Comment: Patient up in side chair upon arrival and retires to side chair at end of treatment session. Transfers  Sit to Stand: Independent  Stand to sit: Independent  Stand Pivot Transfers: Independent  Comment: Patient able to independently ambulate in room. Patient displayed good understanding of safety awareness and had 0 LOB. Ambulation  Ambulation?: Yes  Ambulation 1  Surface: level tile  Device: No Device  Assistance: Independent  Gait Deviations: Slow Esther;Decreased step length;Decreased step height; Increased KATIA  Distance: 25ftx 4 laps  Comments: No LOB patient displays good UE reciprical arm motion. Patient performed the 5x STS completing the task in 15.22 seconds scoring better than the norm for her age group.      Balance  Posture: Good  Sitting - Static: Good  Sitting - Dynamic: Good  Standing - Static: Good;-  Standing - Dynamic: Good;-  Exercises  Comments: seated LE ex's AMANDA 1 set x10 reps & sit to stands x5   AROM RLE (degrees)  RLE AROM: WFL  AROM LLE (degrees)  LLE AROM : WFL  Strength LLE  Comment: grossly 4/5 AM-PAC Score  AM-PAC Inpatient Mobility Raw Score : 20 (01/04/21 1625)  AM-PAC Inpatient T-Scale Score : 47.67 (01/04/21 1625)  Mobility Inpatient CMS 0-100% Score: 35.83 (01/04/21 1625)  Mobility Inpatient CMS G-Code Modifier : CJ (01/04/21 1625)          Goals  Short term goals  Time Frame for Short term goals: 12 visits  Short term goal 1: Pt corey perform all bed mobility indep  Short term goal 2: Pt will transfer indep  Short term goal 3: Pt will ambulate 50ft with no AD/LRAD and SBA  Short term goal 4: Pt will tolerate 25mins of PT including therex, theract, and gait training to improve functional mobility and activity tolerance.   Patient Goals   Patient goals : to go home    Plan    Plan  Times per week: 1-2 x day / 5-6 days per week  Current Treatment Recommendations: Strengthening, Endurance Training, Transfer Training, Balance Training, Gait Training, Home Exercise Program, Functional Mobility Training, Safety Education & Training, Positioning  Safety Devices  Type of devices: Call light within reach, Gait belt, Nurse notified, Left in chair     Therapy Time   Individual Concurrent Group Co-treatment   Time In 1430         Time Out 1455         Minutes [address] [facility]

## 2021-01-04 NOTE — PROGRESS NOTES
[facility] PALLIATIVE CARE NURSING ASSESSMENT    Patient: Mable Hyman  Room: 2021/2021-01    Reason For Consult   Goals of care evaluation  Distress management  Guidance and support  Facilitate communications  Assistance in coordinating care    Code Status:       Impression: Mable Hyman is a 64y.o. year old female  has a past medical history of Anxiety, Diabetes mellitus ([address] Hyperlipidemia, Hypertension, and Pancreatic cancer ([address] .  Currently hospitalized for the management of cellulitis of the left arm. The Palliative Care Team is following to assist with pain mgt and chronic disease support. Vital Signs  Blood pressure (!) 140/47, pulse 91, temperature 98.4 °F (36.9 °C), temperature source Oral, resp. rate 16, height 5' 5\" (1.651 m), weight 251 lb (113.9 kg), SpO2 96 %, unknown if currently breastfeeding.     Patient Active Problem List   Diagnosis    Uncontrolled diabetes mellitus ([address]    Hypertension    Postmenopausal bleeding    Thickened endometrium    Type 2 diabetes mellitus with hyperglycemia, with long-term current use of insulin (HCC)    Hypophosphatemia    Hypomagnesemia    Hyperglycemia due to type 2 diabetes mellitus ([address]    Pancreatic Head Mass    Non-Obstructive CAD    Primary adenocarcinoma of head of pancreas (HCC)    Intractable nausea and vomiting    Class 3 severe obesity with serious comorbidity in adult [facility])    Amnesia    Anxiety    Benign neoplasm of choroid    Depression    Diabetic complication (HCC)    Diabetic peripheral neuropathy associated with type 2 diabetes mellitus (HCC)    Hepatomegaly    Methicillin resistant Staphylococcus aureus infection    Long term current use of insulin (HCC)    Hyperlipidemia    Moderate nonproliferative diabetic retinopathy associated with type 2 diabetes mellitus ([address]    Morbid obesity (HCC)    Pain in limb    Pancreatic adenocarcinoma ([address]    Pancreatic malignancy syndrome ([address]  Peripheral venous insufficiency    Postprocedural state    Primary localized osteoarthrosis of ankle and foot    Urinary tract infectious disease    Transaminitis    Intractable vomiting    Septicemia due to E. coli (HCC)    Acute obstructive cholangitis    [facility]) [facility]    Dehydration with hyponatremia    NSVT (nonsustained ventricular tachycardia) ([facility]    Hypoglycemia    Cellulitis    Encounter for palliative care       Palliative Interaction: Pt known to our team from coming to our [facility]. Pt sitting up in the recliner. She is alert and oriented but is getting confused on times of day. She was recently started on oxycontin 10mg BID with breakthrough Roxicodone as needed. Pt states her pain is manageable at this time. She states she still does \"have some pain in my upper abdomen\" but states it is tolerable. She does not appear in distress. Plan is to possibly go home on IV ATB. She states she does not want to continue cancer treatment but that is to be determined with her oncologist on an outpatient basis. Our team did discuss hospice with her over the weekend but she is declining that at this time. We will continue to see her in the clinic and manage her pain and symptoms. Emotional support offered. Will continue to follow. [facility].P program notified as patient would like to sign up for [employer] today ([employer]). They will touch base with patient today.         Goals/Plan of care  Education/support to patient  Discharge planning/helping to coordinate care  Communications with primary service  Providing support for coping/adaptation/distress of patient  Discussing meaning/purpose   Continue with current plan of care  Clarification of medical condition to patient and family  Code status clarified: [facility]  Continued communication updates  Recognizing, reflecting, and empathizing with the patient's anticipatory grief Pt's pain tolerable since starting on oxycontin over the weekend. Pt has decided not to pursue any further cancer treatment but is refusing hospice at this time. Pt will continue on [facility] for symptom mgt. Possible home with IV atb soon.       Palliative Care Coordinator  [facility] NUPUR Retana, ALONSO ROJAS [facility] Office: [address] Office: [phone]    For Symptom Management Clinic scheduling please call [phone]

## 2021-01-04 NOTE — FLOWSHEET NOTE
Patient was sitting up. No major needs expressed. Patient was on the phone. Follow up as needed. 01/04/21 4520   Encounter Summary   Services provided to: Patient   Referral/Consult From: Ashley   Continue Visiting   (1-4-21)   Complexity of Encounter Low   Length of Encounter 15 minutes   Routine   Type Follow up   Assessment Calm; Approachable   Intervention Explored feelings, thoughts, concerns   Outcome Expressed gratitude

## 2021-01-05 PROBLEM — R10.13 EPIGASTRIC PAIN: Status: ACTIVE | Noted: 2021-01-05

## 2021-01-05 LAB
GLUCOSE BLD-MCNC: 106 MG/DL (ref 65–105)
GLUCOSE BLD-MCNC: 116 MG/DL (ref 65–105)
GLUCOSE BLD-MCNC: 157 MG/DL (ref 65–105)
GLUCOSE BLD-MCNC: 178 MG/DL (ref 65–105)
TOTAL CK: 33 U/L (ref 26–192)

## 2021-01-05 PROCEDURE — 6360000002 HC RX W HCPCS: Performed by: INTERNAL MEDICINE

## 2021-01-05 PROCEDURE — 1200000000 HC SEMI PRIVATE

## 2021-01-05 PROCEDURE — 99233 SBSQ HOSP IP/OBS HIGH 50: CPT | Performed by: NURSE PRACTITIONER

## 2021-01-05 PROCEDURE — 6370000000 HC RX 637 (ALT 250 FOR IP): Performed by: NURSE PRACTITIONER

## 2021-01-05 PROCEDURE — 82947 ASSAY GLUCOSE BLOOD QUANT: CPT

## 2021-01-05 PROCEDURE — 2580000003 HC RX 258: Performed by: INTERNAL MEDICINE

## 2021-01-05 PROCEDURE — 36415 COLL VENOUS BLD VENIPUNCTURE: CPT

## 2021-01-05 PROCEDURE — 82550 ASSAY OF CK (CPK): CPT

## 2021-01-05 PROCEDURE — 99232 SBSQ HOSP IP/OBS MODERATE 35: CPT | Performed by: INTERNAL MEDICINE

## 2021-01-05 PROCEDURE — 6370000000 HC RX 637 (ALT 250 FOR IP): Performed by: INTERNAL MEDICINE

## 2021-01-05 RX ORDER — OXYCODONE HCL 10 MG/1
10 TABLET, FILM COATED, EXTENDED RELEASE ORAL EVERY 12 HOURS SCHEDULED
Qty: 60 EACH | Refills: 0 | Status: SHIPPED | OUTPATIENT
Start: 2021-01-05 | End: 2021-02-04

## 2021-01-05 RX ORDER — FUROSEMIDE 20 MG/1
20 TABLET ORAL DAILY
Status: DISCONTINUED | OUTPATIENT
Start: 2021-01-05 | End: 2021-01-06 | Stop reason: HOSPADM

## 2021-01-05 RX ADMIN — SODIUM CHLORIDE 500 MG: 900 INJECTION INTRAVENOUS at 13:03

## 2021-01-05 RX ADMIN — APIXABAN 5 MG: 5 TABLET, FILM COATED ORAL at 09:58

## 2021-01-05 RX ADMIN — SERTRALINE HYDROCHLORIDE 150 MG: 100 TABLET ORAL at 09:57

## 2021-01-05 RX ADMIN — FUROSEMIDE 20 MG: 20 TABLET ORAL at 21:09

## 2021-01-05 RX ADMIN — APIXABAN 5 MG: 5 TABLET, FILM COATED ORAL at 21:05

## 2021-01-05 RX ADMIN — HYDRALAZINE HYDROCHLORIDE 25 MG: 25 TABLET, FILM COATED ORAL at 13:03

## 2021-01-05 RX ADMIN — OXYCODONE HYDROCHLORIDE 10 MG: 10 TABLET, FILM COATED, EXTENDED RELEASE ORAL at 09:58

## 2021-01-05 RX ADMIN — SODIUM CHLORIDE: 9 INJECTION, SOLUTION INTRAVENOUS at 06:10

## 2021-01-05 RX ADMIN — HYDRALAZINE HYDROCHLORIDE 25 MG: 25 TABLET, FILM COATED ORAL at 05:34

## 2021-01-05 RX ADMIN — INSULIN GLARGINE 32 UNITS: 100 INJECTION, SOLUTION SUBCUTANEOUS at 09:58

## 2021-01-05 RX ADMIN — CARVEDILOL 12.5 MG: 12.5 TABLET, FILM COATED ORAL at 17:09

## 2021-01-05 RX ADMIN — OXYCODONE HYDROCHLORIDE 10 MG: 10 TABLET, FILM COATED, EXTENDED RELEASE ORAL at 21:05

## 2021-01-05 RX ADMIN — AMLODIPINE BESYLATE 10 MG: 5 TABLET ORAL at 09:57

## 2021-01-05 RX ADMIN — INSULIN LISPRO 1 UNITS: 100 INJECTION, SOLUTION INTRAVENOUS; SUBCUTANEOUS at 13:02

## 2021-01-05 RX ADMIN — CARVEDILOL 12.5 MG: 12.5 TABLET, FILM COATED ORAL at 09:57

## 2021-01-05 RX ADMIN — ASPIRIN 81 MG: 81 TABLET, CHEWABLE ORAL at 21:05

## 2021-01-05 RX ADMIN — INSULIN LISPRO 1 UNITS: 100 INJECTION, SOLUTION INTRAVENOUS; SUBCUTANEOUS at 17:09

## 2021-01-05 RX ADMIN — HYDRALAZINE HYDROCHLORIDE 25 MG: 25 TABLET, FILM COATED ORAL at 21:05

## 2021-01-05 ASSESSMENT — PAIN DESCRIPTION - DESCRIPTORS
DESCRIPTORS: DISCOMFORT
DESCRIPTORS: SORE

## 2021-01-05 ASSESSMENT — ENCOUNTER SYMPTOMS
RESPIRATORY NEGATIVE: 1
GASTROINTESTINAL NEGATIVE: 1

## 2021-01-05 ASSESSMENT — PAIN SCALES - GENERAL
PAINLEVEL_OUTOF10: 0
PAINLEVEL_OUTOF10: 6
PAINLEVEL_OUTOF10: 0

## 2021-01-05 ASSESSMENT — PAIN - FUNCTIONAL ASSESSMENT: PAIN_FUNCTIONAL_ASSESSMENT: ACTIVITIES ARE NOT PREVENTED

## 2021-01-05 ASSESSMENT — PAIN DESCRIPTION - ORIENTATION: ORIENTATION: LEFT

## 2021-01-05 ASSESSMENT — PAIN DESCRIPTION - LOCATION: LOCATION: ABDOMEN

## 2021-01-05 NOTE — PLAN OF CARE
Problem: Pain:  Goal: Pain level will decrease  Description: Pain level will decrease  1/5/2021 0144 by Zurdo Fonseca RN  Outcome: Ongoing  1/5/2021 0036 by Zurdo Fonseca RN  Outcome: Ongoing  1/4/2021 1850 by Armando Huddleston RN  Outcome: Ongoing  Note: Pt medicated with pain medication prn. Assessed all pain characteristics including level, type, location, frequency, and onset. Non-pharmacologic interventions offered to pt as well. Pt states pain is tolerable at this time. Will continue to monitor   Goal: Control of acute pain  Description: Control of acute pain  1/5/2021 0144 by Zurdo Fonseca RN  Outcome: Ongoing  1/5/2021 0036 by Zurdo Fonseca RN  Outcome: Ongoing  1/4/2021 1850 by Armando Huddleston RN  Outcome: Ongoing  Goal: Control of chronic pain  Description: Control of chronic pain  1/5/2021 0144 by Zurdo Fonseca RN  Outcome: Ongoing  1/5/2021 0036 by Zurdo Fonseca RN  Outcome: Ongoing  1/4/2021 1850 by Armando Huddleston RN  Outcome: Ongoing     Problem: Discharge Planning:  Goal: Discharged to appropriate level of care  Description: Discharged to appropriate level of care  1/5/2021 0144 by Zurdo Fonseca RN  Outcome: Ongoing  1/5/2021 0036 by Zurdo Fonseca RN  Outcome: Ongoing  1/4/2021 1850 by Armando Huddleston RN  Outcome: Ongoing     Problem: Body Temperature - Imbalanced:  Goal: Ability to maintain a body temperature in the normal range will improve  Description: Ability to maintain a body temperature in the normal range will improve  1/5/2021 0144 by Zurdo Fonseca RN  Outcome: Ongoing  1/5/2021 0036 by Zurdo Fonseca RN  Outcome: Ongoing  1/4/2021 1850 by Armando Huddleston RN  Outcome: Ongoing  Note: Patient remains afebrile, vitals continued to be monitored every 4 hours.       Problem: Mobility - Impaired:  Goal: Mobility will improve to maximum level  Description: Mobility will improve to maximum level  1/5/2021 0144 by Zurdo Fonseca RN  Outcome: Ongoing 1/5/2021 0036 by Wagner Win RN  Outcome: Ongoing  1/4/2021 1850 by Silvia Barraza RN  Outcome: Ongoing     Problem: Pain:  Goal: Pain level will decrease  Description: Pain level will decrease  1/5/2021 0144 by Wagner Win RN  Outcome: Ongoing  1/5/2021 0036 by Wagner Win RN  Outcome: Ongoing  1/4/2021 1850 by Silvia Barraza RN  Outcome: Ongoing  Note: Pt medicated with pain medication prn. Assessed all pain characteristics including level, type, location, frequency, and onset. Non-pharmacologic interventions offered to pt as well. Pt states pain is tolerable at this time.  Will continue to monitor   Goal: Control of acute pain  Description: Control of acute pain  1/5/2021 0144 by Wagner Win RN  Outcome: Ongoing  1/5/2021 0036 by Wagner Win RN  Outcome: Ongoing  1/4/2021 1850 by Silvia Barraza RN  Outcome: Ongoing  Goal: Control of chronic pain  Description: Control of chronic pain  1/5/2021 0144 by Wagner Win RN  Outcome: Ongoing  1/5/2021 0036 by Wagner Win RN  Outcome: Ongoing  1/4/2021 1850 by Silvia Barraza RN  Outcome: Ongoing     Problem: Skin Integrity - Impaired:  Goal: Will show no infection signs and symptoms  Description: Will show no infection signs and symptoms  1/5/2021 0144 by Wagner Win RN  Outcome: Ongoing  1/5/2021 0036 by Wagner Win RN  Outcome: Ongoing  1/4/2021 1850 by Silvia Barraza RN  Outcome: Ongoing  Goal: Absence of new skin breakdown  Description: Absence of new skin breakdown  1/5/2021 0144 by Wagner Win RN  Outcome: Ongoing  1/5/2021 0036 by Wagner Win RN  Outcome: Ongoing  1/4/2021 1850 by Silvia Barraza RN  Outcome: Ongoing     Problem: Falls - Risk of:  Goal: Will remain free from falls  Description: Will remain free from falls  1/5/2021 0144 by Wagner Win RN  Outcome: Ongoing  1/5/2021 0036 by Wagner Win RN  Outcome: Ongoing  1/4/2021 1850 by Silvia Barraza RN  Outcome: Ongoing  Goal: Absence of physical injury Description: Absence of physical injury  1/5/2021 0144 by Esa Ryan RN  Outcome: Ongoing  1/5/2021 0036 by Esa Ryan RN  Outcome: Ongoing  1/4/2021 1850 by Bijal Pacheco RN  Outcome: Ongoing     Problem: Serum Glucose Level - Abnormal:  Goal: Ability to maintain appropriate glucose levels will improve  Description: Ability to maintain appropriate glucose levels will improve  1/5/2021 0144 by Esa Ryan RN  Outcome: Ongoing  1/5/2021 0036 by Esa Ryan RN  Outcome: Ongoing  1/4/2021 1850 by Bijal Pacheco RN  Outcome: Ongoing     Problem: Sensory Perception - Impaired:  Goal: Ability to maintain a stable neurologic state will improve  Description: Ability to maintain a stable neurologic state will improve  1/5/2021 0144 by Esa Ryan RN  Outcome: Ongoing  1/5/2021 0036 by Esa Ryan RN  Outcome: Ongoing  1/4/2021 1850 by Bijal Pacheco RN  Outcome: Ongoing     Problem: SAFETY  Goal: Free from accidental physical injury  1/5/2021 0144 by Esa Ryan RN  Outcome: Ongoing  1/5/2021 0036 by Esa Ryan RN  Outcome: Ongoing  1/4/2021 1850 by Bijal Pacheco RN  Outcome: Ongoing  Goal: Free from intentional harm  1/5/2021 0144 by Esa Ryan RN  Outcome: Ongoing  1/5/2021 0036 by Esa Ryan RN  Outcome: Ongoing  1/4/2021 1850 by Bijal Pacheco RN  Outcome: Ongoing     Problem: DAILY CARE  Goal: Daily care needs are met  1/5/2021 0144 by Esa Ryan RN  Outcome: Ongoing  1/5/2021 0036 by Esa Ryan RN  Outcome: Ongoing  1/4/2021 1850 by Bijal Pacheco RN  Outcome: Ongoing     Problem: KNOWLEDGE DEFICIT  Goal: Patient/S.O. demonstrates understanding of disease process, treatment plan, medications, and discharge instructions.   1/5/2021 0144 by Esa Ryan RN  Outcome: Ongoing  1/5/2021 0036 by Esa Ryan RN  Outcome: Ongoing  1/4/2021 1850 by Bijal Pacheco, RN  Outcome: Ongoing     Problem: DISCHARGE BARRIERS  Goal: Patient's continuum of care needs are met 1/5/2021 0144 by Ap Hair RN  Outcome: Ongoing  1/5/2021 0036 by Ap Hair RN  Outcome: Ongoing  1/4/2021 1850 by Geo Urban RN  Outcome: Ongoing     Problem: Nutrition  Goal: Optimal nutrition therapy  1/5/2021 0144 by Ap Hair RN  Outcome: Ongoing  1/5/2021 0036 by Ap Hair RN  Outcome: Ongoing  1/4/2021 1850 by Geo Urban RN  Outcome: Ongoing

## 2021-01-05 NOTE — CARE COORDINATION
Spoke to pt regarding insurance coverage. Pt has been covered through [email or web address] since July, 2020 and is terminating [email or web address] due to cost. Yvette Ashby from [employer] still shows pt is covered but pt is not planning on paying premium for January. Is over income for Medicaid and will be applying for insurance through [employer]. Spoke to Yvette Ashby from [employer] and she will provide pt with list of contacts prior to D/C if available. Continue to follow ID plan of care for possible home IV ATB.

## 2021-01-05 NOTE — PROGRESS NOTES
Subjective:   Follow-up type 2 diabetes  For renal renal protection hypoglycemia blood sugars reviewed  ROS  No fever no chills no GI/ complaints no cardiopulmonary complaints no TIA no bleeding no headache no sore throat no skin lesions no fatigue physical exam  General Appearance: alert and oriented to person, place and time and in no acute distress  Skin: Left forearm less red less swollen head: normocephalic and atraumatic  Eyes: pupils equal, round, and reactive to light, conjunctivae normal and sclera anicteric    Neck: neck supple and non tender without mass   Pulmonary/Chest: clear to auscultation bilaterally- no wheezes, rales or rhonchi, normal air movement, no respiratory distress  Cardiovascular: normal rate, regular rhythm, normal S1 and S2, no gallops, intact distal pulses and no carotid bruits  Abdomen: soft, non-tender, non-distended, normal bowel sounds, no masses or organomegaly  Extremities: +1 edema good pulses negative Homans' sign  Neurologic: Alert oriented x3 with no focal deficit    BP (!) 148/56   Pulse 75   Temp 98.2 °F (36.8 °C) (Oral)   Resp 18   Ht 5' 5\" (1.651 m)   Wt 256 lb (116.1 kg)   SpO2 96%   BMI 42.60 kg/m²     CBC:   Lab Results   Component Value Date    WBC 8.5 01/04/2021    RBC 3.21 01/04/2021    RBC 4.33 03/05/2012    HGB 8.5 01/04/2021    HCT 28.2 01/04/2021    MCV 87.9 01/04/2021    MCH 26.5 01/04/2021    MCHC 30.1 01/04/2021    RDW 18.8 01/04/2021     01/04/2021     03/05/2012    MPV 9.9 01/04/2021     CMP:    Lab Results   Component Value Date     01/04/2021    K 5.1 01/04/2021     01/04/2021    CO2 18 01/04/2021    BUN 15 01/04/2021    CREATININE 1.42 01/04/2021    GFRAA 46 01/04/2021    LABGLOM 38 01/04/2021    GLUCOSE 310 01/04/2021    GLUCOSE 278 03/05/2012    PROT 4.9 12/29/2020    LABALBU 2.0 12/29/2020    CALCIUM 9.1 01/04/2021    BILITOT 0.48 12/29/2020    ALKPHOS 93 12/29/2020    AST 22 12/29/2020    ALT 17 12/29/2020 Assessment:  Patient Active Problem List   Diagnosis    Uncontrolled diabetes mellitus ([facility]    Hypertension    Postmenopausal bleeding    Thickened endometrium    Type 2 diabetes mellitus with hyperglycemia, with long-term current use of insulin (HCC)    Hypophosphatemia    Hypomagnesemia    Hyperglycemia due to type 2 diabetes mellitus ([address]    Pancreatic Head Mass    Non-Obstructive CAD    Primary adenocarcinoma of head of pancreas (HCC)    Intractable nausea and vomiting    Class 3 severe obesity with serious comorbidity in adult (HCC)    Amnesia    Anxiety    Benign neoplasm of choroid    Depression    Diabetic complication (HCC)    Diabetic peripheral neuropathy associated with type 2 diabetes mellitus (HCC)    Hepatomegaly    Methicillin resistant Staphylococcus aureus infection    Long term current use of insulin (HCC)    Hyperlipidemia    Moderate nonproliferative diabetic retinopathy associated with type 2 diabetes mellitus (HCC)    Morbid obesity (HCC)    Pain in limb    Pancreatic adenocarcinoma (HCC)    Pancreatic malignancy syndrome (HCC)    Peripheral venous insufficiency    Postprocedural state    Primary localized osteoarthrosis of ankle and foot    Urinary tract infectious disease    Transaminitis    Intractable vomiting    Septicemia due to E. coli (HCC)    Acute obstructive cholangitis    TORREY (acute kidney injury) ([facility]    Dehydration with hyponatremia    NSVT (nonsustained ventricular tachycardia) (HCC)    Hypoglycemia    Cellulitis    Encounter for palliative care    Epigastric pain     Left forearm cellulitis with bacteremia   Left forearm thrombophlebitis  CKD 3   Type 2 diabetes with hyperglycemia insulin treated  Type 2 diabetes with renal complications  Metastatic CA of the pancreas morbid obesity excess calories  Pedal edemalikely from norvasc  plan: Labs labs reviewed we will check venous Doppler of the legs start Lasix continue with before meals and at bedtime Accu-Cheks with insulin coverage avoid nephrotoxic drugs continue IV antibiotics see orders      Payal Casrtejon MD  6:57 PM

## 2021-01-05 NOTE — CARE COORDINATION
Call back from [facility] [address] [facility] with Georgie Miranda and they are still on hold with Thad to verify insurance. Unsure if they are in network at this time and will need verification prior to starting care. Helen from [place] also trying to verify insurance for [address] [place] [address] to provide infusions. Both will contact unit in AM with determination.

## 2021-01-05 NOTE — CARE COORDINATION
Call back from [facility] at [address] and they are still verifying insurance but is likely that they can accept pt.

## 2021-01-05 NOTE — PROGRESS NOTES
Subjective:    Type 2 diabetesFollow up dm2  No polyuria no polydipsia no hypoglycemia blood sugars reviewed  ROS  Fever no chills no GI/ complaints no cardiopulmonary complaints at present no TIA no bleeding no headache no sore throat no skin lesions left forearm less swelling less pain, no fatigue  physical exam  General Appearance: alert and oriented to person, place and time and in no acute distress  Skin: warm and dry, no rash or erythema  Head: normocephalic and atraumatic  Eyes: pupils equal, round, and reactive to light, conjunctivae normal and sclera anicteric  Neck: neck supple and non tender without mass   Pulmonary/Chest: clear to auscultation bilaterally- no wheezes, rales or rhonchi, normal air movement, no respiratory distress  Cardiovascular: normal rate, regular rhythm, normal S1 and S2, no gallops, intact distal pulses and no carotid bruits  Abdomen: soft, non-tender, non-distended, normal bowel sounds, no masses or organomegaly  Extremities: no edema and good pulses no Homans' sign    Neurologic: Alert oriented x3 with no focal deficit    BP (!) 122/42   Pulse 74   Temp 97.9 °F (36.6 °C) (Oral)   Resp 18   Ht 5' 5\" (1.651 m)   Wt 251 lb (113.9 kg)   SpO2 98%   BMI 41.77 kg/m²     CBC:   Lab Results   Component Value Date    WBC 8.5 01/04/2021    RBC 3.21 01/04/2021    RBC 4.33 03/05/2012    HGB 8.5 01/04/2021    HCT 28.2 01/04/2021    MCV 87.9 01/04/2021    MCH 26.5 01/04/2021    MCHC 30.1 01/04/2021    RDW 18.8 01/04/2021     01/04/2021     03/05/2012    MPV 9.9 01/04/2021     BMP:    Lab Results   Component Value Date     01/04/2021    K 5.1 01/04/2021     01/04/2021    CO2 18 01/04/2021    BUN 15 01/04/2021    LABALBU 2.0 12/29/2020    CREATININE 1.42 01/04/2021    CALCIUM 9.1 01/04/2021    GFRAA 46 01/04/2021    LABGLOM 38 01/04/2021    GLUCOSE 310 01/04/2021    GLUCOSE 278 03/05/2012        Assessment:  Patient Active Problem List   Diagnosis  Uncontrolled diabetes mellitus ([address]    Hypertension    Postmenopausal bleeding    Thickened endometrium    Type 2 diabetes mellitus with hyperglycemia, with long-term current use of insulin (HCC)    Hypophosphatemia    Hypomagnesemia    Hyperglycemia due to type 2 diabetes mellitus ([address]    Pancreatic Head Mass    Non-Obstructive CAD    Primary adenocarcinoma of head of pancreas (HCC)    Intractable nausea and vomiting    Class 3 severe obesity with serious comorbidity in adult (HCC)    Amnesia    Anxiety    Benign neoplasm of choroid    Depression    Diabetic complication (HCC)    Diabetic peripheral neuropathy associated with type 2 diabetes mellitus (HCC)    Hepatomegaly    Methicillin resistant Staphylococcus aureus infection    Long term current use of insulin (HCC)    Hyperlipidemia    Moderate nonproliferative diabetic retinopathy associated with type 2 diabetes mellitus (HCC)    Morbid obesity (HCC)    Pain in limb    Pancreatic adenocarcinoma (HCC)    Pancreatic malignancy syndrome (HCC)    Peripheral venous insufficiency    Postprocedural state    Primary localized osteoarthrosis of ankle and foot    Urinary tract infectious disease    Transaminitis    Intractable vomiting    Septicemia due to E. coli (HCC)    Acute obstructive cholangitis    TORREY (acute kidney injury) ([address]    Dehydration with hyponatremia    NSVT (nonsustained ventricular tachycardia) (HCC)    Hypoglycemia    Cellulitis    Encounter for palliative care     Left forearm cellulitis with bacteremia  Left forearm thrombophlebitis infected  CKD 3  Type 2 diabetes with hyperglycemia insulin treated  Type 2 diabetes with renal complication  Metastatic CA pancreas  Morbid obesity excess calories  Thrombocytopenia leukopenia secondary to chemotherapy  Anemia secondary to chemotherapy  Plan: Labs reviewed continue with IV antibiotics 2D echo reviewed continue with DVT prophylaxis before meals and at bedtime Accu-Cheks with insulin coverage avoid nephrotoxic drugs see orders with the 80 plans for discharge      Ashley Gan MD  7:17 PM

## 2021-01-05 NOTE — PLAN OF CARE
Problem: Pain:  Goal: Control of chronic pain  Description: Control of chronic pain  1/5/2021 1445 by Bijal Pacheco RN  Outcome: Ongoing  Note: Patient's pain well controlled with ordered pain medications and alternate therapies      Problem: Body Temperature - Imbalanced:  Goal: Ability to maintain a body temperature in the normal range will improve  Description: Ability to maintain a body temperature in the normal range will improve  1/5/2021 1445 by Bijal Pacheco RN  Outcome: Ongoing     Problem: Skin Integrity - Impaired:  Goal: Absence of new skin breakdown  Description: Absence of new skin breakdown  1/5/2021 1445 by Bijal Pacheco RN  Outcome: Ongoing  Note: Skin integrity maintained, no new skin abnormalities assessed. Appropriate measures remain in place      Problem: Falls - Risk of:  Goal: Will remain free from falls  Description: Will remain free from falls  1/5/2021 1445 by Bijal Pacheco RN  Outcome: Ongoing  1/5/2021 0144 by Esa Ryan RN  Outcome: Ongoing     Problem: Serum Glucose Level - Abnormal:  Goal: Ability to maintain appropriate glucose levels will improve  Description: Ability to maintain appropriate glucose levels will improve  1/5/2021 1445 by Bijal Pacheco RN  Outcome: Ongoing  Note: Patient's blood sugars continue to be checked before meals and before bed. Sliding scale insulin available for blood sugars 140 or greater. Physician updated as needed.

## 2021-01-05 NOTE — PLAN OF CARE
Problem: Pain:  Goal: Pain level will decrease  Description: Pain level will decrease  1/5/2021 0036 by Delma Martin RN  Outcome: Ongoing  1/4/2021 1850 by Trini Melissa RN  Outcome: Ongoing  Note: Pt medicated with pain medication prn. Assessed all pain characteristics including level, type, location, frequency, and onset. Non-pharmacologic interventions offered to pt as well. Pt states pain is tolerable at this time. Will continue to monitor   Goal: Control of acute pain  Description: Control of acute pain  1/5/2021 0036 by Delma Martin RN  Outcome: Ongoing  1/4/2021 1850 by Trini Melissa RN  Outcome: Ongoing  Goal: Control of chronic pain  Description: Control of chronic pain  1/5/2021 0036 by Delma Martin RN  Outcome: Ongoing  1/4/2021 1850 by Trini Melissa RN  Outcome: Ongoing     Problem: Discharge Planning:  Goal: Discharged to appropriate level of care  Description: Discharged to appropriate level of care  1/5/2021 0036 by Delma Martin RN  Outcome: Ongoing  1/4/2021 1850 by Trini Melissa RN  Outcome: Ongoing     Problem: Body Temperature - Imbalanced:  Goal: Ability to maintain a body temperature in the normal range will improve  Description: Ability to maintain a body temperature in the normal range will improve  1/5/2021 0036 by Delma Martin RN  Outcome: Ongoing  1/4/2021 1850 by Trini Melissa RN  Outcome: Ongoing  Note: Patient remains afebrile, vitals continued to be monitored every 4 hours.       Problem: Mobility - Impaired:  Goal: Mobility will improve to maximum level  Description: Mobility will improve to maximum level  1/5/2021 0036 by Delma Martin RN  Outcome: Ongoing  1/4/2021 1850 by Trini Melissa RN  Outcome: Ongoing     Problem: Pain:  Goal: Pain level will decrease  Description: Pain level will decrease  1/5/2021 0036 by Delma Martin RN  Outcome: Ongoing  1/4/2021 1850 by Trini Melissa RN  Outcome: Ongoing 1/5/2021 0036 by Arnold Owens RN  Outcome: Ongoing  1/4/2021 1850 by Taylor Joyce RN  Outcome: Ongoing     Problem: SAFETY  Goal: Free from accidental physical injury  1/5/2021 0036 by Arnold Owens RN  Outcome: Ongoing  1/4/2021 1850 by Taylor Joyce RN  Outcome: Ongoing  Goal: Free from intentional harm  1/5/2021 0036 by Arnold Owens RN  Outcome: Ongoing  1/4/2021 1850 by Taylor Joyce RN  Outcome: Ongoing     Problem: DAILY CARE  Goal: Daily care needs are met  1/5/2021 0036 by Arnold Owens RN  Outcome: Ongoing  1/4/2021 1850 by Taylor Joyce RN  Outcome: Ongoing     Problem: KNOWLEDGE DEFICIT  Goal: Patient/S.O. demonstrates understanding of disease process, treatment plan, medications, and discharge instructions.   1/5/2021 0036 by Arnold Owens RN  Outcome: Ongoing  1/4/2021 1850 by Taylor Joyce RN  Outcome: Ongoing     Problem: DISCHARGE BARRIERS  Goal: Patient's continuum of care needs are met  1/5/2021 0036 by Arnold Owens RN  Outcome: Ongoing  1/4/2021 1850 by Taylor Joyce RN  Outcome: Ongoing     Problem: Nutrition  Goal: Optimal nutrition therapy  1/5/2021 0036 by Arnold Owens RN  Outcome: Ongoing  1/4/2021 1850 by Taylor Joyce RN  Outcome: Ongoing

## 2021-01-05 NOTE — CARE COORDINATION
Call from Abbey Rodriguez with [facility] and they are unable to accept pt due to staffing. Spoke to Dennis Barnes with [facility] and he will check for availability.

## 2021-01-05 NOTE — CARE COORDINATION
Call back from Pili Burk at [facility] and they cannot accept pt. Spoke to [address] and they are not in network with patients insurance. Call to [facility] and awaiting call back with availability for Home Care.

## 2021-01-05 NOTE — PLAN OF CARE
Nutrition Problem #1: Mild malnutrition  Intervention: Food and/or Nutrient Delivery: Continue Current Diet, Start Oral Nutrition Supplement  Nutritional Goals: PO intakes to meet greater than 75% of estimated nutrition needs

## 2021-01-05 NOTE — DISCHARGE INSTR - COC
ERCP STENT INSERTION performed by Yisel Hernandez MD at [address] ERCP  7/22/2020    ERCP DILATION BALLOON performed by Yisel Hernandez MD at [address] ENDOSCOPY  06/04/2020    W/EUS FNA     UPPER GASTROINTESTINAL ENDOSCOPY  6/4/2020    EGD W/EUS FNA in OR with GI staff performed by [facility] Mcfadden MD at hospitals Endoscopy       Immunization History:   Immunization History   Administered Date(s) Administered    Influenza Virus Vaccine 01/01/2010, 01/01/2011, 09/17/2012, 11/07/2012, 11/03/2013, 09/04/2014, 09/30/2015, 09/01/2016, 09/01/2017, 10/03/2018, 11/04/2019    Influenza, MDCK Quadv, IM, PF (Flucelvax 4 yrs and older) 10/23/2020    Pneumococcal Polysaccharide ([id number]) 01/13/2012       Active Problems:  Patient Active Problem List   Diagnosis Code    Uncontrolled diabetes mellitus ([facility] E11.65    Hypertension I10    Postmenopausal bleeding N95.0    Thickened endometrium R93.89    Type 2 diabetes mellitus with hyperglycemia, with long-term current use of insulin (HCC) E11.65, Z79.4    Hypophosphatemia E83.39    Hypomagnesemia E83.42    Hyperglycemia due to type 2 diabetes mellitus (HCC) E11.65    Pancreatic Head Mass K86.89    Non-Obstructive CAD I25.10    Primary adenocarcinoma of head of pancreas ([facility] C25.0    Intractable nausea and vomiting R11.2    Class 3 severe obesity with serious comorbidity in adult ([facility]) E66.01    Amnesia R41.3    Anxiety F41.9    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    Depression F32.9    Diabetic complication (HCC) Z97.8    Diabetic peripheral neuropathy associated with type 2 diabetes mellitus (HCC) E11.42    Hepatomegaly R16.0    Methicillin resistant Staphylococcus aureus infection A49.02    Long term current use of insulin (HCC) Z79.4    Hyperlipidemia E78.5    Moderate nonproliferative diabetic retinopathy associated with type 2 diabetes mellitus ([address] R30.1868    Morbid obesity ([facility] E66.01  Pain in limb M79.609    Pancreatic adenocarcinoma (HCC) C25.9    Pancreatic malignancy syndrome (HCC) C25.0    Peripheral venous insufficiency I87.2    Postprocedural state Z98.890    Primary localized osteoarthrosis of ankle and foot M19.079    Urinary tract infectious disease N39.0    Transaminitis R74.01    Intractable vomiting R11.10    Septicemia due to E. coli ([facility]) A41.51    Acute obstructive cholangitis K83.09    TORREY (acute kidney injury) ([facility] N17.9    Dehydration with hyponatremia E86.0, E87.1    NSVT (nonsustained ventricular tachycardia) ([facility]) I47.2    Hypoglycemia E16.2    Cellulitis L03.90    Encounter for palliative care Z51.5    Epigastric pain R10.13       Isolation/Infection:   Isolation            Contact          Patient Infection Status       Infection Onset Added Last Indicated Last Indicated By Review Planned Expiration Resolved Resolved By    MRSA 06/01/20 06/01/20 01/01/21 Culture, Blood 1        Blood 12/2020    Resolved    COVID-19 Rule Out 12/28/20 12/28/20 12/28/20 COVID-19 (Ordered)   12/28/20 Rule-Out Test Resulted    COVID-19 Rule Out 12/17/20 12/17/20 12/17/20 COVID-19 Ambulatory (Ordered)   12/18/20 Rule-Out Test Resulted    C-diff Rule Out 11/11/20 11/11/20 11/11/20 Gastrointestinal Panel, Molecular (Ordered)   11/12/20 Rule-Out Test Resulted    COVID-19 Rule Out 06/03/20 06/03/20 06/03/20 COVID-19 (Ordered)   06/03/20 Rule-Out Test Resulted            Nurse Assessment:  Last Vital Signs: BP (!) 145/51   Pulse 84   Temp 98.6 °F (37 °C) (Oral)   Resp 18   Ht 5' 5\" (1.651 m)   Wt 256 lb (116.1 kg)   SpO2 96%   BMI 42.60 kg/m²     Last documented pain score (0-10 scale): Pain Level: 0  Last Weight:   Wt Readings from Last 1 Encounters:   01/05/21 256 lb (116.1 kg)     Mental Status:  oriented and alert    IV Access:  - implanted port right chest    Nursing Mobility/ADLs:  Walking   Independent  Transfer  Independent  Bathing  Independent Dressing  Independent  [address]  Independent  Med Delivery   whole    Wound Care Documentation and Therapy:        Elimination:  Continence:   · Bowel: Yes  · Bladder: Yes  Urinary Catheter: None   Colostomy/Ileostomy/Ileal Conduit: No       Date of Last BM: 1/4/2021    Intake/Output Summary (Last 24 hours) at 1/5/2021 1305  Last data filed at 1/5/2021 0527  Gross per 24 hour   Intake 1235 ml   Output    Net 1235 ml     I/O last 3 completed shifts: In: 4925 [P.O.:1020; I.V.:615]  Out: -     Safety Concerns:     None    Impairments/Disabilities:      None    Nutrition Therapy:  Current Nutrition Therapy:   - Oral Diet:  Carb Control 4 carbs/meal (1800kcals/day)    Routes of Feeding: Oral  Liquids: No Restrictions  Daily Fluid Restriction: no  Last Modified Barium Swallow with Video (Video Swallowing Test): not done    Treatments at the Time of Hospital Discharge:   Respiratory Treatments: no  Oxygen Therapy:  is not on home oxygen therapy.   Ventilator:    - No ventilator support    Rehab Therapies: ***  Weight Bearing Status/Restrictions: No weight bearing restirctions  Other Medical Equipment (for information only, NOT a DME order):  IV infusion supplies   Other Treatments: Skilled nursing assessment  Med teaching and compliance  Daptomycin 500mg IV daily for 21 days  Chest port care per agency protocol    Patient's personal belongings (please select all that are sent with patient):  Jewelry, clothing,cell phone, purse, wallet     RN SIGNATURE:  Electronically signed by Lian Costa RN on 1/5/21 at 2:51 PM EST    CASE MANAGEMENT/SOCIAL WORK SECTION    Inpatient Status Date: ***    Readmission Risk Assessment Score:  Readmission Risk              Risk of Unplanned Readmission:        30           Discharging to Facility/ Agency   · [facility] will start first antibiotic infusion at 1:00pm tomorrow, January 7th. There will be a nurse from [facility] to come out to administer as well. Phone: [phone]  Fax: [phone]    / signature: Electronically signed by Julio Medeiros 1/6/2021 @ [phone]. PHYSICIAN SECTION    Prognosis: Fair    Condition at Discharge: Stable    Rehab Potential (if transferring to Rehab): Fair    Recommended Labs or Other Treatments After Discharge: bmp cbc 3 days    Physician Certification: I certify the above information and transfer of Mable Hyman  is necessary for the continuing treatment of the diagnosis listed and that she requires {Admit to Appropriate Level of Care:02789} for less 30 days.      Update Admission H&P: No change in H&P    PHYSICIAN SIGNATURE:  Electronically signed by Bill Young MD on 1/6/21 at 1:26 PM EST

## 2021-01-05 NOTE — PROGRESS NOTES
Comprehensive Nutrition Assessment    Type and Reason for Visit:  Reassess    Nutrition Recommendations/Plan:   1. Continue Carb Control diet  2. Start Glucerna 2x daily. Nutrition Assessment:  Patient's oral intakes are poor, consuming less than 25% of meals. However, gained 2 lbs weight in last 5 days. Continue carb control diet, start Glucerna 2x daily, monitor PO intakes and weight. Malnutrition Assessment:  Malnutrition Status:  Mild malnutrition    Context:  Chronic Illness     Findings of the 6 clinical characteristics of malnutrition:  Energy Intake:  7 - 75% or less estimated energy requirements for 1 month or longer  Weight Loss:  1 - Mild weight loss (specify amount and time period)(5.2% loss in 5 months)     Body Fat Loss:  Unable to assess     Muscle Mass Loss:  Unable to assess    Fluid Accumulation:  1 - Mild Extremities   Strength:  Not Performed    Estimated Daily Nutrient Needs:  Energy (kcal):  2080 kcal based on [place]-St. Jeor (1.2 factor); Weight Used for Energy Requirements:  Current     Protein (g):  113 gm based on 2 gm/kg; Weight Used for Protein Requirements:  Current        Fluid (ml/day):   ; Method Used for Fluid Requirements:         Nutrition Related Findings:  +1 edema BLE, LUE. Cellulitis- left forearm      Wounds:  None       Current Nutrition Therapies:    DIET CARB CONTROL;     Anthropometric Measures:  · Height: 5' 5\" (165.1 cm)  · Current Body Weight: 256 lb (116.1 kg)   · Usual Body Weight: 268 lb (121.6 kg)     · Ideal Body Weight: 125 lbs; % Ideal Body Weight 203.2 %   · BMI: 42.6  · BMI Categories: Obese Class 3 (BMI 40.0 or greater)       Nutrition Diagnosis:   · Mild malnutrition related to catabolic illness as evidenced by intake 0-25%, weight loss      Nutrition Interventions:   Food and/or Nutrient Delivery:  Continue Current Diet, Start Oral Nutrition Supplement  Nutrition Education/Counseling:  Education not indicated Coordination of Nutrition Care:  Continue to monitor while inpatient    Goals:  PO intakes to meet greater than 75% of estimated nutrition needs       Nutrition Monitoring and Evaluation:   Food/Nutrient Intake Outcomes:  Food and Nutrient Intake, Supplement Intake  Physical Signs/Symptoms Outcomes:  Biochemical Data, Skin, Weight, Fluid Status or Edema     Discharge Planning:    Continue current diet       Some areas of assessment may be incomplete due to COVID-19 precautions    Eduardo Quezada RD, LD  Office phone [phone]

## 2021-01-05 NOTE — CARE COORDINATION
Spoke to Sara from [facility] and at this time pt is showing she is covered 100% after deductible is met for 2021. Pt encouraged to call insurance company to clarify dates of coverage since Dapto is a very expensive ATB. Pt has decided to renew current [employer] with Niraj Allred. Dr. Miya Beaver rounded and script written for Daptomycin 500mg IV daily for 21 days. Helen from [facility] informed and script faxed. Spoke to Gaudencio from [facility] with referral and face sheet faxed. They need to get approval for start of care tomorrow and call writer back.      F/U appointment scheduled with Dr. Miya Beaver 1-26-21 at 1pm.

## 2021-01-05 NOTE — PROGRESS NOTES
[facility]  Progress Note    Alejandro Guerra  MRN: [id number]  Date: 1/5/2021  LOS: 8     Reason for F/U :   MRSA sepsis    Impression :   1. Left upper extremity/forearm septic thrombophlebitis  2. MRSA sepsis secondary to above  3. Acute kidney injury secondary to vancomycin therapy  4. Advanced pancreatic cancer on chemo radiation therapy  5. Diabetes mellitus type 2    Recommendations:   · Continue intravenous antimicrobial therapy with daptomycin through January 25, 2021  · The patient was previously on intravenous vancomycin but developed acute kidney injury related to this  · There is some soft tissue swelling noted in the forearm and I did ask the nurse to discuss with vascular whether they feel this would benefit from being drained. · Repeat blood culture remains negative  · Otherwise discharge plans underway. · The patient does not need a PICC line and the right anterior chest port can be accessed for IV antibiotic therapy at home    Infection Control Recommendations:   Contact precautions    Discharge Planning:   Estimated Length of IV antimicrobials: 4 weeksJanuary 25, 2021  Patient will need Midline Catheter Insertion/ PICC line Insertion: No  Patient will need: Home IV   Patient willneed outpatient wound care: No    Medical Decision making / Summary of Stay:   Alejandro Guerra is a 64y.o.-year-old female who was initially admitted on 12/28/2020. Cammie Valdovinos has a history of diabetes mellitus, hyperlipidemia, hypertension, anxiety disorder, and locally advanced pancreatic cancer and was receiving chemotherapy with Xeloda and radiation therapy. She was recently hospitalized at [place] [address] after a syncopal episode and hypoglycemia.   The patient was discharged 12/23/2020.    The patient presented to the emergency department with swelling and redness at a prior IV site in her left forearm. The patient started having fevers/chills on the day of admission which prompted the emergency room evaluation. The temperature was 101.5 degrees in the emergency department. She was started on IV antimicrobial therapy with vancomycin and blood cultures were sent.     Blood cultures done in the emergency department have come back 2 out of 2 with gram-positive cocci and I was asked to evaluate and help with antibiotic choice. Current evaluation:2021    BP (!) 145/51   Pulse 84   Temp 98.6 °F (37 °C) (Oral)   Resp 18   Ht 5' 5\" (1.651 m)   Wt 256 lb (116.1 kg)   SpO2 96%   BMI 42.60 kg/m²     Temperature Range: Temp: 98.6 °F (37 °C) Temp  Av.1 °F (36.7 °C)  Min: 97.7 °F (36.5 °C)  Max: 98.6 °F (37 °C)  The patient is seen and evaluated at bedside she is awake and alert in no acute distress. The care was discussed with the patient as well as with the care navigation team.  There may be some insurance issues with the daptomycin but unfortunately the patient cannot be treated with oral antibiotics and had adverse reaction to the vancomycin. Review of Systems   Constitutional: Negative. Respiratory: Negative. Cardiovascular: Negative. Gastrointestinal: Negative. Genitourinary: Negative. Musculoskeletal:        Left forearm pain and soft tissue swelling   Skin: Negative. Neurological: Negative. Psychiatric/Behavioral: Negative. Physical Examination :     Physical Exam  Constitutional:       Appearance: She is well-developed. HENT:      Head: Normocephalic and atraumatic. Neck:      Musculoskeletal: Normal range of motion and neck supple. Cardiovascular:      Rate and Rhythm: Regular rhythm. Heart sounds: Normal heart sounds. Pulmonary:      Effort: Pulmonary effort is normal.      Breath sounds: Normal breath sounds.    Abdominal: General: Bowel sounds are normal.      Palpations: Abdomen is soft. Musculoskeletal:      Comments: Left forearm erythroderma, palpable cords, and small areas of soft tissue swelling   Skin:     General: Skin is warm and dry. Neurological:      Mental Status: She is alert and oriented to person, place, and time. Laboratory data:   I have independently reviewed the followinglabs:  CBC with Differential:   Recent Labs     01/03/21  0636 01/04/21  0706   WBC 5.7 8.5   HGB 8.2* 8.5*   HCT 26.8* 28.2*    290   LYMPHOPCT 8* 3*   MONOPCT 12 9*     BMP:   Recent Labs     01/03/21  0636 01/04/21  0706    135   K 4.3 5.1   * 103   CO2 23 18*   BUN 13 15   CREATININE 1.32* 1.42*     Hepatic Function Panel:   No results for input(s): PROT, LABALBU, BILIDIR, IBILI, BILITOT, ALKPHOS, ALT, AST in the last 72 hours. Lab Results   Component Value Date    PROCAL 10.17 07/21/2020     No results found for: CRP  No results found for: SEDRATE      Lab Results   Component Value Date    DDIMER 1.92 06/08/2018     Lab Results   Component Value Date    FERRITIN 148 06/03/2020     No results found for: LDH  No results found for: FIBRINOGEN    Results in Past 30 Days  Result Component Current Result Ref Range Previous Result Ref Range   SARS-CoV-2      (12/28/2020)  Not Detected (12/16/2020) Not Detected    Not Detected (12/28/2020) Not Detected      Not Detected (12/28/2020) Not Detected       Lab Results   Component Value Date    COVID19 Not Detected 12/28/2020    COVID19 Not Detected 12/28/2020    COVID19 Not Detected 12/16/2020    COVID19 Not Detected 07/21/2020    COVID19 Not Detected 06/03/2020       No results for input(s): VANCOTROUGH in the last 72 hours.     Imaging Studies:   VL Upper Extremity Venous Duplex Left  Pending report    CT OF THE LEFT UPPER EXTREMITY WITHOUT CONTRAST 12/31/2020 1:15 pm  Impression   Diffuse skin thickening, myositis, and cellulitis.  Within the limitations of noncontrast imaging, there is no obvious well-formed or drainable fluid   collection identified to suggest abscess formation. Cultures:     Culture, Blood 1 [[id number]] Collected: 01/03/21 0842   Order Status: Completed Specimen: Blood Updated: 01/05/21 0005    Specimen Description . BLOOD    Special Requests NOT REPORTED    Culture NO GROWTH 2 DAYS   Culture, Blood 1 [[id number]] Collected: 01/03/21 0850   Order Status: Completed Specimen: Blood Updated: 01/05/21 0005    Specimen Description . BLOOD    Special Requests NOT REPORTED    Culture NO GROWTH 2 DAYS   Culture, Blood 1 [[id number]] Collected: 01/01/21 4258   Order Status: Completed Specimen: Blood Updated: 01/05/21 0004    Specimen Description . BLOOD    Special Requests R AC 6 ML    Culture NO GROWTH 4 DAYS       Culture, Blood 1 [[id number]] (Abnormal)  Collected: 01/01/21 0626   Order Status: Completed Specimen: Blood Updated: 01/04/21 0843    Specimen Description . BLOOD    Special Requests R HAND 2 ML    Culture POSITIVE Blood Culture Results called to and read back by: JOHNATHAN VICENTE 1/2/21 2255Abnormal      DIRECT GRAM STAIN FROM BOTTLE: GRAM POSITIVE COCCI IN CLUSTERS     METHICILLIN RESISTANT STAPHYLOCOCCUS AUREUSAbnormal        Culture, Blood 1 [[id number]] (Abnormal) Collected: 12/28/20 1400   Order Status: Completed Specimen: Blood Updated: 12/30/20 0745    Specimen Description . BLOOD    Special Requests 6ML VAC    Culture POSITIVE Blood Culture Results called to and read back by: Gilberto Weaver. AT 0845 12/29/20Abnormal      DIRECT GRAM STAIN FROM BOTTLE: GRAM POSITIVE COCCI IN CLUSTERS     METHICILLIN RESISTANT STAPHYLOCOCCUS AUREUS For susceptibility, refer to previous culture. Abnormal    Culture, Blood 1 [[id number]] (Abnormal)  Collected: 12/28/20 1430   Order Status: Completed Specimen: Blood Updated: 12/30/20 0742    Specimen Description . BLOOD    Special Requests RFA 10ML Culture POSITIVE Blood Culture Results called to and read back by: NUPUR Roblero 0400 12/29/20Abnormal      DIRECT GRAM STAIN FROM BOTTLE: GRAM POSITIVE COCCI IN CLUSTERS     METHICILLIN RESISTANT STAPHYLOCOCCUS AUREUSAbnormal    Methicillin resistant staphylococcus aureus (3)    Antibiotic Interpretation DENA Status    penicillin Resistant  Final     >=0.5   RESISTANT   cefoxitin screen  NOT REPORTED Final    ciprofloxacin   Final     NOT REPORTED    clindamycin Sensitive  Final     <=0.25   SUSCEPTIBLE   erythromycin Resistant  Final     >=8   RESISTANT   gentamicin Sensitive  Final     <=0.5   SUSCEPTIBLE   gentamicin Sensitive Gentamicin is used only in combination with other active agents that test susceptible.  Final    Induced Clind Resist Negative NEGATIVE Final    levofloxacin Intermediate  Final     4   INTERMEDIATE   linezolid   Final     NOT REPORTED    moxifloxacin   Final     NOT REPORTED    nitrofurantoin   Final     NOT REPORTED    oxacillin Resistant  Final     >=4   RESISTANT   Synercid   Final     NOT REPORTED    rifampin   Final     NOT REPORTED    tetracycline Sensitive  Final     <=1   SUSCEPTIBLE   tigecycline   Final     NOT REPORTED    trimethoprim-sulfamethoxazole Sensitive  Final     <=10   SUSCEPTIBLE   vancomycin Sensitive  Final     <=0.5   SUSCEPTIBLE           Medications:      apixaban  5 mg Oral BID    insulin glargine  32 Units Subcutaneous Daily    oxyCODONE  10 mg Oral 2 times per day    daptomycin (CUBICIN) IVPB  6 mg/kg (Adjusted) Intravenous Q24H    sertraline  150 mg Oral Daily    amLODIPine  10 mg Oral Daily    aspirin  81 mg Oral Nightly    [Held by provider] atorvastatin  40 mg Oral Nightly    carvedilol  12.5 mg Oral BID WC    hydrALAZINE  25 mg Oral 3 times per day    sodium chloride flush  10 mL Intravenous 2 times per day    insulin lispro  0-6 Units Subcutaneous TID WC    insulin lispro  0-3 Units Subcutaneous Nightly           [facility] Tj Saleem  [employer] messaging  OFFICE: [phone]      Electronically signed by Tj Saleem MD on 1/5/2021 at 12:17 PM  Thank you for allowing us to participate in the care of this patient. Please call with questions. This note iscreated with the assistance of a speech recognition program.  While intending to generate a document that actually reflects the content of the visit, the document can still have some errors including those of syntax andsound a like substitutions which may escape proof reading. In such instances, actual meaning can be extrapolated by contextual diversion.

## 2021-01-05 NOTE — PROGRESS NOTES
.    Palliative Care Progress Note    NAME:  Karina Hoyt  RECORD NUMBER:  [id number]  AGE: 64 y.o. GENDER: female  : 1960  TODAY'S DATE:  2021    Reason for Consult:  symptom management, pain management, goals of care and support. History of Present Illness     The patient is a 64 y.o.   Non-/non  female who presents with Arm Pain (swelling and redness where prior IV was. )      Referred to Palliative Care by  [x] Physician   [] Nursing  [] Family Request   [] Other: be controlled. Left arm cellulitis/superficial DVT is less swollen and painful per patient. Patient reports having palliative care clinic appointment already scheduled for follow up. OVERNIGHT EVENTS: No overnight events reported. Patient is frustrated d/t not getting help with insurance. She reports COBRA being too expensive with a high deductible so she has stopped paying this. She reports needing to switch to [facility] until Medicare disability kicks in. Michelle Stallings RN is working with HELP program to get patient a resource for this. BP (!) 145/51   Pulse 84   Temp 98.6 °F (37 °C) (Oral)   Resp 18   Ht 5' 5\" (1.651 m)   Wt 256 lb (116.1 kg)   SpO2 96%   BMI 42.60 kg/m²     Assessment        REVIEW OF SYSTEMS    []   UNABLE TO OBTAIN:     Constitutional:  []   Chills   []  Fatigue   []  Fevers   []  Malaise   []  Weight loss   [x] Other: Left eye itchy/burning and left arm decreased redness/swelling. Respiratory:   []  Cough    []  Shortness of breath    []  Chest pain    [] Other:     Cardiovascular:   []  Chest pain  []  Dyspnea    []  Exertional chest pressure/discomfort     [] Fatigue      []  Palpitations    []  Syncope   [] Other:     Gastrointestinal:   []  Abdominal pain   []  Constipation    []  Diarrhea    []   Dysphagia   []  Reflux             []  Vomiting   [x] Other: Decreased appetite    Genitourinary:  []  Dysuria     []  Frequency   []  Hematuria   [] Nocturia   []  Urinary incontinence   [] Other:     Musculoskeletal:   [] Back pain    []  Muscle weakness   []  Myalgias    []  Neck pain   []  Stiff joints   []  Other:     Behavioral/Psych:   [] Anxiety    []  Depression     []  Mood swings   [] Other:     PHYSICAL ASSESSMENT:     General: [x]  Oriented x3      [] well appearing      [] Intubated      [] ill appearing      [x] Other: Sclera redness on left side.      Mental Status: [x] normal mental status exam      [] drowsy      [] Confused      [] Other: Cardiovascular: [x]  Regular rate/rhythm      [] Arrhythmia      [] Other:     Chest: [x] Effort normal      [x] lungs clear      [] respiratory distress      [] Tachypnea      []  Other:    Abdomen: [x] Soft/non-tender      []  Normal appearance      [] Distended      [] Ascites      [x] Other:Obese    Neurological: [x] Normal Speech      [x] Normal Sensation      []  Deficits present:      Extremity:  [x] normal skin color/temp      [] clubbing/cyanosis      []  No edema      [x] Other: LUE edema/erythema/double cords and BLE +1 edema. Palliative Performance Scale:  _x__60%  Ambulation reduced; Significant disease; Can't do hobbies/housework; intake normal or reduced; occasional assist; LOC full/confusion  ___50%  Mainly sit/lie; Extensive disease; Can't do any work; Considerable assist; intake normal or reduced; LOC full/confusion  ___40%  Mainly in bed; Extensive disease; Mainly assist; intake normal or reduced; LOC full/confusion   ___30%  Bed Bound; Extensive disease; Total care; intake reduced; LOCfull/confusion  ___20%  Bed Bound; Extensive disease; Total care; intake minimal; Drowsy/coma  ___10%  Bed Bound; Extensive disease; Total care; Mouth care only; Drowsy/coma  ___0       Death      Plan      Palliative Interaction: I received update form Hal Patel RN. She reports patient is approaching [place] and there is a question if patients port can be used for long term ATB. She was advised to contact Oncology concerning this. She reports patients pain being better controlled at this time. I visited patient, and she was sitting back in recliner awake. I introduced myself, and reminded patient I was from Palliative Care. Patient is familiar with writer, as writer seen patient in palliative care clinic. I discussed patients current hospitalized problems, and she reports decreased swelling/redness in left arm since admission. She is aware that she will need long term ATB's. Patient does report a new onset of of sclera redness on left side. She denies any harsh coughing, or straining. She reports left eye burning, and itching. She reports applying saline eye drops presently and this not being effective. She does report having \"floaters/blussing vision\" due to this. I encouraged patient to make PCP aware on rounds. Writer will defer this to PCP. I discussed patients cancer treatment, and patient reports coming to terms that if cancer treatment worsens her QOL, then she is not wanting to continue it. She reports feeling worse when taking Xeloda. I encouraged patient to f/u with Dr. Fiona Zamudio to further discuss options, and she is agreeable. I discussed patients Present pain regimin and she reports this being more effective, and would like to continue at [facility]. Patient presently has a pain level of 2/10 on pain scale in epigastric area. She reports at times this pain feeling like it is \"twisting\". I discussed present pain regimen, and informed patient that writer will continue at [facility], and have her follow up in clinic in 1 month. Patient reports having an appointment. Writer continued Oxycodone 10MG IR Q6 PRN, and patient has enough at home so this was not refilled. I did electronically send OxyContin 10mg ER BID to Pharmacy today for [facility]. Patient already has pain contract with clinic and OARRS has been reviewed today. I discussed LW on file, but informed patient that we do not have HCPOA on file. She confirms that POA for medical is her friend Ester Marina who is a nurse. She will provide copy at next clinic. Patient did review present code status [id number] with writer and confirms this to be her wishes. Patient verbalized frustration with not being able to find the correct resource for insurance. She reports recently doing SS/disability, but is not eligible for Medicare x2 years. She was on COBRA but reports this being too expensive for her. She is wanting assistance with [employer] sign up, and Milton Madrigal RN in room to further discuss. I offered patient much emotional support, and encouraged her to call clinic with questions or for further support. Education/support to staff  Education/support to patient  Discharge planning/helping to coordinate care  Communications with primary service  Pharmacologic pain management  Providing support for coping/adaptation/distress of patient  Decisional capacity assessed  Continue with current plan of care  Clarification of medical condition to patient and family  Code status clarified: [facility]  Pain management for comfort  Medications to decrease non-pain symptoms  Validating patient/family distress  Continued communication updates  Patient is planning to F/u with Dr. Colin Heredia at [facility] ad reports to have palliative care appointment. Her pain medications were reconciled for approaching [place], and long acting pain medication electronically sent to pharmacy. Principle Problem/Diagnosis:  Cellulitis/bacteremia    Additional Assessments:  Active Problems:    Pancreatic adenocarcinoma ([facility] [address]    Encounter for palliative care    Epigastric pain  Resolved Problems:    * No resolved hospital problems.  *    1- Symptom management/ pain control

## 2021-01-06 VITALS
OXYGEN SATURATION: 97 % | SYSTOLIC BLOOD PRESSURE: 118 MMHG | HEIGHT: 65 IN | BODY MASS INDEX: 42.65 KG/M2 | DIASTOLIC BLOOD PRESSURE: 77 MMHG | WEIGHT: 256 LBS | TEMPERATURE: 98.4 F | RESPIRATION RATE: 16 BRPM | HEART RATE: 75 BPM

## 2021-01-06 LAB
ABSOLUTE EOS #: 0.22 K/UL (ref 0–0.4)
ABSOLUTE IMMATURE GRANULOCYTE: 0.22 K/UL (ref 0–0.3)
ABSOLUTE LYMPH #: 0.43 K/UL (ref 1–4.8)
ABSOLUTE MONO #: 0.5 K/UL (ref 0.2–0.8)
ANION GAP SERPL CALCULATED.3IONS-SCNC: 9 MMOL/L (ref 9–17)
BASOPHILS # BLD: 0 %
BASOPHILS ABSOLUTE: 0 K/UL (ref 0–0.2)
BUN BLDV-MCNC: 18 MG/DL (ref 6–20)
BUN/CREAT BLD: 12 (ref 9–20)
CALCIUM SERPL-MCNC: 8.7 MG/DL (ref 8.6–10.4)
CHLORIDE BLD-SCNC: 107 MMOL/L (ref 98–107)
CO2: 23 MMOL/L (ref 20–31)
CREAT SERPL-MCNC: 1.45 MG/DL (ref 0.5–0.9)
DIFFERENTIAL TYPE: ABNORMAL
EOSINOPHILS RELATIVE PERCENT: 3 % (ref 1–4)
GFR AFRICAN AMERICAN: 45 ML/MIN
GFR NON-AFRICAN AMERICAN: 37 ML/MIN
GFR SERPL CREATININE-BSD FRML MDRD: ABNORMAL ML/MIN/{1.73_M2}
GFR SERPL CREATININE-BSD FRML MDRD: ABNORMAL ML/MIN/{1.73_M2}
GLUCOSE BLD-MCNC: 138 MG/DL (ref 65–105)
GLUCOSE BLD-MCNC: 73 MG/DL (ref 65–105)
GLUCOSE BLD-MCNC: 83 MG/DL (ref 70–99)
HCT VFR BLD CALC: 25.3 % (ref 36.3–47.1)
HEMOGLOBIN: 7.6 G/DL (ref 11.9–15.1)
IMMATURE GRANULOCYTES: 3 %
LYMPHOCYTES # BLD: 6 % (ref 24–44)
MCH RBC QN AUTO: 26.3 PG (ref 25.2–33.5)
MCHC RBC AUTO-ENTMCNC: 30 G/DL (ref 28.4–34.8)
MCV RBC AUTO: 87.5 FL (ref 82.6–102.9)
MONOCYTES # BLD: 7 % (ref 1–7)
NRBC AUTOMATED: 0 PER 100 WBC
PDW BLD-RTO: 18.5 % (ref 11.8–14.4)
PLATELET # BLD: 301 K/UL (ref 138–453)
PLATELET ESTIMATE: ABNORMAL
PMV BLD AUTO: 9.6 FL (ref 8.1–13.5)
POTASSIUM SERPL-SCNC: 4.6 MMOL/L (ref 3.7–5.3)
RBC # BLD: 2.89 M/UL (ref 3.95–5.11)
RBC # BLD: ABNORMAL 10*6/UL
SEG NEUTROPHILS: 81 % (ref 36–66)
SEGMENTED NEUTROPHILS ABSOLUTE COUNT: 5.83 K/UL (ref 1.8–7.7)
SODIUM BLD-SCNC: 139 MMOL/L (ref 135–144)
WBC # BLD: 7.2 K/UL (ref 3.5–11.3)
WBC # BLD: ABNORMAL 10*3/UL

## 2021-01-06 PROCEDURE — 6370000000 HC RX 637 (ALT 250 FOR IP): Performed by: INTERNAL MEDICINE

## 2021-01-06 PROCEDURE — 85025 COMPLETE CBC W/AUTO DIFF WBC: CPT

## 2021-01-06 PROCEDURE — 93970 EXTREMITY STUDY: CPT

## 2021-01-06 PROCEDURE — 2580000003 HC RX 258: Performed by: INTERNAL MEDICINE

## 2021-01-06 PROCEDURE — 36415 COLL VENOUS BLD VENIPUNCTURE: CPT

## 2021-01-06 PROCEDURE — 82947 ASSAY GLUCOSE BLOOD QUANT: CPT

## 2021-01-06 PROCEDURE — 99231 SBSQ HOSP IP/OBS SF/LOW 25: CPT | Performed by: INTERNAL MEDICINE

## 2021-01-06 PROCEDURE — 6360000002 HC RX W HCPCS: Performed by: INTERNAL MEDICINE

## 2021-01-06 PROCEDURE — 80048 BASIC METABOLIC PNL TOTAL CA: CPT

## 2021-01-06 PROCEDURE — 99232 SBSQ HOSP IP/OBS MODERATE 35: CPT | Performed by: NURSE PRACTITIONER

## 2021-01-06 PROCEDURE — 6370000000 HC RX 637 (ALT 250 FOR IP): Performed by: NURSE PRACTITIONER

## 2021-01-06 RX ORDER — ATORVASTATIN CALCIUM 40 MG/1
40 TABLET, FILM COATED ORAL NIGHTLY
Qty: 30 TABLET | Refills: 0 | Status: SHIPPED | OUTPATIENT
Start: 2021-01-06

## 2021-01-06 RX ORDER — HEPARIN SODIUM (PORCINE) LOCK FLUSH IV SOLN 100 UNIT/ML 100 UNIT/ML
500 SOLUTION INTRAVENOUS ONCE
Status: COMPLETED | OUTPATIENT
Start: 2021-01-06 | End: 2021-01-06

## 2021-01-06 RX ORDER — INSULIN GLARGINE 100 [IU]/ML
32 INJECTION, SOLUTION SUBCUTANEOUS DAILY
Qty: 1 VIAL | Refills: 0 | Status: SHIPPED | OUTPATIENT
Start: 2021-01-07

## 2021-01-06 RX ADMIN — FUROSEMIDE 20 MG: 20 TABLET ORAL at 09:47

## 2021-01-06 RX ADMIN — AMLODIPINE BESYLATE 10 MG: 5 TABLET ORAL at 09:48

## 2021-01-06 RX ADMIN — SODIUM CHLORIDE 500 MG: 900 INJECTION INTRAVENOUS at 13:34

## 2021-01-06 RX ADMIN — HYDRALAZINE HYDROCHLORIDE 25 MG: 25 TABLET, FILM COATED ORAL at 06:19

## 2021-01-06 RX ADMIN — SERTRALINE HYDROCHLORIDE 150 MG: 100 TABLET ORAL at 09:47

## 2021-01-06 RX ADMIN — Medication 500 UNITS: at 15:38

## 2021-01-06 RX ADMIN — APIXABAN 5 MG: 5 TABLET, FILM COATED ORAL at 09:47

## 2021-01-06 RX ADMIN — CARVEDILOL 12.5 MG: 12.5 TABLET, FILM COATED ORAL at 09:52

## 2021-01-06 RX ADMIN — OXYCODONE HYDROCHLORIDE 10 MG: 10 TABLET, FILM COATED, EXTENDED RELEASE ORAL at 09:48

## 2021-01-06 RX ADMIN — HYDRALAZINE HYDROCHLORIDE 25 MG: 25 TABLET, FILM COATED ORAL at 13:53

## 2021-01-06 ASSESSMENT — PAIN SCALES - GENERAL: PAINLEVEL_OUTOF10: 6

## 2021-01-06 ASSESSMENT — ENCOUNTER SYMPTOMS
GASTROINTESTINAL NEGATIVE: 1
RESPIRATORY NEGATIVE: 1

## 2021-01-06 NOTE — PROGRESS NOTES
Vitals: BP (!) 120/58   Pulse 72   Temp 98.1 °F (36.7 °C) (Oral)   Resp 16   Ht 5' 5\" (1.651 m)   Wt 256 lb (116.1 kg)   SpO2 97%   BMI 42.60 kg/m²   General appearance: alert, cooperative and no distress  Mental Status: oriented to person, place and time with normal affect  Neck: good carotid pulses, no JVD  Lungs: clear to auscultation bilaterally, normal effort  Heart: regular rate and rhythm, no murmur,  Abdomen: soft, non-tender, non-distended, bowel sounds present all four quadrants, no masses, hepatomegaly, splenomegaly or aortic enlargement  Extremities: no edema, erythema or tenderness in the calves. Mild Lt arm swelling with decreased erythema, small area of fluctuance left forearm  Skin: no gross lesions, rashes, or induration    Assessment:   3 61-year-old female with metastatic pancreatic carcinoma, left arm cellulitis and superficial thrombophlebitis  2.  No evidence of left upper extremity DVT    Patient Active Problem List:     Uncontrolled diabetes mellitus ([address]     Hypertension     Postmenopausal bleeding     Thickened endometrium     Type 2 diabetes mellitus with hyperglycemia, with long-term current use of insulin (HCC)     Hypophosphatemia     Hypomagnesemia     Hyperglycemia due to type 2 diabetes mellitus (HCC)     Pancreatic Head Mass     Non-Obstructive CAD     Primary adenocarcinoma of head of pancreas (HCC)     Intractable nausea and vomiting     Class 3 severe obesity with serious comorbidity in adult [facility])     Amnesia     Anxiety     Benign neoplasm of choroid     Depression     Diabetic complication (HCC)     Diabetic peripheral neuropathy associated with type 2 diabetes mellitus (HCC)     Hepatomegaly     Methicillin resistant Staphylococcus aureus infection     Long term current use of insulin (HCC)     Hyperlipidemia     Moderate nonproliferative diabetic retinopathy associated with type 2 diabetes mellitus ([address]     Morbid obesity (HCC)     Pain in limb

## 2021-01-06 NOTE — PROGRESS NOTES
[facility]  Progress Note    Janet Anderson  MRN: [id number]  Date: 1/6/2021  LOS: 9     Reason for F/U :   MRSA sepsis    Impression :   1. Left upper extremity/forearm septic thrombophlebitis  2. MRSA sepsis secondary to above  3. Acute kidney injury secondary to Vancomycin  4. Advanced pancreatic cancer, on chemotherapy and radiation therapy  5. Diabetes mellitus type 2    Recommendations:   · Patient continues on IV antimicrobial therapy with daptomycin through 1/25/2021  · The patient was previously on IV antimicrobial therapy with vancomycin and developed an acute kidney injury  · Repeat blood cultures remain no growth to date  · Right chest port is being utilized for home IV antimicrobial therapy, it is currently accessed  · Awaiting vascular decision regarding potential for drainage of the left forearm  · Follow-up in ID clinic with Dr. Brandee Pepper 1/26/2021    Infection Control Recommendations:   Contact precautions    Discharge Planning:   Estimated Length of IV antimicrobials: 4 weeks  Patient will need Midline Catheter Insertion/ PICC line Insertion: No  [facility]  [facility],  LTAC: Undetermined  Patient willneed outpatient wound care: No    Medical Decision making / Summary of Stay:   Martha Mcconnell a 64y.o.-year-old female who was initially admitted on 12/28/2020.   Sarika has a history of diabetes mellitus, hyperlipidemia, hypertension, anxiety disorder, and locally advanced pancreatic cancer and was receiving chemotherapy with Xeloda and radiation therapy.   She was recently hospitalized at [place] [address] after a syncopal episode and hypoglycemia.  The patient was discharged 12/23/2020.    The patient presented to the emergency department with swelling and redness at a prior IV site in her left forearm.  The patient started having fevers/chills on the day of admission which prompted the emergency room evaluation.  The temperature was 101.5 degrees in the emergency department. She was started on IV antimicrobial therapy with vancomycin and blood cultures were sent.     Blood cultures done in the emergency department have come back 2 out of 2 with gram-positive cocci and I was asked to evaluate and help with antibiotic choice. Current evaluation:2021    /77   Pulse 75   Temp 98.4 °F (36.9 °C) (Oral)   Resp 16   Ht 5' 5\" (1.651 m)   Wt 256 lb (116.1 kg)   SpO2 97%   BMI 42.60 kg/m²     Temperature Range: Temp: 98.4 °F (36.9 °C) Temp  Av.2 °F (36.8 °C)  Min: 98.1 °F (36.7 °C)  Max: 98.4 °F (36.9 °C)    The patient was seen and evaluated sitting up in chair  Her right chest port is accessed  She states her arm has continued to improve but it is still swollen  No fevers    Review of Systems   Constitutional: Negative. HENT: Negative. Respiratory: Negative. Cardiovascular: Negative. Gastrointestinal: Negative. Genitourinary: Negative. Musculoskeletal:        Left arm pain   Skin: Positive for wound (Left upper extremity redness and swelling has improved but now with lumps on forearm). Neurological: Negative. Psychiatric/Behavioral: Negative. Physical Examination :     Physical Exam  Constitutional:       General: She is not in acute distress. Appearance: Normal appearance. HENT:      Head: Normocephalic and atraumatic. Neck:      Musculoskeletal: Normal range of motion and neck supple. Cardiovascular:      Rate and Rhythm: Normal rate and regular rhythm. Pulmonary:      Effort: Pulmonary effort is normal.      Breath sounds: Normal breath sounds. Abdominal:      General: Abdomen is flat.  Bowel sounds are normal. Palpations: Abdomen is soft. Skin:     Comments: Left upper extremity erythroderma with palpable cords  Does have a single area with fluctuance   Neurological:      General: No focal deficit present. Mental Status: She is alert and oriented to person, place, and time. Mental status is at baseline. Psychiatric:         Mood and Affect: Mood normal.         Behavior: Behavior normal.     1-6-2021          Laboratory data:   I have independently reviewed the followinglabs:  CBC with Differential:   Recent Labs     01/04/21  0706 01/06/21  0538   WBC 8.5 7.2   HGB 8.5* 7.6*   HCT 28.2* 25.3*    301   LYMPHOPCT 3* 6*   MONOPCT 9* 7     BMP:   Recent Labs     01/04/21  0706 01/06/21  0538    139   K 5.1 4.6    107   CO2 18* 23   BUN 15 18   CREATININE 1.42* 1.45*     Hepatic Function Panel:   No results for input(s): PROT, LABALBU, BILIDIR, IBILI, BILITOT, ALKPHOS, ALT, AST in the last 72 hours. Lab Results   Component Value Date    PROCAL 10.17 07/21/2020     No results found for: CRP  No results found for: SEDRATE      Lab Results   Component Value Date    DDIMER 1.92 06/08/2018     Lab Results   Component Value Date    FERRITIN 148 06/03/2020     No results found for: LDH  No results found for: FIBRINOGEN    Results in Past 30 Days  Result Component Current Result Ref Range Previous Result Ref Range   SARS-CoV-2      (12/28/2020)  Not Detected (12/16/2020) Not Detected    Not Detected (12/28/2020) Not Detected      Not Detected (12/28/2020) Not Detected       Lab Results   Component Value Date    COVID19 Not Detected 12/28/2020    COVID19 Not Detected 12/28/2020    COVID19 Not Detected 12/16/2020    COVID19 Not Detected 07/21/2020    COVID19 Not Detected 06/03/2020       No results for input(s): VANCOTROUGH in the last 72 hours.     Imaging Studies:     NO new imaging    Cultures:       Culture, Blood 1 [[id number]] Collected: 01/03/21 0890 Order Status: Completed Specimen: Blood Updated: 01/06/21 0013    Specimen Description . BLOOD    Special Requests NOT REPORTED    Culture NO GROWTH 3 DAYS   Culture, Blood 1 [[id number]] Collected: 01/03/21 0842   Order Status: Completed Specimen: Blood Updated: 01/06/21 0013    Specimen Description . BLOOD    Special Requests NOT REPORTED    Culture NO GROWTH 3 DAYS   Culture, Blood 1 [[id number]] Collected: 01/01/21 6795   Order Status: Completed Specimen: Blood Updated: 01/06/21 0012    Specimen Description . BLOOD    Special Requests R AC 6 ML    Culture NO GROWTH 5 DAYS         Culture, Blood 1 [[id number]] (Abnormal)  Collected: 01/01/21 0626   Order Status: Completed Specimen: Blood Updated: 01/04/21 0843    Specimen Description . BLOOD    Special Requests R HAND 2 ML    Culture POSITIVE Blood Culture Results called to and read back by: JOHNATHAN VICENTE 1/2/21 2255Abnormal      DIRECT GRAM STAIN FROM BOTTLE: GRAM POSITIVE COCCI IN CLUSTERS     METHICILLIN RESISTANT STAPHYLOCOCCUS AUREUSAbnormal        Culture, Blood 1 [[id number]] (Abnormal) Collected: 12/28/20 1400   Order Status: Completed Specimen: Blood Updated: 12/30/20 0745    Specimen Description . BLOOD    Special Requests 6ML VAC    Culture POSITIVE Blood Culture Results called to and read back by: Gonzales Quintero. AT 0845 12/29/20Abnormal      DIRECT GRAM STAIN FROM BOTTLE: GRAM POSITIVE COCCI IN CLUSTERS     METHICILLIN RESISTANT STAPHYLOCOCCUS AUREUS For susceptibility, refer to previous culture. Abnormal    Culture, Blood 1 [[id number]] (Abnormal)  Collected: 12/28/20 1430   Order Status: Completed Specimen: Blood Updated: 12/30/20 0742    Specimen Description . BLOOD    Special Requests RFA 10ML    Culture POSITIVE Blood Culture Results called to and read back by: NUPUR Roblero 0400 12/29/20Abnormal      DIRECT GRAM STAIN FROM BOTTLE: GRAM POSITIVE COCCI IN CLUSTERS     METHICILLIN RESISTANT STAPHYLOCOCCUS Amy Graham Methicillin resistant staphylococcus aureus (3)    Antibiotic Interpretation DENA Status    penicillin Resistant  Final     >=0.5   RESISTANT   cefoxitin screen  NOT REPORTED Final    ciprofloxacin   Final     NOT REPORTED    clindamycin Sensitive  Final     <=0.25   SUSCEPTIBLE   erythromycin Resistant  Final     >=8   RESISTANT   gentamicin Sensitive  Final     <=0.5   SUSCEPTIBLE   gentamicin Sensitive Gentamicin is used only in combination with other active agents that test susceptible.  Final    Induced Clind Resist Negative NEGATIVE Final    levofloxacin Intermediate  Final     4   INTERMEDIATE   linezolid   Final     NOT REPORTED    moxifloxacin   Final     NOT REPORTED    nitrofurantoin   Final     NOT REPORTED    oxacillin Resistant  Final     >=4   RESISTANT   Synercid   Final     NOT REPORTED    rifampin   Final     NOT REPORTED    tetracycline Sensitive  Final     <=1   SUSCEPTIBLE   tigecycline   Final     NOT REPORTED    trimethoprim-sulfamethoxazole Sensitive  Final     <=10   SUSCEPTIBLE   vancomycin Sensitive  Final     <=0.5   SUSCEPTIBLE           Medications:      furosemide  20 mg Oral Daily    apixaban  5 mg Oral BID    insulin glargine  32 Units Subcutaneous Daily    oxyCODONE  10 mg Oral 2 times per day    daptomycin (CUBICIN) IVPB  6 mg/kg (Adjusted) Intravenous Q24H    sertraline  150 mg Oral Daily    amLODIPine  10 mg Oral Daily    aspirin  81 mg Oral Nightly    [Held by provider] atorvastatin  40 mg Oral Nightly    carvedilol  12.5 mg Oral BID     hydrALAZINE  25 mg Oral 3 times per day    sodium chloride flush  10 mL Intravenous 2 times per day    insulin lispro  0-6 Units Subcutaneous TID     insulin lispro  0-3 Units Subcutaneous Nightly           [facility]  [address] [place] [address]  [employer] messaging  OFFICE: [phone]      Electronically signed by [address] [place] [address], APRN - CNP on 1/6/2021 at 12:53 PM Thank you for allowing us to participate in the care of this patient. Please call with questions. This note iscreated with the assistance of a speech recognition program.  While intending to generate a document that actually reflects the content of the visit, the document can still have some errors including those of syntax andsound a like substitutions which may escape proof reading. In such instances, actual meaning can be extrapolated by contextual diversion.

## 2021-01-06 NOTE — PLAN OF CARE
Problem: Pain:  Goal: Pain level will decrease  Description: Pain level will decrease  1/6/2021 0412 by Helen Spatz, RN  Outcome: Ongoing  1/5/2021 1445 by Yola Loza RN  Outcome: Ongoing     Problem: Pain:  Goal: Control of acute pain  Description: Control of acute pain  1/6/2021 0412 by Helen Spatz, RN  Outcome: Ongoing     Problem: Pain:  Goal: Control of chronic pain  Description: Control of chronic pain  1/6/2021 0412 by Helen Spatz, RN  Outcome: Ongoing     Problem: Discharge Planning:  Goal: Discharged to appropriate level of care  Description: Discharged to appropriate level of care  1/6/2021 0412 by Helen Spatz, RN  Outcome: Ongoing     Problem: Skin Integrity - Impaired:  Goal: Will show no infection signs and symptoms  Description: Will show no infection signs and symptoms  1/6/2021 0412 by Helen Spatz, RN  Outcome: Ongoing     Problem: Falls - Risk of:  Goal: Will remain free from falls  Description: Will remain free from falls  1/6/2021 0412 by Helen Spatz, RN  Outcome: Ongoing

## 2021-01-06 NOTE — PROGRESS NOTES
Patient discharged home with [place] for IV antibiotics. AVS understood and signed. All questions answered.

## 2021-01-06 NOTE — PROGRESS NOTES
Progress Note               Date:                           1/6/2021  Patient name:           Eleanor Gill  Date of admission:  12/28/2020  1:26 PM  MRN:   [id number]  YOB: 1960  PCP:                           Jamey Bowman        Subjective:   Patient is seen and examined, the arm continues to improve progressively but they are to small areas that look like early abscesses in the left forearm. Vascular is considering drainage of the fluid collections  Overall her pain is well controlled  No nausea or vomiting, she continues to complain of abdominal discomfort and diarrhea  HPI in Brief:         Review of systems  General: no fever or night sweats, Weight is stable. Poor appetite  ENT: No double or blurred vision, no tinnitus or hearing problem, no dysphagia or sore throat   Respiratory: No chest pain, no shortness of breath, no cough or hemoptysis. Cardiovascular: Denies chest pain, PND or orthopnea. No L E swelling or palpitations. Gastrointestinal:    No nausea or vomiting, there is abdominal pain and intermittent diarrhea, unchanged from yesterday   Genitourinary: Denies dysuria, hematuria, frequency, urgency or incontinence. Neurological: Denies headaches, decreased LOC, no sensory or motor focal deficits. Medications:   Reviewed in Epic     Objective:   Vitals: /77   Pulse 75   Temp 98.4 °F (36.9 °C) (Oral)   Resp 16   Ht 5' 5\" (1.651 m)   Wt 256 lb (116.1 kg)   SpO2 97%   BMI 42.60 kg/m²   Gen.  Exam, showed a well-appearing patient without evidence of distress or pain  HEENT, normocephalic and atraumatic, PERRLA extraocular muscles are intact, subconjunctival hemorrhage on the left  Neck showed no JVD no carotid bruit, no cervical adenopathy  Chest is clear to auscultation bilaterally  Heart is regular without any murmur  Abdomen soft nontender no hepatosplenomegaly  Lower extremities showed no edema clubbing or cyanosis Left upper extremity:  swollen with redness. Although improved significantly according to patient  Neurological examination was nonfocal, with intact cranial nerves  Skin  No rashes or bruising appreciated      Data:  No intake/output data recorded. In: 240 [P.O.:240]  Out: -   CBC:   Recent Labs     01/04/21  0706 01/06/21  0538   WBC 8.5 7.2   HGB 8.5* 7.6*    301     BMP:    Recent Labs     01/04/21  0706 01/06/21  0538    139   K 5.1 4.6    107   CO2 18* 23   BUN 15 18   CREATININE 1.42* 1.45*   GLUCOSE 310* 83     Hepatic: No results for input(s): AST, ALT, ALB, BILITOT, ALKPHOS in the last 72 hours. INR: No results for input(s): INR in the last 72 hours. IMAGING DATA:    Problem Lists:   Primary Problem:  <principal problem not specified>   Current Problems:  Active Hospital Problems    Diagnosis Date Noted    Epigastric pain [R10.13] 01/05/2021    Encounter for palliative care [Z51.5]     Pancreatic [facility] ([facility] [address] [C25.9] 06/09/2020     PMH:  Past Medical History:   Diagnosis Date    Anxiety     Diabetes mellitus ([facility] [address]     Hyperlipidemia     Hypertension     Pancreatic cancer ([facility] [address]       Allergies: Allergies   Allergen Reactions    Lisinopril Swelling     Other reaction(s): swollen lips  In lips      Sulfamethoxazole      Other reaction(s): lip swelling  Other reaction(s): lip swelling      Trimethoprim      Other reaction(s): lip swelling    Cefuroxime Axetil Rash     Other reaction(s): swollen lips  Swollen lips    Sulfamethoxazole-Trimethoprim      Other reaction(s): swollen lips  Other reaction(s): swollen lips      Clindamycin Phosphate Rash     Other reaction(s): rash    Penicillins Rash     Other reaction(s): rash      Assessment    1. Pancreatic cancer, locally advanced and unresectable  2.  Status post chemotherapy, poorly tolerated currently on chemoradiation with Xeloda 3. Repeated hospitalizations for various reasons leading to holding chemotherapy and radiation  4. Cellulitis of the left upper extremity  5. Superficial vein thrombosis  6. Subconjunctival hemorrhage  7. Goals of care discussion        Plan     1. Continue antibiotics per ID, plan to give antibiotics through the port  2.  she is on therapeutic anticoagulation with Eliquis, well-tolerated  3. Continue oxycodone and OxyContin  4. Continue with Eliquis, plan 3 months of anticoagulation  5. Okay to discharge from my perspective  6.  Follow-up with oncology in 1 to 2 weeks      Analisa Hoover MD  Hematologist/Medical Oncologist  Cell: [phone]

## 2021-01-06 NOTE — PROGRESS NOTES
Progress Note               Date:                           1/5/2021  Patient name:           Senait Conley  Date of admission:  12/28/2020  1:26 PM  MRN:   [id number]  YOB: 1960  PCP:                           Liset Fleming        Subjective:   Patient is seen and examined, the arm continues to improve progressively but they are to small areas that look like early abscesses in the left forearm. Seems to be doing better clinically, continues to have intermittent abdominal pain and diarrhea unchanged  HPI in Brief:         Review of systems  General: no fever or night sweats, Weight is stable. Poor appetite  ENT: No double or blurred vision, no tinnitus or hearing problem, no dysphagia or sore throat   Respiratory: No chest pain, no shortness of breath, no cough or hemoptysis. Cardiovascular: Denies chest pain, PND or orthopnea. No L E swelling or palpitations. Gastrointestinal:    No nausea or vomiting, there is abdominal pain and intermittent diarrhea, unchanged from yesterday   Genitourinary: Denies dysuria, hematuria, frequency, urgency or incontinence. Neurological: Denies headaches, decreased LOC, no sensory or motor focal deficits. Medications:   Reviewed in Epic     Objective:   Vitals: BP (!) 120/58   Pulse 72   Temp 98.1 °F (36.7 °C) (Oral)   Resp 16   Ht 5' 5\" (1.651 m)   Wt 256 lb (116.1 kg)   SpO2 97%   BMI 42.60 kg/m²   Gen. Exam, showed a well-appearing patient without evidence of distress or pain  HEENT, normocephalic and atraumatic, PERRLA extraocular muscles are intact, subconjunctival hemorrhage on the left  Neck showed no JVD no carotid bruit, no cervical adenopathy  Chest is clear to auscultation bilaterally  Heart is regular without any murmur  Abdomen soft nontender no hepatosplenomegaly  Lower extremities showed no edema clubbing or cyanosis  Left upper extremity:  swollen with redness.   Although improved significantly according to patient Neurological examination was nonfocal, with intact cranial nerves  Skin  No rashes or bruising appreciated      Data:  No intake/output data recorded. In: 4020 [P.O.:400; I.V.:615]  Out: -   CBC:   Recent Labs     01/03/21  0636 01/04/21  0706   WBC 5.7 8.5   HGB 8.2* 8.5*    290     BMP:    Recent Labs     01/03/21  0636 01/04/21  0706    135   K 4.3 5.1   * 103   CO2 23 18*   BUN 13 15   CREATININE 1.32* 1.42*   GLUCOSE 112* 310*     Hepatic: No results for input(s): AST, ALT, ALB, BILITOT, ALKPHOS in the last 72 hours. INR: No results for input(s): INR in the last 72 hours. IMAGING DATA:    Problem Lists:   Primary Problem:  <principal problem not specified>   Current Problems:  Active Hospital Problems    Diagnosis Date Noted    Epigastric pain [R10.13] 01/05/2021    Encounter for palliative care [Z51.5]     Pancreatic adenocarcinoma ([facility] [address] [C25.9] 06/09/2020     PMH:  Past Medical History:   Diagnosis Date    Anxiety     Diabetes mellitus ([facility] [address]     Hyperlipidemia     Hypertension     [facility]       Allergies: Allergies   Allergen Reactions    Lisinopril Swelling     Other reaction(s): swollen lips  In lips      Sulfamethoxazole      Other reaction(s): lip swelling  Other reaction(s): lip swelling      Trimethoprim      Other reaction(s): lip swelling    Cefuroxime Axetil Rash     Other reaction(s): swollen lips  Swollen lips    Sulfamethoxazole-Trimethoprim      Other reaction(s): swollen lips  Other reaction(s): swollen lips      Clindamycin Phosphate Rash     Other reaction(s): rash    Penicillins Rash     Other reaction(s): rash      Assessment    1. Pancreatic cancer, locally advanced and unresectable  2. Status post chemotherapy, poorly tolerated currently on chemoradiation with Xeloda  3. Repeated hospitalizations for various reasons leading to holding chemotherapy and radiation  4.  Cellulitis of the left upper extremity 5. Superficial vein thrombosis  6. Subconjunctival hemorrhage  7. Goals of care discussion        Plan     1. Continue antibiotics per ID, plan to give antibiotics through the port  2.  she is on therapeutic anticoagulation with Lovenox, well-tolerated  3. Continue oxycodone and OxyContin  4. Continue with Eliquis, plan 3 months of anticoagulation  5.  Okay to discharge from my perspective      YOLANDA BAUMANN [facility] MD [place]  Hematologist/Medical Oncologist  Cell: [phone]

## 2021-01-06 NOTE — PLAN OF CARE
Problem: Pain:  Goal: Pain level will decrease  Description: Pain level will decrease  1/6/2021 1325 by Alexis Seip, RN  Outcome: Ongoing  Note: Pain level assessment complete. Patient educated on pain scale and control interventions  PRN pain medication given per patient request-Oxycodone  Patient instructed to call out with new onset of pain or unrelieved pain    1/6/2021 0412 by Mei Arredondo RN  Outcome: Ongoing  Goal: Control of acute pain  Description: Control of acute pain  1/6/2021 1325 by Alexis Seip, RN  Outcome: Ongoing  1/6/2021 0412 by Mei Arredondo RN  Outcome: Ongoing  Goal: Control of chronic pain  Description: Control of chronic pain  1/6/2021 1325 by Alexis Seip, RN  Outcome: Ongoing  1/6/2021 0412 by Mei Arredondo RN  Outcome: Ongoing     Problem: Discharge Planning:  Goal: Discharged to appropriate level of care  Description: Discharged to appropriate level of care  1/6/2021 1325 by Alexis Seip, RN  Outcome: Ongoing  1/6/2021 0412 by Mei Arredondo RN  Outcome: Ongoing     Problem: Body Temperature - Imbalanced:  Goal: Ability to maintain a body temperature in the normal range will improve  Description: Ability to maintain a body temperature in the normal range will improve  1/6/2021 1325 by Alexis Seip, RN  Outcome: Ongoing  1/6/2021 0412 by Mei Arredondo RN  Outcome: Ongoing     Problem: Mobility - Impaired:  Goal: Mobility will improve to maximum level  Description: Mobility will improve to maximum level  1/6/2021 1325 by Alexis Seip, RN  Outcome: Ongoing  1/6/2021 0412 by Mei Arredondo RN  Outcome: Ongoing     Problem: Pain:  Goal: Pain level will decrease  Description: Pain level will decrease  1/6/2021 1325 by Alexis Seip, RN  Outcome: Ongoing  Note: Pain level assessment complete.    Patient educated on pain scale and control interventions  PRN pain medication given per patient request-Oxycodone Patient instructed to call out with new onset of pain or unrelieved pain    1/6/2021 0412 by Topher Guerrero RN  Outcome: Ongoing  Goal: Control of acute pain  Description: Control of acute pain  1/6/2021 1325 by Estella Paulino RN  Outcome: Ongoing  1/6/2021 0412 by Topher Guerrero RN  Outcome: Ongoing  Goal: Control of chronic pain  Description: Control of chronic pain  1/6/2021 1325 by Estella Paulino RN  Outcome: Ongoing  1/6/2021 0412 by Topher Guerrero RN  Outcome: Ongoing     Problem: Skin Integrity - Impaired:  Goal: Will show no infection signs and symptoms  Description: Will show no infection signs and symptoms  1/6/2021 1325 by Estella Paulino RN  Outcome: Ongoing  1/6/2021 0412 by Topher Guerrero RN  Outcome: Ongoing  Goal: Absence of new skin breakdown  Description: Absence of new skin breakdown  1/6/2021 1325 by Estella Paulino RN  Outcome: Ongoing  1/6/2021 0412 by Topher Guerrero RN  Outcome: Ongoing     Problem: Falls - Risk of:  Goal: Will remain free from falls  Description: Will remain free from falls  1/6/2021 1325 by Estella Paulino RN  Outcome: Ongoing  Note: Siderails up x 2  Hourly rounding  Call light in reach  Remains free from falls and accidental injury at this time   Floor free from obstacles  Bed is locked and in lowest position  Adequate lighting provided  Patient independent. Gait steady.   1/6/2021 0412 by Topher Guerrero RN  Outcome: Ongoing  Goal: Absence of physical injury  Description: Absence of physical injury  1/6/2021 1325 by Estella Paulino RN  Outcome: Ongoing  1/6/2021 0412 by Topher Guerrero RN  Outcome: Ongoing     Problem: Serum Glucose Level - Abnormal:  Goal: Ability to maintain appropriate glucose levels will improve  Description: Ability to maintain appropriate glucose levels will improve  1/6/2021 [place] by Estella Paulino RN  Outcome: Ongoing  1/6/2021 0412 by Topher Guerrero RN  Outcome: Ongoing     Problem: Sensory Perception - Impaired: Goal: Ability to maintain a stable neurologic state will improve  Description: Ability to maintain a stable neurologic state will improve  1/6/2021 1325 by Boom Cedillo RN  Outcome: Ongoing  1/6/2021 0412 by Miriam Schofield RN  Outcome: Ongoing     Problem: SAFETY  Goal: Free from accidental physical injury  1/6/2021 1325 by Boom Cedillo RN  Outcome: Ongoing  1/6/2021 0412 by Miriam Schofield RN  Outcome: Ongoing  Goal: Free from intentional harm  1/6/2021 1325 by Boom Cedillo RN  Outcome: Ongoing  1/6/2021 0412 by Miriam Schofield RN  Outcome: Ongoing     Problem: DAILY CARE  Goal: Daily care needs are met  1/6/2021 1325 by Boom Cedillo RN  Outcome: Ongoing  1/6/2021 0412 by Miriam Schofield RN  Outcome: Ongoing     Problem: KNOWLEDGE DEFICIT  Goal: Patient/S.O. demonstrates understanding of disease process, treatment plan, medications, and discharge instructions.   1/6/2021 1325 by Boom Cedillo RN  Outcome: Ongoing  1/6/2021 0412 by Miriam Schofield RN  Outcome: Ongoing     Problem: DISCHARGE BARRIERS  Goal: Patient's continuum of care needs are met  1/6/2021 1325 by Boom Cedillo RN  Outcome: Ongoing  1/6/2021 0412 by Miriam Schofield RN  Outcome: Ongoing     Problem: Nutrition  Goal: Optimal nutrition therapy  1/6/2021 1325 by Boom Cedillo RN  Outcome: Ongoing  1/6/2021 0412 by Miriam Schofield RN  Outcome: Ongoing

## 2021-01-06 NOTE — DISCHARGE INSTR - DIET

## 2021-01-07 ENCOUNTER — CARE COORDINATION (OUTPATIENT)
Dept: CASE MANAGEMENT | Age: 61
End: 2021-01-07

## 2021-01-07 LAB
CULTURE: NORMAL
Lab: NORMAL
SPECIMEN DESCRIPTION: NORMAL

## 2021-01-07 NOTE — CARE COORDINATION
[facility] Transitions Initial Follow Up Call-  COVID risk follow up call       Call within 2 business days of discharge: Yes    Patient: Kaushal Barragan Patient : 1960   MRN: [id number]  Reason for Admission: LUE cellulitis  Discharge Date: 21 RARS: Readmission Risk Score: 28      Last Discharge [address] [address]       Complaint Diagnosis Description Type Department Provider    20 Arm Pain Cellulitis of left upper extremity . .. ED to Hosp-Admission (Discharged) (ADMITTED) Brant Mendoza MD; Radha Haro. .. Spoke withLakeshia Hernadez    Date/Time:  2021 3:07 PM  Attempted to reach patient by telephone. Call within 2 business days of discharge: Yes Left HIPPA compliant message requesting a return call. Will attempt to reach patient again. Call to pharmacy who confirms oxycodone ER needs a PA and the request was sent to Manuel Ochoa CNP with palliative care- writer will reach out to her to make sure this gets completed     Patient returned call. States she is doing \"okay\". States she did fall last night because of fluid in her legs- confirms she is taking the lasix for this (20 mg qd prn)- writer advises notifying PCP, Dr Rafat Haider or Manuel Ochoa CNP with [facility]. Care if this continues- v/u  States left arm is still slightly red and not having a lot of pain right now- took an oxy IR earlier today and is comfortable   Denies SOB, fever/ chills. States drinking fluids but not the greatest appetite- writer advises trying small portions of food with protein to help with healing  States home care nurse is with her now and teaching her son how to use the IV for the abx      Challenges to be reviewed by the provider   Additional needs identified to be addressed with provider- notify Manuel Ochoa with palliative care about PA for oxy- message through chart    Discussed COVID-19 related testing which was available at this time. Test results were negative.    Patient informed of results, if available? No         Method of communication with provider : none    Advance Care Planning:   Does patient have an Advance Directive:  reviewed and current. Was this a readmission? No  Patient stated reason for admission: cellulitis  Patients top risk factors for readmission: lack of knowledge about disease and medical condition    Care Transition Nurse (CTN) contacted the patient by telephone to perform post hospital discharge assessment. Verified name and  with patient as identifiers. Provided introduction to self, and explanation of the CTN role. CTN reviewed discharge instructions, medical action plan and red flags with patient who verbalized understanding. Patient given an opportunity to ask questions and does not have any further questions or concerns at this time. Were discharge instructions available to patient? Yes. Reviewed appropriate site of care based on symptoms and resources available to patient including: PCP, Specialist, Home health, When to call 911 and ctn. The patient agrees to contact the PCP office for questions related to their healthcare. Medication reconciliation was performed with patient, who verbalizes understanding of administration of home medications. Advised obtaining a 90-day supply of all daily and as-needed medications. Covid Risk Education    Patient has following risk factors of: immunocompromised and diabetes. Education provided regarding infection prevention, and signs and symptoms of COVID-19 and when to seek medical attention with patient who verbalized understanding. Discussed exposure protocols and quarantine From CDC: Are you at higher risk for severe illness?   and given an opportunity for questions and concerns. The patient agrees to contact the COVID-19 hotline [phone] or PCP office for questions related to COVID-19.      For more information on steps you can take to protect yourself, see CDC's How to Protect Yourself Patient/family/caregiver given information for [employer] and agrees to enroll no  Patient's preferred e-mail: declines  Patient's preferred phone number: declines    Discussed follow-up appointments. If no appointment was previously scheduled, appointment scheduling offered: Yes. Is follow up appointment scheduled within 7 days of discharge? No  Non-[facility] follow up appointment(s): Jared Castro 1/22    Plan for follow-up call in 5-7 days based on severity of symptoms and risk factors. Plan for next call: symptom management-pain, arm celluitis and follow up appointment-Eli durham  CTN provided contact information for future needs. Non-face-to-face services provided:  Scheduled appointment with PCP-1/22  Scheduled appointment with Aminata Pitts 1/26.  Pt is canceling radiation   Obtained and reviewed discharge summary and/or continuity of care documents  Communication with home health agencies or other community services the patient is currently using-SOC today   Assessment and support for treatment adherence and medication management-confirms she has new meds except oxy which needs PA    Care Transitions 24 Hour Call    Do you have any ongoing symptoms?: No  Do you have a copy of your discharge instructions?: Yes  Do you have all of your prescriptions and are they filled?: Yes  Have you been contacted by a [address]?: No  Have you scheduled your follow up appointment?: Yes  How are you going to get to your appointment?: Car - family or friend to transport  Were you discharged with any Home Care or Post Acute Services: Yes  Post Acute [facility] (Comment: [facility] home care)  Do you feel like you have everything you need to keep you well at home?: Yes  Care Transitions Interventions         Follow Up  Future Appointments   Date Time Provider Hetal Gao   1/8/2021 11:30 AM [facility] [place]   1/11/2021 11:30 AM [facility] [address] [place]

## 2021-01-08 ENCOUNTER — TELEPHONE (OUTPATIENT)
Dept: ONCOLOGY | Age: 61
End: 2021-01-08

## 2021-01-09 LAB
CULTURE: NORMAL
CULTURE: NORMAL
Lab: NORMAL
Lab: NORMAL
SPECIMEN DESCRIPTION: NORMAL
SPECIMEN DESCRIPTION: NORMAL

## 2021-01-10 NOTE — DISCHARGE SUMMARY
Physician Discharge Summary     Patient ID:  Franny Medina  [id number]  54 y.o.  1960    Admit date: 12/28/2020    Discharge date and time: 1/6/2021    Admission Diagnoses:   Patient Active Problem List   Diagnosis    Uncontrolled diabetes mellitus ([facility]    Hypertension    Postmenopausal bleeding    Thickened endometrium    Type 2 diabetes mellitus with hyperglycemia, with long-term current use of insulin (HCC)    Hypophosphatemia    Hypomagnesemia    Hyperglycemia due to type 2 diabetes mellitus ([address]    Pancreatic Head Mass    Non-Obstructive CAD    Primary adenocarcinoma of head of pancreas (HCC)    Intractable nausea and vomiting    Class 3 severe obesity with serious comorbidity in adult ([address]    Amnesia    Anxiety    Benign neoplasm of choroid    Depression    Diabetic complication (HCC)    Diabetic peripheral neuropathy associated with type 2 diabetes mellitus (HCC)    Hepatomegaly    Methicillin resistant Staphylococcus aureus infection    Long term current use of insulin (HCC)    Hyperlipidemia    Moderate nonproliferative diabetic retinopathy associated with type 2 diabetes mellitus (HCC)    Morbid obesity (HCC)    Pain in limb    Pancreatic adenocarcinoma (HCC)    Pancreatic malignancy syndrome ([address]    Peripheral venous insufficiency    Postprocedural state    Primary localized osteoarthrosis of ankle and foot    Urinary tract infectious disease    Transaminitis    Intractable vomiting    Septicemia due to E. coli (HCC)    Acute obstructive cholangitis    TORREY (acute kidney injury) ([address]    Dehydration with hyponatremia    NSVT (nonsustained ventricular tachycardia) (HCC)    Hypoglycemia    Cellulitis    Encounter for palliative care    Epigastric pain       Discharge Diagnoses:   Left forearm cellulitis with bacteremia  Left forearm thrombophlebitis  CKD 3  Type 2 diabetes with hyperglycemia insulin treated  Type 2 diabetes with renal complications Metastatic CA of the pancreas  Morbid obesity excess calories  Pedal edema  Pancytopenia secondary to chemotherapy  Consults: ID, hematology/oncology and vascular surgery    Procedures: None    Hospital Course: 53-year-old gentleman with nausea of the pancreas of chemotherapy comes in with left arm cellulitis and thrombophlebitis from IV site, cultures were obtained IV antibiotic ID consultations were obtained she had CT of the left forearm no abscess was found she had before meals and at bedtime Accu-Cheks with insulin coverage was seen by oncology she refused any further treatment for her pancreatic CA, patient did fairly well with no major complication during her stay was discharged home on IV antibiotics    Discharge Exam:  See progress note from today    Disposition: home  Stable improved  Patient Instructions:   Discharge Medication List as of 1/6/2021  3:08 PM      START taking these medications    Details   apixaban (ELIQUIS) 5 MG TABS tablet Take 1 tablet by mouth 2 times daily, Disp-60 tablet, R-0Print      insulin glargine (LANTUS) 100 UNIT/ML injection vial Inject 32 Units into the skin daily, Disp-1 vial, R-0Print      daptomycin (CUBICIN) infusion Infuse 500 mg intravenously every 24 hours for 21 days Compound per protocol., Disp-23296 mg, R-0Print      oxyCODONE (OXYCONTIN) 10 MG extended release tablet Take 1 tablet by mouth every 12 hours for 30 days. , Disp-60 each, R-0Normal         CONTINUE these medications which have CHANGED    Details   atorvastatin (LIPITOR) 40 MG tablet Take 1 tablet by mouth nightly, Disp-30 tablet, R-0Print         CONTINUE these medications which have NOT CHANGED    Details   furosemide (LASIX) 20 MG tablet Take 20 mg by mouth daily as needed (swelling)Historical Med      !! LORazepam (ATIVAN) 1 MG tablet Take 0.5 mg by mouth daily as needed for Anxiety. Historical Med insulin lispro, 1 Unit Dial, (HUMALOG KWIKPEN) 100 UNIT/ML SOPN Inject 0-12 Units into the skin 3 times daily (before meals) **Medium Dose Corrective Algorithm** Glucose: Dose: If <139 - No Insulin. 140-199 -  2 Units. 200-249 4 Units. 250-299 6 Units. 300-349 8 Units. 350-400 10 Units. Above 400- 12 Units, Disp-20  pen, R-3Normal      oxyCODONE (OXY-IR) 10 MG immediate release tablet Take 1 tablet by mouth every 6 hours as needed for Pain for up to 30 days. , Disp-120 tablet,R-0Normal      ondansetron (ZOFRAN-ODT) 8 MG TBDP disintegrating tablet Place 1 tablet under the tongue every 8 hours as needed for Nausea or Vomiting, Disp-90 tablet,R-2Normal      hydrALAZINE (APRESOLINE) 25 MG tablet Take 1 tablet by mouth every 8 hours, Disp-90 tablet, R-3Normal      carvedilol (COREG) 12.5 MG tablet Take 1 tablet by mouth 2 times daily (with meals), Disp-60 tablet, R-3Normal      amLODIPine (NORVASC) 10 MG tablet Take 1 tablet by mouth daily, Disp-30 tablet, R-3Normal      aspirin 81 MG chewable tablet Take 81 mg by mouth nightlyHistorical Med      sertraline (ZOLOFT) 100 MG tablet Take 150 mg by mouth daily Takes 1.5 tabs (=150mg) dailyHistorical Med      !! LORazepam (ATIVAN) 0.5 MG tablet Take 1 mg by mouth nightly. And 1/2 tab once daily as neededHistorical Med      Insulin Pen Needle ([employer] PEN NEEDLES 29G) 29G X 12MM MISC DAILY Starting Wed 12/23/2020, Disp-100 each, R-3, Normal      glucose monitoring kit (FREESTYLE) monitoring kit DAILY Starting Wed 12/23/2020, Disp-1 kit, R-0, Normal      blood glucose monitor strips Test 3  times daily  Insulin Dependent mellitus, Disp-100 strip, R-11, Print      Lancets MISC 3 TIMES DAILY Starting Wed 12/23/2020, Disp-600 each, R-1, Normal      nitroGLYCERIN (NITROSTAT) 0.4 MG SL tablet up to max of 3 total doses. If no relief after 1 dose, call 911., Disp-25 tablet, R-3Normal       !! - Potential duplicate medications found. Please discuss with provider. STOP taking these medications       insulin glargine (LANTUS SOLOSTAR) 100 UNIT/ML injection pen Comments:   Reason for Stopping:         prochlorperazine (COMPAZINE) 10 MG tablet Comments:   Reason for Stopping:         capecitabine (XELODA) 150 MG chemo tablet Comments:   Reason for Stopping:         capecitabine (XELODA) 500 MG chemo tablet Comments:   Reason for Stopping:             Activity: activity as tolerated  Diet: diabetic diet    Follow-up with PCP in 5 days. Community referral completed for IV antibiotic at home she was started on Eliquis for 2 to 3 weeks signed:   Migel Noyola MD  1/10/2021  4:30 PM

## 2021-01-11 ENCOUNTER — CARE COORDINATION (OUTPATIENT)
Dept: CASE MANAGEMENT | Age: 61
End: 2021-01-11

## 2021-01-11 NOTE — CARE COORDINATION
COVID-19 Monitoring Follow-up Note    Attempted to reach the patient for follow up [facility] (negative) Care Transition call post hospital discharge. Message left with CTN's contact information requesting return phone call.

## 2021-01-14 ENCOUNTER — HOSPITAL ENCOUNTER (OUTPATIENT)
Age: 61
Setting detail: SPECIMEN
Discharge: HOME | End: 2021-01-14
Payer: COMMERCIAL

## 2021-01-14 ENCOUNTER — TELEPHONE (OUTPATIENT)
Dept: PALLATIVE CARE | Age: 61
End: 2021-01-14

## 2021-01-14 LAB
ABSOLUTE BANDS #: 0.1 K/UL (ref 0–1)
ABSOLUTE EOS #: 0.05 K/UL (ref 0–0.4)
ABSOLUTE IMMATURE GRANULOCYTE: ABNORMAL K/UL (ref 0–0.3)
ABSOLUTE LYMPH #: 0.34 K/UL (ref 1–4.8)
ABSOLUTE MONO #: 0.29 K/UL (ref 0.1–1.3)
BANDS: 2 % (ref 0–10)
BASOPHILS # BLD: 0 % (ref 0–2)
BASOPHILS ABSOLUTE: 0 K/UL (ref 0–0.2)
CREAT SERPL-MCNC: 1.12 MG/DL (ref 0.5–0.9)
DIFFERENTIAL TYPE: ABNORMAL
EOSINOPHILS RELATIVE PERCENT: 1 % (ref 0–4)
GFR AFRICAN AMERICAN: >60 ML/MIN
GFR NON-AFRICAN AMERICAN: 50 ML/MIN
GFR SERPL CREATININE-BSD FRML MDRD: ABNORMAL ML/MIN/{1.73_M2}
GFR SERPL CREATININE-BSD FRML MDRD: ABNORMAL ML/MIN/{1.73_M2}
HCT VFR BLD CALC: 23.3 % (ref 36–46)
HEMOGLOBIN: 7.7 G/DL (ref 12–16)
IMMATURE GRANULOCYTES: ABNORMAL %
LYMPHOCYTES # BLD: 7 % (ref 24–44)
MCH RBC QN AUTO: 27.4 PG (ref 26–34)
MCHC RBC AUTO-ENTMCNC: 33.1 G/DL (ref 31–37)
MCV RBC AUTO: 82.8 FL (ref 80–100)
MONOCYTES # BLD: 6 % (ref 1–7)
MORPHOLOGY: ABNORMAL
MORPHOLOGY: ABNORMAL
NRBC AUTOMATED: ABNORMAL PER 100 WBC
PDW BLD-RTO: 19.7 % (ref 11.5–14.9)
PLATELET # BLD: 228 K/UL (ref 150–450)
PLATELET ESTIMATE: ABNORMAL
PMV BLD AUTO: 7.4 FL (ref 6–12)
RBC # BLD: 2.81 M/UL (ref 4–5.2)
RBC # BLD: ABNORMAL 10*6/UL
SEG NEUTROPHILS: 84 % (ref 36–66)
SEGMENTED NEUTROPHILS ABSOLUTE COUNT: 4.02 K/UL (ref 1.3–9.1)
TOTAL CK: 145 U/L (ref 26–192)
WBC # BLD: 4.8 K/UL (ref 3.5–11)
WBC # BLD: ABNORMAL 10*3/UL

## 2021-01-14 PROCEDURE — 82565 ASSAY OF CREATININE: CPT

## 2021-01-14 PROCEDURE — 82550 ASSAY OF CK (CPK): CPT

## 2021-01-14 PROCEDURE — 36415 COLL VENOUS BLD VENIPUNCTURE: CPT

## 2021-01-14 PROCEDURE — 85025 COMPLETE CBC W/AUTO DIFF WBC: CPT

## 2021-01-14 NOTE — TELEPHONE ENCOUNTER
Rec'd call from patient's son Mariam Camacho stating patient has still not received long acting pain medicine. I called [employer] on 1/11/21 and prior authorization was completed. Awaiting answer from insurance company. I called insurance company today after receiving call from son and they said it was approved on 1/11/21. Insurance company states they did fax an approval to our office. I did not receive an approval. I confirmed the fax number. I called pharmacy and gave them the approval number and they will fill script for oxycontin and call son back. Pharmacy states they did not get notified with approval either. Prior Auth phone number: [phone]  Oxycontin approval number: [id number]    . [address] [facility] [address], [place], [facility]

## 2021-01-15 ENCOUNTER — TELEPHONE (OUTPATIENT)
Dept: ONCOLOGY | Age: 61
End: 2021-01-15

## 2021-01-15 NOTE — TELEPHONE ENCOUNTER
Name: Pierce Najera  : 1960  MRN: [id number]    Oncology Navigation Follow-Up Note    Contact Type:  Telephone  Notes: Spoke with pt for f/u call. Pt stated doing okay & recently dx with MRSA to eye. Inquired on pt's tx, pt stated does not want to continue XRT or xeloda. Inquired on post hospital f/u with Dr. Alicia Hanna, pt agreeable to schedule. Keenjar message sent to Kathleen Strong [facility] , updated on pt & requested f/u. Pt scheduled 2021 @ 1600 @ [facility]. Pt updated on appt details & verbalized understanding. Pt denied questions/concerns. Instructed pt may contact writer prn. Shereen Jett., [facility] RO, updated. Will continue to follow.     Electronically signed by Nunu Sanchez RN on 1/15/2021 at 1:04 PM

## 2021-01-18 ENCOUNTER — CARE COORDINATION (OUTPATIENT)
Dept: CASE MANAGEMENT | Age: 61
End: 2021-01-18

## 2021-01-18 NOTE — CARE COORDINATION
[facility] Transitions Follow Up Call- final COVID risk follow up call       2021    Patient: Nahomy Calvillo  Patient : 1960   MRN: [id number]  Reason for Admission: LUE cellulitis    Discharge Date: 21 RARS: Readmission Risk Score: 28         Spoke with: London Anand (pt caregiver- okay HIPAA) & Sarika     Patient caregiver returns call stating pt is doing \"really well\"  States her pain is well controlled   States her blood sugar is good  States left arm is doing better   Hands phone to Kimberly Mabry  She states she is doing \"okay\". States not needing prn pain meds  Confirms appts with Porfirio Mcclure and Robyn Wing next week   Denies needs  Writer informs this is final ([facility]) phone call- v/u. Encouraged call writer/ CTC or providers if questions or concerns- v/u. Episode closed       Care Transitions Subsequent and Final Call    Subsequent and Final Calls  Do you have any ongoing symptoms?: No  Have your medications changed?: No  Do you have any questions related to your medications?: No  Do you currently have any active services?: Yes  Are you currently active with any services?: Home Health  Do you have any needs or concerns that I can assist you with?: No  Identified Barriers: Lack of Education  Care Transitions Interventions  Other Interventions:            Follow Up  Future Appointments   Date Time Provider Hetal Gao   2021  1:00 PM [address], MD [facility] [place]   2021  4:00 PM Violet Barraza MD [facility] Re Chan RN

## 2021-01-18 NOTE — CARE COORDINATION
[facility] Trans[facility] Follow Up Call- 2nd attempt COVID risk follow up call       2021    Patient: Kathya Wilson  Patient : 1960   MRN: [id number]  Reason for Admission: LUE cellulitis   Discharge Date: 21 RARS: Readmission Risk Score: 28         Attempted to reach patient for final transitional call. VM left to return call to [phone] or continue communication with providers.  Episode closed         Follow Up  Future Appointments   Date Time Provider Hetal Gao   2021  1:00 PM Tima Chan MD [facility] [place]   2021  4:00 PM Edward Bonilla MD [facility] Courtney Proctor RN

## 2021-01-20 ENCOUNTER — HOSPITAL ENCOUNTER (OUTPATIENT)
Age: 61
Setting detail: SPECIMEN
Discharge: HOME | End: 2021-01-20
Payer: COMMERCIAL

## 2021-01-20 LAB
ABSOLUTE EOS #: 0.1 K/UL (ref 0–0.4)
ABSOLUTE IMMATURE GRANULOCYTE: ABNORMAL K/UL (ref 0–0.3)
ABSOLUTE LYMPH #: 0.4 K/UL (ref 1–4.8)
ABSOLUTE MONO #: 0.3 K/UL (ref 0.1–1.3)
BASOPHILS # BLD: 1 % (ref 0–2)
BASOPHILS ABSOLUTE: 0 K/UL (ref 0–0.2)
CREAT SERPL-MCNC: 1.43 MG/DL (ref 0.5–0.9)
DIFFERENTIAL TYPE: ABNORMAL
EOSINOPHILS RELATIVE PERCENT: 2 % (ref 0–4)
GFR AFRICAN AMERICAN: 46 ML/MIN
GFR NON-AFRICAN AMERICAN: 38 ML/MIN
GFR SERPL CREATININE-BSD FRML MDRD: ABNORMAL ML/MIN/{1.73_M2}
GFR SERPL CREATININE-BSD FRML MDRD: ABNORMAL ML/MIN/{1.73_M2}
HCT VFR BLD CALC: 24.9 % (ref 36–46)
HEMOGLOBIN: 8.2 G/DL (ref 12–16)
IMMATURE GRANULOCYTES: ABNORMAL %
LYMPHOCYTES # BLD: 10 % (ref 24–44)
MCH RBC QN AUTO: 27.7 PG (ref 26–34)
MCHC RBC AUTO-ENTMCNC: 32.7 G/DL (ref 31–37)
MCV RBC AUTO: 84.8 FL (ref 80–100)
MONOCYTES # BLD: 8 % (ref 1–7)
NRBC AUTOMATED: ABNORMAL PER 100 WBC
PDW BLD-RTO: 20.1 % (ref 11.5–14.9)
PLATELET # BLD: 248 K/UL (ref 150–450)
PLATELET ESTIMATE: ABNORMAL
PMV BLD AUTO: 8 FL (ref 6–12)
RBC # BLD: 2.94 M/UL (ref 4–5.2)
RBC # BLD: ABNORMAL 10*6/UL
SEG NEUTROPHILS: 79 % (ref 36–66)
SEGMENTED NEUTROPHILS ABSOLUTE COUNT: 3.3 K/UL (ref 1.3–9.1)
TOTAL CK: 492 U/L (ref 26–192)
WBC # BLD: 4.1 K/UL (ref 3.5–11)
WBC # BLD: ABNORMAL 10*3/UL

## 2021-01-20 PROCEDURE — 85025 COMPLETE CBC W/AUTO DIFF WBC: CPT

## 2021-01-20 PROCEDURE — 82565 ASSAY OF CREATININE: CPT

## 2021-01-20 PROCEDURE — 82550 ASSAY OF CK (CPK): CPT

## 2021-01-21 ENCOUNTER — TELEPHONE (OUTPATIENT)
Dept: INFECTIOUS DISEASES | Age: 61
End: 2021-01-21

## 2021-01-22 NOTE — PROGRESS NOTES
[address]            Radiation Oncology          [address] [facility]          [address]        Tiffany Baker: [phone]        F: [phone]       [employer]         Dear Dr James Lambert: Thank you for referring Ronny Leal to me for evaluation and treatment. Below is a summary of the patient's recently completed radiation course. If you have questions, please do not hesitate to call me. I look forward to following this patient along with you. Sincerely,  Electronically signed by Quynh Haynes MD on 21 at 4:25 PM EST      CC: Patient Care Team:  Heather Riddle as PCP - General (Nurse Practitioner)  Heather Riddle as PCP - [facility] Provider  Lisa Garvey DO (Obstetrics & Gynecology)  Evelina Gresham RN as Nurse Navigator (Oncology)  Micha Rduolph RN as Registered Nurse (Oncology)  ------------------------------------------------------------------------------------------------------------------------------------------------------------------------------------------        Date of Service: 2020     Location:  [facility],   [address] [place] [address], Alice Gomez [place]   [phone]        RADIATION ONCOLOGY END OF TREATMENT SUMMARY:    Patient ID:   Ronny Leal  : 1960   MRN: [id number]    DIAGNOSIS:  Cancer Staging  Primary adenocarcinoma of head of pancreas [facility]  Staging form: Exocrine Pancreas, AJCC 8th Edition  - Clinical stage from 2020: Stage IIB (cT2, cN1, cM0) - Signed by Quynh Haynes MD on 2020      TREATMENT DETAILS:    Treatment Technique: IMRT  Fraction Technique: Daily        Treatment Summary:  Radiation Oncology - Course: 1 Protocol:    Treatment Site Current Dose Modality From To Elapsed Days Fx.    Pancreas LNs  3,780 cGy x06 2020  30 21                    Concurrent Chemotherapy: Xeloda daily    CLINICAL COURSE: Patient did not complete the treatment course as prescribed. Patient developed some nausea, diarrhea, fatigue, and also had a hypoglycemic episode at home. Patient went to the ED for this and ended up with MRSA infection. Patient refused to come back for any additional chemotherapy or radiation treatments. Patient will come back in 1 month for a follow up visit and to assess treatment toxicity and response. Patient was advised to continue close follow up with medical oncology and infectious disease as well. Patient does have our contact information in case they have any questions or concerns in the interim.     Electronically signed by Hai Ge MD on 1/22/2021 at 4:25 PM

## 2021-01-26 ENCOUNTER — OFFICE VISIT (OUTPATIENT)
Dept: ONCOLOGY | Age: 61
End: 2021-01-26
Payer: COMMERCIAL

## 2021-01-26 ENCOUNTER — OFFICE VISIT (OUTPATIENT)
Dept: INFECTIOUS DISEASES | Age: 61
End: 2021-01-26
Payer: COMMERCIAL

## 2021-01-26 ENCOUNTER — TELEPHONE (OUTPATIENT)
Dept: ONCOLOGY | Age: 61
End: 2021-01-26

## 2021-01-26 VITALS
RESPIRATION RATE: 15 BRPM | OXYGEN SATURATION: 98 % | BODY MASS INDEX: 37.32 KG/M2 | SYSTOLIC BLOOD PRESSURE: 166 MMHG | HEART RATE: 89 BPM | DIASTOLIC BLOOD PRESSURE: 82 MMHG | HEIGHT: 65 IN | WEIGHT: 224 LBS | TEMPERATURE: 97.6 F

## 2021-01-26 VITALS
BODY MASS INDEX: 37.94 KG/M2 | SYSTOLIC BLOOD PRESSURE: 156 MMHG | HEART RATE: 74 BPM | WEIGHT: 228 LBS | DIASTOLIC BLOOD PRESSURE: 80 MMHG | TEMPERATURE: 98.4 F

## 2021-01-26 DIAGNOSIS — I80.8: ICD-10-CM

## 2021-01-26 DIAGNOSIS — C25.9 PANCREATIC ADENOCARCINOMA (HCC): ICD-10-CM

## 2021-01-26 DIAGNOSIS — T50.905A ADVERSE EFFECT OF DRUG, INITIAL ENCOUNTER: ICD-10-CM

## 2021-01-26 DIAGNOSIS — A41.02 SEPSIS DUE TO METHICILLIN RESISTANT STAPHYLOCOCCUS AUREUS (MRSA) WITHOUT ACUTE ORGAN DYSFUNCTION (HCC): Primary | ICD-10-CM

## 2021-01-26 DIAGNOSIS — H44.002 EYE INFECTION, LEFT: ICD-10-CM

## 2021-01-26 PROCEDURE — 99213 OFFICE O/P EST LOW 20 MIN: CPT | Performed by: INTERNAL MEDICINE

## 2021-01-26 PROCEDURE — 99215 OFFICE O/P EST HI 40 MIN: CPT | Performed by: INTERNAL MEDICINE

## 2021-01-26 PROCEDURE — 99211 OFF/OP EST MAY X REQ PHY/QHP: CPT | Performed by: INTERNAL MEDICINE

## 2021-01-26 ASSESSMENT — ENCOUNTER SYMPTOMS
RESPIRATORY NEGATIVE: 1
GASTROINTESTINAL NEGATIVE: 1

## 2021-01-26 NOTE — PROGRESS NOTES
Manny Boothe is a 64y.o.-year-old female who I am seeing in follow-up after recent hospital stay 12/28/2020 through 1/6/2021 at [facility]. Hong Boyce has a history of diabetes mellitus type 2, hypertension, metastatic pancreatic cancer, and she was admitted with left forearm septic thrombophlebitis with associated MRSA sepsis. The patient was initially treated with intravenous vancomycin but developed acute kidney injury on chronic kidney disease and was later switched to daptomycin. She did have some difficulty clearing the bacteremia but was subsequently able to and was discharged home on IV antimicrobial therapy to complete a 28-day course of treatment after work-up for endocarditis of the hospital was negative. The patient was having weekly lab testing done and the CPK level did increase from  and subsequently close to 400 and the daptomycin was discontinued about 5 days early. The patient comes in today reports that she is feeling well. She did develop some left eye blindness and went to see her ophthalmologist and was found to have MRSA infection in the vitreous of the left eye. She has undergone biopsy and injections and is scheduled for surgery next week on Wednesday. The patient does not report any issues or concerns at this point in time other than some fatigue. I have personally reviewedthe past medical history, medications, social history, and I have updated the database accordingly.   Past Medical History:     Past Medical History:   Diagnosis Date    Acute obstructive cholangitis 7/22/2020    TORREY (acute kidney injury) ([facility] [address] 7/22/2020    Amnesia 7/21/2020    Anxiety     Benign neoplasm of choroid 5/26/2016    Cellulitis 12/28/2020    Class 3 severe obesity with serious comorbidity in adult [facility]) 7/21/2020    Dehydration with hyponatremia 7/22/2020    Depression 7/21/2020    Diabetes mellitus ([facility] [address]     Diabetic complication ([facility] [address] 4/74/3321  Encounter for palliative care     Epigastric pain 1/5/2021    Hepatomegaly 7/21/2020    Hyperglycemia due to type 2 diabetes mellitus ([address] 6/4/2020    Hyperlipidemia     Hypertension     Hypoglycemia 12/21/2020    Hypomagnesemia 6/4/2020    Hypophosphatemia 6/4/2020    Intractable nausea and vomiting 7/21/2020    Intractable vomiting     Long term current use of insulin ([address] 7/21/2020    Methicillin resistant Staphylococcus aureus infection 7/21/2020    NSVT (nonsustained ventricular tachycardia) ([address] 7/23/2020    Pain in limb 7/21/2020    Pancreatic adenocarcinoma ([address] 6/9/2020    Pancreatic cancer ([address]     Pancreatic Head Mass 6/4/2020    Pancreatic malignancy syndrome ([address] 7/21/2020    Peripheral venous insufficiency 7/21/2020    Postmenopausal bleeding 12/27/2013    Postprocedural state 7/21/2020    Primary adenocarcinoma of head of pancreas ([address] 6/5/2020    Primary localized osteoarthrosis of ankle and foot 7/21/2020    Septicemia due to E. coli ([address] 7/22/2020    Transaminitis 7/21/2020    Type 2 diabetes mellitus with hyperglycemia, with long-term current use of insulin ([address] 6/1/2020    Urinary tract infectious disease 7/21/2020     Medications:     Current Outpatient Medications   Medication Sig Dispense Refill    apixaban (ELIQUIS) 5 MG TABS tablet Take 1 tablet by mouth 2 times daily 60 tablet 0    insulin glargine (LANTUS) 100 UNIT/ML injection vial Inject 32 Units into the skin daily 1 vial 0    atorvastatin (LIPITOR) 40 MG tablet Take 1 tablet by mouth nightly 30 tablet 0    daptomycin (CUBICIN) infusion Infuse 500 mg intravenously every 24 hours for 21 days Compound per protocol. 63611 mg 0    oxyCODONE (OXYCONTIN) 10 MG extended release tablet Take 1 tablet by mouth every 12 hours for 30 days.  60 each 0    furosemide (LASIX) 20 MG tablet Take 20 mg by mouth daily as needed (swelling)  LORazepam (ATIVAN) 1 MG tablet Take 0.5 mg by mouth daily as needed for Anxiety.  insulin lispro, 1 Unit Dial, (HUMALOG KWIKPEN) 100 UNIT/ML SOPN Inject 0-12 Units into the skin 3 times daily (before meals) **Medium Dose Corrective Algorithm** Glucose: Dose: If <139 - No Insulin. 140-199 -  2 Units. 200-249 4 Units. 250-299 6 Units. 300-349 8 Units. 350-400 10 Units. Above 400- 12 Units 20 pen 3    Insulin Pen Needle ([employer] PEN NEEDLES 29G) 29G X 12MM MISC 1 each by Does not apply route daily 100 each 3    glucose monitoring kit (FREESTYLE) monitoring kit 1 kit by Does not apply route daily 1 kit 0    blood glucose monitor strips Test 3  times daily  Insulin Dependent mellitus 100 strip 11    Lancets MISC 1 each by Does not apply route 3 times daily 600 each 1    ondansetron (ZOFRAN-ODT) 8 MG TBDP disintegrating tablet Place 1 tablet under the tongue every 8 hours as needed for Nausea or Vomiting 90 tablet 2    nitroGLYCERIN (NITROSTAT) 0.4 MG SL tablet up to max of 3 total doses. If no relief after 1 dose, call 911. 25 tablet 3    hydrALAZINE (APRESOLINE) 25 MG tablet Take 1 tablet by mouth every 8 hours 90 tablet 3    carvedilol (COREG) 12.5 MG tablet Take 1 tablet by mouth 2 times daily (with meals) 60 tablet 3    amLODIPine (NORVASC) 10 MG tablet Take 1 tablet by mouth daily 30 tablet 3    aspirin 81 MG chewable tablet Take 81 mg by mouth nightly      sertraline (ZOLOFT) 100 MG tablet Take 150 mg by mouth daily Takes 1.5 tabs (=150mg) daily      LORazepam (ATIVAN) 0.5 MG tablet Take 1 mg by mouth nightly. And 1/2 tab once daily as needed       No current facility-administered medications for this visit. Allergies:   Lisinopril, Sulfamethoxazole, Trimethoprim, Cefuroxime axetil, Buspirone, Sulfamethoxazole-trimethoprim, Clindamycin phosphate, and Penicillins     Review of Systems:   Review of Systems   Constitutional: Positive for fatigue. Eyes: Positive for visual disturbance (Left eye decreased visual acuity). Respiratory: Negative. Cardiovascular: Negative. Gastrointestinal: Negative. Genitourinary: Negative. Musculoskeletal: Negative. Skin: Negative. Neurological: Negative. Psychiatric/Behavioral: Negative. Physical Examination :   BP (!) 166/82 (Site: Left Upper Arm, Position: Sitting)   Pulse 89   Temp 97.6 °F (36.4 °C) (Temporal)   Resp 15   Ht 5' 5\" (1.651 m)   Wt 224 lb (101.6 kg)   SpO2 98%   BMI 37.28 kg/m²     Physical Exam  Constitutional:       Appearance: She is well-developed. HENT:      Head: Normocephalic and atraumatic. Neck:      Musculoskeletal: Normal range of motion and neck supple. Cardiovascular:      Rate and Rhythm: Regular rhythm. Heart sounds: Murmur present. Pulmonary:      Effort: Pulmonary effort is normal.      Breath sounds: Normal breath sounds. Abdominal:      General: Bowel sounds are normal.      Palpations: Abdomen is soft. Skin:     General: Skin is dry. Comments: Mild hyperpigementation in the left forearm, and palpable cords mostly resolved   Neurological:      Mental Status: She is alert and oriented to person, place, and time.          Laboratory studies :  Medical Decision Making:   I have independently reviewed the following labs:  CBC with Differential:  Lab Results   Component Value Date    WBC 4.1 01/20/2021    WBC 4.8 01/14/2021    HGB 8.2 01/20/2021    HGB 7.7 01/14/2021    HCT 24.9 01/20/2021    HCT 23.3 01/14/2021     01/20/2021     01/14/2021     03/05/2012     10/03/2011    LYMPHOPCT 10 01/20/2021    LYMPHOPCT 7 01/14/2021    MONOPCT 8 01/20/2021    MONOPCT 6 01/14/2021       BMP:  Lab Results   Component Value Date     01/06/2021     01/04/2021    K 4.6 01/06/2021    K 5.1 01/04/2021     01/06/2021     01/04/2021    CO2 23 01/06/2021    CO2 18 01/04/2021    BUN 18 01/06/2021 BUN 15 01/04/2021    CREATININE 1.43 01/20/2021    CREATININE 1.12 01/14/2021    MG 1.5 12/31/2020    MG 1.1 12/30/2020       Hepatic Function Panel:   Lab Results   Component Value Date    PROT 4.9 12/29/2020    PROT 6.1 12/21/2020    LABALBU 2.0 12/29/2020    LABALBU 3.1 12/21/2020    BILIDIR 2.41 07/24/2020    BILIDIR 2.96 07/23/2020    IBILI 0.24 07/24/2020    IBILI 0.20 07/23/2020    BILITOT 0.48 12/29/2020    BILITOT 0.39 12/21/2020    ALKPHOS 93 12/29/2020    ALKPHOS 131 12/21/2020    ALT 17 12/29/2020    ALT 20 12/21/2020    AST 22 12/29/2020    AST 32 12/21/2020       No results found for: CRP  No results found for: SEDRATE    Total CK 492High   26 - 192 U/L Final 01/20/2021  3:45 PM [facility]     Total   26 - 192 U/L Final 01/14/2021 11:30 AM [facility]     Total CK 33  26 - 192 U/L Final 01/05/2021  2:22  [facility]           Thank you for allowing us to participate in the care of this patient. Pleasecall with questions. Bharati Rosario MD  [employer] messaging: [phone]    This note is created with the assistance of a speech recognition program.  While intending to generate a document that actually reflects the content ofthe visit, the document can still have some errors including those of syntax and sound a like substitutions which may escape proof reading. It such instances, actual meaning can be extrapolated by contextual diversion.

## 2021-01-26 NOTE — PROGRESS NOTES
Past Medical History:   has a past medical history of Anxiety, Diabetes mellitus ([facility] [address] Hyperlipidemia, Hypertension, and Pancreatic cancer ([facility] [address] Past Surgical History:   has a past surgical history that includes  section; Gallbladder surgery; ERCP (2020); Upper gastrointestinal endoscopy (2020); Upper gastrointestinal endoscopy (2020); ERCP (2020); ERCP (2020); ERCP (2020); ERCP (2020); ERCP (2020); and ERCP (2020). Medications:    Prior to Admission medications    Medication Sig Start Date End Date Taking? Authorizing Provider   apixaban (ELIQUIS) 5 MG TABS tablet Take 1 tablet by mouth 2 times daily 21  Yes Topher Guerrero MD   insulin glargine (LANTUS) 100 UNIT/ML injection vial Inject 32 Units into the skin daily 21  Yes Topher Guerrero MD   atorvastatin (LIPITOR) 40 MG tablet Take 1 tablet by mouth nightly 21  Yes Topher Guerrero MD   oxyCODONE (OXYCONTIN) 10 MG extended release tablet Take 1 tablet by mouth every 12 hours for 30 days. 21 Yes EMERSON Ribeiro CNP   furosemide (LASIX) 20 MG tablet Take 20 mg by mouth daily as needed (swelling)   Yes Historical Provider, MD   LORazepam (ATIVAN) 1 MG tablet Take 0.5 mg by mouth daily as needed for Anxiety. Yes Historical Provider, MD   insulin lispro, 1 Unit Dial, (HUMALOG KWIKPEN) 100 UNIT/ML SOPN Inject 0-12 Units into the skin 3 times daily (before meals) **Medium Dose Corrective Algorithm** Glucose: Dose: If <139 - No Insulin. 140-199 -  2 Units. 200-249 4 Units. 250-299 6 Units. 300-349 8 Units. 350-400 10 Units.  Above 400- 12 Units 20  Yes Zakia Cintron MD   Insulin Pen Needle ([employer] PEN NEEDLES 29G) 29G X 12MM MISC 1 each by Does not apply route daily 20  Yes Zakia Cintron MD   glucose monitoring kit (FREESTYLE) monitoring kit 1 kit by Does not apply route daily 20  Yes Zakia Cintron MD   blood glucose monitor strips Test 3  times daily Insulin Dependent mellitus 12/23/20  Yes Angel Marrufo MD   Lancets MISC 1 each by Does not apply route 3 times daily 12/23/20  Yes Angel Marrufo MD   ondansetron (ZOFRAN-ODT) 8 MG TBDP disintegrating tablet Place 1 tablet under the tongue every 8 hours as needed for Nausea or Vomiting 7/6/20  Yes Juli Arboleda MD   nitroGLYCERIN (NITROSTAT) 0.4 MG SL tablet up to max of 3 total doses. If no relief after 1 dose, call 911. 6/6/20  Yes Martha Gee MD   hydrALAZINE (APRESOLINE) 25 MG tablet Take 1 tablet by mouth every 8 hours 6/6/20  Yes Martha Gee MD   carvedilol (COREG) 12.5 MG tablet Take 1 tablet by mouth 2 times daily (with meals) 6/6/20  Yes Martha Gee MD   amLODIPine (NORVASC) 10 MG tablet Take 1 tablet by mouth daily 6/7/20  Yes Martha Gee MD   aspirin 81 MG chewable tablet Take 81 mg by mouth nightly   Yes Historical Provider, MD   sertraline (ZOLOFT) 100 MG tablet Take 150 mg by mouth daily Takes 1.5 tabs (=150mg) daily   Yes Historical Provider, MD   LORazepam (ATIVAN) 0.5 MG tablet Take 1 mg by mouth nightly. And 1/2 tab once daily as needed 10/12/13  Yes Historical Provider, MD   daptomycin (CUBICIN) infusion Infuse 500 mg intravenously every 24 hours for 21 days Compound per protocol. Patient not taking: Reported on 1/26/2021 1/6/21 1/27/21  Yara Gaines MD     Current Outpatient Medications   Medication Sig Dispense Refill    apixaban (ELIQUIS) 5 MG TABS tablet Take 1 tablet by mouth 2 times daily 60 tablet 0    insulin glargine (LANTUS) 100 UNIT/ML injection vial Inject 32 Units into the skin daily 1 vial 0    atorvastatin (LIPITOR) 40 MG tablet Take 1 tablet by mouth nightly 30 tablet 0    oxyCODONE (OXYCONTIN) 10 MG extended release tablet Take 1 tablet by mouth every 12 hours for 30 days.  60 each 0    furosemide (LASIX) 20 MG tablet Take 20 mg by mouth daily as needed (swelling)  LORazepam (ATIVAN) 1 MG tablet Take 0.5 mg by mouth daily as needed for Anxiety.  insulin lispro, 1 Unit Dial, (HUMALOG KWIKPEN) 100 UNIT/ML SOPN Inject 0-12 Units into the skin 3 times daily (before meals) **Medium Dose Corrective Algorithm** Glucose: Dose: If <139 - No Insulin. 140-199 -  2 Units. 200-249 4 Units. 250-299 6 Units. 300-349 8 Units. 350-400 10 Units. Above 400- 12 Units 20 pen 3    Insulin Pen Needle ([employer] PEN NEEDLES 29G) 29G X 12MM MISC 1 each by Does not apply route daily 100 each 3    glucose monitoring kit (FREESTYLE) monitoring kit 1 kit by Does not apply route daily 1 kit 0    blood glucose monitor strips Test 3  times daily  Insulin Dependent mellitus 100 strip 11    Lancets MISC 1 each by Does not apply route 3 times daily 600 each 1    ondansetron (ZOFRAN-ODT) 8 MG TBDP disintegrating tablet Place 1 tablet under the tongue every 8 hours as needed for Nausea or Vomiting 90 tablet 2    nitroGLYCERIN (NITROSTAT) 0.4 MG SL tablet up to max of 3 total doses. If no relief after 1 dose, call 911. 25 tablet 3    hydrALAZINE (APRESOLINE) 25 MG tablet Take 1 tablet by mouth every 8 hours 90 tablet 3    carvedilol (COREG) 12.5 MG tablet Take 1 tablet by mouth 2 times daily (with meals) 60 tablet 3    amLODIPine (NORVASC) 10 MG tablet Take 1 tablet by mouth daily 30 tablet 3    aspirin 81 MG chewable tablet Take 81 mg by mouth nightly      sertraline (ZOLOFT) 100 MG tablet Take 150 mg by mouth daily Takes 1.5 tabs (=150mg) daily      LORazepam (ATIVAN) 0.5 MG tablet Take 1 mg by mouth nightly. And 1/2 tab once daily as needed      daptomycin (CUBICIN) infusion Infuse 500 mg intravenously every 24 hours for 21 days Compound per protocol. (Patient not taking: Reported on 1/26/2021) 26273 mg 0     No current facility-administered medications for this visit. Allergies:  Lisinopril, Sulfamethoxazole, Trimethoprim, Cefuroxime axetil, Sulfamethoxazole-trimethoprim, Clindamycin phosphate, and Penicillins    Social History:   reports that she quit smoking about 15 years ago. She has never used smokeless tobacco. She reports previous alcohol use. She reports that she does not use drugs. Family History: family history includes Cancer in her maternal grandfather, maternal grandmother, and mother; Heart Failure in her father; Hypertension in her father and mother. REVIEW OF SYSTEMS:    Constitutional: No fever or chills. No night sweats, no weight loss   Eyes: No eye discharge, double vision, or eye pain   HEENT: negative for sore mouth, sore throat, hoarseness and voice change   Respiratory: negative for cough , sputum, dyspnea, wheezing, hemoptysis, chest pain   Cardiovascular: negative for chest pain, dyspnea, palpitations, orthopnea, PND   Gastrointestinal: negative for nausea, vomiting, diarrhea, constipation, abdominal pain, Dysphagia, hematemesis and hematochezia   Genitourinary: negative for frequency, dysuria, nocturia, urinary incontinence, and hematuria   Integument: negative for rash, skin lesions, bruises.    Hematologic/Lymphatic: negative for easy bruising, bleeding, lymphadenopathy, or petechiae   Endocrine: negative for heat or cold intolerance,weight changes, change in bowel habits and hair loss   Musculoskeletal: negative for myalgias, arthralgias, pain, joint swelling,and bone pain   Neurological: negative for headaches, dizziness, seizures, weakness, numbness    PHYSICAL EXAM:      BP (!) 156/80   Pulse 74   Temp 98.4 °F (36.9 °C) (Oral)   Wt 228 lb (103.4 kg)   BMI 37.94 kg/m²    Temp (24hrs), Av.8 °F (36.6 °C), Min:97.1 °F (36.2 °C), Max:99.4 °F (37.4 °C)    General appearance - well appearing, no in pain or distress   Mental status - alert and cooperative   Eyes - pupils equal and reactive, extraocular eye movements intact Ears - bilateral TM's and external ear canals normal   Mouth - mucous membranes moist, pharynx normal without lesions   Neck - supple, no significant adenopathy   Lymphatics - no palpable lymphadenopathy, no hepatosplenomegaly   Chest - clear to auscultation, no wheezes, rales or rhonchi, symmetric air entry   Heart - normal rate, regular rhythm, normal S1, S2, no murmurs  Abdomen - soft, nontender, nondistended, no masses or organomegaly   Neurological - alert, oriented, normal speech, no focal findings or movement disorder noted   Musculoskeletal - no joint tenderness, deformity or swelling   Extremities - peripheral pulses normal, no pedal edema, no clubbing or cyanosis   Skin - normal coloration and turgor, no rashes, no suspicious skin lesions noted ,    DATA:    Labs:   CBC:   No results for input(s): WBC, HGB, HCT, PLT in the last 72 hours. BMP:   No results for input(s): NA, K, CO2, BUN, CREATININE, LABGLOM, GLUCOSE in the last 72 hours. PT/INR:   No results for input(s): PROTIME, INR in the last 72 hours. Surgical Pathology Report   Surgical Pathology   Collected: 06/04/20 1542   Lab status: Final   Resulting lab: [facility]   Value: -- Diagnosis --   HEAD OF PANCREAS MASS EUS FINE NEEDLE ASPIRATION:           POSITIVE FOR MALIGNANCY:   ADENOCARCINOMA, MODERATELY DIFFERENTIATED. IMAGING DATA:  MRI abd 6/4/20  FINDINGS:   Exam degraded by motion artifact.       ABDOMEN:       The visualized lung bases reveal no signal abnormality.       The liver is normal in signal intensity and contour.  No focal liver lesion   identified.       Irregular mass in the head of the pancreas is present resulting in biliary   and pancreatic duct obstruction.  This measures 3.2 x 2.3 cm and is best   visualized on T2 imaging.  This mass extends towards the liver hilum. Alan Betty   is close approximation with the main portal vein, which remains patent. There is close abutment with the hepatic artery.  No inflammatory change   identified involving the pancreas.  Relative atrophy of the body and tail is   noted.       The spleen, adrenals and kidneys reveal no significant findings.  Right renal   cyst.       The visualized bowel is normal in caliber.       No ascites.  Other than periportal lymph nodes measuring up to 12 mm, no   retroperitoneal or mesenteric lymphadenopathy is otherwise appreciated.       Incidental small intramural fibroid is noted in the right body of the uterus.       No signal abnormality identified in the visualized osseous structures.       MRCP:       Gallbladder: Surgically absent.  Small amount of fluid signal within the   gallbladder fossa, likely postoperative.       Bile Ducts: Intrahepatic and extrahepatic biliary dilatation with abrupt   cutoff at the mid common duct level.  Upstream dilatation measures 16 mm at   the common duct level, 8 mm at the left hepatic duct level and 7 mm in the   right hepatic duct.       Pancreatic Duct: Dilatation with abrupt cutoff at the site of pancreatic head   mass.  Upstream dilatation of 8 mm is noted.           Impression   1.  3.2 cm soft tissue mass in the pancreatic head resulting in pancreatic   duct and biliary dilatation, compatible with primary neoplasm.  There is   abutment of the portal vein and soft tissue extension/lymph nodes in the   liver hilum noted.  Given the limitations of motion artifact on this exam, a   pancreas CT should be considered for detailed vascular evaluation.       2.  No liver metastatic disease. 9/17/2020: MRI abdomen  Impression    1. Stable mass in the pancreatic head measuring up to 3.2 cm compatible with    pancreatic malignancy. Wes Ades is less than 180 degrees involvement of the    adjacent proximal SMV with some luminal narrowing, not significantly changed    from prior examination.  No additional vascular involvement is demonstrated. I spent more than 40 minutes examining, evaluating, reviewing data and counseling the patient. Greater than 50% of that time was spent face-to-face with the patient in counseling and coordinating her care.

## 2021-01-26 NOTE — TELEPHONE ENCOUNTER
AVS from 1/26/21     Labs tomorrow (print orders)  MR in 2 weeks  RTC 3 weeks    Pt will [facility] - orders given  Pt given orders for MRI and will self schedule will have done one week prior to rv  rv scheduled for 2/19/21 @ 10:45am    Pt was given AVS and appt schedule

## 2021-02-02 ENCOUNTER — APPOINTMENT (OUTPATIENT)
Dept: CT IMAGING | Age: 61
End: 2021-02-02
Payer: COMMERCIAL

## 2021-02-02 ENCOUNTER — HOSPITAL ENCOUNTER (EMERGENCY)
Age: 61
Discharge: HOME | End: 2021-02-03
Attending: EMERGENCY MEDICINE
Payer: COMMERCIAL

## 2021-02-02 VITALS
TEMPERATURE: 97.6 F | BODY MASS INDEX: 37.24 KG/M2 | HEIGHT: 65 IN | WEIGHT: 223.5 LBS | DIASTOLIC BLOOD PRESSURE: 68 MMHG | OXYGEN SATURATION: 97 % | RESPIRATION RATE: 18 BRPM | HEART RATE: 90 BPM | SYSTOLIC BLOOD PRESSURE: 131 MMHG

## 2021-02-02 DIAGNOSIS — S06.0X0A CONCUSSION WITHOUT LOSS OF CONSCIOUSNESS, INITIAL ENCOUNTER: Primary | ICD-10-CM

## 2021-02-02 DIAGNOSIS — R73.9 HYPERGLYCEMIA: ICD-10-CM

## 2021-02-02 PROCEDURE — 99283 EMERGENCY DEPT VISIT LOW MDM: CPT

## 2021-02-02 RX ORDER — ONDANSETRON 2 MG/ML
4 INJECTION INTRAMUSCULAR; INTRAVENOUS ONCE
Status: COMPLETED | OUTPATIENT
Start: 2021-02-03 | End: 2021-02-03

## 2021-02-02 RX ORDER — 0.9 % SODIUM CHLORIDE 0.9 %
1000 INTRAVENOUS SOLUTION INTRAVENOUS ONCE
Status: COMPLETED | OUTPATIENT
Start: 2021-02-03 | End: 2021-02-03

## 2021-02-03 ENCOUNTER — APPOINTMENT (OUTPATIENT)
Dept: CT IMAGING | Age: 61
End: 2021-02-03
Payer: COMMERCIAL

## 2021-02-03 ENCOUNTER — TELEPHONE (OUTPATIENT)
Dept: ONCOLOGY | Age: 61
End: 2021-02-03

## 2021-02-03 PROBLEM — E11.10 DKA, TYPE 2, NOT AT GOAL (HCC): Status: ACTIVE | Noted: 2021-02-03

## 2021-02-03 LAB
ABSOLUTE EOS #: 0 K/UL (ref 0–0.4)
ABSOLUTE IMMATURE GRANULOCYTE: 0.14 K/UL (ref 0–0.3)
ABSOLUTE LYMPH #: 0.57 K/UL (ref 1–4.8)
ABSOLUTE MONO #: 0.85 K/UL (ref 0.2–0.8)
ALBUMIN SERPL-MCNC: 3 G/DL (ref 3.5–5.2)
ALBUMIN/GLOBULIN RATIO: ABNORMAL (ref 1–2.5)
ALP BLD-CCNC: 137 U/L (ref 35–104)
ALT SERPL-CCNC: 16 U/L (ref 5–33)
ANION GAP SERPL CALCULATED.3IONS-SCNC: 28 MMOL/L (ref 9–17)
AST SERPL-CCNC: 16 U/L
BASOPHILS # BLD: 0 %
BASOPHILS ABSOLUTE: 0 K/UL (ref 0–0.2)
BILIRUB SERPL-MCNC: 0.35 MG/DL (ref 0.3–1.2)
BUN BLDV-MCNC: 38 MG/DL (ref 6–20)
BUN/CREAT BLD: 18 (ref 9–20)
CALCIUM SERPL-MCNC: 10.1 MG/DL (ref 8.6–10.4)
CHLORIDE BLD-SCNC: 88 MMOL/L (ref 98–107)
CHP ED QC CHECK: NORMAL
CO2: 16 MMOL/L (ref 20–31)
CREAT SERPL-MCNC: 2.16 MG/DL (ref 0.5–0.9)
DIFFERENTIAL TYPE: ABNORMAL
EKG ATRIAL RATE: 87 BPM
EKG P AXIS: 58 DEGREES
EKG P-R INTERVAL: 158 MS
EKG Q-T INTERVAL: 406 MS
EKG QRS DURATION: 92 MS
EKG QTC CALCULATION (BAZETT): 488 MS
EKG T AXIS: 82 DEGREES
EKG VENTRICULAR RATE: 87 BPM
EOSINOPHILS RELATIVE PERCENT: 0 % (ref 1–4)
GFR AFRICAN AMERICAN: 28 ML/MIN
GFR NON-AFRICAN AMERICAN: 23 ML/MIN
GFR SERPL CREATININE-BSD FRML MDRD: ABNORMAL ML/MIN/{1.73_M2}
GFR SERPL CREATININE-BSD FRML MDRD: ABNORMAL ML/MIN/{1.73_M2}
GLUCOSE BLD-MCNC: 317 MG/DL
GLUCOSE BLD-MCNC: 317 MG/DL (ref 65–105)
GLUCOSE BLD-MCNC: 354 MG/DL (ref 70–99)
HCT VFR BLD CALC: 27.7 % (ref 36.3–47.1)
HEMOGLOBIN: 8.3 G/DL (ref 11.9–15.1)
IMMATURE GRANULOCYTES: 1 %
LYMPHOCYTES # BLD: 4 % (ref 24–44)
MCH RBC QN AUTO: 27 PG (ref 25.2–33.5)
MCHC RBC AUTO-ENTMCNC: 30 G/DL (ref 28.4–34.8)
MCV RBC AUTO: 90.2 FL (ref 82.6–102.9)
MONOCYTES # BLD: 6 % (ref 1–7)
NRBC AUTOMATED: 0 PER 100 WBC
PDW BLD-RTO: 16.9 % (ref 11.8–14.4)
PLATELET # BLD: 434 K/UL (ref 138–453)
PLATELET ESTIMATE: ABNORMAL
PMV BLD AUTO: 10.2 FL (ref 8.1–13.5)
POTASSIUM SERPL-SCNC: 4.4 MMOL/L (ref 3.7–5.3)
RBC # BLD: 3.07 M/UL (ref 3.95–5.11)
RBC # BLD: ABNORMAL 10*6/UL
SEG NEUTROPHILS: 89 % (ref 36–66)
SEGMENTED NEUTROPHILS ABSOLUTE COUNT: 12.64 K/UL (ref 1.8–7.7)
SODIUM BLD-SCNC: 132 MMOL/L (ref 135–144)
TOTAL PROTEIN: 7 G/DL (ref 6.4–8.3)
WBC # BLD: 14.2 K/UL (ref 3.5–11.3)
WBC # BLD: ABNORMAL 10*3/UL

## 2021-02-03 PROCEDURE — 82947 ASSAY GLUCOSE BLOOD QUANT: CPT

## 2021-02-03 PROCEDURE — 96374 THER/PROPH/DIAG INJ IV PUSH: CPT

## 2021-02-03 PROCEDURE — 2580000003 HC RX 258: Performed by: EMERGENCY MEDICINE

## 2021-02-03 PROCEDURE — 70450 CT HEAD/BRAIN W/O DYE: CPT

## 2021-02-03 PROCEDURE — 85025 COMPLETE CBC W/AUTO DIFF WBC: CPT

## 2021-02-03 PROCEDURE — 6360000002 HC RX W HCPCS: Performed by: EMERGENCY MEDICINE

## 2021-02-03 PROCEDURE — 96375 TX/PRO/DX INJ NEW DRUG ADDON: CPT

## 2021-02-03 PROCEDURE — 6370000000 HC RX 637 (ALT 250 FOR IP): Performed by: EMERGENCY MEDICINE

## 2021-02-03 PROCEDURE — 80053 COMPREHEN METABOLIC PANEL: CPT

## 2021-02-03 PROCEDURE — 93005 ELECTROCARDIOGRAM TRACING: CPT | Performed by: EMERGENCY MEDICINE

## 2021-02-03 PROCEDURE — 93010 ELECTROCARDIOGRAM REPORT: CPT | Performed by: INTERNAL MEDICINE

## 2021-02-03 RX ORDER — 0.9 % SODIUM CHLORIDE 0.9 %
1000 INTRAVENOUS SOLUTION INTRAVENOUS ONCE
Status: COMPLETED | OUTPATIENT
Start: 2021-02-03 | End: 2021-02-03

## 2021-02-03 RX ADMIN — SODIUM CHLORIDE 1000 ML: 9 INJECTION, SOLUTION INTRAVENOUS at 01:33

## 2021-02-03 RX ADMIN — INSULIN HUMAN 10 UNITS: 100 INJECTION, SOLUTION PARENTERAL at 01:40

## 2021-02-03 RX ADMIN — ONDANSETRON 4 MG: 2 INJECTION INTRAMUSCULAR; INTRAVENOUS at 00:17

## 2021-02-03 RX ADMIN — SODIUM CHLORIDE 1000 ML: 9 INJECTION, SOLUTION INTRAVENOUS at 00:17

## 2021-02-03 NOTE — ED PROVIDER NOTES
EMERGENCY DEPARTMENT ENCOUNTER    Pt Name: Kathya Wilson  MRN: [id number]  [place] 1960  Date of evaluation: 2/3/21  CHIEF COMPLAINT       Chief Complaint   Patient presents with    Head Injury     fell yesterday hit left side of head on ground    Generalized Body Aches     fell yesterday and hit ground     HISTORY OF PRESENT ILLNESS   Patient is a 79-year-old female with PMH of terminal pancreatic cancer who presents the ED complaining of headache and nausea and vomiting after slip and fall yesterday. Patient slipped on ice fell to the ground striking left side of her head. No LOC. Patient is on Eliquis. Today she had generalized weakness, lightheaded and had one episode of NBNB emesis. No neck pain. No fever, cough, chest pain, abdominal pain. REVIEW OF SYSTEMS     Review of Systems   All other systems reviewed and are negative.     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    Cellulitis 12/28/2020    Class 3 severe obesity with serious comorbidity in adult [facility]) 7/21/2020    Dehydration with hyponatremia 7/22/2020    Depression 7/21/2020    Diabetes mellitus ([address]     Diabetic complication ([address] 8/37/2025    Encounter for palliative care     Epigastric pain 1/5/2021    Hepatomegaly 7/21/2020    Hyperglycemia due to type 2 diabetes mellitus ([address] 6/4/2020    Hyperlipidemia     Hypertension     Hypoglycemia 12/21/2020    Hypomagnesemia 6/4/2020    Hypophosphatemia 6/4/2020    Intractable nausea and vomiting 7/21/2020    Intractable vomiting     Long term current use of insulin ([address] 7/21/2020    Methicillin resistant Staphylococcus aureus infection 7/21/2020    NSVT (nonsustained ventricular tachycardia) ([address] 7/23/2020    Pain in limb 7/21/2020    Pancreatic adenocarcinoma ([address] 6/9/2020  Pancreatic cancer [facility])     Pancreatic Head Mass 2020    Pancreatic malignancy syndrome ([address] 2020    Peripheral venous insufficiency 2020    Postmenopausal bleeding 2013    Postprocedural state 2020    Primary adenocarcinoma of head of pancreas ([address] 2020    Primary localized osteoarthrosis of ankle and foot 2020    Septicemia due to E. coli ([address] 2020    Transaminitis 2020    Type 2 diabetes mellitus with hyperglycemia, with long-term current use of insulin ([address] 2020    Urinary tract infectious disease 2020     SURGICAL HISTORY       Past Surgical History:   Procedure Laterality Date     SECTION      x2    ERCP  2020    SPHINCTER/PAPILLOTOMY, STENT INSERTION    ERCP  2020    ERCP SPHINCTER/PAPILLOTOMY performed by Holly Hernandez MD at [address] ERCP  2020    ERCP STENT INSERTION performed by Holly Hernandez MD at [address] ERCP  2020     ERCP ENDOSCOPIC RETROGRADE CHOLANGIOPANCREATOGRAPHY -   ERCP STENT REMOVAL     ERCP  2020    ERCP STENT REMOVAL performed by Mayco Spann MD at [address] ERCP  2020    ERCP STENT INSERTION performed by Mayco Spann MD at [address] [place] [address] ERCP  2020    ERCP DILATION BALLOON performed by Mayco Spann MD at [address] [place] [address]  2020    W/EUS FNA     UPPER GASTROINTESTINAL ENDOSCOPY  2020    EGD W/EUS FNA in OR with GI staff performed by Holly Hernandez MD at [place] [id number]       Previous Medications    AMLODIPINE (NORVASC) 10 MG TABLET    Take 1 tablet by mouth daily    APIXABAN (ELIQUIS) 5 MG TABS TABLET    Take 1 tablet by mouth 2 times daily    ASPIRIN 81 MG CHEWABLE TABLET    Take 81 mg by mouth nightly    ATORVASTATIN (LIPITOR) 40 MG TABLET    Take 1 tablet by mouth nightly    BLOOD GLUCOSE MONITOR STRIPS    Test 3  times daily  Insulin Dependent mellitus CARVEDILOL (COREG) 12.5 MG TABLET    Take 1 tablet by mouth 2 times daily (with meals)    FUROSEMIDE (LASIX) 20 MG TABLET    Take 20 mg by mouth daily as needed (swelling)    GLUCOSE MONITORING KIT (FREESTYLE) MONITORING KIT    1 kit by Does not apply route daily    HYDRALAZINE (APRESOLINE) 25 MG TABLET    Take 1 tablet by mouth every 8 hours    INSULIN GLARGINE (LANTUS) 100 UNIT/ML INJECTION VIAL    Inject 32 Units into the skin daily    INSULIN LISPRO, 1 UNIT DIAL, (HUMALOG KWIKPEN) 100 UNIT/ML SOPN    Inject 0-12 Units into the skin 3 times daily (before meals) **Medium Dose Corrective Algorithm** Glucose: Dose: If <139 - No Insulin. 140-199 -  2 Units. 200-249 4 Units. 250-299 6 Units. 300-349 8 Units. 350-400 10 Units. Above 400- 12 Units    INSULIN PEN NEEDLE ([employer] PEN NEEDLES 29G) 29G X 12MM MISC    1 each by Does not apply route daily    LANCETS MISC    1 each by Does not apply route 3 times daily    LORAZEPAM (ATIVAN) 0.5 MG TABLET    Take 1 mg by mouth nightly. And 1/2 tab once daily as needed    LORAZEPAM (ATIVAN) 1 MG TABLET    Take 0.5 mg by mouth daily as needed for Anxiety. NITROGLYCERIN (NITROSTAT) 0.4 MG SL TABLET    up to max of 3 total doses. If no relief after 1 dose, call 911. ONDANSETRON (ZOFRAN-ODT) 8 MG TBDP DISINTEGRATING TABLET    Place 1 tablet under the tongue every 8 hours as needed for Nausea or Vomiting    OXYCODONE (OXYCONTIN) 10 MG EXTENDED RELEASE TABLET    Take 1 tablet by mouth every 12 hours for 30 days. SERTRALINE (ZOLOFT) 100 MG TABLET    Take 150 mg by mouth daily Takes 1.5 tabs (=150mg) daily     ALLERGIES     is allergic to lisinopril; sulfamethoxazole; trimethoprim; cefuroxime axetil; buspirone; sulfamethoxazole-trimethoprim; clindamycin phosphate; and penicillins.   FAMILY HISTORY She indicated that the status of her mother is unknown. She indicated that the status of her father is unknown. She indicated that the status of her maternal grandmother is unknown. She indicated that the status of her maternal grandfather is unknown. SOCIAL HISTORY       Social History     Tobacco Use    Smoking status: Former Smoker     Quit date: 7/14/2005     Years since quitting: 15.5    Smokeless tobacco: Never Used   Substance Use Topics    Alcohol use: Not Currently    Drug use: No     PHYSICAL EXAM     INITIAL VITALS: /68   Pulse 90   Temp 97.6 °F (36.4 °C) (Oral)   Resp 18   Ht 5' 5\" (1.651 m)   Wt 223 lb 8 oz (101.4 kg)   SpO2 97%   BMI 37.19 kg/m²    Physical Exam  Constitutional:       Appearance: Normal appearance. HENT:      Head: Normocephalic. Right Ear: External ear normal.      Left Ear: External ear normal.      Mouth/Throat:      Mouth: Mucous membranes are moist.   Eyes:      Conjunctiva/sclera: Conjunctivae normal.   Neck:      Musculoskeletal: Normal range of motion. Cardiovascular:      Rate and Rhythm: Normal rate. Pulmonary:      Effort: Pulmonary effort is normal.   Abdominal:      General: Abdomen is flat. Skin:     General: Skin is dry. Neurological:      Mental Status: She is alert and oriented to person, place, and time. Mental status is at baseline. Psychiatric:         Mood and Affect: Mood normal.         Behavior: Behavior normal.      Comments:       Limited exam secondary to Covid-19 pandemic. MEDICAL DECISION MAKING:   The patient is hemodynamically stable, afebrile, nontoxic-appearing. Physical exam unremarkable. Based on history and exam differential includes concussion, intracranial hemorrhage. ED plan for basic labs, fluids, CT head, reassess.            DIAGNOSTIC RESULTS   EKG:All EKG's are interpreted by the Emergency Department Physician who either signs or Co-signs this chart in the absence of a cardiologist. RADIOLOGY:All plain film, CT, MRI, and formal ultrasound images (except ED bedside ultrasound) are read by the radiologist, see reports below, unless otherwisenoted in MDM or here. CT Head WO Contrast   Final Result   No acute intracranial abnormality. LABS: All lab results were reviewed by myself, and all abnormals are listed below. Labs Reviewed   CBC WITH AUTO DIFFERENTIAL - Abnormal; Notable for the following components:       Result Value    WBC 14.2 (*)     RBC 3.07 (*)     Hemoglobin 8.3 (*)     Hematocrit 27.7 (*)     RDW 16.9 (*)     Seg Neutrophils 89 (*)     Lymphocytes 4 (*)     Eosinophils % 0 (*)     Immature Granulocytes 1 (*)     Segs Absolute 12.64 (*)     Absolute Lymph # 0.57 (*)     Absolute Mono # 0.85 (*)     All other components within normal limits   COMPREHENSIVE METABOLIC PANEL W/ REFLEX TO MG FOR LOW K - Abnormal; Notable for the following components:    Glucose 354 (*)     BUN 38 (*)     CREATININE 2.16 (*)     Sodium 132 (*)     Chloride 88 (*)     CO2 16 (*)     Anion Gap 28 (*)     Alkaline Phosphatase 137 (*)     Albumin 3.0 (*)     GFR Non- 23 (*)     GFR  28 (*)     All other components within normal limits   POC GLUCOSE FINGERSTICK - Abnormal; Notable for the following components:    POC Glucose 317 (*)     All other components within normal limits   POCT GLUCOSE - Normal   URINE RT REFLEX TO CULTURE       EMERGENCY DEPARTMENTCOURSE:   Patient did well in the ED. CT head negative for acute intracranial hemorrhage. Glucose improved from 3 54-3 17 with insulin and fluids. Symptoms improved with fluids and Zofran  Patient appears to be suffering from mild concussion. No further work-up indicated time      Nursing notes reviewed. At this time this is what I find, the patient appears well and does not appear sick or toxic. I gave my usual and customary discussion of the risks and benefits of discharge versus admission. I answered the patient's questions. I gave the patient strict return precautions. Patient expressed understanding of the discharge instructions. Dictated but not reviewed. Vitals:    Vitals:    02/02/21 2329 02/02/21 2332   BP: 131/68    Pulse: 90    Resp: 18    Temp:  97.6 °F (36.4 °C)   TempSrc:  Oral   SpO2: 97%    Weight:  223 lb 8 oz (101.4 kg)   Height: 5' 5\" (1.651 m)        The patient was given the following medications while in the emergency department:  Orders Placed This Encounter   Medications    0.9 % sodium chloride bolus    ondansetron (ZOFRAN) injection 4 mg    insulin regular (HUMULIN R;NOVOLIN R) injection 10 Units    0.9 % sodium chloride bolus     CONSULTS:  None    FINAL IMPRESSION      1. Concussion without loss of consciousness, initial encounter    2.  Hyperglycemia          DISPOSITION/PLAN   DISPOSITION Decision To Discharge 02/03/2021 02:34:45 AM      [facility] [id number] [phone]    In 2 days      DISCHARGE MEDICATIONS:  New Prescriptions    No medications on file     Bobbi Adams MD  Attending Emergency Physician                    Courtney Parra MD  02/03/21 5238

## 2021-02-03 NOTE — ED NOTES
Pt came to ED after a fall yesterday. Pt denies any LOC and states that her balance has been poor lately and she has been experiencing weakness. Pt states she hit her head yesterday and is experiencing pain on the left side where she fell.      Allison Ribeiro RN  02/03/21 8081

## 2021-02-03 NOTE — TELEPHONE ENCOUNTER
Name: Kevin Lancaster  : 1960  MRN: [id number]    Oncology Navigation Follow-Up Note    Contact Type:  Telephone  Notes: Pt left VM after hours requesting writer call back. Upon review of chart noted pt completed ER visit @ [facility] yesterday r/t fall dx with concussion without loss of consciousness & hyperglycemia. Attempted to contact pt, no answer, VM left notified writer aware of ER visit, instructed may contact writer prn, & may contact [facility] triage nurse @ [phone] prn. Will continue to follow.     Electronically signed by Tiffany Bailon RN on 2/3/2021 at 8:09 AM

## 2021-02-05 ENCOUNTER — TELEPHONE (OUTPATIENT)
Dept: PALLATIVE CARE | Age: 61
End: 2021-02-05

## 2021-02-05 NOTE — TELEPHONE ENCOUNTER
I called patient and her son Ally Malik answered. Son states patient fell going to an appointment and hit her head. She is now admitted at [facility] with a concussion. Ally Malik states patient is no longer on home IV ATB. She is no longer receiving chemotherapy or radiation. I asked about patient's pain and he states, \"She has quite a bit of pain but I think she is ok\". I explained that we can bring patient into our clinic to be seen once she is out of the hospital to assess her pain. Ally Malik is agreeable. I asked Ally Malik if they have discussed hospice at home and he states they are thinking about it. Emotional support offered. I told Ally Malik to reach out to us if needing anything.    Will follow up with patient once she is discharged from [address] Lainey Zambrano RN, [facility]

## 2021-02-09 ENCOUNTER — TELEPHONE (OUTPATIENT)
Dept: ONCOLOGY | Age: 61
End: 2021-02-09

## 2021-02-09 NOTE — TELEPHONE ENCOUNTER
Call received from [facility] stating patient's blood sugar is 363 this morning. [facility] is not on patient's HIPAA form and writer requested to talk to patient. Writer spoke with patient and patient stated she was placed on an Insulin sliding scale prior to d/c from the hospital. Patient stated she is unsure how much insulin to take. Writer explained that the sliding scales typically have documented parameters based on blood sugar results for how many units of insulin that is needed. Cammie Valdovinos stated she doesn't know how to read it. Writer explained to contact PCP or endocrinologist STAT to address hyperglycemia and insulin management. Patient stated she doesn't even know what endocrinologist she is going to see and has not called any doctors for f/u appts that she was instructed to do at discharge. Patient to call Dr Monserrat Brady, PCP, STAT this morning for further management and guidance.  Adrienne Hernández

## 2021-02-12 ENCOUNTER — TELEPHONE (OUTPATIENT)
Dept: ONCOLOGY | Age: 61
End: 2021-02-12

## 2021-02-12 ENCOUNTER — TELEPHONE (OUTPATIENT)
Dept: PALLATIVE CARE | Age: 61
End: 2021-02-12

## 2021-02-12 NOTE — TELEPHONE ENCOUNTER
I reached out to patient and called her home phone. Son Alice Quinn answered and states patient is no longer living there but staying with a friend. Alice Quinn states patient is now on Hospice and is \"doing good\". I asked if patient is able to talk on the phone and he said yes and directed me to call her cell phone (confirmed the number). Alice Quinn states, \"It has been really busy that's why she hasn't called you back\". I explained that I was just worried she may be out of pain medicine. If patient is on hospice, they will take over pain regimen. I called patient's cell phone and she did not answer. I did leave her a detailed message and my phone number.     [id number], Lainey RN, [facility]

## 2021-10-27 NOTE — PROGRESS NOTES
CLINICAL PHARMACY NOTE: MEDS TO [address] Select Patient?: No  Total # of Prescriptions Filled: 4   The following medications were delivered to the patient:  · LANTUS SOLOSTAR   · PEN NEEDLES 31G X 6 MM  · ATORVASTATIN 40 MG TABS  · ELIQUIS 5 MG TABS  Total # of Interventions Completed: 0  Time Spent (min): 5    Additional Documentation:  DELIVERED TO PT'S ROOM 1/6/21. $45.00 COPAY, PAID WITH CARD. THE PT ALSO HAD AN OXYCODONE 10 MG TABS RX THAT WAS SENT ELECTRONICALLY TO HER LOCAL [facility]. SHE AND HER  ARE AWARE OF THIS. Patient has a TKA 10/25.  She states that her meds were not called in

## (undated) DEVICE — SNARE ENDOSCP M W27MMXL240CM SHTH DIA2.4MM OVL FLX DISP

## (undated) DEVICE — CONTRAST IOTHALAMATE MEGLUMINE 60% 50 ML INJ CONRAY 60

## (undated) DEVICE — SPHINCTEROTOME: Brand: DREAMTOME™ RX 44

## (undated) DEVICE — RETRIEVAL BALLOON CATHETER: Brand: EXTRACTOR™ PRO RX

## (undated) DEVICE — ELECTRODE PT RET AD L9FT HI MOIST COND ADH HYDRGEL CORDED

## (undated) DEVICE — STENT SYSTEM RMV
Type: IMPLANTABLE DEVICE | Site: BILE DUCT | Status: NON-FUNCTIONAL
Brand: WALLFLEX BILIARY
Removed: 2020-07-22

## (undated) DEVICE — ENDOSCOPIC ULTRASOUND FINE NEEDLE BIOPSY (FNB) DEVICE: Brand: ACQUIRE